# Patient Record
Sex: MALE | Race: WHITE | NOT HISPANIC OR LATINO | Employment: UNEMPLOYED | URBAN - METROPOLITAN AREA
[De-identification: names, ages, dates, MRNs, and addresses within clinical notes are randomized per-mention and may not be internally consistent; named-entity substitution may affect disease eponyms.]

---

## 2017-01-19 ENCOUNTER — TRANSFERRED RECORDS (OUTPATIENT)
Dept: HEALTH INFORMATION MANAGEMENT | Facility: CLINIC | Age: 32
End: 2017-01-19

## 2017-03-15 ENCOUNTER — TRANSFERRED RECORDS (OUTPATIENT)
Dept: HEALTH INFORMATION MANAGEMENT | Facility: CLINIC | Age: 32
End: 2017-03-15

## 2018-07-20 ENCOUNTER — TRANSFERRED RECORDS (OUTPATIENT)
Dept: HEALTH INFORMATION MANAGEMENT | Facility: CLINIC | Age: 33
End: 2018-07-20

## 2018-08-10 PROBLEM — R25.1 TREMOR: Status: ACTIVE | Noted: 2018-08-10

## 2018-08-13 NOTE — TELEPHONE ENCOUNTER
FUTURE VISIT INFORMATION      FUTURE VISIT INFORMATION:    Date: 08/17/2018    Time: 1:15 pm     [place] [facility] [place]FORMATION:    Referring provider:  Brianna Mckenzie,    Referring providers clinic:      Reason for visit/diagnosis        NOTES (FOR ALL VISITS) STATUS DETAILS   OFFICE NOTE from referring provider Internal 08/17/2018   OFFICE NOTE from other specialist N/A    DISCHARGE SUMMARY from hospital N/A    DISCHARGE REPORT from the ER Care Everywhere 07/14/2017   OPERATIVE REPORT N/A    MEDICATION LIST NA     IMAGING  (FOR ALL VISITS)     EMG N/A    EEG N/A    ECT N/A    MRI (HEAD, NECK, SPINE) Internal PACS  12/08/2009, 03/15/2017   CT (HEAD, NECK, SPINE) N/A    OTHER

## 2018-08-17 ENCOUNTER — PRE VISIT (OUTPATIENT)
Dept: NEUROLOGY | Facility: CLINIC | Age: 33
End: 2018-08-17

## 2018-08-17 PROBLEM — R56.9 SEIZURE (H): Status: ACTIVE | Noted: 2018-08-17

## 2018-09-04 ENCOUNTER — OFFICE VISIT (OUTPATIENT)
Dept: NEUROLOGY | Facility: CLINIC | Age: 33
End: 2018-09-04
Payer: COMMERCIAL

## 2018-09-04 ENCOUNTER — PRE VISIT (OUTPATIENT)
Dept: NEUROLOGY | Facility: CLINIC | Age: 33
End: 2018-09-04

## 2018-09-04 VITALS
RESPIRATION RATE: 16 BRPM | HEART RATE: 62 BPM | OXYGEN SATURATION: 98 % | TEMPERATURE: 97.3 F | WEIGHT: 177.1 LBS | BODY MASS INDEX: 23.47 KG/M2 | SYSTOLIC BLOOD PRESSURE: 134 MMHG | DIASTOLIC BLOOD PRESSURE: 82 MMHG | HEIGHT: 73 IN

## 2018-09-04 DIAGNOSIS — R25.1 TREMOR: Primary | ICD-10-CM

## 2018-09-04 DIAGNOSIS — R25.1 TREMOR: ICD-10-CM

## 2018-09-04 PROCEDURE — 82390 ASSAY OF CERULOPLASMIN: CPT | Performed by: PSYCHIATRY & NEUROLOGY

## 2018-09-04 ASSESSMENT — PAIN SCALES - GENERAL: PAINLEVEL: NO PAIN (0)

## 2018-09-04 NOTE — TELEPHONE ENCOUNTER
FUTURE VISIT INFORMATION:    Date: 09/04/2018 Rescheduled from  08/17/2018    Time: 1:40 pm     Location[place] [facility] [place]   REFERRAL INFORMATION:    Referring provider:  Brianna Mckenzie,    Referring providers clinic:      Reason for visit/diagnosis          NOTES (FOR ALL VISITS) STATUS DETAILS   OFFICE NOTE from referring provider Internal 08/17/2018   OFFICE NOTE from other specialist N/A     DISCHARGE SUMMARY from hospital N/A     DISCHARGE REPORT from the ER Care Everywhere 07/14/2017   OPERATIVE REPORT N/A     MEDICATION LIST NA      IMAGING  (FOR ALL VISITS)       EMG N/A     EEG N/A     ECT N/A     MRI (HEAD, NECK, SPINE) Internal PACS  12/08/2009, 03/15/2017   CT (HEAD, NECK, SPINE) N/A     OTHER

## 2018-09-04 NOTE — NURSING NOTE
Chief Complaint   Patient presents with     Tremors     P NEW MOVEMENT DISORDER, CONSULTATION     Skyler Joy, EMT

## 2018-09-04 NOTE — PROGRESS NOTES
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 Most notable on exam are rigidity and bradykinesia, confined largely to the left arm although there is some subtler possible involvement of the face with hypomimia and right arm with milder rigidity. There was no clear resting tremor observed today, more a jerky action tremor.     Etiology is not clear at this point. Will evaluate for secondary cause for parkinsonism, notably Renny's Disease or a mass lesion causing hemiparkinonism. Will also pursure GEETA scan. If this were to be Parkinson Disease, age of onset is quite young and a genetic etiology would have to be considered.    Recommendations:   -ceruloplasmin and 24 hour urine copper  -MRI brain  -GEETA scan  -genetics consult with kelsi Sorto    Will follow-up with me in one month to discuss above results    Jimbo Love MD  Movement Disorders Fellow    Patient seen and examined by me today September 4, 2018  Ramesh carson md.   September 4, 2018    Ramesh Carson MD      [facility] - New Patient    CC: tremor    HPI: Dipesh Nicole is a 32 year-old right-handed male with a history of a single reported seizure who presents due to left hand tremor. He reports that about a year ago while holding his left hand on the steering wheel he would start to notice difficulty tapping out a rhythm and tremor of the hand. He reports the tremor comes more when he is trying to use the hand and less when it is relaxed. The hand has also become more difficult to use is certain situations. For example putting his hand in his pocket, making piano playing movements are difficult. 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 He also more recently noted that his left toe would extend while driving. He feels like he has been trying suppress the tremor, and thus not swinging his  "left arm while walking.    He denies anosmia, dream enactment behavior, right body symptoms. No cognitive changes, mood changes, dysphagia, or orthostasis. Rolling over in bed is a little harder, no difficulty rising from a chair. No drooling. No facial expression or speech changes.    He had a convulsion in 2009. It occurred after eating breakfast while deer hunting. He had a felt like he was \"sleepwalking\" while cleaning the campsite, then lost consciousness. He was witnessed to fall over a convulse for about 10 minutes. He had an MRI performed but does not remember an EEG. No further events.      Past Medical History:   Diagnosis Date     H/O magnetic resonance imaging of brain and brain stem 8/17/2018 2009 December MR Brain without and with IV [id number]/8/2009 [facility] Result Impression  Normal MRI of the brain.  Result Narrative      Multiecho, multiplanar views of the brain were obtained prior to and  following the IV administration of 19 mL of contrast. There are no  previous studies available for comparison.     The brain parenchyma is normal in appearance on all pulse sequences,  with      H/O magnetic resonance imaging of cervical spine 8/17/2018 March MR Cervical Spine without IV Contrast3/15/2017 [facility] Result Addenda Addendum by Provider, PA Jones on 03/15/2017 12:30 PM RAD^^^MA MR Cervical Spine w/o contrast 3/15/2017 12:30:00 Result Impression  Minimal degenerative disc disease at C5-6 and C6-7  otherwise an unremarkable MR scan of the cervical spine.  Result Narrative   EXAM: MR Cervical Spine w/o contrast   INDICATION:      H/O shoulder surgery 8/17/2018 2017 July XR SHOULDER 2 VIEWS LEFT7/14/2017 [facility] Result Narrative XR SHOULDER 2 VIEWS LEFT 7/14/2017 9:13 PM  INDICATION: Shoulder Injury COMPARISON: None.  FINDINGS: Negative shoulder. No fracture or dislocation.  Status       Seizure (H) at age 22 yrs 8/17/2018     Shoulder " "dislocation     left     Tremor 8/10/2018     Past Surgical History:   Procedure Laterality Date     SHOULDER SURGERY  2007       No current outpatient prescriptions on file.     No current facility-administered medications for this visit.        Social History     Social History Narrative    single. lives in [facility]. mother cabrera ambriz            He has a history of a left dislocated shoulder with surgery performed in 2009. He currently lives in [place] but comes to the [place] area to visit his parents in [place]. 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           Family History   Problem Relation Age of Onset     Cancer Other      grandmother     Tremor No family hx of      Dementia No family hx of      Parkinsonism No family hx of      Seizure Disorder No family hx of        Review of Systems noted below, or as above in HPI    Exam:  /82  Pulse 62  Temp 97.3  F (36.3  C) (Oral)  Resp 16  Ht 1.854 m (6' 1\")  Wt 80.3 kg (177 lb 1.6 oz)  SpO2 98%  BMI 23.37 kg/m2    Neurological Examination (R/L):  Mental Status: Awake, alert. Fluent. Affect slightly anxious.  Cranial Nerves: Visual fields intact to confrontation. Versions full. Saccades intact. Question slightly decreased frequency of eye blinking. No hypophonia. Tongue protrudes midline without fasciculations. No visual extinction to DST. No apraxia of left arm.  Motor: Moderate/severe bradykinesia with left hand finger tapping, grasping, and pronation-supination. Slight bradykinesia on left heel stomping, but toe tapping normal bilaterally. No right arm bradykinesia. Tone moderate in neck, as well as left arm and milder in left leg. Increased tone with augmentation in right arm. No resting tremor. Irregular low amplitude jerky tremor with posture and kinetic in left arm.  Sensory: Light touch intact in all four extremities. No extinction to DST. Vibration intact at the toes bilaterally. Romberg negative.  Reflexes: " Biceps 2/2 Triceps 2/2 Brachioradialis 2/2 Patellar 2/2 Achilles 2/2. Toes were downgoing bilaterally.  Coordination: Finger to nose without dysmetria.  Gait: Can rise from chair with arms crossed. Gait narrow-based with good posture. Decreased left arm swing. Tandem and retropulsion normal.      _____________________________________________________________________  PATIENT: Dipesh Nicole  32 year old male   : 1985  LAUREL: 2018    Consult requested by Dr. Mckenzie        Outside records reviewed and revealed   Inserted.       History obtained from patient      History of Present Illness  33 yo with tremor      Norco  Hydrocodone=acetaminophen    MR Cervical Spine without IV Contrast3/15/2017  [facility]  Result Addenda   Addendum by Provider, PA Jones on 03/15/2017 12:30 PM  RAD^^^MA  MR Cervical Spine w/o contrast  3/15/2017 12:30:00   Result Impression    Minimal degenerative disc disease at C5-6 and C6-7   otherwise an unremarkable MR scan of the cervical spine.    Result Narrative       EXAM: MR Cervical Spine w/o contrast     INDICATION: left hand weakness     COMPARISON: None.      Procedure: Sagittal short and long TR and STIR images of the cervical   spine were followed by axial 2-D merge and long TR images through the   disc spaces. Oblique sagittal long TR images within obtained through   the neural foramina bilaterally.     FINDINGS: There is a normal cervical lordosis. The vertebral body   heights and intervertebral disc spaces are maintained. The marrow   signal is normal in all sequences. The C1-2 relationship is normal. No   prevertebral soft tissue swelling is seen. The C7-T1 relationship is   normal.     Axial images at C2-3, C3-4, C4-5 demonstrate no disc bulging or   herniation. The neural foramina are widely patent and the posterior   facets are intact.     Axial images at C5-6 and C6-7 demonstrate minimal focal annular   bulging in the midline without significant  encroachment on the thecal   sac. The neural foramina are widely patent and the posterior facets   are intact.     Axial images at C7-T1 demonstrate no disc bulging or herniation. The   neural foramina are        2009 December  MR Brain without and with IV Gkikswul64/8/2009  [facility]  Result Impression    Normal MRI of the brain.    Result Narrative           Multiecho, multiplanar views of the brain were obtained prior to and   following the IV administration of 19 mL of contrast. There are no   previous studies available for comparison.       The brain parenchyma is normal in appearance on all pulse sequences,   with no intracranial hemorrhage or evidence of an acute stroke. There   is no pathological enhancement. The ventricles are normal in size,   shape and position, with no shift in midline structures or other   evidence of significant mass effect. There is no sclerosis of the   mesial temporal lobe on either side to suggest a cause for this   patient's apparent seizure activity.       There are normal flow-voids within the internal carotid and   vertebrobasilar arterial systems bilaterally. The visualized aspects   of the orbits are normal.              Medications                                                                                                                                                                                                                  14 Review of systems  are negative except for   Patient Active Problem List   Diagnosis     Tremor     Seizure (H) at age 22 yrs     H/O magnetic resonance imaging of cervical spine     H/O shoulder surgery     H/O magnetic resonance imaging of brain and brain stem     Shoulder dislocation      No Known Allergies  Past Surgical History:   Procedure Laterality Date     SHOULDER SURGERY  2007     Past Medical History:   Diagnosis Date     H/O magnetic resonance imaging of brain and brain stem 8/17/2018 2009 December MR Brain without and  with IV Jojzqmih96/8/2009 [facility] Result Impression  Normal MRI of the brain.  Result Narrative      Multiecho, multiplanar views of the brain were obtained prior to and  following the IV administration of 19 mL of contrast. There are no  previous studies available for comparison.     The brain parenchyma is normal in appearance on all pulse sequences,  with      H/O magnetic resonance imaging of cervical spine 8/17/2018 March MR Cervical Spine without IV Contrast3/15/2017 [facility] Result Addenda Addendum by Provider, PA Jones on 03/15/2017 12:30 PM RAD^^^MA MR Cervical Spine w/o contrast 3/15/2017 12:30:00 Result Impression  Minimal degenerative disc disease at C5-6 and C6-7  otherwise an unremarkable MR scan of the cervical spine.  Result Narrative   EXAM: MR Cervical Spine w/o contrast   INDICATION:      H/O shoulder surgery 8/17/2018 2017 July XR SHOULDER 2 VIEWS LEFT7/14/2017 [facility] Result Narrative XR SHOULDER 2 VIEWS LEFT 7/14/2017 9:13 PM  INDICATION: Shoulder Injury COMPARISON: None.  FINDINGS: Negative shoulder. No fracture or dislocation.  Status       Seizure (H) at age 22 yrs 8/17/2018     Shoulder dislocation     left     Tremor 8/10/2018     Social History     Social History     Marital status: Single     Spouse name: N/A     Number of children: N/A     Years of education: N/A     Occupational History     Not on file.     Social History Main Topics     Smoking status: Never Smoker     Smokeless tobacco: Never Used     Alcohol use No     Drug use: No     Sexual activity: Not on file     Other Topics Concern     Not on file     Social History Narrative    single. lives in [facility]. mother cabrera ambriz            He has a history of a left dislocated shoulder with surgery performed in 2009. He currently lives in [place] but comes to the [place] area to visit his parents in [place]. He works as a full-time  throughout the  "year.         Family History   Problem Relation Age of Onset     Cancer Other      grandmother     Tremor No family hx of      Dementia No family hx of      Parkinsonism No family hx of      Seizure Disorder No family hx of      No current outpatient prescriptions on file.     Examination  B/P: Data Unavailable, T: Data Unavailable, P: Data Unavailable, R: Data Unavailable 177 lbs 1.6 oz  Blood pressure 134/82, pulse 62, temperature 97.3  F (36.3  C), temperature source Oral, resp. rate 16, height 1.854 m (6' 1\"), weight 80.3 kg (177 lb 1.6 oz), SpO2 98 %., Body mass index is 23.37 kg/(m^2).    Vitals signs were added and reviewed if not above. Please refer to the chart from this visit.    He has obvious asymmetric features with tremor, rigidity and bradykinesia on the left.     ----------------------------------------------------------------------------------------------------------------------------------------------        32 year old male   : 1985  LAUREL:      No show        2018     Consult requested by Dr. Mckenzie           Outside records reviewed and revealed  =inserted.         History obtained from patient        History of Present Illness  31 yo man with tremor           Medications                                                                                                                                                                                                                                                                                                                                                                          14 Review of systems  are negative except for       Patient Active Problem List   Diagnosis     Tremor       Allergies not on file   Past Surgical History    No past surgical history on file.      Past Medical History         Past Medical History:   Diagnosis Date     Tremor 8/10/2018          Social History    Social History            Social History     " Marital status: Single       Spouse name: N/A     Number of children: N/A     Years of education: N/A          Occupational History     Not on file.           Social History Main Topics     Smoking status: Not on file     Smokeless tobacco: Not on file     Alcohol use Not on file     Drug use: Not on file     Sexual activity: Not on file           Other Topics Concern     Not on file      Social History Narrative     single. lives in [facility]. mother cabrera ambriz          Family History    No family history on file.      Current Outpatient Prescriptions    No current outpatient prescriptions on file.               Patient Demographics  - 32 y.o. Male, born Oct. 27, 1985     Patient Address Communication Language Race / Ethnicity   [address] [place] [address]  [phone] (Home)  [phone]  English - Spoken (Preferred) White / Not  or    Allergies    No Known Allergies  Current Medications    Prescription Sig. Disp. Refills Start Date End Date Status   rx HYDROcodone-acetaminophen, 5-325 mg, (NORCO) tablet (ED DC MED)    Indications: Acute pain of left shoulder Take 1 tablet by mouth every 4 hours if needed 4 tablet   0 07/14/2017    Active   Active Problems    Not on file    Surgical History    Surgery Date Laterality Comments   SHOULDER SURGERY 2007 Left      Social History    Tobacco Use Types Packs/Day Years Used Date   Never Smoker               Smokeless Tobacco: Never Used               Alcohol Use Drinks/Week oz/Week Comments   Yes       occasionally      Sex Assigned at Birth Date Recorded   Not on file            Patient Demographics  - 32 y.o. Male, born Oct. 27, 1985     Patient Address Communication Language Race / Ethnicity   [address]  [place] [address]  [phone] (Home)  [email or web address]  English - Written (Preferred) White / Not  or    Source Comments  - [facility]    Please be aware that records only contain data for  patients seen at the [facility] and [facility] in [place], [place]. [facility]. For patient requestsfor these sites, Please contact [phone] M-F 8:00 AM - 5:00 PM [employer]. For emergent after hours requests, please call [phone].  Allergies    No Known Allergies  Current Medications    No known medications  Active Problems    No known active problems    Encounters  - from Last 3 Months    Date Type Specialty Care Team Description   07/20/2018 Comprehensive Visit Family Medicine Artie Chandler D.O.   Pain Neck (Primary Dx);   Dysfunction Somatic Head Region;   Dysfunction Somatic Cervical Region;   Dysfunction Somatic Upper Extremity   Immunizations  Reconcile with Patient's Chart    Name Dates Previously Given Next Due   HepA Adult 10/24/2011      Influenza, Unspecified 10/24/2011      Tdap 10/24/2011      Surgical History    Surgery Date Laterality Comments   SHOULDER SURGERY    Left      Medical History    Medical History Date Comments   Dislocation Recurrent Shoulder Left         Social History    Tobacco Use Types Packs/Day Years Used Date   Never Smoker               Smokeless Tobacco: Never Used               Alcohol Use Drinks/Week oz/Week Comments   Yes               Sex Assigned at Birth Date Recorded   Not on file           Progress Notes  - in this encounter    Artie Chandelr D.O. - 07/20/2018 9:15 AM CDT  Formatting of this note may be different from the original.  SUBJECTIVE     Dipesh Nicole is a 32 y.o. male who comes to the clinic for   Chief Complaint   Patient presents with     Torso Pain   Left sided for 1 year     Neck Pain   on right worsening x 2 months     Annalise comes to the clinic today to discuss right-sided neck pain. He also is in need of a annual physical but would like to concentrate on his pain today. He states this has presently been present since February but has been worsening over the last  2 months. He describes the pain as neck in 5/10 in quality. He will occasionally have clicking in his jaw when he yawns or swallows. When this happens he reports his left arm will also start shaking. He does not know of any aggravating or relieving factors. He does notice that he is stiff in the mornings.    He also describes pain on his left side and can feel clicking when he bends over and twists. This pain will also be in his arm PET when he brings his shoulders back.    He has a history of a left dislocated shoulder with surgery performed in 2009. He currently lives in [place] but comes to the [place] area to visit his parents in [place]. 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    I have reviewed the patient's medication list, allergies, and problem list.    REVIEW OF SYSTEMS  Positive review of systems as per HPI. Patient also describes essential tremor in his hands.    OBJECTIVE     VITAL SIGNS  Temperature: [36.3  C] 36.3  C  Resp Rate: [16] 16  Blood Pressure: (118)/(62) 118/62  SpO2: [98 %] 98 %  Height: [183 cm] 183 cm  Weight: [80.3 kg] 80.3 kg  BSA (Calculated - sq m): [2.02 sq meters] 2.02 sq meters  BMI (Calculated): [24 kg/m ] 24 kg/m   Pulse Rate: [73] 73    PHYSICAL EXAM  Constitutional: He is oriented to person, place, and time. He appears well-developed and well-nourished. No distress.     Neurological: Alert and oriented to person, place, and time. There is normal strength and normal reflexes. Negative Spurling's test bilaterally. No sensory deficit. Gait normal.   Musculoskeletal: Decreased passive range of motion in neck flexion and left side bending. No clicking noticed in TMJ. No deviation of the jaw with opening and closing of the mouth.  Skin: Skin is warm, dry and intact. Capillary refill takes less than 2 seconds. No rash noted. No pallor.     HENT:   Head:     Musculoskeletal:   Back:    Osteopathic exam:  OA FRlSr  C2 Left anterior TP  C2 FRrSr  C7 FRrSr  B/l  thight trapezoid musculature  R splenius cervicis hypertonicity    Nursing note and vitals reviewed.    LABS AND DIAGNOSTICS    None performed today.    ASSESSMENT / PLAN     #1 Pain Neck  #2 Dysfunction Somatic Head Region  #3 Dysfunction Somatic Cervical Region  #4 Dysfunction Somatic Upper Extremity  Patient describes chronic pain which is most likely due to his career as a . He does a lot of overhead throwing throughout the day. Also possible that the patient has some TMJ dysfunction on the right. He osteopathic evaluation was performed with somatic dysfunction as described above. The patient agreed osteopathic treatment today. Somatic dysfunction was treated using strain counterstrain, facilitated positional release, and muscle energy techniques. The patient described great improvement in pain. He was instructed to drink plenty of water today. He will follow up with me in 3 weeks for his neck pain and possible osteopathic treatment as needed.    Risks, benefits, and alternatives to the above assessment and plan were discussed with the patient who is in agreement with this moving forward. All of patient's questions were answered to the best of my ability at this time.    This patient was precepted with Dr. Seo.    Artie Chandler D.O. PGY2

## 2018-09-04 NOTE — Clinical Note
9/4/2018       RE: Dipesh Nicole  [address]  [place] [address]     Dear Colleague,    Thank you for referring your patient, Dipesh Nicole, to the [facility]UR[facility] at [facility]. Please see a copy of my visit note below.    No notes on file    Again, thank you for allowing me to participate in the care of your patient.      Sincerely,    Ramesh Carson MD

## 2018-09-04 NOTE — LETTER
9/4/2018       RE: Dipesh Nicole  [address]  [place] [address]     Dear Colleague,    Thank you for referring your patient, Dipesh Nicole, to the [facility] NEUROLOGY at [facility]. Please see a copy of my visit note below.          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 Most notable on exam are rigidity and bradykinesia, confined largely to the left arm although there is some subtler possible involvement of the face with hypomimia and right arm with milder rigidity. There was no clear resting tremor observed today, more a jerky action tremor.     Etiology is not clear at this point. Will evaluate for secondary cause for parkinsonism, notably Renny's Disease or a mass lesion causing hemiparkinonism. Will also pursure GEETA scan. If this were to be Parkinson Disease, age of onset is quite young and a genetic etiology would have to be considered.    Recommendations:   -ceruloplasmin and 24 hour urine copper  -MRI brain  -GEETA scan  -genetics consult with kelsi Sorto    Will follow-up with me in one month to discuss above results    Jimbo Love MD  Movement Disorders Fellow    Patient seen and examined by me today September 4, 2018  Ramesh carson md.   September 4, 2018    Ramesh Carson MD      [facility] - New Patient    CC: tremor    HPI: Dipesh Nicole is a 32 year-old right-handed male with a history of a single reported seizure who presents due to left hand tremor. He reports that about a year ago while holding his left hand on the steering wheel he would start to notice difficulty tapping out a rhythm and tremor of the hand. He reports the tremor comes more when he is trying to use the hand and less when it is relaxed. The hand has also become more difficult to use is certain situations. For example putting his  "hand in his pocket, making piano playing movements are difficult. 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 He also more recently noted that his left toe would extend while driving. He feels like he has been trying suppress the tremor, and thus not swinging his left arm while walking.    He denies anosmia, dream enactment behavior, right body symptoms. No cognitive changes, mood changes, dysphagia, or orthostasis. Rolling over in bed is a little harder, no difficulty rising from a chair. No drooling. No facial expression or speech changes.    He had a convulsion in 2009. It occurred after eating breakfast while deer hunting. He had a felt like he was \"sleepwalking\" while cleaning the campsite, then lost consciousness. He was witnessed to fall over a convulse for about 10 minutes. He had an MRI performed but does not remember an EEG. No further events.      Past Medical History:   Diagnosis Date     H/O magnetic resonance imaging of brain and brain stem 8/17/2018 2009 December MR Brain without and with IV Gyrfcbci13/8/2009 [facility] Result Impression  Normal MRI of the brain.  Result Narrative      Multiecho, multiplanar views of the brain were obtained prior to and  following the IV administration of 19 mL of contrast. There are no  previous studies available for comparison.     The brain parenchyma is normal in appearance on all pulse sequences,  with      H/O magnetic resonance imaging of cervical spine 8/17/2018 March MR Cervical Spine without IV Contrast3/15/2017 [facility] Result Addenda Addendum by Provider, PA Jones on 03/15/2017 12:30 PM RAD^^^MA MR Cervical Spine w/o contrast 3/15/2017 12:30:00 Result Impression  Minimal degenerative disc disease at C5-6 and C6-7  otherwise an unremarkable MR scan of the cervical spine.  Result Narrative   EXAM: MR Cervical Spine w/o contrast   INDICATION:      H/O shoulder surgery 8/17/2018 2017 July XR SHOULDER 2 VIEWS " "LEFT7/14/2017 [facility] Result Narrative XR SHOULDER 2 VIEWS LEFT 7/14/2017 9:13 PM  INDICATION: Shoulder Injury COMPARISON: None.  FINDINGS: Negative shoulder. No fracture or dislocation.  Status       Seizure (H) at age 22 yrs 8/17/2018     Shoulder dislocation     left     Tremor 8/10/2018     Past Surgical History:   Procedure Laterality Date     SHOULDER SURGERY  2007       No current outpatient prescriptions on file.     No current facility-administered medications for this visit.        Social History     Social History Narrative    single. lives in [facility] [place] [facility]. mother cabrera ambriz            He has a history of a left dislocated shoulder with surgery performed in 2009. He currently lives in [place] but comes to the [place] area to visit his parents in [place]. 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           Family History   Problem Relation Age of Onset     Cancer Other      grandmother     Tremor No family hx of      Dementia No family hx of      Parkinsonism No family hx of      Seizure Disorder No family hx of        Review of Systems noted below, or as above in HPI    Exam:  /82  Pulse 62  Temp 97.3  F (36.3  C) (Oral)  Resp 16  Ht 1.854 m (6' 1\")  Wt 80.3 kg (177 lb 1.6 oz)  SpO2 98%  BMI 23.37 kg/m2    Neurological Examination (R/L):  Mental Status: Awake, alert. Fluent. Affect slightly anxious.  Cranial Nerves: Visual fields intact to confrontation. Versions full. Saccades intact. Question slightly decreased frequency of eye blinking. No hypophonia. Tongue protrudes midline without fasciculations. No visual extinction to DST. No apraxia of left arm.  Motor: Moderate/severe bradykinesia with left hand finger tapping, grasping, and pronation-supination. Slight bradykinesia on left heel stomping, but toe tapping normal bilaterally. No right arm bradykinesia. Tone moderate in neck, as well as left arm and milder in left leg. " Increased tone with augmentation in right arm. No resting tremor. Irregular low amplitude jerky tremor with posture and kinetic in left arm.  Sensory: Light touch intact in all four extremities. No extinction to DST. Vibration intact at the toes bilaterally. Romberg negative.  Reflexes: Biceps 2/2 Triceps 2/2 Brachioradialis 2/2 Patellar 2/2 Achilles 2/2. Toes were downgoing bilaterally.  Coordination: Finger to nose without dysmetria.  Gait: Can rise from chair with arms crossed. Gait narrow-based with good posture. Decreased left arm swing. Tandem and retropulsion normal.      _____________________________________________________________________  PATIENT: Dipesh Nicole  32 year old male   : 1985  LAUREL: 2018    Consult requested by Dr. Mckenzie        Outside records reviewed and revealed   Inserted.       History obtained from patient      History of Present Illness  31 yo with tremor      Norco  Hydrocodone=acetaminophen    MR Cervical Spine without IV Contrast3/15/2017  [facility]  Result Addenda   Addendum by Provider, PA Jones on 03/15/2017 12:30 PM  RAD^^^MA  MR Cervical Spine w/o contrast  3/15/2017 12:30:00   Result Impression    Minimal degenerative disc disease at C5-6 and C6-7   otherwise an unremarkable MR scan of the cervical spine.    Result Narrative       EXAM: MR Cervical Spine w/o contrast     INDICATION: left hand weakness     COMPARISON: None.      Procedure: Sagittal short and long TR and STIR images of the cervical   spine were followed by axial 2-D merge and long TR images through the   disc spaces. Oblique sagittal long TR images within obtained through   the neural foramina bilaterally.     FINDINGS: There is a normal cervical lordosis. The vertebral body   heights and intervertebral disc spaces are maintained. The marrow   signal is normal in all sequences. The C1-2 relationship is normal. No   prevertebral soft tissue swelling is seen. The C7-T1 relationship is    normal.     Axial images at C2-3, C3-4, C4-5 demonstrate no disc bulging or   herniation. The neural foramina are widely patent and the posterior   facets are intact.     Axial images at C5-6 and C6-7 demonstrate minimal focal annular   bulging in the midline without significant encroachment on the thecal   sac. The neural foramina are widely patent and the posterior facets   are intact.     Axial images at C7-T1 demonstrate no disc bulging or herniation. The   neural foramina are        2009 December  MR Brain without and with IV Taurudue62/8/2009  [facility]  Result Impression    Normal MRI of the brain.    Result Narrative           Multiecho, multiplanar views of the brain were obtained prior to and   following the IV administration of 19 mL of contrast. There are no   previous studies available for comparison.       The brain parenchyma is normal in appearance on all pulse sequences,   with no intracranial hemorrhage or evidence of an acute stroke. There   is no pathological enhancement. The ventricles are normal in size,   shape and position, with no shift in midline structures or other   evidence of significant mass effect. There is no sclerosis of the   mesial temporal lobe on either side to suggest a cause for this   patient's apparent seizure activity.       There are normal flow-voids within the internal carotid and   vertebrobasilar arterial systems bilaterally. The visualized aspects   of the orbits are normal.              Medications                                                                                                                                                                                                                  14 Review of systems  are negative except for   Patient Active Problem List   Diagnosis     Tremor     Seizure (H) at age 22 yrs     H/O magnetic resonance imaging of cervical spine     H/O shoulder surgery     H/O magnetic resonance imaging of brain and brain  stem     Shoulder dislocation      No Known Allergies  Past Surgical History:   Procedure Laterality Date     SHOULDER SURGERY  2007     Past Medical History:   Diagnosis Date     H/O magnetic resonance imaging of brain and brain stem 8/17/2018 2009 December MR Brain without and with IV Ggyeihbt25/8/2009 [facility] Result Impression  Normal MRI of the brain.  Result Narrative      Multiecho, multiplanar views of the brain were obtained prior to and  following the IV administration of 19 mL of contrast. There are no  previous studies available for comparison.     The brain parenchyma is normal in appearance on all pulse sequences,  with      H/O magnetic resonance imaging of cervical spine 8/17/2018 March MR Cervical Spine without IV Contrast3/15/2017 [facility] Result Addenda Addendum by Provider, PA Jones on 03/15/2017 12:30 PM RAD^^^MA MR Cervical Spine w/o contrast 3/15/2017 12:30:00 Result Impression  Minimal degenerative disc disease at C5-6 and C6-7  otherwise an unremarkable MR scan of the cervical spine.  Result Narrative   EXAM: MR Cervical Spine w/o contrast   INDICATION:      H/O shoulder surgery 8/17/2018 2017 July XR SHOULDER 2 VIEWS LEFT7/14/2017 [facility] Result Narrative XR SHOULDER 2 VIEWS LEFT 7/14/2017 9:13 PM  INDICATION: Shoulder Injury COMPARISON: None.  FINDINGS: Negative shoulder. No fracture or dislocation.  Status       Seizure (H) at age 22 yrs 8/17/2018     Shoulder dislocation     left     Tremor 8/10/2018     Social History     Social History     Marital status: Single     Spouse name: N/A     Number of children: N/A     Years of education: N/A     Occupational History     Not on file.     Social History Main Topics     Smoking status: Never Smoker     Smokeless tobacco: Never Used     Alcohol use No     Drug use: No     Sexual activity: Not on file     Other Topics Concern     Not on file     Social History Narrative    single.  "lives in [facility]. mother cabrera ambriz            He has a history of a left dislocated shoulder with surgery performed in . He currently lives in [place] but comes to the [place] area to visit his parents in [place]. 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         Family History   Problem Relation Age of Onset     Cancer Other      grandmother     Tremor No family hx of      Dementia No family hx of      Parkinsonism No family hx of      Seizure Disorder No family hx of      No current outpatient prescriptions on file.     Examination  B/P: Data Unavailable, T: Data Unavailable, P: Data Unavailable, R: Data Unavailable 177 lbs 1.6 oz  Blood pressure 134/82, pulse 62, temperature 97.3  F (36.3  C), temperature source Oral, resp. rate 16, height 1.854 m (6' 1\"), weight 80.3 kg (177 lb 1.6 oz), SpO2 98 %., Body mass index is 23.37 kg/(m^2).    Vitals signs were added and reviewed if not above. Please refer to the chart from this visit.    He has obvious asymmetric features with tremor, rigidity and bradykinesia on the left.     ----------------------------------------------------------------------------------------------------------------------------------------------        32 year old male   : 1985  LAUREL:      No show        2018     Consult requested by Dr. Mckenzie           Outside records reviewed and revealed  =inserted.         History obtained from patient        History of Present Illness  33 yo man with tremor           Medications                                                                                                                                                                                                                                                                                                                                                                          14 Review of systems  are negative except for       Patient Active " Problem List   Diagnosis     Tremor       Allergies not on file   Past Surgical History    No past surgical history on file.      Past Medical History         Past Medical History:   Diagnosis Date     Tremor 8/10/2018          Social History    Social History            Social History     Marital status: Single       Spouse name: N/A     Number of children: N/A     Years of education: N/A          Occupational History     Not on file.           Social History Main Topics     Smoking status: Not on file     Smokeless tobacco: Not on file     Alcohol use Not on file     Drug use: Not on file     Sexual activity: Not on file           Other Topics Concern     Not on file          Social History Narrative     single. lives in [facility]. mother cabrera ambriz          Family History    No family history on file.      Current Outpatient Prescriptions    No current outpatient prescriptions on file.               Patient Demographics  - 32 y.o. Male, born Oct. 27, 1985     Patient Address Communication Language Race / Ethnicity   [address] [place] [address]  [phone] (Home)  [phone]  English - Spoken (Preferred) White / Not  or    Allergies    No Known Allergies  Current Medications    Prescription Sig. Disp. Refills Start Date End Date Status   rx HYDROcodone-acetaminophen, 5-325 mg, (NORCO) tablet (ED DC MED)    Indications: Acute pain of left shoulder Take 1 tablet by mouth every 4 hours if needed 4 tablet   0 07/14/2017    Active   Active Problems    Not on file    Surgical History    Surgery Date Laterality Comments   SHOULDER SURGERY 2007 Left      Social History    Tobacco Use Types Packs/Day Years Used Date   Never Smoker               Smokeless Tobacco: Never Used               Alcohol Use Drinks/Week oz/Week Comments   Yes       occasionally      Sex Assigned at Birth Date Recorded   Not on file            Patient Demographics  - 32 y.o. Male, born Oct. 27, 1985     Patient Address  Communication Language Race / Ethnicity   [facility]  [place] [address]  [phone] (Home)  [email or web address]  English - Written (Preferred) White / Not  or    Source Comments  - [facility]    Please be aware that records only contain data for patients seen at the [facility] and [facility] in [place], [place]. [facility] will transition to [facility] later this year. For patient requestsfor these sites, Please contact [phone] M-F 8:00 AM - 5:00 PM CentralTime. For emergent after hours requests, please call [phone].  Allergies    No Known Allergies  Current Medications    No known medications  Active Problems    No known active problems    Encounters  - from Last 3 Months    Date Type Specialty Care Team Description   07/20/2018 Comprehensive Visit Family Medicine Artie Chandler D.O.   Pain Neck (Primary Dx);   Dysfunction Somatic Head Region;   Dysfunction Somatic Cervical Region;   Dysfunction Somatic Upper Extremity   Immunizations  Reconcile with Patient's Chart    Name Dates Previously Given Next Due   HepA Adult 10/24/2011      Influenza, Unspecified 10/24/2011      Tdap 10/24/2011      Surgical History    Surgery Date Laterality Comments   SHOULDER SURGERY    Left      Medical History    Medical History Date Comments   Dislocation Recurrent Shoulder Left         Social History    Tobacco Use Types Packs/Day Years Used Date   Never Smoker               Smokeless Tobacco: Never Used               Alcohol Use Drinks/Week oz/Week Comments   Yes               Sex Assigned at Birth Date Recorded   Not on file           Progress Notes  - in this encounter    Artie Chandler D.O. - 07/20/2018 9:15 AM CDT  Formatting of this note may be different from the original.  SUBJECTIVE     Dipesh Nicole is a 32 y.o. male who comes to the clinic for   Chief Complaint   Patient presents with     Torso Pain   Left sided for 1 year      Neck Pain   on right worsening x 2 months     Annalise comes to the clinic today to discuss right-sided neck pain. He also is in need of a annual physical but would like to concentrate on his pain today. He states this has presently been present since February but has been worsening over the last 2 months. He describes the pain as neck in 5/10 in quality. He will occasionally have clicking in his jaw when he yawns or swallows. When this happens he reports his left arm will also start shaking. He does not know of any aggravating or relieving factors. He does notice that he is stiff in the mornings.    He also describes pain on his left side and can feel clicking when he bends over and twists. This pain will also be in his arm PET when he brings his shoulders back.    He has a history of a left dislocated shoulder with surgery performed in 2009. He currently lives in [place] but comes to the [place] area to visit his parents in [place]. 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    I have reviewed the patient's medication list, allergies, and problem list.    REVIEW OF SYSTEMS  Positive review of systems as per HPI. Patient also describes essential tremor in his hands.    OBJECTIVE     VITAL SIGNS  Temperature: [36.3  C] 36.3  C  Resp Rate: [16] 16  Blood Pressure: (118)/(62) 118/62  SpO2: [98 %] 98 %  Height: [183 cm] 183 cm  Weight: [80.3 kg] 80.3 kg  BSA (Calculated - sq m): [2.02 sq meters] 2.02 sq meters  BMI (Calculated): [24 kg/m ] 24 kg/m   Pulse Rate: [73] 73    PHYSICAL EXAM  Constitutional: He is oriented to person, place, and time. He appears well-developed and well-nourished. No distress.     Neurological: Alert and oriented to person, place, and time. There is normal strength and normal reflexes. Negative Spurling's test bilaterally. No sensory deficit. Gait normal.   Musculoskeletal: Decreased passive range of motion in neck flexion and left side bending. No clicking noticed in TMJ.  No deviation of the jaw with opening and closing of the mouth.  Skin: Skin is warm, dry and intact. Capillary refill takes less than 2 seconds. No rash noted. No pallor.     HENT:   Head:     Musculoskeletal:   Back:    Osteopathic exam:  OA FRlSr  C2 Left anterior TP  C2 FRrSr  C7 FRrSr  B/l thight trapezoid musculature  R splenius cervicis hypertonicity    Nursing note and vitals reviewed.    LABS AND DIAGNOSTICS    None performed today.    ASSESSMENT / PLAN     #1 Pain Neck  #2 Dysfunction Somatic Head Region  #3 Dysfunction Somatic Cervical Region  #4 Dysfunction Somatic Upper Extremity  Patient describes chronic pain which is most likely due to his career as a . He does a lot of overhead throwing throughout the day. Also possible that the patient has some TMJ dysfunction on the right. He osteopathic evaluation was performed with somatic dysfunction as described above. The patient agreed osteopathic treatment today. Somatic dysfunction was treated using strain counterstrain, facilitated positional release, and muscle energy techniques. The patient described great improvement in pain. He was instructed to drink plenty of water today. He will follow up with me in 3 weeks for his neck pain and possible osteopathic treatment as needed.    Risks, benefits, and alternatives to the above assessment and plan were discussed with the patient who is in agreement with this moving forward. All of patient's questions were answered to the best of my ability at this time.    This patient was precepted with Dr. Seo.    Artie Chandler D.O. PGY2             Again, thank you for allowing me to participate in the care of your patient.      Sincerely,    Ramesh Carson MD

## 2018-09-04 NOTE — PATIENT INSTRUCTIONS
Tests to be done:    Ceruloplasmin blood test  24 hour urine copper    MRI of brain    GEETA scan to look at dopamine transporter

## 2018-09-04 NOTE — MR AVS SNAPSHOT
After Visit Summary   9/4/2018    Dipesh Nicole    MRN: [id number]           Patient Information     Date Of Birth          1985        Visit Information        Provider Department      9/4/2018 1:40 PM Ramesh Carson MD [facility] Neurology        Today's Diagnoses     Tremor    -  1      Care Instructions    Tests to be done:    Ceruloplasmin blood test  24 hour urine copper    MRI of brain    GEETA scan to look at dopamine transporter          Follow-ups after your visit        Your next 10 appointments already scheduled     Sep 04, 2018  3:15 PM CDT   LAB with [facility] ([facility] [place])    [address] [id number] [phone]           Please do not eat 10-12 hours before your appointment if you are coming in fasting for labs on lipids, cholesterol, or glucose (sugar). This does not apply to pregnant women. Water, hot tea and black coffee (with nothing added) are okay. Do not drink other fluids, diet soda or chew gum.            Sep 10, 2018  4:00 PM CDT   (Arrive by 3:30 PM)   MR BRAIN W/O CONTRAST with UUMR2   [facility] [place], MRI ([facility] [place])    [address] [address] [phone]           Take your medicines as usual, unless your doctor tells you not to. Bring a list of your current medicines to your exam (including vitamins, minerals and over-the-counter drugs). Also bring the results of similar scans you may have had.  Please remove any body piercings and hair extensions before you arrive.  Follow your doctor s orders. If you do not, we may have to postpone your exam.  You may or may not receive IV contrast for this exam pending the discretion of the Radiologist.  You do not need to do anything special to prepare.  The MRI machine uses a strong magnet. Please wear clothes without metal (snaps, zippers). A sweatsuit works well, or we may  give you a hospital gown.   **IMPORTANT** THE INSTRUCTIONS BELOW ARE ONLY FOR THOSE PATIENTS WHO HAVE BEEN PRESCRIBED SEDATION OR GENERAL ANESTHESIA DURING THEIR MRI PROCEDURE:  IF YOUR DOCTOR PRESCRIBED ORAL SEDATION (take medicine to help you relax during your exam):   You must get the medicine from your doctor (oral medication) before you arrive. Bring the medicine to the exam. Do not take it at home. You ll be told when to take it upon arriving for your exam.   Arrive one hour early. Bring someone who can take you home after the test. Your medicine will make you sleepy. After the exam, you may not drive, take a bus or take a taxi by yourself.  IF YOUR DOCTOR PRESCRIBED IV SEDATION:   Arrive one hour early. Bring someone who can take you home after the test. Your medicine will make you sleepy. After the exam, you may not drive, take a bus or take a taxi by yourself.   No eating 6 hours before your exam. You may have clear liquids up until 4 hours before your exam. (Clear liquids include water, clear tea, black coffee and fruit juice without pulp.)  IF YOUR DOCTOR PRESCRIBED ANESTHESIA (be asleep for your exam):   Arrive 1 1/2 hours early. Bring someone who can take you home after the test. You may not drive, take a bus or take a taxi by yourself.   No eating 8 hours before your exam. You may have clear liquids up until 4 hours before your exam. (Clear liquids include water, clear tea, black coffee and fruit juice without pulp.)   You will spend four to five hours in the recovery room.  Please call the Imaging Department at your exam site with any questions.            Oct 03, 2018  4:30 PM CDT   (Arrive by 4:15 PM)   Return Movement Disorder with  MOVEMENT DISORDER FELLOW   [facility] Neurology ([facility] [place])    [address]  [address] [facility] [address] [phone]              Future tests that were ordered for you today     Open Future Orders        Priority Expected  "Expires Ordered    MRI Brain w/o contrast Routine  9/4/2019 9/4/2018    NM Brain Image Tomographic(Spect) Datscan Routine  9/4/2019 9/4/2018    Copper urine Routine  9/4/2019 9/4/2018    Ceruloplasmin Routine  9/4/2019 9/4/2018            Who to contact     Please call your clinic at [phone] to:    Ask questions about your health    Make or cancel appointments    Discuss your medicines    Learn about your test results    Speak to your doctor            Additional Information About Your Visit        Care EveryWhere ID     This is your Care EveryWhere ID. This could be used by other organizations to access your [place] medical records  [id number]        Your Vitals Were     Pulse Temperature Respirations Height Pulse Oximetry BMI (Body Mass Index)    62 97.3  F (36.3  C) (Oral) 16 1.854 m (6' 1\") 98% 23.37 kg/m2       Blood Pressure from Last 3 Encounters:   09/04/18 134/82    Weight from Last 3 Encounters:   09/04/18 80.3 kg (177 lb 1.6 oz)              We Performed the Following     Copper urine          Today's Medication Changes          These changes are accurate as of 9/4/18  3:12 PM.  If you have any questions, ask your nurse or doctor.               Stop taking these medicines if you haven't already. Please contact your care team if you have questions.     HYDROcodone-acetaminophen 5-325 MG per tablet   Commonly known as:  NORCO   Stopped by:  Ramesh Carson MD                    Primary Care Provider Office Phone # Fax #    Joey Morley [phone]       [place] [id number]        Equal Access to Services     [facility] AH: Hadii aad ku hadasho Soomaali, waaxda luqadaha, qaybta kaalmada adeegyada, kyle rosales haysalma medel . So [facility] [phone].    ATENCIÓN: Si habla español, tiene a gonzalez disposición servicios gratuitos de asistencia lingüística. Llame al [phone].    We comply with applicable federal civil rights laws and " [place] laws. We do not discriminate on the basis of race, color, national origin, age, disability, sex, sexual orientation, or gender identity.            Thank you!     Thank you for choosing [facility] NEUROLOGY  for your care. Our goal is always to provide you with excellent care. Hearing back from our patients is one way we can continue to improve our services. Please take a few minutes to complete the written survey that you may receive in the mail after your visit with us. Thank you!             Your Updated Medication List - Protect others around you: Learn how to safely use, store and throw away your medicines at www.disposemymeds.org.      Notice  As of 9/4/2018  3:12 PM    You have not been prescribed any medications.

## 2018-09-06 LAB — CERULOPLASMIN SERPL-MCNC: 31 MG/DL (ref 23–53)

## 2018-09-10 ENCOUNTER — HOSPITAL ENCOUNTER (OUTPATIENT)
Dept: MRI IMAGING | Facility: CLINIC | Age: 33
Discharge: HOME | End: 2018-09-10
Attending: PSYCHIATRY & NEUROLOGY | Admitting: PSYCHIATRY & NEUROLOGY
Payer: COMMERCIAL

## 2018-09-10 DIAGNOSIS — R25.1 TREMOR: ICD-10-CM

## 2018-09-10 LAB
COLLECT DURATION TIME UR: 24 H
SPECIMEN VOL UR: 2790 ML

## 2018-09-10 PROCEDURE — 70551 MRI BRAIN STEM W/O DYE: CPT

## 2018-09-13 LAB
COLLECT DURATION TIME UR: 24 HR
COPPER 24H UR-MRATE: ABNORMAL UG/D (ref 3–45)
COPPER ?TM UR-MCNC: <1 UG/DL (ref 0.3–3.2)
COPPER/CREAT 24H UR: ABNORMAL UG/G CRT (ref 10–45)
CREAT 24H UR-MRATE: 1702 MG/D (ref 1000–2500)
CREAT UR-MCNC: 61 MG/DL
SPECIMEN VOL ?TM UR: 2790 ML

## 2018-10-03 ENCOUNTER — OFFICE VISIT (OUTPATIENT)
Dept: NEUROLOGY | Facility: CLINIC | Age: 33
End: 2018-10-03
Payer: COMMERCIAL

## 2018-10-03 VITALS
TEMPERATURE: 98 F | WEIGHT: 179.6 LBS | HEART RATE: 51 BPM | BODY MASS INDEX: 23.8 KG/M2 | RESPIRATION RATE: 18 BRPM | OXYGEN SATURATION: 98 % | SYSTOLIC BLOOD PRESSURE: 142 MMHG | HEIGHT: 73 IN | DIASTOLIC BLOOD PRESSURE: 92 MMHG

## 2018-10-03 VITALS
HEART RATE: 51 BPM | OXYGEN SATURATION: 98 % | RESPIRATION RATE: 18 BRPM | HEIGHT: 73 IN | BODY MASS INDEX: 23.8 KG/M2 | TEMPERATURE: 98 F | SYSTOLIC BLOOD PRESSURE: 142 MMHG | DIASTOLIC BLOOD PRESSURE: 92 MMHG | WEIGHT: 179.6 LBS

## 2018-10-03 DIAGNOSIS — G20.A1 PARKINSON DISEASE (H): Primary | ICD-10-CM

## 2018-10-03 DIAGNOSIS — G20.C PARKINSONISM (H): Primary | ICD-10-CM

## 2018-10-03 ASSESSMENT — PAIN SCALES - GENERAL
PAINLEVEL: NO PAIN (0)
PAINLEVEL: NO PAIN (0)

## 2018-10-03 NOTE — LETTER
"10/3/2018       RE: Dipesh Nicole  [address]  [place] [address]     Dear Colleague,    Thank you for referring your patient, Dipesh Nicole, to the [facility] NEUROLOGY at [facility]. Please see a copy of my visit note below.    GENETIC COUNSELING-Neurology  I met with Dipesh \"Jin\" Corey and his parents, Ritu and Sarath, for 45 minutes in the neurology clinic at [facility].  I met with them in conjunction with our movement disorders fellow, Dr. Jimbo Love. Jin was referred to me by Dr. Ramesh Carson to review family history and discuss genetic testing options for early onset Parkinson disease.    MEDICAL HISTORY-  Please see notes from Travis Carson and Ivan.  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 He began noticing difficulty using one hand/arm.  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  He has had a workup for other causes of Parkinsonism, which has been unrevealing. At this point, we are considering Parkinson disease as the likely diagnosis, but Dr. Love discussed the difficulty of establishing a definitive diagnosis in the early part of the disease.    FAMILY HISTORY-see scanned pedigree  1. Jin has a younger brother, Aung, who has no reported neurologic findings.  2. Jin's parents are both alive and neurologically healthy.  3. Jin has 17 aunts and uncles and numerous cousins.  Only a single paternal uncle is reported with cognitive impairment in his 70's. Otherwise, no significant neurologic history is reported in these family members.  4. Jin's grandparents all lived to older ages and  from non-neurologic complications.  5. Ritu and Sarath reported no known neurologic disease in more distant family members (Jin's great grand parents, great aunts, great uncles).  6. Ethnic background is Citizen of the [place], " [place], [place]. No consanguinity is reported.    GENETIC COUNSELING-  We discussed the genetic and environmental basis of Parkinson disease. I explained that in most cases, it results from a complex and lifelong interaction of multiple genetic and environmental factors. In some rare cases, there may be a single gene cause.  In Jin's case, we still strongly suspect a genetic etiology in spite of the negative family history due to his extremely young age of onset.      We discussed some of the known genetic causes of Parkinsonism.  For today's discussion, we focused on bigger picture issues such as patterns of inheritance and did not discuss each individual gene associated with hereditary Parkinsonism.    1. We discussed fully penetrant autsomal dominant forms of Parkinson disease (e.g. SNCA mutations). I explained that the lack of family history- particularly given such a large family of long-lived individuals- does argue against this possibility.  I explained that for these types of Parkinsonism, a single mutation is sufficient to cause disease and there is a 50% risk of transmitting the risk to each child.    2. We discussed autosomal recessive forms of Parkinson disease (e.g. Wharton). I explained that this would be much more consistent with the lack of family history and Jin's very young age of onset.  I explained that with this pattern of inheritance, we typically would not expect a significant family history.  This type of Parkinsonism is typically confined to a single sibship. We did discuss that if this form of Parkinsonism is identified, there would be a 25% chance that Jin's younger brother could have inherited the genetic risk.  I also explained that with this pattern of inheritance, the risks to any future children for Jin would be small.    3. We discussed autosomal dominant risk factor genes (e.g. GBA mutations). I explained that these types of mutations confer a significant increase in risk for  Parkinsonism- but they do not always cause Parkinsonism.  While we typically would not expect these types of mutations to be the sole cause of Parkinsonism in such a young onset case, we have observed these types of mutations in some of our young onset patients.    We discussed the utility of genetic testing. I explained that identifying causative mutation(s) would provide us with diagnostic certainty.  This is of critical importance for Jin as he is such a young person who is facing a possible diagnosis of a progressive neurodegenerative disease.  I explained that the some of the hereditary forms of Parkinsonism may follow a more 'benign' and slowly progressive course.  This is also of critical importance to Jin as he will likely be facing many decisions about career and/or starting a family in the near future.  This testing can also impact medical management decisions as we know that genetic forms of Parkinsonism may respond more favorably to interventions such as deep brain stimulation surgery.  Finally, this information is critical for Jin's family.  He has a younger sibling who may face a risk as high as 25% if a recessive form is identified or 50% if a dominant form is identified.    We discussed other possible results of testing including a negative test result or variants of uncertain significance.  We discussed options of pursuing testing, or waiting to think about the implications of testing before proceeding.  Jin and his family indicated that he would like to have blood drawn for the testing and we will check on prior authorization.  This will give them time to think about this testing before making a final decision.  We strongly support the decision to have testing as it could have important bearing on Jin's diagnosis, medical management, and the risks to his family members.  Jin provided written informed consent for this testing.    Kev Sorto MS, [facility]  Licensed Genetic Counselor    We are  requesting prior authorization for:  Genes:HIA57N4,AT,BSC5BP6,CHCHD2,CHMP2B,DCTN1,DNAJC6,EIF4G1,FBXO7,GBA,GRN,LRRK2,MAPT,PARK2,PARK7,PINK1,PLA2G6,RAB39B,SNCA,SNCB,SYNJ1,TAF1,QCMV962,VPS13C,VPS35    CPT codes (sequencing): 81405x1, 81406x3, 81407x1, 81479x19, 81251x1  CPT codes (CNV): 81405x1, 81479x24
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
## 2018-10-03 NOTE — MR AVS SNAPSHOT
"              After Visit Summary   10/3/2018    Dipesh Nicole    MRN: [id number]           Patient Information     Date Of Birth          1985        Visit Information        Provider Department      10/3/2018 3:30 PM Jose Maria Sorto GC [facility] Neurology        Today's Diagnoses     Parkinsonism (H)    -  1       Follow-ups after your visit        Future tests that were ordered for you today     Open Future Orders        Priority Expected Expires Ordered    [employer] Hold For Preauthorization: CUSTOM NGS; Parkinsonism sequence and CNV Routine  10/4/2019 10/3/2018            Who to contact     Please call your clinic at [phone] to:    Ask questions about your health    Make or cancel appointments    Discuss your medicines    Learn about your test results    Speak to your doctor            Additional Information About Your Visit        Swag Of The Month Information     Swag Of The Month gives you secure access to your electronic health record. If you see a primary care provider, you can also send messages to your care team and make appointments. If you have questions, please call your primary care clinic.  If you do not have a primary care provider, please call [phone] and they will assist you.      Swag Of The Month is an electronic gateway that provides easy, online access to your medical records. With Swag Of The Month, you can request a clinic appointment, read your test results, renew a prescription or communicate with your care team.     To access your existing account, please contact your [facility] or call [phone] for assistance.        Care EveryWhere ID     This is your Care EveryWhere ID. This could be used by other organizations to access your [place] medical records  [id number]        Your Vitals Were     Pulse Temperature Respirations Height Pulse Oximetry BMI (Body Mass Index)    51 98  F (36.7  C) (Oral) 18 1.842 m (6' 0.5\") 98% 24.02 kg/m2       Blood Pressure from Last 3 " Encounters:   10/03/18 (!) 142/92   10/03/18 (!) 142/92   09/04/18 134/82    Weight from Last 3 Encounters:   10/03/18 81.5 kg (179 lb 9.6 oz)   10/03/18 81.5 kg (179 lb 9.6 oz)   09/04/18 80.3 kg (177 lb 1.6 oz)               Primary Care Provider Office Phone # Fax #    Joey Morley [phone]       [place] [address] [address] [address]        Equal Access to Services     [facility] : Hadii isra ku hadasho Sorush, waaxda luqadaha, qaybta kaalmada adegrisyada, kyle medel . So [facility] [phone].    ATENCIÓN: Si habla español, tiene a gonzalez disposición servicios gratuitos de asistencia lingüística. LlKing's Daughters Medical [facility] [phone].    We comply with applicable federal civil rights laws and [place] laws. We do not discriminate on the basis of race, color, national origin, age, disability, sex, sexual orientation, or gender identity.            Thank you!     Thank you for choosing [facility] NEUROLOGY  for your care. Our goal is always to provide you with excellent care. Hearing back from our patients is one way we can continue to improve our services. Please take a few minutes to complete the written survey that you may receive in the mail after your visit with us. Thank you!             Your Updated Medication List - Protect others around you: Learn how to safely use, store and throw away your medicines at www.disposemymeds.org.      Notice  As of 10/3/2018 11:59 PM    You have not been prescribed any medications.
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
## 2018-10-03 NOTE — MR AVS SNAPSHOT
"              After Visit Summary   10/3/2018    Dipesh Nicole    MRN: [id number]           Patient Information     Date Of Birth          1985        Visit Information        Provider Department      10/3/2018 4:30 PM  MOVEMENT DISORDER FELLOW [facility] Neurology        Today's Diagnoses     Parkinson disease (H)    -  1      Care Instructions    Start taking carbidopa/levodopa 25/100 (Sinemet), half tablet three times daily.  After one week, increase the dose to 1 tab three times daily.    Protein can interfere with the absorption of Sinemet, so it is advised you take the medication 1 hour before or 2 hours after meals.    If you develop nausea with the medication you can eat crackers.     Some side effects that you can feel are lightheadedness with standing and fidgeting movements called \"dyskinesias.\"          Follow-ups after your visit        Future tests that were ordered for you today     Open Future Orders        Priority Expected Expires Ordered    [employer] Hold For Preauthorization: CUSTOM NGS; Parkinsonism sequence and CNV Routine  10/4/2019 10/3/2018            Who to contact     Please call your clinic at [phone] to:    Ask questions about your health    Make or cancel appointments    Discuss your medicines    Learn about your test results    Speak to your doctor            Additional Information About Your Visit        [employer] Information     [employer] gives you secure access to your electronic health record. If you see a primary care provider, you can also send messages to your care team and make appointments. If you have questions, please call your primary care clinic.  If you do not have a primary care provider, please call [phone] and they will assist you.      [employer] is an electronic gateway that provides easy, online access to your medical records. With [employer], you can request a clinic appointment, read your test results, renew a prescription or communicate with your " "care team.     To access your existing account, please contact your [facility] or call [phone] for assistance.        Care EveryWhere ID     This is your Care EveryWhere ID. This could be used by other organizations to access your [place] medical records  [id number]        Your Vitals Were     Pulse Temperature Respirations Height Pulse Oximetry BMI (Body Mass Index)    51 98  F (36.7  C) (Oral) 18 1.842 m (6' 0.5\") 98% 24.02 kg/m2       Blood Pressure from Last 3 Encounters:   10/03/18 (!) 142/92   10/03/18 (!) 142/92   09/04/18 134/82    Weight from Last 3 Encounters:   10/03/18 81.5 kg (179 lb 9.6 oz)   10/03/18 81.5 kg (179 lb 9.6 oz)   09/04/18 80.3 kg (177 lb 1.6 oz)              Today, you had the following     No orders found for display       Primary Care Provider Office Phone # Fax #    Joey Lucasangelia [phone]       [address] [address]        Equal Access to Services     [facility] Campus[facility]HEIL[facility] : Hadii aad ku hadasho Soomaali, waaxda luqadaha, qaybta kaalmada adeegyada, waxay idiin hayaan adegris khgeorge la'mckenzien ah. So [facility] [phone].    ATENCIÓN: Si habla español, tiene a gonzalez disposición servicios gratuitos de asistencia lingüística. Kameron al [phone].    We comply with applicable federal civil rights laws and [place] laws. We do not discriminate on the basis of race, color, national origin, age, disability, sex, sexual orientation, or gender identity.            Thank you!     Thank you for choosing [facility] NEUROLOGY  for your care. Our goal is always to provide you with excellent care. Hearing back from our patients is one way we can continue to improve our services. Please take a few minutes to complete the written survey that you may receive in the mail after your visit with us. Thank you!             Your Updated Medication List - Protect others around you: Learn how to safely use, store and throw away your " medicines at www.disposemymeds.org.      Notice  As of 10/3/2018  5:19 PM    You have not been prescribed any medications.

## 2018-10-03 NOTE — PATIENT INSTRUCTIONS
"Start taking carbidopa/levodopa 25/100 (Sinemet), half tablet three times daily.  After one week, increase the dose to 1 tab three times daily.    Protein can interfere with the absorption of Sinemet, so it is advised you take the medication 1 hour before or 2 hours after meals.    If you develop nausea with the medication you can eat crackers.     Some side effects that you can feel are lightheadedness with standing and fidgeting movements called \"dyskinesias.\"  "

## 2018-10-03 NOTE — NURSING NOTE
Chief Complaint   Patient presents with     RECHECK     [facility] RETURN MOVEMENT DISORDER     Vitals carried over from earlier appt on same day 10/3/18.    Naya Aparicio, EMT

## 2018-10-04 RX ORDER — CARBIDOPA AND LEVODOPA 25; 100 MG/1; MG/1
TABLET ORAL
Qty: 90 TABLET | Refills: 3 | Status: SHIPPED | OUTPATIENT
Start: 2018-10-04 | End: 2018-10-22

## 2018-10-04 NOTE — PROGRESS NOTES
"GENETIC COUNSELING-Neurology  I met with Dipesh \"Jin\" Corey and his parents, Ritu and Sarath, for 45 minutes in the neurology clinic at [facility].  I met with them in conjunction with our movement disorders fellow, Dr. Jimbo Love. Jin was referred to me by Dr. Ramesh Carson to review family history and discuss genetic testing options for early onset Parkinson disease.    MEDICAL HISTORY-  Please see notes from Travis Carson and Ivna.  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 He began noticing difficulty using one hand/arm.  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  He has had a workup for other causes of Parkinsonism, which has been unrevealing. At this point, we are considering Parkinson disease as the likely diagnosis, but Dr. Love discussed the difficulty of establishing a definitive diagnosis in the early part of the disease.    FAMILY HISTORY-see scanned pedigree  1. Jin has a younger brother, Aung, who has no reported neurologic findings.  2. Jin's parents are both alive and neurologically healthy.  3. Jin has 17 aunts and uncles and numerous cousins.  Only a single paternal uncle is reported with cognitive impairment in his 70's. Otherwise, no significant neurologic history is reported in these family members.  4. Jin's grandparents all lived to older ages and  from non-neurologic complications.  5. Ritu and Sarath reported no known neurologic disease in more distant family members (Jin's great grand parents, great aunts, great uncles).  6. Ethnic background is Turkish, Croatian, Faroese. No consanguinity is reported.    GENETIC COUNSELING-  We discussed the genetic and environmental basis of Parkinson disease. I explained that in most cases, it results from a complex and lifelong interaction of multiple genetic and environmental factors. In some rare cases, " there may be a single gene cause.  In Jin's case, we still strongly suspect a genetic etiology in spite of the negative family history due to his extremely young age of onset.      We discussed some of the known genetic causes of Parkinsonism.  For today's discussion, we focused on bigger picture issues such as patterns of inheritance and did not discuss each individual gene associated with hereditary Parkinsonism.    1. We discussed fully penetrant autsomal dominant forms of Parkinson disease (e.g. SNCA mutations). I explained that the lack of family history- particularly given such a large family of long-lived individuals- does argue against this possibility.  I explained that for these types of Parkinsonism, a single mutation is sufficient to cause disease and there is a 50% risk of transmitting the risk to each child.    2. We discussed autosomal recessive forms of Parkinson disease (e.g. Fan). I explained that this would be much more consistent with the lack of family history and Jin's very young age of onset.  I explained that with this pattern of inheritance, we typically would not expect a significant family history.  This type of Parkinsonism is typically confined to a single sibship. We did discuss that if this form of Parkinsonism is identified, there would be a 25% chance that Jin's younger brother could have inherited the genetic risk.  I also explained that with this pattern of inheritance, the risks to any future children for Jin would be small.    3. We discussed autosomal dominant risk factor genes (e.g. GBA mutations). I explained that these types of mutations confer a significant increase in risk for Parkinsonism- but they do not always cause Parkinsonism.  While we typically would not expect these types of mutations to be the sole cause of Parkinsonism in such a young onset case, we have observed these types of mutations in some of our young onset patients.    We discussed the utility of  genetic testing. I explained that identifying causative mutation(s) would provide us with diagnostic certainty.  This is of critical importance for Jin as he is such a young person who is facing a possible diagnosis of a progressive neurodegenerative disease.  I explained that the some of the hereditary forms of Parkinsonism may follow a more 'benign' and slowly progressive course.  This is also of critical importance to Jin as he will likely be facing many decisions about career and/or starting a family in the near future.  This testing can also impact medical management decisions as we know that genetic forms of Parkinsonism may respond more favorably to interventions such as deep brain stimulation surgery.  Finally, this information is critical for Jin's family.  He has a younger sibling who may face a risk as high as 25% if a recessive form is identified or 50% if a dominant form is identified.    We discussed other possible results of testing including a negative test result or variants of uncertain significance.  We discussed options of pursuing testing, or waiting to think about the implications of testing before proceeding.  Jin and his family indicated that he would like to have blood drawn for the testing and we will check on prior authorization.  This will give them time to think about this testing before making a final decision.  We strongly support the decision to have testing as it could have important bearing on Jin's diagnosis, medical management, and the risks to his family members.  Jin provided written informed consent for this testing.    Kev Sorto MS, [facility]  Licensed Genetic Counselor    We are requesting prior authorization for:  Genes:ICU75R1,AT,MHJ1CC5,CHCHD2,CHMP2B,DCTN1,DNAJC6,EIF4G1,FBXO7,GBA,GRN,LRRK2,MAPT,PARK2,PARK7,PINK1,PLA2G6,RAB39B,SNCA,SNCB,SYNJ1,TAF1,VJBL388,VPS13C,VPS35    CPT codes (sequencing): 81405x1, 81406x3, 81407x1, 81479x19, 81251x1  CPT codes (CNV): 81405x1,  81479x24

## 2018-10-22 ENCOUNTER — OFFICE VISIT (OUTPATIENT)
Dept: NEUROLOGY | Facility: CLINIC | Age: 33
End: 2018-10-22
Payer: COMMERCIAL

## 2018-10-22 VITALS
HEART RATE: 57 BPM | OXYGEN SATURATION: 99 % | DIASTOLIC BLOOD PRESSURE: 82 MMHG | SYSTOLIC BLOOD PRESSURE: 145 MMHG | HEIGHT: 73 IN | BODY MASS INDEX: 23.72 KG/M2 | WEIGHT: 179 LBS

## 2018-10-22 DIAGNOSIS — G20.A1 PARKINSON DISEASE (H): Primary | ICD-10-CM

## 2018-10-22 RX ORDER — CARBIDOPA AND LEVODOPA 25; 100 MG/1; MG/1
TABLET ORAL
Qty: 180 TABLET | Refills: 3 | Status: SHIPPED | OUTPATIENT
Start: 2018-10-22 | End: 2019-05-06

## 2018-10-22 ASSESSMENT — UNIFIED PARKINSONS DISEASE RATING SCALE (UPDRS)
TOETAPPING_LEFT: SLIGHT: ANY OF THE FOLLOWING: A) THE REGULAR RHYTHM IS BROKEN WITH ONE WITH ONE OR TWO INTERRUPTIONS OR HESITATIONS OF THE MOVEMENT B) SLIGHT SLOWING C) THE AMPLITUDE DECREMENTS NEAR THE END OF THE 10 MOVEMENTS.
RIGIDITY_NECK: MILD: RIGIDITY DETECTED WITHOUT THE ACTIVATION MANEUVER.  FULL RANGE OF MOTION IS EASILY ACHIEVED.
FACIAL_EXPRESSION: SLIGHT: MINIMAL MASKED FACIES MANIFESTED ONLY BY DECREASED FREQUENCY OF BLINKING.
RIGIDITY_LUE: MILD: RIGIDITY DETECTED WITHOUT THE ACTIVATION MANEUVER.  FULL RANGE OF MOTION IS EASILY ACHIEVED.
POSTURAL_STABILITY: NORMAL:  RECOVERS WITH ONE OR TWO STEPS.
RIGIDITY_RUE: SLIGHT: RIGIDITY ONLY DETECTED WITH ACTIVATION MANEUVER.
SPONTANEITY_OF_MOVEMENT: 1: SLIGHT: SLIGHT GLOBAL SLOWNESS AND POVERTY OF SPONTANEOUS MOVEMENTS.
FREEZING_GAIT: NORMAL
AMPLITUDE_LIP_JAW: NORMAL: NO TREMOR.
TOTAL_SCORE: 1
FINGER_TAPPING_LEFT: SEVERE: CANNOT OR CAN ONLY BARELY PERFORM THE TASK BECAUSE OF SLOWING, INTERRUPTIONS, OR DECREMENTS.
RIGIDITY_LLE: SLIGHT: RIGIDITY ONLY DETECTED WITH ACTIVATION MANEUVER.
TOTAL_SCORE: 22
LEG_AGILITY_LEFT: SLIGHT: ANY OF THE FOLLOWING: A) THE REGULAR RHYTHM IS BROKEN WITH ONE WITH ONE OR TWO INTERRUPTIONS OR HESITATIONS OF THE MOVEMENT B) SLIGHT SLOWING C) THE AMPLITUDE DECREMENTS NEAR THE END OF THE 10 MOVEMENTS.
PRONATION_SUPINATION_RIGHT: NORMAL
AMPLITUDE_LUE: NORMAL: NO TREMOR.
LEG_AGILITY_RIGHT: NORMAL
HANDMOVEMENTS_RIGHT: NORMAL
CONSTANCY_TREMOR_ATREST: NORMAL: NO TREMOR.
TOETAPPING_RIGHT: NORMAL
RIGIDITY_RLE: NORMAL
POSTURE: 0 NORMAL, NO PROBLEMS
GAIT: NORMAL
FINGER_TAPPING_RIGHT: NORMAL
PRONATION_SUPINATION_LEFT: MODERATE: ANY OF THE FOLLOWING:  A) MORE THAN 5 INTERRUPTIONS  OR AT LEAST ONE LONGER ARREST (FREEZE) IN ONGOING MOVEMENT  B) MODERATE SLOWING C) THE AMPLITUDE DECREMENTS STARTING AFTER THE FIRST MOVEMENT.
PARKINSONS_MEDS: ON
HANDMOVEMENTS_LEFT: MODERATE: ANY OF THE FOLLOWING:  A) MORE THAN 5 INTERRUPTIONS  OR AT LEAST ONE LONGER ARREST (FREEZE) IN ONGOING MOVEMENT  B) MODERATE SLOWING C) THE AMPLITUDE DECREMENTS STARTING AFTER THE FIRST MOVEMENT.
ARISING_CHAIR: NORMAL: ABLE TO ARISE QUICKLY WITHOUT HESITATION.
TOTAL_SCORE_LEFT: 17
AMPLITUDE_LLE: NORMAL: NO TREMOR.
AXIAL_SCORE: 4
SPEECH: NORMAL
AMPLITUDE_RUE: NORMAL: NO TREMOR.
AMPLITUDE_RLE: NORMAL: NO TREMOR.

## 2018-10-22 ASSESSMENT — PAIN SCALES - GENERAL: PAINLEVEL: NO PAIN (0)

## 2018-10-22 NOTE — MR AVS SNAPSHOT
After Visit Summary   10/22/2018    Dipesh Nicole    MRN: [id number]           Patient Information     Date Of Birth          1985        Visit Information        Provider Department      10/22/2018 10:30 AM  MOVEMENT DISORDER FELLOW [facility] Neurology        Today's Diagnoses     Parkinson disease (H)    -  1      Care Instructions    If you are going to be looking up information you can check:  National Parkinson Foundation  [facility]    Increase your Sinemet to 1.5 tabs three times daily.  After two weeks increase to 2 tablets three times daily.    I will send a message to Theresa Garcia and she will get in touch with you about the medication class.    I have placed a referral for the Big therapy.          Follow-ups after your visit        Additional Services     PT Referral (Tanesha)       If you have not heard from the scheduling office within 2 business days, please call [phone] for all locations, with the exception of [place], please call [phone] and [facility] [place], please call [phone].    Please be aware that coverage of these services is subject to the terms and limitations of your health insurance plan.  Call member services at your health plan with any benefit or coverage questions.                  Your next 10 appointments already scheduled     Dec 19, 2018  4:30 PM CST   (Arrive by 4:15 PM)   Return Movement Disorder with  MOVEMENT DISORDER FELLOW   [facility] Neurology ([facility])    [address] [place] [id number] [phone]              Future tests that were ordered for you today     Open Future Orders        Priority Expected Expires Ordered    PT Referral (Tanesha) Routine  10/22/2019 10/22/2018            Who to contact     Please call your clinic at [phone] to:    Ask questions about your health    Make or cancel appointments    Discuss your medicines    Learn about your  "test results    Speak to your doctor            Additional Information About Your Visit        [employer] Information     [employer] gives you secure access to your electronic health record. If you see a primary care provider, you can also send messages to your care team and make appointments. If you have questions, please call your primary care clinic.  If you do not have a primary care provider, please call [phone] and they will assist you.      [employer] is an electronic gateway that provides easy, online access to your medical records. With [employer], you can request a clinic appointment, read your test results, renew a prescription or communicate with your care team.     To access your existing account, please contact your [facility] or call [phone] for assistance.        Care EveryWhere ID     This is your Care EveryWhere ID. This could be used by other organizations to access your [place] medical records  [id number]        Your Vitals Were     Pulse Height Pulse Oximetry BMI (Body Mass Index)          57 1.842 m (6' 0.5\") 99% 23.94 kg/m2         Blood Pressure from Last 3 Encounters:   10/22/18 145/82   10/03/18 (!) 142/92   10/03/18 (!) 142/92    Weight from Last 3 Encounters:   10/22/18 81.2 kg (179 lb)   10/03/18 81.5 kg (179 lb 9.6 oz)   10/03/18 81.5 kg (179 lb 9.6 oz)                 Today's Medication Changes          These changes are accurate as of 10/22/18 11:16 AM.  If you have any questions, ask your nurse or doctor.               These medicines have changed or have updated prescriptions.        Dose/Directions    carbidopa-levodopa  MG per tablet   Commonly known as:  SINEMET   This may have changed:  additional instructions   Used for:  Parkinson disease (H)        Take 1.5 tablets TID for two weeks. Increase to 2 tablets TID after two weeks.   Quantity:  180 tablet   Refills:  3            Where to get your medicines      These medications were sent to " [facility]/pharmacy #[id number] - [place], [place] - [address]  [address], [place] [address]     Phone:  [phone]     carbidopa-levodopa  MG per tablet                Primary Care Provider Office Phone # Fax #    Joey Morley [phone]       [place] [address] [address] [facility] [address]        Equal Access to Services     R[place] HANNA : Hadii aad ku hadasho Soomaali, waaxda luqadaha, qaybta kaalmada adeegyada, waxay idiin hayaan adeeg kharash la'aan . So [facility] [phone].    ATENCIÓN: Si habla español, tiene a gonzalez disposición servicios gratuitos de asistencia lingüística. [facility] [phone].    We comply with applicable federal civil rights laws and [place] laws. We do not discriminate on the basis of race, color, national origin, age, disability, sex, sexual orientation, or gender identity.            Thank you!     Thank you for choosing [facility] NEUROLOGY  for your care. Our goal is always to provide you with excellent care. Hearing back from our patients is one way we can continue to improve our services. Please take a few minutes to complete the written survey that you may receive in the mail after your visit with us. Thank you!             Your Updated Medication List - Protect others around you: Learn how to safely use, store and throw away your medicines at www.disposemymeds.org.          This list is accurate as of 10/22/18 11:16 AM.  Always use your most recent med list.                   Brand Name Dispense Instructions for use Diagnosis    carbidopa-levodopa  MG per tablet    SINEMET    180 tablet    Take 1.5 tablets TID for two weeks. Increase to 2 tablets TID after two weeks.    Parkinson disease (H)

## 2018-10-22 NOTE — PATIENT INSTRUCTIONS
If you are going to be looking up information you can check:  National Parkinson Foundation  [facility]    Increase your Sinemet to 1.5 tabs three times daily.  After two weeks increase to 2 tablets three times daily.    I will send a message to Theresa Garcia and she will get in touch with you about the medication class.    I have placed a referral for the Big therapy.

## 2018-10-22 NOTE — PROGRESS NOTES
"[facility] Disorders Clinic Follow-up    CC: parkinsonism follow-up    HPI: Dipesh Nicole is a 32 year-old male with a history of a single convulsion who presents in follow-up due to likely early onset Parkinson Disease. Symptom onset was in 2017 with difficulty using the left hand and tremor.    He was last seen 10/3/18. At that time he met with Kev Sorto to have genetic testing for a recessive PD. He was started on a Sinemet trial.    He reports he has been on the full dose for two weeks now. He feels like it makes him slightly more \"wired\" like he has had too much coffee. He reports movements in the left hand feel more smooth, and he feels like it is moving spontaneously more now that it used to. Tremor is better, but still present.    No significant fluctuations, dyskinesia, or hallucinations.    Medications:  Current Outpatient Prescriptions   Medication     carbidopa-levodopa (SINEMET)  MG per tablet     No current facility-administered medications for this visit.      Exam:  /82  Pulse 57  Ht 1.842 m (6' 0.5\")  Wt 81.2 kg (179 lb)  SpO2 99%  BMI 23.94 kg/m2    Neurological Examination (R/L):  Mental Status: Awake, alert. Fluent. Affect normal.  Cranial Nerves: Versions full. Question slight hypomimia. No hypophonia. No torticollis.  Motor: Moderate/severe bradykinesia in left hand/arm. Mild bradykinesia with left foot toe tapping. No right body bradykinesia. No resting tremor. Has a jerky left hand postural and action tremor. Does develop a tremor in left hand with gait. Moderate neck and left arm increased tone. Right arm with mildly increased tone.  Coordination: Finger to nose without dysmetria.  Gait: Gait narrow-based with decreased left arm swing and tremor.    UPDRS Values 10/22/2018   Time: 11:00 AM   Medication On   R Brain DBS: None   L Brain DBS: None   Speech 0   Facial Expression 1   Rigidity Neck 2   Rigidity RUE 1   Rigidity LUE 2   Rigidity RLE 0 "   Rigidity LLE 1   Finger Taps R 0   Finger Taps L 4   Hand Mvt R 0   Hand Mvt L 3   Pron-/Supinate R 0   Pron-/Supinate L 3   Toe Tap R 0   Toe Tap L 1   Leg Agility R 0   Leg Agility L 1   Arise From Chair 0   Gait 0   Gait Freezing 0   Postural Stability 0   Posture 0   Global Spont Mvt 1   Postural Tremor RUE 0   Postural Tremor LUE 1   Kinetic Tremor RUE 0   Kinetic Tremor LUE 1   Rest Tremor RUE 0   Rest Tremor LUE 0   Rest Tremor RLE 0   Rest Tremor LLE 0   Rest Tremor Lip/Jaw 0   Rest Tremor Constancy 0   Total Right 1   Total Left 17   Axial Total 4   Total 22       Assessment: Likely early onset PD in a 32 year-old male. Reports subjective improvement with Sinemet, however does still have significant bradykinesia in the left arm/hand on exam today. Will plan to increase the dose further. He had multiple questions about how to take the Sinemet, as well as general questions about PD which were answered.    Plan:   -increase carbidopa/levodopa 25/100 to 1.5 tabs TID then 2 tabs TID after one week  -medication class with Theresa Garcia  -Big therapy    Follow-up 2-3 months    40 minutes were spent in this visit, greater than 50% was spent on counseling and coordination of care.    Jimbo Love MD  Movement Disorders Fellow

## 2018-10-23 ENCOUNTER — DOCUMENTATION ONLY (OUTPATIENT)
Dept: NEUROLOGY | Facility: CLINIC | Age: 33
End: 2018-10-23

## 2018-10-23 NOTE — TELEPHONE ENCOUNTER
October 23, 2018      Re: Dipesh Nicole  MRN: [id number]  Member ID: [id number]  Reference: [id number]    To whom it may concern:    I am writing on behalf of both Dipesh and Dr. Ramesh Carson to appeal the decision to not cover genetic testing in this case.  Dipesh is a young man (32 years old) with clinical findings that strongly suggest a diagnosis of early onset Parkinson disease.  The denial letter outline 3 primary criteria that must be met for genetic testing to be covered.  I have responded to each below.    1. The result of the test will have a significant impact on the treatment being delivered for the disease.  While the test result may not fundamentally change our current treatment recommendations we still strongly believe that it is medically necessary in the situation of a 32-year old man facing a neurodegenerative process.  Identifying a genetic basis would allow Dipesh to realistically make plans for his future.  Some forms of hereditary Parkinsonism are characterized by a relatively benign course with minimal risk for significant cognitive complications [PMID: 39097831], while others may present with a much more rapidly progressive course.  In addition, some hereditary forms of Parkinsonism may be accompanied by additional neurologic findings for which screening may be warranted.  Equally important is the fact that Dipesh is of a reproductive age and if a dominant form of Parkinsonism is identified, the risks to his children could be as high as 50%.  Thus, genetic testing could significantly shape the guidance we provide Dipesh in reproductive decision making.      2. The testing method is considered scientifically valid for the identification of a specific genetically linked inheritable disease-   * This testing utilizes next generation sequencing technology which has been validated in our laboratory for the detection of both point mutations and copy number alterations [PMID:  18305968][PMID:64845410][PMID:24885458]    * This testing evaluates a panel of genes that is well established as causing hereditary young onset forms of Parkinson disease.  This list of genes was established through review of established Parkinson disease gene loci in the Online Mendelian inheritance in Man (OMIM) database and review of medical literature.    * There is a clear link between mutations in these genes and risk for early onset Parkinson disease [PMID: 79374278].  In particular, mutations in [place] are identified in ~10% of patients with onset between age 30-40 and ~24% of patients with onset before age 30.  Dipesh reports that his symptoms date back 1-2 years to around age 29-30.  Recent reviews have suggested that the proportion of young-adult patients with mutations in autosomal recessive genes may be underestimated [PMID: 02973317].  Autosomal dominant forms of PD are also well established to cause younger onset Parkinson disease [PMID: 50313473].    3. Appropriate genetic counseling occurs both before and after testing-    I met with Dipesh for a 60 minute genetic consultation on 10/3/2018.  We reviewed the genetic basis of early onset Parkinsonism.  We reviewed risks, benefits, limitations, and utility of genetic testing. We discussed potential results of testing and the implications for both Dipesh and family members. We also discussed potential reproductive implications of genetic testing.  I will personally communicate results of genetic testing when completed and will be available to provide clinical genetic counseling services as needed, depending upon the results.    In summary, Dipesh has been counseled about genetic testing for Parkinson disease.  We strongly believe that this testing is medically necessary in the situation of a 32 year old man with a neurodegenerative process.  While we concede that the testing may not alter our specific treatment decisions at this point, the testing has  profound implications for discussing Dipesh's future and for his family planning decisions.    Sincerely      Kev Sorto MS, [facility]  Licensed Genetic Counselor  [phone]

## 2018-11-02 ENCOUNTER — TELEPHONE (OUTPATIENT)
Dept: NEUROLOGY | Facility: CLINIC | Age: 33
End: 2018-11-02

## 2018-11-02 NOTE — TELEPHONE ENCOUNTER
Called patient to invite him and a support person to the next Intro to Parkinson's disease class on 11/12 or 12/10.     Left voice mail asking him to let me know by [employer]t or by calling.

## 2018-11-28 ENCOUNTER — TELEPHONE (OUTPATIENT)
Dept: NEUROLOGY | Facility: CLINIC | Age: 33
End: 2018-11-28

## 2018-11-28 NOTE — TELEPHONE ENCOUNTER
GENETIC COUNSELING-Neurology clinic  I left a message for Dipesh indicating that his insurance company had denied coverage for his genetic testing. I did write a letter to appeal the decision, and the company still upheld the denial. I indicated that for the time being, we will not do this gene testing unless he contacts me to indicate otherwise. I did indicate that we may have some options in the near future to make this testing more affordable and so he may want to check back in later in 2019.    Kev Sorto MS, [facility]  Licensed Genetic Counselor

## 2018-12-19 ENCOUNTER — OFFICE VISIT (OUTPATIENT)
Dept: NEUROLOGY | Facility: CLINIC | Age: 33
End: 2018-12-19
Payer: COMMERCIAL

## 2018-12-19 VITALS
RESPIRATION RATE: 16 BRPM | HEIGHT: 73 IN | DIASTOLIC BLOOD PRESSURE: 72 MMHG | HEART RATE: 68 BPM | WEIGHT: 185 LBS | TEMPERATURE: 97.5 F | SYSTOLIC BLOOD PRESSURE: 129 MMHG | BODY MASS INDEX: 24.52 KG/M2 | OXYGEN SATURATION: 96 %

## 2018-12-19 DIAGNOSIS — G20.A1 PARKINSON DISEASE (H): Primary | ICD-10-CM

## 2018-12-19 ASSESSMENT — MIFFLIN-ST. JEOR: SCORE: 1835.41

## 2018-12-19 ASSESSMENT — PAIN SCALES - GENERAL: PAINLEVEL: NO PAIN (0)

## 2018-12-19 NOTE — NURSING NOTE
Chief Complaint   Patient presents with     RECHECK     [facility] RETURN MOVEMENT DISORDER - 2 MONTH     Lillian Haque, CMA

## 2018-12-19 NOTE — PROGRESS NOTES
[facility] Follow-up    CC: PD f/u    HPI: Dipesh Nicole is a 33 year-old male with a history of early onset PD who presents in follow-up. At last visit increased Sinemet from 1 to 2 tabs TID.    Today reports feels much better. Looser, more coordinated. Will maybe feel a little stiffer around 8pm or 10pm at night, but otherwise no fluctuations.     No dyskinesias, orthostasis, or hallucinations        PD Medications                   830 noon 5   C/L 25/100                                 2 2 2     Medications:  Current Outpatient Medications   Medication     carbidopa-levodopa (SINEMET)  MG per tablet     No current facility-administered medications for this visit.        Past Medical History:   Diagnosis Date     H/O magnetic resonance imaging of brain and brain stem 8/17/2018 2009 December MR Brain without and with IV Zcxdouyh55/8/2009 [facility] Result Impression  Normal MRI of the brain.  Result Narrative      Multiecho, multiplanar views of the brain were obtained prior to and  following the IV administration of 19 mL of contrast. There are no  previous studies available for comparison.     The brain parenchyma is normal in appearance on all pulse sequences,  with      H/O magnetic resonance imaging of cervical spine 8/17/2018 March MR Cervical Spine without IV Contrast3/15/2017 [facility] Result Addenda Addendum by Provider, PA Jones on 03/15/2017 12:30 PM RAD^^^MA MR Cervical Spine w/o contrast 3/15/2017 12:30:00 Result Impression  Minimal degenerative disc disease at C5-6 and C6-7  otherwise an unremarkable MR scan of the cervical spine.  Result Narrative   EXAM: MR Cervical Spine w/o contrast   INDICATION:      H/O shoulder surgery 8/17/2018 2017 July XR SHOULDER 2 VIEWS LEFT7/14/2017 [facility] Result Narrative XR SHOULDER 2 VIEWS LEFT 7/14/2017 9:13 PM  INDICATION: Shoulder Injury COMPARISON: None.   "FINDINGS: Negative shoulder. No fracture or dislocation.  Status       Seizure (H) at age 22 yrs 8/17/2018     Shoulder dislocation     left     Tremor 8/10/2018     Social History     Social History Narrative    single. lives in [facility]. mother cabrera ambriz            He has a history of a left dislocated shoulder with surgery performed in 2009. He currently lives in [place] but comes to the [place] area to visit his parents in [place]. 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     Exam:  /72 (BP Location: Left arm, Patient Position: Sitting, Cuff Size: Adult Regular)   Pulse 68   Temp 97.5  F (36.4  C) (Oral)   Resp 16   Ht 1.842 m (6' 0.52\")   Wt 83.9 kg (185 lb)   SpO2 96%   BMI 24.73 kg/m    NAD  Breathing comfortably  No peripheral edema    Neurological Examination (R/L):  Mental Status: Awake, alert. Fluent. Affect normal. No apraxia in left hand.  Cranial Nerves: Versions full. No hypomimia. No hypophonia. No torticollis. Tongue protrudes midline  Motor: Slight/mild bradykinesia in left arm. Slight left postural and kinetic tremor. No resting tremor.   Coordination: Finger to nose without dysmetria.  Gait: Can rise from chair with arms crossed. Gait narrow-based, good arm swing on left.     Assessment: Early onset PD in a 33 year-old male. More reassuring as to idiopathic PD diagnosis today given much better response to 2 tabs of Sinemet. Has some mild left arm parkinsonism, but overall doing quite well and doesn't seem to be functionally limiting. Will not change dose.    Plan:   -continue 2 tabs TID    Follow-up 4-6 months    30 minute visit  50% spent on counseling and coordination of care    Jimbo Love MD  Movement Disorders Fellow  "

## 2019-04-03 ENCOUNTER — OFFICE VISIT (OUTPATIENT)
Dept: NEUROLOGY | Facility: CLINIC | Age: 34
End: 2019-04-03
Payer: COMMERCIAL

## 2019-04-03 VITALS
BODY MASS INDEX: 24.6 KG/M2 | WEIGHT: 184 LBS | OXYGEN SATURATION: 97 % | DIASTOLIC BLOOD PRESSURE: 82 MMHG | SYSTOLIC BLOOD PRESSURE: 140 MMHG | HEART RATE: 50 BPM

## 2019-04-03 DIAGNOSIS — G20.A1 PARKINSON DISEASE (H): Primary | ICD-10-CM

## 2019-04-03 ASSESSMENT — UNIFIED PARKINSONS DISEASE RATING SCALE (UPDRS)
CONSTANCY_TREMOR_ATREST: NORMAL: NO TREMOR.
POSTURE: 0 NORMAL, NO PROBLEMS
FREEZING_GAIT: NORMAL
RIGIDITY_LLE: NORMAL
LEG_AGILITY_RIGHT: NORMAL
AMPLITUDE_LUE: NORMAL: NO TREMOR.
POSTURAL_STABILITY: NORMAL:  RECOVERS WITH ONE OR TWO STEPS.
PARKINSONS_MEDS: ON
FINGER_TAPPING_RIGHT: NORMAL
AMPLITUDE_LLE: NORMAL: NO TREMOR.
RIGIDITY_RLE: NORMAL
ARISING_CHAIR: NORMAL: ABLE TO ARISE QUICKLY WITHOUT HESITATION.
RIGIDITY_LUE: SLIGHT: RIGIDITY ONLY DETECTED WITH ACTIVATION MANEUVER.
AMPLITUDE_RLE: NORMAL: NO TREMOR.
TOTAL_SCORE: 1
HANDMOVEMENTS_RIGHT: NORMAL
AMPLITUDE_LIP_JAW: NORMAL: NO TREMOR.
HANDMOVEMENTS_LEFT: NORMAL
TOETAPPING_RIGHT: NORMAL
PRONATION_SUPINATION_LEFT: NORMAL
FACIAL_EXPRESSION: NORMAL.
AMPLITUDE_RUE: NORMAL: NO TREMOR.
PRONATION_SUPINATION_RIGHT: NORMAL
TOTAL_SCORE_LEFT: 1
RIGIDITY_NECK: SLIGHT: RIGIDITY ONLY DETECTED WITH ACTIVATION MANEUVER.
TOETAPPING_LEFT: NORMAL
FINGER_TAPPING_LEFT: NORMAL
SPONTANEITY_OF_MOVEMENT: 0: NORMAL.  NO PROBLEMS.
GAIT: NORMAL
SPEECH: NORMAL
AXIAL_SCORE: 1
RIGIDITY_RUE: SLIGHT: RIGIDITY ONLY DETECTED WITH ACTIVATION MANEUVER.
TOTAL_SCORE: 3
LEG_AGILITY_LEFT: NORMAL

## 2019-04-03 ASSESSMENT — PAIN SCALES - GENERAL: PAINLEVEL: NO PAIN (0)

## 2019-04-03 NOTE — PROGRESS NOTES
[facility] Movement Disorders Clinic Follow-up    CC: PD f/u    HPI: Dipesh Nicole is a 33 year-old male with a history of early onset PD who presents in follow-up. At last visit Sinemet was continued at 2 tabs TID.    Today he reports he is doing well. Has almost normal function in the left hand. No difficulty walking. Has some shaking of the hand if he runs.    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    Can feel the Sinemet kick in in the morning. No fluctuations or dyskinesias or orthostasis.      PD Medications                   830 noon 5   C/L 25/100                                 2 2 2     Medications:  Current Outpatient Medications   Medication     carbidopa-levodopa (SINEMET)  MG per tablet     No current facility-administered medications for this visit.        Past Medical History:   Diagnosis Date     H/O magnetic resonance imaging of brain and brain stem 8/17/2018 2009 December MR Brain without and with IV Ruicdvbd53/8/2009 [facility] Result Impression  Normal MRI of the brain.  Result Narrative      Multiecho, multiplanar views of the brain were obtained prior to and  following the IV administration of 19 mL of contrast. There are no  previous studies available for comparison.     The brain parenchyma is normal in appearance on all pulse sequences,  with      H/O magnetic resonance imaging of cervical spine 8/17/2018 March MR Cervical Spine without IV Contrast3/15/2017 [facility] Result Addenda Addendum by ProviderKaren M.D. on 03/15/2017 12:30 PM RAD^^^MA MR Cervical Spine w/o contrast 3/15/2017 12:30:00 Result Impression  Minimal degenerative disc disease at C5-6 and C6-7  otherwise an unremarkable MR scan of the cervical spine.  Result Narrative   EXAM: MR Cervical Spine w/o contrast   INDICATION:      H/O shoulder surgery 8/17/2018 2017 July XR SHOULDER 2 VIEWS LEFT7/14/2017 [facility] Result Narrative XR SHOULDER 2 VIEWS LEFT 7/14/2017 9:13 PM  INDICATION: Shoulder Injury COMPARISON: None.  FINDINGS: Negative shoulder. No fracture or dislocation.  Status       Seizure (H) at age 22 yrs 8/17/2018     Shoulder dislocation     left     Tremor 8/10/2018     Social History     Social History Narrative    single. lives in [facility]. mother cabrera ambriz            He has a history of a left dislocated shoulder with surgery performed in 2009. He currently lives in [place] but comes to the [place] area to visit his parents in [place]. 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     Exam:  /82 (BP Location: Right arm, Patient Position: Chair, Cuff Size: Adult Regular)   Pulse 50   Wt 83.5 kg (184 lb)   SpO2 97%   BMI 24.60 kg/m    NAD  Breathing comfortably  No peripheral edema    Neurological Examination (R/L):  Mental Status: Awake, alert. Fluent. Affect normal.  Cranial Nerves: Versions full. No hypomimia. No hypophonia. No torticollis. Tongue protrudes midline  Motor: No bradykinesia. Tone mildly increased in neck and arms bilaterally. No resting tremor.   Coordination: Finger to nose without dysmetria.  Gait: Can rise from chair with arms crossed. Gait narrow-based with good arm swing.     UPDRS Values 4/3/2019   Time: 4:30 PM   Medication On   R Brain DBS: None   L Brain DBS: None   Speech 0   Facial Expression 0   Rigidity Neck 1   Rigidity RUE 1   Rigidity LUE 1   Rigidity RLE 0   Rigidity LLE 0   Finger Taps R 0   Finger Taps L 0   Hand Mvt R 0   Hand Mvt L 0   Pron-/Supinate R 0   Pron-/Supinate L 0   Toe Tap R 0   Toe Tap L 0   Leg Agility R 0   Leg Agility L 0   Arise From Chair 0   Gait 0   Gait Freezing 0   Postural Stability 0   Posture 0   Global Spont Mvt 0   Postural Tremor RUE 0   Postural Tremor LUE 0   Kinetic Tremor RUE 0   Kinetic Tremor LUE 0   Rest Tremor RUE 0   Rest Tremor LUE 0   Rest Tremor RLE 0   Rest Tremor  LLE 0   Rest Tremor Lip/Jaw 0   Rest Tremor Constancy 0   Total Right 1   Total Left 1   Axial Total 1   Total 3       Assessment: Early onset PD in a 33 year-old male. Exam is largely normal today. Interestingly, has improved since last visit without change in Sinemet dose. Will not change dose at this time.    Plan:   -continue Sinemet 2 tabs TID      Will schedule follow-up appointment in 6 months with Dr. Carson, who he has seen before as I will be completing fellowship.    30 minute visit  50% spent on counseling and coordination of care    Jimbo Love MD  Movement Disorders Fellow

## 2019-04-03 NOTE — NURSING NOTE
Chief Complaint   Patient presents with     RECHECK     [facility] RETURN MOVEMENT DISORDER 4-6 MO F/U       Naya Aparicio, EMT

## 2019-05-06 ENCOUNTER — MYC REFILL (OUTPATIENT)
Dept: NEUROLOGY | Facility: CLINIC | Age: 34
End: 2019-05-06

## 2019-05-06 DIAGNOSIS — G20.A1 PARKINSON DISEASE (H): ICD-10-CM

## 2019-05-06 RX ORDER — CARBIDOPA AND LEVODOPA 25; 100 MG/1; MG/1
2 TABLET ORAL 3 TIMES DAILY
Qty: 180 TABLET | Refills: 11 | Status: SHIPPED | OUTPATIENT
Start: 2019-05-06 | End: 2019-10-03

## 2019-05-06 RX ORDER — CARBIDOPA AND LEVODOPA 25; 100 MG/1; MG/1
TABLET ORAL
Qty: 180 TABLET | Refills: 3 | OUTPATIENT
Start: 2019-05-06

## 2019-05-06 NOTE — TELEPHONE ENCOUNTER
Nurse received refill request for:   carbidopa-levodopa (SINEMET)  MG per tablet 180 tablet 3 10/22/2018  No   Sig: Take 1.5 tablets TID for two weeks. Increase to 2 tablets TID after two weeks.     Pharm[facility]: [facility]    Last re-ordered: 10/22/2018    Last appointment: 4/3/2019    Action taken: Sent to be signed by the provider.

## 2019-09-09 PROBLEM — K40.90 RIGHT INGUINAL HERNIA: Status: ACTIVE | Noted: 2017-01-19

## 2019-09-09 RX ORDER — CYANOCOBALAMIN/FOLIC AC/VIT B6 1-2.5-25MG
TABLET ORAL
Refills: 3 | COMMUNITY
Start: 2019-01-22 | End: 2019-10-03

## 2019-09-11 NOTE — PROGRESS NOTES
Diagnosis/Summary/Recommendations:    PATIENT: Dipesh Nicole  33 year old male     : 1985    LAUREL: October 3, 2019    Parkinson  Seen by me and Dr. Love    He is [employer] and getting another degree at [employer] and working on sports management  Will see where things are after that.     He was with the [employer] and [employer] and working with [employer].     His father is with [employer] and travels a lot and is in the [place].  Mother is in [place] and in [place]    The past month he has some locking of the right leg and has some cramping of the leg - he feels like he has hyperextension of the leg - locks up and sits down and gets up and it is better briefly  Not clear as how it relates to the medication  The right arm may be dystonic and raised and locked up    If he foregoes the medication he is slower.     He may take the medication in the morning and goes back to bed and then wakes up and feels great    He had locking up of th e leg at 215pm    8am 1pm 5/6pm     He has had this problem over the past 1.5 months.     The problem with the leg is more common in the afternoon    He has tried less and still having     He took another pill while golfing and seems to have helped.     [facility] often recommends that patients take vitamin b12, b6 and folic acid if individuals are on sinemet (levodopa) to reduce the chances of alterations in homocysteine levels.  The doses that are in a typical Foltx/Folbic tablet that is taking once daily as recommended by [facility] is as follows:    Folacin (Folic Acid) 2.5 mg  Pyridoxine (B6)  25 mg  Cyanocobalamin (B12) 2 mg      Medications            Carbidopa/levodopa sinemet 25/100 2 2 2     Folbee 2.5-25-1mg 1       Folic acid-vit b6-vit b12 fa -cyanocobalamin-pyridoxine 2.5-1-25mg 1                                                                                                                   History obtained from patient    PLAN  PD GENEration study  with Dr. Coreas at [facility]  Appointment Scheduling: Brooke Dang  Telephone: [phone]  Email: [email or web address]     See if can obtain folic acid/ vitamin b6 and b12 over the counter    Try a change in sinemet from 2-2-2 to 2-2.5-2 or 2-3-2     Pharmacy consultation for other options if dystonic locking up problems persist, eg trial of amantadine, mao b  Inhibitor or low dose of a dopamine agonist    Will be in [place] January 2019    Return back in 3-6 months.       Coding statement:   Duration of  Services: patient care and care coordination was 25 minutes  Greater than 50% of this visit was spent in counseling and coordination of care.     Ramesh Carson MD     ______________________________________    Last visit date and details:      Assessment: 32 year-old male with single reported seizure who presents with about a year of left arm parkinsonism. Most notable on exam are rigidity and bradykinesia, confined largely to the left arm although there is some subtler possible involvement of the face with hypomimia and right arm with milder rigidity. There was no clear resting tremor observed today, more a jerky action tremor.      Etiology is not clear at this point. Will evaluate for secondary cause for parkinsonism, notably Renny's Disease or a mass lesion causing hemiparkinonism. Will also pursure GEETA scan. If this were to be Parkinson Disease, age of onset is quite young and a genetic etiology would have to be considered.     Recommendations:   -ceruloplasmin and 24 hour urine copper  -MRI brain  -GEETA scan  -genetics consult with kelsi Sorto     Will follow-up with me in one month to discuss above results     Jimbo Love MD  Movement Disorders Fellow     Patient seen and examined by me today September 4, 2018  Ramesh carson md.   September 4, 2018     Ramesh Carson MD        ______________________________________      Patient was asked about 14 Review of systems including changes in vision (dry eyes,  double vision), hearing, heart, lungs, musculoskeletal, depression, anxiety, snoring, RBD, insomnia, urinary frequency, urinary urgency, constipation, swallowing problems, hematological, ID, allergies, skin problems: seborrhea, endocrinological: thyroid, diabetes, cholesterol; balance, weight changes, and other neurological problems and these were not significant at this time except for   Patient Active Problem List   Diagnosis     Tremor     Seizure (H) at age 22 yrs     H/O magnetic resonance imaging of cervical spine     H/O shoulder surgery     H/O magnetic resonance imaging of brain and brain stem     Shoulder dislocation     Parkinson disease (H)     Right inguinal hernia          Allergies   Allergen Reactions     Metoclopramide      Avoid in patient with a history of Parkinson Disease  Other reaction(s): GI intolerance  Avoid in patient with a history of Parkinson Disease     Past Surgical History:   Procedure Laterality Date     SHOULDER SURGERY  2007     Past Medical History:   Diagnosis Date     H/O magnetic resonance imaging of brain and brain stem 8/17/2018 2009 December MR Brain without and with IV Orfszxsa12/8/2009 [facility] Result Impression  Normal MRI of the brain.  Result Narrative      Multiecho, multiplanar views of the brain were obtained prior to and  following the IV administration of 19 mL of contrast. There are no  previous studies available for comparison.     The brain parenchyma is normal in appearance on all pulse sequences,  with      H/O magnetic resonance imaging of cervical spine 8/17/2018 March MR Cervical Spine without IV Contrast3/15/2017 [facility] Result Addenda Addendum by ProviderKaren M.D. on 03/15/2017 12:30 PM RAD^^^MA MR Cervical Spine w/o contrast 3/15/2017 12:30:00 Result Impression  Minimal degenerative disc disease at C5-6 and C6-7  otherwise an unremarkable MR scan of the cervical spine.  Result Narrative   EXAM: MR Cervical Spine w/o contrast    INDICATION:      H/O shoulder surgery 8/17/2018 2017 July XR SHOULDER 2 VIEWS LEFT7/14/2017 [facility] Result Narrative XR SHOULDER 2 VIEWS LEFT 7/14/2017 9:13 PM  INDICATION: Shoulder Injury COMPARISON: None.  FINDINGS: Negative shoulder. No fracture or dislocation.  Status       Seizure (H) at age 22 yrs 8/17/2018     Shoulder dislocation     left     Tremor 8/10/2018     Social History     Socioeconomic History     Marital status: Single     Spouse name: Not on file     Number of children: Not on file     Years of education: Not on file     Highest education level: Not on file   Occupational History     Not on file   Social Needs     Financial resource strain: Not on file     Food insecurity:     Worry: Not on file     Inability: Not on file     Transportation needs:     Medical: Not on file     Non-medical: Not on file   Tobacco Use     Smoking status: Never Smoker     Smokeless tobacco: Never Used   Substance and Sexual Activity     Alcohol use: No     Drug use: No     Sexual activity: Not on file   Lifestyle     Physical activity:     Days per week: Not on file     Minutes per session: Not on file     Stress: Not on file   Relationships     Social connections:     Talks on phone: Not on file     Gets together: Not on file     Attends Denominational service: Not on file     Active member of club or organization: Not on file     Attends meetings of clubs or organizations: Not on file     Relationship status: Not on file     Intimate partner violence:     Fear of current or ex partner: Not on file     Emotionally abused: Not on file     Physically abused: Not on file     Forced sexual activity: Not on file   Other Topics Concern     Not on file   Social History Narrative    single. lives in [facility]. mother cabrera ambriz            He has a history of a left dislocated shoulder with surgery performed in 2009. He currently lives in [place] but comes to the [facility]  to visit his parents in [place]. 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       Drug and lactation database from the [place] National Library of Medicine:  http://toxnet.nlm.nih.gov/cgi-bin/sis/htmlgen?LACT      B/P: Data Unavailable, T: Data Unavailable, P: Data Unavailable, R: Data Unavailable 0 lbs 0 oz  There were no vitals taken for this visit., There is no height or weight on file to calculate BMI.  Medications and Vitals not listed above were documented in the cart and reviewed by me.     Current Outpatient Medications   Medication Sig Dispense Refill     Folic Acid-Vit B6-Vit B12 (FA-CYANOCOBALAMIN-PYRIDOXINE) 2.5-1-25 MG TABS Take 1 tablet by mouth       carbidopa-levodopa (SINEMET)  MG tablet Take 2 tablets by mouth 3 times daily 180 tablet 11     FOLBEE 2.5-25-1 MG TABS TK 1 T PO D  3         Ramesh Carson MD    [facility] Follow-up     CC: PD f/u     HPI: Dipesh Nicole is a 33 year-old male with a history of early onset PD who presents in follow-up. At last visit Sinemet was continued at 2 tabs TID.     Today he reports he is doing well. Has almost normal function in the left hand. No difficulty walking. Has some shaking of the hand if he runs.     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     Can feel the Sinemet kick in in the morning. No fluctuations or dyskinesias or orthostasis.        PD Medications                   830 noon 5   C/L 25/100                                 2 2 2      Medications:      Current Outpatient Medications   Medication     carbidopa-levodopa (SINEMET)  MG per tablet      No current facility-administered medications for this visit.          Past Medical History        Past Medical History:   Diagnosis Date     H/O magnetic resonance imaging of brain and brain stem 8/17/2018 2009 December MR Brain without and with IV Ssmpdwpk22/8/2009  [facility] Result Impression  Normal MRI of the brain.  Result Narrative      Multiecho, multiplanar views of the brain were obtained prior to and  following the IV administration of 19 mL of contrast. There are no  previous studies available for comparison.     The brain parenchyma is normal in appearance on all pulse sequences,  with      H/O magnetic resonance imaging of cervical spine 8/17/2018 March MR Cervical Spine without IV Contrast3/15/2017 [facility] Result Addenda Addendum by Provider, PA Jones on 03/15/2017 12:30 PM RAD^^^MA MR Cervical Spine w/o contrast 3/15/2017 12:30:00 Result Impression  Minimal degenerative disc disease at C5-6 and C6-7  otherwise an unremarkable MR scan of the cervical spine.  Result Narrative   EXAM: MR Cervical Spine w/o contrast   INDICATION:      H/O shoulder surgery 8/17/2018 2017 July XR SHOULD[facility] Narrative XR SHOULDER 2 VIEWS LEFT 7/14/2017 9:13 PM  INDICATION: Shoulder Injury COMPARISON: None.  FINDINGS: Negative shoulder. No fracture or dislocation.  Status       Seizure (H) at age 22 yrs 8/17/2018     Shoulder dislocation       left     Tremor 8/10/2018         Social History          Social History Narrative     single. lives in [facility]. mother cabrera ambriz                 He has a history of a left dislocated shoulder with surgery performed in 2009. He currently lives in [place] but comes to the [place] area to visit his parents in [place]. 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      Exam:  /82 (BP Location: Right arm, Patient Position: Chair, Cuff Size: Adult Regular)   Pulse 50   Wt 83.5 kg (184 lb)   SpO2 97%   BMI 24.60 kg/m    NAD  Breathing comfortably  No peripheral edema     Neurological Examination (R/L):  Mental Status: Awake, alert. Fluent. Affect normal.  Cranial Nerves: Versions full. No hypomimia. No hypophonia. No  torticollis. Tongue protrudes midline  Motor: No bradykinesia. Tone mildly increased in neck and arms bilaterally. No resting tremor.   Coordination: Finger to nose without dysmetria.  Gait: Can rise from chair with arms crossed. Gait narrow-based with good arm swing.     UPDRS Values 4/3/2019   Time: 4:30 PM   Medication On   R Brain DBS: None   L Brain DBS: None   Speech 0   Facial Expression 0   Rigidity Neck 1   Rigidity RUE 1   Rigidity LUE 1   Rigidity RLE 0   Rigidity LLE 0   Finger Taps R 0   Finger Taps L 0   Hand Mvt R 0   Hand Mvt L 0   Pron-/Supinate R 0   Pron-/Supinate L 0   Toe Tap R 0   Toe Tap L 0   Leg Agility R 0   Leg Agility L 0   Arise From Chair 0   Gait 0   Gait Freezing 0   Postural Stability 0   Posture 0   Global Spont Mvt 0   Postural Tremor RUE 0   Postural Tremor LUE 0   Kinetic Tremor RUE 0   Kinetic Tremor LUE 0   Rest Tremor RUE 0   Rest Tremor LUE 0   Rest Tremor RLE 0   Rest Tremor LLE 0   Rest Tremor Lip/Jaw 0   Rest Tremor Constancy 0   Total Right 1   Total Left 1   Axial Total 1   Total 3         Assessment: Early onset PD in a 33 year-old male. Exam is largely normal today. Interestingly, has improved since last visit without change in Sinemet dose. Will not change dose at this time.     Plan:   -continue Sinemet 2 tabs TID        Will schedule follow-up appointment in 6 months with Dr. Carson, who he has seen before as I will be completing fellowship.     30 minute visit  50% spent on counseling and coordination of care     Jimbo Love MD  Movement Disorders Fellow    [facility] Follow-up     CC: PD f/u     HPI: Dipesh Nicole is a 33 year-old male with a history of early onset PD who presents in follow-up. At last visit increased Sinemet from 1 to 2 tabs TID.     Today reports feels much better. Looser, more coordinated. Will maybe feel a little stiffer around 8pm or 10pm at night, but otherwise no fluctuations.      No dyskinesias,  orthostasis, or hallucinations           PD Medications                   830 noon 5   C/L 25/100                                 2 2 2      Medications:      Current Outpatient Medications   Medication     carbidopa-levodopa (SINEMET)  MG per tablet      No current facility-administered medications for this visit.          Past Medical History        Past Medical History:   Diagnosis Date     H/O magnetic resonance imaging of brain and brain stem 8/17/2018 2009 December MR Brain without and with IV Eotedvpy87/8/2009 [facility] Result Impression  Normal MRI of the brain.  Result Narrative      Multiecho, multiplanar views of the brain were obtained prior to and  following the IV administration of 19 mL of contrast. There are no  previous studies available for comparison.     The brain parenchyma is normal in appearance on all pulse sequences,  with      H/O magnetic resonance imaging of cervical spine 8/17/2018 March MR Cervical Spine without IV Contrast3/15/2017 [facility] Result Addenda Addendum by Provider, PA Jones on 03/15/2017 12:30 PM RAD^^^MA MR Cervical Spine w/o contrast 3/15/2017 12:30:00 Result Impression  Minimal degenerative disc disease at C5-6 and C6-7  otherwise an unremarkable MR scan of the cervical spine.  Result Narrative   EXAM: MR Cervical Spine w/o contrast   INDICATION:      H/O shoulder surgery 8/17/2018 2017 July XR SHOULDER 2 VIEWS [facility]7/14/[facility] Result Narrative XR SHOULDER 2 VIEWS LEFT 7/14/2017 9:13 PM  INDICATION: Shoulder Injury COMPARISON: None.  FINDINGS: Negative shoulder. No fracture or dislocation.  Status       Seizure (H) at age 22 yrs 8/17/2018     Shoulder dislocation       left     Tremor 8/10/2018         Social History          Social History Narrative     single. lives in [facility]. mother cabrera ambriz                 He has a history of a left dislocated shoulder with surgery performed in 2009.  "He currently lives in [place] but comes to the [place] area to visit his parents in [place]. 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      Exam:  /72 (BP Location: Left arm, Patient Position: Sitting, Cuff Size: Adult Regular)   Pulse 68   Temp 97.5  F (36.4  C) (Oral)   Resp 16   Ht 1.842 m (6' 0.52\")   Wt 83.9 kg (185 lb)   SpO2 96%   BMI 24.73 kg/m    NAD  Breathing comfortably  No peripheral edema     Neurological Examination (R/L):  Mental Status: Awake, alert. Fluent. Affect normal. No apraxia in left hand.  Cranial Nerves: Versions full. No hypomimia. No hypophonia. No torticollis. Tongue protrudes midline  Motor: Slight/mild bradykinesia in left arm. Slight left postural and kinetic tremor. No resting tremor.   Coordination: Finger to nose without dysmetria.  Gait: Can rise from chair with arms crossed. Gait narrow-based, good arm swing on left.     Assessment: Early onset PD in a 33 year-old male. More reassuring as to idiopathic PD diagnosis today given much better response to 2 tabs of Sinemet. Has some mild left arm parkinsonism, but overall doing quite well and doesn't seem to be functionally limiting. Will not change dose.     Plan:   -continue 2 tabs TID     Follow-up 4-6 months     30 minute visit  50% spent on counseling and coordination of care     Jimbo Love MD  Movement Disorders Fellow      "

## 2019-10-03 ENCOUNTER — OFFICE VISIT (OUTPATIENT)
Dept: NEUROLOGY | Facility: CLINIC | Age: 34
End: 2019-10-03
Payer: COMMERCIAL

## 2019-10-03 VITALS
HEART RATE: 61 BPM | OXYGEN SATURATION: 99 % | BODY MASS INDEX: 24.33 KG/M2 | WEIGHT: 182 LBS | DIASTOLIC BLOOD PRESSURE: 90 MMHG | SYSTOLIC BLOOD PRESSURE: 142 MMHG

## 2019-10-03 DIAGNOSIS — G20.A1 PARKINSON DISEASE (H): Primary | ICD-10-CM

## 2019-10-03 RX ORDER — CYANOCOBALAMIN/FOLIC AC/VIT B6 1-2.5-25MG
TABLET ORAL
Qty: 90 TABLET | Refills: 3 | COMMUNITY
Start: 2019-10-03 | End: 2019-10-03

## 2019-10-03 RX ORDER — CARBIDOPA AND LEVODOPA 25; 100 MG/1; MG/1
TABLET ORAL
Qty: 630 TABLET | Refills: 11 | Status: SHIPPED | OUTPATIENT
Start: 2019-10-03 | End: 2020-01-16

## 2019-10-03 RX ORDER — CARBIDOPA AND LEVODOPA 25; 100 MG/1; MG/1
2 TABLET ORAL 3 TIMES DAILY
Qty: 540 TABLET | Refills: 11 | COMMUNITY
Start: 2019-10-03 | End: 2019-10-03

## 2019-10-03 RX ORDER — CYANOCOBALAMIN/FOLIC AC/VIT B6 1-2.5-25MG
TABLET ORAL
Qty: 90 TABLET | Refills: 3 | Status: SHIPPED | OUTPATIENT
Start: 2019-10-03 | End: 2020-06-09

## 2019-10-03 ASSESSMENT — PAIN SCALES - GENERAL: PAINLEVEL: NO PAIN (0)

## 2019-10-03 NOTE — PATIENT INSTRUCTIONS
: 1985    LAUREL: October 3, 2019    Parkinson  Seen by me and Dr. Love    He is [employer] and getting another degree at [employer] and working on sports management  Will see where things are after that.     He was with the [employer] and [employer] and working with [employer].     His father is with [employer] and travels a lot and is in the [place].  Mother is in [place] and in [place]    The past month he has some locking of the right leg and has some cramping of the leg - he feels like he has hyperextension of the leg - locks up and sits down and gets up and it is better briefly  Not clear as how it relates to the medication  The right arm may be dystonic and raised and locked up    If he foregoes the medication he is slower.     He may take the medication in the morning and goes back to bed and then wakes up and feels great    He had locking up of th e leg at 215pm    8am 1pm 5/6pm     He has had this problem over the past 1.5 months.     The problem with the leg is more common in the afternoon    He has tried less and still having     He took another pill while golfing and seems to have helped.     [facility] often recommends that patients take vitamin b12, b6 and folic acid if individuals are on sinemet (levodopa) to reduce the chances of alterations in homocysteine levels.  The doses that are in a typical Foltx/Folbic tablet that is taking once daily as recommended by [facility] is as follows:    Folacin (Folic Acid) 2.5 mg  Pyridoxine (B6)  25 mg  Cyanocobalamin (B12) 2 mg      Medications            Carbidopa/levodopa sinemet 25/100 2 2 2     Folbee 2.5-25-1mg 1       Folic acid-vit b6-vit b12 fa -cyanocobalamin-pyridoxine 2.5-1-25mg 1                                                                                                                   History obtained from patient    PLAN  PD GENEration study with Dr. Coreas at [place]  Appointment Scheduling: Brooke Dang  Telephone:  [phone]  Email: [email or web address]     See if can obtain folic acid/ vitamin b6 and b12 over the counter    Try a change in sinemet from 2-2-2 to 2-2.5-2 or 2-3-2     Pharmacy consultation for other options if dystonic locking up problems persist, eg trial of amantadine, mao b  Inhibitor or low dose of a dopamine agonist    Will be in [place] January 2019    Return back in 3-6 months.

## 2019-10-03 NOTE — NURSING NOTE
Chief Complaint   Patient presents with     Parkinson     [facility] RETURN - 6 MONTH FOLLOW UP       Andrea Still, EMT

## 2019-10-03 NOTE — LETTER
10/3/2019       RE: Dipesh Nicole  [address]  [place] [address]     Dear Colleague,    Thank you for referring your patient, Dipesh Nicole, to the [facility] NEUROLOGY at [facility]. Please see a copy of my visit note below.      Diagnosis/Summary/Recommendations:    PATIENT: Dipesh Nicole  33 year old male     : 1985    LAUREL: October 3, 2019    Parkinson  Seen by me and Dr. Love    He is [place] and getting another degree at [employer] and working on sports management  Will see where things are after that.     He was with the [employer] and [employer] and working with [employer].     His father is with [employer] and travels a lot and is in the [place].  Mother is in [place] and in [place]    The past month he has some locking of the right leg and has some cramping of the leg - he feels like he has hyperextension of the leg - locks up and sits down and gets up and it is better briefly  Not clear as how it relates to the medication  The right arm may be dystonic and raised and locked up    If he foregoes the medication he is slower.     He may take the medication in the morning and goes back to bed and then wakes up and feels great    He had locking up of th e leg at 215pm    8am 1pm 5/6pm     He has had this problem over the past 1.5 months.     The problem with the leg is more common in the afternoon    He has tried less and still having     He took another pill while golfing and seems to have helped.     [facility] often recommends that patients take vitamin b12, b6 and folic acid if individuals are on sinemet (levodopa) to reduce the chances of alterations in homocysteine levels.  The doses that are in a typical Foltx/Folbic tablet that is taking once daily as recommended by [facility] is as follows:    Folacin (Folic Acid) 2.5 mg  Pyridoxine (B6)  25 mg  Cyanocobalamin (B12) 2 mg      Medications            Carbidopa/levodopa sinemet 25/100 2 2 2      Folbee 2.5-25-1mg 1       Folic acid-vit b6-vit b12 fa -cyanocobalamin-pyridoxine 2.5-1-25mg 1                                                                                                                   History obtained from patient    PLAN  PD GENEration study with Dr. Lyudmila guzman [place]  Appointment Scheduling: Brooke Dang  Telephone: [phone]  Email: [email or web address]     See if can obtain folic acid/ vitamin b6 and b12 over the counter    Try a change in sinemet from 2-2-2 to 2-2.5-2 or 2-3-2     Pharmacy consultation for other options if dystonic locking up problems persist, eg trial of amantadine, mao b  Inhibitor or low dose of a dopamine agonist    Will be in [place] January 2019    Return back in 3-6 months.       Coding statement:   Duration of  Services: patient care and care coordination was 25 minutes  Greater than 50% of this visit was spent in counseling and coordination of care.     Ramesh Carson MD     ______________________________________    Last visit date and details:      Assessment: 32 year-old male with single reported seizure who presents with about a year of left arm parkinsonism. Most notable on exam are rigidity and bradykinesia, confined largely to the left arm although there is some subtler possible involvement of the face with hypomimia and right arm with milder rigidity. There was no clear resting tremor observed today, more a jerky action tremor.      Etiology is not clear at this point. Will evaluate for secondary cause for parkinsonism, notably Renny's Disease or a mass lesion causing hemiparkinonism. Will also pursure GEETA scan. If this were to be Parkinson Disease, age of onset is quite young and a genetic etiology would have to be considered.     Recommendations:   -ceruloplasmin and 24 hour urine copper  -MRI brain  -GEETA scan  -genetics consult with kelsi Sorto     Will follow-up with me in one month to discuss above results     Jimbo Love MD  Movement  Disorders Fellow     Patient seen and examined by me today September 4, 2018  Ramesh carson md.   September 4, 2018     Ramesh Carson MD        ______________________________________      Patient was asked about 14 Review of systems including changes in vision (dry eyes, double vision), hearing, heart, lungs, musculoskeletal, depression, anxiety, snoring, RBD, insomnia, urinary frequency, urinary urgency, constipation, swallowing problems, hematological, ID, allergies, skin problems: seborrhea, endocrinological: thyroid, diabetes, cholesterol; balance, weight changes, and other neurological problems and these were not significant at this time except for   Patient Active Problem List   Diagnosis     Tremor     Seizure (H) at age 22 yrs     H/O magnetic resonance imaging of cervical spine     H/O shoulder surgery     H/O magnetic resonance imaging of brain and brain stem     Shoulder dislocation     Parkinson disease (H)     Right inguinal hernia          Allergies   Allergen Reactions     Metoclopramide      Avoid in patient with a history of Parkinson Disease  Other reaction(s): GI intolerance  Avoid in patient with a history of Parkinson Disease     Past Surgical History:   Procedure Laterality Date     SHOULDER SURGERY  2007     Past Medical History:   Diagnosis Date     H/O magnetic resonance imaging of brain and brain stem 8/17/2018 2009 December MR Brain without and with IV Whnikqvu77/8/2009 [facility] Result Impression  Normal MRI of the brain.  Result Narrative      Multiecho, multiplanar views of the brain were obtained prior to and  following the IV administration of 19 mL of contrast. There are no  previous studies available for comparison.     The brain parenchyma is normal in appearance on all pulse sequences,  with      H/O magnetic resonance imaging of cervical spine 8/17/2018 March MR Cervical Spine without IV Contrast3/15/2017 [facility] Result Addenda Addendum by ProviderKaren M.D. on  03/15/2017 12:30 PM RAD^^^MA MR Cervical Spine w/o contrast 3/15/2017 12:30:00 Result Impression  Minimal degenerative disc disease at C5-6 and C6-7  otherwise an unremarkable MR scan of the cervical spine.  Result Narrative   EXAM: MR Cervical Spine w/o contrast   INDICATION:      H/O shoulder surgery 8/17/2018 2017 July [facility] Result Narrative XR SHOULDER 2 VIEWS LEFT 7/14/2017 9:13 PM  INDICATION: Shoulder Injury COMPARISON: None.  FINDINGS: Negative shoulder. No fracture or dislocation.  Status       Seizure (H) at age 22 yrs 8/17/2018     Shoulder dislocation     left     Tremor 8/10/2018     Social History     Socioeconomic History     Marital status: Single     Spouse name: Not on file     Number of children: Not on file     Years of education: Not on file     Highest education level: Not on file   Occupational History     Not on file   Social Needs     Financial resource strain: Not on file     Food insecurity:     Worry: Not on file     Inability: Not on file     Transportation needs:     Medical: Not on file     Non-medical: Not on file   Tobacco Use     Smoking status: Never Smoker     Smokeless tobacco: Never Used   Substance and Sexual Activity     Alcohol use: No     Drug use: No     Sexual activity: Not on file   Lifestyle     Physical activity:     Days per week: Not on file     Minutes per session: Not on file     Stress: Not on file   Relationships     Social connections:     Talks on phone: Not on file     Gets together: Not on file     Attends Caodaism service: Not on file     Active member of club or organization: Not on file     Attends meetings of clubs or organizations: Not on file     Relationship status: Not on file     Intimate partner violence:     Fear of current or ex partner: Not on file     Emotionally abused: Not on file     Physically abused: Not on file     Forced sexual activity: Not on file   Other Topics  Concern     Not on file   Social History Narrative    single. lives in [facility] [place]. mother cabrera ambriz            He has a history of a left dislocated shoulder with surgery performed in 2009. He currently lives in [place] but comes to the [place] area to visit his parents in [place]. 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       Drug and lactation database from the [place] National Library of Medicine:  http://toxnet.nlm.nih.gov/cgi-bin/sis/htmlgen?LACT      B/P: Data Unavailable, T: Data Unavailable, P: Data Unavailable, R: Data Unavailable 0 lbs 0 oz  There were no vitals taken for this visit., There is no height or weight on file to calculate BMI.  Medications and Vitals not listed above were documented in the cart and reviewed by me.     Current Outpatient Medications   Medication Sig Dispense Refill     Folic Acid-Vit B6-Vit B12 (FA-CYANOCOBALAMIN-PYRIDOXINE) 2.5-1-25 MG TABS Take 1 tablet by mouth       carbidopa-levodopa (SINEMET)  MG tablet Take 2 tablets by mouth 3 times daily 180 tablet 11     FOLBEE 2.5-25-1 MG TABS TK 1 T PO D  3         Ramesh Carson MD    [facility] Follow-up     CC: PD f/u     HPI: Dipesh Ambriz is a 33 year-old male with a history of early onset PD who presents in follow-up. At last visit Sinemet was continued at 2 tabs TID.     Today he reports he is doing well. Has almost normal function in the left hand. No difficulty walking. Has some shaking of the hand if he runs.     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     Can feel the Sinemet kick in in the morning. No fluctuations or dyskinesias or orthostasis.        PD Medications                   830 noon 5   C/L 25/100                                 2 2 2      Medications:      Current Outpatient Medications   Medication     carbidopa-levodopa (SINEMET)  MG per tablet      No  current facility-administered medications for this visit.          Past Medical History        Past Medical History:   Diagnosis Date     H/O magnetic resonance imaging of brain and brain stem 8/17/2018 2009 December MR Brain without and with IV Tlldklox99/8/2009 [facility] Result Impression  Normal MRI of the brain.  Result Narrative      Multiecho, multiplanar views of the brain were obtained prior to and  following the IV administration of 19 mL of contrast. There are no  previous studies available for comparison.     The brain parenchyma is normal in appearance on all pulse sequences,  with      H/O magnetic resonance imaging of cervical spine 8/17/2018 March MR Cervical Spine without IV Contrast3/15/2017 [facility] Result Addenda Addendum by Provider, PA Jones on 03/15/2017 12:30 PM RAD^^^MA MR Cervical Spine w/o contrast 3/15/2017 12:30:00 Result Impression  Minimal degenerative disc disease at C5-6 and C6-7  otherwise an unremarkable MR scan of the cervical spine.  Result Narrative   EXAM: MR Cervical Spine w/o contrast   INDICATION:      H/O shoulder surgery 8/17/2018 2017 July [facility] SH[facility] Result Narrative XR SHOULDER 2 VIEWS LEFT 7/14/2017 9:13 PM  INDICATION: Shoulder Injury COMPARISON: None.  FINDINGS: Negative shoulder. No fracture or dislocation.  Status       Seizure (H) at age 22 yrs 8/17/2018     Shoulder dislocation       left     Tremor 8/10/2018         Social History          Social History Narrative     single. lives in [facility] [facility]. mother cabrera ambriz                 He has a history of a left dislocated shoulder with surgery performed in 2009. He currently lives in [place] but comes to the [place] area to visit his parents in [place]. 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      Exam:  /82 (BP Location: Right arm, Patient Position: Chair, Cuff Size: Adult Regular)    Pulse 50   Wt 83.5 kg (184 lb)   SpO2 97%   BMI 24.60 kg/m    NAD  Breathing comfortably  No peripheral edema     Neurological Examination (R/L):  Mental Status: Awake, alert. Fluent. Affect normal.  Cranial Nerves: Versions full. No hypomimia. No hypophonia. No torticollis. Tongue protrudes midline  Motor: No bradykinesia. Tone mildly increased in neck and arms bilaterally. No resting tremor.   Coordination: Finger to nose without dysmetria.  Gait: Can rise from chair with arms crossed. Gait narrow-based with good arm swing.     UPDRS Values 4/3/2019   Time: 4:30 PM   Medication On   R Brain DBS: None   L Brain DBS: None   Speech 0   Facial Expression 0   Rigidity Neck 1   Rigidity RUE 1   Rigidity LUE 1   Rigidity RLE 0   Rigidity LLE 0   Finger Taps R 0   Finger Taps L 0   Hand Mvt R 0   Hand Mvt L 0   Pron-/Supinate R 0   Pron-/Supinate L 0   Toe Tap R 0   Toe Tap L 0   Leg Agility R 0   Leg Agility L 0   Arise From Chair 0   Gait 0   Gait Freezing 0   Postural Stability 0   Posture 0   Global Spont Mvt 0   Postural Tremor RUE 0   Postural Tremor LUE 0   Kinetic Tremor RUE 0   Kinetic Tremor LUE 0   Rest Tremor RUE 0   Rest Tremor LUE 0   Rest Tremor RLE 0   Rest Tremor LLE 0   Rest Tremor Lip/Jaw 0   Rest Tremor Constancy 0   Total Right 1   Total Left 1   Axial Total 1   Total 3         Assessment: Early onset PD in a 33 year-old male. Exam is largely normal today. Interestingly, has improved since last visit without change in Sinemet dose. Will not change dose at this time.     Plan:   -continue Sinemet 2 tabs TID        Will schedule follow-up appointment in 6 months with Dr. Carson, who he has seen before as I will be completing fellowship.     30 minute visit  50% spent on counseling and coordination of care     Jimbo Love MD  Movement Disorders Fellow    [facility] Follow-up     CC: PD f/u     HPI: Dipesh Nicole is a 33 year-old male with a history of early  onset PD who presents in follow-up. At last visit increased Sinemet from 1 to 2 tabs TID.     Today reports feels much better. Looser, more coordinated. Will maybe feel a little stiffer around 8pm or 10pm at night, but otherwise no fluctuations.      No dyskinesias, orthostasis, or hallucinations           PD Medications                   830 noon 5   C/L 25/100                                 2 2 2      Medications:      Current Outpatient Medications   Medication     carbidopa-levodopa (SINEMET)  MG per tablet      No current facility-administered medications for this visit.          Past Medical History        Past Medical History:   Diagnosis Date     H/O magnetic resonance imaging of brain and brain stem 8/17/2018 2009 December MR Brain without and with IV Pvogelrz85/8/2009 [facility] Result Impression  Normal MRI of the brain.  Result Narrative      Multiecho, multiplanar views of the brain were obtained prior to and  following the IV administration of 19 mL of contrast. There are no  previous studies available for comparison.     The brain parenchyma is normal in appearance on all pulse sequences,  with      H/O magnetic resonance imaging of cervical spine 8/17/2018 March MR Cervical Spine without IV Contrast3/15/2017 [facility] Result Addenda Addendum by Provider, PA Jones on 03/15/2017 12:30 PM RAD^^^MA MR Cervical Spine w/o contrast 3/15/2017 12:30:00 Result Impression  Minimal degenerative disc disease at C5-6 and C6-7  otherwise an unremarkable MR scan of the cervical spine.  Result Narrative   EXAM: MR Cervical Spine w/o contrast   INDICATION:      H/O shoulder surgery 8/17/2018 2017 July XR SHOULDER 2 [facility] Result Narrative XR SHOULDER 2 VIEWS LEFT 7/14/2017 9:13 PM  INDICATION: Shoulder Injury COMPARISON: None.  FINDINGS: Negative shoulder. No fracture or dislocation.  Status       Seizure (H) at age 22 yrs  "8/17/2018     Shoulder dislocation       left     Tremor 8/10/2018         Social History          Social History Narrative     single. lives in [place]. mother cabrera ambriz                 He has a history of a left dislocated shoulder with surgery performed in 2009. He currently lives in [place] but comes to the [place] area to visit his parents in [place]. 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      Exam:  /72 (BP Location: Left arm, Patient Position: Sitting, Cuff Size: Adult Regular)   Pulse 68   Temp 97.5  F (36.4  C) (Oral)   Resp 16   Ht 1.842 m (6' 0.52\")   Wt 83.9 kg (185 lb)   SpO2 96%   BMI 24.73 kg/m    NAD  Breathing comfortably  No peripheral edema     Neurological Examination (R/L):  Mental Status: Awake, alert. Fluent. Affect normal. No apraxia in left hand.  Cranial Nerves: Versions full. No hypomimia. No hypophonia. No torticollis. Tongue protrudes midline  Motor: Slight/mild bradykinesia in left arm. Slight left postural and kinetic tremor. No resting tremor.   Coordination: Finger to nose without dysmetria.  Gait: Can rise from chair with arms crossed. Gait narrow-based, good arm swing on left.     Assessment: Early onset PD in a 33 year-old male. More reassuring as to idiopathic PD diagnosis today given much better response to 2 tabs of Sinemet. Has some mild left arm parkinsonism, but overall doing quite well and doesn't seem to be functionally limiting. Will not change dose.     Plan:   -continue 2 tabs TID     Follow-up 4-6 months     30 minute visit  50% spent on counseling and coordination of care     Jimbo Love MD  Movement Disorders Fellow        Again, thank you for allowing me to participate in the care of your patient.      Sincerely,    Ramesh Carson MD      "

## 2019-10-07 ENCOUNTER — TELEPHONE (OUTPATIENT)
Dept: NEUROLOGY | Facility: CLINIC | Age: 34
End: 2019-10-07

## 2019-10-07 NOTE — TELEPHONE ENCOUNTER
MTM referral from: [facility] visit (referral by provider)    MTM referral outreach attempt #2 on October 7, 2019 at 10:56 AM      Outcome: Patient not reachable after several attempts, will route to MTM Pharmacist/Provider as an FYI. Thank you for the referral.    See [facility] Pharm[facility] Coordinator

## 2019-10-15 ENCOUNTER — TELEPHONE (OUTPATIENT)
Dept: NEUROLOGY | Facility: CLINIC | Age: 34
End: 2019-10-15

## 2019-10-15 NOTE — TELEPHONE ENCOUNTER
GENETIC COUNSELING-Neurology  I left a message for Dipesh. We had previously tried to obtain authorization for genetic testing and it was denied. I offered the option of the [employer] 250 dollar patient pay program and asked him to contact me if interested.    Kev Sorto MS, [facility]  Licensed Genetic Counselor

## 2020-01-14 NOTE — PROGRESS NOTES
Diagnosis/Summary/Recommendations:    PATIENT: Dipesh Nicole  34 year old male     : 1985    LAUREL: 2020    Taking 4 classes this semester in sports management and 2 classes this summer   Been doing camps and coaching.   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   Business management.   Front office.     Delayed on    He has some locking up at mid day     He is trying to stay ahead of the medication curve.     He usually takes medication one hour before he gets up to go   eg 6am takes pill and goes back to bed till 7am and then has class at 8am     Wondering about foods.      Medications      6/7am 10/11am 3/4pm 7/8pm     Carbidopa/levodopa sinemet 25/100 2 2 2 2      Folbee 2.5-25-1mg 1           Folic acid-vit b6-vit b12 fa -cyanocobalamin-pyridoxine 2.5-1-25mg See above                                                                                  History obtained from patient    PLAN  Genetics study with Dr. Coreas -   Will wait to get results from patient after results are back    Telephone consultation with Veronica Funk     Telephone discussion with Theresa Garcia.    Medications reviewed and refilled.     Discussion about medication therapy today as he is have some wearing off and delayed on periods. And mild dyskinesias    He will have completed his degree by the end of the summer.     Father is with the [employer]     Return back       Coding statement:   Duration of  Services: patient care and care coordination was 25 minutes  Greater than 50% of this visit was spent in counseling and coordination of care.     Ramesh Carson MD     ______________________________________    Last visit date and details:       LAUREL: October 3, 2019     Parkinson  Seen by me and Dr. Love     He is [place] and getting another degree at [employer] and working on sports management  Will see where things are after that.      He was with the [employer] and working with [employer] program.      His father is with [employer]  and travels a lot and is in the [place].  Mother is in [place] and in [place]     The past month he has some locking of the right leg and has some cramping of the leg - he feels like he has hyperextension of the leg - locks up and sits down and gets up and it is better briefly  Not clear as how it relates to the medication  The right arm may be dystonic and raised and locked up     If he foregoes the medication he is slower.      He may take the medication in the morning and goes back to bed and then wakes up and feels great     He had locking up of th e leg at 215pm     8am 1pm 5/6pm      He has had this problem over the past 1.5 months.      The problem with the leg is more common in the afternoon     He has tried less and still having      He took another pill while golfing and seems to have helped.      [facility] often recommends that patients take vitamin b12, b6 and folic acid if individuals are on sinemet (levodopa) to reduce the chances of alterations in homocysteine levels.  The doses that are in a typical Foltx/Folbic tablet that is taking once daily as recommended by [facility] is as follows:     Folacin (Folic Acid)      2.5 mg  Pyridoxine       (B6)                 25 mg  Cyanocobalamin         (B12)   2 mg        Medications                 Carbidopa/levodopa sinemet 25/100 2 2 2       Folbee 2.5-25-1mg 1           Folic acid-vit b6-vit b12 fa -cyanocobalamin-pyridoxine 2.5-1-25mg 1                                                                                                                                                                                                       History obtained from patient     PLAN  PD GENEration study with Dr. Coreas at [place]  Appointment Scheduling: Brooke Dang  Telephone: [phone]  Email: [email or web address]      See if can obtain folic acid/ vitamin b6 and b12 over the counter     Try a change in sinemet from 2-2-2 to 2-2.5-2 or 2-3-2       Pharmacy consultation for other options if dystonic locking up problems persist, eg trial of amantadine, mao b  Inhibitor or low dose of a dopamine agonist     Will be in [place] January 2019     Return back in 3-6 months.           ______________________________________      Patient was asked about 14 Review of systems including changes in vision (dry eyes, double vision), hearing, heart, lungs, musculoskeletal, depression, anxiety, snoring, RBD, insomnia, urinary frequency, urinary urgency, constipation, swallowing problems, hematological, ID, allergies, skin problems: seborrhea, endocrinological: thyroid, diabetes, cholesterol; balance, weight changes, and other neurological problems and these were not significant at this time except for   Patient Active Problem List   Diagnosis     Tremor     Seizure (H) at age 22 yrs     H/O magnetic resonance imaging of cervical spine     H/O shoulder surgery     H/O magnetic resonance imaging of brain and brain stem     Shoulder dislocation     Parkinson disease (H)     Right inguinal hernia          Allergies   Allergen Reactions     Metoclopramide      Avoid in patient with a history of Parkinson Disease  Other reaction(s): GI intolerance  Avoid in patient with a history of Parkinson Disease     Past Surgical History:   Procedure Laterality Date     SHOULDER SURGERY  2007     Past Medical History:   Diagnosis Date     H/O magnetic resonance imaging of brain and brain stem 8/17/2018 2009 December MR Brain without and with IV Yjfovfqv02/8/2009 [facility] Result Impression  Normal MRI of the brain.  Result Narrative      Multiecho, multiplanar views of the brain were obtained prior to and  following the IV administration of 19 mL of contrast. There are no  previous studies available for comparison.     The brain parenchyma is normal in appearance on all pulse sequences,  with      H/O magnetic resonance imaging of cervical spine 8/17/2018 March MR Cervical Spine  without IV Contrast3/15/2017 [facility] Result Addenda Addendum by Provider, PA Jones on 03/15/2017 12:30 PM RAD^^^MA MR Cervical Spine w/o contrast 3/15/2017 12:30:00 Result Impression  Minimal degenerative disc disease at C5-6 and C6-7  otherwise an unremarkable MR scan of the cervical spine.  Result Narrative   EXAM: MR Cervical Spine w/o contrast   INDICATION:      H/O shoulder surgery 8/17/2018 2017 July XR SHOULDER 2 VIEWS LEFT7/14/2017 [facility] Result Narrative XR SHOULDER 2 VIEWS LEFT 7/14/2017 9:13 PM  INDICATION: Shoulder Injury COMPARISON: None.  FINDINGS: Negative shoulder. No fracture or dislocation.  Status       Seizure (H) at age 22 yrs 8/17/2018     Shoulder dislocation     left     Tremor 8/10/2018     Social History     Socioeconomic History     Marital status: Single     Spouse name: Not on file     Number of children: Not on file     Years of education: Not on file     Highest education level: Not on file   Occupational History     Not on file   Social Needs     Financial resource strain: Not on file     Food insecurity:     Worry: Not on file     Inability: Not on file     Transportation needs:     Medical: Not on file     Non-medical: Not on file   Tobacco Use     Smoking status: Never Smoker     Smokeless tobacco: Never Used   Substance and Sexual Activity     Alcohol use: Yes     Drug use: No     Sexual activity: Not on file   Lifestyle     Physical activity:     Days per week: Not on file     Minutes per session: Not on file     Stress: Not on file   Relationships     Social connections:     Talks on phone: Not on file     Gets together: Not on file     Attends Che[employer] service: Not on file     Active member of club or organization: Not on file     Attends meetings of clubs or organizations: Not on file     Relationship status: Not on file     Intimate partner violence:     Fear of current or ex partner: Not on file     Emotionally abused:  Not on file     Physically abused: Not on file     Forced sexual activity: Not on file   Other Topics Concern     Not on file   Social History Narrative    single. lives in [facility]. mother [place]rera amb[place]            He has a history of a left dislocated shoulder with surgery performed in 2009. He currently lives in [place] but comes to the [place] area to visit his parents in [place]. 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       Drug and lactation database from the [place] National Library of Medicine:  http://toxnet.nlm.nih.gov/cgi-bin/sis/htmlgen?LACT      B/P: Data Unavailable, T: Data Unavailable, P: Data Unavailable, R: Data Unavailable 0 lbs 0 oz  There were no vitals taken for this visit., There is no height or weight on file to calculate BMI.  Medications and Vitals not listed above were documented in the cart and reviewed by me.     Current Outpatient Medications   Medication Sig Dispense Refill     carbidopa-levodopa (SINEMET)  MG tablet Take 2 tabs @ 8am and 2-3 tabs @1pm and 2 tabs @ 5pm 630 tablet 11     FOLBEE 2.5-25-1 MG TABS 1 tablet by mouth daily 90 tablet 3         Ramesh Carson MD

## 2020-01-16 ENCOUNTER — TELEPHONE (OUTPATIENT)
Dept: NEUROLOGY | Facility: CLINIC | Age: 35
End: 2020-01-16

## 2020-01-16 DIAGNOSIS — G20.A1 PARKINSON DISEASE (H): ICD-10-CM

## 2020-01-16 RX ORDER — CARBIDOPA AND LEVODOPA 25; 100 MG/1; MG/1
TABLET ORAL
Qty: 210 TABLET | Refills: 0 | Status: SHIPPED | OUTPATIENT
Start: 2020-01-16 | End: 2020-01-28

## 2020-01-16 NOTE — TELEPHONE ENCOUNTER
Health Call Center    Phone Message    May a detailed message be left on voicemail: yes    Reason for Call: Medication Question or concern regarding medication   Prescription Clarification  Name of Medication: carbidopa-levodopa (SINEMET)  MG  Prescribing Provider: Dr. Carson, Ramesh Parks MD Pharmacy:  [id number] - [place], [address]  [phone]    What on the order needs clarification? Pharmacy stating too early to refill.  He went on vacation and left his medication, had to get a refill and is now out.    Last pill taken this morning.  Priority High/    Action Taken: Message routed to:  [facility] ([facility] Neurology

## 2020-01-28 ENCOUNTER — OFFICE VISIT (OUTPATIENT)
Dept: NEUROLOGY | Facility: CLINIC | Age: 35
End: 2020-01-28
Payer: COMMERCIAL

## 2020-01-28 VITALS
WEIGHT: 184.4 LBS | HEIGHT: 73 IN | BODY MASS INDEX: 24.44 KG/M2 | OXYGEN SATURATION: 97 % | DIASTOLIC BLOOD PRESSURE: 84 MMHG | SYSTOLIC BLOOD PRESSURE: 128 MMHG | HEART RATE: 70 BPM | RESPIRATION RATE: 16 BRPM

## 2020-01-28 DIAGNOSIS — G20.A1 PARKINSON DISEASE (H): Primary | ICD-10-CM

## 2020-01-28 RX ORDER — CARBIDOPA AND LEVODOPA 25; 100 MG/1; MG/1
TABLET ORAL
Qty: 720 TABLET | Refills: 3 | COMMUNITY
Start: 2020-01-28 | End: 2020-01-28

## 2020-01-28 RX ORDER — CARBIDOPA AND LEVODOPA 25; 100 MG/1; MG/1
TABLET ORAL
Qty: 720 TABLET | Refills: 3 | Status: SHIPPED | OUTPATIENT
Start: 2020-01-28 | End: 2020-06-09

## 2020-01-28 ASSESSMENT — PAIN SCALES - GENERAL: PAINLEVEL: NO PAIN (0)

## 2020-01-28 ASSESSMENT — MIFFLIN-ST. JEOR: SCORE: 1827.37

## 2020-01-28 NOTE — NURSING NOTE
Chief Complaint   Patient presents with     Parkinson     [facility] RETURN MOVEMENT DISORDER 3 MO F/U     Kelly Tenorio CMA

## 2020-01-28 NOTE — LETTER
2020    RE: Dipesh Nicole  [address] [place] [address]     Diagnosis/Summary/Recommendations:    PATIENT: Dipesh Nicole  34 year old male     : 1985    LAUREL: 2020    Taking 4 classes this semester in sports management and 2 classes this summer   Been doing camps and coaching.   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   Business management.   Front office.     Delayed on    He has some locking up at mid day     He is trying to stay ahead of the medication curve.     He usually takes medication one hour before he gets up to go   eg 6am takes pill and goes back to bed till 7am and then has class at 8am     Wondering about foods.      Medications      6/7am 10/11am 3/4pm 7/8pm     Carbidopa/levodopa sinemet 25/100 2 2 2 2      Folbee 2.5-25-1mg 1           Folic acid-vit b6-vit b12 fa -cyanocobalamin-pyridoxine 2.5-1-25mg See above                                                                                  History obtained from patient    PLAN  Genetics study with Dr. Coreas -   Will wait to get results from patient after results are back    Telephone consultation with Veronica Funk     Telephone discussion with Theresa Garcia.    Medications reviewed and refilled.     Discussion about medication therapy today as he is have some wearing off and delayed on periods. And mild dyskinesias    He will have completed his degree by the end of the summer.     Father is with the [employer] [facility] [employer] [facility]     Return back       Coding statement:   Duration of  Services: patient care and care coordination was 25 minutes  Greater than 50% of this visit was spent in counseling and coordination of care.     Ramesh Carson MD     ______________________________________    Last visit date and details:       LAUREL: October 3, 2019     Parkinson  Seen by me and Dr. Love     He is [place] and getting another degree at [employer] and working on sports management  Will see where things are after that.      He was with the [employer] and camps and  working with [employer].      His father is with [employer] and travels a lot and is in the [place].  Mother is in [place] and in [place]     The past month he has some locking of the right leg and has some cramping of the leg - he feels like he has hyperextension of the leg - locks up and sits down and gets up and it is better briefly  Not clear as how it relates to the medication  The right arm may be dystonic and raised and locked up     If he foregoes the medication he is slower.      He may take the medication in the morning and goes back to bed and then wakes up and feels great     He had locking up of th e leg at 215pm     8am 1pm 5/6pm      He has had this problem over the past 1.5 months.      The problem with the leg is more common in the afternoon     He has tried less and still having      He took another pill while golfing and seems to have helped.      [place] often recommends that patients take vitamin b12, b6 and folic acid if individuals are on sinemet (levodopa) to reduce the chances of alterations in homocysteine levels.  The doses that are in a typical Foltx/Folbic tablet that is taking once daily as recommended by [facility] is as follows:     Folacin (Folic Acid)      2.5 mg  Pyridoxine       (B6)                 25 mg  Cyanocobalamin         (B12)   2 mg        Medications                 Carbidopa/levodopa sinemet 25/100 2 2 2       Folbee 2.5-25-1mg 1           Folic acid-vit b6-vit b12 fa -cyanocobalamin-pyridoxine 2.5-1-25mg 1                                                                                       History obtained from patient     PLAN  PD GENEration study with Dr. Coreas at [place]  Appointment Scheduling: Brooke Dang  Telephone: [phone]  Email: [email or web address]      See if can obtain folic acid/ vitamin b6 and b12 over the counter     Try a change in sinemet from 2-2-2 to 2-2.5-2 or 2-3-2      Pharmacy consultation for other options if  dystonic locking up problems persist, eg trial of amantadine, mao b  Inhibitor or low dose of a dopamine agonist     Will be in [place] January 2019     Return back in 3-6 months.     ______________________________________      Patient was asked about 14 Review of systems including changes in vision (dry eyes, double vision), hearing, heart, lungs, musculoskeletal, depression, anxiety, snoring, RBD, insomnia, urinary frequency, urinary urgency, constipation, swallowing problems, hematological, ID, allergies, skin problems: seborrhea, endocrinological: thyroid, diabetes, cholesterol; balance, weight changes, and other neurological problems and these were not significant at this time except for   Patient Active Problem List   Diagnosis     Tremor     Seizure (H) at age 22 yrs     H/O magnetic resonance imaging of cervical spine     H/O shoulder surgery     H/O magnetic resonance imaging of brain and brain stem     Shoulder dislocation     Parkinson disease (H)     Right inguinal hernia          Allergies   Allergen Reactions     Metoclopramide      Avoid in patient with a history of Parkinson Disease  Other reaction(s): GI intolerance  Avoid in patient with a history of Parkinson Disease     Past Surgical History:   Procedure Laterality Date     SHOULDER SURGERY  2007     Past Medical History:   Diagnosis Date     H/O magnetic resonance imaging of brain and brain stem 8/17/2018 2009 December MR Brain without and with IV [id number]/8/2009 [facility] Result Impression  Normal MRI of the brain.  Result Narrative      Multiecho, multiplanar views of the brain were obtained prior to and  following the IV administration of 19 mL of contrast. There are no  previous studies available for comparison.     The brain parenchyma is normal in appearance on all pulse sequences,  with      H/O magnetic resonance imaging of cervical spine 8/17/2018 March MR Cervical Spine without IV Contrast3/15/2017 [facility] Result  Addenda Addendum by ProviderKaren M.D. on 03/15/2017 12:30 PM RAD^^^MA MR Cervical Spine w/o contrast 3/15/2017 12:30:00 Result Impression  Minimal degenerative disc disease at C5-6 and C6-7  otherwise an unremarkable MR scan of the cervical spine.  Result Narrative   EXAM: MR Cervical Spine w/o contrast   INDICATION:      H/O shoulder surgery 8/17/2018 2017 July XR SHOULDER 2 [facility]IE[facility]14[facility] Result Narrative XR SHOULDER 2 VIEWS LEFT 7/14/2017 9:13 PM  INDICATION: Shoulder Injury COMPARISON: None.  FINDINGS: Negative shoulder. No fracture or dislocation.  Status       Seizure (H) at age 22 yrs 8/17/2018     Shoulder dislocation     left     Tremor 8/10/2018     Social History     Socioeconomic History     Marital status: Single     Spouse name: Not on file     Number of children: Not on file     Years of education: Not on file     Highest education level: Not on file   Occupational History     Not on file   Social Needs     Financial resource strain: Not on file     Food insecurity:     Worry: Not on file     Inability: Not on file     Transportation needs:     Medical: Not on file     Non-medical: Not on file   Tobacco Use     Smoking status: Never Smoker     Smokeless tobacco: Never Used   Substance and Sexual Activity     Alcohol use: Yes     Drug use: No     Sexual activity: Not on file   Lifestyle     Physical activity:     Days per week: Not on file     Minutes per session: Not on file     Stress: Not on file   Relationships     Social connections:     Talks on phone: Not on file     Gets together: Not on file     Attends Evangelical service: Not on file     Active member of club or organization: Not on file     Attends meetings of clubs or organizations: Not on file     Relationship status: Not on file     Intimate partner violence:     Fear of current or ex partner: Not on file     Emotionally abused: Not on file     Physically abused: Not on file      Forced sexual activity: Not on file   Other Topics Concern     Not on file   Social History Narrative    single. lives in [facility]. mother [place]            He has a history of a left dislocated shoulder with surgery performed in 2009. He currently lives in [place] but comes to the [place] area to visit his parents in [place]. 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     Drug and lactation database from the [place] National Library of Medicine:  http://toxnet.nlm.nih.gov/cgi-bin/sis/htmlgen?LACT    B/P: Data Unavailable, T: Data Unavailable, P: Data Unavailable, R: Data Unavailable 0 lbs 0 oz  There were no vitals taken for this visit., There is no height or weight on file to calculate BMI.  Medications and Vitals not listed above were documented in the cart and reviewed by me.     Current Outpatient Medications   Medication Sig Dispense Refill     carbidopa-levodopa (SINEMET)  MG tablet Take 2 tabs @ 8am and 2-3 tabs @1pm and 2 tabs @ 5pm 630 tablet 11     FOLBEE 2.5-25-1 MG TABS 1 tablet by mouth daily 90 tablet 3       Ramesh Carson MD

## 2020-01-28 NOTE — LETTER
2020     RE: Dipesh Nicole  [address] [facility] [address]     Dear Colleague,    Thank you for referring your patient, Dipesh Nicole, to the [facility] NEUROLOGY at [facility]. Please see a copy of my visit note below.    Diagnosis/Summary/Recommendations:    PATIENT: Dipesh Nicole  34 year old male     : 1985    LAUREL: 2020    Taking 4 classes this semester in sports management and 2 classes this summer   Been doing camps and coaching.   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   Business management.   Front office.     Delayed on    He has some locking up at mid day     He is trying to stay ahead of the medication curve.     He usually takes medication one hour before he gets up to go   eg 6am takes pill and goes back to bed till 7am and then has class at 8am     Wondering about foods.      Medications      6/7am 10/11am 3/4pm 7/8pm     Carbidopa/levodopa sinemet 25/100 2 2 2 2      Folbee 2.5-25-1mg 1           Folic acid-vit b6-vit b12 fa -cyanocobalamin-pyridoxine 2.5-1-25mg See above                                                                                  History obtained from patient    PLAN  Genetics study with Dr. Coreas -   Will wait to get results from patient after results are back    Telephone consultation with Veronica Funk     Telephone discussion with Theresa Garcia.    Medications reviewed and refilled.     Discussion about medication therapy today as he is have some wearing off and delayed on periods. And mild dyskinesias    He will have completed his degree by the end of the summer.     Father is with the [employer]     Return back       Coding statement:   Duration of  Services: patient care and care coordination was 25 minutes  Greater than 50% of this visit was spent in counseling and coordination of care.     Ramesh Carson MD     ______________________________________    Last visit date and details:       LAUREL: October 3, 2019     Parkinson  Seen by me  and Dr. Love     He is [place] and getting another degree at [employer] and working on sports management  Will see where things are after that.      He was with the [employer] and camps and working with [employer].      His father is with [employer] and travels a lot and is in the [place].  Mother is in [place] and in [place]     The past month he has some locking of the right leg and has some cramping of the leg - he feels like he has hyperextension of the leg - locks up and sits down and gets up and it is better briefly  Not clear as how it relates to the medication  The right arm may be dystonic and raised and locked up     If he foregoes the medication he is slower.      He may take the medication in the morning and goes back to bed and then wakes up and feels great     He had locking up of th e leg at 215pm     8am 1pm 5/6pm      He has had this problem over the past 1.5 months.      The problem with the leg is more common in the afternoon     He has tried less and still having      He took another pill while golfing and seems to have helped.      [facility] often recommends that patients take vitamin b12, b6 and folic acid if individuals are on sinemet (levodopa) to reduce the chances of alterations in homocysteine levels.  The doses that are in a typical Foltx/Folbic tablet that is taking once daily as recommended by [facility] is as follows:     Folacin (Folic Acid)      2.5 mg  Pyridoxine       (B6)                 25 mg  Cyanocobalamin         (B12)   2 mg        Medications                 Carbidopa/levodopa sinemet 25/100 2 2 2       Folbee 2.5-25-1mg 1           Folic acid-vit b6-vit b12 fa -cyanocobalamin-pyridoxine 2.5-1-25mg 1                                                                                                                                                                                                       History obtained from patient     PLAN  PD GENEration study  with Dr. Coreas at [place]  Appointment Scheduling: Brooke Laurence  Telephone: [phone]  Email: [email or web address]      See if can obtain folic acid/ vitamin b6 and b12 over the counter     Try a change in sinemet from 2-2-2 to 2-2.5-2 or 2-3-2      Pharmacy consultation for other options if dystonic locking up problems persist, eg trial of amantadine, mao b  Inhibitor or low dose of a dopamine agonist     Will be in [place] January 2019     Return back in 3-6 months.           ______________________________________      Patient was asked about 14 Review of systems including changes in vision (dry eyes, double vision), hearing, heart, lungs, musculoskeletal, depression, anxiety, snoring, RBD, insomnia, urinary frequency, urinary urgency, constipation, swallowing problems, hematological, ID, allergies, skin problems: seborrhea, endocrinological: thyroid, diabetes, cholesterol; balance, weight changes, and other neurological problems and these were not significant at this time except for   Patient Active Problem List   Diagnosis     Tremor     Seizure (H) at age 22 yrs     H/O magnetic resonance imaging of cervical spine     H/O shoulder surgery     H/O magnetic resonance imaging of brain and brain stem     Shoulder dislocation     Parkinson disease (H)     Right inguinal hernia          Allergies   Allergen Reactions     Metoclopramide      Avoid in patient with a history of Parkinson Disease  Other reaction(s): GI intolerance  Avoid in patient with a history of Parkinson Disease     Past Surgical History:   Procedure Laterality Date     SHOULDER SURGERY  2007     Past Medical History:   Diagnosis Date     H/O magnetic resonance imaging of brain and brain stem 8/17/2018 2009 December MR Brain without and with IV Kyxgdkrp07/8/2009 [facility] Result Impression  Normal MRI of the brain.  Result Narrative      Multiecho, multiplanar views of the brain were obtained prior to and  following the IV  administration of 19 mL of contrast. There are no  previous studies available for comparison.     The brain parenchyma is normal in appearance on all pulse sequences,  with      H/O magnetic resonance imaging of cervical spine 8/17/2018 March MR Cervical Spine without IV Contrast3/15/2017 [facility] Result Addenda Addendum by Provider, PA Jones on 03/15/2017 12:30 PM RAD^^^MA MR Cervical Spine w/o contrast 3/15/2017 12:30:00 Result Impression  Minimal degenerative disc disease at C5-6 and C6-7  otherwise an unremarkable MR scan of the cervical spine.  Result Narrative   EXAM: MR Cervical Spine w/o contrast   INDICATION:      H/O shoulder surgery 8/17/2018 2017 July XR SHOULDER 2 VIEWS LEFT7/14/2017 [facility] Result Narrative XR SHOULDER 2 VIEWS LEFT 7/14/2017 9:13 PM  INDICATION: Shoulder Injury COMPARISON: None.  FINDINGS: Negative shoulder. No fracture or dislocation.  Status       Seizure (H) at age 22 yrs 8/17/2018     Shoulder dislocation     left     Tremor 8/10/2018     Social History     Socioeconomic History     Marital status: Single     Spouse name: Not on file     Number of children: Not on file     Years of education: Not on file     Highest education level: Not on file   Occupational History     Not on file   Social Needs     Financial resource strain: Not on file     Food insecurity:     Worry: Not on file     Inability: Not on file     Transportation needs:     Medical: Not on file     Non-medical: Not on file   Tobacco Use     Smoking status: Never Smoker     Smokeless tobacco: Never Used   Substance and Sexual Activity     Alcohol use: Yes     Drug use: No     Sexual activity: Not on file   Lifestyle     Physical activity:     Days per week: Not on file     Minutes per session: Not on file     Stress: Not on file   Relationships     Social connections:     Talks on phone: Not on file     Gets together: Not on file     Attends Pentecostal service: Not  on file     Active member of club or organization: Not on file     Attends meetings of clubs or organizations: Not on file     Relationship status: Not on file     Intimate partner violence:     Fear of current or ex partner: Not on file     Emotionally abused: Not on file     Physically abused: Not on file     Forced sexual activity: Not on file   Other Topics Concern     Not on file   Social History Narrative    single. lives in [facility]. mother cabrera ambriz            He has a history of a left dislocated shoulder with surgery performed in 2009. He currently lives in [place] but comes to the [place] area to visit his parents in [place]. 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       Drug and lactation database from the [place] National Library of Medicine:  http://toxnet.nlm.nih.gov/cgi-bin/sis/htmlgen?LACT      B/P: Data Unavailable, T: Data Unavailable, P: Data Unavailable, R: Data Unavailable 0 lbs 0 oz  There were no vitals taken for this visit., There is no height or weight on file to calculate BMI.  Medications and Vitals not listed above were documented in the cart and reviewed by me.     Current Outpatient Medications   Medication Sig Dispense Refill     carbidopa-levodopa (SINEMET)  MG tablet Take 2 tabs @ 8am and 2-3 tabs @1pm and 2 tabs @ 5pm 630 tablet 11     FOLBEE 2.5-25-1 MG TABS 1 tablet by mouth daily 90 tablet 3         Ramesh Carson MD    Again, thank you for allowing me to participate in the care of your patient.      Sincerely,    Ramesh Carson MD

## 2020-01-31 ENCOUNTER — ALLIED HEALTH/NURSE VISIT (OUTPATIENT)
Dept: PHARMACY | Facility: CLINIC | Age: 35
End: 2020-01-31
Payer: COMMERCIAL

## 2020-01-31 DIAGNOSIS — G20.A1 PARKINSON DISEASE (H): Primary | ICD-10-CM

## 2020-01-31 PROCEDURE — 99207 ZZC NO CHARGE LOS: CPT | Performed by: PHARMACIST

## 2020-01-31 NOTE — PROGRESS NOTES
"Therapy Management:                                                    Dipesh Nicole is a 34 year old male called for a therapy management visit.  He was referred to me from Dr. Carson.     Reason for Consult: education on carbidopa-levodopa    Discussion: Patient is currently taking carbidopa-levodopa  mg 2 tablets 4 times per day (6 am, 10-11 am, 3-4 pm, and 7-8 pm). Patient states that about 4 months ago he started experiencing more leg stiffness which was affecting his ability to walk. He describes this as \"leg cramping\" and \"locking up.\" He tried taking less medication and this seemed to make it worse. Since meeting with Dr. Carson earlier this week he increased the dose as directed and feels this has been better with less wearing off. The first dose typically takes about an hour to kick in so he goes back to sleep for an hour and gets up at about 7 am. He is experiencing some dyskinesia, especially in the afternoons when he is at school or at the gym and on those days he sometimes takes a dose early. I explained that dyskinesia can occur because of too much medication - the peak effect of medication or taking it too early and getting build up in the body. He will watch out for this and will try to be consistent about the timing. We briefly discussed other PD therapies but decided it would be best to stay with his current regimen for now and we will have further discussion at follow up in July 2020.    Plan:  1. Continue carbidopa-levodopa  mg 2 tablets 4 times/day.   2. Provided education on monitoring efficacy (ie: stiffness) and side effects (ie: dyskinesia) of carbidopa-levodopa.   3. At next visit (7/28/20) we can continue to discuss other possible medications (amantadine, MAOB inhibitor, etc).    Veronica Funk, Pharm.D.  Medication Therapy Management Pharmacist  Phone: [phone]  "

## 2020-01-31 NOTE — Clinical Note
Provided education by phone today but won't make any further changes. He has Medical assistance insurance so I can't do formal phone visits but can check in like this by phone if needed. I sched him for a visit with me the same day he is here to see you in July.

## 2020-02-07 NOTE — TELEPHONE ENCOUNTER
Faxed a signed clarification of a prescription to [facility] [phone].    2 tabs of carbidopa/levodopa  mg every 4 hours up to 8 tablets per day.

## 2020-03-11 ENCOUNTER — HEALTH MAINTENANCE LETTER (OUTPATIENT)
Age: 35
End: 2020-03-11

## 2020-06-01 ENCOUNTER — TELEPHONE (OUTPATIENT)
Dept: NEUROLOGY | Facility: CLINIC | Age: 35
End: 2020-06-01

## 2020-06-01 NOTE — TELEPHONE ENCOUNTER
[facility] Call Center    Phone Message    May a detailed message be left on voicemail: no     Reason for Call: Other: Lorena from [facility] calling about the purchasing of over the counter more prescriptions for carbidopa-levodopa beyond what has been prescribed.  1-2 more times over a month.   Pt, Annalise then says he has upcoming appt with Dr. Carson for Medication Changes when he asks for more medication.       Comments: Wants us to know that and the concern over Meth also uses the Carbidopa-Levodopa as possible red flag.  Sending over priority      Action Taken: Message routed to:  [facility] Neurology    Travel Screening: Not Applicable

## 2020-06-01 NOTE — TELEPHONE ENCOUNTER
"Spoke to Yaquelin in the pharmacy. She has noticed a pattern evolving with the patient and wanted to let us know this is happening. Patient has been asking to get extra tabs of the carbidopa/levodopa  mg. He is prescribed 8 per day but told Yaquelin that it is not enough to help his symptoms.     On 4/17 (1 month supply at 8 per day) 240 tabs filled, insurance would not pay until 5/10. He came on 5/7 asking for more, pharmacy gave him  32 tabs (4 days), again on 5/9  8 tabs (1 day). Insurance paid for the refill on 5/10 (240) tabs. On 5/27 he came in again asking for more, they gave him 40 tabs (5 days). He keeps telling the pharmacy that he has a follow up appointment. His follow up is not until 7/28 and there have been no appointments made or canceled since he was seen in clinic on 1/31/20.    He called again over the holiday weekend and said he was out. They gave him 20 more tablets, today 6/1,  he called and asked for 8 more. Dad came through drive through and pharmacist talked to him about her concern. Dad told her that he is very active and stays up pretty late at night.     TOTAL = 588 over 46 days = over 12 per day.    Call center person told her that it can be used to make meth. I will follow up with call center and patient after speaking with provider. I advised the pharmacist to tell the patient \"no\" to any extra tabs and to be seen in clinic.    "

## 2020-06-02 NOTE — TELEPHONE ENCOUNTER
Received a note from Dr. Carson to relay our concerns about his medication overuse regarding carbidopa/levodopa.    Patient will need to have a virtual visit before 20.    carbidopa-levodopa (SINEMET)  MG tablet  720 tablet  3  2020   No    Si TABS by mouth eVERY 4 HOURS = 8/day x90 = 720      Left voice mail that he needs to be seen for a return video visit before  and that we need to discuss his carbidopa/levodopa dosing. Gave him the scheduling number and stated I [facility]

## 2020-06-05 NOTE — PROGRESS NOTES
VIDEO VISIT    Date of Visit: June 9, 2020  Name: Dipesh Nicole  Date of Birth 1985  [place] [address]  [phone] (M)  [phone] (H)    [email or web address]  Has mychart  No proxy    Presently has been in [email or web address]    Ritu      He has been taking 2 @ 6/630a, 9am and 1-1.5 tabs every 3 hours 12noon, 3pm, 6pm, 9pm and 1/2 at 1030pm    Since he is taking 1 instead of 2.     8.5 - 9 tabs per day. Doing well with this regimen.    May take 0.5 tab before bed to avoid cramping before bed.  Not as jittery with taking 1 tab vs 2 tabs.  Cramps up after 20 minutes of being up, doesn't bother him when getting up in middle of night.  Living at parent's home  Was at school, now doing on line schooling for Sports management.   Discussed extensively about Dopamine Dysregulation Syndrome  Family and brother know about his PD but now friends.   Feels normal with meds.  Diagnosed end of 2018    Assessment:  (G20) Parkinson disease (H)  (primary encounter diagnosis)     6/630a 9a 12pm 3p 6p 9pm 1030p   carbidopa/levodopa sinemet 25/100 2 2 1 1 1  1 1/2   Folbee                               Plan:    Discussion about dopamine dysregulation and parkinson  Deep brain stimulation  Medications reviewed and refilled.     I have reviewed the note as documented above.  This accurately captures the substance of my conversation with the patient.  Patient contact time  40  minutes. Over 50% of this visit was spent in patient care and care coordination.     Time of visit 740am - 820am    PATIENT SEEN AND EXAMINED BY ME on June 9, 2020 I AGREE WITH THE FINDINGS DETAILED BY THE NEUROLOGY fellow and documented in their note on June 9, 2020   PLEASE REFER TO THEIR NOTE FOR ADDITIONAL DETAILS.       RAMESH CARSON MD  June 9, 2020      Ramesh Carson  "MD      ------------------------------------------------------------------------------------------------------------------------------------------------------------------------    Video-Visit Details    The patient has been notified of following:     \"After a review of the patient s situation, this visit was changed from an in-person visit to a video visit to reduce the risk of COVID-19 exposure.   The patient is being evaluated via a billable video visit.\"    \"This video visit will be conducted via a call between you and your physician/provider. We have found that certain health care needs can be provided without the need for an in-person physical exam.  This service lets us provide the care you need with a video conversation.  If a prescription is necessary we can send it directly to your pharmacy.  If lab work is needed we can place an order for that and you can then stop by our lab to have the test done at a later time.    If during the course of the call the physician/provider feels a video visit is not appropriate, you will not be charged for this service.\"     Patient has given verbal consent for Video visit? Yes    Patient would like the video invitation sent by:     Type of service:  Video Visit    Video Start Time:     Video End Time (time video stopped):     Duration:  minutes - see above    Originating Location (pt. Location):     Distant Location (provider location):  [facility] NEUROLOGY     Mode of Communication:  Video Conference via Qriket (and if not possible then doximity)      Ramesh Carson MD      --------------------------------------------------------------------------------------------------------------    Dipesh Nicole is a 34 year old male who is being evaluated via a billable video visit.      Charts reviewed  Consult from  Images reviewed        I have reviewed and updated the patient's Past Medical History, Social History, Family History and Medication " List.    ALLERGIES  Metoclopramide    Lasts visit details if there was a last visit:         Medications                                                                                                                                                                                                              14 Review of systems  are negative except for   Patient Active Problem List   Diagnosis     Tremor     Seizure (H) at age 22 yrs     H/O magnetic resonance imaging of cervical spine     H/O shoulder surgery     H/O magnetic resonance imaging of brain and brain stem     Shoulder dislocation     Parkinson disease (H)     Right inguinal hernia        Allergies   Allergen Reactions     Metoclopramide      Avoid in patient with a history of Parkinson Disease  Other reaction(s): GI intolerance  Avoid in patient with a history of Parkinson Disease     Past Surgical History:   Procedure Laterality Date     SHOULDER SURGERY  2007     Past Medical History:   Diagnosis Date     H/O magnetic resonance imaging of brain and brain stem 8/17/2018 2009 December MR Brain without and with IV [id number]/8/2009 [facility] Result Impression  Normal MRI of the brain.  Result Narrative      Multiecho, multiplanar views of the brain were obtained prior to and  following the IV administration of 19 mL of contrast. There are no  previous studies available for comparison.     The brain parenchyma is normal in appearance on all pulse sequences,  with      H/O magnetic resonance imaging of cervical spine 8/17/2018 March MR Cervical Spine without IV Contrast3/15/2017 [facility] Result Addenda Addendum by Provider, PA Jones on 03/15/2017 12:30 PM RAD^^^MA MR Cervical Spine w/o contrast 3/15/2017 12:30:00 Result Impression  Minimal degenerative disc disease at C5-6 and C6-7  otherwise an unremarkable MR scan of the cervical spine.  Result Narrative   EXAM: MR Cervical Spine w/o contrast   INDICATION:      H/O shoulder surgery  8/17/2018 2017 July XR SHOULDER 2 VIEWS LEFT7/14/2017 [facility] Result Narrative XR SHOULDER 2 VIEWS LEFT 7/14/2017 9:13 PM  INDICATION: Shoulder Injury COMPARISON: None.  FINDINGS: Negative shoulder. No fracture or dislocation.  Status       Seizure (H) at age 22 yrs 8/17/2018     Shoulder dislocation     left     Tremor 8/10/2018     Social History     Socioeconomic History     Marital status: Single     Spouse name: Not on file     Number of children: Not on file     Years of education: Not on file     Highest education level: Not on file   Occupational History     Not on file   Social Needs     Financial resource strain: Not on file     Food insecurity     Worry: Not on file     Inability: Not on file     Transportation needs     Medical: Not on file     Non-medical: Not on file   Tobacco Use     Smoking status: Never Smoker     Smokeless tobacco: Never Used   Substance and Sexual Activity     Alcohol use: Yes     Drug use: No     Sexual activity: Not on file   Lifestyle     Physical activity     Days per week: Not on file     Minutes per session: Not on file     Stress: Not on file   Relationships     Social connections     Talks on phone: Not on file     Gets together: Not on file     Attends Protestant service: Not on file     Active member of club or organization: Not on file     Attends meetings of clubs or organizations: Not on file     Relationship status: Not on file     Intimate partner violence     Fear of current or ex partner: Not on file     Emotionally abused: Not on file     Physically abused: Not on file     Forced sexual activity: Not on file   Other Topics Concern     Not on file   Social History Narrative    single. lives in [facility] [place] [facility]. mother cabrera ambriz            He has a history of a left dislocated shoulder with surgery performed in 2009. He currently lives in [place] but comes to the [facility] to visit his parents in [place]. He works as  a full-time  throughout the year.     Family History   Problem Relation Age of Onset     Cancer Other         grandmother     Tremor No family hx of      Dementia No family hx of      Parkinsonism No family hx of      Seizure Disorder No family hx of      Current Outpatient Medications   Medication Sig Dispense Refill     carbidopa-levodopa (SINEMET)  MG tablet 2 TABS by mouth eVERY 4 HOURS = 8/day x90 = 720 720 tablet 3     FOLBEE 2.5-25-1 MG TABS 1 tablet by mouth daily (Patient taking differently: 1 tablet by mouth daily    MAY NEED A NEW RX PER INSURANCE MAY NOT BE COVERED) 90 tablet 3

## 2020-06-09 ENCOUNTER — VIRTUAL VISIT (OUTPATIENT)
Dept: NEUROLOGY | Facility: CLINIC | Age: 35
End: 2020-06-09
Payer: COMMERCIAL

## 2020-06-09 DIAGNOSIS — G20.A1 PARKINSON DISEASE (H): Primary | ICD-10-CM

## 2020-06-09 RX ORDER — CYANOCOBALAMIN/FOLIC AC/VIT B6 1-2.5-25MG
TABLET ORAL
Qty: 90 TABLET | Refills: 3 | Status: SHIPPED | OUTPATIENT
Start: 2020-06-09 | End: 2020-07-28

## 2020-06-09 RX ORDER — CARBIDOPA AND LEVODOPA 25; 100 MG/1; MG/1
TABLET ORAL
Qty: 810 TABLET | Refills: 3 | Status: SHIPPED | OUTPATIENT
Start: 2020-06-09 | End: 2020-07-28

## 2020-06-09 NOTE — LETTER
6/9/2020       RE: Dipesh Nicole  [address]  [place] [address]     Dear Colleague,    Thank you for referring your patient, Dipesh Nicole, to the [facility] NEUROLOGY at [facility]. Please see a copy of my visit note below.      VIDEO VISIT    Date of Visit: June 9, 2020  Name: Dipesh Nicole  Date of Birth 1985  [place] [address]  [phone] (M)  [phone] (H)    [email or web address]  Has mychart  No proxy    Presently has been in [place]    [email or web address]    Ritu  [phone]    He has been taking 2 @ 6/630a, 9am and 1-1.5 tabs every 3 hours 12noon, 3pm, 6pm, 9pm and 1/2 at 1030pm    Since he is taking 1 instead of 2.     8.5 - 9 tabs per day. Doing well with this regimen.    May take 0.5 tab before bed to avoid cramping before bed.  Not as jittery with taking 1 tab vs 2 tabs.  Cramps up after 20 minutes of being up, doesn't bother him when getting up in middle of night.  Living at parent's home  Was at school, now doing on line schooling for Sports management.   Discussed extensively about Dopamine Dysregulation Syndrome  Family and brother know about his PD but now friends.   Feels normal with meds.  Diagnosed end of 2018    Assessment:  (G20) Parkinson disease (H)  (primary encounter diagnosis)     6/630a 9a 12pm 3p 6p 9pm 1030p   carbidopa/levodopa sinemet 25/100 2 2 1 1 1  1 1/2   Folbee                               Plan:    Discussion about dopamine dysregulation and parkinson  Deep brain stimulation  Medications reviewed and refilled.     I have reviewed the note as documented above.  This accurately captures the substance of my conversation with the patient.  Patient contact time  40  minutes. Over 50% of this visit was spent in patient care and care coordination.     Time of visit 740am - 820am    PATIENT SEEN AND EXAMINED BY ME on June 9, 2020 I AGREE WITH THE FINDINGS DETAILED BY THE NEUROLOGY fellow and documented in their note on June 9, 2020    "PLEASE REFER TO THEIR NOTE FOR ADDITIONAL DETAILS.       RAMESH CARSON MD  June 9, 2020      Ramesh Carson MD      ------------------------------------------------------------------------------------------------------------------------------------------------------------------------    Video-Visit Details    The patient has been notified of following:     \"After a review of the patient s situation, this visit was changed from an in-person visit to a video visit to reduce the risk of COVID-19 exposure.   The patient is being evaluated via a billable video visit.\"    \"This video visit will be conducted via a call between you and your physician/provider. We have found that certain health care needs can be provided without the need for an in-person physical exam.  This service lets us provide the care you need with a video conversation.  If a prescription is necessary we can send it directly to your pharmacy.  If lab work is needed we can place an order for that and you can then stop by our lab to have the test done at a later time.    If during the course of the call the physician/provider feels a video visit is not appropriate, you will not be charged for this service.\"     Patient has given verbal consent for Video visit? Yes    Patient would like the video invitation sent by:     Type of service:  Video Visit    Video Start Time:     Video End Time (time video stopped):     Duration:  minutes - see above    Originating Location (pt. Location):     Distant Location (provider location):  [facility] NEUROLOGY     Mode of Communication:  Video Conference via [employer]mi (and if not possible then doximity)      Ramesh Carson MD      --------------------------------------------------------------------------------------------------------------    Dipesh Nicole is a 34 year old male who is being evaluated via a billable video visit.      Charts reviewed  Consult from  Images reviewed        I have reviewed and updated the patient's " Past Medical History, Social History, Family History and Medication List.    ALLERGIES  Metoclopramide    Lasts visit details if there was a last visit:         Medications                                                                                                                                                                                                              14 Review of systems  are negative except for   Patient Active Problem List   Diagnosis     Tremor     Seizure (H) at age 22 yrs     H/O magnetic resonance imaging of cervical spine     H/O shoulder surgery     H/O magnetic resonance imaging of brain and brain stem     Shoulder dislocation     Parkinson disease (H)     Right inguinal hernia        Allergies   Allergen Reactions     Metoclopramide      Avoid in patient with a history of Parkinson Disease  Other reaction(s): GI intolerance  Avoid in patient with a history of Parkinson Disease     Past Surgical History:   Procedure Laterality Date     SHOULDER SURGERY  2007     Past Medical History:   Diagnosis Date     H/O magnetic resonance imaging of brain and brain stem 8/17/2018 2009 December MR Brain without and with IV Yqazbrhr67/8/2009 [facility] Result Impression  Normal MRI of the brain.  Result Narrative      Multiecho, multiplanar views of the brain were obtained prior to and  following the IV administration of 19 mL of contrast. There are no  previous studies available for comparison.     The brain parenchyma is normal in appearance on all pulse sequences,  with      H/O magnetic resonance imaging of cervical spine 8/17/2018 March MR Cervical Spine without IV Contrast3/15/2017 [facility] Result Addenda Addendum by Provider, PA Jones on 03/15/2017 12:30 PM RAD^^^MA MR Cervical Spine w/o contrast 3/15/2017 12:30:00 Result Impression  Minimal degenerative disc disease at C5-6 and C6-7  otherwise an unremarkable MR scan of the cervical spine.  Result Narrative   EXAM: MR  Cervical Spine w/o contrast   INDICATION:      H/O shoulder surgery 8/17/2018 2017 July XR SHOULDER 2 VIEWS LEFT7/14/2017 [facility] Result Narrative XR SHOULDER 2 VIEWS LEFT 7/14/2017 9:13 PM  INDICATION: Shoulder Injury COMPARISON: None.  FINDINGS: Negative shoulder. No fracture or dislocation.  Status       Seizure (H) at age 22 yrs 8/17/2018     Shoulder dislocation     left     Tremor 8/10/2018     Social History     Socioeconomic History     Marital status: Single     Spouse name: Not on file     Number of children: Not on file     Years of education: Not on file     Highest education level: Not on file   Occupational History     Not on file   Social Needs     Financial resource strain: Not on file     Food insecurity     Worry: Not on file     Inability: Not on file     Transportation needs     Medical: Not on file     Non-medical: Not on file   Tobacco Use     Smoking status: Never Smoker     Smokeless tobacco: Never Used   Substance and Sexual Activity     Alcohol use: Yes     Drug use: No     Sexual activity: Not on file   Lifestyle     Physical activity     Days per week: Not on file     Minutes per session: Not on file     Stress: Not on file   Relationships     Social connections     Talks on phone: Not on file     Gets together: Not on file     Attends Jain service: Not on file     Active member of club or organization: Not on file     Attends meetings of clubs or organizations: Not on file     Relationship status: Not on file     Intimate partner violence     Fear of current or ex partner: Not on file     Emotionally abused: Not on file     Physically abused: Not on file     Forced sexual activity: Not on file   Other Topics Concern     Not on file   Social History Narrative    single. lives in [facility]. mother cabrera ambriz            He has a history of a left dislocated shoulder with surgery performed in 2009. He currently lives in [place] but  comes to the [place] area to visit his parents in [place]. 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     Family History   Problem Relation Age of Onset     Cancer Other         grandmother     Tremor No family hx of      Dementia No family hx of      Parkinsonism No family hx of      Seizure Disorder No family hx of      Current Outpatient Medications   Medication Sig Dispense Refill     carbidopa-levodopa (SINEMET)  MG tablet 2 TABS by mouth eVERY 4 HOURS = 8/day x90 = 720 720 tablet 3     FOLBEE 2.5-25-1 MG TABS 1 tablet by mouth daily (Patient taking differently: 1 tablet by mouth daily    MAY NEED A NEW RX PER INSURANCE MAY NOT BE COVERED) 90 tablet 3     Again, thank you for allowing me to participate in the care of your patient.      Sincerely,    Ramesh Carson MD

## 2020-06-09 NOTE — PATIENT INSTRUCTIONS
Today you spoke with Travis Carson and Sunil. We discussed your symptoms and talked about different treatment options for Parkinson disease. We also discussed a condition called Dopamine Dysregulation Syndrome (DDS) and occurs in people that take dopaminergic medication. DDS is a dysfunction of the reward system and may begin to affect behavior. We are not saying you have DDS but this is a condition we keep an eye out for as it is preventable.     Plan:   - We discussed several treatment options for Parkinson disease (PD), including MAOB-I (rasagiline is not covered by your healthcare plan, but selegiline is), Rytary (not covered by your healthcare plan), and Deep Brain Stimulation.   - Ultimately, we decided to continue with your current PD medication regimen and observe how well your symptoms respond.   - Take carbidopa/levodopa (CD/LD) 25/100 mg IR 2 tabs around 6-6:30 am, 2 tabs at 9 am, then 1 tab every 3 hours afterward.   - May take an extra CD/LD 0.5 tab as needed. Consider crushing the med and taking with a carbonated beverage or orange juice to increase absorption into your system.    - Avoid taking CD/LD with protein. Take an hour before a protein rich meal or 1 hour afterward.  - We discussed the process of evaluation for deep brain stimulation at the [facility], which will consist of MRI of the brain, neuropsychometric evaluation, video on/off testing and a meeting with one of our DBS neurosurgeons (Dr. Pramod Delgado or Dr. Angel Morales).  - [employer] has a series of short videos to help Parkinson disease patients understand DBS Therapy. The videos combine engaging patient stories, animations, and education from clinicians.  The website address is: [email or web address]. This is not an endorsement for [employer] [place].   - Continue to stay active.  - Follow up with Dr. Veronica Funk, PharmD, as scheduled.    Follow up with Dr. Carson around 9/2020.

## 2020-07-13 ENCOUNTER — TELEPHONE (OUTPATIENT)
Dept: NEUROLOGY | Facility: CLINIC | Age: 35
End: 2020-07-13

## 2020-07-13 ENCOUNTER — MYC REFILL (OUTPATIENT)
Dept: NEUROLOGY | Facility: CLINIC | Age: 35
End: 2020-07-13

## 2020-07-13 DIAGNOSIS — G20.A1 PARKINSON DISEASE (H): ICD-10-CM

## 2020-07-13 RX ORDER — CARBIDOPA AND LEVODOPA 25; 100 MG/1; MG/1
TABLET ORAL
Qty: 810 TABLET | Refills: 3
Start: 2020-07-13

## 2020-07-13 NOTE — TELEPHONE ENCOUNTER
carbidopa-levodopa (SINEMET)  MG tablet  810 tablet  3  2020   No    Si@6/630a and 9/930am, and 1 -1.5 tabs @ 12noon, 3pm, 6pm and 9pm and 1/2 tab @ 1030pm = 8.5- 9 tabs per day    Sent to pharmacy as: Carbidopa-Levodopa  MG Oral Tablet (SINEMET)    Class: E-Prescribe    Order: [id number]    E-Prescribing Status: Receipt confirmed by pharmacy (2020  8:02 AM CDT)      Called pharmacy to find out why patient was instructed to call pharmacy before.     Patient called pharmacy today to pay cash again. He is trying to fill early. The prescription is fillable on Wednesday - he is asking for a 2 day supply to last until he can get it formally filled. I will check with Dr. Carson.    Date: 2020    Time of Call: 4:07 PM     Diagnosis:  pd     [ TORB ] Ordering provider: Dr. Ramesh Carson  Order: OK to allow patient to pay out of pocket for 2 days of carbidopa/levodopa  mg for a total of 18 tabs.     Order received by: Theresa Garcia, RN     Follow-up/additional notes: Called pharmacy to give them verbal OK.

## 2020-07-13 NOTE — TELEPHONE ENCOUNTER
[facility] Call Center    Phone Message    May a detailed message be left on voicemail: yes     Reason for Call: Medication Question or concern regarding medication   Prescription Clarification  Name of Medication: carbidopa-levodopa (SINEMET)  MG tablet  Prescribing Provider: Dr. Carson   Pharmacy: [id number] - [address]    What on the order needs clarification? Annalise only has 1 dose of medication left. He stated that his last prescription was 2 days short. The pharmacy is requesting a verbal 2 day prescription. Annalise is also requesting a call back for clarification.        Action Taken: Message routed to:  [facility] ([facility]  NEUROLOGY    Travel Screening: Not Applicable

## 2020-07-26 NOTE — PROGRESS NOTES
VIDEO VISIT    Date of Visit: July 28, 2020  Name: Dipesh Nicole  Date of Birth 1985  [place] [address]  [phone] (M)  [phone] (H)     [email or web address]  Has mychart  No proxy    [email or web address]     Ritu  [phone]     Assessment:  (G20) Parkinson disease (H)  (primary encounter diagnosis)    Present medication schedule is working better  He is taking vitamin B6 intermittently   He is doing okay with his schedule    2 weeks ago -     8.5 tabs per day.     He is taking  His medication every 3 hours and has had some stiffness on the right side - and sometimes has cramping in his abdomen and diaphragm - he has thrown upon occasion. This may represent axial and appendicular symptoms that are wearing off related.     He has some dyskinesias.    He has to take his first sinemet before he gets up in the morning or he has significant morning off problems    When he is working out - golfing o baseball - he may have some cramping. He may take an extra 1/2 tablet.         6/630a 830a 1130am 230p 530p 830p 1030p   carbidopa/levodopa sinemet 25/100 2 1.5 1 1 1  1 1/2-1   Folbee Not covered                                Plan:    No change in his medication regimen for now.     Discussion about future options - such as rytary (long acting levodopa), monoamine oxidase type inhibitors such as rasagiline, and amantadine.     With rytary this medication tends to be variable in effectiveness in younger patients due to differences in diet and activity levels varying.     Discussion about not taking too much sinemet due to risks of having more dyskinesias, impulse control options.     Return to see me in 3 months or Veronica Funk to review things     I have reviewed the note as documented above.  This accurately captures the substance of my conversation with the patient.    Patient contact time  20 minutes. Over 50% of this visit was spent in patient care and care coordination.     Time of visit 245pm - 308pm  "    Ramesh Carson MD      ------------------------------------------------------------------------------------------------------------------------------------------------------------------------    Video-Visit Details    The patient has been notified of following:     \"After a review of the patient s situation, this visit was changed from an in-person visit to a video visit to reduce the risk of COVID-19 exposure.   The patient is being evaluated via a billable video visit.\"    \"This video visit will be conducted via a call between you and your physician/provider. We have found that certain health care needs can be provided without the need for an in-person physical exam.  This service lets us provide the care you need with a video conversation.  If a prescription is necessary we can send it directly to your pharmacy.  If lab work is needed we can place an order for that and you can then stop by our lab to have the test done at a later time.    If during the course of the call the physician/provider feels a video visit is not appropriate, you will not be charged for this service.\"     Patient has given verbal consent for Video visit? Yes    Patient would like the video invitation sent by:     Type of service:  Video Visit    Video Start Time:     Video End Time (time video stopped):     Duration:  minutes - see above    Originating Location (pt. Location):     Distant Location (provider location):  [facility] NEUROLOGY     Mode of Communication:  Video Conference via [employer] (and if not possible then doximity)      Ramesh Carson MD      --------------------------------------------------------------------------------------------------------------    Dipesh Nicole is a 34 year old male who is being evaluated via a billable video visit.      Charts reviewed  Consult from  Images reviewed        I have reviewed and updated the patient's Past Medical History, Social History, Family History and Medication " List.    ALLERGIES  Metoclopramide    Lasts visit details if there was a last visit:         Medications                                                                                                                                                                                                              14 Review of systems  are negative except for   Patient Active Problem List   Diagnosis     Tremor     Seizure (H) at age 22 yrs     H/O magnetic resonance imaging of cervical spine     H/O shoulder surgery     H/O magnetic resonance imaging of brain and brain stem     Shoulder dislocation     Parkinson disease (H)     Right inguinal hernia        Allergies   Allergen Reactions     Metoclopramide      Avoid in patient with a history of Parkinson Disease  Other reaction(s): GI intolerance  Avoid in patient with a history of Parkinson Disease     Past Surgical History:   Procedure Laterality Date     SHOULDER SURGERY  2007     Past Medical History:   Diagnosis Date     H/O magnetic resonance imaging of brain and brain stem 8/17/2018 2009 December MR Brain without and with IV Wvtqetbh56/8/2009 [facility] Result Impression  Normal MRI of the brain.  Result Narrative      Multiecho, multiplanar views of the brain were obtained prior to and  following the IV administration of 19 mL of contrast. There are no  previous studies available for comparison.     The brain parenchyma is normal in appearance on all pulse sequences,  with      H/O magnetic resonance imaging of cervical spine 8/17/2018 March MR Cervical Spine without IV Contrast3/15/2017 [facility] Result Addenda Addendum by Provider, PA Jones on 03/15/2017 12:30 PM RAD^^^MA MR Cervical Spine w/o contrast 3/15/2017 12:30:00 Result Impression  Minimal degenerative disc disease at C5-6 and C6-7  otherwise an unremarkable MR scan of the cervical spine.  Result Narrative   EXAM: MR Cervical Spine w/o contrast   INDICATION:      H/O shoulder surgery  8/17/2018 2017 July XR SHOULDER 2 VIEWS LEFT7/14/2017 [facility] Result Narrative XR SHOULDER 2 VIEWS LEFT 7/14/2017 9:13 PM  INDICATION: Shoulder Injury COMPARISON: None.  FINDINGS: Negative shoulder. No fracture or dislocation.  Status       Seizure (H) at age 22 yrs 8/17/2018     Shoulder dislocation     left     Tremor 8/10/2018     Social History     Socioeconomic History     Marital status: Single     Spouse name: Not on file     Number of children: Not on file     Years of education: Not on file     Highest education level: Not on file   Occupational History     Not on file   Social Needs     Financial resource strain: Not on file     Food insecurity     Worry: Not on file     Inability: Not on file     Transportation needs     Medical: Not on file     Non-medical: Not on file   Tobacco Use     Smoking status: Never Smoker     Smokeless tobacco: Never Used   Substance and Sexual Activity     Alcohol use: Yes     Drug use: No     Sexual activity: Not on file   Lifestyle     Physical activity     Days per week: Not on file     Minutes per session: Not on file     Stress: Not on file   Relationships     Social connections     Talks on phone: Not on file     Gets together: Not on file     Attends Jewish service: Not on file     Active member of club or organization: Not on file     Attends meetings of clubs or organizations: Not on file     Relationship status: Not on file     Intimate partner violence     Fear of current or ex partner: Not on file     Emotionally abused: Not on file     Physically abused: Not on file     Forced sexual activity: Not on file   Other Topics Concern     Not on file   Social History Narrative    single. lives in [facility] [place] [facility]. mother cabrera ambriz            He has a history of a left dislocated shoulder with surgery performed in 2009. He currently lives in [place] but comes to the [facility] to visit his parents in [place]. He works as  a full-time  throughout the year.     Family History   Problem Relation Age of Onset     Cancer Other         grandmother     Tremor No family hx of      Dementia No family hx of      Parkinsonism No family hx of      Seizure Disorder No family hx of      Current Outpatient Medications   Medication Sig Dispense Refill     carbidopa-levodopa (SINEMET)  MG tablet 2@6/630a and 9/930am, and 1 -1.5 tabs @ 12noon, 3pm, 6pm and 9pm and 1/2 tab @ 1030pm = 8.5- 9 tabs per day 810 tablet 3     FOLBEE 2.5-25-1 MG TABS 1 tablet by mouth daily    MAY NEED A NEW RX PER INSURANCE MAY NOT BE COVERED 90 tablet 3

## 2020-07-28 ENCOUNTER — ALLIED HEALTH/NURSE VISIT (OUTPATIENT)
Dept: PHARMACY | Facility: CLINIC | Age: 35
End: 2020-07-28
Payer: COMMERCIAL

## 2020-07-28 ENCOUNTER — VIRTUAL VISIT (OUTPATIENT)
Dept: NEUROLOGY | Facility: CLINIC | Age: 35
End: 2020-07-28
Payer: COMMERCIAL

## 2020-07-28 DIAGNOSIS — G20.A1 PARKINSON DISEASE (H): Primary | ICD-10-CM

## 2020-07-28 PROCEDURE — 99606 MTMS BY PHARM EST 15 MIN: CPT | Performed by: PHARMACIST

## 2020-07-28 RX ORDER — CARBIDOPA AND LEVODOPA 25; 100 MG/1; MG/1
TABLET ORAL
Qty: 900 TABLET | Refills: 3 | Status: SHIPPED | OUTPATIENT
Start: 2020-07-28 | End: 2021-04-20

## 2020-07-28 RX ORDER — CYANOCOBALAMIN/FOLIC AC/VIT B6 1-2.5-25MG
TABLET ORAL
Qty: 90 TABLET | Refills: 3 | Status: SHIPPED | OUTPATIENT
Start: 2020-07-28 | End: 2021-08-24

## 2020-07-28 RX ORDER — CARBIDOPA AND LEVODOPA 25; 100 MG/1; MG/1
TABLET ORAL
Qty: 810 TABLET | Refills: 3 | COMMUNITY
Start: 2020-07-28 | End: 2020-07-28

## 2020-07-28 NOTE — PATIENT INSTRUCTIONS
Assessment:  (G20) Parkinson disease (H)  (primary encounter diagnosis)    Present medication schedule is working better  He is taking vitamin B6 intermittently   He is doing okay with his schedule    2 weeks ago -     8.5 tabs per day.     He is taking  His medication every 3 hours and has had some stiffness on the right side - and sometimes has cramping in his abdomen and diaphragm - he has thrown upon occasion. This may represent axial and appendicular symptoms that are wearing off related.     He has some dyskinesias.    He has to take his first sinemet before he gets up in the morning or he has significant morning off problems    When he is working out - golfing o baseball - he may have some cramping. He may take an extra 1/2 tablet.         6/630a 830a 1130am 230p 530p 830p 1030p   carbidopa/levodopa sinemet 25/100 2 1.5 1 1 1  1 1/2-1   Folbee Not covered                                Plan:    No change in his medication regimen for now.     Discussion about future options - such as rytary (long acting levodopa), monoamine oxidase type inhibitors such as rasagiline, and amantadine.     With rytary this medication tends to be variable in effectiveness in younger patients due to differences in diet and activity levels varying.     Discussion about not taking too much sinemet due to risks of having more dyskinesias, impulse control options.     Return to see me in 3 months or Veronica Funk to review things

## 2020-07-28 NOTE — LETTER
7/28/2020       RE: Dipesh Nciole  [address]  [place] [address]     Dear Colleague,    Thank you for referring your patient, Dipesh Nicole, to the [facility] NEUROLOGY at [facility]. Please see a copy of my visit note below.        VIDEO VISIT    Date of Visit: July 28, 2020  Name: Dipesh Nicole  Date of Birth 1985  [place] [address]  [phone] (M)  [phone] (H)     [email or web address]  Has mychart  No proxy    [email or web address]     Ritu  [phone]     Assessment:  (G20) Parkinson disease (H)  (primary encounter diagnosis)    Present medication schedule is working better  He is taking vitamin B6 intermittently   He is doing okay with his schedule    2 weeks ago -     8.5 tabs per day.     He is taking  His medication every 3 hours and has had some stiffness on the right side - and sometimes has cramping in his abdomen and diaphragm - he has thrown upon occasion. This may represent axial and appendicular symptoms that are wearing off related.     He has some dyskinesias.    He has to take his first sinemet before he gets up in the morning or he has significant morning off problems    When he is working out - golfing o baseball - he may have some cramping. He may take an extra 1/2 tablet.         6/630a 830a 1130am 230p 530p 830p 1030p   carbidopa/levodopa sinemet 25/100 2 1.5 1 1 1  1 1/2-1   Folbee Not covered                                Plan:    No change in his medication regimen for now.     Discussion about future options - such as rytary (long acting levodopa), monoamine oxidase type inhibitors such as rasagiline, and amantadine.     With rytary this medication tends to be variable in effectiveness in younger patients due to differences in diet and activity levels varying.     Discussion about not taking too much sinemet due to risks of having more dyskinesias, impulse control options.     Return to see me in 3 months or Veronica Funk to review  "things     I have reviewed the note as documented above.  This accurately captures the substance of my conversation with the patient.    Patient contact time  20 minutes. Over 50% of this visit was spent in patient care and care coordination.     Time of visit 245pm - 308pm     Ramesh Carson MD      ------------------------------------------------------------------------------------------------------------------------------------------------------------------------    Video-Visit Details    The patient has been notified of following:     \"After a review of the patient s situation, this visit was changed from an in-person visit to a video visit to reduce the risk of COVID-19 exposure.   The patient is being evaluated via a billable video visit.\"    \"This video visit will be conducted via a call between you and your physician/provider. We have found that certain health care needs can be provided without the need for an in-person physical exam.  This service lets us provide the care you need with a video conversation.  If a prescription is necessary we can send it directly to your pharmacy.  If lab work is needed we can place an order for that and you can then stop by our lab to have the test done at a later time.    If during the course of the call the physician/provider feels a video visit is not appropriate, you will not be charged for this service.\"     Patient has given verbal consent for Video visit? Yes    Patient would like the video invitation sent by:     Type of service:  Video Visit    Video Start Time:     Video End Time (time video stopped):     Duration:  minutes - see above    Originating Location (pt. Location):     Distant Location (provider location):  [facility] NEUROLOGY     Mode of Communication:  Video Conference via [employer] (and if not possible then [facility])      Ramesh Carson " MD      --------------------------------------------------------------------------------------------------------------    Dipesh Nicole is a 34 year old male who is being evaluated via a billable video visit.      Charts reviewed  Consult from  Images reviewed        I have reviewed and updated the patient's Past Medical History, Social History, Family History and Medication List.    ALLERGIES  Metoclopramide    Lasts visit details if there was a last visit:         Medications                                                                                                                                                                                                              14 Review of systems  are negative except for   Patient Active Problem List   Diagnosis     Tremor     Seizure (H) at age 22 yrs     H/O magnetic resonance imaging of cervical spine     H/O shoulder surgery     H/O magnetic resonance imaging of brain and brain stem     Shoulder dislocation     Parkinson disease (H)     Right inguinal hernia        Allergies   Allergen Reactions     Metoclopramide      Avoid in patient with a history of Parkinson Disease  Other reaction(s): GI intolerance  Avoid in patient with a history of Parkinson Disease     Past Surgical History:   Procedure Laterality Date     SHOULDER SURGERY  2007     Past Medical History:   Diagnosis Date     H/O magnetic resonance imaging of brain and brain stem 8/17/2018 2009 December MR Brain without and with IV Ozanmuvs80/8/2009 [facility] Result Impression  Normal MRI of the brain.  Result Narrative      Multiecho, multiplanar views of the brain were obtained prior to and  following the IV administration of 19 mL of contrast. There are no  previous studies available for comparison.     The brain parenchyma is normal in appearance on all pulse sequences,  with      H/O magnetic resonance imaging of cervical spine 8/17/2018 March MR Cervical Spine without IV  Contrast3/15/2017 [facility] Result Addenda Addendum by Provider, PA Jones on 03/15/2017 12:30 PM RAD^^^MA MR Cervical Spine w/o contrast 3/15/2017 12:30:00 Result Impression  Minimal degenerative disc disease at C5-6 and C6-7  otherwise an unremarkable MR scan of the cervical spine.  Result Narrative   EXAM: MR Cervical Spine w/o contrast   INDICATION:      H/O shoulder surgery 8/17/2018 2017 July XR SHOULD[facility] 2 VIE[facility] Result Narrative XR SHOULDER 2 VIEWS LEFT 7/14/2017 9:13 PM  INDICATION: Shoulder Injury COMPARISON: None.  FINDINGS: Negative shoulder. No fracture or dislocation.  Status       Seizure (H) at age 22 yrs 8/17/2018     Shoulder dislocation     left     Tremor 8/10/2018     Social History     Socioeconomic History     Marital status: Single     Spouse name: Not on file     Number of children: Not on file     Years of education: Not on file     Highest education level: Not on file   Occupational History     Not on file   Social Needs     Financial resource strain: Not on file     Food insecurity     Worry: Not on file     Inability: Not on file     Transportation needs     Medical: Not on file     Non-medical: Not on file   Tobacco Use     Smoking status: Never Smoker     Smokeless tobacco: Never Used   Substance and Sexual Activity     Alcohol use: Yes     Drug use: No     Sexual activity: Not on file   Lifestyle     Physical activity     Days per week: Not on file     Minutes per session: Not on file     Stress: Not on file   Relationships     Social connections     Talks on phone: Not on file     Gets together: Not on file     Attends Jehovah's witness service: Not on file     Active member of club or organization: Not on file     Attends meetings of clubs or organizations: Not on file     Relationship status: Not on file     Intimate partner violence     Fear of current or ex partner: Not on file     Emotionally abused: Not on file      Physically abused: Not on file     Forced sexual activity: Not on file   Other Topics Concern     Not on file   Social History Narrative    single. lives in [place]. mother cabrera ambriz            He has a history of a left dislocated shoulder with surgery performed in 2009. He currently lives in [place] but comes to the [place] area to visit his parents in [place]. 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     Family History   Problem Relation Age of Onset     Cancer Other         grandmother     Tremor No family hx of      Dementia No family hx of      Parkinsonism No family hx of      Seizure Disorder No family hx of      Current Outpatient Medications   Medication Sig Dispense Refill     carbidopa-levodopa (SINEMET)  MG tablet 2@6/630a and 9/930am, and 1 -1.5 tabs @ 12noon, 3pm, 6pm and 9pm and 1/2 tab @ 1030pm = 8.5- 9 tabs per day 810 tablet 3     FOLBEE 2.5-25-1 MG TABS 1 tablet by mouth daily    MAY NEED A NEW RX PER INSURANCE MAY NOT BE COVERED 90 tablet 3       Again, thank you for allowing me to participate in the care of your patient.      Sincerely,    Ramesh Carson MD

## 2020-07-28 NOTE — PROGRESS NOTES
"  MT ENCOUNTER  SUBJECTIVE/OBJECTIVE:                           Dipesh Nicole is a 34 year old male contacted via secure video for a follow-up visit. He was referred to me from Dr. Carson. Today's visit is a co-visit with Dr. Carson. Today's visit is a follow-up MT visit from 1/31/20     Patient consented to a telehealth visit: yes  Telemedicine Visit Details  Type of service:  Video visit  Start Time: 2:51 PM  End Time: 3:06 PM  Originating Location (pt. Location): Home  Distant Location (provider location):  [facility] NEUROLOGY CLINIC [facility]  Mode of Communication:  Video Conference via [employer]    Chief Complaint: Discuss PD medications    Allergies/ADRs: Reviewed in EHR  Tobacco:  reports that he has never smoked. He has never used smokeless tobacco.  Alcohol: yes - did not quantify  Caffeine: not discussed  Activity:   PMH: Reviewed in EHR    Medication Adherence/Access: no issues reported    Parkinson's Disease:  Current medication regimen is listed below (not regularly taking Folbee vitamin). Patient states that since changing his medication regimen to the below schedule he has had more \"on\" time with less jitteriness, less dyskinesia, overall less fluctuations \"peaks and valleys.\" He found that with >3 hours between doses he would experience more muscle cramping and stiffness. On occasion he has had stomach \"convulsions\" which have caused him to vomit his medication. He will occasionally take an extra half-tablet on days when he is especially active and he tries to stay ahead of the medication wearing off. Patient states he is staying active with playing baseball.       6-6:30 am 8:30 am 11:30  am 2:30 pm 5:30 pm 8:30 pm 10:30 pm   Carbidopa/  Levodopa  mg  2 1.5 1 1 1  1 0.5-1       Today's Vitals: There were no vitals taken for this visit.  (Telemed)    ASSESSMENT:                            Medication Adherence: excellent, no issues identified    Parkinson's Disease: Stable. Discussed " possible medication changes to minimize motor fluctuations but patient is okay with continuing on his current regimen as-is for now with follow up with Dr. Carson or me in 3 months. Possible medication changes to consider in the future for motor fluctuations could include: switch to Rytary or add rasagiline, amantadine, or entacapone. I would be hesitant to switch to Rytary because he may be more prone to unpredictability of the absorption as the patient is very active and has varying diet habits depending on activity level. I would lean towards adding one of the other 3 medications.      PLAN:                            No medication changes recommended today.    I spent 15 minutes with this patient today. All changes were made via verbal approval with Dr. Carson. A copy of the visit note was provided to the patient's referring provider.    Will follow up in 3-6 months or sooner if needed.    The patient declined a summary of these recommendations.     Veronica Funk, Pharm.D.  Medication Therapy Management Pharmacist  Phone: [phone]

## 2020-08-05 ENCOUNTER — NURSE TRIAGE (OUTPATIENT)
Dept: NURSING | Facility: CLINIC | Age: 35
End: 2020-08-05

## 2020-08-05 DIAGNOSIS — Z11.59 SCREENING FOR VIRAL DISEASE: Primary | ICD-10-CM

## 2020-08-05 NOTE — TELEPHONE ENCOUNTER
"Patient is calling requesting COVID serologic antibody testing.  NOTE: Serologic testing is a blood test for 'antibodies' which are made at 10-14 days after you have had symptoms of COVID or were exposed and had an asymptomatic infection.  This does NOT test you for 'active' infection or tell you if you are contagious.    Are you a healthcare worker? no  Do you currently have a cough, fever, body aches, shortness of breath, or difficulty breathing?  No  Did you previously have cough, fever, body aches, shortness of breath, or difficulty breathing that have now resolved? Has had previous covid symptoms.   Symptoms began 30 days ago.  Symptoms started > 14 days ago. Lab order placed per SARS-CoV-2 Serology test Standing Order using indication \"Previously symptomatic >14d since onset, currently asymptomatic\" and diagnosis code \"Screening for viral disease\" (Z11.59)      The patient was informed: \"Testing is limited each day and it may take time for testing to be available to everyone who has called. You will receive a call within 48-72 hours to schedule the serology testing. Please confirm the best number to reach you is [phone]. If you have any questions about scheduling, call [phone].\"         Amanda Hylton RN      "

## 2021-01-03 ENCOUNTER — HEALTH MAINTENANCE LETTER (OUTPATIENT)
Age: 36
End: 2021-01-03

## 2021-02-17 NOTE — PROGRESS NOTES
VIDEO VISIT    Date of Visit: February 19, 2021  Name: Dipesh Nicole  Date of Birth 1985  [place] [address]  [phone] (M)  [phone] (H)     [email or web address]  Has mychart  No proxy     [email or web address]     Ritu  [phone]      Assessment:  (G20) Parkinson disease (H)  (primary encounter diagnosis)  carbidopa/levodopa sinemet 25/100  He has wearing off at 2 to 2.5 hours.   1.5@615/630am, 1@830a, 1@1045a, 1@1230/1245, 1@3p, 1@5p, 1@7p and 1@9p  Goes to sleep at 1030/11pm  Gets up at 730am  2 hours 20 minutes dose     1/2 pill every other day of the folbee.     He has wearing off and has mild dyskinesias.    [employer] - this is last semester and has 2 classes.   Company - vision training aide.   Stance check    Gait/Med related complications/Falls - no falls  Working out - lifting    Exercise/Therapy - exercising    Cognitive/Driving  No    Mood  - some changes in motivation  Not depressed.   Denies typical ICD issues     Hallucinations/delusions no     Sleep sleep from 11ish till 615am    Bladder - denies    GI/Constipation/GERD   denies    ENDO - no     Cardio/heart - no     Vision no     Heme no     Other:         6/630a 830a 1130am 230p 530p 830p 1030p   carbidopa/levodopa sinemet 25/100 2 1 1 1 1  1 1/2-1   Folbee Not covered                                     Plan:    May try amantadine to see how it helps his wearing off and dyskinesias.     Interested in talking with Luis Manuel    Interested in reviewing rytary and other medication options with Veronica Funk, Pharm    Kynmobi (apomorphine) inbrijja (inhaled levodopa)    Counseling     Discussed disclosure     Discussed social behaviors     [facility]  [address] [place] [id number]    Diana Link, Ph.D., LP  [phone]    Amaris Peres,Ph.D.,LP  [phone] (inpatient)    Dirk Hussein,Ph.D.,LP  [phone]    Renay Gibbons, Ph.D.  [phone]    Lisset Anderson, Ph.D.,  "  [phone]    Damian Jameson, Ph.D., [facility],   [phone]    Ekta Aguilar,Ph.D.,   [phone]    Medical Decision Making:  #  Chronic progressive medical conditions addressed  no  Review and/or interpretation of unique test or documentation from a provider outside of neurology no   Independent historian provided additional details  no   Prescription drug management and review of potential side effects and/or monitoring for side effects  yes   Health impacted by social determinants of health  no    I have reviewed the note as documented above.  This accurately captures the substance of my conversation with the patient and total time spent preparing for visit, executing visit and completing visit on the day of the visit:  30 minutes.     Ramesh Carson MD      ------------------------------------------------------------------------------------------------------------------------------------------------------------------------    Video-Visit Details    The patient has been notified of following:     \"After a review of the patient s situation, this visit was changed from an in-person visit to a video visit to reduce the risk of COVID-19 exposure.   The patient is being evaluated via a billable video visit.\"    \"This video visit will be conducted via a call between you and your physician/provider. We have found that certain health care needs can be provided without the need for an in-person physical exam.  This service lets us provide the care you need with a video conversation.  If a prescription is necessary we can send it directly to your pharmacy.  If lab work is needed we can place an order for that and you can then stop by our lab to have the test done at a later time.    If during the course of the call the physician/provider feels a video visit is not appropriate, you will not be charged for this service.\"     Patient has given verbal consent for Video visit? Yes    Patient would like the video " invitation sent by:     Type of service:  Video Visit    Video Start Time:     Video End Time (time video stopped):     Duration:  minutes - see above    Originating Location (pt. Location):     Distant Location (provider location):  [facility] NEUROLOGY [facility]     Mode of Communication:  Video Conference via [employer] (and if not possible then [employer])      Ramesh Carson MD      --------------------------------------------------------------------------------------------------------------    Dipesh Nicole is a 35 year old male who is being evaluated via a billable video visit.      Charts reviewed  Consult from  Images reviewed        I have reviewed and updated the patient's Past Medical History, Social History, Family History and Medication List.    ALLERGIES  Metoclopramide    Lasts visit details if there was a last visit:       14 Review of systems  are negative except for   Patient Active Problem List   Diagnosis     Tremor     Seizure (H) at age 22 yrs     H/O magnetic resonance imaging of cervical spine     H/O shoulder surgery     H/O magnetic resonance imaging of brain and brain stem     Shoulder dislocation     Parkinson disease (H)     Right inguinal hernia        Allergies   Allergen Reactions     Metoclopramide      Avoid in patient with a history of Parkinson Disease  Other reaction(s): GI intolerance  Avoid in patient with a history of Parkinson Disease     Past Surgical History:   Procedure Laterality Date     SHOULDER SURGERY  2007     Past Medical History:   Diagnosis Date     H/O magnetic resonance imaging of brain and brain stem 8/17/2018 2009 December MR Brain without and with IV Ttaejkqp18/8/2009 [facility] Result Impression  Normal MRI of the brain.  Result Narrative      Multiecho, multiplanar views of the brain were obtained prior to and  following the IV administration of 19 mL of contrast. There are no  previous studies available for comparison.     The brain  parenchyma is normal in appearance on all pulse sequences,  with      H/O magnetic resonance imaging of cervical spine 8/17/2018 March MR Cervical Spine without IV Contrast3/15/2017 [facility] Result Addenda Addendum by Provider, PA Jones on 03/15/2017 12:30 PM RAD^^^MA MR Cervical Spine w/o contrast 3/15/2017 12:30:00 Result Impression  Minimal degenerative disc disease at C5-6 and C6-7  otherwise an unremarkable MR scan of the cervical spine.  Result Narrative   EXAM: MR Cervical Spine w/o contrast   INDICATION:      H/O shoulder surgery 8/17/2018 2017 July XR SHOULDER 2 VIEWS LEFT7/14/2017 [facility] Result Narrative XR SHOULDER 2 VIEWS LEFT 7/14/2017 9:13 PM  INDICATION: Shoulder Injury COMPARISON: None.  FINDINGS: Negative shoulder. No fracture or dislocation.  Status       Seizure (H) at age 22 yrs 8/17/2018     Shoulder dislocation     left     Tremor 8/10/2018     Social History     Socioeconomic History     Marital status: Single     Spouse name: Not on file     Number of children: Not on file     Years of education: Not on file     Highest education level: Not on file   Occupational History     Not on file   Social Needs     Financial resource strain: Not on file     Food insecurity     Worry: Not on file     Inability: Not on file     Transportation needs     Medical: Not on file     Non-medical: Not on file   Tobacco Use     Smoking status: Never Smoker     Smokeless tobacco: Never Used   Substance and Sexual Activity     Alcohol use: Yes     Drug use: No     Sexual activity: Not on file   Lifestyle     Physical activity     Days per week: Not on file     Minutes per session: Not on file     Stress: Not on file   Relationships     Social connections     Talks on phone: Not on file     Gets together: Not on file     Attends Baptism service: Not on file     Active member of club or organization: Not on file     Attends meetings of clubs or organizations:  Not on file     Relationship status: Not on file     Intimate partner violence     Fear of current or ex partner: Not on file     Emotionally abused: Not on file     Physically abused: Not on file     Forced sexual activity: Not on file   Other Topics Concern     Not on file   Social History Narrative    single. lives in [place]. mother cabrera ambriz            He has a history of a left dislocated shoulder with surgery performed in 2009. He currently lives in [place] but comes to the [place] area to visit his parents in [place]. 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     Family History   Problem Relation Age of Onset     Cancer Other         grandmother     Tremor No family hx of      Dementia No family hx of      Parkinsonism No family hx of      Seizure Disorder No family hx of      Current Outpatient Medications   Medication Sig Dispense Refill     carbidopa-levodopa (SINEMET)  MG tablet 2@6/630a, 1.5 @ 830am, 1@ 1130a, 1@ 230p, 1@530p, 1@830p, 1/2-1@1030p and 1 as needed = 8.5- 10 tabs per day 900 tablet 3     FOLBEE 2.5-25-1 MG TABS 1 tablet by mouth daily 90 tablet 3         Medications

## 2021-02-19 ENCOUNTER — VIRTUAL VISIT (OUTPATIENT)
Dept: NEUROLOGY | Facility: CLINIC | Age: 36
End: 2021-02-19
Payer: COMMERCIAL

## 2021-02-19 DIAGNOSIS — G20.A1 PARKINSON DISEASE (H): Primary | ICD-10-CM

## 2021-02-19 DIAGNOSIS — F43.20 ADJUSTMENT DISORDER, UNSPECIFIED TYPE: ICD-10-CM

## 2021-02-19 PROCEDURE — 99214 OFFICE O/P EST MOD 30 MIN: CPT | Mod: 95 | Performed by: PSYCHIATRY & NEUROLOGY

## 2021-02-19 RX ORDER — AMANTADINE HYDROCHLORIDE 100 MG/1
100 CAPSULE, GELATIN COATED ORAL 2 TIMES DAILY
Qty: 60 CAPSULE | Refills: 11 | Status: SHIPPED | OUTPATIENT
Start: 2021-02-19 | End: 2021-11-18

## 2021-02-19 NOTE — PATIENT INSTRUCTIONS
(G20) Parkinson disease (H)  (primary encounter diagnosis)  carbidopa/levodopa sinemet 25/100  He has wearing off at 2 to 2.5 hours.   1.5@615/630am, 1@830a, 1@1045a, 1@1230/1245, 1@3p, 1@5p, 1@7p and 1@9p  Goes to sleep at 1030/11pm  Gets up at 730am  2 hours 20 minutes dose     1/2 pill every other day of the folbee.     He has wearing off and has mild dyskinesias.    [place] [employer] - this is last semester and has 2 classes.   Company - vision training aide.   Stance check    Gait/Med related complications/Falls - no falls  Working out - lifting    Exercise/Therapy - exercising    Cognitive/Driving  No    Mood  - some changes in motivation  Not depressed.   Denies typical ICD issues     Hallucinations/delusions no     Sleep sleep from 11ish till 615am    Bladder - denies    GI/Constipation/GERD   denies    ENDO - no     Cardio/heart - no     Vision no     Heme no     Other:         6/630a 830a 1130am 230p 530p 830p 1030p   carbidopa/levodopa sinemet 25/100 2 1 1 1 1  1 1/2-1   Folbee Not covered                                     Plan:    May try amantadine to see how it helps his wearing off and dyskinesias.     Interested in talking with Luis Manuel    Interested in reviewing rytary and other medication options with Veronica Funk, Pharm    Kynmobi (apomorphine) inbrijja (inhaled levodopa)    Counseling     Discussed disclosure     Discussed social behaviors     [facility]  [address] [place] [address]    Diana Link, Ph.D., LP  [phone]    Amaris Peres,Ph.D.,LP  [phone] (inpatient)    Dirk Hussein,Ph.D.,LP  [phone]    Renay Gibbons, Ph.D.  [phone]    Lisset Anderson, Ph.D., LP  [phone]    Damian Jameson, Ph.D., [facility], [facility]  [phone]    Ekta Aguilar,Ph.D., LP  [phone]

## 2021-02-19 NOTE — PROGRESS NOTES
ALEA is a 35 year old who is being evaluated via a billable video visit.      How would you like to obtain your AVS? Mychart  If the video visit is dropped, the invitation should be resent by:[phone]      Video Start Time:   Video-Visit Details    Type of service:  Video Visit    Video End Time:    Originating Location (pt. Location): Home    Distant Location (provider location):  [facility] NEUROLOGY [facility]     Platform used for Video Visit:Franchisee Gladiator

## 2021-02-19 NOTE — LETTER
2/19/2021       RE: Dipesh Nicole  [address]  [place] [address]     Dear Colleague,    Thank you for referring your patient, Dipesh Nicole, to the [facility]. Please see a copy of my visit note below.        VIDEO VISIT    Date of Visit: February 19, 2021  Name: Dipesh Nicole  Date of Birth 1985  [place] [address]  [phone] (M)  [phone] (H)     [email or web address]  Has mychart  No proxy     [email or web address]     Ritu  [phone]      Assessment:  (G20) Parkinson disease (H)  (primary encounter diagnosis)  carbidopa/levodopa sinemet 25/100  He has wearing off at 2 to 2.5 hours.   1.5@615/630am, 1@830a, 1@1045a, 1@1230/1245, 1@3p, 1@5p, 1@7p and 1@9p  Goes to sleep at 1030/11pm  Gets up at 730am  2 hours 20 minutes dose     1/2 pill every other day of the folbee.     He has wearing off and has mild dyskinesias.    RICK Herron - this is last semester and has 2 classes.   Company - vision training aide.   Stance check    Gait/Med related complications/Falls - no falls  Working out - lifting    Exercise/Therapy - exercising    Cognitive/Driving  No    Mood  - some changes in motivation  Not depressed.   Denies typical ICD issues     Hallucinations/delusions no     Sleep sleep from 11ish till 615am    Bladder - denies    GI/Constipation/GERD   denies    ENDO - no     Cardio/heart - no     Vision no     Heme no     Other:         6/630a 830a 1130am 230p 530p 830p 1030p   carbidopa/levodopa sinemet 25/100 2 1 1 1 1  1 1/2-1   Folbee Not covered                                     Plan:    May try amantadine to see how it helps his wearing off and dyskinesias.     Interested in talking with Luis Manuel    Interested in reviewing rytary and other medication options with Veronica Funk, Pharm    Kynmobi (apomorphine) innathanael (inhaled levodopa)    Counseling     Discussed disclosure     Discussed social behaviors  "    [facility] [address] [address]    Diana Link, Ph.D., LP  [phone]    Amaris Peres,Ph.D.,LP  [phone] (inpatient)    Dirk Hussein,Ph.D.,LP  [phone]    Renay Gibbons, Ph.D.  [phone]    Lisset Anderson, Ph.D., LP  [phone]    Damian Jameson, Ph.D., [facility], LP  [phone]    Ekta Aguilar,Ph.D., LP  [phone]    Medical Decision Making:  #  Chronic progressive medical conditions addressed  no  Review and/or interpretation of unique test or documentation from a provider outside of neurology no   Independent historian provided additional details  no   Prescription drug management and review of potential side effects and/or monitoring for side effects  yes   Health impacted by social determinants of health  no    I have reviewed the note as documented above.  This accurately captures the substance of my conversation with the patient and total time spent preparing for visit, executing visit and completing visit on the day of the visit:  30 minutes.     Ramesh Carson MD      ------------------------------------------------------------------------------------------------------------------------------------------------------------------------    Video-Visit Details    The patient has been notified of following:     \"After a review of the patient s situation, this visit was changed from an in-person visit to a video visit to reduce the risk of COVID-19 exposure.   The patient is being evaluated via a billable video visit.\"    \"This video visit will be conducted via a call between you and your physician/provider. We have found that certain health care needs can be provided without the need for an in-person physical exam.  This service lets us provide the care you need with a video conversation.  If a prescription is necessary we can send it directly to your pharmacy.  If lab work is needed we can place an order for that and you can then " "stop by our lab to have the test done at a later time.    If during the course of the call the physician/provider feels a video visit is not appropriate, you will not be charged for this service.\"     Patient has given verbal consent for Video visit? Yes    Patient would like the video invitation sent by:     Type of service:  Video Visit    Video Start Time:     Video End Time (time video stopped):     Duration:  minutes - see above    Originating Location (pt. Location):     Distant Location (provider location):  [facility] NEUROLOGY CL[facility]IC [place]     Mode of Communication:  Video Conference via WatrHub (and if not possible then doximity)      Ramesh Carson MD      --------------------------------------------------------------------------------------------------------------    Dipesh Nicole is a 35 year old male who is being evaluated via a billable video visit.      Charts reviewed  Consult from  Images reviewed        I have reviewed and updated the patient's Past Medical History, Social History, Family History and Medication List.    ALLERGIES  Metoclopramide    Lasts visit details if there was a last visit:       14 Review of systems  are negative except for   Patient Active Problem List   Diagnosis     Tremor     Seizure (H) at age 22 yrs     H/O magnetic resonance imaging of cervical spine     H/O shoulder surgery     H/O magnetic resonance imaging of brain and brain stem     Shoulder dislocation     Parkinson disease (H)     Right inguinal hernia        Allergies   Allergen Reactions     Metoclopramide      Avoid in patient with a history of Parkinson Disease  Other reaction(s): GI intolerance  Avoid in patient with a history of Parkinson Disease     Past Surgical History:   Procedure Laterality Date     SHOULDER SURGERY  2007     Past Medical History:   Diagnosis Date     H/O magnetic resonance imaging of brain and brain stem 8/17/2018 2009 December MR Brain without and with IV " Zjdldwza26/8/2009 [facility] Result Impression  Normal MRI of the brain.  Result Narrative      Multiecho, multiplanar views of the brain were obtained prior to and  following the IV administration of 19 mL of contrast. There are no  previous studies available for comparison.     The brain parenchyma is normal in appearance on all pulse sequences,  with      H/O magnetic resonance imaging of cervical spine 8/17/2018 March MR Cervical Spine without IV Contrast3/15/2017 [facility] Result Addenda Addendum by Provider, PA Jones on 03/15/2017 12:30 PM RAD^^^MA MR Cervical Spine w/o contrast 3/15/2017 12:30:00 Result Impression  Minimal degenerative disc disease at C5-6 and C6-7  otherwise an unremarkable MR scan of the cervical spine.  Result Narrative   EXAM: MR Cervical Spine w/o contrast   INDICATION:      H/O shoulder surgery 8/17/2018 2017 July XR SHOULDER 2 VIEWS LEFT7/14/[facility] Result Narrative XR SHOULDER 2 VIEWS LEFT 7/14/2017 9:13 PM  INDICATION: Shoulder Injury COMPARISON: None.  FINDINGS: Negative shoulder. No fracture or dislocation.  Status       Seizure (H) at age 22 yrs 8/17/2018     Shoulder dislocation     left     Tremor 8/10/2018     Social History     Socioeconomic History     Marital status: Single     Spouse name: Not on file     Number of children: Not on file     Years of education: Not on file     Highest education level: Not on file   Occupational History     Not on file   Social Needs     Financial resource strain: Not on file     Food insecurity     Worry: Not on file     Inability: Not on file     Transportation needs     Medical: Not on file     Non-medical: Not on file   Tobacco Use     Smoking status: Never Smoker     Smokeless tobacco: Never Used   Substance and Sexual Activity     Alcohol use: Yes     Drug use: No     Sexual activity: Not on file   Lifestyle     Physical activity     Days per week: Not on file     Minutes per  session: Not on file     Stress: Not on file   Relationships     Social connections     Talks on phone: Not on file     Gets together: Not on file     Attends Christianity service: Not on file     Active member of club or organization: Not on file     Attends meetings of clubs or organizations: Not on file     Relationship status: Not on file     Intimate partner violence     Fear of current or ex partner: Not on file     Emotionally abused: Not on file     Physically abused: Not on file     Forced sexual activity: Not on file   Other Topics Concern     Not on file   Social History Narrative    single. lives in [place]. mother cabrera ambriz            He has a history of a left dislocated shoulder with surgery performed in 2009. He currently lives in [place] but comes to the [place] area to visit his parents in [place]. 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     Family History   Problem Relation Age of Onset     Cancer Other         grandmother     Tremor No family hx of      Dementia No family hx of      Parkinsonism No family hx of      Seizure Disorder No family hx of      Current Outpatient Medications   Medication Sig Dispense Refill     carbidopa-levodopa (SINEMET)  MG tablet 2@6/630a, 1.5 @ 830am, 1@ 1130a, 1@ 230p, 1@530p, 1@830p, 1/2-1@1030p and 1 as needed = 8.5- 10 tabs per day 900 tablet 3     FOLBEE 2.5-25-1 MG TABS 1 tablet by mouth daily 90 tablet 3         Medications                                                                                                                                                                                                                    ALEA is a 35 year old who is being evaluated via a billable video visit.      How would you like to obtain your AVS? Mychart  If the video visit is dropped, the invitation should be resent by:[phone]      Video Start Time:   Video-Visit Details    Type of service:  Video Visit    Video End  Time:    Originating Location (pt. Location): Home    Distant Location (provider location):  [facility] NEUROLOGY [facility]     Platform used for Video Visit:Alessia      Again, thank you for allowing me to participate in the care of your patient.      Sincerely,    Ramesh Carson MD

## 2021-02-23 ENCOUNTER — TELEPHONE (OUTPATIENT)
Dept: NEUROLOGY | Facility: CLINIC | Age: 36
End: 2021-02-23

## 2021-02-23 NOTE — TELEPHONE ENCOUNTER
MTM referral from: [facility] visit (referral by provider)    MTM referral outreach attempt #2 on February 23, 2021 at 2:40 PM      Outcome: Patient not reachable after several attempts, will route to MTM Pharmacist/Provider as an FYI. Thank you for the referral.    Phillip Berg, MTM coordinator

## 2021-03-08 ENCOUNTER — VIRTUAL VISIT (OUTPATIENT)
Dept: PHARMACY | Facility: CLINIC | Age: 36
End: 2021-03-08
Payer: COMMERCIAL

## 2021-03-08 DIAGNOSIS — G20.A1 PARKINSON DISEASE (H): Primary | ICD-10-CM

## 2021-03-08 PROCEDURE — 99605 MTMS BY PHARM NP 15 MIN: CPT | Performed by: PHARMACIST

## 2021-03-08 PROCEDURE — 99607 MTMS BY PHARM ADDL 15 MIN: CPT | Performed by: PHARMACIST

## 2021-03-08 NOTE — PROGRESS NOTES
"Medication Therapy Management (MTM) Encounter    ASSESSMENT:                            Medication Adherence/Access: No issues identified    Parkinson's Disease: Patient would benefit from starting amantadine per recent visit with Dr. Carson.  Amantadine may help reduce the wearing off of levodopa between doses and can also reduce dyskinesia.  We discussed that there are other options for adjunctive medications for Parkinson's that we can consider in the future but will start with trying amantadine for now.  I would hold off on the \"on demand\" Parkinson's medications (ie: Inbrija/Kynmobi) for now and focus on optimizing the patient's daily medication regimen which overall seems to be working pretty well for him.      PLAN:                            Start amantadine 100 mg by mouth once daily in the morning (ie: 6 am) for one week then increase to twice daily (ie: 6 am and 11 am) thereafter. We discussed that the goal of amantadine is to decrease the wearing off of carbidopa-levodopa and to minimize your dyskinesia.  Side effects of amantadine can include dry mouth, constipation, leg swelling, or a discoloration of your skin (snakeskin appearance).    Follow-up: 4/12/21 at 12:30 pm     SUBJECTIVE/OBJECTIVE:                          Dipesh Nicole is a 35 year old male contacted via secure video for a follow-up visit. He was referred to me from Dr. Carson.  Today's visit is a follow-up MTM visit from 7/28/20     Reason for visit: follow up on Parkinson's disease medications.    Allergies/ADRs: Reviewed in chart  Tobacco: He reports that he has never smoked. He has never used smokeless tobacco.  Alcohol: 1-3 beverages / week   Caffeine: not discussed  Activity: very active   Past Medical History: Reviewed in chart    Medication Adherence/Access: no issues reported    Parkinson's Disease:  Current medication regimen is listed below.  Patient states that he seems to be having more \"peaks and valleys\" throughout the day.  " Overall he states that levodopa has remained very effective for his Parkinson symptoms but he is noticing more effective protein on the efficacy of the medication.  The dyskinesia occurs occasionally but is not too bothersome.  He would be interested in trying amantadine per his last visit with Dr. Carson but wanted to know more about this medication.  He did some of his own research online and read about Kynmobi and Inbrija and is wondering if these medications are options for him to try.       6/630a 830a 1130am 230p 530p 830p 1030p   Carbidopa/levodopa 25/100 2 1 1 1 1  1 0.5-1     Today's Vitals: There were no vitals taken for this visit.  ----------------    I spent 21 minutes with this patient today. All changes were made via collaborative practice agreement with Dr. Carson. A copy of the visit note was provided to the patient's referring provider.    The patient was sent via Neohapsis a summary of these recommendations.     Veronica Funk, Pharm.D.  Medication Therapy Management Pharmacist  Phone: [phone]    Telemedicine Visit Details  Type of service:  Video Conference via Jugo  Start Time: 12:39 PM  End Time: 1:00 PM  Originating Location (patient location): Home  Distant Location (provider location):  [facility] [place]      Medication Therapy Recommendations  Parkinson disease (H)    Current Medication: amantadine (SYMMETREL) 100 MG capsule   Rationale: Does not understand instructions - Adherence - Adherence   Recommendation: Provide Education - amantadine HCl 100 MG Tabs - start amantadine as prescribed by Dr. Carson   Status: Patient Agreed - Adherence/Education

## 2021-03-08 NOTE — PATIENT INSTRUCTIONS
Recommendations from today's MTM visit:                                                      Start amantadine 100 mg by mouth once daily in the morning (ie: 6 am) for one week then increase to twice daily (ie: 6 am and 11 am) thereafter. We discussed that the goal of amantadine is to decrease the wearing off of carbidopa-levodopa and to minimize your dyskinesia.  Side effects of amantadine can include dry mouth, constipation, leg swelling, or a discoloration of your skin (snakeskin appearance).    It was great to speak with you today.  I value your experience and would be very thankful for your time with providing feedback on our clinic survey. You may receive a survey via email or text message in the next few days.     Next MTM visit: 4/12/21 at 12:30 pm - video visit    To schedule another MTM appointment, please call the clinic directly or you may call the MTM scheduling line at [phone] or toll-free at [phone].     My Clinical Pharmacist's contact information:                                                      It was a pleasure talking with you today!  Please feel free to contact me with any questions or concerns you have.      Veronica Funk, Pharm.D.  Medication Therapy Management Pharmacist  Phone: [phone]

## 2021-03-23 ENCOUNTER — TELEPHONE (OUTPATIENT)
Dept: NEUROLOGY | Facility: CLINIC | Age: 36
End: 2021-03-23

## 2021-03-23 NOTE — TELEPHONE ENCOUNTER
Dr. Carson asked me to get in touch with medical record number [id number] to see if he would consider sharing his Parkinson's disease story/journey with Annalise in the hopes that this would help support this patient. Annalise has not shared his Parkinson's disease diagnosis with anyone.    I called the other patient who readily agreed to have Annalise call him to talk. He told me that the best times to talk would be evenings or weekends.    I will give Annalise the first name of the other patient along with his phone number [phone]. Documentation of consent is in the ScribeStorm messages in the other patient's chart for today 3/23.     Left voice mail and sent ScribeStorm with the above information.

## 2021-04-12 ENCOUNTER — MYC MEDICAL ADVICE (OUTPATIENT)
Dept: NEUROLOGY | Facility: CLINIC | Age: 36
End: 2021-04-12

## 2021-04-12 ENCOUNTER — VIRTUAL VISIT (OUTPATIENT)
Dept: PHARMACY | Facility: CLINIC | Age: 36
End: 2021-04-12
Payer: COMMERCIAL

## 2021-04-12 DIAGNOSIS — G20.A1 PARKINSON DISEASE (H): Primary | ICD-10-CM

## 2021-04-12 PROCEDURE — 99606 MTMS BY PHARM EST 15 MIN: CPT | Performed by: PHARMACIST

## 2021-04-12 NOTE — Clinical Note
He is doing well with addition of amantadine. He is still having trouble dosing carbidopa-levodopa around protein - would you be willing to refer him to a dietician? He would like to talk with someone about low-protein or fast-absorbed protein options.

## 2021-04-12 NOTE — PROGRESS NOTES
Medication Therapy Management (MTM) Encounter    ASSESSMENT:                            Medication Adherence/Access: No issues identified    Parkinson's Disease: Improved with addition of amantadine. Because patient is having difficulty with getting protein in his diet while maintaining optimal effect of carbidopa-levodopa, I would recommend that he meet with a dietician to explore dietary changes that may help manage his Parkinson's disease symptoms and overall medication efficacy.     PLAN:                            1. No change to medications today.  2. Dietician referral recommended.  3. Follow up with Dr. Carson in late June/early July. Carbidopa-levodopa refill will be due end of July.    Follow-up: 1 year or sooner if needed    SUBJECTIVE/OBJECTIVE:                          Dipesh Nicole is a 35 year old male contacted via secure video for a follow-up visit. He was referred to me from Dr. Carson.  Today's visit is a follow-up MTM visit from 3/8/21     Reason for visit: discuss amantadine.    Allergies/ADRs: Reviewed in chart  Tobacco: He reports that he has never smoked. He has never used smokeless tobacco.  Alcohol: 1-3 beverages / week  Activity: very active  Past Medical History: Reviewed in chart  Social: currently taking college classes    Medication Adherence/Access: no issues reported    Parkinson's Disease:  Current medications are listed below. Since starting amantadine, patient has noticed that he has had an improvement in energy, drive, motivation - making him more productive in school. He has not had any issues with interference with his sleep scheduled and is able to relax at night and fall asleep easily. He denies any compulsive/impulsive behaviors. Dyskinesia has lessened. Carbidopa-levodopa I still working well every 2.5 hours or so but he does notice that protein significantly interferes with efficacy of the medication and he is wondering if there are dietary changes he could make. He is  reserving his protein shake for nighttime hours to not interfere with his function during the day. Carbonated water with carbidopa-levodopa doses does seem to help the medication kick in faster.       6/630a 830a 1130am 230p 530p 830p 1030p   Carbidopa/levodopa   25/100 2 1 1 1 1  1 0.5-1   Amantadine 100 mg 1  1         Today's Vitals: There were no vitals taken for this visit.  ----------------    I spent 10 minutes with this patient today. I offer these suggestions for consideration by Dr. Carson. A copy of the visit note was provided to the patient's referring provider.    The patient was sent via [employer] a summary of these recommendations.     Telemedicine Visit Details  Type of service:  Video Conference via Kira Talent  Start Time: 12:29 PM  End Time: 12:39 PM  Originating Location (patient location): [place]  Distant Location (provider location):  [facility]UROLOGY CLINIC [facility]

## 2021-04-12 NOTE — PATIENT INSTRUCTIONS
Recommendations from today's MTM visit:                                                      1. No change to medications today.  2. Dietician referral recommended.  3. Follow up with Dr. Carson in late June/early July. Carbidopa-levodopa refill will be due end of July.    Next MTM visit: 1 year or sooner if needed    It was great to speak with you today.  I value your experience and would be very thankful for your time with providing feedback on our clinic survey. You may receive a survey via email or text message in the next few days.     To schedule another MTM appointment, please call the clinic directly or you may call the MTM scheduling line at [phone] or toll-free at [phone].     My Clinical Pharmacist's contact information:                                                      Please feel free to contact me with any questions or concerns you have.      Veronica Funk, Pharm.D.  Medication Therapy Management Pharmacist  Phone: [phone]

## 2021-04-13 ENCOUNTER — TELEPHONE (OUTPATIENT)
Dept: NEUROLOGY | Facility: CLINIC | Age: 36
End: 2021-04-13

## 2021-04-13 NOTE — TELEPHONE ENCOUNTER
Diabetes Education Scheduling Outreach #1:    Call to patient to schedule. Left message with phone number to call to schedule.    Plan for 2nd outreach attempt within 1 week.    Elyse Smith  [facility]  Diabetes and Nutrition Scheduling

## 2021-04-19 RX ORDER — CARBIDOPA AND LEVODOPA 25; 100 MG/1; MG/1
1 TABLET, EXTENDED RELEASE ORAL
COMMUNITY
End: 2021-04-19

## 2021-04-19 RX ORDER — CARBIDOPA/LEVODOPA 25MG-250MG
2 TABLET ORAL
COMMUNITY
End: 2021-04-19

## 2021-04-19 RX ORDER — CARBIDOPA AND LEVODOPA 25; 100 MG/1; MG/1
TABLET, EXTENDED RELEASE ORAL
COMMUNITY
Start: 2021-04-19 | End: 2021-04-20

## 2021-04-19 RX ORDER — ONDANSETRON 4 MG/1
4 TABLET, ORALLY DISINTEGRATING ORAL EVERY 8 HOURS PRN
COMMUNITY
Start: 2021-04-16 | End: 2021-04-20

## 2021-04-19 RX ORDER — CARBIDOPA/LEVODOPA 25MG-250MG
TABLET ORAL
COMMUNITY
Start: 2021-04-19 | End: 2021-04-20

## 2021-04-20 ENCOUNTER — VIRTUAL VISIT (OUTPATIENT)
Dept: NEUROLOGY | Facility: CLINIC | Age: 36
End: 2021-04-20
Payer: COMMERCIAL

## 2021-04-20 DIAGNOSIS — G20.A1 PARKINSON DISEASE (H): Primary | ICD-10-CM

## 2021-04-20 DIAGNOSIS — K44.9 HIATAL HERNIA: ICD-10-CM

## 2021-04-20 DIAGNOSIS — K31.89 GASTRIC DYSMOTILITY: ICD-10-CM

## 2021-04-20 PROCEDURE — 99214 OFFICE O/P EST MOD 30 MIN: CPT | Mod: 95 | Performed by: PSYCHIATRY & NEUROLOGY

## 2021-04-20 RX ORDER — ONDANSETRON 4 MG/1
4 TABLET, ORALLY DISINTEGRATING ORAL EVERY 8 HOURS PRN
Qty: 90 TABLET | Refills: 11 | Status: SHIPPED | OUTPATIENT
Start: 2021-04-20 | End: 2021-11-18

## 2021-04-20 RX ORDER — CARBIDOPA AND LEVODOPA 25; 100 MG/1; MG/1
TABLET ORAL
Qty: 900 TABLET | Refills: 3 | COMMUNITY
Start: 2021-04-20 | End: 2021-05-20

## 2021-04-20 NOTE — PROGRESS NOTES
ALEA is a 35 year old who is being evaluated via a billable video visit.      How would you like to obtain your AVS? Mychart  If the video visit is dropped, the invitation should be resent by: [phone]      Video Start Time:   Video-Visit Details    Type of service:  Video Visit    Video End Time:    Originating Location (pt. Location): Home    Distant Location (provider location):  [facility] NEUROLOGY [facility]     Platform used for Video Visit: MakieLab

## 2021-04-20 NOTE — PATIENT INSTRUCTIONS
Assessment:  (G20) Parkinson disease (H)  (primary encounter diagnosis)  He has wearing off at 2 to 2.5 hours.     Amantadine symmetrel 100mg    carbidopa/levodopa sinemet 25/100  1.5@615/630am, 1@830a, 1@1130am, 1@230p, 1@530p, 1@830p, 1/2-1@1030p    odansetron zofran ODT 4mg as needed    Had some cramping in legs- 3 hours after eating at 930pm   He had dose at 630pm and ate at 730 and cramping at 930pm    Cramping - independent of what he is eating.     Gait/Med related complications/Falls     Exercise/Therapy     Cognitive/Driving     Mood     Hallucinations/delusions     Sleep     Bladder     GI/Constipation/GERD     ENDO     Cardio/heart     Vision     Heme     Other:    Meals are usually at 8am, noon and evening  2 hours after eating gets cramping  Has not tried 1/2 tab one hour after eating       6/630a 830a 1130am 230p 530p 830p 1030p   Amantadine symmetrel 100mg 1  1       Carbidopa/levodopa Sinemet CR 25/100 Not taking         Carbidopa/Levodopa 25/250 Not taking         carbidopa/levodopa sinemet 25/100 1.5 1 1 1 1  1 1/2-1   Folbee Has a few left              Ondansetron zofran-ODT 4mg  As needed                   Plan:    Genetic counseling with kelsi [email or web address]  He thinks he did the PDGeneration study but not clear if he got the results.     DBS video from peter ramirez    Neuropsych baseline with dr Gracia.     GI referral for endoscopy - has esophagitis and may need lower studies  May need upper and lower gastric motility studies    Will try him on 1/2 sinemet 1 to 2 hours after meals    Ondansetron as needed - continue for now.

## 2021-04-20 NOTE — LETTER
4/20/2021       RE: Dipesh Nicole  [address] [place] [address]     Dear Colleague,    Thank you for referring your patient, Dipesh Nicloe, to the [facility]UROLOG[facility] [place] at [facility]. Please see a copy of my visit note below.        VIDEO VISIT    Date of Visit: April 20, 2021  Name: Dipesh Nicole  Date of Birth 1985  [address] [address]   [email or web address]  [phone] (M)   [phone] (H)     Ritu  [phone]    Assessment:  (G20) Parkinson disease (H)  (primary encounter diagnosis)  He has wearing off at 2 to 2.5 hours.     Amantadine symmetrel 100mg    carbidopa/levodopa sinemet 25/100  1.5@615/630am, 1@830a, 1@1130am, 1@230p, 1@530p, 1@830p, 1/2-1@1030p    odansetron zofran ODT 4mg as needed    Had some cramping in legs- 3 hours after eating at 930pm   He had dose at 630pm and ate at 730 and cramping at 930pm    Cramping - independent of what he is eating.     Gait/Med related complications/Falls     Exercise/Therapy     Cognitive/Driving     Mood     Hallucinations/delusions     Sleep     Bladder     GI/Constipation/GERD     ENDO     Cardio/heart     Vision     Heme     Other:    Meals are usually at 8am, noon and evening  2 hours after eating gets cramping  Has not tried 1/2 tab one hour after eating       6/630a 830a 1130am 230p 530p 830p 1030p   Amantadine symmetrel 100mg 1  1       Carbidopa/levodopa Sinemet CR 25/100 Not taking         Carbidopa/Levodopa 25/250 Not taking         carbidopa/levodopa sinemet 25/100 1.5 1 1 1 1  1 1/2-1   Folbee Has a few left              Ondansetron zofran-ODT 4mg  As needed                   Plan:    Genetic counseling with kelsi Sorto - [email or web address]  He thinks he did the PDGeneration study but not clear if he got the results.     DBS video from peter ramirez    Neuropsych baseline with dr Gracia.     GI referral for endoscopy - has esophagitis and may need  "lower studies  May need upper and lower gastric motility studies    Will try him on 1/2 sinemet 1 to 2 hours after meals    Ondansetron as needed - continue for now.       Medical Decision Making:  #  Chronic progressive medical conditions addressed  Yes - gi and parkinson  Review and/or interpretation of unique test or documentation from a provider outside of neurology yes -    Independent historian provided additional details  no   Prescription drug management and review of potential side effects and/or monitoring for side effects  yes   Health impacted by social determinants of health  no    I have reviewed the note as documented above.  This accurately captures the substance of my conversation with the patient and total time spent preparing for visit, executing visit and completing visit on the day of the visit:  30 minutes.     Time of video start 740am  End of video 810am     Ramesh Carson MD      ------------------------------------------------------------------------------------------------------------------------------------------------------------------------    Video-Visit Details    The patient has been notified of following:     \"After a review of the patient s situation, this visit was changed from an in-person visit to a video visit to reduce the risk of COVID-19 exposure.   The patient is being evaluated via a billable video visit.\"    \"This video visit will be conducted via a call between you and your physician/provider. We have found that certain health care needs can be provided without the need for an in-person physical exam.  This service lets us provide the care you need with a video conversation.  If a prescription is necessary we can send it directly to your pharmacy.  If lab work is needed we can place an order for that and you can then stop by our lab to have the test done at a later time.    If during the course of the call the physician/provider feels a video visit is not appropriate, you " "will not be charged for this service.\"     Patient has given verbal consent for Video visit? Yes    Patient would like the video invitation sent by:     Type of service:  Video Visit    Video Start Time:     Video End Time (time video stopped):     Duration:  minutes - see above    Originating Location (pt. Location):     Distant Location (provider location):  [facility] NEUROLOGY CLINIC [place]     Mode of Communication:  Video Conference via [employer] (and if not possible then doximity)      Ramesh Carson MD      --------------------------------------------------------------------------------------------------------------    Dipesh Nicole is a 35 year old male who is being evaluated via a billable video visit.      Charts reviewed  Consult from  Images reviewed        I have reviewed and updated the patient's Past Medical History, Social History, Family History and Medication List.    ALLERGIES  Metoclopramide    Lasts visit details if there was a last visit:       14 Review of systems  are negative except for   Patient Active Problem List   Diagnosis     Tremor     Seizure (H) at age 22 yrs     H/O magnetic resonance imaging of cervical spine     H/O shoulder surgery     H/O magnetic resonance imaging of brain and brain stem     Shoulder dislocation     Parkinson disease (H)     Right inguinal hernia        Allergies   Allergen Reactions     Metoclopramide      Avoid in patient with a history of Parkinson Disease  Other reaction(s): GI intolerance  Avoid in patient with a history of Parkinson Disease     Past Surgical History:   Procedure Laterality Date     SHOULDER SURGERY  2007     Past Medical History:   Diagnosis Date     H/O magnetic resonance imaging of brain and brain stem 8/17/2018 2009 December MR Brain without and with IV Yueuojvy53/8/2009 [facility] Result Impression  Normal MRI of the brain.  Result Narrative      Multiecho, multiplanar views of the brain were obtained prior to and  " following the IV administration of 19 mL of contrast. There are no  previous studies available for comparison.     The brain parenchyma is normal in appearance on all pulse sequences,  with      H/O magnetic resonance imaging of cervical spine 8/17/2018 March MR Cervical Spine without IV Contrast3/15/2017 [facility] Result Addenda Addendum by ProviderKaren M.D. on 03/15/2017 12:30 PM RAD^^^MA MR Cervical Spine w/o contrast 3/15/2017 12:30:00 Result Impression  Minimal degenerative disc disease at C5-6 and C6-7  otherwise an unremarkable MR scan of the cervical spine.  Result Narrative   EXAM: MR Cervical Spine w/o contrast   INDICATION:      H/O shoulder surgery 8/17/2018 2017 July XR SHOULDER 2 VIEWS LEFT7/14/2017 [facility] Result Narrative XR SHOULDER 2 VIEWS LEFT 7/14/2017 9:13 PM  INDICATION: Shoulder Injury COMPARISON: None.  FINDINGS: Negative shoulder. No fracture or dislocation.  Status       Seizure (H) at age 22 yrs 8/17/2018     Shoulder dislocation     left     Tremor 8/10/2018     Social History     Socioeconomic History     Marital status: Single     Spouse name: Not on file     Number of children: Not on file     Years of education: Not on file     Highest education level: Not on file   Occupational History     Not on file   Social Needs     Financial resource strain: Not on file     Food insecurity     Worry: Not on file     Inability: Not on file     Transportation needs     Medical: Not on file     Non-medical: Not on file   Tobacco Use     Smoking status: Never Smoker     Smokeless tobacco: Never Used   Substance and Sexual Activity     Alcohol use: Yes     Drug use: No     Sexual activity: Not on file   Lifestyle     Physical activity     Days per week: Not on file     Minutes per session: Not on file     Stress: Not on file   Relationships     Social connections     Talks on phone: Not on file     Gets together: Not on file     Attends Rastafarian  service: Not on file     Active member of club or organization: Not on file     Attends meetings of clubs or organizations: Not on file     Relationship status: Not on file     Intimate partner violence     Fear of current or ex partner: Not on file     Emotionally abused: Not on file     Physically abused: Not on file     Forced sexual activity: Not on file   Other Topics Concern     Not on file   Social History Narrative    single. lives in [facility]. mother cabrera ambriz            He has a history of a left dislocated shoulder with surgery performed in 2009. He currently lives in [place] but comes to the [place] area to visit his parents in [place]. 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     Family History   Problem Relation Age of Onset     Cancer Other         grandmother     Tremor No family hx of      Dementia No family hx of      Parkinsonism No family hx of      Seizure Disorder No family hx of      Current Outpatient Medications   Medication Sig Dispense Refill     amantadine (SYMMETREL) 100 MG capsule Take 1 capsule (100 mg) by mouth 2 times daily for 180 doses 60 capsule 11     carbidopa-levodopa (SINEMET)  MG tablet 2@6/630a, 1.5 @ 830am, 1@ 1130a, 1@ 230p, 1@530p, 1@830p, 1/2-1@1030p and 1 as needed = 8.5- 10 tabs per day 900 tablet 3     FOLBEE 2.5-25-1 MG TABS 1 tablet by mouth daily 90 tablet 3         Medications                                                                                                                                                                                                                    ALEA is a 35 year old who is being evaluated via a billable video visit.      How would you like to obtain your AVS? Mychart  If the video visit is dropped, the invitation should be resent by: [phone]      Video Start Time:   Video-Visit Details    Type of service:  Video Visit    Video End Time:    Originating Location (pt. Location):  Home    Distant Location (provider location):  [facility] NEUROLOGY [facility]     Platform used for Video Visit: Alessia        Again, thank you for allowing me to participate in the care of your patient.      Sincerely,    Ramesh Carson MD

## 2021-04-20 NOTE — PROGRESS NOTES
VIDEO VISIT    Date of Visit: April 20, 2021  Name: Dipesh Nicole  Date of Birth 1985  [address] [place] [address]   [email or web address]  [phone] (M)   [phone] (H)     Ritu  [phone]    Assessment:  (G20) Parkinson disease (H)  (primary encounter diagnosis)  He has wearing off at 2 to 2.5 hours.     Amantadine symmetrel 100mg    carbidopa/levodopa sinemet 25/100  1.5@615/630am, 1@830a, 1@1130am, 1@230p, 1@530p, 1@830p, 1/2-1@1030p    odansetron zofran ODT 4mg as needed    Had some cramping in legs- 3 hours after eating at 930pm   He had dose at 630pm and ate at 730 and cramping at 930pm    Cramping - independent of what he is eating.     Gait/Med related complications/Falls     Exercise/Therapy     Cognitive/Driving     Mood     Hallucinations/delusions     Sleep     Bladder     GI/Constipation/GERD     ENDO     Cardio/heart     Vision     Heme     Other:    Meals are usually at 8am, noon and evening  2 hours after eating gets cramping  Has not tried 1/2 tab one hour after eating       6/630a 830a 1130am 230p 530p 830p 1030p   Amantadine symmetrel 100mg 1  1       Carbidopa/levodopa Sinemet CR 25/100 Not taking         Carbidopa/Levodopa 25/250 Not taking         carbidopa/levodopa sinemet 25/100 1.5 1 1 1 1  1 1/2-1   Folbee Has a few left              Ondansetron zofran-ODT 4mg  As needed                   Plan:    Genetic counseling with kelsi Sorto - [email or web address]  He thinks he did the PDGeneration study but not clear if he got the results.     DBS video from peter ramirez    Neuropsych baseline with dr Gracia.     GI referral for endoscopy - has esophagitis and may need lower studies  May need upper and lower gastric motility studies    Will try him on 1/2 sinemet 1 to 2 hours after meals    Ondansetron as needed - continue for now.       Medical Decision Making:  #  Chronic progressive medical conditions addressed  Yes - gi and parkinson  Review and/or interpretation of  "unique test or documentation from a provider outside of neurology yes -    Independent historian provided additional details  no   Prescription drug management and review of potential side effects and/or monitoring for side effects  yes   Health impacted by social determinants of health  no    I have reviewed the note as documented above.  This accurately captures the substance of my conversation with the patient and total time spent preparing for visit, executing visit and completing visit on the day of the visit:  30 minutes.     Time of video start 740am  End of video 810am     Ramesh Carson MD      ------------------------------------------------------------------------------------------------------------------------------------------------------------------------    Video-Visit Details    The patient has been notified of following:     \"After a review of the patient s situation, this visit was changed from an in-person visit to a video visit to reduce the risk of COVID-19 exposure.   The patient is being evaluated via a billable video visit.\"    \"This video visit will be conducted via a call between you and your physician/provider. We have found that certain health care needs can be provided without the need for an in-person physical exam.  This service lets us provide the care you need with a video conversation.  If a prescription is necessary we can send it directly to your pharmacy.  If lab work is needed we can place an order for that and you can then stop by our lab to have the test done at a later time.    If during the course of the call the physician/provider feels a video visit is not appropriate, you will not be charged for this service.\"     Patient has given verbal consent for Video visit? Yes    Patient would like the video invitation sent by:     Type of service:  Video Visit    Video Start Time:     Video End Time (time video stopped):     Duration:  minutes - see above    Originating Location (pt. " Location):     Distant Location (provider location):  [facility] NEUROLOGY CLINIC [place]     Mode of Communication:  Video Conference via [employer] (and if not possible then doximity)      Ramesh Carson MD      --------------------------------------------------------------------------------------------------------------    Dipesh Nicole is a 35 year old male who is being evaluated via a billable video visit.      Charts reviewed  Consult from  Images reviewed        I have reviewed and updated the patient's Past Medical History, Social History, Family History and Medication List.    ALLERGIES  Metoclopramide    Lasts visit details if there was a last visit:       14 Review of systems  are negative except for   Patient Active Problem List   Diagnosis     Tremor     Seizure (H) at age 22 yrs     H/O magnetic resonance imaging of cervical spine     H/O shoulder surgery     H/O magnetic resonance imaging of brain and brain stem     Shoulder dislocation     Parkinson disease (H)     Right inguinal hernia        Allergies   Allergen Reactions     Metoclopramide      Avoid in patient with a history of Parkinson Disease  Other reaction(s): GI intolerance  Avoid in patient with a history of Parkinson Disease     Past Surgical History:   Procedure Laterality Date     SHOULDER SURGERY  2007     Past Medical History:   Diagnosis Date     H/O magnetic resonance imaging of brain and brain stem 8/17/2018 2009 December MR Brain without and with IV Nqpsgovj45/8/2009 [facility] Result Impression  Normal MRI of the brain.  Result Narrative      Multiecho, multiplanar views of the brain were obtained prior to and  following the IV administration of 19 mL of contrast. There are no  previous studies available for comparison.     The brain parenchyma is normal in appearance on all pulse sequences,  with      H/O magnetic resonance imaging of cervical spine 8/17/2018 March MR Cervical Spine without IV Contrast3/15/2017  [facility] Result Addenda Addendum by ProviderKaren M.D. on 03/15/2017 12:30 PM RAD^^^MA MR Cervical Spine w/o contrast 3/15/2017 12:30:00 Result Impression  Minimal degenerative disc disease at C5-6 and C6-7  otherwise an unremarkable MR scan of the cervical spine.  Result Narrative   EXAM: MR Cervical Spine w/o contrast   INDICATION:      H/O shoulder surgery 8/17/2018 2017 July XR SHOULDER 2 VIEWS LEFT7/14/2017 [facility] Result Narrative XR SHOULDER 2 VIEWS LEFT 7/14/2017 9:13 PM  INDICATION: Shoulder Injury COMPARISON: None.  FINDINGS: Negative shoulder. No fracture or dislocation.  Status       Seizure (H) at age 22 yrs 8/17/2018     Shoulder dislocation     left     Tremor 8/10/2018     Social History     Socioeconomic History     Marital status: Single     Spouse name: Not on file     Number of children: Not on file     Years of education: Not on file     Highest education level: Not on file   Occupational History     Not on file   Social Needs     Financial resource strain: Not on file     Food insecurity     Worry: Not on file     Inability: Not on file     Transportation needs     Medical: Not on file     Non-medical: Not on file   Tobacco Use     Smoking status: Never Smoker     Smokeless tobacco: Never Used   Substance and Sexual Activity     Alcohol use: Yes     Drug use: No     Sexual activity: Not on file   Lifestyle     Physical activity     Days per week: Not on file     Minutes per session: Not on file     Stress: Not on file   Relationships     Social connections     Talks on phone: Not on file     Gets together: Not on file     Attends Faith service: Not on file     Active member of club or organization: Not on file     Attends meetings of clubs or organizations: Not on file     Relationship status: Not on file     Intimate partner violence     Fear of current or ex partner: Not on file     Emotionally abused: Not on file     Physically abused:  Not on file     Forced sexual activity: Not on file   Other Topics Concern     Not on file   Social History Narrative    single. lives in [facility]. mother cabrera ambriz            He has a history of a left dislocated shoulder with surgery performed in 2009. He currently lives in [place] but comes to the [place] area to visit his parents in [place]. 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     Family History   Problem Relation Age of Onset     Cancer Other         grandmother     Tremor No family hx of      Dementia No family hx of      Parkinsonism No family hx of      Seizure Disorder No family hx of      Current Outpatient Medications   Medication Sig Dispense Refill     amantadine (SYMMETREL) 100 MG capsule Take 1 capsule (100 mg) by mouth 2 times daily for 180 doses 60 capsule 11     carbidopa-levodopa (SINEMET)  MG tablet 2@6/630a, 1.5 @ 830am, 1@ 1130a, 1@ 230p, 1@530p, 1@830p, 1/2-1@1030p and 1 as needed = 8.5- 10 tabs per day 900 tablet 3     FOLBEE 2.5-25-1 MG TABS 1 tablet by mouth daily 90 tablet 3         Medications

## 2021-04-25 ENCOUNTER — HEALTH MAINTENANCE LETTER (OUTPATIENT)
Age: 36
End: 2021-04-25

## 2021-04-29 ENCOUNTER — MYC MEDICAL ADVICE (OUTPATIENT)
Dept: NEUROLOGY | Facility: CLINIC | Age: 36
End: 2021-04-29

## 2021-05-04 ENCOUNTER — TELEPHONE (OUTPATIENT)
Dept: NEUROLOGY | Facility: CLINIC | Age: 36
End: 2021-05-04

## 2021-05-04 ENCOUNTER — MYC MEDICAL ADVICE (OUTPATIENT)
Dept: NEUROLOGY | Facility: CLINIC | Age: 36
End: 2021-05-04

## 2021-05-04 NOTE — TELEPHONE ENCOUNTER
Pt is due for 3 month follow-up (around 7/20/21). Please schedule pt for an in-person or virtual follow-up with Dr Carson.

## 2021-05-05 NOTE — TELEPHONE ENCOUNTER
Need more information/documentation to submit to insurance to answer why patient needs to exceed the Qty Limit on the Ondansetron ODT tabs.   Limit is 2/day, patient is taking up to 3/day.   I am unable to find anything that clearly can support this.

## 2021-05-05 NOTE — TELEPHONE ENCOUNTER
Central Prior Authorization Team   Phone: [phone]      PA Initiation    Medication: ondansetron (ZOFRAN-ODT) 4 MG ODT tab  Insurance Company: [employer] - Phone [phone] Fax [phone]  Pharmacy Filling the Rx: [id number] - [place], [place] - [address]  Filling Pharmacy Phone: [phone]  Filling Pharmacy Fax:    Start Date: 5/5/2021

## 2021-05-06 NOTE — TELEPHONE ENCOUNTER
Quantity Limit Approval - for up to 3 tabs/day    Authorization Effective Date: 5/6/2021  Authorization Expiration Date: 5/6/2022  Medication: ondansetron (ZOFRAN-ODT) 4 MG ODT tab  Approved Dose/Quantity: 90 per 30 days  Reference #: [id number]   Insurance Company: [employer] - Phone [phone] Fax [phone]  Expected CoPay:       Which Pharmacy is filling the prescription (Not needed for infusion/clinic administered): [id number] - [place], [place] - [address]  Pharmacy Notified: Yes  Patient Notified: No

## 2021-05-06 NOTE — TELEPHONE ENCOUNTER
Submitted Letter (letters tab dated today) to insurance to satisfy their request for additional info.

## 2021-05-20 DIAGNOSIS — G20.A1 PARKINSON DISEASE (H): ICD-10-CM

## 2021-05-20 RX ORDER — CARBIDOPA AND LEVODOPA 25; 100 MG/1; MG/1
TABLET ORAL
Qty: 900 TABLET | Refills: 3 | Status: SHIPPED | OUTPATIENT
Start: 2021-05-20 | End: 2021-08-24

## 2021-05-20 NOTE — TELEPHONE ENCOUNTER
Rx Authorization:    Requested Medication/ Dose: Carbidopa/Ldopa  MG tabs    Date last refill ordered: 4/20/21    Quantity ordered: 900 tabs    # refills:     Date of last clinic visit with ordering provider: 4/20/21    Date of next clinic visit with ordering provider: F/U 3 months    All pertinent protocol data (lab date/result):     Include pertinent information from patients message:

## 2021-05-25 ENCOUNTER — HOSPITAL ENCOUNTER (OUTPATIENT)
Dept: NUTRITION | Facility: CLINIC | Age: 36
Discharge: HOME | End: 2021-05-25
Attending: PSYCHIATRY & NEUROLOGY | Admitting: PSYCHIATRY & NEUROLOGY
Payer: COMMERCIAL

## 2021-05-25 PROCEDURE — 97802 MEDICAL NUTRITION INDIV IN: CPT | Mod: 95 | Performed by: DIETITIAN, REGISTERED

## 2021-05-25 NOTE — PROGRESS NOTES
"[facility]  Medical Nutrition Therapy    Visit Type: Initial Assessment    Dipesh Nicole, 35 year old referred by Ramesh Carson MD for MNT related to Parkinson disease (H)       The patient has been notified of following:      \"This video visit will be conducted via a call between you and your physician/provider. We have found that certain health care needs can be provided without the need for an in-person physical exam.  This service lets us provide the care you need with a video conversation.     Video visits are billed at different rates depending on your insurance coverage.  Please reach out to your insurance provider with any questions.     If during the course of the call the physician/provider feels a video visit is not appropriate, you will not be charged for this service.\"     Patient has given verbal consent for Video visit? YES        Video-Visit Details     Type of service:  Video Visit    Originating Location (pt. Location): home     Distant Location (provider location):  [facility] NUTRITION SERVICES      Platform used for Video Visit: Markafoni     Nutrition Assessment:  Anthropometrics  Height:   Ht Readings from Last 1 Encounters:   01/28/20 1.842 m (6' 0.5\")         BMI:  24.7   Weight Status:  Normal BMI   Weight:   Wt Readings from Last 1 Encounters:   01/28/20 83.6 kg (184 lb 6.4 oz)             IBW:  178 lb (81 kg) IBW %: 103%        Weight History:   Wt Readings from Last 10 Encounters:   01/28/20 83.6 kg (184 lb 6.4 oz)   10/03/19 82.6 kg (182 lb)   04/03/19 83.5 kg (184 lb)   12/19/18 83.9 kg (185 lb)   10/22/18 81.2 kg (179 lb)   10/03/18 81.5 kg (179 lb 9.6 oz)   10/03/18 81.5 kg (179 lb 9.6 oz)   09/04/18 80.3 kg (177 lb 1.6 oz)   -Stable wt past 1 year    Goal Weight:   184 lb    Nutrition History    PMH:   Patient Active Problem List   Diagnosis     Tremor     Seizure (H) at age 22 yrs     H/O magnetic resonance imaging of cervical spine     H/O " "shoulder surgery     H/O magnetic resonance imaging of brain and brain stem     Shoulder dislocation     Parkinson disease (H)     Right inguinal hernia     Hiatal hernia     -Pt was dx with Parkinson's disease in about 2018. Hasn't told any family yet.     Labs: reviewed      Meds:   Current Outpatient Medications   Medication Instructions     carbidopa-levodopa (SINEMET)  MG tablet 1.5@6/630a, 1 @ 830am, 1@ 1130a, 1@ 230p, 1@530p, 1@830p, 1/2-1@1030p and 1/2 tab 3/day as needed after meals = 8.5- 10 tabs per day     FOLBEE 2.5-25-1 MG TABS 1 tablet by mouth daily     ondansetron (ZOFRAN-ODT) 4 mg, Oral, EVERY 8 HOURS PRN   -Taking a MTV+M morning w/breakfast    carbidopa/levodopa sinemet 25/100  1.5@615/630am, 1@830a, 1@1130am, 1@230p, 1@530p, 1@830p, 1/2-1@1030p    \"Had some cramping in legs- 3 hours after eating at 930pm\"...\"He had dose at 630pm and ate at 730 and cramping at 930pm\"...\"Cramping - independent of what he is eating\"...\"Will try him on 1/2 sinemet 1 to 2 hours after meals\"- per MD note 4/20    -Feels cramping in abs, stiffness in knees, tighten in arms when carbidopa not absorbed.     Supplements: reviewed      Social/Environmental:   -average sleep per night:   -level of stress:     Food Record  Breakfast: Toast and cereal (Special K), OJ, 2% 2 cups milk   Lunch: Model lunch meat or PB& J, veggies or frozen fruit   Dinner: Meat, potatoes, veggies  Snacks: Potato Chips and salsa OR carrots OR peanuts OR fruits  Beverages: Carbonated beverages- helps absorption. Sprite- 1 can per day.   -Whey protein shake (44 grams per night)- 3 weeks ago     Nutritional Details:   -Food allergies: none  -Food intolerances: none  -Food sensitivities: none  -GI concerns: none  -Appetite: good. Hungry a lot.   -Pace of eating: moderate  -Role of cooking: self  -Role of food shopping: self      Physical Activity:  Weight lifting, riding bike at the gym - 3-4 days per week for 1.5 hr per day   Golfing 3 days " per week for over an hour     ASSESSED MALNUTRITION STATUS  % Weight Loss:  None noted  % Intake:  No decreased intake noted  Subcutaneous Fat Loss:  None observed  Loss of Muscle Mass:  None observed  Fluid Retention:  None noted    Malnutrition Diagnosis:  Patient does not meet two of the above criteria necessary for diagnosing malnutrition        Nutrition Diagnosis:  Food and nutrition-related knowledge deficit related to limited exposure to nutrition information for Parkinson's Disease as evidenced by usual dietary intake inconsistent in protein, dx Parkinson's Disease, and report of not knowing protein needs.        Nutrition Intervention:  Nutrition Prescription Summary: Protein consistent diet, scheduled around Carbidopa-Levodopa medication intake    Carbidopa-Levodopa schedule: 1.5@615/630am, 1@830a, 1@1130am, 1@230p, 1@530p, 1@830p, 1/2-1@1030p    Meal Plan:  21-25 g PRO w/meals TID (Breakfast: 7:30 am, Lunch: 12:30 pm, Dinner: 6:30 pm)  5-7 g PRO w/snacks TID (Snacks: 10:30 am, 3:30 pm, 9:30 pm)  =Total PRO 78-96 g/day     Nutrition Education (Content):  -Discussed: General Healthy Nutrition with adequate calcium, vitamin D, and B12 intake and adequate fiber to prevent constipation. Discussed general healthy meal planning using MyPlate as well. Helped with meal planning ideas. Discussed CHO+Protein for snacks.     Provided the following handouts: General, Healthful Nutrition Therapy and Healthy Snacks- Complex Carb & Protein Pairing      Nutrition Prescription: Macronutrient and Micronutrient details   Dosing weight: 84 kg (current BW)  Energy: 6465-5864 kcals/day (25-30 Kcal/Kg)    Justification:  (maintenance) Protein:  g Pro/day (1-1.2 g pro/Kg)    Justification:  (maintenance, high physical activity)   Fluid: 1699-1006 mL/day   (1 mL/Kcal)     Justification:  (maintenance)   Fiber:   38 grams per day       Micronutrient:  Vitamin D: 600 international unit(s)/day  B-12: 2.4 micrograms/day               Patient Engagement:   Assessed learning needs and learning preference: Yes.  Teaching Method(s) used: Explanation    Nutrition Education (Application):  a)  Discussed current eating plans / recommended alternative food choices    b)  Patient verbalizes understanding of diet by coming up with snack ideas    Anticipate >75% compliance   Stage of Change:  action      Nutrition Goals:  1) 21-25 g Protein with meals  2) 5-7 g Protein with snacks  3) Space meals and snacks apart from Carbidopa-Levodopa meds as directed    Nutrition Follow Up / Monitoring:   Weight, PO intake, PA, muscular symptoms (stiffness, cramping)      Nutrition Recommendations:  Patient to follow-up with RD in 4 weeks.  Patient has RD contact information to call/email if needed.      Start time: 15:00  End time: 15:31    Total Time Duration: 31 min      Nayeli Reyna RD, SMILEY  Clinical Dietitian  [facility]: [phone]  [facility]: [phone]

## 2021-05-26 NOTE — TELEPHONE ENCOUNTER
REFERRAL INFORMATION:    Referring Provider:  Dr. Ramesh Carson    Referring Clinic:  [facility]     Reason for Visit/Diagnosis: Abdominal pain     FUTURE VISIT INFORMATION:    Appointment Date: 6/29/2021    Appointment Time: 12 PM     NOTES STATUS DETAILS   OFFICE NOTE from Referring Provider Internal 4/20/2021 Office visit with Dr. Carson      OFFICE NOTE from Other Specialist N/A    HOSPITAL DISCHARGE SUMMARY/  ED VISITS N/A    OPERATIVE REPORT N/A    MEDICATION LIST Internal         ENDOSCOPY  N/A    COLONOSCOPY N/A    ERCP N/A    EUS N/A    STOOL TESTING N/A    PERTINENT LABS Care Everywhere/ Internal     PATHOLOGY REPORTS (RELATED) N/A    IMAGING (CT, MRI, EGD, MRCP, Small Bowel Follow Through/SBT, MR/CT Enterography) Care Everywhere [facility]:  - CT Abdomen Pelvis: 4/15/2021, 2/21/19 6/21/2021 9:07am Fax request sent to [facility] ([phone]) for images. -Bhao     6/29/2021 11:36am Fax request sent to [facility] ([phone]) for images. -Miguelao

## 2021-06-29 ENCOUNTER — PRE VISIT (OUTPATIENT)
Dept: GASTROENTEROLOGY | Facility: CLINIC | Age: 36
End: 2021-06-29

## 2021-08-19 ENCOUNTER — MYC MEDICAL ADVICE (OUTPATIENT)
Dept: NEUROLOGY | Facility: CLINIC | Age: 36
End: 2021-08-19

## 2021-08-20 ENCOUNTER — TELEPHONE (OUTPATIENT)
Dept: NEUROLOGY | Facility: CLINIC | Age: 36
End: 2021-08-20

## 2021-08-20 NOTE — TELEPHONE ENCOUNTER
Prior Authorization Retail Medication Request    Medication/Dose: carbidopa 25 mg   ICD code (if different than what is on RX):  G20  Previously Tried and Failed:  Carbidopa-levodopa, amantadine  Rationale:  Patient is experiencing nausea from carbidopa-levodopa and would benefit from additional carbidopa to manage this side effect     Insurance Name:  [facility]  Insurance ID:  [id number]      Pharmacy Information (if different than what is on RX)  Name:  [id number] - [place], [place] - [address]  Phone:

## 2021-08-22 RX ORDER — AMANTADINE HYDROCHLORIDE 100 MG/1
100 CAPSULE, GELATIN COATED ORAL 2 TIMES DAILY
COMMUNITY
Start: 2021-02-19 | End: 2021-08-24

## 2021-08-22 NOTE — PROGRESS NOTES
VIDEO VISIT    Date of Visit: August 24, 2021  Name: Dipesh Nicole  Date of Birth 1985  [address] [place] [address]   [email or web address]  [phone] (M)   [phone] (H)      Ritu      [phone]    DBS video from peter ramirez     Neuropsych baseline with dr Gracia.      GI referral for endoscopy - has esophagitis and may need lower studies  May need upper and lower gastric motility studies     Will try him on 1/2 sinemet 1 to 2 hours after meals     Ondansetron as needed - continue for now.      Assessment:  (G20) Parkinson disease (H)  (primary encounter diagnosis)  He has wearing off at 2 to 2.5 hours.   Developed symptoms left arm at age 31 years or 30 years of age  Diagnosis was made 2018  Genetic testing not yet done but will be done September 5, 2021 at [facility]     Amantadine symmetrel 100mg twice daily     carbidopa/levodopa sinemet 25/100  1.5@630am, 1@9a, 1@1130am, 1@2p, 1@430p, 1@7p, 1/2-1@930p    Watched DBS video and watched something from the App[employer]eta Horne     odansetron zofran ODT 4mg as needed     Had some cramping in legs- 3 hours after eating at 930pm   He had dose at 630pm and ate at 730 and cramping at 930pm     Cramping - independent of what he is eating.     Having episodes once per month - wondering     Had a possible seizure on 11/23/2009 - had an mri and was discussing the use of medication  From chart review it may have been a syncopal episode vs seizure. He had been dehydrated and possibly sleep deprived.     EEG was done 12/9/2009  This is an abnormal EEG due to the presence of two episodes of frontally maximal sharp waves, epileptiform in nature. This places the patient at risk for partial or secondarily generalized seizures arising from the frontal lobes.     BRAIN MRI was normal on 12/8/2009    Seen by Dr. Je Sanon on 12/10/2009 and Annalise opted not to take an AED lamotrigine     Gait/Med related complications/Falls   No  falls     Exercise/Therapy      Cognitive/Driving   No problems    Working with his dad's company and may move to [place] and look for a job.     Mood   Denies      Hallucinations/delusions   None     Sleep   Crazy dreams  No problems falling asleep  Has not yet tried melatonin  He recalls having dreams when he wakes up  He can be awaken about a dream about cramping leg and then wakes up with a cramp and takes sinemet and goes back to bed.     Waking up with cramping at 2am or so and taking 1/4 to 1/2 tablet of medication.     Bladder   Denies urinary problems     GI/Constipation/GERD   No constipation or heartburn  Had vomiting episodes when he has taken a lot of sinemet in a day  Has not been on lodosyn before      ENDO   denies     Cardio/heart   Denies faints     Vision   No changes in vision     Heme   Not taking aspirin     Other:     Meals are usually at 8am, noon and evening  2 hours after eating gets cramping  Has not tried 1/2 tab one hour after eating       630a 9a 1130am 2p 430p 7p 930p 2-3am   Amantadine symmetrel 100mg 1   1            Carbidopa (lodosyn) 25mg 1  1   1     Carbidopa/levodopa Sinemet CR 25/100 Not taking                Carbidopa/Levodopa 25/250 Not taking                carbidopa/levodopa sinemet 25/100 1.5 1 1 1 1  1 1 1/4   Elmer Has a few left                Ondansetron zofran-ODT 4mg  As needed                    Plan:    We have decided to start a deep brain stimulation (DBS) workup to see if you are candidate for DBS.  The workup will consist of the following series of tests or evaluations.    1.  MRI brain with and without contrast.  This is a study to look at the structure of your brain.  Please alert us if you have an allergy to MRI dye (gadolinium), claustrophobia, or if you have a medical device such as a pacemaker.    2.  Memory and cognitive testing to make sure you will tolerate DBS    3.  Videotaping session.     This is an in-person session with our DBS specialists  which will video tape you and your symptoms.    It is very important for you to follow these instructions about your medication before this appointment.    Do not take the following medication for 3 days before your appointment:  Amantadine - Stop 3 days before motor testing. Stop night before lead placement  surgery. OK to stay on for neuropsych.    Do not take any levodopa formulations 12 hours before your appointment.  These medications include:  Sinemet (carbidopa/levodopa) both CR and immediate release  Stalevo (carbidopa/levodopa + comtan/entacapone)  Rytary      Take your anti-depressants and anti-anxiety medications as usual.    Please do not eat breakfast foods that have a significant amount of protein the morning of the  appointment. Please bring your Parkinsons medications with you to the appointment.    Call our nurses at [phone].  If you have questions about your medication.  Failure to follow these instructions will require us to cancel this appointment which will delay your workup.    4.  Neurosurgery consultation    You will meet with one of our expert neurosurgeons to discuss your potential DBS procedure.    After your 4 appointments are completed you will be discussed with our entire team at the DBS Consensus Conference.  You should hear from us about the results of that conference within 3 weeks after your last appointment.  If you have questions please call Theresa Garcia RN at [phone].    [facility]    May consider video EEG for 3 hours to followup on his 2009 studies from [facility]    He does not have a power of  at this time - as best I can determine    His brother Aung, mother and father are the only ones he has told he has parkinson    He will decide if he will vivian proxy access to his family or someone else.     Medical Decision Making:  #  Chronic progressive medical conditions addressed  Parkinson's  "disease  Review and/or interpretation of unique test or documentation from a provider outside of neurology  - no   Independent historian provided additional details  no   Prescription drug management and review of potential side effects and/or monitoring for side effects  ys   Health impacted by social determinants of health  no    I have reviewed the note as documented above.  This accurately captures the substance of my conversation with the patient and total time spent preparing for visit, executing visit and completing visit on the day of the visit:  40 minutes.     Start Time of visit: 1040am  End of video visit 1124am    Ramesh Carson MD      ------------------------------------------------------------------------------------------------------------------------------------------------------------------------    Video-Visit Details    The patient has been notified of following:     \"After a review of the patient s situation, this visit was changed from an in-person visit to a video visit to reduce the risk of COVID-19 exposure.   The patient is being evaluated via a billable video visit.\"    \"This video visit will be conducted via a call between you and your physician/provider. We have found that certain health care needs can be provided without the need for an in-person physical exam.  This service lets us provide the care you need with a video conversation.  If a prescription is necessary we can send it directly to your pharmacy.  If lab work is needed we can place an order for that and you can then stop by our lab to have the test done at a later time.    If during the course of the call the physician/provider feels a video visit is not appropriate, you will not be charged for this service.\"     Patient has given verbal consent for Video visit? Yes    Patient would like the video invitation sent by:     Type of service:  Video Visit    Video Start Time:     Video End Time (time video stopped):     Duration:  " minutes - see above    Originating Location (pt. Location):     Distant Location (provider location):  [facility] [place]     Mode of Communication:  Video Conference via Reclamador (and if not possible then doximity)      Ramesh Carson MD      --------------------------------------------------------------------------------------------------------------    Dipesh Nicole is a 35 year old male who is being evaluated via a billable video visit.      Charts reviewed  Consult from  Images reviewed        I have reviewed and updated the patient's Past Medical History, Social History, Family History and Medication List.    ALLERGIES  Diphenhydramine, Metoclopramide, and Promethazine    Lasts visit details if there was a last visit:       14 Review of systems  are negative except for   Patient Active Problem List   Diagnosis     Tremor     Seizure (H) at age 22 yrs     H/O magnetic resonance imaging of cervical spine     H/O shoulder surgery     H/O magnetic resonance imaging of brain and brain stem     Shoulder dislocation     Parkinson disease (H)     Right inguinal hernia     Hiatal hernia        Allergies   Allergen Reactions     Diphenhydramine      Other reaction(s): Other (see comments)  Told not to take due to parkinsons     Metoclopramide      Avoid in patient with a history of Parkinson Disease  Other reaction(s): GI intolerance  Avoid in patient with a history of Parkinson Disease     Promethazine      Other reaction(s): Other (see comments)  Told not to take due to parkisons     Past Surgical History:   Procedure Laterality Date     SHOULDER SURGERY  2007     Past Medical History:   Diagnosis Date     H/O magnetic resonance imaging of brain and brain stem 8/17/2018 2009 December MR Brain without and with IV [id number]/8/2009 [facility] Result Impression  Normal MRI of the brain.  Result Narrative      Multiecho, multiplanar views of the brain were obtained prior to and  following  the IV administration of 19 mL of contrast. There are no  previous studies available for comparison.     The brain parenchyma is normal in appearance on all pulse sequences,  with      H/O magnetic resonance imaging of cervical spine 8/17/2018 March MR Cervical Spine without IV Contrast3/15/2017 [facility] Result Addenda Addendum by ProviderKaren M.D. on 03/15/2017 12:30 PM RAD^^^MA MR Cervical Spine w/o contrast 3/15/2017 12:30:00 Result Impression  Minimal degenerative disc disease at C5-6 and C6-7  otherwise an unremarkable MR scan of the cervical spine.  Result Narrative   EXAM: MR Cervical Spine w/o contrast   INDICATION:      H/O shoulder surgery 8/17/2018 2017 July XR SH[facility] Result Narrative XR SHOULDER 2 VIEWS LEFT 7/14/2017 9:13 PM  INDICATION: Shoulder Injury COMPARISON: None.  FINDINGS: Negative shoulder. No fracture or dislocation.  Status       Hiatal hernia 4/20/2021    Based on a 2021 ct scan Small hiatal hernia with some wall thickening in the distal thoracic esophagus suggesting some esophagitis.     Seizure (H) at age 22 yrs 8/17/2018     Shoulder dislocation     left     Tremor 8/10/2018     Social History     Socioeconomic History     Marital status: Single     Spouse name: Not on file     Number of children: Not on file     Years of education: Not on file     Highest education level: Not on file   Occupational History     Not on file   Tobacco Use     Smoking status: Never Smoker     Smokeless tobacco: Never Used   Substance and Sexual Activity     Alcohol use: Yes     Drug use: No     Sexual activity: Not on file   Other Topics Concern     Not on file   Social History Narrative    single. lives in [facility]. mother cabrera ambriz            He has a history of a left dislocated shoulder with surgery performed in 2009. He currently lives in [place] but comes to the [place] area to visit his parents in  [place]. 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     Social Determinants of Health     Financial Resource Strain:      Difficulty of Paying Living Expenses:    Food Insecurity:      Worried About Running Out of Food in the Last Year:      Ran Out of Food in the Last Year:    Transportation Needs:      Lack of Transportation (Medical):      Lack of Transportation (Non-Medical):    Physical Activity:      Days of Exercise per Week:      Minutes of Exercise per Session:    Stress:      Feeling of Stress :    Social Connections:      Frequency of Communication with Friends and Family:      Frequency of Social Gatherings with Friends and Family:      Attends Cheondoism Services:      Active Member of Clubs or Organizations:      Attends Club or Organization Meetings:      Marital Status:    Intimate Partner Violence:      Fear of Current or Ex-Partner:      Emotionally Abused:      Physically Abused:      Sexually Abused:      Family History   Problem Relation Age of Onset     Cancer Other         grandmother     Tremor No family hx of      Dementia No family hx of      Parkinsonism No family hx of      Seizure Disorder No family hx of      Current Outpatient Medications   Medication Sig Dispense Refill     amantadine (SYMMETREL) 100 MG capsule Take 100 mg by mouth 2 times daily       carbidopa (LODOSYN) 25 MG TABS tablet Take 1 tablet (25 mg) by mouth 3 times daily 90 tablet 11     carbidopa-levodopa (SINEMET)  MG tablet 1.5@6/630a, 1 @ 830am, 1@ 1130a, 1@ 230p, 1@530p, 1@830p, 1/2-1@1030p and 1/2 tab 3/day as needed after meals = 8.5- 10 tabs per day 900 tablet 3     FOLBEE 2.5-25-1 MG TABS 1 tablet by mouth daily 90 tablet 3     ondansetron (ZOFRAN-ODT) 4 MG ODT tab Take 1 tablet (4 mg) by mouth every 8 hours as needed for nausea or vomiting 90 tablet 11         Medications

## 2021-08-23 NOTE — TELEPHONE ENCOUNTER
Prior Authorization Approval    Authorization Effective Date: 8/23/2021  Authorization Expiration Date: 8/23/2022  Medication: carbidopa 25 mg -APPROVED  Approved Dose/Quantity:   Reference #:     Insurance Company: [employer] - Phone [phone] Fax [phone]  Expected CoPay:       CoPay Card Available:      Foundation Assistance Needed:    Which Pharmacy is filling the prescription (Not needed for infusion/clinic administered): [id number] - [place], [address]  Pharmacy Notified: Yes-Pharmacy will notify patient when ready.  Patient Notified: No

## 2021-08-23 NOTE — TELEPHONE ENCOUNTER
Central Prior Authorization Team   Phone: [phone]      PA Initiation    Medication: carbidopa 25 mg   Insurance Company: [employer] - Phone [phone] Fax [phone]  Pharmacy Filling the Rx: [id number] - [place], [place] - [address]  Filling Pharmacy Phone: [phone]  Filling Pharmacy Fax:    Start Date: 8/23/2021

## 2021-08-24 ENCOUNTER — TELEPHONE (OUTPATIENT)
Dept: NEUROLOGY | Facility: CLINIC | Age: 36
End: 2021-08-24

## 2021-08-24 ENCOUNTER — VIRTUAL VISIT (OUTPATIENT)
Dept: NEUROLOGY | Facility: CLINIC | Age: 36
End: 2021-08-24
Payer: COMMERCIAL

## 2021-08-24 DIAGNOSIS — G20.C PARKINSONISM, UNSPECIFIED PARKINSONISM TYPE (H): ICD-10-CM

## 2021-08-24 DIAGNOSIS — G20.A1 PARKINSON DISEASE (H): ICD-10-CM

## 2021-08-24 DIAGNOSIS — R41.89 SPELL OF ALTERED COGNITION: Primary | ICD-10-CM

## 2021-08-24 PROBLEM — F12.90 MISUSE OF CANNABIS: Status: RESOLVED | Noted: 2021-07-30 | Resolved: 2021-08-24

## 2021-08-24 PROBLEM — F12.90 MISUSE OF CANNABIS: Status: ACTIVE | Noted: 2021-07-30

## 2021-08-24 PROBLEM — R11.15 CYCLICAL VOMITING SYNDROME UNRELATED TO MIGRAINE: Status: ACTIVE | Noted: 2021-07-30

## 2021-08-24 PROCEDURE — 99207 ZZHC EEG VIDEO < 12 HR: CPT | Mod: 52 | Performed by: PSYCHIATRY & NEUROLOGY

## 2021-08-24 PROCEDURE — 99215 OFFICE O/P EST HI 40 MIN: CPT | Mod: 95 | Performed by: PSYCHIATRY & NEUROLOGY

## 2021-08-24 RX ORDER — AMANTADINE HYDROCHLORIDE 100 MG/1
CAPSULE, GELATIN COATED ORAL
Qty: 180 CAPSULE | Refills: 3 | COMMUNITY
Start: 2021-08-24 | End: 2021-11-23

## 2021-08-24 RX ORDER — CARBIDOPA AND LEVODOPA 25; 100 MG/1; MG/1
TABLET ORAL
Qty: 900 TABLET | Refills: 3 | COMMUNITY
Start: 2021-08-24 | End: 2022-03-24

## 2021-08-24 RX ORDER — CYANOCOBALAMIN/FOLIC AC/VIT B6 1-2.5-25MG
TABLET ORAL
Qty: 90 TABLET | Refills: 3 | COMMUNITY
Start: 2021-08-24 | End: 2021-11-18

## 2021-08-24 RX ORDER — CARBIDOPA 25 MG/1
TABLET ORAL
Qty: 270 TABLET | Refills: 3 | COMMUNITY
Start: 2021-08-24 | End: 2021-10-05

## 2021-08-24 RX ORDER — AMANTADINE HYDROCHLORIDE 100 MG/1
CAPSULE, GELATIN COATED ORAL
Qty: 180 CAPSULE | Refills: 3 | COMMUNITY
Start: 2021-08-24 | End: 2021-08-24

## 2021-08-24 NOTE — PATIENT INSTRUCTIONS
Assessment:  (G20) Parkinson disease (H)  (primary encounter diagnosis)  He has wearing off at 2 to 2.5 hours.   Developed symptoms left arm at age 31 years or 30 years of age  Diagnosis was made 2018  Genetic testing not yet done but will be done September 5, 2021 at [place]     Amantadine symmetrel 100mg twice daily     carbidopa/levodopa sinemet 25/100  1.5@630am, 1@9a, 1@1130am, 1@2p, 1@430p, 1@7p, 1/2-1@930p    Watched DBS video and watched something from the [employer]     odansetron zofran ODT 4mg as needed     Had some cramping in legs- 3 hours after eating at 930pm   He had dose at 630pm and ate at 730 and cramping at 930pm     Cramping - independent of what he is eating.     Having episodes once per month - wondering     Had a possible seizure on 11/23/2009 - had an mri and was discussing the use of medication  From chart review it may have been a syncopal episode vs seizure. He had been dehydrated and possibly sleep deprived.     EEG was done 12/9/2009  This is an abnormal EEG due to the presence of two episodes of frontally maximal sharp waves, epileptiform in nature. This places the patient at risk for partial or secondarily generalized seizures arising from the frontal lobes.     BRAIN MRI was normal on 12/8/2009    Seen by Dr. Je Sanon on 12/10/2009 and Annalise opted not to take an AED lamotrigine     Gait/Med related complications/Falls   No falls     Exercise/Therapy      Cognitive/Driving   No problems    Working with his dad's company and may move to [place] and look for a job.     Mood   Denies      Hallucinations/delusions   None     Sleep   Crazy dreams  No problems falling asleep  Has not yet tried melatonin  He recalls having dreams when he wakes up  He can be awaken about a dream about cramping leg and then wakes up with a cramp and takes sinemet and goes back to bed.     Waking up with cramping at 2am or so and taking 1/4 to 1/2 tablet of medication.     Bladder   Denies urinary  problems     GI/Constipation/GERD   No constipation or heartburn  Had vomiting episodes when he has taken a lot of sinemet in a day  Has not been on lodosyn before      ENDO   denies     Cardio/heart   Denies faints     Vision   No changes in vision     Heme   Not taking aspirin     Other:     Meals are usually at 8am, noon and evening  2 hours after eating gets cramping  Has not tried 1/2 tab one hour after eating       630a 9a 1130am 2p 430p 7p 930p 2-3am   Amantadine symmetrel 100mg 1   1            Carbidopa (lodosyn) 25mg 1  1   1     Carbidopa/levodopa Sinemet CR 25/100 Not taking                Carbidopa/Levodopa 25/250 Not taking                carbidopa/levodopa sinemet 25/100 1.5 1 1 1 1  1 1 1/4   Elmer Has a few left                Ondansetron zofran-ODT 4mg  As needed                    Plan:    We have decided to start a deep brain stimulation (DBS) workup to see if you are candidate for DBS.  The workup will consist of the following series of tests or evaluations.    1.  MRI brain with and without contrast.  This is a study to look at the structure of your brain.  Please alert us if you have an allergy to MRI dye (gadolinium), claustrophobia, or if you have a medical device such as a pacemaker.    2.  Memory and cognitive testing to make sure you will tolerate DBS    3.  Videotaping session.     This is an in-person session with our DBS specialists which will video tape you and your symptoms.    It is very important for you to follow these instructions about your medication before this appointment.    Do not take the following medication for 3 days before your appointment:  Amantadine - Stop 3 days before motor testing. Stop night before lead placement  surgery. OK to stay on for neuropsych.    Do not take any levodopa formulations 12 hours before your appointment.  These medications include:  Sinemet (carbidopa/levodopa) both CR and immediate release  Stalevo (carbidopa/levodopa +  comtan/entacapone)  Rytary      Take your anti-depressants and anti-anxiety medications as usual.    Please do not eat breakfast foods that have a significant amount of protein the morning of the  appointment. Please bring your Parkinsons medications with you to the appointment.    Call our nurses at [phone].  If you have questions about your medication.  Failure to follow these instructions will require us to cancel this appointment which will delay your workup.    4.  Neurosurgery consultation    You will meet with one of our expert neurosurgeons to discuss your potential DBS procedure.    After your 4 appointments are completed you will be discussed with our entire team at the DBS Consensus Conference.  You should hear from us about the results of that conference within 3 weeks after your last appointment.  If you have questions please call Theresa Garcia RN at [phone].    Has pending genetic testing from [place] [facility] GENERATION study on 9/5/2021    May consider video EEG for 3 hours to followup on his 2009 studies from [facility]    He does not have a power of  at this time - as best I can determine    His brother Aung, mother and father are the only ones he has told he has parkinson    He will decide if he will vivian proxy access to his family or someone else.

## 2021-08-24 NOTE — TELEPHONE ENCOUNTER
The patient confirmed with the writer that he was able to view the Deep Brain Stimulation Class Video message the writer just sent to him earlier today.    The patient stated he wanted to hold off on scheduling and to look at the Deep Brain Stimulation class videos first. The writer informed the patient that she would contact him in 2 weeks time, to give him time to think it over, but also, for the patient to reach out at anytime if after reviewing the videos, he wants to start scheduling.

## 2021-08-24 NOTE — PROGRESS NOTES
ALEA is a 35 year old who is being evaluated via a billable video visit.      How would you like to obtain your AVS? Mychart  If the video visit is dropped, the invitation should be resent by:[phone]      Video Start Time:   Video-Visit Details    Type of service:  Video Visit    Video End Time:    Originating Location (pt. Location): Home    Distant Location (provider location):  [facility] NEUROLOGY [facility]     Platform used for Video Visit: [employer]

## 2021-08-24 NOTE — LETTER
8/24/2021       RE: Dipesh Nicole  [address] [place] [address]     Dear Colleague,    Thank you for referring your patient, Dipesh Nicole, to the [facility] NEUROLOGY CLINIC [place] at [facility]. Please see a copy of my visit note below.        VIDEO VISIT    Date of Visit: August 24, 2021  Name: Dipesh Nicole  Date of Birth 1985  [address] [address]   [email or web address]  [phone] (M)   [phone] (H)      Ritu  [phone]    DBS video from peter ramirez     Neuropsych baseline with dr Gracia.      GI referral for endoscopy - has esophagitis and may need lower studies  May need upper and lower gastric motility studies     Will try him on 1/2 sinemet 1 to 2 hours after meals     Ondansetron as needed - continue for now.      Assessment:  (G20) Parkinson disease (H)  (primary encounter diagnosis)  He has wearing off at 2 to 2.5 hours.   Developed symptoms left arm at age 31 years or 30 years of age  Diagnosis was made 2018  Genetic testing not yet done but will be done September 5, 2021 at [facility]     Amantadine symmetrel 100mg twice daily     carbidopa/levodopa sinemet 25/100  1.5@630am, 1@9a, 1@1130am, 1@2p, 1@430p, 1@7p, 1/2-1@930p    Watched DBS video and watched something from the [employer]     odansetron zofran ODT 4mg as needed     Had some cramping in legs- 3 hours after eating at 930pm   He had dose at 630pm and ate at 730 and cramping at 930pm     Cramping - independent of what he is eating.     Having episodes once per month - wondering     Had a possible seizure on 11/23/2009 - had an mri and was discussing the use of medication  From chart review it may have been a syncopal episode vs seizure. He had been dehydrated and possibly sleep deprived.     EEG was done 12/9/2009  This is an abnormal EEG due to the presence of two episodes of frontally maximal sharp waves, epileptiform in nature. This  places the patient at risk for partial or secondarily generalized seizures arising from the frontal lobes.     BRAIN MRI was normal on 12/8/2009    Seen by Dr. Je Sanon on 12/10/2009 and Annalise opted not to take an AED lamotrigine     Gait/Med related complications/Falls   No falls     Exercise/Therapy      Cognitive/Driving   No problems    Working with his dad's company and may move to [place] and look for a job.     Mood   Denies      Hallucinations/delusions   None     Sleep   Crazy dreams  No problems falling asleep  Has not yet tried melatonin  He recalls having dreams when he wakes up  He can be awaken about a dream about cramping leg and then wakes up with a cramp and takes sinemet and goes back to bed.     Waking up with cramping at 2am or so and taking 1/4 to 1/2 tablet of medication.     Bladder   Denies urinary problems     GI/Constipation/GERD   No constipation or heartburn  Had vomiting episodes when he has taken a lot of sinemet in a day  Has not been on lodosyn before      ENDO   denies     Cardio/heart   Denies faints     Vision   No changes in vision     Heme   Not taking aspirin     Other:     Meals are usually at 8am, noon and evening  2 hours after eating gets cramping  Has not tried 1/2 tab one hour after eating       630a 9a 1130am 2p 430p 7p 930p 2-3am   Amantadine symmetrel 100mg 1   1            Carbidopa (lodosyn) 25mg 1  1   1     Carbidopa/levodopa Sinemet CR 25/100 Not taking                Carbidopa/Levodopa 25/250 Not taking                carbidopa/levodopa sinemet 25/100 1.5 1 1 1 1  1 1 1/4   Derikbee Has a few left                Ondansetron zofran-ODT 4mg  As needed                    Plan:    We have decided to start a deep brain stimulation (DBS) workup to see if you are candidate for DBS.  The workup will consist of the following series of tests or evaluations.    1.  MRI brain with and without contrast.  This is a study to look at the structure of your brain.   Please alert us if you have an allergy to MRI dye (gadolinium), claustrophobia, or if you have a medical device such as a pacemaker.    2.  Memory and cognitive testing to make sure you will tolerate DBS    3.  Videotaping session.     This is an in-person session with our DBS specialists which will video tape you and your symptoms.    It is very important for you to follow these instructions about your medication before this appointment.    Do not take the following medication for 3 days before your appointment:  Amantadine - Stop 3 days before motor testing. Stop night before lead placement  surgery. OK to stay on for neuropsych.    Do not take any levodopa formulations 12 hours before your appointment.  These medications include:  Sinemet (carbidopa/levodopa) both CR and immediate release  Stalevo (carbidopa/levodopa + comtan/entacapone)  Rytary      Take your anti-depressants and anti-anxiety medications as usual.    Please do not eat breakfast foods that have a significant amount of protein the morning of the  appointment. Please bring your Parkinsons medications with you to the appointment.    Call our nurses at [phone].  If you have questions about your medication.  Failure to follow these instructions will require us to cancel this appointment which will delay your workup.    4.  Neurosurgery consultation    You will meet with one of our expert neurosurgeons to discuss your potential DBS procedure.    After your 4 appointments are completed you will be discussed with our entire team at the DBS Consensus Conference.  You should hear from us about the results of that conference within 3 weeks after your last appointment.  If you have questions please call Theresa Garcia RN at [phone].    Has pending genetic testing from [facility] GENERATION study on 9/5/2021    May consider video EEG for 3 hours to followup on his 2009 studies from [facility]    He does not have a power of  at this time - as  "best I can determine    His brother Aung, mother and father are the only ones he has told he has parkinson    He will decide if he will vivian proxy access to his family or someone else.     Medical Decision Making:  #  Chronic progressive medical conditions addressed  Parkinson's disease  Review and/or interpretation of unique test or documentation from a provider outside of neurology  - no   Independent historian provided additional details  no   Prescription drug management and review of potential side effects and/or monitoring for side effects  ys   Health impacted by social determinants of health  no    I have reviewed the note as documented above.  This accurately captures the substance of my conversation with the patient and total time spent preparing for visit, executing visit and completing visit on the day of the visit:  40 minutes.     Start Time of visit: 1040am  End of video visit 1124am    Ramesh Carson MD      ------------------------------------------------------------------------------------------------------------------------------------------------------------------------    Video-Visit Details    The patient has been notified of following:     \"After a review of the patient s situation, this visit was changed from an in-person visit to a video visit to reduce the risk of COVID-19 exposure.   The patient is being evaluated via a billable video visit.\"    \"This video visit will be conducted via a call between you and your physician/provider. We have found that certain health care needs can be provided without the need for an in-person physical exam.  This service lets us provide the care you need with a video conversation.  If a prescription is necessary we can send it directly to your pharmacy.  If lab work is needed we can place an order for that and you can then stop by our lab to have the test done at a later time.    If during the course of the call the physician/provider feels a video visit is " "not appropriate, you will not be charged for this service.\"     Patient has given verbal consent for Video visit? Yes    Patient would like the video invitation sent by:     Type of service:  Video Visit    Video Start Time:     Video End Time (time video stopped):     Duration:  minutes - see above    Originating Location (pt. Location):     Distant Location (provider location):  [facility] NEUROLOGY CLINIC [place]     Mode of Communication:  Video Conference via [employer] (and if not possible then doximity)      Ramesh Carson MD      --------------------------------------------------------------------------------------------------------------    Dipesh Nicole is a 35 year old male who is being evaluated via a billable video visit.      Charts reviewed  Consult from  Images reviewed        I have reviewed and updated the patient's Past Medical History, Social History, Family History and Medication List.    ALLERGIES  Diphenhydramine, Metoclopramide, and Promethazine    Lasts visit details if there was a last visit:       14 Review of systems  are negative except for   Patient Active Problem List   Diagnosis     Tremor     Seizure (H) at age 22 yrs     H/O magnetic resonance imaging of cervical spine     H/O shoulder surgery     H/O magnetic resonance imaging of brain and brain stem     Shoulder dislocation     Parkinson disease (H)     Right inguinal hernia     Hiatal hernia        Allergies   Allergen Reactions     Diphenhydramine      Other reaction(s): Other (see comments)  Told not to take due to parkinsons     Metoclopramide      Avoid in patient with a history of Parkinson Disease  Other reaction(s): GI intolerance  Avoid in patient with a history of Parkinson Disease     Promethazine      Other reaction(s): Other (see comments)  Told not to take due to parkisons     Past Surgical History:   Procedure Laterality Date     SHOULDER SURGERY  2007     Past Medical History:   Diagnosis Date     H/O " magnetic resonance imaging of brain and brain stem 8/17/2018 2009 December MR Brain without and with IV Mlfwcewn45/8/2009 [facility] Result Impression  Normal MRI of the brain.  Result Narrative      Multiecho, multiplanar views of the brain were obtained prior to and  following the IV administration of 19 mL of contrast. There are no  previous studies available for comparison.     The brain parenchyma is normal in appearance on all pulse sequences,  with      H/O magnetic resonance imaging of cervical spine 8/17/2018 March MR Cervical Spine without IV Contrast3/15/2017 [facility] Result Addenda Addendum by Provider, PA Jones on 03/15/2017 12:30 PM RAD^^^MA MR Cervical Spine w/o contrast 3/15/2017 12:30:00 Result Impression  Minimal degenerative disc disease at C5-6 and C6-7  otherwise an unremarkable MR scan of the cervical spine.  Result Narrative   EXAM: MR Cervical Spine w/o contrast   INDICATION:      H/O shoulder surgery 8/17/2018 2017 July XR SHOULDER 2 VIEWS LEFT7/14/2017 [facility] Result Narrative XR SHOULDER 2 VIEWS LEFT 7/14/2017 9:13 PM  INDICATION: Shoulder Injury COMPARISON: None.  FINDINGS: Negative shoulder. No fracture or dislocation.  Status       Hiatal hernia 4/20/2021    Based on a 2021 ct scan Small hiatal hernia with some wall thickening in the distal thoracic esophagus suggesting some esophagitis.     Seizure (H) at age 22 yrs 8/17/2018     Shoulder dislocation     left     Tremor 8/10/2018     Social History     Socioeconomic History     Marital status: Single     Spouse name: Not on file     Number of children: Not on file     Years of education: Not on file     Highest education level: Not on file   Occupational History     Not on file   Tobacco Use     Smoking status: Never Smoker     Smokeless tobacco: Never Used   Substance and Sexual Activity     Alcohol use: Yes     Drug use: No     Sexual activity: Not on file   Other Topics  Concern     Not on file   Social History Narrative    single. lives in [facility] [facility]. mother cabrera ambriz            He has a history of a left dislocated shoulder with surgery performed in 2009. He currently lives in [place] but comes to the [place] area to visit his parents in [place]. 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     Social Determinants of Health     Financial Resource Strain:      Difficulty of Paying Living Expenses:    Food Insecurity:      Worried About Running Out of Food in the Last Year:      Ran Out of Food in the Last Year:    Transportation Needs:      Lack of Transportation (Medical):      Lack of Transportation (Non-Medical):    Physical Activity:      Days of Exercise per Week:      Minutes of Exercise per Session:    Stress:      Feeling of Stress :    Social Connections:      Frequency of Communication with Friends and Family:      Frequency of Social Gatherings with Friends and Family:      Attends Muslim Services:      Active Member of Clubs or Organizations:      Attends Club or Organization Meetings:      Marital Status:    Intimate Partner Violence:      Fear of Current or Ex-Partner:      Emotionally Abused:      Physically Abused:      Sexually Abused:      Family History   Problem Relation Age of Onset     Cancer Other         grandmother     Tremor No family hx of      Dementia No family hx of      Parkinsonism No family hx of      Seizure Disorder No family hx of      Current Outpatient Medications   Medication Sig Dispense Refill     amantadine (SYMMETREL) 100 MG capsule Take 100 mg by mouth 2 times daily       carbidopa (LODOSYN) 25 MG TABS tablet Take 1 tablet (25 mg) by mouth 3 times daily 90 tablet 11     carbidopa-levodopa (SINEMET)  MG tablet 1.5@6/630a, 1 @ 830am, 1@ 1130a, 1@ 230p, 1@530p, 1@830p, 1/2-1@1030p and 1/2 tab 3/day as needed after meals = 8.5- 10 tabs per day 900 tablet 3     FOLBEE 2.5-25-1 MG TABS 1 tablet by mouth  daily 90 tablet 3     ondansetron (ZOFRAN-ODT) 4 MG ODT tab Take 1 tablet (4 mg) by mouth every 8 hours as needed for nausea or vomiting 90 tablet 11         Medications                                                                                                                                                                                                                    ALEA is a 35 year old who is being evaluated via a billable video visit.      How would you like to obtain your AVS? Mychart  If the video visit is dropped, the invitation should be resent by:[phone]      Video Start Time:   Video-Visit Details    Type of service:  Video Visit    Video End Time:    Originating Location (pt. Location): Home    Distant Location (provider location):  [facility] NEUROLOGY [facility]     Platform used for Video Visit: [employer]      Again, thank you for allowing me to participate in the care of your patient.      Sincerely,    Ramesh Carson MD

## 2021-08-27 NOTE — TELEPHONE ENCOUNTER
Left a message asking if the patient wanted to schedule the rest of his appointments since he scheduled his image test on 9/30. The writer indicated in the message to call the writer back on what his plan is.

## 2021-09-07 NOTE — TELEPHONE ENCOUNTER
Writer left a message for the patient to call back and that the writer will send him a [employer] message as well.    See the [employer] message sent today as well.

## 2021-09-07 NOTE — TELEPHONE ENCOUNTER
The patient called back and left the writer a message that he wanted me to call him back next week after his parents are back from vacation and have reviewed the videos with him.

## 2021-09-14 NOTE — TELEPHONE ENCOUNTER
"Called patient to schedule their motor testing for Parkinson's Disease.    Relayed the following message to the patient:    \"There is a research study that involves minimal risk and would simply be part of your motor testing appointment, plus an additional 10-15 minutes for you to fill out some forms. If you're interested, I can let the , Oral Varela know, and he will be in contact with you and provide more details. Would you be interested to learn more?\"    The patient stated he is interested in learning more about this research.    Christopher Rogers CMA        "

## 2021-09-16 ENCOUNTER — TELEPHONE (OUTPATIENT)
Dept: NEUROLOGY | Facility: CLINIC | Age: 36
End: 2021-09-16

## 2021-09-16 NOTE — TELEPHONE ENCOUNTER
Spoke to Margoth at [facility]. The writer asked if they received the order from Dr. Carson on scheduling the patient for an EEG. Margoth was given the patient's information and stated they do have the order. Margoth stated they will call the patient to get it set up and that they had availability in the next 2 weeks. Margoth was informed that the patient is undergoing Deep Brain Stimulation work up and that the EEG is part of the work up. Margoth was informed the patient has all of his work up scheduled but is missing this appointment. Margoth stated they will contact the patient to get him scheduled for his EEG.

## 2021-09-16 NOTE — TELEPHONE ENCOUNTER
FUTURE VISIT INFORMATION      FUTURE VISIT INFORMATION:    Date: 10/5/2021    Time: 2pm    [place] [facility]  REFERRAL INFORMATION:    Referring provider:  Dr. Carson    Referring providers clinic:  [facility]     Reason for visit/diagnosis  DBS     RECORDS REQUESTED FROM:       Clinic name Comments Records Status Imaging Status   Internal Dr. Carson-8/24/2021, 4/20/2021, 2/19/2021    MR Brain-9/10/2018, 9/30/2021 Epic PACS         [facility] ED Visit-7/7/2021 Care Everywhere N/A

## 2021-09-30 ENCOUNTER — ANCILLARY PROCEDURE (OUTPATIENT)
Dept: MRI IMAGING | Facility: CLINIC | Age: 36
End: 2021-09-30
Attending: PSYCHIATRY & NEUROLOGY
Payer: COMMERCIAL

## 2021-09-30 RX ORDER — GADOBUTROL 604.72 MG/ML
10 INJECTION INTRAVENOUS ONCE
Status: COMPLETED | OUTPATIENT
Start: 2021-09-30 | End: 2021-09-30

## 2021-09-30 RX ADMIN — GADOBUTROL 7.7 ML: 604.72 INJECTION INTRAVENOUS at 10:30

## 2021-10-01 ENCOUNTER — TRANSFERRED RECORDS (OUTPATIENT)
Dept: HEALTH INFORMATION MANAGEMENT | Facility: CLINIC | Age: 36
End: 2021-10-01
Payer: COMMERCIAL

## 2021-10-03 ASSESSMENT — ENCOUNTER SYMPTOMS
NAUSEA: 1
BLOOD IN STOOL: 0
BOWEL INCONTINENCE: 0
BLOATING: 0
CONSTIPATION: 0
RECTAL PAIN: 0
VOMITING: 1
HEARTBURN: 0
JAUNDICE: 0
DIARRHEA: 0
ABDOMINAL PAIN: 0

## 2021-10-04 ENCOUNTER — TELEPHONE (OUTPATIENT)
Dept: PHARMACY | Facility: CLINIC | Age: 36
End: 2021-10-04

## 2021-10-04 ENCOUNTER — MYC MEDICAL ADVICE (OUTPATIENT)
Dept: NEUROLOGY | Facility: CLINIC | Age: 36
End: 2021-10-04

## 2021-10-04 DIAGNOSIS — K31.89 GASTRIC DYSMOTILITY: Primary | ICD-10-CM

## 2021-10-04 RX ORDER — TRIMETHOBENZAMIDE HYDROCHLORIDE 300 MG/1
300 CAPSULE ORAL 3 TIMES DAILY PRN
Qty: 90 CAPSULE | Refills: 3 | Status: SHIPPED | OUTPATIENT
Start: 2021-10-04 | End: 2021-10-05

## 2021-10-04 RX ORDER — ONDANSETRON 4 MG/1
4 TABLET, FILM COATED ORAL EVERY 8 HOURS PRN
Status: ON HOLD | COMMUNITY
End: 2022-07-25

## 2021-10-04 NOTE — TELEPHONE ENCOUNTER
Received voicemail from mother, Ritu, stating that Annalise is not doing well -- he is still vomiting and in terrible abdominal cramping. He is not able to speak on the phone right now. They are concerned his medications are causing these severe GI problems and wondering what to do. Ritu is requesting a call back as soon as possible.     Veronica Swift (Roy), Pharm.D.  Medication Therapy Management Pharmacist  [facility]

## 2021-10-04 NOTE — PROGRESS NOTES
Department of Neurology  Movement Disorders Division   Motor Testing Visit    Patient: Dipesh Nicole   MRN: [id number]   : 1985   Date of Visit: 10/3/2021    CC: ON/OFF testing    HPI:  Mr. Nicole is a 34 yo man with PD who presents for ON/OFF videotaped exam for DBS evaluation.  OFF examination was completed after numerous delays due to severe dystonia and episodes of freezing.  He was extremely diaphoretic and had episodes every few minutes of right arm, right leg, and abdominal cramping.  He began nauseated by the end of the exam. Almost immediately after taking CD/LD + CD + Zofran, he began to have intractable vomiting and was transported to the ED.    I spoke with his parents who reported that the episodes of vomiting began 6 months ago.  Five or six times he has needed to be taken to the ED for hydration and control of the vomiting.  Many times these occurred overnight.  Recently he had coffee ground emesis.  Has not been evaluated by GI.      Sx onset:  Diagnosis:  Prior meds:  Side effects:  Goals:  1st body side:  IPG type:  FH:      Parkinson Disease Motor Symptom Review:  Motor fluctuations:   Dyskinesia: left arm when on  Wearing off:    Freezing of gait:   Dystonia: Right arm and leg when off  Tremor: left leg  Rigidity:   Bradykinesia:        UPDRS Part II  2.1 Speech (P) 0 (P) Normal: Not at all (no problems).   2.2 Saliva and drooling (P) 0 (P) Normal: Not at all (no problems).   2.3 Chewing and swallowing (P) 0 (P) Normal: No problems.   2.4 Eating tasks (P) 0 (P) Normal: Not at all (no problems).   2.5 Dressing (P) 0 (P) Normal: Not at all (no problems).   2.6 Hygiene (P) 0 (P) Normal: Not at all (no problems).   2.7 Handwriting (P) 0 (P) Normal: Not at all (no problems).   2.8 Doing hobbies and other activities (P) 0 (P) Normal:  Not at all (no problems).   2.9 Turning in bed (P) 1 (P) Slight: I have a bit of trouble turning, but I do not need any help.   2.10 Tremor (P) 1 (P) Slight:  Shaking or tremor occurs but does not cause problems with any activities.   2.11 Getting out of bed  (P) 0 (P) Normal: Not at all (no problems).   2.12 Walking and balance  (P) 0 (P) Normal: Not at all (no problems).   2.13 Freezing (P) 1 (P) Slightly: I briefly freeze, but I can easily start walking again. I do not need help from someone else or a walking aid (cane or walker) because of freezing.   Total (P) 3        PD Medications        CD/LD 25/100mg IR        Carbidopa 25mg        Amantadine 100mg        Last taken at 12:45am today 10/6/2021    ROS:  All others negative except as listed above.    Past Medical History:   Diagnosis Date     H/O magnetic resonance imaging of brain and brain stem 8/17/2018 2009 December MR Brain without and with IV Owdoxlec55/8/2009 [facility] Result Impression  Normal MRI of the brain.  Result Narrative      Multiecho, multiplanar views of the brain were obtained prior to and  following the IV administration of 19 mL of contrast. There are no  previous studies available for comparison.     The brain parenchyma is normal in appearance on all pulse sequences,  with      H/O magnetic resonance imaging of cervical spine 8/17/2018 March MR Cervical Spine without IV Contrast3/15/2017 [facility] Result Addenda Addendum by Provider, PA Jones on 03/15/2017 12:30 PM RAD^^^MA MR Cervical Spine w/o contrast 3/15/2017 12:30:00 Result Impression  Minimal degenerative disc disease at C5-6 and C6-7  otherwise an unremarkable MR scan of the cervical spine.  Result Narrative   EXAM: MR Cervical Spine w/o contrast   INDICATION:      H/O shoulder surgery 8/17/2018 2017 July XR SHOULD[facility] 2 V[facility] Result Narrative XR SHOULDER 2 VIEWS LEFT 7/14/2017 9:13 PM  INDICATION: Shoulder Injury COMPARISON: None.  FINDINGS: Negative shoulder. No fracture or dislocation.  Status       Hiatal hernia 4/20/2021    Based on a 2021 ct scan  Small hiatal hernia with some wall thickening in the distal thoracic esophagus suggesting some esophagitis.     Seizure (H) at age 22 yrs 8/17/2018     Shoulder dislocation     left     Tremor 8/10/2018        Past Surgical History:   Procedure Laterality Date     SHOULDER SURGERY  2007        Current Outpatient Medications   Medication Sig Dispense Refill     amantadine (SYMMETREL) 100 MG capsule 100mg by mouth twice daily at 630am and 87815pm 180 capsule 3     carbidopa-levodopa (PARCOPA)  MG ODT Take Parcopa when you are vomiting and can't keep the carbidopa-levodopa tablets down: 1.5@630a, 1 @ 9am, 1@ 1130a, 1@ 2p, 1@430p, 1@7p, 1@930p 240 tablet 3     carbidopa-levodopa (SINEMET)  MG tablet 1.5@630a, 1 @ 9am, 1@ 1130a, 1@ 2p, 1@430p, 1@7p, 1@930p and 1/4-1/2 tab 3/day as needed after meals = 8.5- 10 tabs per day 900 tablet 3     ondansetron (ZOFRAN) 4 MG tablet Take 4 mg by mouth every 8 hours as needed       FOLBEE 2.5-25-1 MG TABS 1/2-1 tablet by mouth daily (Patient not taking: Reported on 10/6/2021) 90 tablet 3     NONFORMULARY Domperidone 1-2 tabs of 10mg by mouth 3-4 times per day as needed up to 6/day (Patient not taking: Reported on 10/6/2021) 180 each 11     ondansetron (ZOFRAN-ODT) 4 MG ODT tab Take 1 tablet (4 mg) by mouth every 8 hours as needed for nausea or vomiting (Patient not taking: Reported on 10/6/2021) 90 tablet 11       Allergies   Allergen Reactions     Diphenhydramine      Other reaction(s): Other (see comments)  Told not to take due to parkinsons     Metoclopramide      Avoid in patient with a history of Parkinson Disease  Other reaction(s): GI intolerance  Avoid in patient with a history of Parkinson Disease     Promethazine      Other reaction(s): Other (see comments)  Told not to take due to parkisons        Family History   Problem Relation Age of Onset     Cancer Other         grandmother     Tremor No family hx of      Dementia No family hx of      Parkinsonism No  family hx of      Seizure Disorder No family hx of         Social History     Socioeconomic History     Marital status: Single     Spouse name: Not on file     Number of children: Not on file     Years of education: Not on file     Highest education level: Not on file   Occupational History     Not on file   Tobacco Use     Smoking status: Never Smoker     Smokeless tobacco: Never Used   Substance and Sexual Activity     Alcohol use: Yes     Drug use: No     Sexual activity: Not on file   Other Topics Concern     Not on file   Social History Narrative    single. lives in [facility]. mother cabrera ambriz            He has a history of a left dislocated shoulder with surgery performed in 2009. He currently lives in [place] but comes to the [facility] to visit his parents in [place]. 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     Social Determinants of Health     Financial Resource Strain:      Difficulty of Paying Living Expenses:    Food Insecurity:      Worried About Running Out of Food in the Last Year:      Ran Out of Food in the Last Year:    Transportation Needs:      Lack of Transportation (Medical):      Lack of Transportation (Non-Medical):    Physical Activity:      Days of Exercise per Week:      Minutes of Exercise per Session:    Stress:      Feeling of Stress :    Social Connections:      Frequency of Communication with Friends and Family:      Frequency of Social Gatherings with Friends and Family:      Attends Druze Services:      Active Member of Clubs or Organizations:      Attends Club or Organization Meetings:      Marital Status:    Intimate Partner Violence:      Fear of Current or Ex-Partner:      Emotionally Abused:      Physically Abused:      Sexually Abused:         PHYSICAL EXAM:  BP (!) 151/90   Pulse 60   SpO2 100%   Video consent obtained.  Meds taken at 2:08, CD/LD 25/100mg 1.5 pills, carbidopa 25mg, ondansetron 4mg  UPDRS Values 10/6/2021   Time: 1:44  PM   Medication Off   R Brain DBS: None   L Brain DBS: None   Dyskinesia (LID) No   Speech 1   Facial Expression 0   Rigidity Neck 4   Rigidity RUE 2   Rigidity LUE 1   Rigidity RLE 4   Rigidity LLE 2   Finger Taps R 1   Finger Taps L 2   Hand Mvt R 2   Hand Mvt L 3   Pron-/Supinate R 1   Pron-/Supinate L 2   Toe Tap R 1   Toe Tap L 2   Leg Agility R 0   Leg Agility L 1   Arise From Chair 3   Gait 4   Gait Freezing 2   Postural Stability 0   Posture 4   Global Spont Mvt 1   Postural Tremor RUE 0   Postural Tremor LUE 1   Kinetic Tremor RUE 0   Kinetic Tremor LUE 2   Rest Tremor RUE 0   Rest Tremor LUE 0   Rest Tremor RLE 1   Rest Tremor LLE 2   Rest Tremor Lip/Jaw 0   Rest Tremor Constancy 4   Total Right 12   Total Left 18   Axial Total 19   Total 53         ASSESSMENT/PLAN:  Mr. Nicole is a 34 yo man with PD who presented today for ON/OFF videotaped assessment.  After the OFF assessment he developed intractable vomiting almost immediately after taking CD/LD 25/100mg 1.5 pills, carbidopa 25mg, and ondansetron 4mg.  Patient was transferred to the ED.  I spoke with the charge nurse there and encouraged a GI consultation given his history of episodic intractable vomiting and coffee ground emesis.      - Completed 3T MRI, neurosurgery consult.  - Schedule 60 minute appointment for ON medication testing and interview  - Pending neuropsych eval 10/19      Krista Bettencourt MD  Movement Disorders Fellow      60 minutes spent on the date of the encounter doing chart review, history and exam, documentation and further activities as noted above.

## 2021-10-04 NOTE — TELEPHONE ENCOUNTER
"Ritu reports the nausea and vomiting started on Friday - he got fluids, Ativan and droperidol  In the ER which seemed to help. After this he was weak all weakend but otherwise his nausea/vomiting was gone. This morning it started coming on again after his first dose of carbidopa/levodopa. His abdomin starts to cramp first then the nausea and vomiting happen. It is unpredictable when it will occur. Ritu is willing to have him try anything out of the box to help. The only thing that helps is bending his legs up over his head but this does not solve the issue and is very uncomfortable for him. He sweats and will become very stiff and cannot easily walk to the bathroom which is 15 feet away. On Friday afternoon he was throwing up dark black fluid \"like thick coca cola.\" He threw up maybe a small amount of thick black fluid today. Currently Annalise is sleeping with his legs bent up over his head.    Ritu's questions:  1. Is there a different nausea medicine he can take daily?  2. Can he try parcopa or inhaled levodopa?  3. Are most patients on this medication for Parkinson's disease? Do other patients have this level of nausea and vomiting?  4. He cannot keep water down, what if he cant keep the lashell tea down?    Education:  1. Some anti-nausea medications arnt good to use because of their interaction with Parkinson's disease (I.e. confusion, memory issues, urinary retention etc.). Given Annalise is pretty young these are a little less likely to occur. Anti-cholinergic medications should not be used regularly in people with Parkinson's disease. This is what makes treating his nausea difficult as many have these effects.    2. Carbidopa/levodopa is the gold standard treatment for Parkinson's disease. The level of nausea and vomiting he has is not typical for people who are on this medication.    3. Inhaled levodopa is now available but this is not a good option for management of symptoms, just for flare ups in symptoms with " oral medications.    Plan/recommendation:  1. I will discuss with Veronica and Dr. Carson about the use of disintegrating carbidopa/levodopa for Annalise - I don't see that he has tried this before and on if there are options for nausea treatment. It looks like he has tried the typical ones we use with no benefit.    2. If he vomits blood or increasing amounts of black fluid bring him to the emergency room    3. If his nausea/vomiting does not subside 24 hours after starting he should go to the emergency room for fluid and electrolyte replacements. When the electrolytes are off this can cause serious health events.    4. When he wakes him have him try lashell tea. The hope is that this will settle his stomach better than water alone.    5. Per your request I will leave the appointment on to meet with Dr. Camara tomorrow. Ritu and her  made arrangements to go with Annalise to the appointment.    24 minute phone call

## 2021-10-05 ENCOUNTER — PRE VISIT (OUTPATIENT)
Dept: NEUROSURGERY | Facility: CLINIC | Age: 36
End: 2021-10-05

## 2021-10-05 ENCOUNTER — OFFICE VISIT (OUTPATIENT)
Dept: NEUROSURGERY | Facility: CLINIC | Age: 36
End: 2021-10-05
Payer: COMMERCIAL

## 2021-10-05 ENCOUNTER — TELEPHONE (OUTPATIENT)
Dept: NEUROLOGY | Facility: CLINIC | Age: 36
End: 2021-10-05

## 2021-10-05 VITALS
DIASTOLIC BLOOD PRESSURE: 90 MMHG | HEART RATE: 83 BPM | RESPIRATION RATE: 16 BRPM | BODY MASS INDEX: 22.07 KG/M2 | OXYGEN SATURATION: 98 % | WEIGHT: 165 LBS | SYSTOLIC BLOOD PRESSURE: 137 MMHG

## 2021-10-05 DIAGNOSIS — K31.89 GASTRIC DYSMOTILITY: ICD-10-CM

## 2021-10-05 DIAGNOSIS — G20.A1 PARKINSON DISEASE (H): Primary | ICD-10-CM

## 2021-10-05 DIAGNOSIS — K92.89 GASTROINTESTINAL DYSMOTILITY: Primary | ICD-10-CM

## 2021-10-05 DIAGNOSIS — R41.89 SPELL OF ALTERED COGNITION: Primary | ICD-10-CM

## 2021-10-05 PROCEDURE — 99204 OFFICE O/P NEW MOD 45 MIN: CPT | Performed by: NEUROLOGICAL SURGERY

## 2021-10-05 ASSESSMENT — MOVEMENT DISORDERS SOCIETY - UNIFIED PARKINSONS DISEASE RATING SCALE (MDS-UPDRS)
WALKING_AND_BALANCE: NORMAL: NOT AT ALL (NO PROBLEMS).
CHEWING_AND_SWALLOWING: NORMAL: NO PROBLEMS.
TURNING_IN_BED: SLIGHT: I HAVE A BIT OF TROUBLE TURNING, BUT I DO NOT NEED ANY HELP.
HYGIENE: NORMAL: NOT AT ALL (NO PROBLEMS).
HOBBIES_AND_OTHER_ACTIVITIES: NORMAL:  NOT AT ALL (NO PROBLEMS).
EATING_TASKS: NORMAL: NOT AT ALL (NO PROBLEMS).
FREEZING: SLIGHTLY: I BRIEFLY FREEZE, BUT I CAN EASILY START WALKING AGAIN. I DO NOT NEED HELP FROM SOMEONE ELSE OR A WALKING AID (CANE OR WALKER) BECAUSE OF FREEZING.
DRESSING: NORMAL: NOT AT ALL (NO PROBLEMS).
SALIVA_AND_DROOLING: NORMAL: NOT AT ALL (NO PROBLEMS).
TOTAL_SCORE: 3
SPEECH: NORMAL: NOT AT ALL (NO PROBLEMS).
HANDWRITING: NORMAL: NOT AT ALL (NO PROBLEMS).
GETTING_OUT_OF_BED_CAR_DEEP_CHAIR: NORMAL: NOT AT ALL (NO PROBLEMS).
TREMOR: SLIGHT: SHAKING OR TREMOR OCCURS BUT DOES NOT CAUSE PROBLEMS WITH ANY ACTIVITIES.

## 2021-10-05 ASSESSMENT — PAIN SCALES - GENERAL: PAINLEVEL: NO PAIN (0)

## 2021-10-05 NOTE — LETTER
10/5/2021       RE: Dipesh Nicole  [address] [id number]     Dear Colleague,    Thank you for referring your patient, Dipesh Nicole, to the [facility]UROSURG[facility]INIC [place] at [facility]. Please see a copy of my visit note below.    Neurosurgery Attending DBS Consult    Chief Complaint   Patient presents with     Consult     [facility] NEW       HISTORY OF PRESENT ILLNESS  Annalise Nicole is a 36 y/o gentleman with PD who presents for neurosurgical consultation regarding DBS. He was diagnosed in 2018 with onset of left arm bradykinesia, rigidity and subtle action (not resting) tremor for about a year or two prior to his presentation. He was sent for genetic testing soon after his diagnosis, but his insurance denied testing at that time. Since then, he was seen and enrolled into the PD GENEration study in Jan 2020, but then apparently did not follow up for the testing results, but recently re-enrolled in the study in September. He is considering DBS now as he is wearing off 2-2.5 hrs after taking his medication and feels he is developing worsening dyskinesias and leg dystonia. 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 He is otherwise healthy but has been having multiple bouts of vomiting leading to dehydration, most typically occurs shortly after taking his levodopa recently. Not on antiplatelets or anticoagulation.    ON/OFF testing: performed tomorrow    MRI: Normal besides absence of swallow tail sign    Neuropsych testing: upcoming on 10/19    DBS goals:  To improve his quality of life, reduce his dyskinesias and dystonia, lengthen time to wearing off. He is reasonably concerned about the aesthetics and practical implications of having implant surgery at such a young age and what effects that might have on being able to date, have a family, etc.       Past Medical History:   Diagnosis Date      H/O magnetic resonance imaging of brain and brain stem 8/17/2018 2009 December MR Brain without and with IV Clhkzxjx15/8/2009 [facility] Result Impression  Normal MRI of the brain.  Result Narrative      Multiecho, multiplanar views of the brain were obtained prior to and  following the IV administration of 19 mL of contrast. There are no  previous studies available for comparison.     The brain parenchyma is normal in appearance on all pulse sequences,  with      H/O magnetic resonance imaging of cervical spine 8/17/2018 March MR Cervical Spine without IV Contrast3/15/2017 [facility] Result Addenda Addendum by Provider, PA Jones on 03/15/2017 12:30 PM RAD^^^MA MR Cervical Spine w/o contrast 3/15/2017 12:30:00 Result Impression  Minimal degenerative disc disease at C5-6 and C6-7  otherwise an unremarkable MR scan of the cervical spine.  Result Narrative   EXAM: MR Cervical Spine w/o contrast   INDICATION:      H/O shoulder surgery 8/17/2018 2017 July XR SHOULDER 2 VIEWS [facility] Result Narrative XR SHOULDER 2 VIEWS LEFT 7/14/2017 9:13 PM  INDICATION: Shoulder Injury COMPARISON: None.  FINDINGS: Negative shoulder. No fracture or dislocation.  Status       Hiatal hernia 4/20/2021    Based on a 2021 ct scan Small hiatal hernia with some wall thickening in the distal thoracic esophagus suggesting some esophagitis.     Seizure (H) at age 22 yrs 8/17/2018     Shoulder dislocation     left     Tremor 8/10/2018       Past Surgical History:   Procedure Laterality Date     SHOULDER SURGERY  2007       Family History   Problem Relation Age of Onset     Cancer Other         grandmother     Tremor No family hx of      Dementia No family hx of      Parkinsonism No family hx of      Seizure Disorder No family hx of        Social History     Socioeconomic History     Marital status: Single     Spouse name: Not on file     Number of children: Not on file      Years of education: Not on file     Highest education level: Not on file   Occupational History     Not on file   Tobacco Use     Smoking status: Never Smoker     Smokeless tobacco: Never Used   Substance and Sexual Activity     Alcohol use: Yes     Drug use: No     Sexual activity: Not on file   Other Topics Concern     Not on file   Social History Narrative    single. lives in [facility]. mother cabrera ambriz            He has a history of a left dislocated shoulder with surgery performed in 2009. He currently lives in [place] but comes to the [facility] to visit his parents in [place]. 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     Social Determinants of Health     Financial Resource Strain:      Difficulty of Paying Living Expenses:    Food Insecurity:      Worried About Running Out of Food in the Last Year:      Ran Out of Food in the Last Year:    Transportation Needs:      Lack of Transportation (Medical):      Lack of Transportation (Non-Medical):    Physical Activity:      Days of Exercise per Week:      Minutes of Exercise per Session:    Stress:      Feeling of Stress :    Social Connections:      Frequency of Communication with Friends and Family:      Frequency of Social Gatherings with Friends and Family:      Attends Shinto Services:      Active Member of Clubs or Organizations:      Attends Club or Organization Meetings:      Marital Status:    Intimate Partner Violence:      Fear of Current or Ex-Partner:      Emotionally Abused:      Physically Abused:      Sexually Abused:           Allergies   Allergen Reactions     Diphenhydramine      Other reaction(s): Other (see comments)  Told not to take due to parkinsons     Metoclopramide      Avoid in patient with a history of Parkinson Disease  Other reaction(s): GI intolerance  Avoid in patient with a history of Parkinson Disease     Promethazine      Other reaction(s): Other (see comments)  Told not to take due to  fallon       Current Outpatient Medications   Medication     amantadine (SYMMETREL) 100 MG capsule     carbidopa-levodopa (PARCOPA)  MG ODT     carbidopa-levodopa (SINEMET)  MG tablet     FOLBEE 2.5-25-1 MG TABS     NONFORMULARY     ondansetron (ZOFRAN) 4 MG tablet     ondansetron (ZOFRAN-ODT) 4 MG ODT tab     No current facility-administered medications for this visit.         PHYSICAL EXAM  BP (!) 137/90   Pulse 83   Resp 16   Wt 74.8 kg (165 lb)   SpO2 98%   BMI 22.07 kg/m      Neurological  Awake, alert and oriented to date, time, place and person. Speech fluent.   Pupils equal, round, reactive to light.  Extraocular movement intact.  Hearing is grossly normal to finger rub.   Facial sensation intact.  Face symmetric.  Tongue midline.  Uvula elevates equally.    Motor: full strength throughout.  Sensation: intact to light touch and pinpoint.        ASSESSMENT   35 year old right handed gentleman with a history of Parkinson's Disease. If his vomiting is triggered solely by medication administration, then awake surgery would likely be fine since he will come in off medication. But if it is related to anxiety or is unpredictable, this may make awake, frame-based surgery a bit more of a risk in terms of the need to abort surgery in this event.     During today's visit, we discussed both phase I and II of DBS surgery. We discussed that Phase I would place the DBS electrode on the right side under MAC and microelectrode recording. He would then return one week later for phase II with placement of the DBS generator/battery over the right chest wall under general anesthesia. He  would then return three weeks later for phase I with placement of the DBS electrode on the left side under MAC and microelectrode recording.    Briefly, the following topics were discussed regarding device options:    [employer]  MRI compatible.  Non-rechargeable and rechargeable generator/battery available.  8 contact  segmented/directional electrode.  Communication method: have the  or patient controller on the skin directly over the generator/battery.    [employer]  MRI compatible.  Non-rechargeable generator/battery.  8 contact segmented/directional electrode.  Communication method: Wireless/bluetooth.    [employer]  MRI compatible.    Rechargeable generator/battery.  8 contact segmented/directional electrode.  Independent current control at each contacts.  Communication method: Wireless.    I did discuss that the implant technique for these devices are relatively the same which was discussed again.  They all have similar risks associated with the surgery.    The risks, benefits, alternative therapies were discussed with the patient, including but not limited to infection and bleeding. Surgical procedure was discussed in detail. All questions were answered, and he  expressed understanding. His  case will be discussed at the Movement Disorder Consensus Group Meeting to determine whether he  is a good candidate for DBS surgery.     PLAN:  1. We will discuss his  case at the Movement Disorder Consensus Group Meeting, and we will contact him  regarding his DBS candidacy.         Again, thank you for allowing me to participate in the care of your patient.      Sincerely,    Angel Morales MD

## 2021-10-05 NOTE — CONFIDENTIAL NOTE
Ref Range & Units 1 mo ago   Ventricular Rate ECG/Min  BPM 80    FL Interval  ms 156    QRSD Interval  ms 96    QT Interval  ms 370    QTC Interval  ms 426    P Axis  degrees 56    R Axis  degrees 90    T Wave Axis  degrees 56      Impression    Normal sinus rhythm with sinus arrhythmia   Rightward axis   When compared with ECG of 31-AUG-2021 19:33,   Significant changes have occurred   Reviewed by ANGÉLICA Pepper

## 2021-10-05 NOTE — TELEPHONE ENCOUNTER
Ritu took him to the ER at 8 PM last night due to ongoing symptoms. He was there until 11 PM. He got 2 liters of sodium chloride, ativan and droperidol. When he got home he took his bedtime carbidopa/levodopa dose. He is currently still sleeping. He has not ate anything in 24 hours and continues to lose weight. The hospital was going to have GI call him to schedule an appointment but this has not happened yet. They would like a referral for GI to go [facility] clinic in [place]. They would like to try the parcopa to use during the times he has these episodes to see if maybe these can stay down better.    We reviewed the instructions for his on-off testing tomorrow - he very much wants to try and make this appointment.  Do not take the following medications 24 hours before your appointment:   Symmetrel (amantadine)     Do not take any levodopa formulations 12 hours before your appointment.  Sinemet (carbidopa/levodopa) immediate release   Lodosyn (carbidopa)

## 2021-10-05 NOTE — TELEPHONE ENCOUNTER
[facility] Call Center    Phone Message    May a detailed message be left on voicemail: yes     Reason for Call: Other: Please have Nurse Valeria call Ritu. Thank you.     Action Taken: Message routed to:  [facility] ([facility]    Travel Screening: Not Applicable

## 2021-10-06 ENCOUNTER — HOSPITAL ENCOUNTER (EMERGENCY)
Facility: CLINIC | Age: 36
Discharge: HOME | End: 2021-10-06
Payer: COMMERCIAL

## 2021-10-06 ENCOUNTER — TELEPHONE (OUTPATIENT)
Dept: NEUROLOGY | Facility: CLINIC | Age: 36
End: 2021-10-06

## 2021-10-06 ENCOUNTER — MYC MEDICAL ADVICE (OUTPATIENT)
Dept: NEUROLOGY | Facility: CLINIC | Age: 36
End: 2021-10-06

## 2021-10-06 ENCOUNTER — OFFICE VISIT (OUTPATIENT)
Dept: NEUROLOGY | Facility: CLINIC | Age: 36
End: 2021-10-06
Payer: COMMERCIAL

## 2021-10-06 VITALS
OXYGEN SATURATION: 98 % | SYSTOLIC BLOOD PRESSURE: 134 MMHG | DIASTOLIC BLOOD PRESSURE: 97 MMHG | HEART RATE: 90 BPM | RESPIRATION RATE: 18 BRPM | TEMPERATURE: 97.9 F

## 2021-10-06 VITALS — DIASTOLIC BLOOD PRESSURE: 90 MMHG | SYSTOLIC BLOOD PRESSURE: 151 MMHG | OXYGEN SATURATION: 100 % | HEART RATE: 60 BPM

## 2021-10-06 DIAGNOSIS — R11.2 INTRACTABLE VOMITING WITH NAUSEA, UNSPECIFIED VOMITING TYPE: Primary | ICD-10-CM

## 2021-10-06 DIAGNOSIS — R11.2 INTRACTABLE VOMITING WITH NAUSEA, UNSPECIFIED VOMITING TYPE: ICD-10-CM

## 2021-10-06 DIAGNOSIS — G20.A1 PARKINSON DISEASE (H): Primary | ICD-10-CM

## 2021-10-06 DIAGNOSIS — K31.89 GASTRIC DYSMOTILITY: ICD-10-CM

## 2021-10-06 DIAGNOSIS — K92.89 GASTROINTESTINAL DYSMOTILITY: ICD-10-CM

## 2021-10-06 PROCEDURE — 99207 PR ESOPHAGEAL MOTILITY STUDY: CPT | Performed by: PSYCHIATRY & NEUROLOGY

## 2021-10-06 PROCEDURE — 99207 PR NON-BILLABLE SERV PER CHARTING: CPT | Performed by: STUDENT IN AN ORGANIZED HEALTH CARE EDUCATION/TRAINING PROGRAM

## 2021-10-06 PROCEDURE — 99215 OFFICE O/P EST HI 40 MIN: CPT | Performed by: STUDENT IN AN ORGANIZED HEALTH CARE EDUCATION/TRAINING PROGRAM

## 2021-10-06 ASSESSMENT — UNIFIED PARKINSONS DISEASE RATING SCALE (UPDRS)
ARISING_CHAIR: MODERATE: NEEDS TO PUSH OFF, BUT TENDS TO FALL BACK, OR MAY HAVE TO TRY MORE THAN ONE TIME USING ARMS OF CHAIR, BUT CAN GET UP WITHOUT HELP.
AXIAL_SCORE: 19
LEG_AGILITY_LEFT: SLIGHT: ANY OF THE FOLLOWING: A) THE REGULAR RHYTHM IS BROKEN WITH ONE WITH ONE OR TWO INTERRUPTIONS OR HESITATIONS OF THE MOVEMENT B) SLIGHT SLOWING C) THE AMPLITUDE DECREMENTS NEAR THE END OF THE 10 MOVEMENTS.
PRONATION_SUPINATION_LEFT: MILD: ANY OF THE FOLLOWING: A) 3 TO 5 INTERRUPTIONS DURING TAPPING B) MILD SLOWING C) THE AMPLITUDE DECREMENTS MIDWAY IN THE 10-MOVEMENT SEQUENCE
AMPLITUDE_RLE: SLIGHT: < 1 CM IN MAXIMAL AMPLITUDE.
RIGIDITY_RLE: SEVERE: RIGIDITY DETECTED WITHOUT THE ACTIVATION MANEUVER AND FULL RANGE OF MOTION NOT ACHIEVED.
FINGER_TAPPING_RIGHT: SLIGHT: ANY OF THE FOLLOWING: A) THE REGULAR RHYTHM IS BROKEN WITH ONE WITH ONE OR TWO INTERRUPTIONS OR HESITATIONS OF THE MOVEMENT B) SLIGHT SLOWING C) THE AMPLITUDE DECREMENTS NEAR THE END OF THE 10 MOVEMENTS.
RIGIDITY_LUE: SLIGHT: RIGIDITY ONLY DETECTED WITH ACTIVATION MANEUVER.
TOTAL_SCORE: 12
CONSTANCY_TREMOR_ATREST: SEVERE: TREMOR AT REST IS PRESENT > 75% OF THE ENTIRE EXAMINATION PERIOD.
TOETAPPING_RIGHT: SLIGHT: ANY OF THE FOLLOWING: A) THE REGULAR RHYTHM IS BROKEN WITH ONE WITH ONE OR TWO INTERRUPTIONS OR HESITATIONS OF THE MOVEMENT B) SLIGHT SLOWING C) THE AMPLITUDE DECREMENTS NEAR THE END OF THE 10 MOVEMENTS.
DYSKINESIAS_PRESENT: NO
RIGIDITY_RUE: MILD: RIGIDITY DETECTED WITHOUT THE ACTIVATION MANEUVER.  FULL RANGE OF MOTION IS EASILY ACHIEVED.
FREEZING_GAIT: MILD: FREEZES ON STARTING, TURNING, OR WALKING THROUGH DOORWAY WITH MORE THAN ONE HALT DURING ANY OF THESE ACTIVITIES, BUT CONTINUES SMOOTHLY WITHOUT FREEZING DURING STRAIGHT WALKING.
TOTAL_SCORE_LEFT: 18
TOTAL_SCORE: 53
FACIAL_EXPRESSION: NORMAL.
AMPLITUDE_LIP_JAW: NORMAL: NO TREMOR.
PRONATION_SUPINATION_RIGHT: SLIGHT: ANY OF THE FOLLOWING: A) THE REGULAR RHYTHM IS BROKEN WITH ONE WITH ONE OR TWO INTERRUPTIONS OR HESITATIONS OF THE MOVEMENT B) SLIGHT SLOWING C) THE AMPLITUDE DECREMENTS NEAR THE END OF THE 10 MOVEMENTS.
POSTURE: 4 SEVERE. FLEXION, SCOLIOSIS, OR LEANING WITH EXTREME ABNORMALITY OF POSTURE.
FINGER_TAPPING_LEFT: MILD: ANY OF THE FOLLOWING: A) 3 TO 5 INTERRUPTIONS DURING TAPPING B) MILD SLOWING C) THE AMPLITUDE DECREMENTS MIDWAY IN THE 10-MOVEMENT SEQUENCE
TOETAPPING_LEFT: MILD: ANY OF THE FOLLOWING: A) 3 TO 5 INTERRUPTIONS DURING TAPPING B) MILD SLOWING C) THE AMPLITUDE DECREMENTS MIDWAY IN THE 10-MOVEMENT SEQUENCE
POSTURAL_STABILITY: NORMAL:  RECOVERS WITH ONE OR TWO STEPS.
PARKINSONS_MEDS: ON
HANDMOVEMENTS_RIGHT: MILD: ANY OF THE FOLLOWING: A) 3 TO 5 INTERRUPTIONS DURING TAPPING B) MILD SLOWING C) THE AMPLITUDE DECREMENTS MIDWAY IN THE 10-MOVEMENT SEQUENCE
PARKINSONS_MEDS: OFF
GAIT: SEVERE: CANNOT WALK AT ALL OR ONLY WITH ANOTHER PERSON'S ASSISTANCE.
SPONTANEITY_OF_MOVEMENT: 1: SLIGHT: SLIGHT GLOBAL SLOWNESS AND POVERTY OF SPONTANEOUS MOVEMENTS.
AMPLITUDE_RUE: NORMAL: NO TREMOR.
RIGIDITY_NECK: SEVERE: RIGIDITY DETECTED WITHOUT THE ACTIVATION MANEUVER AND FULL RANGE OF MOTION NOT ACHIEVED.
AMPLITUDE_LUE: NORMAL: NO TREMOR.
AMPLITUDE_LLE: MILD > 1 CM BUT < 3 CM IN MAXIMAL AMPLITUDE.
LEG_AGILITY_RIGHT: NORMAL
SPEECH: SLIGHT: LOSS OF MODULATION, DICTION OR VOLUME, BUT STILL ALL WORDS EASY TO UNDERSTAND.
RIGIDITY_LLE: MILD: RIGIDITY DETECTED WITHOUT THE ACTIVATION MANEUVER.  FULL RANGE OF MOTION IS EASILY ACHIEVED.
HANDMOVEMENTS_LEFT: MODERATE: ANY OF THE FOLLOWING:  A) MORE THAN 5 INTERRUPTIONS  OR AT LEAST ONE LONGER ARREST (FREEZE) IN ONGOING MOVEMENT  B) MODERATE SLOWING C) THE AMPLITUDE DECREMENTS STARTING AFTER THE FIRST MOVEMENT.

## 2021-10-06 NOTE — TELEPHONE ENCOUNTER
Patient's mom, Ritu, left a message on the writer's voicemail that they have left the emergency room. Ritu stated the wait would've been 3 hours long. Ritu stated the patient took his ant-inausea pill and his anti-parkinson's medication and was feeling better. Ritu stated she wanted to let the writer know they went home.

## 2021-10-06 NOTE — ED TRIAGE NOTES
Arrives via EMS, from [facility] clinic. Per reports at clinic for testing for parkinson's. Developed Nausea and vomiting. Bringing up dark brown fluid.     20 R AC. 4 mg Zofran at clinic, and 2.5 mg of droperidol en route.     VSS en route .

## 2021-10-06 NOTE — TELEPHONE ENCOUNTER
Tried to reach the patient but it went to voicemail. Called Ritu, patient's mother, to ask how the patient is doing. Ritu stated the patient is much better and had no more vomiting as of yesterday. Ritu stated she is not sure how the patient is doing today. The writer asked if Ritu could get an update on how the patient is feeling as we do not want to strain the patient more with a 2-hour appointment if he is not feeling well and also that he will have to be off of his Parkinson's Disease medications. Ritu stated she will call the writer back once she hears from the patient.

## 2021-10-06 NOTE — PATIENT INSTRUCTIONS
We completed your OFF medication exam today.  Christopher will contact you to schedule a time for us to videotape an ON medication exam.    Otherwise your neuropsych evaluation will complete your DBS work-up.    Please keep us up to date with regards to your vomiting and GI evaluation.  Hope you feel better quickly.

## 2021-10-06 NOTE — TELEPHONE ENCOUNTER
Ritu, patient's mother, called back and left the writer a message stating she checked with the patient and is doing well. Ritu stated they plan to come to today's on/off testing.

## 2021-10-06 NOTE — ED NOTES
During triage patient reporting feeling significantly improved and did not want to stay to be seen by a physician. Encouraged patient to stay for a medically screening without success. Patient A&O X4. Ambulatory and had family in the waiting room as a ride.

## 2021-10-07 ENCOUNTER — PATIENT OUTREACH (OUTPATIENT)
Dept: CARE COORDINATION | Facility: CLINIC | Age: 36
End: 2021-10-07

## 2021-10-07 DIAGNOSIS — Z71.89 OTHER SPECIFIED COUNSELING: ICD-10-CM

## 2021-10-07 NOTE — PROGRESS NOTES
Background: Care Coordination referral placed from [facility] discharge report for reason of patient meeting criteria for a TCM outreach call by [facility] team.    Assessment: Upon chart review, [facility] Team member will cancel/close the referral for TCM outreach due to reason below:    Patient went to the ED and left before being seen.     Plan: Care Coordination referral for TCM outreach canceled eliot Wells MA  [facility] [place], [facility]

## 2021-10-08 ENCOUNTER — TELEPHONE (OUTPATIENT)
Dept: NEUROLOGY | Facility: CLINIC | Age: 36
End: 2021-10-08

## 2021-10-08 ENCOUNTER — PATIENT OUTREACH (OUTPATIENT)
Dept: GASTROENTEROLOGY | Facility: CLINIC | Age: 36
End: 2021-10-08

## 2021-10-08 DIAGNOSIS — K31.89 GASTRIC DYSMOTILITY: ICD-10-CM

## 2021-10-08 DIAGNOSIS — K92.89 GASTROINTESTINAL DYSMOTILITY: ICD-10-CM

## 2021-10-08 DIAGNOSIS — R11.2 INTRACTABLE VOMITING WITH NAUSEA, UNSPECIFIED VOMITING TYPE: Primary | ICD-10-CM

## 2021-10-08 NOTE — TELEPHONE ENCOUNTER
----- Message from Krista Bettencourt MD sent at 10/6/2021  4:05 PM CDT -----  Regarding: Motor testing reschedule  Hi Christopher,    We were able to do the off testing today but then he had to be sent to the ED with vomiting.  Can you schedule him with me for 1hr to complete just the ON medication portion and interview?    Thanks,  Krista

## 2021-10-08 NOTE — TELEPHONE ENCOUNTER
The patient was called and scheduled for his ON testing for 11/2/21 at 8:15 AM with Dr. Bettencourt.    Patient asked if the OFF exam would be affected since the patient was sick. Patient stated that when he starts to have the same illness episode that he had on 10/6/21, his symptoms always begin with vomiting. Patient stated that Dr. Carson is aware his GI symptoms have been ongoing. The patient is aware Dr. Bettencourt is not available next week and will answer his question upon her return.    The patient did ask the writer about his GI referral that Dr. Carson ordered. The patient was informed he was called yesterday by scheduling and that scheduling also sent him a Mychart letter regarding the referral. The patient stated he did not receive a message from yesterday and that he will check his Mychart himself.     The patient also asked about the timeline of when his case would be discussed and how soon he can get scheduled for surgery if he is approved. The patient was informed the Deep Brain Stimulation consensus group meets every 2 weeks and that once he is done with his last test, which is his motor testing on 11/2 we just scheduled, then the group would discuss his case at the next meeting after that. The patient was informed if he is approved for surgery then the scheduling of the surgery would depend upon the neurosurgeon's OR schedule and also accommodating any requests from the patient's schedule too. Patient verbalized understanding and had no further questions.

## 2021-10-08 NOTE — TELEPHONE ENCOUNTER
Reached out to pt per Dr Carson to assist in scheduling EGD, motility studies and clinic visit.  High priority message sent to endoscopy schedule EGD and manometry.  Pt scheduled in first available appt in esophageal clinic with Dr Agustin for 12/21

## 2021-10-10 ENCOUNTER — HEALTH MAINTENANCE LETTER (OUTPATIENT)
Age: 36
End: 2021-10-10

## 2021-10-11 ENCOUNTER — PATIENT OUTREACH (OUTPATIENT)
Dept: GASTROENTEROLOGY | Facility: CLINIC | Age: 36
End: 2021-10-11

## 2021-10-11 ENCOUNTER — TELEPHONE (OUTPATIENT)
Dept: NEUROLOGY | Facility: CLINIC | Age: 36
End: 2021-10-11

## 2021-10-11 NOTE — TELEPHONE ENCOUNTER
From: Alexandr Sweet MD   Sent: 10/11/2021   2:20 PM CDT   To: Ramesh Carson MD, Rubi Dos Santos RN, *   Subject: RE: urgent GI consultation                       If this patient is deemed ok for outpatients scopes I am happy to do him any time.     I have a [facility] opening tomorrow (Tuesday 10/12). Otherwise 10/19 would also work    -------  Left voice mail for patient to call me back urgently to see if he can make it tomorrow for the scope.  --------  Spoke to mother Ritu, Annalise was at her house.  Spoke to Annalise, he will be here for the scope.  --------  Writer sending info to Dr. Sweet to have his staff call and set up with patient with instructions.

## 2021-10-11 NOTE — TELEPHONE ENCOUNTER
REFERRAL INFORMATION:    Referring Provider:  Dr. Ramesh Carson     Referring Clinic:      Reason for Visit/Diagnosis: Intractable vomiting with nausea, Gastric dysmotility, GI dysmotility      FUTURE VISIT INFORMATION:    Appointment Date: 3/2/2022    Appointment Time: 2 PM      NOTES STATUS DETAILS   OFFICE NOTE from Referring Provider Internal 4/20/2021 Office visit with Dr. Carson      OFFICE NOTE from Other Specialist Internal/ Care Everywhere 10/6/2021 Office visit with Dr. Krista Bettencourt ([facility])     7/30/2021 Office visit with Dr. Joey Morley ([place])      HOSPITAL DISCHARGE SUMMARY/  ED VISITS Care Everywhere 10/4/2021, 10/1/2021, 9/24/2021 ([place] [facility])     8/31/2021, 7/7/2021 ([place])      OPERATIVE REPORT Internal  Esophageal Motility Study: 11/4/2021, 10/6/2021  [place] Gastric Motility Study: 4/20/2021  Gastric Motility Study: 4/20/2021   MEDICATION LIST Internal         ENDOSCOPY  Internal  EGD: 11/18/2021   COLONOSCOPY N/A    ERCP N/A    EUS N/A    STOOL TESTING N/A    PERTINENT LABS Care Everywhere/ Internal     PATHOLOGY REPORTS (RELATED) Internal  11/18/2021   IMAGING (CT, MRI, EGD, MRCP, Small Bowel Follow Through/SBT, MR/CT Enterography) Care Everywhere [place]:  - CT Abdomen Pelvis: 4/15/2021, 2/21/19  - US Head Neck: 9/19/19 2/4/2022 8:17am Fax request sent to [place] for images. -Joni

## 2021-10-11 NOTE — TELEPHONE ENCOUNTER
Patient to have upper endoscopy  Openings tomorrow however   Due to patient needing a covid test will need to schedule next available  Dr Sweet does have openings on the 19th  Patient aware that he will be called to schedule  In basket message to the endoscopy scheduling team  Also my chart message to pt.

## 2021-10-12 ENCOUNTER — PATIENT OUTREACH (OUTPATIENT)
Dept: GASTROENTEROLOGY | Facility: CLINIC | Age: 36
End: 2021-10-12

## 2021-10-12 ENCOUNTER — TELEPHONE (OUTPATIENT)
Dept: GASTROENTEROLOGY | Facility: CLINIC | Age: 36
End: 2021-10-12

## 2021-10-12 ENCOUNTER — HOSPITAL ENCOUNTER (OUTPATIENT)
Facility: AMBULATORY SURGERY CENTER | Age: 36
End: 2021-10-12
Attending: INTERNAL MEDICINE
Payer: COMMERCIAL

## 2021-10-12 DIAGNOSIS — Z11.59 ENCOUNTER FOR SCREENING FOR OTHER VIRAL DISEASES: ICD-10-CM

## 2021-10-12 NOTE — TELEPHONE ENCOUNTER
Screening Questions  1. Are you active on mychart? Yes    2. What insurance is in the chart? [place]    2.  Ordering/Referring Provider: Kee    3. BMI 23.1    4. Do you have any pulmonary issues? Yes: No    5. Have you had a heart, lung, or liver transplant? No    6. Are you currently on dialysis or have chronic kidney disease? No    7. Have you had a stroke or Transient ischemic atttack (TIA) within 6 months? No    8. In the past 6 months, have you had any heart related issues including cardiomyopathy or heart attack? No      If yes, did it require cardiac stenting or other implantable device?No      9. Do you have any implantable devices in your body (pacemaker, defib, LVAD)? No    10. Do you take nitroglycerin? If yes, how often? No    11. Are you currently taking any blood thinners?No    12. Are you a diabetic? No    13. (Females) Are you currently pregnant?   If yes, how many weeks?      15. Are you taking any prescription pain medications on a routine schedule? No If yes, MAC sedation.    16. Do you have any chemical dependencies such as alcohol, street drugs, or methadone? No If yes, MAC sedation.    17. Do you have any history of post-traumatic stress syndrome, severe anxiety or history of psychosis? No    18. Do you transfer independently? Yes    19.  Do you have any issues with constipation? No    20. Preferred Pharmacy for Pre Prescription [id number] - [place]    Scheduling Details    Which Colonoscopy Prep was Sent?:   Procedure Scheduled: EGD  Provider/Surgeon: Kee  Date of Procedure: 10/19  Location: [facility]  Caller (Please ask for phone number if not scheduled by patient): Annalise      Sedation Type: CS  Conscious Sedation- Needs  for 6 hours after the procedure  MAC/General-Needs  for 24 hours after procedure    Pre-op Required at [facility], [place], [place] and OR for MAC sedation:   (if yes advise patient they will need a pre-op prior to procedure)      Is  patient on blood thinners? -No (If yes- inform patient to follow up with PCP or provider for follow up instructions)     Informed patient they will need an adult  Yes  Cannot take any type of public or medical transportation alone    Pre-Procedure Covid test to be completed at [facility] or Externally: Yes @ [facility] 10-16    Confirmed Nurse will call to complete assessment Yes    Additional comments:

## 2021-10-12 NOTE — TELEPHONE ENCOUNTER
Spoke with pt to schedule esophageal manometry and soft transfer to endoscopy scheduling.  Pt scheduled: EGD on 10/19 with Dr Sweet  Manometry on 10/21  Manometry information and instructions sent to pt's email per request.  Pt will call back as needed before procedures.     Isaias Landon,    Here is the information and instructions for your manometry test on 10/21.  I left you a voicemail as well.  Please let me know if you have any questions or concerns prior to your test.    Thanks  Kym Betancourt RN Motility Nurse  Non Invasive GI Procedures  Phone: [phone]         Manometry Test  Your exam is on 10/21/21 Exam Time: 11 am Arrival Time: 10:45 am  Check in at the [facility] [address] [place] [address]   Check in on the 3rd floor.    What is a manometry test?  This test shows the strength and function of your swallowing muscles. It also tells us the exact location of the lower muscle in your esophagus (food pipe) and stomach.    Manometry can help find the cause of acid reflux, heartburn, trouble swallowing and some types of chest pain. Or, it is done before surgery or other tests.    The test takes 45 to 60 minutes. We will:    Moisten the inside of your nose with gel.    Put a small tube in your nose, down your esophagus and into your stomach.    Give you swallows of salty water. Tiny sensors on the tube will record the pressure from swallowing.    Remove the tube.    Getting ready    Do not eat or drink 4 hours before the test. (It is okay to take medicines with a small amount of water.)      Unless your provider says it is okay, do not take these medicines for 4 hours before your test(s):  - pain medicine  - sedatives or tranquilizers  - antispasmodics  - promotility medicines  - anti-depressants      It is okay to continue taking:  - Proton-pump inhibitors (PPIs): omeprazole, Nexium, Prevacid, Zantac, Zegerid, Aciphex or Protonix  - Antacids or medicines that reduce stomach acid:  Aciphex, Axid, cimetidine, esomeprazole, famotidine, Losec, metoclopramide, Nexium, nizatidine, omeprazole, pantoprazole, Pepcid, Prilosec, Propulsid, Protonix, rabeprazole, ranitidine, Reglan,  Tagamet, and Zantac      You may drive yourself to and from the test(s).    Test results  - You will receive your results in 7 to 10 business days by phone, letter or MyChart.    Schedule your Covid Test:   Please ensure your COVID test is scheduled within 96 hours or 4 days of your procedure. If you have not been contacted to schedule please call [phone].    For questions or appointments, call:  [facility]  [phone]  Monday through Friday, 8 a.m. to 4:30 p.m.  (If it is after hours, call [phone]. Ask for the GI fellow resident on call.)

## 2021-10-12 NOTE — TELEPHONE ENCOUNTER
Attempted to contact patient regarding upcoming EGD procedure on 10.19.2021 for pre assessment questions. No answer.     Left message to return call to [phone] #2    Covid test scheduled: 10.16.2021    Arrival time: 0945    Facility location: [facility]    Sedation type: CS    Sent updated EGD prep instructions with 24 hours clear liquid diet d/t indication for procedure is gastric dysmotility.    Isabel Rosa RN

## 2021-10-13 ENCOUNTER — ANCILLARY PROCEDURE (OUTPATIENT)
Dept: NEUROLOGY | Facility: CLINIC | Age: 36
End: 2021-10-13
Attending: PSYCHIATRY & NEUROLOGY
Payer: COMMERCIAL

## 2021-10-13 ENCOUNTER — PATIENT OUTREACH (OUTPATIENT)
Dept: GASTROENTEROLOGY | Facility: CLINIC | Age: 36
End: 2021-10-13

## 2021-10-13 NOTE — TELEPHONE ENCOUNTER
Left message for pt to call back with any questions about upcoming manometry testing. Sent [employer] message with instructions as well.

## 2021-10-14 NOTE — TELEPHONE ENCOUNTER
Second attempt for pre-assessment prior to upcoming EGD.    No answer.  Left message to return call [phone] #2    Isabel Rosa RN

## 2021-10-18 RX ORDER — LIDOCAINE 40 MG/G
CREAM TOPICAL
Status: CANCELLED | OUTPATIENT
Start: 2021-10-18

## 2021-10-18 RX ORDER — ONDANSETRON 2 MG/ML
4 INJECTION INTRAMUSCULAR; INTRAVENOUS
Status: CANCELLED | OUTPATIENT
Start: 2021-10-18

## 2021-10-18 NOTE — PROGRESS NOTES
Neurosurgery Attending DBS Consult    Chief Complaint   Patient presents with     Consult     [facility] NEW       HISTORY OF PRESENT ILLNESS  Annalise Nicole is a 34 y/o gentleman with PD who presents for neurosurgical consultation regarding DBS. He was diagnosed in 2018 with onset of left arm bradykinesia, rigidity and subtle action (not resting) tremor for about a year or two prior to his presentation. He was sent for genetic testing soon after his diagnosis, but his insurance denied testing at that time. Since then, he was seen and enrolled into the PD GENEration study in Jan 2020, but then apparently did not follow up for the testing results, but recently re-enrolled in the study in September. He is considering DBS now as he is wearing off 2-2.5 hrs after taking his medication and feels he is developing worsening dyskinesias and leg dystonia. 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 He is otherwise healthy but has been having multiple bouts of vomiting leading to dehydration, most typically occurs shortly after taking his levodopa recently. Not on antiplatelets or anticoagulation.    ON/OFF testing: performed tomorrow    MRI: Normal besides absence of swallow tail sign    Neuropsych testing: upcoming on 10/19    DBS goals:  To improve his quality of life, reduce his dyskinesias and dystonia, lengthen time to wearing off. He is reasonably concerned about the aesthetics and practical implications of having implant surgery at such a young age and what effects that might have on being able to date, have a family, etc.       Past Medical History:   Diagnosis Date     H/O magnetic resonance imaging of brain and brain stem 8/17/2018 2009 December MR Brain without and with IV Jyiekoix47/8/2009 [facility] Result Impression  Normal MRI of the brain.  Result Narrative      Multiecho, multiplanar views of the brain were obtained prior to and  following the IV administration  of 19 mL of contrast. There are no  previous studies available for comparison.     The brain parenchyma is normal in appearance on all pulse sequences,  with      H/O magnetic resonance imaging of cervical spine 8/17/2018 March MR Cervical Spine without IV Contrast3/15/2017 [facility] Result Addenda Addendum by Provider, PA Jones on 03/15/2017 12:30 PM RAD^^^MA MR Cervical Spine w/o contrast 3/15/2017 12:30:00 Result Impression  Minimal degenerative disc disease at C5-6 and C6-7  otherwise an unremarkable MR scan of the cervical spine.  Result Narrative   EXAM: MR Cervical Spine w/o contrast   INDICATION:      H/O shoulder surgery 8/17/2018 2017 July XR SHOULDER 2 VIEWS LEFT7/14/2017 [facility] Result Narrative XR SHOULDER 2 VIEWS LEFT 7/14/2017 9:13 PM  INDICATION: Shoulder Injury COMPARISON: None.  FINDINGS: Negative shoulder. No fracture or dislocation.  Status       Hiatal hernia 4/20/2021    Based on a 2021 ct scan Small hiatal hernia with some wall thickening in the distal thoracic esophagus suggesting some esophagitis.     Seizure (H) at age 22 yrs 8/17/2018     Shoulder dislocation     left     Tremor 8/10/2018       Past Surgical History:   Procedure Laterality Date     SHOULDER SURGERY  2007       Family History   Problem Relation Age of Onset     Cancer Other         grandmother     Tremor No family hx of      Dementia No family hx of      Parkinsonism No family hx of      Seizure Disorder No family hx of        Social History     Socioeconomic History     Marital status: Single     Spouse name: Not on file     Number of children: Not on file     Years of education: Not on file     Highest education level: Not on file   Occupational History     Not on file   Tobacco Use     Smoking status: Never Smoker     Smokeless tobacco: Never Used   Substance and Sexual Activity     Alcohol use: Yes     Drug use: No     Sexual activity: Not on file   Other Topics  Concern     Not on file   Social History Narrative    single. lives in [facility] [place] [facility]. mother cabrera ambriz            He has a history of a left dislocated shoulder with surgery performed in 2009. He currently lives in [place] but comes to the [place] area to visit his parents in [place]. 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     Social Determinants of Health     Financial Resource Strain:      Difficulty of Paying Living Expenses:    Food Insecurity:      Worried About Running Out of Food in the Last Year:      Ran Out of Food in the Last Year:    Transportation Needs:      Lack of Transportation (Medical):      Lack of Transportation (Non-Medical):    Physical Activity:      Days of Exercise per Week:      Minutes of Exercise per Session:    Stress:      Feeling of Stress :    Social Connections:      Frequency of Communication with Friends and Family:      Frequency of Social Gatherings with Friends and Family:      Attends [employer] Services:      Active Member of Clubs or Organizations:      Attends Club or Organization Meetings:      Marital Status:    Intimate Partner Violence:      Fear of Current or Ex-Partner:      Emotionally Abused:      Physically Abused:      Sexually Abused:           Allergies   Allergen Reactions     Diphenhydramine      Other reaction(s): Other (see comments)  Told not to take due to parkinsons     Metoclopramide      Avoid in patient with a history of Parkinson Disease  Other reaction(s): GI intolerance  Avoid in patient with a history of Parkinson Disease     Promethazine      Other reaction(s): Other (see comments)  Told not to take due to parkisons       Current Outpatient Medications   Medication     amantadine (SYMMETREL) 100 MG capsule     carbidopa-levodopa (PARCOPA)  MG ODT     carbidopa-levodopa (SINEMET)  MG tablet     FOLBEE 2.5-25-1 MG TABS     NONFORMULARY     ondansetron (ZOFRAN) 4 MG tablet     ondansetron (ZOFRAN-ODT) 4 MG  ODT tab     No current facility-administered medications for this visit.         PHYSICAL EXAM  BP (!) 137/90   Pulse 83   Resp 16   Wt 74.8 kg (165 lb)   SpO2 98%   BMI 22.07 kg/m      Neurological  Awake, alert and oriented to date, time, place and person. Speech fluent.   Pupils equal, round, reactive to light.  Extraocular movement intact.  Hearing is grossly normal to finger rub.   Facial sensation intact.  Face symmetric.  Tongue midline.  Uvula elevates equally.    Motor: full strength throughout.  Sensation: intact to light touch and pinpoint.        ASSESSMENT   35 year old right handed gentleman with a history of Parkinson's Disease. If his vomiting is triggered solely by medication administration, then awake surgery would likely be fine since he will come in off medication. But if it is related to anxiety or is unpredictable, this may make awake, frame-based surgery a bit more of a risk in terms of the need to abort surgery in this event.     During today's visit, we discussed both phase I and II of DBS surgery. We discussed that Phase I would place the DBS electrode on the right side under MAC and microelectrode recording. He would then return one week later for phase II with placement of the DBS generator/battery over the right chest wall under general anesthesia. He  would then return three weeks later for phase I with placement of the DBS electrode on the left side under MAC and microelectrode recording.    Briefly, the following topics were discussed regarding device options:    [employer]  MRI compatible.  Non-rechargeable and rechargeable generator/battery available.  8 contact segmented/directional electrode.  Communication method: have the  or patient controller on the skin directly over the generator/battery.    [employer]  MRI compatible.  Non-rechargeable generator/battery.  8 contact segmented/directional electrode.  Communication method: Wireless/bluetooth.    [employer]  MRI compatible.    Rechargeable generator/battery.  8 contact segmented/directional electrode.  Independent current control at each contacts.  Communication method: Wireless.    I did discuss that the implant technique for these devices are relatively the same which was discussed again.  They all have similar risks associated with the surgery.    The risks, benefits, alternative therapies were discussed with the patient, including but not limited to infection and bleeding. Surgical procedure was discussed in detail. All questions were answered, and he  expressed understanding. His  case will be discussed at the Movement Disorder Consensus Group Meeting to determine whether he  is a good candidate for DBS surgery.     PLAN:  1. We will discuss his  case at the Movement Disorder Consensus Group Meeting, and we will contact him  regarding his DBS candidacy.

## 2021-10-18 NOTE — TELEPHONE ENCOUNTER
Third attempt for pre-assessment prior to upcoming EGD.    No answer.  Left message to return call [phone] #2    COVID test?    MyChart Message sent.    Isabel Rosa RN

## 2021-10-19 ENCOUNTER — OFFICE VISIT (OUTPATIENT)
Dept: NEUROPSYCHOLOGY | Facility: CLINIC | Age: 36
End: 2021-10-19
Attending: PSYCHIATRY & NEUROLOGY
Payer: COMMERCIAL

## 2021-10-19 DIAGNOSIS — G20.A1 PARKINSON'S DISEASE (H): Primary | ICD-10-CM

## 2021-10-19 DIAGNOSIS — F09 MENTAL DISORDER DUE TO GENERAL MEDICAL CONDITION: ICD-10-CM

## 2021-10-19 PROCEDURE — 90791 PSYCH DIAGNOSTIC EVALUATION: CPT

## 2021-10-19 PROCEDURE — 96139 PSYCL/NRPSYC TST TECH EA: CPT

## 2021-10-19 PROCEDURE — 96132 NRPSYC TST EVAL PHYS/QHP 1ST: CPT

## 2021-10-19 PROCEDURE — 96133 NRPSYC TST EVAL PHYS/QHP EA: CPT

## 2021-10-19 PROCEDURE — 96138 PSYCL/NRPSYC TECH 1ST: CPT

## 2021-10-19 NOTE — TELEPHONE ENCOUNTER
A [employer] message was sent to the patient with Dr. Bettencourt's response below.

## 2021-10-19 NOTE — PROGRESS NOTES
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  Information was obtained via interview with the patient and review of his medical records.  Records indicate a history of Parkinson's disease diagnosed in 2018, with symptom onset the previous year including difficulty using his left hand tremor.  He is interested in deep brain stimulation (DBS) surgery for management of his symptoms.  This neuropsychological evaluation was undertaken at the request of Ramesh Carson MD, as part of the presurgical protocol, to assess cognitive functioning and mood, as they pertain to his ability to make well reasoned medical decisions and follow through treatment recommendations, and to establish a neurocognitive baseline.    CLINICAL INTERVIEW FINDINGS    Upon interview, Mr. Nicole stated that he was diagnosed with Parkinson's disease in 2018.  One year earlier, he had noticed a tremor in his left hand, and trouble opening and closing his hand.  Tremor in his left arm remains his most bothersome symptom, and he has cramping in his right leg when it seems like it is stuck to the ground.  Goals for surgery include stabilizing his up and down time, as he has very high and very low times.  Ideally, he would like to reduce his medications as well.  He prefers to start with his left body since the tremor is on that side, and he is experiencing greater stiffness on his right side.  He has a good understanding of the surgical procedure and feels confident about being awake during the surgery.  He listed risks as including fogginess and haziness afterwards, changes in speech, bleeding in his brain, and infection.  His family is supportive of him proceeding, because they would like him to have a better quality of life.  He lives by himself, and plans on moving in with his brother in about 2 months.  His brother will be able to  "assist with his cares, or his parents could help if necessary following the surgery.    Mr. Nicole has not noticed any changes in cognition, including memory, word finding, attention or concentration, or decision-making.  He manages his own finances, driving, and cooking, apparently without difficulty.  He handles his medications himself using an alarm on his phone.  If he is late with the Parkinson's medications, 30 minutes or an hour later he finds that his movements are slow.  He will occasionally take half an extra pill if he is going to golf, or go to social gatherings.  He handles his personal cares independently.    Mr. Nicole denied any history of psychiatric illness.  He has not worked with a psychologist.  He described his mood as good.  He does not feel depressed, sad, guilty, or anxious.  He has been having more vivid dreams, but does not act out his dreams.  He stated that his amantadine keeps him up.  He sleeps about 8 hours a night but he is up 3 or 4 times a night.  He naps about twice a week for an hour each time.  His energy level varies depending on his medications.  When he is \"on\" his energy level is good.  His interest level is good.  For exercise he golfs, bikes, lifts weights, does push-ups, and walk his dog.  He is active and enjoys spending time with friends and relatives.  His appetite has been good.  He had been feeling sick earlier this summer, and lost about 10 pounds over the summer.  Over the last few weeks his appetite is better.  He is eating healthy foods.  He denied visual or auditory hallucinations.  He denied suicidal ideation or any history of attempts to commit suicide.    Mr. Nicole consumes an average of 2 beers once a week.  On perhaps a monthly basis, he may have 6 or more drinks if he has had a birthday party or other gathering.  He has never been in any chemical dependency treatment programs.  He has used marijuana, most recently a few months ago.  It helped him sleep a " little bit.  He denied tobacco use.  He does not osullivan.    Mr. Nicole graduated from [employer] this summer, with a Bachelor's degree in sports management.  He has worked as a professional , as a major league , and he is a partial owner of the company now, working full-time.  Work is going well, and they have been busier in the last year.  He has never been  and has no children.    In 2009, Mr. Nicole experienced a seizure of unknown etiology.  He stated that he was dehydrated and had been drinking for 2 days in a row.  He never had any subsequent seizures.  He denied any history of stroke or head injury resulting in loss of consciousness.  His balance has been good.  He denied unilateral weakness or numbness.  He has not been bothered by headaches or other aches or pains.    PAST MEDICAL HISTORY: Medical records indicate a history of hiatal hernia, Parkinson's disease, seizure, cyclical vomiting syndrome unrelated to migraine.    CURRENT MEDICATIONS:  Include amantadine, carbidopa-levodopa (Parcopa, Sinemet), Folbee, ondansetron.    FAMILY MEDICAL HISTORY: Reportedly neuropsychologically noncontributory.    BEHAVIORAL OBSERVATIONS    During the evaluation, Mr. Nicole was pleasant, cooperative, and seemed to understand the instructions. He was alert and oriented to person, place, and time. Gait was rushed. Tremors were observed clinically bilaterally, in his arms, legs, and head. Mood was neutral. Speech was fluent, with rapid rate and normal articulation and volume. Spontaneous conversation was present and appropriate. Internal performance validity measures fell within normal limits. The results are believed to accurately reflect his current level of functioning.     MEASURES ADMINISTERED    The following measures were administered by a trained psychometrist, under the direct supervision of a licensed psychologist.    Subtests of the Wechsler Adult Intelligence  Scale-4; Reading subtest of the Wide Range Achievement Test-4; subtests of the Wechsler Memory Scale-Revised; [place] Verbal Learning Test-Revised, Form 4; Brief Visual Memory Test - Revised, Form 4; [place] Naming Test; D-KEFS Verbal Fluency, Standard Form; Trail Making Test; Stroop; [place] Card Sorting Test - 64; [place] Judgement of Line Orientation Form V; Clock Drawing; MoCA v. 7.1; Arredondo Depression Inventory-2 (BDI-2), HAM-D, HAM-A, Apathy Scale, RBDSQ; QUIP, PDQ-39, ESS.     RESULTS AND INTERPRETATION    Overall intellectual functioning was estimated to fall in the high average range, generally consistent with premorbid estimates of average based on single word reading abilities. Performance on the MoCA fell within normal limits (28/30), with errors in memory.    Confrontation naming was average for his age and level of education. Verbal abstract reasoning and ability to comprehend and articulate responses to complex social situations was high average. Letter fluency, generative naming to category, and switching fluency were high average to above average.    Attention span was average for his age. Divided attention was average. Performance on a measure of distractibility was average. Psychomotor processing speed was average.     Basic visual perception, including matching lines and angles, was average for his age. Construction of a clock fell within normal limits. Nonverbal deductive reasoning was low average.    Novel problem solving, including the ability to generate strategies and solutions, fell within normal limits for his age and level of education.    Immediate and 30 minute delayed recall of verbal narrative material was average for his age. On a multiple trial list learning task, immediate recall was average, with exceptionally low recall following a 25 minute delay. Recognition memory on this task was exceptionally low. Immediate and 25 minute delayed recall of visual material was average.    On the  BDI-2, he endorsed minimal depressive symptomatology. He did not report significant apathy, dream enactment behaviors, excessive daytime sleepiness, or compulsive behaviors on questionnaires.    IMPRESSIONS AND RECOMMENDATIONS    Annalise Nicole is a 35-year-old man with a history of Parkinson's disease diagnosed in 2018.  He is bothered by left-sided tremor and right-sided stiffness.  He is interested in undergoing DBS surgery for management of his symptoms.    Current results indicate performance that falls almost entirely within normal limits across cognitive domains, generally in the average to above average range. He has difficulty with memory on one list-learning task; learning and memory otherwise falls within normal limits. He does not report significant depressive symptomatology, anxiety, apathy, or compulsive behaviors.    This pattern of performance does not reflect dementia at this time, and is only subtly abnormal. There is no consistent suggestion of cognitive decline.  He appears to be capable of comprehending medical information and making well reasoned decisions for himself. He has a good understanding of the surgical procedure and the risks involved.  He has a good support system in his family.  He lives alone now, but will be moving in with his brother in the next couple of months, and his parents are also available to provide assistance as necessary. He appears to be a good candidate for surgery from a neurocognitive perspective.    In terms of daily functioning, Mr. Nicole's cognitive inefficiencies are not likely to interfere with his ability to actively participate in treatment, or to manage his instrumental activities of daily living independently. He may remember information best when it is presented in context, or when it is presented visually. He may benefit from the use of written reminders or checklists.    Results may serve as a baseline of his neurocognitive functioning.  Repeated  neuropsychological evaluation in one year may help to determine whether his cognitive difficulties progress, remit, or remain stable.    Mamie Gracia, Ph.D., ABPP  Licensed Psychologist, LP [id number]  Board Certified in Clinical Neuropsychology      Time spent: One unit of professional time, including interview (CPT 15763). 60 minutes (1 unit) neuropsychological testing evaluation by licensed and board-certified neuropsychologist, including integration of patient data, interpretation of standardized test results and clinical data, clinical decision-making, treatment planning, report, and interactive feedback to the patient, first hour (CPT 06456). 94 minutes (2 units) of neuropsychological testing evaluation by licensed and board-certified neuropsychologist, including integration of patient data, interpretation of standardized test results and clinical data, clinical decision-making, treatment planning, report, and interactive feedback to the patient, subsequent hours (CPT 61051). 30 minutes of neuropsychological test administration and scoring by technician, first 30 minutes (CPT 64475). 131 additional minutes (4 units) neuropsychological test administration and scoring by technician, subsequent 30 minutes (CPT 41538). ICD-10 Diagnoses: G20; F06.8.

## 2021-10-19 NOTE — LETTER
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  Information was obtained via interview with the patient and review of his medical records.  Records indicate a history of Parkinson's disease diagnosed in 2018, with symptom onset the previous year including difficulty using his left hand tremor.  He is interested in deep brain stimulation (DBS) surgery for management of his symptoms.  This neuropsychological evaluation was undertaken at the request of Ramesh Carson MD, as part of the presurgical protocol, to assess cognitive functioning and mood, as they pertain to his ability to make well reasoned medical decisions and follow through treatment recommendations, and to establish a neurocognitive baseline.    CLINICAL INTERVIEW FINDINGS    Upon interview, Mr. Nicole stated that he was diagnosed with Parkinson's disease in 2018.  One year earlier, he had noticed a tremor in his left hand, and trouble opening and closing his hand.  Tremor in his left arm remains his most bothersome symptom, and he has cramping in his right leg when it seems like it is stuck to the ground.  Goals for surgery include stabilizing his up and down time, as he has very high and very low times.  Ideally, he would like to reduce his medications as well.  He prefers to start with his left body since the tremor is on that side, and he is experiencing greater stiffness on his right side.  He has a good understanding of the surgical procedure and feels confident about being awake during the surgery.  He listed risks as including fogginess and haziness afterwards, changes in speech, bleeding in his brain, and infection.  His family is supportive of him proceeding, because they would like him to have a better quality of life.  He lives by himself, and plans on  "moving in with his brother in about 2 months.  His brother will be able to assist with his cares, or his parents could help if necessary following the surgery.    Mr. Nicole has not noticed any changes in cognition, including memory, word finding, attention or concentration, or decision-making.  He manages his own finances, driving, and cooking, apparently without difficulty.  He handles his medications himself using an alarm on his phone.  If he is late with the Parkinson's medications, 30 minutes or an hour later he finds that his movements are slow.  He will occasionally take half an extra pill if he is going to golf, or go to social gatherings.  He handles his personal cares independently.    Mr. Nicole denied any history of psychiatric illness.  He has not worked with a psychologist.  He described his mood as good.  He does not feel depressed, sad, guilty, or anxious.  He has been having more vivid dreams, but does not act out his dreams.  He stated that his amantadine keeps him up.  He sleeps about 8 hours a night but he is up 3 or 4 times a night.  He naps about twice a week for an hour each time.  His energy level varies depending on his medications.  When he is \"on\" his energy level is good.  His interest level is good.  For exercise he golfs, bikes, lifts weights, does push-ups, and walk his dog.  He is active and enjoys spending time with friends and relatives.  His appetite has been good.  He had been feeling sick earlier this summer, and lost about 10 pounds over the summer.  Over the last few weeks his appetite is better.  He is eating healthy foods.  He denied visual or auditory hallucinations.  He denied suicidal ideation or any history of attempts to commit suicide.    Mr. Nicole consumes an average of 2 beers once a week.  On perhaps a monthly basis, he may have 6 or more drinks if he has had a birthday party or other gathering.  He has never been in any chemical dependency treatment programs.  He has " used marijuana, most recently a few months ago.  It helped him sleep a little bit.  He denied tobacco use.  He does not osullivan.    Mr. Nicole graduated from [employer] this summer, with a Bachelor's degree in sports management.  He has worked as a professional , as a major league , and he is a partial owner of the company now, working full-time.  Work is going well, and they have been busier in the last year.  He has never been  and has no children.    In 2009, Mr. Nicole experienced a seizure of unknown etiology.  He stated that he was dehydrated and had been drinking for 2 days in a row.  He never had any subsequent seizures.  He denied any history of stroke or head injury resulting in loss of consciousness.  His balance has been good.  He denied unilateral weakness or numbness.  He has not been bothered by headaches or other aches or pains.    PAST MEDICAL HISTORY: Medical records indicate a history of hiatal hernia, Parkinson's disease, seizure, cyclical vomiting syndrome unrelated to migraine.    CURRENT MEDICATIONS:  Include amantadine, carbidopa-levodopa (Parcopa, Sinemet), Folbee, ondansetron.    FAMILY MEDICAL HISTORY: Reportedly neuropsychologically noncontributory.    BEHAVIORAL OBSERVATIONS    During the evaluation, Mr. Nicole was pleasant, cooperative, and seemed to understand the instructions. He was alert and oriented to person, place, and time. Gait was rushed. Tremors were observed clinically bilaterally, in his arms, legs, and head. Mood was neutral. Speech was fluent, with rapid rate and normal articulation and volume. Spontaneous conversation was present and appropriate. Internal performance validity measures fell within normal limits. The results are believed to accurately reflect his current level of functioning.     MEASURES ADMINISTERED    The following measures were administered by a trained psychometrist, under the direct supervision of a  licensed psychologist.    Subtests of the Wechsler Adult Intelligence Scale-4; Reading subtest of the Wide Range Achievement Test-4; subtests of the Wechsler Memory Scale-Revised; [place] Verbal Learning Test-Revised, Form 4; Brief Visual Memory Test - Revised, Form 4; [place] Naming Test; D-KEFS Verbal Fluency, Standard Form; Trail Making Test; Stroop; [place] Card Sorting Test - 64; [place] Judgement of Line Orientation Form V; Clock Drawing; MoCA v. 7.1; Arredondo Depression Inventory-2 (BDI-2), HAM-D, HAM-A, Apathy Scale, RBDSQ; QUIP, PDQ-39, ESS.     RESULTS AND INTERPRETATION    Overall intellectual functioning was estimated to fall in the high average range, generally consistent with premorbid estimates of average based on single word reading abilities. Performance on the MoCA fell within normal limits (28/30), with errors in memory.    Confrontation naming was average for his age and level of education. Verbal abstract reasoning and ability to comprehend and articulate responses to complex social situations was high average. Letter fluency, generative naming to category, and switching fluency were high average to above average.    Attention span was average for his age. Divided attention was average. Performance on a measure of distractibility was average. Psychomotor processing speed was average.     Basic visual perception, including matching lines and angles, was average for his age. Construction of a clock fell within normal limits. Nonverbal deductive reasoning was low average.    Novel problem solving, including the ability to generate strategies and solutions, fell within normal limits for his age and level of education.    Immediate and 30 minute delayed recall of verbal narrative material was average for his age. On a multiple trial list learning task, immediate recall was average, with exceptionally low recall following a 25 minute delay. Recognition memory on this task was exceptionally low. Immediate  and 25 minute delayed recall of visual material was average.    On the BDI-2, he endorsed minimal depressive symptomatology. He did not report significant apathy, dream enactment behaviors, excessive daytime sleepiness, or compulsive behaviors on questionnaires.    IMPRESSIONS AND RECOMMENDATIONS    Annalise Nicole is a 35-year-old man with a history of Parkinson's disease diagnosed in 2018.  He is bothered by left-sided tremor and right-sided stiffness.  He is interested in undergoing DBS surgery for management of his symptoms.    Current results indicate performance that falls almost entirely within normal limits across cognitive domains, generally in the average to above average range. He has difficulty with memory on one list-learning task; learning and memory otherwise falls within normal limits. He does not report significant depressive symptomatology, anxiety, apathy, or compulsive behaviors.    This pattern of performance does not reflect dementia at this time, and is only subtly abnormal. There is no consistent suggestion of cognitive decline.  He appears to be capable of comprehending medical information and making well reasoned decisions for himself. He has a good understanding of the surgical procedure and the risks involved.  He has a good support system in his family.  He lives alone now, but will be moving in with his brother in the next couple of months, and his parents are also available to provide assistance as necessary. He appears to be a good candidate for surgery from a neurocognitive perspective.    In terms of daily functioning, Mr. Nicole's cognitive inefficiencies are not likely to interfere with his ability to actively participate in treatment, or to manage his instrumental activities of daily living independently. He may remember information best when it is presented in context, or when it is presented visually. He may benefit from the use of written reminders or checklists.    Results may  serve as a baseline of his neurocognitive functioning.  Repeated neuropsychological evaluation in one year may help to determine whether his cognitive difficulties progress, remit, or remain stable.    Mamie Gracia, Ph.D., [facility]  Licensed Psychologist, LP [id number]  Board Certified in Clinical Neuropsychology      Time spent: One unit of professional time, including interview (CPT 56294). 60 minutes (1 unit) neuropsychological testing evaluation by licensed and board-certified neuropsychologist, including integration of patient data, interpretation of standardized test results and clinical data, clinical decision-making, treatment planning, report, and interactive feedback to the patient, first hour (CPT 69218). 94 minutes (2 units) of neuropsychological testing evaluation by licensed and board-certified neuropsychologist, including integration of patient data, interpretation of standardized test results and clinical data, clinical decision-making, treatment planning, report, and interactive feedback to the patient, subsequent hours (CPT 99525). 30 minutes of neuropsychological test administration and scoring by technician, first 30 minutes (CPT 10824). 131 additional minutes (4 units) neuropsychological test administration and scoring by technician, subsequent 30 minutes (CPT 84196). ICD-10 Diagnoses: G20; F06.8.              Mamie Gracia, PhD LP

## 2021-10-19 NOTE — NURSING NOTE
The patient was seen for neuropsychological evaluation at the request of Dr. Ramesh Carson, for the purposes of diagnostic clarification and treatment planning. 161 minutes of test administration and scoring were provided by this writer, Giovanny Shirley. Please see Dr. Mamie Gracia's report for a full interpretation of the findings.

## 2021-10-21 ENCOUNTER — TELEPHONE (OUTPATIENT)
Dept: GASTROENTEROLOGY | Facility: CLINIC | Age: 36
End: 2021-10-21

## 2021-10-21 DIAGNOSIS — Z11.59 ENCOUNTER FOR SCREENING FOR OTHER VIRAL DISEASES: ICD-10-CM

## 2021-10-21 NOTE — TELEPHONE ENCOUNTER
Caller: ALEA CALLED TO FIND OUT WHAT HE NEEDS TO DO BEFORE APPOINTMENT TODAY.    Procedure: MANOMETRY    Date, Location, and Surgeon of Procedure Cancelled: 10/21/21 1045 RN    Ordering Provider:JOSE    Reason for cancel (please be detailed, any staff messages or encounters to note?): VERIFIED WITH PATIENT AND HE DID NOT DO COVID TEST SO COULD NOT DO PROCEDURE TODAY.        Rescheduled: YES     If rescheduled:    Date: 11/04   Location: [facility] [place]   Note any change or update to original order/sedation:   COVID TEST SCHEDULED 11/01 OX 130PM        ALSO RESCHEDULED EGD THAT PATIENT GOT CONFUSED ON THE TWO PROCEDURE.    EGD, [facility] [facility] [place] 10:05AM  COVID TEST 11/01

## 2021-10-21 NOTE — ADDENDUM NOTE
Addended by: SHERLYN MENDENHALL on: 10/21/2021 11:23 AM     Modules accepted: Orders     Consent 2/Introductory Paragraph: Mohs surgery was explained to the patient and consent was obtained. The risks, benefits and alternatives to therapy were discussed in detail. Specifically, the risks of infection, scarring, bleeding, prolonged wound healing, incomplete removal, allergy to anesthesia, nerve injury and recurrence were addressed. Prior to the procedure, the treatment site was clearly identified and confirmed by the patient. All components of Universal Protocol/PAUSE Rule completed.

## 2021-10-24 NOTE — PROGRESS NOTES
Patient Dipesh Nicole  CIFUENTES 10/19/21    MRN [id number]   Provider      [birth year]   Tech KB    Sex Male   Occupation Business  Owner   Education 16   Language English    Preferred Hand Right   Station OP    Age 35                 WAIS-IV          Raw SS       Similarities 29 12       Comprehension 28 12       Letter Num Seq 19 9       Digit Span 31 11       Matrix Reasoning 14 7                WIDE RANGE ACHIEVEMENT TEST - 4         Raw SS %ile Grade Eq.     Reading 63 103 58 12.9              WECHSLER MEMORY SCALE - REVISED         Raw MAS/SS       Info/Orientation 14        LM Immediate 25 10       LM 30 Minute 24 11                BOSTON NAMING TEST         Score (Correct+Stim Cue)  56       # Correct Stimulus Cue  0 T 48     # Correct Phonemic Cue  2                D-KEFS VERBAL FLUENCY    TEST FORM Standard     Raw SS       Letter Fluency 55 15       Category Fluency 48 13       Switching Fluency             Total Correct 18 15       Switching Accuracy 16 14                CLOCK DRAWING         Command 3 /3 Rating   Normal    Copy 3 /3 Rating  Normal    TRAIL MAKING TEST          Seconds Errors  z     Trails A 27 1  0.31     Trails B 56 1  0.34              STROOP             Raw    +    Jocelin    = Jocelin Tot.         T      Word 113 0 113 53     Color  73 0 73 45     Color/Word 46 0 46 51              [place] CARD SORTING TEST - 1 deck        # Categories 5 %ile >16      # Persev Error 5 T 46      Concept. Lev Resp. 56 T 54      FMS 0                 RENTERIA JUDGMENT OF LINE ORIENTATION   TEST FORM V    Raw Score 25 Raw Correction 0       Renteria %ile  56              HVLT-R    TEST FORM 4     Raw T       Trial 1 7        Trial 2 9        Trial 3 10        Total 1-3 26 45       Learning 3        Delay 4 21       Percent Retained 0.4 <20       True Positives 10        Discrimination Index 8 27       False Positives 2                 BRIEF VISUAL MEMORY TEST - REVISED   TEST FORM 4     Raw T       Trial 1 7         Trial 2 11        Trial 3 10        Total 1-3 28 53       Learning 3 46       Delay 11 56       Percent Retained 1 >16 %ile      Recognition Hits 5        Discrimination Index 5 6-10% %ile      False Alarms 0                 CLOTILDE COGNITVE ASSESSMENT (MOCA)  TEST FORM 7.1    Score 28 /30                BDI         Score 2        Interpretation Minimal                 APATHY SCALE         Score 0                 PD QUESTIONNAIRES         ESS         RDBSQ         PDQ-39         QUIP

## 2021-10-25 ENCOUNTER — TELEPHONE (OUTPATIENT)
Dept: NEUROLOGY | Facility: CLINIC | Age: 36
End: 2021-10-25
Payer: COMMERCIAL

## 2021-10-25 ENCOUNTER — TELEPHONE (OUTPATIENT)
Dept: GASTROENTEROLOGY | Facility: CLINIC | Age: 36
End: 2021-10-25

## 2021-10-25 DIAGNOSIS — Z11.59 ENCOUNTER FOR SCREENING FOR OTHER VIRAL DISEASES: ICD-10-CM

## 2021-10-25 NOTE — TELEPHONE ENCOUNTER
Caller: Annalise Nicole    Procedure: EGD    Date, Location, and Surgeon of Procedure Cancelled: 11/5/2021, CSC, Josue Kay    Ordering Provider:Vamsi    Reason for cancel (please be detailed, any staff messages or encounters to note?): pt needed to reschedule his EGD        Rescheduled: yes     If rescheduled:    Date: 11/18/2021   Location: [facility]   Note any change or update to original order/sedation: no

## 2021-10-25 NOTE — TELEPHONE ENCOUNTER
Deep Brain Stimulation Surgery Surgical Candidacy Form    Referring Provider: Dr. Ramesh Carson   Patient Information: Lives in [place], [place]  Name: Dipesh Nicole  YOB: 1985  Age: 35 year old  Height: ?  Weight: 175 lbs  BMI: ?  Blood Pressure: 107/48  Diagnosis: Parkinson's disease  Age of Onset of Symptoms: 31. Year of Onset of Symptoms: 2016.  Age of Diagnosis: 33. Year of Diagnosis: 2018.  Handedness: Right.  Side of Onset: Left upper extremity.   Disease Features: Dyskinesias, Dystonia, Fluctuations, Rigidity and Tremor     Surgery Goals: Decrease ON/OFF fluctuations., Decrease tremor. and Medication reduction.     Medications: As of 2/28/22:  Current Outpatient Medications   Medication Sig Dispense Refill     amantadine (SYMMETREL) 100 MG capsule Take 1 capsule (100 mg) by mouth 2 times daily At 6:30 AM and 10:30-11  capsule 3     carbidopa-levodopa (PARCOPA)  MG ODT TAKE 1 AND 1/2 TAB BY MOUTHAT 6:30AM, 1 AT 9AM, 1 AT 11:30 AM, 1 AT 2PM, 1 AT 4:30PM,1 AT 7PM,1 AT 9:30PMTAKE PARCOMA WHEN YOU ARE VOMITING AND CANT KEEP DOWN 240 tablet 2     carbidopa-levodopa (SINEMET CR)  MG CR tablet 50/200 tab by mouth nightly at 10/11pm 90 tablet 11     carbidopa-levodopa (SINEMET)  MG tablet 1.5@630a, 1 @ 9am, 1@ 1130a, 1@ 2p, 1@430p, 1@7p, 1@930p and 1/4-1/2 tab 3/day as needed after meals = 8.5- 10 tabs per day 900 tablet 3     Multiple Vitamin (MULTIVITAMIN ADULT PO) Vitamin by mouth daily       omeprazole (PRILOSEC) 20 MG DR capsule Take 1 capsule (20 mg) by mouth 2 times daily 60 capsule 3     ondansetron (ZOFRAN) 4 MG tablet Take 4 mg by mouth every 8 hours as needed       rotigotine (NEUPRO) 3 MG/24HR 24 hr patch Place 1 patch onto the skin daily 30 patch 11      Motor Assessment:  ON: 11  OFF: 53  % Change: 79%     Neuropsychological Evaluation:    Cognitive Issues: Results indicate performance that falls almost entirely within  normal limits across cognitive domains, in the average to above average range. He had difficulty with memory on one list learning task, with learning and memory otherwise falling within normala limits.     Psychiatric Issues: He does not report any history of psychiatric illness. He does not endorse significant depressive symptomatology, anxiety, apathy, or compulsive behaviors. He drinks an average of two beers, once a week. On a monthly basis, he may have six or more alcoholic beverages at a birthday party or other gathering. He has never been in any chemical dependency treatment programs. He lives alone now, but will be moving in with his brother in the next couple of months, and his parents are also available to provide assistance as needed.    Depression: No depression.    Cognitive Function: No signs or symptoms of cognitive dysfunction.    Psychosis: No hallucinations.     MRI Date: 8/24/21    Impression:   On high-resolution SWI images, nigrosome-1 cannot be well-seen  (absence of swallow tail sign). This can be seen in Parkinson's  disease or Lewy Body dementia. No abnormal enhancement.     PMH: -  Past Medical History:   Diagnosis Date     H/O magnetic resonance imaging of brain and brain stem 8/17/2018 2009 December MR Brain without and with IV [id number]/8/2009 [facility] Result Impression  Normal MRI of the brain.  Result Narrative      Multiecho, multiplanar views of the brain were obtained prior to and  following the IV administration of 19 mL of contrast. There are no  previous studies available for comparison.     The brain parenchyma is normal in appearance on all pulse sequences,  with      H/O magnetic resonance imaging of cervical spine 8/17/2018 March MR Cervical Spine without IV Contrast3/15/2017 [facility] Result Addenda Addendum by Provider, PA Jones on 03/15/2017 12:30 PM RAD^^^MA MR Cervical Spine w/o contrast 3/15/2017 12:30:00 Result Impression  Minimal degenerative disc  disease at C5-6 and C6-7  otherwise an unremarkable MR scan of the cervical spine.  Result Narrative   EXAM: MR Cervical Spine w/o contrast   INDICATION:      H/O shoulder surgery 8/17/2018 2017 July XR SHOULDER 2 VIEWS LEFT7/14/2017 [facility] Result Narrative XR SHOULDER 2 VIEWS LEFT 7/14/2017 9:13 PM  INDICATION: Shoulder Injury COMPARISON: None.  FINDINGS: Negative shoulder. No fracture or dislocation.  Status       Hiatal hernia 4/20/2021    Based on a 2021 ct scan Small hiatal hernia with some wall thickening in the distal thoracic esophagus suggesting some esophagitis.     Seizure (H) at age 22 yrs 8/17/2018     Shoulder dislocation     left     Tremor 8/10/2018     PSH  Past Surgical History:   Procedure Laterality Date     SHOULDER SURGERY  2007     Soc Hx: lives alone, plans to move in with brother in 2 months. No tobacco use. Drinks 2 beers once per week on average, approximately monthly will have 6 or more for social functions. Marijuana occasionally, most recent a few months ago to help him sleep.    Family Hx of Movement Disorders: none    Consult Travis Carson/Sg/Carmen   Patient discussed at conference on: 11/18/21, 3/10/22     DBS Meeting Notes/Further Work-up Needed: -    Authorization to discuss Protected Health Information:  Yes, Ritu (mother) and Sarath (father)  Healthcare Directive:  None on file  Mychart use:  Uses reliably    From 11/18/21 Consensus:     1. Rediscuss after medication changes - need to trial meds to avoid vomiting during surgery. Appointment with Dr. Carson 11/23 to discuss medication options.     2. If medication plan can be developed to avoid vomiting during surgery, he is a Candidate for staged gpi start with RGpi     3. Shake Genus rechargeable - make sure he knows if he has another medical implanted device he may no longer be MRI conditional     4. Treating his tremor, bradykinesia, rigidity. He has motor fluctuations,  "predictable FOG and dystonia when off, choreiform dyskinesias when on. He has vomiting with medication. Other indication for surgery is to decrease medication.     5. Trial with Neupro before moving forward with surgery scheduling. Veronica to talk with Dr. Carson regarding medications. She placed PA for the Neupro during the meeting. Rytary also option.     6. Interested in research - YES  ---------------------------------  Writer made it very clear, the surgery depends on getting him, medically, to a point where he is not vomiting, as this cannot occur during surgery.  Briefly discussed dopamine agonists (Neupro) and our concern for ICDs in him and that Dr. Carson will look at all the options.     Writer went into detail about the MRI conditionality of [employer]. If he were to need another medically implanted device he may no longer be MRI conditional with the Bsci system. He stated, \"Yeah, I am young and may need an MRI of my knee or hip, shoulder.\" Explained that the other two systems are very good, it is just an individual decision. He was very intrigued hearing about the Abbott remote programming. Writer stated there is a risk with all Deep Brain Stimulation systems of a short or open circuit which would make him ineligible for an MRI.     Writer asked about the cocaine found in his system in July. Patient assured writer it was a \"One time thing, I was at a party with friends, I know it was wrong. I got sick and went to the hospital.\"      Writer discussed our concern about DDS dopamine dysregulation syndrome with him as he had a history of using cash to purchase the carbidopa/levodopa from his pharmacy before refills were available. Stated that this propensity, plus a dopamine agonist, plus any kind of addictive behavior does not natan well. Patient assured writer that he does not excessively shop, eat, osullivan or have hypersexuality.     Patient wants to treat the dystonia of his right leg first - LGpi " preferred. He has noticed the pattern of his right foot locking up first then goes to his abdomen and then he vomits.      He is interested in research and learning about the GWL for bilateral DEEP BRAIN STIMULATION.     [employer] message sent with the above information.  18 minute call.  ___________________________    From 3/10/22 Consensus:     Goals Decrease ON/OFF fluctuations., Decrease tremor. and Medication reduction.  1. Candidate for staged bilateral start with LGPi  2. Benzo on hand (Versed), continue with all PD medications, bring medications to hospital with him (days were q 2 hour parcopa)  3. Abbott Infinity 7 - make sure he knows it will have a big profile  4. Research and GET WELL LOOP     Motor symptoms treating: Dystonia, Rigidity and Tremor  Motor fluctuations? yes wearing off, dystonia, and emesis  Off predictable? yes - freezing, emesis, dystonia  On predictable? choreiform dysk  Med side effects?  Trending toward DDS  Other indication for surgery? Decrease medications    Patient is very excited.  Wants to hear about research and GET WELL LOOP.

## 2021-10-25 NOTE — TELEPHONE ENCOUNTER
Called patient to attempt to complete pre assessment for upcoming EGD scheduled on 11/5/21.    Per patient they are needing to reschedule as they are going out of town.     Transferred to endoscopy scheduling per request.     Emely Pascual RN

## 2021-10-27 ENCOUNTER — PATIENT OUTREACH (OUTPATIENT)
Dept: GASTROENTEROLOGY | Facility: CLINIC | Age: 36
End: 2021-10-27

## 2021-10-27 NOTE — TELEPHONE ENCOUNTER
Left message for pt to call back with any questions about upcoming manometry testing. Sent ADTZt message as well. Pt is scheduled for COVID test pre-procedure

## 2021-11-01 ENCOUNTER — LAB (OUTPATIENT)
Dept: URGENT CARE | Facility: URGENT CARE | Age: 36
End: 2021-11-01
Payer: COMMERCIAL

## 2021-11-01 DIAGNOSIS — Z11.59 ENCOUNTER FOR SCREENING FOR OTHER VIRAL DISEASES: ICD-10-CM

## 2021-11-01 PROCEDURE — U0003 INFECTIOUS AGENT DETECTION BY NUCLEIC ACID (DNA OR RNA); SEVERE ACUTE RESPIRATORY SYNDROME CORONAVIRUS 2 (SARS-COV-2) (CORONAVIRUS DISEASE [COVID-19]), AMPLIFIED PROBE TECHNIQUE, MAKING USE OF HIGH THROUGHPUT TECHNOLOGIES AS DESCRIBED BY CMS-2020-01-R: HCPCS

## 2021-11-01 PROCEDURE — U0005 INFEC AGEN DETEC AMPLI PROBE: HCPCS

## 2021-11-01 NOTE — PROGRESS NOTES
Department of Neurology  Movement Disorders Division   ON Motor Testing Note    Patient: Dipesh Nicole   MRN: [id number]   : 1985   Date of Visit: 2021    INTERVAL EVENTS:  Annalise is a 35 yo man with PD who returns for ON medication motor testing as part of his DBS evaluation.    Sx onset: , slowed left hand movement  Diagnosis: 2018  Prior meds: carbidopa  Side effects: insomnia - amantadine  Goals: less off time, more even/smooth throughout the day, decrease medication  1st body side: left body since that is where his tremors are  IPG type: nonrechargeable  FH: no family hx    He is asking about taking ER overnight to reduce cramping.  Right side is the dystonia side.  Left side is tremor and dyskinesias.    Parkinson Disease Motor Symptom Review:  Motor fluctuations: wearing off  Dyskinesia: yes, when on  Wearing off:  2.5 hours  Freezing of gait: yes, when off  Dystonia: yes, when off  Tremor: mostly on left arm  Rigidity: worse on right  Bradykinesia: not as noticeable     Parkinson's Disease Non-motor Symptom Review:   GI symptoms - denies constipation  Urinary symptoms - denies   Autonomic dysfunction - denies orthostasis  Hallucinations - denies  Swallowing - denies dysphagia  Salivation - denies  Anosmia - denies    PD Medications  630am 900am 1130am 200pm 430pm 700pm 930pm 11pm   CD/LD 25/100mg IR  1.5 1  1 1 1 1 1 1   Carbidopa 25mg - not taking                Amantadine 100mg   1            Last taken at 800am  0.5 pill prior to eating    ROS:  All others negative except as listed above.    Past Medical History:   Diagnosis Date     H/O magnetic resonance imaging of brain and brain stem 2018 MR Brain without and with IV Snvsimyu54/8/2009 [facility] Result Impression  Normal MRI of the brain.  Result Narrative      Multiecho, multiplanar views of the brain were obtained prior to and  following the IV administration of 19 mL of contrast. There are no  previous  studies available for comparison.     The brain parenchyma is normal in appearance on all pulse sequences,  with      H/O magnetic resonance imaging of cervical spine 8/17/2018 March MR Cervical Spine without IV Contrast3/15/2017 [facility] Result Addenda Addendum by Provider, PA Jones on 03/15/2017 12:30 PM RAD^^^MA MR Cervical Spine w/o contrast 3/15/2017 12:30:00 Result Impression  Minimal degenerative disc disease at C5-6 and C6-7  otherwise an unremarkable MR scan of the cervical spine.  Result Narrative   EXAM: MR Cervical Spine w/o contrast   INDICATION:      H/O shoulder surgery 8/17/2018 2017 July XR SHOULDER 2 VIEWS LEFT7/14/2017 [facility] Result Narrative XR SHOULDER 2 VIEWS LEFT 7/14/2017 9:13 PM  INDICATION: Shoulder Injury COMPARISON: None.  FINDINGS: Negative shoulder. No fracture or dislocation.  Status       Hiatal hernia 4/20/2021    Based on a 2021 ct scan Small hiatal hernia with some wall thickening in the distal thoracic esophagus suggesting some esophagitis.     Seizure (H) at age 22 yrs 8/17/2018     Shoulder dislocation     left     Tremor 8/10/2018        Past Surgical History:   Procedure Laterality Date     SHOULDER SURGERY  2007        Current Outpatient Medications   Medication Sig Dispense Refill     amantadine (SYMMETREL) 100 MG capsule 100mg by mouth twice daily at 630am and [id number] 180 capsule 3     carbidopa (LODOSYN) 25 MG TABS tablet TAKE 1 TABLET (25 MG) BY MOUTH 3 TIMES DAILY       carbidopa-levodopa (PARCOPA)  MG ODT Take Parcopa when you are vomiting and can't keep the carbidopa-levodopa tablets down: 1.5@630a, 1 @ 9am, 1@ 1130a, 1@ 2p, 1@430p, 1@7p, 1@930p 240 tablet 3     carbidopa-levodopa (SINEMET)  MG tablet 1.5@630a, 1 @ 9am, 1@ 1130a, 1@ 2p, 1@430p, 1@7p, 1@930p and 1/4-1/2 tab 3/day as needed after meals = 8.5- 10 tabs per day 900 tablet 3     ondansetron (ZOFRAN) 4 MG tablet Take 4 mg by mouth every 8  hours as needed       FOLBEE 2.5-25-1 MG TABS 1/2-1 tablet by mouth daily (Patient not taking: Reported on 10/6/2021) 90 tablet 3     NONFORMULARY Domperidone 1-2 tabs of 10mg by mouth 3-4 times per day as needed up to 6/day (Patient not taking: Reported on 10/6/2021) 180 each 11     ondansetron (ZOFRAN-ODT) 4 MG ODT tab Take 1 tablet (4 mg) by mouth every 8 hours as needed for nausea or vomiting (Patient not taking: Reported on 10/6/2021) 90 tablet 11       Allergies   Allergen Reactions     Diphenhydramine      Other reaction(s): Other (see comments)  Told not to take due to parkinsons     Metoclopramide      Avoid in patient with a history of Parkinson Disease  Other reaction(s): GI intolerance  Avoid in patient with a history of Parkinson Disease     Promethazine      Other reaction(s): Other (see comments)  Told not to take due to parkisons        Family History   Problem Relation Age of Onset     Cancer Other         grandmother     Tremor No family hx of      Dementia No family hx of      Parkinsonism No family hx of      Seizure Disorder No family hx of         Social History     Socioeconomic History     Marital status: Single     Spouse name: Not on file     Number of children: Not on file     Years of education: Not on file     Highest education level: Not on file   Occupational History     Not on file   Tobacco Use     Smoking status: Never Smoker     Smokeless tobacco: Never Used   Substance and Sexual Activity     Alcohol use: Yes     Drug use: No     Sexual activity: Not on file   Other Topics Concern     Not on file   Social History Narrative    single. lives in [facility]. mother cabrera ambriz            He has a history of a left dislocated shoulder with surgery performed in 2009. He currently lives in [place] but comes to the [place] area to visit his parents in [place]. 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     Social Determinants of Health     Financial Resource  Strain:      Difficulty of Paying Living Expenses:    Food Insecurity:      Worried About Running Out of Food in the Last Year:      Ran Out of Food in the Last Year:    Transportation Needs:      Lack of Transportation (Medical):      Lack of Transportation (Non-Medical):    Physical Activity:      Days of Exercise per Week:      Minutes of Exercise per Session:    Stress:      Feeling of Stress :    Social Connections:      Frequency of Communication with Friends and Family:      Frequency of Social Gatherings with Friends and Family:      Attends Rastafari Services:      Active Member of Clubs or Organizations:      Attends Club or Organization Meetings:      Marital Status:    Intimate Partner Violence:      Fear of Current or Ex-Partner:      Emotionally Abused:      Physically Abused:      Sexually Abused:         PHYSICAL EXAM:  BP (!) 148/74   Pulse 65   Resp 16   Wt 77.6 kg (171 lb)   SpO2 99%   BMI 22.87 kg/m     Exam was videotaped.  UPDRS Values 10/6/2021 11/2/2021   Time: 1:44 PM 8:27 AM   Medication Off On   R Brain DBS: None None   L Brain DBS: None None   Dyskinesia (LID) No Yes   Did LID interfere  No   Speech 1 0   Facial Expression 0 0   Rigidity Neck 4 2   Rigidity RUE 2 1   Rigidity LUE 1 0   Rigidity RLE 4 1   Rigidity LLE 2 0   Finger Taps R 1 0   Finger Taps L 2 0   Hand Mvt R 2 0   Hand Mvt L 3 1   Pron-/Supinate R 1 0   Pron-/Supinate L 2 0   Toe Tap R 1 0   Toe Tap L 2 0   Leg Agility R 0 0   Leg Agility L 1 0   Arise From Chair 3 0   Gait 4 1   Gait Freezing 2 0   Postural Stability 0 0   Posture 4 3   Global Spont Mvt 1 0   Postural Tremor RUE 0 0   Postural Tremor LUE 1 1   Kinetic Tremor RUE 0 0   Kinetic Tremor LUE 2 1   Rest Tremor RUE 0 0   Rest Tremor LUE 0 0   Rest Tremor RLE 1 0   Rest Tremor LLE 2 0   Rest Tremor Lip/Jaw 0 0   Rest Tremor Constancy 4 0   Total Right 12 2   Total Left 18 3   Axial Total 19 6   Total 53 11     % improvement: 53-11=42/53=  79%      ASSESSMENT/PLAN:  Annalise is a 37 yo man with PD who returns for ON medication motor testing as part of his DBS evaluation.  He has now completed his evaluation.      Krista Bettencourt MD  Movement Disorders Fellow      23 minutes spent on the date of the encounter doing chart review, history and exam, documentation and further activities as noted above.

## 2021-11-02 ENCOUNTER — OFFICE VISIT (OUTPATIENT)
Dept: NEUROLOGY | Facility: CLINIC | Age: 36
End: 2021-11-02
Payer: COMMERCIAL

## 2021-11-02 VITALS
BODY MASS INDEX: 22.87 KG/M2 | WEIGHT: 171 LBS | RESPIRATION RATE: 16 BRPM | SYSTOLIC BLOOD PRESSURE: 148 MMHG | OXYGEN SATURATION: 99 % | HEART RATE: 65 BPM | DIASTOLIC BLOOD PRESSURE: 74 MMHG

## 2021-11-02 DIAGNOSIS — G20.A1 PARKINSON DISEASE (H): Primary | ICD-10-CM

## 2021-11-02 LAB — SARS-COV-2 RNA RESP QL NAA+PROBE: NEGATIVE

## 2021-11-02 PROCEDURE — 99213 OFFICE O/P EST LOW 20 MIN: CPT

## 2021-11-02 RX ORDER — CARBIDOPA 25 MG/1
TABLET ORAL
COMMUNITY
Start: 2021-10-14 | End: 2021-11-23

## 2021-11-02 ASSESSMENT — UNIFIED PARKINSONS DISEASE RATING SCALE (UPDRS)
LEG_AGILITY_RIGHT: NORMAL
SPONTANEITY_OF_MOVEMENT: 0: NORMAL.  NO PROBLEMS.
PARKINSONS_MEDS: ON
FACIAL_EXPRESSION: NORMAL.
DYSKINESIAS_PRESENT: YES
TOETAPPING_LEFT: NORMAL
POSTURE: 3 MODERATE.  STOOPED POSTURE, SCOLIOSIS, OR LEANING TO ONE SIDE THAT CANNOT BE CORRECTED VOLITIONALLY TO A NORMAL POSTURE BY THE PATIENT.
FINGER_TAPPING_RIGHT: NORMAL
RIGIDITY_RLE: SLIGHT: RIGIDITY ONLY DETECTED WITH ACTIVATION MANEUVER.
AXIAL_SCORE: 6
MOVEMENTS_INTERFERE_WITH_RATINGS: NO
TOTAL_SCORE: 11
RIGIDITY_RUE: SLIGHT: RIGIDITY ONLY DETECTED WITH ACTIVATION MANEUVER.
RIGIDITY_LLE: NORMAL
POSTURAL_STABILITY: NORMAL:  RECOVERS WITH ONE OR TWO STEPS.
FREEZING_GAIT: NORMAL
CONSTANCY_TREMOR_ATREST: NORMAL: NO TREMOR.
FINGER_TAPPING_LEFT: NORMAL
PRONATION_SUPINATION_LEFT: NORMAL
HANDMOVEMENTS_RIGHT: NORMAL
HANDMOVEMENTS_LEFT: SLIGHT: ANY OF THE FOLLOWING: A) THE REGULAR RHYTHM IS BROKEN WITH ONE WITH ONE OR TWO INTERRUPTIONS OR HESITATIONS OF THE MOVEMENT B) SLIGHT SLOWING C) THE AMPLITUDE DECREMENTS NEAR THE END OF THE 10 MOVEMENTS.
AMPLITUDE_LUE: NORMAL: NO TREMOR.
AMPLITUDE_LLE: NORMAL: NO TREMOR.
RIGIDITY_LUE: NORMAL
AMPLITUDE_RUE: NORMAL: NO TREMOR.
TOETAPPING_RIGHT: NORMAL
LEG_AGILITY_LEFT: NORMAL
AMPLITUDE_RLE: NORMAL: NO TREMOR.
TOTAL_SCORE: 2
PRONATION_SUPINATION_RIGHT: NORMAL
SPEECH: NORMAL
RIGIDITY_NECK: MILD: RIGIDITY DETECTED WITHOUT THE ACTIVATION MANEUVER.  FULL RANGE OF MOTION IS EASILY ACHIEVED.
AMPLITUDE_LIP_JAW: NORMAL: NO TREMOR.
ARISING_CHAIR: NORMAL: ABLE TO ARISE QUICKLY WITHOUT HESITATION.
GAIT: SLIGHT: INDEPENDENT WALKING WITH MINOR GAIT IMPAIRMENT.
TOTAL_SCORE_LEFT: 3

## 2021-11-02 ASSESSMENT — PAIN SCALES - GENERAL: PAINLEVEL: NO PAIN (0)

## 2021-11-02 NOTE — NURSING NOTE
Chief Complaint   Patient presents with     RECHECK     [facility] RETURN MOVEMENT DISORDER      Heath Galvez

## 2021-11-03 ENCOUNTER — TELEPHONE (OUTPATIENT)
Dept: NEUROLOGY | Facility: CLINIC | Age: 36
End: 2021-11-03

## 2021-11-03 DIAGNOSIS — G20.A1 PARKINSON DISEASE (H): Primary | ICD-10-CM

## 2021-11-03 RX ORDER — CARBIDOPA AND LEVODOPA 50; 200 MG/1; MG/1
1 TABLET, EXTENDED RELEASE ORAL AT BEDTIME
Qty: 30 TABLET | Refills: 11 | Status: SHIPPED | OUTPATIENT
Start: 2021-11-03 | End: 2021-11-23

## 2021-11-03 NOTE — PROGRESS NOTES
VIDEO VISIT    Reschedule     Date of Visit: November 16, 2021  Name: Dipesh Nicole  Date of Birth 1985    [address] [place] [place] [address]   [email or web address]  [phone] (M)   [phone] (H)      Ritu       [phone]      Assessment:  (G20) Parkinson disease (H)  (primary encounter diagnosis)    Developed symptoms left arm at age 31 years or 30 years of age  Diagnosis was made 2018  Parkinson's disease, not due to a mutation in SNCA, GBA, LRRK2, kimberley, DJ1, PINK1, or VPS35.      Amantadine symmetrel 100mg twice daily     carbidopa/levodopa sinemet 25/100  1.5@630am, 1@9a, 1@1130am, 1@2p, 1@430p, 1@7p, 1/2-1@930p      Review of diagnosis    Young onset Parkinson    Avoidance of dopamine blockers   Not taking    Motor complication review   Motor fluctuations/dyskinesias    Review of Impulse control disorders     Review of surgical or medication options     Gait/Balance/Falls       Exercise/Therapy performed/offered     Had intractable vomiting with motor testing on 10/6/2021  OFF score of 53      Cognitive/Driving     Working with his dad's company and may move to [place] and look for a job      Brain MRI 9/30/2021    Impression:  On high-resolution SWI images, nigrosome-1 cannot be well-seen  (absence of swallow tail sign). This can be seen in Parkinson's  disease or Lewy Body dementia. No abnormal enhancement.      Neuropsych evaluation 10/19/2021    Annalise Nicole is a 35-year-old man with a history of Parkinson's disease diagnosed in 2018.  He is bothered by left-sided tremor and right-sided stiffness.  He is interested in undergoing DBS surgery for management of his symptoms.     Current results indicate performance that falls almost entirely within normal limits across cognitive domains, generally in the average to above average range. He has difficulty with memory on one list-learning task; learning and memory otherwise falls within normal limits. He does not report  significant depressive symptomatology, anxiety, apathy, or compulsive behaviors.     This pattern of performance does not reflect dementia at this time, and is only subtly abnormal. There is no consistent suggestion of cognitive decline.  He appears to be capable of comprehending medical information and making well reasoned decisions for himself. He has a good understanding of the surgical procedure and the risks involved.  He has a good support system in his family.  He lives alone now, but will be moving in with his brother in the next couple of months, and his parents are also available to provide assistance as necessary. He appears to be a good candidate for surgery from a neurocognitive perspective.    Mood   denies    Hallucinations/delusions   denies    Sleep     Crazy dreams  No problems falling asleep  Has not yet tried melatonin  He recalls having dreams when he wakes up  He can be awaken about a dream about cramping leg and then wakes up with a cramp and takes sinemet and goes back to bed.      Waking up with cramping at 2am or so and taking 1/4 to 1/2 tablet of medication.    Bladder   Denies    GI/Constipation/GERD    odansetron zofran ODT 4mg as needed     Had some cramping in legs- 3 hours after eating at 930pm   He had dose at 630pm and ate at 730 and cramping at 930pm     Cramping - independent of what he is eating.      Having episodes once per month - wondering       ENDO   denies     Cardio/heart   Denies faints     Vision   No changes in vision     Heme   Not taking aspirin    Other:    Had a possible seizure on 11/23/2009 - had an mri and was discussing the use of medication  From chart review it may have been a syncopal episode vs seizure. He had been dehydrated and possibly sleep deprived.      EEG was done 12/9/2009  This is an abnormal EEG due to the presence of two episodes of frontally maximal sharp waves, epileptiform in nature. This places the patient at risk for partial or secondarily  "generalized seizures arising from the frontal lobes.      BRAIN MRI was normal on 12/8/2009     Seen by Dr. Je Sanon on 12/10/2009 and Annalise opted not to take an AED lamotrigine        630a 9a 1130am 2p 430p 7p 930p 2-3am   Amantadine symmetrel 100mg 1   1             Carbidopa (lodosyn) 25mg 1   1     1       Carbidopa/levodopa Parcopa 25/100 ODT prn          Carbidopa/levodopa Sinemet CR 25/100 stopped                 Carbidopa/Levodopa 25/250 stopped                 carbidopa/levodopa sinemet 25/100 1.5 1 1 1 1  1 1 1/4   Folbee ?taking                 Domperidone motilium 10mg ?taking          Ondansetron zofran 4mg prn          Ondansetron zofran-ODT 4mg  prn                           Plan:      Medical Decision Making:  #  Chronic progressive medical conditions addressed    Review and/or interpretation of unique test or documentation from a provider outside of neurology    Independent historian provided additional details     Prescription drug management and review of potential side effects and/or monitoring for side effects     Health impacted by social determinants of health      I have reviewed the note as documented above.  This accurately captures the substance of my conversation with the patient and total time spent preparing for visit, executing visit and completing visit on the day of the visit:  0 minutes.     Ramesh Carson MD      ------------------------------------------------------------------------------------------------------------------------------------------------------------------------    Video-Visit Details    The patient has been notified of following:     \"After a review of the patient s situation, this visit was changed from an in-person visit to a video visit to reduce the risk of COVID-19 exposure.   The patient is being evaluated via a billable video visit.\"    \"This video visit will be conducted via a call between you and your physician/provider. We have found that certain " "health care needs can be provided without the need for an in-person physical exam.  This service lets us provide the care you need with a video conversation.  If a prescription is necessary we can send it directly to your pharmacy.  If lab work is needed we can place an order for that and you can then stop by our lab to have the test done at a later time.    If during the course of the call the physician/provider feels a video visit is not appropriate, you will not be charged for this service.\"     Patient has given verbal consent for Video visit? Yes    Patient would like the video invitation sent by:     Type of service:  Video Visit    Video Start Time:     Video End Time (time video stopped):     Duration:  minutes - see above    Originating Location (pt. Location):     Distant Location (provider location):  [facility] [place]     Mode of Communication:  Video Conference via [employer] (and if not possible then doximity)      Ramesh Carson MD      --------------------------------------------------------------------------------------------------------------    Dipesh Nicole is a 36 year old male who is being evaluated via a billable video visit.      Charts reviewed  Consult from  Images reviewed        I have reviewed and updated the patient's Past Medical History, Social History, Family History and Medication List.    ALLERGIES  Diphenhydramine, Metoclopramide, and Promethazine    Lasts visit details if there was a last visit:       14 Review of systems  are negative except for   Patient Active Problem List   Diagnosis     Tremor     Seizure (H) at age 22 yrs     H/O magnetic resonance imaging of cervical spine     H/O shoulder surgery     H/O magnetic resonance imaging of brain and brain stem     Shoulder dislocation     Parkinson disease (H)     Right inguinal hernia     Hiatal hernia     Cyclical vomiting syndrome unrelated to migraine        Allergies   Allergen Reactions     " Diphenhydramine      Other reaction(s): Other (see comments)  Told not to take due to parkinsons     Metoclopramide      Avoid in patient with a history of Parkinson Disease  Other reaction(s): GI intolerance  Avoid in patient with a history of Parkinson Disease     Promethazine      Other reaction(s): Other (see comments)  Told not to take due to parkisons     Past Surgical History:   Procedure Laterality Date     SHOULDER SURGERY  2007     Past Medical History:   Diagnosis Date     H/O magnetic resonance imaging of brain and brain stem 8/17/2018 2009 December MR Brain without and with IV Cluupeoq59/8/2009 [facility] Result Impression  Normal MRI of the brain.  Result Narrative      Multiecho, multiplanar views of the brain were obtained prior to and  following the IV administration of 19 mL of contrast. There are no  previous studies available for comparison.     The brain parenchyma is normal in appearance on all pulse sequences,  with      H/O magnetic resonance imaging of cervical spine 8/17/2018 March MR Cervical Spine without IV Contrast3/15/2017 [facility] Result Addenda Addendum by Provider, PA Jones on 03/15/2017 12:30 PM RAD^^^MA MR Cervical Spine w/o contrast 3/15/2017 12:30:00 Result Impression  Minimal degenerative disc disease at C5-6 and C6-7  otherwise an unremarkable MR scan of the cervical spine.  Result Narrative   EXAM: MR Cervical Spine w/o contrast   INDICATION:      H/O shoulder surgery 8/17/2018 2017 July XR SHOULDER 2 VIEWS LEFT7/14/2017 [facility] [employer] [facility] Result Narrative XR SHOULDER 2 VIEWS LEFT 7/14/2017 9:13 PM  INDICATION: Shoulder Injury COMPARISON: None.  FINDINGS: Negative shoulder. No fracture or dislocation.  Status       Hiatal hernia 4/20/2021    Based on a 2021 ct scan Small hiatal hernia with some wall thickening in the distal thoracic esophagus suggesting some esophagitis.     Seizure (H) at age 22 yrs 8/17/2018     Shoulder  dislocation     left     Tremor 8/10/2018     Social History     Socioeconomic History     Marital status: Single     Spouse name: Not on file     Number of children: Not on file     Years of education: Not on file     Highest education level: Not on file   Occupational History     Not on file   Tobacco Use     Smoking status: Never Smoker     Smokeless tobacco: Never Used   Substance and Sexual Activity     Alcohol use: Yes     Drug use: No     Sexual activity: Not on file   Other Topics Concern     Not on file   Social History Narrative    single. lives in [facility]. mother cabrera ambriz            He has a history of a left dislocated shoulder with surgery performed in 2009. He currently lives in [place] but comes to the [facility] to visit his parents in [place]. 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     Social Determinants of Health     Financial Resource Strain:      Difficulty of Paying Living Expenses:    Food Insecurity:      Worried About Running Out of Food in the Last Year:      Ran Out of Food in the Last Year:    Transportation Needs:      Lack of Transportation (Medical):      Lack of Transportation (Non-Medical):    Physical Activity:      Days of Exercise per Week:      Minutes of Exercise per Session:    Stress:      Feeling of Stress :    Social Connections:      Frequency of Communication with Friends and Family:      Frequency of Social Gatherings with Friends and Family:      Attends Restorationist Services:      Active Member of Clubs or Organizations:      Attends Club or Organization Meetings:      Marital Status:    Intimate Partner Violence:      Fear of Current or Ex-Partner:      Emotionally Abused:      Physically Abused:      Sexually Abused:      Family History   Problem Relation Age of Onset     Cancer Other         grandmother     Tremor No family hx of      Dementia No family hx of      Parkinsonism No family hx of      Seizure Disorder No family hx of       Current Outpatient Medications   Medication Sig Dispense Refill     amantadine (SYMMETREL) 100 MG capsule 100mg by mouth twice daily at 630am and [id number] 180 capsule 3     carbidopa (LODOSYN) 25 MG TABS tablet TAKE 1 TABLET (25 MG) BY MOUTH 3 TIMES DAILY       carbidopa-levodopa (PARCOPA)  MG ODT Take Parcopa when you are vomiting and can't keep the carbidopa-levodopa tablets down: 1.5@630a, 1 @ 9am, 1@ 1130a, 1@ 2p, 1@430p, 1@7p, 1@930p 240 tablet 3     carbidopa-levodopa (SINEMET)  MG tablet 1.5@630a, 1 @ 9am, 1@ 1130a, 1@ 2p, 1@430p, 1@7p, 1@930p and 1/4-1/2 tab 3/day as needed after meals = 8.5- 10 tabs per day 900 tablet 3     FOLBEE 2.5-25-1 MG TABS 1/2-1 tablet by mouth daily (Patient not taking: Reported on 10/6/2021) 90 tablet 3     NONFORMULARY Domperidone 1-2 tabs of 10mg by mouth 3-4 times per day as needed up to 6/day (Patient not taking: Reported on 10/6/2021) 180 each 11     ondansetron (ZOFRAN) 4 MG tablet Take 4 mg by mouth every 8 hours as needed       ondansetron (ZOFRAN-ODT) 4 MG ODT tab Take 1 tablet (4 mg) by mouth every 8 hours as needed for nausea or vomiting (Patient not taking: Reported on 10/6/2021) 90 tablet 11

## 2021-11-03 NOTE — TELEPHONE ENCOUNTER
----- Message from Veronica Swift [facility] sent at 11/3/2021  2:17 PM CDT -----  Regarding: RE: PD meds  Thanks everyone, that sounds like a good plan. Very helpful to hear your perspective Krista as the timing of his vomiting has been puzzling. Looks like he had very significant different between on/off during your testing.    Veronica    ----- Message -----  From: Ramesh Carson MD  Sent: 11/3/2021  12:52 PM CDT  To: Krista Bettencourt MD, Veronica Swift, [facility], #  Subject: RE: PD meds                                      Okay - will have to see how it all goes and probably worth trying the sinemet CR  I sent in a Rx  Will see if Valeria or Theresa can followup with him about trying it at night.       ----- Message -----  From: Krista Bettencourt MD  Sent: 11/3/2021  12:45 PM CDT  To: Ramesh Carson MD, Veronica Swift, [facility]  Subject: RE: PD meds                                      I feel strongly after speaking with him and his parents that his vomiting is related to being off medications.  ----- Message -----  From: Ramesh Carson MD  Sent: 11/3/2021  12:43 PM CDT  To: Krista Bettencourt MD, Veronica Swift, [facility]  Subject: RE: PD meds                                      Not unreasonable. Want to loop Veronica in again as he has had complicated gi issues = don't think we have tried before - we are also tracking his amount of meds - hence my push for surgery asap to keep his dose as low as possible as he is a dopamine dysreg risk in the long run.     ----- Message -----  From: Krista Bettencourt MD  Sent: 11/3/2021  12:34 PM CDT  To: Ramesh Carson MD  Subject: PD meds                                          Annalise was asking about the possibility of Sinemet CR for overnight symptoms which I think is a good idea so I told him I'd pass his question on to you.    Thanks,  Krista

## 2021-11-03 NOTE — TELEPHONE ENCOUNTER
Spoke to patient. He is very happy to try this medication and follow up with us next week on Mychart. He will let us know how it is working. He stated that he cramps up in the middle of the night and he hopes this will help with that. Writer told him to take his other medication as prescribed, that this medication is in addition to that.    Patient verbalized understanding and agreement with this plan.

## 2021-11-03 NOTE — H&P (VIEW-ONLY)
VIDEO VISIT    Reschedule     Date of Visit: November 16, 2021  Name: Dipesh Nicole  Date of Birth 1985    [address] [place] [address]   [email or web address]  [phone] (M)   [phone] (H)      Ritu       [phone]      Assessment:  (G20) Parkinson disease (H)  (primary encounter diagnosis)    Developed symptoms left arm at age 31 years or 30 years of age  Diagnosis was made 2018  Parkinson's disease, not due to a mutation in SNCA, GBA, LRRK2, kimberley, DJ1, PINK1, or VPS35.      Amantadine symmetrel 100mg twice daily     carbidopa/levodopa sinemet 25/100  1.5@630am, 1@9a, 1@1130am, 1@2p, 1@430p, 1@7p, 1/2-1@930p      Review of diagnosis    Young onset Parkinson    Avoidance of dopamine blockers   Not taking    Motor complication review   Motor fluctuations/dyskinesias    Review of Impulse control disorders     Review of surgical or medication options     Gait/Balance/Falls       Exercise/Therapy performed/offered     Had intractable vomiting with motor testing on 10/6/2021  OFF score of 53      Cognitive/Driving     Working with his dad's company and may move to [place] and look for a job      Brain MRI 9/30/2021    Impression:  On high-resolution SWI images, nigrosome-1 cannot be well-seen  (absence of swallow tail sign). This can be seen in Parkinson's  disease or Lewy Body dementia. No abnormal enhancement.      Neuropsych evaluation 10/19/2021    Annalise Nicole is a 35-year-old man with a history of Parkinson's disease diagnosed in 2018.  He is bothered by left-sided tremor and right-sided stiffness.  He is interested in undergoing DBS surgery for management of his symptoms.     Current results indicate performance that falls almost entirely within normal limits across cognitive domains, generally in the average to above average range. He has difficulty with memory on one list-learning task; learning and memory otherwise falls within normal limits. He does not report  significant depressive symptomatology, anxiety, apathy, or compulsive behaviors.     This pattern of performance does not reflect dementia at this time, and is only subtly abnormal. There is no consistent suggestion of cognitive decline.  He appears to be capable of comprehending medical information and making well reasoned decisions for himself. He has a good understanding of the surgical procedure and the risks involved.  He has a good support system in his family.  He lives alone now, but will be moving in with his brother in the next couple of months, and his parents are also available to provide assistance as necessary. He appears to be a good candidate for surgery from a neurocognitive perspective.    Mood   denies    Hallucinations/delusions   denies    Sleep     Crazy dreams  No problems falling asleep  Has not yet tried melatonin  He recalls having dreams when he wakes up  He can be awaken about a dream about cramping leg and then wakes up with a cramp and takes sinemet and goes back to bed.      Waking up with cramping at 2am or so and taking 1/4 to 1/2 tablet of medication.    Bladder   Denies    GI/Constipation/GERD    odansetron zofran ODT 4mg as needed     Had some cramping in legs- 3 hours after eating at 930pm   He had dose at 630pm and ate at 730 and cramping at 930pm     Cramping - independent of what he is eating.      Having episodes once per month - wondering       ENDO   denies     Cardio/heart   Denies faints     Vision   No changes in vision     Heme   Not taking aspirin    Other:    Had a possible seizure on 11/23/2009 - had an mri and was discussing the use of medication  From chart review it may have been a syncopal episode vs seizure. He had been dehydrated and possibly sleep deprived.      EEG was done 12/9/2009  This is an abnormal EEG due to the presence of two episodes of frontally maximal sharp waves, epileptiform in nature. This places the patient at risk for partial or secondarily  "generalized seizures arising from the frontal lobes.      BRAIN MRI was normal on 12/8/2009     Seen by Dr. Je Sanon on 12/10/2009 and Annalise opted not to take an AED lamotrigine        630a 9a 1130am 2p 430p 7p 930p 2-3am   Amantadine symmetrel 100mg 1   1             Carbidopa (lodosyn) 25mg 1   1     1       Carbidopa/levodopa Parcopa 25/100 ODT prn          Carbidopa/levodopa Sinemet CR 25/100 stopped                 Carbidopa/Levodopa 25/250 stopped                 carbidopa/levodopa sinemet 25/100 1.5 1 1 1 1  1 1 1/4   Folbee ?taking                 Domperidone motilium 10mg ?taking          Ondansetron zofran 4mg prn          Ondansetron zofran-ODT 4mg  prn                           Plan:      Medical Decision Making:  #  Chronic progressive medical conditions addressed    Review and/or interpretation of unique test or documentation from a provider outside of neurology    Independent historian provided additional details     Prescription drug management and review of potential side effects and/or monitoring for side effects     Health impacted by social determinants of health      I have reviewed the note as documented above.  This accurately captures the substance of my conversation with the patient and total time spent preparing for visit, executing visit and completing visit on the day of the visit:  0 minutes.     Ramesh Carson MD      ------------------------------------------------------------------------------------------------------------------------------------------------------------------------    Video-Visit Details    The patient has been notified of following:     \"After a review of the patient s situation, this visit was changed from an in-person visit to a video visit to reduce the risk of COVID-19 exposure.   The patient is being evaluated via a billable video visit.\"    \"This video visit will be conducted via a call between you and your physician/provider. We have found that certain " "health care needs can be provided without the need for an in-person physical exam.  This service lets us provide the care you need with a video conversation.  If a prescription is necessary we can send it directly to your pharmacy.  If lab work is needed we can place an order for that and you can then stop by our lab to have the test done at a later time.    If during the course of the call the physician/provider feels a video visit is not appropriate, you will not be charged for this service.\"     Patient has given verbal consent for Video visit? Yes    Patient would like the video invitation sent by:     Type of service:  Video Visit    Video Start Time:     Video End Time (time video stopped):     Duration:  minutes - see above    Originating Location (pt. Location):     Distant Location (provider location):  [facility] [place]     Mode of Communication:  Video Conference via [employer]late (and if not possible then doximity)      Ramesh Carson MD      --------------------------------------------------------------------------------------------------------------    Dipesh Nicole is a 36 year old male who is being evaluated via a billable video visit.      Charts reviewed  Consult from  Images reviewed        I have reviewed and updated the patient's Past Medical History, Social History, Family History and Medication List.    ALLERGIES  Diphenhydramine, Metoclopramide, and Promethazine    Lasts visit details if there was a last visit:       14 Review of systems  are negative except for   Patient Active Problem List   Diagnosis     Tremor     Seizure (H) at age 22 yrs     H/O magnetic resonance imaging of cervical spine     H/O shoulder surgery     H/O magnetic resonance imaging of brain and brain stem     Shoulder dislocation     Parkinson disease (H)     Right inguinal hernia     Hiatal hernia     Cyclical vomiting syndrome unrelated to migraine        Allergies   Allergen Reactions     " Diphenhydramine      Other reaction(s): Other (see comments)  Told not to take due to parkinsons     Metoclopramide      Avoid in patient with a history of Parkinson Disease  Other reaction(s): GI intolerance  Avoid in patient with a history of Parkinson Disease     Promethazine      Other reaction(s): Other (see comments)  Told not to take due to parkisons     Past Surgical History:   Procedure Laterality Date     SHOULDER SURGERY  2007     Past Medical History:   Diagnosis Date     H/O magnetic resonance imaging of brain and brain stem 8/17/2018 2009 December MR Brain without and with IV Vzqkxbzz93/8/2009 [facility] Result Impression  Normal MRI of the brain.  Result Narrative      Multiecho, multiplanar views of the brain were obtained prior to and  following the IV administration of 19 mL of contrast. There are no  previous studies available for comparison.     The brain parenchyma is normal in appearance on all pulse sequences,  with      H/O magnetic resonance imaging of cervical spine 8/17/2018 March MR Cervical Spine without IV Contrast3/15/2017 [facility] Result Addenda Addendum by Provider, PA Jones on 03/15/2017 12:30 PM RAD^^^MA MR Cervical Spine w/o contrast 3/15/2017 12:30:00 Result Impression  Minimal degenerative disc disease at C5-6 and C6-7  otherwise an unremarkable MR scan of the cervical spine.  Result Narrative   EXAM: MR Cervical Spine w/o contrast   INDICATION:      H/O shoulder surgery 8/17/2018 2017 July XR SHOULDER 2 VIEWS LEFT7/14/2017 [facility] Result Narrative XR SHOULDER 2 VIEWS LEFT 7/14/2017 9:13 PM  INDICATION: Shoulder Injury COMPARISON: None.  FINDINGS: Negative shoulder. No fracture or dislocation.  Status       Hiatal hernia 4/20/2021    Based on a 2021 ct scan Small hiatal hernia with some wall thickening in the distal thoracic esophagus suggesting some esophagitis.     Seizure (H) at age 22 yrs 8/17/2018     Shoulder  dislocation     left     Tremor 8/10/2018     Social History     Socioeconomic History     Marital status: Single     Spouse name: Not on file     Number of children: Not on file     Years of education: Not on file     Highest education level: Not on file   Occupational History     Not on file   Tobacco Use     Smoking status: Never Smoker     Smokeless tobacco: Never Used   Substance and Sexual Activity     Alcohol use: Yes     Drug use: No     Sexual activity: Not on file   Other Topics Concern     Not on file   Social History Narrative    single. lives in [facility]. mother cabrera ambriz            He has a history of a left dislocated shoulder with surgery performed in 2009. He currently lives in [place] but comes to the [facility] to visit his parents in [place]. 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     Social Determinants of Health     Financial Resource Strain:      Difficulty of Paying Living Expenses:    Food Insecurity:      Worried About Running Out of Food in the Last Year:      Ran Out of Food in the Last Year:    Transportation Needs:      Lack of Transportation (Medical):      Lack of Transportation (Non-Medical):    Physical Activity:      Days of Exercise per Week:      Minutes of Exercise per Session:    Stress:      Feeling of Stress :    Social Connections:      Frequency of Communication with Friends and Family:      Frequency of Social Gatherings with Friends and Family:      Attends Mandaen Services:      Active Member of Clubs or Organizations:      Attends Club or Organization Meetings:      Marital Status:    Intimate Partner Violence:      Fear of Current or Ex-Partner:      Emotionally Abused:      Physically Abused:      Sexually Abused:      Family History   Problem Relation Age of Onset     Cancer Other         grandmother     Tremor No family hx of      Dementia No family hx of      Parkinsonism No family hx of      Seizure Disorder No family hx of       Current Outpatient Medications   Medication Sig Dispense Refill     amantadine (SYMMETREL) 100 MG capsule 100mg by mouth twice daily at 630am and 36670xc 180 capsule 3     carbidopa (LODOSYN) 25 MG TABS tablet TAKE 1 TABLET (25 MG) BY MOUTH 3 TIMES DAILY       carbidopa-levodopa (PARCOPA)  MG ODT Take Parcopa when you are vomiting and can't keep the carbidopa-levodopa tablets down: 1.5@630a, 1 @ 9am, 1@ 1130a, 1@ 2p, 1@430p, 1@7p, 1@930p 240 tablet 3     carbidopa-levodopa (SINEMET)  MG tablet 1.5@630a, 1 @ 9am, 1@ 1130a, 1@ 2p, 1@430p, 1@7p, 1@930p and 1/4-1/2 tab 3/day as needed after meals = 8.5- 10 tabs per day 900 tablet 3     FOLBEE 2.5-25-1 MG TABS 1/2-1 tablet by mouth daily (Patient not taking: Reported on 10/6/2021) 90 tablet 3     NONFORMULARY Domperidone 1-2 tabs of 10mg by mouth 3-4 times per day as needed up to 6/day (Patient not taking: Reported on 10/6/2021) 180 each 11     ondansetron (ZOFRAN) 4 MG tablet Take 4 mg by mouth every 8 hours as needed       ondansetron (ZOFRAN-ODT) 4 MG ODT tab Take 1 tablet (4 mg) by mouth every 8 hours as needed for nausea or vomiting (Patient not taking: Reported on 10/6/2021) 90 tablet 11

## 2021-11-04 ENCOUNTER — OFFICE VISIT (OUTPATIENT)
Dept: GASTROENTEROLOGY | Facility: CLINIC | Age: 36
End: 2021-11-04
Payer: COMMERCIAL

## 2021-11-04 VITALS
HEART RATE: 63 BPM | WEIGHT: 175 LBS | DIASTOLIC BLOOD PRESSURE: 63 MMHG | HEIGHT: 73 IN | SYSTOLIC BLOOD PRESSURE: 105 MMHG | BODY MASS INDEX: 23.19 KG/M2 | RESPIRATION RATE: 18 BRPM | OXYGEN SATURATION: 97 %

## 2021-11-04 DIAGNOSIS — K92.89 GASTROINTESTINAL DYSMOTILITY: ICD-10-CM

## 2021-11-04 DIAGNOSIS — K31.89 GASTRIC DYSMOTILITY: ICD-10-CM

## 2021-11-04 DIAGNOSIS — R11.2 INTRACTABLE VOMITING WITH NAUSEA, UNSPECIFIED VOMITING TYPE: ICD-10-CM

## 2021-11-04 PROCEDURE — 91010 ESOPHAGUS MOTILITY STUDY: CPT

## 2021-11-04 PROCEDURE — 91037 ESOPH IMPED FUNCTION TEST: CPT

## 2021-11-04 ASSESSMENT — MIFFLIN-ST. JEOR: SCORE: 1782.67

## 2021-11-04 ASSESSMENT — PAIN SCALES - GENERAL: PAINLEVEL: NO PAIN (0)

## 2021-11-04 NOTE — PATIENT INSTRUCTIONS
Esophogeal Manometry Study  1. Resume regular diet.  2. You may have a bloody nose or sore throat after the procedure.  3. If you have questions call [phone] from 7:00am-5:00pm.  For afterhours questions call GI doctor on call at [phone].

## 2021-11-04 NOTE — PROGRESS NOTES
"Non-Invasive GI Procedure Visit    Dipesh Nicole is a 36 year old male with history of    Intractable vomiting with nausea, unspecified vomiting type  Gastric dysmotility  Gastrointestinal dysmotility.   Patient stated reason for procedure: Vomitting  COVID-19 Test Performed within 48-72hrs of the procedure:  Yes. COVID-19 result was NEGATIVE.    COVID-19 PCR Results    COVID-19 PCR Results 11/1/21   SARS CoV2 PCR Negative      Comments are available for some flowsheets but are not being displayed.         COVID-19 Antibody Results, Testing for Immunity    COVID-19 Antibody Results, Testing for Immunity   No data to display.             Pre-Procedure Assessment  Patient presents to clinic today for Esophageal Manometry Study    Referring Provider: Dr. Agustin  Patient has not undergone previous endoscopy.    Does patient report taking a PPI (omeprazole, pantoprazole, rabeprazole, lansoprazole, esomeprazole, dexlansoprazole)? No  Does patient report taking a H2 blocker (ranitidine, or famotidine)? No  Does patient report taking opioids? No  Patient reported that last food and/or drink was last consumed 12 hours ago.  Esophageal Questionnaire(s) Completed: Yes - Esophageal Questionnaire(s)    BEDQ Questionnaire  No flowsheet data found.  No flowsheet data found.    Eckardt Questionnaire  No flowsheet data found.    Promis 10 Questionnaire  No flowsheet data found.    .    Patient Hx  Patient's history, medications and allergies were reviewed.     Height: 6' 1\"   Weight: 175 lbs 0 oz    Patient Active Problem List    Diagnosis Date Noted     Cyclical vomiting syndrome unrelated to migraine 07/30/2021     Priority: Medium     Hiatal hernia 04/20/2021     Priority: Medium     Based on a 2021 ct scan  Small hiatal hernia with some wall thickening in the distal thoracic esophagus suggesting some esophagitis.       Parkinson disease (H) 01/09/2019     Priority: Medium     Shoulder dislocation      Priority: Medium     left   "     Seizure (H) at age 22 yrs 08/17/2018     Priority: Medium     H/O magnetic resonance imaging of cervical spine 08/17/2018     Priority: Medium     March  MR Cervical Spine without IV Contrast3/15/2017  [facility]  Result Addenda   Addendum by Provider, PA Jones on 03/15/2017 12:30 PM  RAD^^^MA  MR Cervical Spine w/o contrast  3/15/2017 12:30:00   Result Impression    Minimal degenerative disc disease at C5-6 and C6-7   otherwise an unremarkable MR scan of the cervical spine.    Result Narrative       EXAM: MR Cervical Spine w/o contrast     INDICATION: left hand weakness     COMPARISON: None.      Procedure: Sagittal short and long TR and STIR images of the cervical   spine were followed by axial 2-D merge and long TR images through the   disc spaces. Oblique sagittal long TR images within obtained through   the neural foramina bilaterally.     FINDINGS: There is a normal cervical lordosis. The vertebral body   heights and intervertebral disc spaces are maintained. The marrow   signal is normal in all sequences. The C1-2 relationship is normal. No   prevertebral soft tissue swelling is seen. The C7-T1 relationship is   normal.     Axial images at C2-3, C3-4, C4-5 demonstrate no disc bulging or   herniation. The neural foramina are widely patent and the posterior   facets are intact.     Axial images at C5-6 and C6-7 demonstrate minimal focal annular   bulging in the midline without significant encroachment on the thecal   sac. The neural foramina are widely patent and the posterior facets   are intact.     Axial images at C7-T1 demonstrate no disc bulging or herniation. The   neural foramina are widely patent and the posterior facets are intact.    Status            H/O shoulder surgery 08/17/2018     Priority: Medium     2017 July  [facility]  Result Narrative   XR SHOULDER 2 VIEWS LEFT  7/14/2017 9:13 PM    INDICATION: Shoulder  Injury  COMPARISON: None.    FINDINGS: Negative shoulder. No fracture or dislocation.    Status            H/O magnetic resonance imaging of brain and brain stem 08/17/2018     Priority: Medium     2009 December  MR Brain without and with IV Vujlfnux52/8/2009  [facility]  Result Impression    Normal MRI of the brain.    Result Narrative           Multiecho, multiplanar views of the brain were obtained prior to and   following the IV administration of 19 mL of contrast. There are no   previous studies available for comparison.       The brain parenchyma is normal in appearance on all pulse sequences,   with no intracranial hemorrhage or evidence of an acute stroke. There   is no pathological enhancement. The ventricles are normal in size,   shape and position, with no shift in midline structures or other   evidence of significant mass effect. There is no sclerosis of the   mesial temporal lobe on either side to suggest a cause for this   patient's apparent seizure activity.       There are normal flow-voids within the internal carotid and   vertebrobasilar arterial systems bilaterally. The visualized aspects   of the orbits are normal.                 Tremor 08/10/2018     Priority: Medium     Right inguinal hernia 01/19/2017     Priority: Medium      Prior to Admission medications    Medication Sig Start Date End Date Taking? Authorizing Provider   amantadine (SYMMETREL) 100 MG capsule 100mg by mouth twice daily at 630am and 16102wd 8/24/21   Ramesh Carson MD   carbidopa (LODOSYN) 25 MG TABS tablet TAKE 1 TABLET (25 MG) BY MOUTH 3 TIMES DAILY 10/14/21   Reported, Patient   carbidopa-levodopa (PARCOPA)  MG ODT Take Parcopa when you are vomiting and can't keep the carbidopa-levodopa tablets down: 1.5@630a, 1 @ 9am, 1@ 1130a, 1@ 2p, 1@430p, 1@7p, 1@930p 10/5/21   Ramesh Carson MD   carbidopa-levodopa (SINEMET CR)  MG CR tablet Take 1 tablet by mouth At Bedtime 11/3/21   Ramesh Carson MD    carbidopa-levodopa (SINEMET)  MG tablet 1.5@630a, 1 @ 9am, 1@ 1130a, 1@ 2p, 1@430p, 1@7p, 1@930p and 1/4-1/2 tab 3/day as needed after meals = 8.5- 10 tabs per day 8/24/21   Ramesh Carson MD   FOLBEE 2.5-25-1 MG TABS 1/2-1 tablet by mouth daily  Patient not taking: Reported on 10/6/2021 8/24/21   Ramesh Carson MD   NONFORMULARY Domperidone 1-2 tabs of 10mg by mouth 3-4 times per day as needed up to 6/day  Patient not taking: Reported on 10/6/2021 10/5/21   Ramesh Carson MD   ondansetron (ZOFRAN) 4 MG tablet Take 4 mg by mouth every 8 hours as needed    Reported, Patient   ondansetron (ZOFRAN-ODT) 4 MG ODT tab Take 1 tablet (4 mg) by mouth every 8 hours as needed for nausea or vomiting  Patient not taking: Reported on 10/6/2021 4/20/21   Ramesh Carson MD     Allergies   Allergen Reactions     Diphenhydramine      Other reaction(s): Other (see comments)  Told not to take due to parkinsons     Metoclopramide      Avoid in patient with a history of Parkinson Disease  Other reaction(s): GI intolerance  Avoid in patient with a history of Parkinson Disease     Promethazine      Other reaction(s): Other (see comments)  Told not to take due to parkisons     Past Medical History:   Diagnosis Date     H/O magnetic resonance imaging of brain and brain stem 8/17/2018 2009 December MR Brain without and with IV Lcdqvosw98/8/2009 [facility] Result Impression  Normal MRI of the brain.  Result Narrative      Multiecho, multiplanar views of the brain were obtained prior to and  following the IV administration of 19 mL of contrast. There are no  previous studies available for comparison.     The brain parenchyma is normal in appearance on all pulse sequences,  with      H/O magnetic resonance imaging of cervical spine 8/17/2018 March MR Cervical Spine without IV Contrast3/15/2017 [facility] Result Addenda Addendum by Provider, PA Jones on 03/15/2017 12:30 PM RAD^^^MA MR Cervical Spine w/o  contrast 3/15/2017 12:30:00 Result Impression  Minimal degenerative disc disease at C5-6 and C6-7  otherwise an unremarkable MR scan of the cervical spine.  Result Narrative   EXAM: MR Cervical Spine w/o contrast   INDICATION:      H/O shoulder surgery 8/17/2018 2017 July XR SHOULDER 2 VIEWS LEFT7/14/2017 [facility] Result Narrative XR SHOULDER 2 VIEWS LEFT 7/14/2017 9:13 PM  INDICATION: Shoulder Injury COMPARISON: None.  FINDINGS: Negative shoulder. No fracture or dislocation.  Status       Hiatal hernia 4/20/2021    Based on a 2021 ct scan Small hiatal hernia with some wall thickening in the distal thoracic esophagus suggesting some esophagitis.     Seizure (H) at age 22 yrs 8/17/2018     Shoulder dislocation     left     Tremor 8/10/2018     Past Surgical History:   Procedure Laterality Date     SHOULDER SURGERY  2007     Family History   Problem Relation Age of Onset     Cancer Other         grandmother     Tremor No family hx of      Dementia No family hx of      Parkinsonism No family hx of      Seizure Disorder No family hx of      Social History     Tobacco Use     Smoking status: Never Smoker     Smokeless tobacco: Never Used   Substance Use Topics     Alcohol use: Yes        Pre-Procedure Education & Consent  Procedure education was provided to: Patient  Teaching method: Explanation  Barriers to learning: No Barrier    Patient indicated understanding of pre-procedure instruction and appropriate consent was obtained and documented.    ____________________________________________________________________    Post-Procedure Documentation: Esophageal Manometry    Manometry catheter was placed via right nare to 57 cm and normal saline swallows given per protocol. Manometry catheter was removed at the end of test.    Discharge instructions given to patient.    Notification of pending test results sent to provider for interpretation. Please reference scanned document for final  interpretation of results. Patient will follow up with referring provider for test results.    Lita Ingram RN on 11/4/2021 at 11:07 AM

## 2021-11-10 ENCOUNTER — TELEPHONE (OUTPATIENT)
Dept: GASTROENTEROLOGY | Facility: CLINIC | Age: 36
End: 2021-11-10
Payer: COMMERCIAL

## 2021-11-10 NOTE — TELEPHONE ENCOUNTER
Attempted to contact patient regarding upcoming EGD procedure on 11.18.2021 for pre assessment questions. No answer.     Left message to return call to [phone] #2    Covid test scheduled: 11.15.2021    Arrival time: 1130    Facility location: [facility]    Sedation type: MAC    Indication for procedure: n/v, gastric dysmotility    Sent updated EGD prep instructions with 24 hours clear liquid diet d/t indication for procedure is gastric dysmotility.    Isabel Rosa RN

## 2021-11-15 ENCOUNTER — LAB (OUTPATIENT)
Dept: URGENT CARE | Facility: URGENT CARE | Age: 36
End: 2021-11-15
Attending: INTERNAL MEDICINE
Payer: COMMERCIAL

## 2021-11-15 DIAGNOSIS — Z11.59 ENCOUNTER FOR SCREENING FOR OTHER VIRAL DISEASES: ICD-10-CM

## 2021-11-15 PROCEDURE — U0005 INFEC AGEN DETEC AMPLI PROBE: HCPCS

## 2021-11-15 PROCEDURE — U0003 INFECTIOUS AGENT DETECTION BY NUCLEIC ACID (DNA OR RNA); SEVERE ACUTE RESPIRATORY SYNDROME CORONAVIRUS 2 (SARS-COV-2) (CORONAVIRUS DISEASE [COVID-19]), AMPLIFIED PROBE TECHNIQUE, MAKING USE OF HIGH THROUGHPUT TECHNOLOGIES AS DESCRIBED BY CMS-2020-01-R: HCPCS

## 2021-11-15 NOTE — TELEPHONE ENCOUNTER
Writer reviewed pre-assessment questions with patient prior to upcoming EGD on 11.18.2021.      Covid test scheduled: 11.15.2021    Arrival time: 1130    Facility location: [facility]    Sedation type: MAC    Electronic Implantable devices? No    Blood thinners/Antiplatelet medication? NO    Reviewed EGD prep instructions with 24 hours clear liquid diet prior with patient.      Designated  policy reviewed with patient.     Patient verbalized understanding.  No further questions or concerns.    Isabel Rosa RN

## 2021-11-16 ENCOUNTER — VIRTUAL VISIT (OUTPATIENT)
Dept: NEUROLOGY | Facility: CLINIC | Age: 36
End: 2021-11-16
Payer: COMMERCIAL

## 2021-11-16 DIAGNOSIS — G20.A1 PARKINSON DISEASE (H): Primary | ICD-10-CM

## 2021-11-16 LAB — SARS-COV-2 RNA RESP QL NAA+PROBE: NEGATIVE

## 2021-11-16 PROCEDURE — 99207 PR NO CHARGE LOS: CPT | Performed by: PSYCHIATRY & NEUROLOGY

## 2021-11-17 ENCOUNTER — ANESTHESIA EVENT (OUTPATIENT)
Dept: SURGERY | Facility: AMBULATORY SURGERY CENTER | Age: 36
End: 2021-11-17
Payer: COMMERCIAL

## 2021-11-18 ENCOUNTER — TELEPHONE (OUTPATIENT)
Dept: NEUROLOGY | Facility: CLINIC | Age: 36
End: 2021-11-18

## 2021-11-18 ENCOUNTER — ANESTHESIA (OUTPATIENT)
Dept: SURGERY | Facility: AMBULATORY SURGERY CENTER | Age: 36
End: 2021-11-18
Payer: COMMERCIAL

## 2021-11-18 ENCOUNTER — HOSPITAL ENCOUNTER (OUTPATIENT)
Facility: AMBULATORY SURGERY CENTER | Age: 36
End: 2021-11-18
Attending: INTERNAL MEDICINE
Payer: COMMERCIAL

## 2021-11-18 VITALS
SYSTOLIC BLOOD PRESSURE: 108 MMHG | DIASTOLIC BLOOD PRESSURE: 68 MMHG | TEMPERATURE: 97.4 F | OXYGEN SATURATION: 99 % | RESPIRATION RATE: 16 BRPM

## 2021-11-18 VITALS — HEART RATE: 62 BPM

## 2021-11-18 DIAGNOSIS — K22.11 ULCER OF ESOPHAGUS WITH BLEEDING: Primary | ICD-10-CM

## 2021-11-18 DIAGNOSIS — G20.A1 PARKINSON DISEASE (H): Primary | ICD-10-CM

## 2021-11-18 PROBLEM — Z92.89 H/O ELECTROENCEPHALOGRAM: Status: ACTIVE | Noted: 2021-11-18

## 2021-11-18 PROBLEM — K20.90 ESOPHAGITIS: Status: ACTIVE | Noted: 2021-11-18

## 2021-11-18 LAB — UPPER GI ENDOSCOPY: NORMAL

## 2021-11-18 PROCEDURE — 88342 IMHCHEM/IMCYTCHM 1ST ANTB: CPT | Mod: TC | Performed by: INTERNAL MEDICINE

## 2021-11-18 PROCEDURE — 43239 EGD BIOPSY SINGLE/MULTIPLE: CPT

## 2021-11-18 RX ORDER — NALOXONE HYDROCHLORIDE 0.4 MG/ML
0.2 INJECTION, SOLUTION INTRAMUSCULAR; INTRAVENOUS; SUBCUTANEOUS
Status: DISCONTINUED | OUTPATIENT
Start: 2021-11-18 | End: 2021-11-19 | Stop reason: HOSPADM

## 2021-11-18 RX ORDER — FLUMAZENIL 0.1 MG/ML
0.2 INJECTION, SOLUTION INTRAVENOUS
Status: DISCONTINUED | OUTPATIENT
Start: 2021-11-18 | End: 2021-11-19 | Stop reason: HOSPADM

## 2021-11-18 RX ORDER — PROPOFOL 10 MG/ML
INJECTION, EMULSION INTRAVENOUS PRN
Status: DISCONTINUED | OUTPATIENT
Start: 2021-11-18 | End: 2021-11-18

## 2021-11-18 RX ORDER — NALOXONE HYDROCHLORIDE 0.4 MG/ML
0.4 INJECTION, SOLUTION INTRAMUSCULAR; INTRAVENOUS; SUBCUTANEOUS
Status: DISCONTINUED | OUTPATIENT
Start: 2021-11-18 | End: 2021-11-19 | Stop reason: HOSPADM

## 2021-11-18 RX ORDER — ONDANSETRON 4 MG/1
4 TABLET, ORALLY DISINTEGRATING ORAL EVERY 6 HOURS PRN
Status: DISCONTINUED | OUTPATIENT
Start: 2021-11-18 | End: 2021-11-19 | Stop reason: HOSPADM

## 2021-11-18 RX ORDER — GLYCOPYRROLATE 0.2 MG/ML
INJECTION, SOLUTION INTRAMUSCULAR; INTRAVENOUS PRN
Status: DISCONTINUED | OUTPATIENT
Start: 2021-11-18 | End: 2021-11-18

## 2021-11-18 RX ORDER — LIDOCAINE 40 MG/G
CREAM TOPICAL
Status: DISCONTINUED | OUTPATIENT
Start: 2021-11-18 | End: 2021-11-18 | Stop reason: HOSPADM

## 2021-11-18 RX ORDER — LIDOCAINE HYDROCHLORIDE 20 MG/ML
INJECTION, SOLUTION INFILTRATION; PERINEURAL PRN
Status: DISCONTINUED | OUTPATIENT
Start: 2021-11-18 | End: 2021-11-18

## 2021-11-18 RX ORDER — PROPOFOL 10 MG/ML
INJECTION, EMULSION INTRAVENOUS CONTINUOUS PRN
Status: DISCONTINUED | OUTPATIENT
Start: 2021-11-18 | End: 2021-11-18

## 2021-11-18 RX ORDER — PROCHLORPERAZINE MALEATE 10 MG
10 TABLET ORAL EVERY 6 HOURS PRN
Status: DISCONTINUED | OUTPATIENT
Start: 2021-11-18 | End: 2021-11-19 | Stop reason: HOSPADM

## 2021-11-18 RX ORDER — ONDANSETRON 2 MG/ML
4 INJECTION INTRAMUSCULAR; INTRAVENOUS
Status: DISCONTINUED | OUTPATIENT
Start: 2021-11-18 | End: 2021-11-18 | Stop reason: HOSPADM

## 2021-11-18 RX ORDER — SODIUM CHLORIDE, SODIUM LACTATE, POTASSIUM CHLORIDE, CALCIUM CHLORIDE 600; 310; 30; 20 MG/100ML; MG/100ML; MG/100ML; MG/100ML
INJECTION, SOLUTION INTRAVENOUS CONTINUOUS PRN
Status: DISCONTINUED | OUTPATIENT
Start: 2021-11-18 | End: 2021-11-18

## 2021-11-18 RX ORDER — ONDANSETRON 2 MG/ML
4 INJECTION INTRAMUSCULAR; INTRAVENOUS EVERY 6 HOURS PRN
Status: DISCONTINUED | OUTPATIENT
Start: 2021-11-18 | End: 2021-11-19 | Stop reason: HOSPADM

## 2021-11-18 RX ADMIN — GLYCOPYRROLATE 0.2 MG: 0.2 INJECTION, SOLUTION INTRAMUSCULAR; INTRAVENOUS at 12:35

## 2021-11-18 RX ADMIN — PROPOFOL 40 MG: 10 INJECTION, EMULSION INTRAVENOUS at 12:39

## 2021-11-18 RX ADMIN — SODIUM CHLORIDE, SODIUM LACTATE, POTASSIUM CHLORIDE, CALCIUM CHLORIDE: 600; 310; 30; 20 INJECTION, SOLUTION INTRAVENOUS at 12:31

## 2021-11-18 RX ADMIN — LIDOCAINE HYDROCHLORIDE 100 MG: 20 INJECTION, SOLUTION INFILTRATION; PERINEURAL at 12:35

## 2021-11-18 RX ADMIN — PROPOFOL 150 MCG/KG/MIN: 10 INJECTION, EMULSION INTRAVENOUS at 12:37

## 2021-11-18 RX ADMIN — PROPOFOL 50 MG: 10 INJECTION, EMULSION INTRAVENOUS at 12:37

## 2021-11-18 RX ADMIN — PROPOFOL 50 MG: 10 INJECTION, EMULSION INTRAVENOUS at 12:38

## 2021-11-18 ASSESSMENT — ENCOUNTER SYMPTOMS: SEIZURES: 1

## 2021-11-18 NOTE — ANESTHESIA POSTPROCEDURE EVALUATION
Patient: Dipesh Nicole    Procedure: Procedure(s):  ESOPHAGOGASTRODUODENOSCOPY, WITH BIOPSY       Diagnosis:Intractable vomiting with nausea, unspecified vomiting type [R11.2]  Diagnosis Additional Information: No value filed.    Anesthesia Type:  MAC    Note:  Disposition: Outpatient   Postop Pain Control: Uneventful            Sign Out: Well controlled pain   PONV: No   Neuro/Psych: Uneventful            Sign Out: Acceptable/Baseline neuro status   Airway/Respiratory: Uneventful            Sign Out: Acceptable/Baseline resp. status   CV/Hemodynamics: Uneventful            Sign Out: Acceptable CV status; No obvious hypovolemia; No obvious fluid overload   Other NRE: NONE   DID A NON-ROUTINE EVENT OCCUR? No           Last vitals:  Vitals Value Taken Time   /68 11/18/21 1314   Temp 36.3  C (97.4  F) 11/18/21 1314   Pulse     Resp 16 11/18/21 1314   SpO2 99 % 11/18/21 1314       Electronically Signed By: Artie Cartagena MD  November 18, 2021  2:20 PM

## 2021-11-18 NOTE — H&P
Dipesh BROWN [place]  [id number]  male  36 year old      Reason for procedure/surgery: vomiting    Patient Active Problem List   Diagnosis     Tremor     Seizure (H) at age 22 yrs     H/O magnetic resonance imaging of cervical spine     H/O shoulder surgery     H/O magnetic resonance imaging of brain and brain stem     Shoulder dislocation     Parkinson disease (H)     Right inguinal hernia     Hiatal hernia     Cyclical vomiting syndrome unrelated to migraine       Past Surgical History:    Past Surgical History:   Procedure Laterality Date     SHOULDER SURGERY  2007       Past Medical History:   Past Medical History:   Diagnosis Date     H/O magnetic resonance imaging of brain and brain stem 8/17/2018 2009 December MR Brain without and with IV Hprbruoq87/8/2009 [facility] Result Impression  Normal MRI of the brain.  Result Narrative      Multiecho, multiplanar views of the brain were obtained prior to and  following the IV administration of 19 mL of contrast. There are no  previous studies available for comparison.     The brain parenchyma is normal in appearance on all pulse sequences,  with      H/O magnetic resonance imaging of cervical spine 8/17/2018 March MR Cervical Spine without IV Contrast3/15/2017 [facility] Result Addenda Addendum by Provider, PA Jones on 03/15/2017 12:30 PM RAD^^^MA MR Cervical Spine w/o contrast 3/15/2017 12:30:00 Result Impression  Minimal degenerative disc disease at C5-6 and C6-7  otherwise an unremarkable MR scan of the cervical spine.  Result Narrative   EXAM: MR Cervical Spine w/o contrast   INDICATION:      H/O shoulder surgery 8/17/2018 2017 July XR SHOULDER 2 VIEWS LEFT7/14/2017 [facility] Result Narrative XR SHOULDER 2 VIEWS LEFT 7/14/2017 9:13 PM  INDICATION: Shoulder Injury COMPARISON: None.  FINDINGS: Negative shoulder. No fracture or dislocation.  Status       Hiatal hernia 4/20/2021    Based on a 2021 ct scan Small hiatal  hernia with some wall thickening in the distal thoracic esophagus suggesting some esophagitis.     Seizure (H) at age 22 yrs 8/17/2018     Shoulder dislocation     left     Tremor 8/10/2018       Social History:   Social History     Tobacco Use     Smoking status: Never Smoker     Smokeless tobacco: Never Used   Substance Use Topics     Alcohol use: Yes       Family History:   Family History   Problem Relation Age of Onset     Cancer Other         grandmother     Tremor No family hx of      Dementia No family hx of      Parkinsonism No family hx of      Seizure Disorder No family hx of        Allergies:   Allergies   Allergen Reactions     Diphenhydramine      Other reaction(s): Other (see comments)  Told not to take due to parkinsons     Metoclopramide      Avoid in patient with a history of Parkinson Disease  Other reaction(s): GI intolerance  Avoid in patient with a history of Parkinson Disease     Promethazine      Other reaction(s): Other (see comments)  Told not to take due to parkisons       Active Medications:   Current Outpatient Medications   Medication Sig Dispense Refill     carbidopa-levodopa (PARCOPA)  MG ODT Take Parcopa when you are vomiting and can't keep the carbidopa-levodopa tablets down: 1.5@630a, 1 @ 9am, 1@ 1130a, 1@ 2p, 1@430p, 1@7p, 1@930p 240 tablet 3     carbidopa-levodopa (SINEMET CR)  MG CR tablet Take 1 tablet by mouth At Bedtime 30 tablet 11     carbidopa-levodopa (SINEMET)  MG tablet 1.5@630a, 1 @ 9am, 1@ 1130a, 1@ 2p, 1@430p, 1@7p, 1@930p and 1/4-1/2 tab 3/day as needed after meals = 8.5- 10 tabs per day 900 tablet 3     amantadine (SYMMETREL) 100 MG capsule 100mg by mouth twice daily at 630am and [id number] 180 capsule 3     carbidopa (LODOSYN) 25 MG TABS tablet TAKE 1 TABLET (25 MG) BY MOUTH 3 TIMES DAILY       FOLBEE 2.5-25-1 MG TABS 1/2-1 tablet by mouth daily (Patient not taking: Reported on 10/6/2021) 90 tablet 3     NONFORMULARY Domperidone 1-2 tabs of 10mg by  mouth 3-4 times per day as needed up to 6/day (Patient not taking: Reported on 10/6/2021) 180 each 11     ondansetron (ZOFRAN) 4 MG tablet Take 4 mg by mouth every 8 hours as needed       ondansetron (ZOFRAN-ODT) 4 MG ODT tab Take 1 tablet (4 mg) by mouth every 8 hours as needed for nausea or vomiting (Patient not taking: Reported on 10/6/2021) 90 tablet 11       Systemic Review:   CONSTITUTIONAL: NEGATIVE for fever, chills, change in weight  ENT/MOUTH: NEGATIVE for ear, mouth and throat problems  RESP: NEGATIVE for significant cough or SOB  CV: NEGATIVE for chest pain, palpitations or peripheral edema    Physical Examination:   Vital Signs: BP (!) 146/103   Temp 97.7  F (36.5  C) (Temporal)   Resp 18   SpO2 100%   GENERAL:  alert and no distress  RESP: clear  CV: rrr  ABDOMEN: soft      Plan: Appropriate to proceed as scheduled.      Veronica Kay MD  11/18/2021    PCP:  Joey Morley

## 2021-11-18 NOTE — ANESTHESIA CARE TRANSFER NOTE
Patient: Dipesh Nicole    Procedure: Procedure(s):  ESOPHAGOGASTRODUODENOSCOPY, WITH BIOPSY       Diagnosis: Intractable vomiting with nausea, unspecified vomiting type [R11.2]  Diagnosis Additional Information: No value filed.    Anesthesia Type:   MAC     Note:    Oropharynx: oropharynx clear of all foreign objects and spontaneously breathing  Level of Consciousness: drowsy  Oxygen Supplementation: room air    Independent Airway: airway patency satisfactory and stable  Dentition: dentition unchanged  Vital Signs Stable: post-procedure vital signs reviewed and stable  Report to RN Given: handoff report given  Patient transferred to: Phase II    Handoff Report: Identifed the Patient, Identified the Reponsible Provider, Reviewed the pertinent medical history, Discussed the surgical course, Reviewed Intra-OP anesthesia mangement and issues during anesthesia, Set expectations for post-procedure period and Allowed opportunity for questions and acknowledgement of understanding      Vitals:  Vitals Value Taken Time   /57    Temp 97.2    Pulse 63    Resp 16    SpO2 97        Electronically Signed By: LUCERO Angeles CRNA  November 18, 2021  12:55 PM

## 2021-11-18 NOTE — TELEPHONE ENCOUNTER
Prior Authorization Retail Medication Request    Medication/Dose: Neupro 2 mg  ICD code (if different than what is on RX):  G20  Previously Tried and Failed:  Carbidopa-levodopa  mg IR, carbidopa-levodopa  mg CR, carbidopa-levodopa  mg ODT, amantadine, carbidopa (Lodosyn)  Rationale:  Patient experiences severe nausea/vomiting and would benefit from a non-oral formulation of Parkinson's disease medications    Insurance Name:  [facility]  Insurance ID:  [id number]    Pharmacy Information (if different than what is on RX)  Name:  [facility]  Phone:  [phone]

## 2021-11-18 NOTE — ANESTHESIA PREPROCEDURE EVALUATION
Anesthesia Pre-Procedure Evaluation    Patient: Dipesh Nicole   MRN: [id number] : 1985        Preoperative Diagnosis: Intractable vomiting with nausea, unspecified vomiting type [R11.2]    Procedure : Procedure(s):  ESOPHAGOGASTRODUODENOSCOPY (EGD)          Past Medical History:   Diagnosis Date     H/O magnetic resonance imaging of brain and brain stem 2018 MR Brain without and with IV Vkmleedk12/8/2009 [facility] Result Impression  Normal MRI of the brain.  Result Narrative      Multiecho, multiplanar views of the brain were obtained prior to and  following the IV administration of 19 mL of contrast. There are no  previous studies available for comparison.     The brain parenchyma is normal in appearance on all pulse sequences,  with      H/O magnetic resonance imaging of cervical spine 2018 MR Cervical Spine without IV Contrast3/15/2017 [facility] Result Addenda Addendum by ProviderKaren M.D. on 03/15/2017 12:30 PM RAD^^^MA MR Cervical Spine w/o contrast 3/15/2017 12:30:00 Result Impression  Minimal degenerative disc disease at C5-6 and C6-7  otherwise an unremarkable MR scan of the cervical spine.  Result Narrative   EXAM: MR Cervical Spine w/o contrast   INDICATION:      H/O shoulder surgery 2018 [facility] Result Narrative XR SHOULDER 2 VIEWS LEFT 2017 9:13 PM  INDICATION: Shoulder Injury COMPARISON: None.  FINDINGS: Negative shoulder. No fracture or dislocation.  Status       Hiatal hernia 2021    Based on a  ct scan Small hiatal hernia with some wall thickening in the distal thoracic esophagus suggesting some esophagitis.     Seizure (H) at age 22 yrs 2018     Shoulder dislocation     left     Tremor 8/10/2018      Past Surgical History:   Procedure Laterality Date     SHOULDER SURGERY        Allergies   Allergen Reactions     Diphenhydramine       Other reaction(s): Other (see comments)  Told not to take due to parkinsons     Metoclopramide      Avoid in patient with a history of Parkinson Disease  Other reaction(s): GI intolerance  Avoid in patient with a history of Parkinson Disease     Promethazine      Other reaction(s): Other (see comments)  Told not to take due to parkisons      Social History     Tobacco Use     Smoking status: Never Smoker     Smokeless tobacco: Never Used   Substance Use Topics     Alcohol use: Yes      Wt Readings from Last 1 Encounters:   11/04/21 79.4 kg (175 lb)        Anesthesia Evaluation   Pt has had prior anesthetic. Type: General.        ROS/MED HX  ENT/Pulmonary:  - neg pulmonary ROS     Neurologic:  - neg neurologic ROS   (+) seizures, Parkinson's disease,     Cardiovascular:  - neg cardiovascular ROS  (-) murmur   METS/Exercise Tolerance: >4 METS    Hematologic:  - neg hematologic  ROS     Musculoskeletal:  - neg musculoskeletal ROS     GI/Hepatic:  - neg GI/hepatic ROS   (+) GERD, Symptomatic, hiatal hernia,     Renal/Genitourinary:  - neg Renal ROS     Endo:  - neg endo ROS     Psychiatric/Substance Use:  - neg psychiatric ROS     Infectious Disease:  - neg infectious disease ROS     Malignancy:  - neg malignancy ROS     Other:  - neg other ROS          Physical Exam    Airway        Mallampati: I   TM distance: > 3 FB   Neck ROM: full   Mouth opening: > 3 cm    Respiratory Devices and Support         Dental  no notable dental history         Cardiovascular          Rhythm and rate: regular and normal (-) no murmur    Pulmonary   pulmonary exam normal        breath sounds clear to auscultation           OUTSIDE LABS:  CBC: No results found for: WBC, HGB, HCT, PLT  BMP: No results found for: NA, POTASSIUM, CHLORIDE, CO2, BUN, CR, GLC  COAGS: No results found for: PTT, INR, FIBR  POC: No results found for: BGM, HCG, HCGS  HEPATIC: No results found for: ALBUMIN, PROTTOTAL, ALT, AST, GGT, ALKPHOS, BILITOTAL, BILIDIRECT,  JACQUELYN  OTHER: No results found for: PH, LACT, A1C, DAWSON, PHOS, MAG, LIPASE, AMYLASE, TSH, T4, T3, CRP, SED    Anesthesia Plan    ASA Status:  3      Anesthesia Type: MAC.     - Reason for MAC: chronic cardiopulmonary disease, immobility needed   Induction: Intravenous, Propofol.   Maintenance: TIVA.        Consents    Anesthesia Plan(s) and associated risks, benefits, and realistic alternatives discussed. Questions answered and patient/representative(s) expressed understanding.     - Discussed: Risks, Benefits and Alternatives for BOTH SEDATION and the PROCEDURE were discussed     - Discussed with:  Patient      - Specific Concerns: Conversion to general with associated airway management.     - Extended Intubation/Ventilatory Support Discussed: No.      - Patient is DNR/DNI Status: No    Use of blood products discussed: No .     Postoperative Care    Pain management: IV analgesics.   PONV prophylaxis: Background Propofol Infusion     Comments:    Other Comments: Patient appropriately NPO, has episodes of vomiting 2-3 times a week preempted by foot spasm and abdominal pain, of which he is not experiencing either currently. Will plan on starting with Native airway unless substantial gastric contents seen or other changes occur.             Artie Cartagena MD

## 2021-11-19 PROCEDURE — 88305 TISSUE EXAM BY PATHOLOGIST: CPT | Mod: 26 | Performed by: PATHOLOGY

## 2021-11-19 PROCEDURE — 88342 IMHCHEM/IMCYTCHM 1ST ANTB: CPT | Mod: 26 | Performed by: PATHOLOGY

## 2021-11-19 PROCEDURE — 88341 IMHCHEM/IMCYTCHM EA ADD ANTB: CPT | Mod: 26 | Performed by: PATHOLOGY

## 2021-11-19 NOTE — PROGRESS NOTES
VIDEO VISIT    Date of Visit: November 23, 2021  Name: Dipesh Nicole  Date of Birth 1985    [address] [place] [address]   [email or web address]  [phone] (M)   [phone] (H)      Ritu       [phone]    See information from Theresa at [facility]    [phone]    Assessment:  (G20) Parkinson disease (H)  (primary encounter diagnosis)    Developed symptoms left arm at age 31 years or 30 years of age  Diagnosis was made 2018  Parkinson's disease, not due to a mutation in SNCA, GBA, LRRK2, kimberley, DJ1, PINK1, or VPS35.       Amantadine symmetrel 100mg twice daily     carbidopa/levodopa sinemet 25/100  1.5@630am, 1@9a, 1@1130am, 1@2p, 1@430p, 1@7p, 1/2-1@930p     Review of diagnosis    Young onset Parkinson     Avoidance of dopamine blockers   Not taking     Motor complication review   Motor fluctuations/dyskinesias     Review of Impulse control disorders      Review of surgical or medication options      Gait/Balance/Falls      Exercise/Therapy performed/offered     Had intractable vomiting with motor testing on 10/6/2021  OFF score of 53      Cognitive/Driving      Working with his dad's company and may move to [place] and look for a job      Brain MRI 9/30/2021     Impression:  On high-resolution SWI images, nigrosome-1 cannot be well-seen  (absence of swallow tail sign). This can be seen in Parkinson's  disease or Lewy Body dementia. No abnormal enhancement.        Neuropsych evaluation 10/19/2021     Annalise Nicole is a 35-year-old man with a history of Parkinson's disease diagnosed in 2018.  He is bothered by left-sided tremor and right-sided stiffness.  He is interested in undergoing DBS surgery for management of his symptoms.     Current results indicate performance that falls almost entirely within normal limits across cognitive domains, generally in the average to above average range. He has difficulty with memory on one list-learning task; learning and memory otherwise falls  within normal limits. He does not report significant depressive symptomatology, anxiety, apathy, or compulsive behaviors.     This pattern of performance does not reflect dementia at this time, and is only subtly abnormal. There is no consistent suggestion of cognitive decline.  He appears to be capable of comprehending medical information and making well reasoned decisions for himself. He has a good understanding of the surgical procedure and the risks involved.  He has a good support system in his family.  He lives alone now, but will be moving in with his brother in the next couple of months, and his parents are also available to provide assistance as necessary. He appears to be a good candidate for surgery from a neurocognitive perspective.    Mood   denies     Hallucinations/delusions   denies     Sleep      Crazy dreams  No problems falling asleep  Has not yet tried melatonin  He recalls having dreams when he wakes up  He can be awaken about a dream about cramping leg and then wakes up with a cramp and takes sinemet and goes back to bed.      Waking up with cramping at 2am or so and taking 1/4 to 1/2 tablet of medication.     Bladder   Denies     GI/Constipation/GERD     odansetron zofran ODT 4mg as needed     Had some cramping in legs- 3 hours after eating at 930pm   He had dose at 630pm and ate at 730 and cramping at 930pm     Cramping - independent of what he is eating.      Having episodes once per month - wondering     Endoscopy 11/2021    - Normal first portion of the duodenum, second portion                          of the duodenum and third portion of the duodenum.                          Biopsied.                          - Nodular mucosa in the duodenal bulb. Biopsied.                          - Normal stomach. Biopsied.                          - Medium-sized hiatal hernia.                          - LA Grade C esophagitis (with numerous ulcers at the                          GE junction and in the  esophagus) with bleeding.                          Biopsied. Friability may indicate EoE as well -                          further biopses avoided given friability.   Recommendation:        - Patient has a contact number available for                          emergencies. The signs and symptoms of potential                          delayed complications were discussed with the patient.                          Return to normal activities tomorrow. Written                          discharge instructions were provided to the patient.                          - Resume previous diet.                          - Continue present medications.                          - Await pathology results.                          - Repeat upper endoscopy at appointment to be                          scheduled to check healing.                          - Start PPI BID if there is no interaction with                          current medications.   ENDO   denies     Cardio/heart   Denies faints     Vision   No changes in vision     Heme   Not taking aspirin     Other:     Had a possible seizure on 11/23/2009 - had an mri and was discussing the use of medication  From chart review it may have been a syncopal episode vs seizure. He had been dehydrated and possibly sleep deprived.      EEG was done 12/9/2009  This is an abnormal EEG due to the presence of two episodes of frontally maximal sharp waves, epileptiform in nature. This places the patient at risk for partial or secondarily generalized seizures arising from the frontal lobes.      BRAIN MRI was normal on 12/8/2009     Seen by Dr. Je Sanon on 12/10/2009 and Annalise opted not to take an AED lamotrigine          630a 9a 1130a 2p 430p 7p 930p 10/11p 2-3am   Amantadine symmetrel 100mg 1   1              Carbidopa (lodosyn) 25mg 1   1     1        Carbidopa/levodopa Parcopa 25/100 ODT prn                  Carbidopa/levodopa Sinemet CR 50/200              1      Carbidopa/Levodopa 25/250 stopped                  carbidopa/levodopa sinemet 25/100 1.5 1 1 1 1  1 1  prn   Carbidopa/levodopa Rytary 195mg Not started           Folbee ?taking                  Domperidone motilium 10mg stopped                  Omeprazole prilosec 20mg 1     1      Ondansetron zofran 4mg prn                  Ondansetron zofran-ODT 4mg  prn                  Rotigotine neupro 2mg/24 hr patch Not started                           Plan:    [facility] Consultation 12/21   Will need to be on omeprazole/prilosec     Wondering about ativan for surgery as it helped with vomiting - discussed reluctance about using this but may be needed perioperatively.     Will have to review the possibility of using lorazepam short term with Maryanne swift.     He has not taken the rotigotine or rytary yet as it has not been approved.     We are still needing to get control of his wearing off and nausea.   Encouraged to take the carbidopa - he is not sure it is helpful.   He is also going to start the omeprazole twice daily      He has benefit from the CR at night and does not need night time sinemet at 2 or 3am    Discussed the drug screen results -     Once he gets his pills/patch will need to have him chat with maryanne Swift      Medical Decision Making:  #  Chronic progressive medical conditions addressed  - parkinson  Review and/or interpretation of unique test or documentation from a provider outside of neurology reviewed   Independent historian provided additional details  no   Prescription drug management and review of potential side effects and/or monitoring for side effects  yes   Health impacted by social determinants of health  no    I have reviewed the note as documented above.  This accurately captures the substance of my conversation with the patient and total time spent preparing for visit, executing visit and completing visit on the day of the visit:  30 minutes.     Video face to face   "[phone]    Ramesh Carson MD      ------------------------------------------------------------------------------------------------------------------------------------------------------------------------    Video-Visit Details    The patient has been notified of following:     \"After a review of the patient s situation, this visit was changed from an in-person visit to a video visit to reduce the risk of COVID-19 exposure.   The patient is being evaluated via a billable video visit.\"    \"This video visit will be conducted via a call between you and your physician/provider. We have found that certain health care needs can be provided without the need for an in-person physical exam.  This service lets us provide the care you need with a video conversation.  If a prescription is necessary we can send it directly to your pharmacy.  If lab work is needed we can place an order for that and you can then stop by our lab to have the test done at a later time.    If during the course of the call the physician/provider feels a video visit is not appropriate, you will not be charged for this service.\"     Patient has given verbal consent for Video visit? Yes    Patient would like the video invitation sent by:     Type of service:  Video Visit    Video Start Time:     Video End Time (time video stopped):     Duration:  minutes - see above    Originating Location (pt. Location):     Distant Location (provider location):  [facility] NEUROLOGY [facility]     Mode of Communication:  Video Conference via [employer] (and if not possible then doximity)      Ramesh Carson MD      --------------------------------------------------------------------------------------------------------------    Dipesh Nicole is a 36 year old male who is being evaluated via a billable video visit.      Charts reviewed  Consult from  Images reviewed        I have reviewed and updated the patient's Past Medical History, Social History, Family " History and Medication List.    ALLERGIES  Diphenhydramine, Metoclopramide, and Promethazine    Lasts visit details if there was a last visit:       14 Review of systems  are negative except for   Patient Active Problem List   Diagnosis     Tremor     Seizure (H) at age 22 yrs     H/O magnetic resonance imaging of cervical spine     H/O shoulder surgery     H/O magnetic resonance imaging of brain and brain stem     Shoulder dislocation     Parkinson disease (H)     Right inguinal hernia     Hiatal hernia     Cyclical vomiting syndrome unrelated to migraine     Esophagitis endoscopy done 11/2021     H/O electroencephalogram 2021        Allergies   Allergen Reactions     Diphenhydramine      Other reaction(s): Other (see comments)  Told not to take due to parkinsons     Metoclopramide      Avoid in patient with a history of Parkinson Disease  Other reaction(s): GI intolerance  Avoid in patient with a history of Parkinson Disease     Promethazine      Other reaction(s): Other (see comments)  Told not to take due to parkisons     Past Surgical History:   Procedure Laterality Date     SHOULDER SURGERY  2007     Past Medical History:   Diagnosis Date     Esophagitis endoscopy done 11/2021 11/18/2021    Impression:            - Normal first portion of the duodenum, second portion                         of the duodenum and third portion of the duodenum.                         Biopsied.                         - Nodular mucosa in the duodenal bulb. Biopsied.                         - Normal stomach. Biopsied.                         - Medium-sized hiatal hernia.                         - LA Grade     H/O electroencephalogram 2021 11/18/2021     Impression:  This is a normal waking and sleep EEG, with generalized beta activities throughout the recording. Generalized beta activities are usually associated certain medication use, such as benzodiazepines or barbiturates. The cause of these activities in this case is unclear at  this time.         H/O magnetic resonance imaging of brain and brain stem 8/17/2018 2009 December MR Brain without and with IV [id number]/8/2009 [facility] Result Impression  Normal MRI of the brain.  Result Narrative      Multiecho, multiplanar views of the brain were obtained prior to and  following the IV administration of 19 mL of contrast. There are no  previous studies available for comparison.     The brain parenchyma is normal in appearance on all pulse sequences,  with      H/O magnetic resonance imaging of cervical spine 8/17/2018 March MR Cervical Spine without IV Contrast3/15/2017 [facility] Result Addenda Addendum by Provider, PA Jones on 03/15/2017 12:30 PM RAD^^^MA MR Cervical Spine w/o contrast 3/15/2017 12:30:00 Result Impression  Minimal degenerative disc disease at C5-6 and C6-7  otherwise an unremarkable MR scan of the cervical spine.  Result Narrative   EXAM: MR Cervical Spine w/o contrast   INDICATION:      H/O shoulder surgery 8/17/2018 2017 July XR SHOULDER 2 VIEWS [facility] Result Narrative XR SHOULDER 2 VIEWS LEFT 7/14/2017 9:13 PM  INDICATION: Shoulder Injury COMPARISON: None.  FINDINGS: Negative shoulder. No fracture or dislocation.  Status       Hiatal hernia 4/20/2021    Based on a 2021 ct scan Small hiatal hernia with some wall thickening in the distal thoracic esophagus suggesting some esophagitis.     Seizure (H) at age 22 yrs 8/17/2018     Shoulder dislocation     left     Tremor 8/10/2018     Social History     Socioeconomic History     Marital status: Single     Spouse name: Not on file     Number of children: Not on file     Years of education: Not on file     Highest education level: Not on file   Occupational History     Not on file   Tobacco Use     Smoking status: Never Smoker     Smokeless tobacco: Never Used   Substance and Sexual Activity     Alcohol use: Yes     Drug use: No     Sexual activity: Not on  file   Other Topics Concern     Not on file   Social History Narrative    single. lives in [place]. mother cabrera ambriz            He has a history of a left dislocated shoulder with surgery performed in 2009. He currently lives in [place] but comes to the [place] area to visit his parents in [place]. 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     Social Determinants of Health     Financial Resource Strain: Not on file   Food Insecurity: Not on file   Transportation Needs: Not on file   Physical Activity: Not on file   Stress: Not on file   Social Connections: Not on file   Intimate Partner Violence: Not At Risk     Fear of Current or Ex-Partner: No     Emotionally Abused: No     Physically Abused: No     Sexually Abused: No   Housing Stability: Not on file     Family History   Problem Relation Age of Onset     Cancer Other         grandmother     Tremor No family hx of      Dementia No family hx of      Parkinsonism No family hx of      Seizure Disorder No family hx of      Current Outpatient Medications   Medication Sig Dispense Refill     amantadine (SYMMETREL) 100 MG capsule 100mg by mouth twice daily at 630am and 67140kq 180 capsule 3     carbidopa (LODOSYN) 25 MG TABS tablet TAKE 1 TABLET (25 MG) BY MOUTH 3 TIMES DAILY       carbidopa-levodopa (PARCOPA)  MG ODT Take Parcopa when you are vomiting and can't keep the carbidopa-levodopa tablets down: 1.5@630a, 1 @ 9am, 1@ 1130a, 1@ 2p, 1@430p, 1@7p, 1@930p 240 tablet 3     carbidopa-levodopa (RYTARY) 48. MG CPCR per CR capsule Take 3 capsules by mouth 3 times daily 270 capsule 11     carbidopa-levodopa (SINEMET CR)  MG CR tablet Take 1 tablet by mouth At Bedtime 30 tablet 11     carbidopa-levodopa (SINEMET)  MG tablet 1.5@630a, 1 @ 9am, 1@ 1130a, 1@ 2p, 1@430p, 1@7p, 1@930p and 1/4-1/2 tab 3/day as needed after meals = 8.5- 10 tabs per day 900 tablet 3     FOLBEE 2.5-25-1 MG TABS 1/2-1 tablet by mouth daily  (Patient not taking: Reported on 10/6/2021) 90 tablet 3     NONFORMULARY Domperidone 1-2 tabs of 10mg by mouth 3-4 times per day as needed up to 6/day (Patient not taking: Reported on 10/6/2021) 180 each 11     omeprazole (PRILOSEC) 20 MG DR capsule Take 1 capsule (20 mg) by mouth 2 times daily 60 capsule 3     ondansetron (ZOFRAN) 4 MG tablet Take 4 mg by mouth every 8 hours as needed       ondansetron (ZOFRAN-ODT) 4 MG ODT tab Take 1 tablet (4 mg) by mouth every 8 hours as needed for nausea or vomiting (Patient not taking: Reported on 10/6/2021) 90 tablet 11     rotigotine (NEUPRO) 2 MG/24HR 24 hr patch Place 1 patch onto the skin daily 30 patch 11           Thank you for your time today in discussing Deep Brain Stimulation. Below is what we talked about:     1. We will rediscuss your case after medication changes - need to trial meds to avoid vomiting during surgery. Appointment with Dr. Carson 11/23 to discuss medication options.     2. If medication plan can be developed to avoid vomiting during surgery, you are a Candidate for staged gpi start with RGpi  (You would like to start with LGPi to treat your right foot dystonia).     3. [email or web address] Genus rechargeable - keep in mind if you get another medical implanted device you may no longer be MRI conditional.  You will need to let us know what device  you prefer.     4. We are treating your tremor, bradykinesia, rigidity. You have motor fluctuations, predictable freezing of gait and dystonia when off, choreiform dyskinesias when on. You have vomiting with medication and off time. Another indication for surgery is to decrease medication.     5. Trial with Neupro before moving forward with surgery scheduling. Veronica to talk with Dr. Carson regarding medications. Veronica placed PA for the Neupro during the meeting. Rytary also option.     6. Interested in research - YES     I made it very clear, the surgery depends on getting you, medically, to a  "point where you are not vomiting, as this cannot occur during surgery.  Briefly discussed dopamine agonists (Neupro) and our concern for ICDs and that Dr. Carson will look at all the options.     Writer went into detail about the MRI conditionality of [employer]. If you were to need another medically implanted device you may no longer be MRI conditional with the Bsci system. You stated, \"Yeah, I am young and may need an MRI of my knee or hip, shoulder.\" Explained that the other two systems are very good, it is just an individual decision. You were intrigued hearing about the [employer] remote programming. I told you there is a risk with all Deep Brain Stimulation systems of a short or open circuit which would make you ineligible for an MRI.     Writer discussed our concern about DDS dopamine dysregulation syndrome You assured me that you do not excessively shop, eat, osullivan or have hypersexuality.     You want to treat the dystonia of his right leg first - LGpi preferred. You have noticed the pattern of your right foot locking up first then goes to your abdomen and then you vomit.      You are interested in research and learning about the [employer] Loop for bilateral DEEP BRAIN STIMULATION.     Warmest regards,  Theresa Garcia, DAMONN, RN, PHN, BA, CNRN, HNB-BC  DBS   Clinical Nurse Care Coordinator Movement Disorders   Phone: [phone]  Fax: [phone]    Last read by Dipesh Nicole at 3:35 PM on 11/22/2021.    "

## 2021-11-22 ENCOUNTER — TELEPHONE (OUTPATIENT)
Dept: NEUROLOGY | Facility: CLINIC | Age: 36
End: 2021-11-22
Payer: COMMERCIAL

## 2021-11-22 LAB
PATH REPORT.ADDENDUM SPEC: NORMAL
PATH REPORT.COMMENTS IMP SPEC: NORMAL
PATH REPORT.FINAL DX SPEC: NORMAL
PATH REPORT.GROSS SPEC: NORMAL
PATH REPORT.MICROSCOPIC SPEC OTHER STN: NORMAL
PATH REPORT.RELEVANT HX SPEC: NORMAL
PHOTO IMAGE: NORMAL

## 2021-11-22 NOTE — TELEPHONE ENCOUNTER
"From 11/18/21 Consensus:    1. Rediscuss after medication changes - need to trial meds to avoid vomiting during surgery. Appointment with Dr. Carson 11/23 to discuss medication options.    2. If medication plan can be developed to avoid vomiting during surgery, he is a Candidate for staged gpi start with RGpi    3. [employer] Genus rechargeable - make sure he knows if he has another medical implanted device he may no longer be MRI conditional    4. Treating his tremor, bradykinesia, rigidity. He has motor fluctuations, predictable FOG and dystonia when off, choreiform dyskinesias when on. He has vomiting with medication. Other indication for surgery is to decrease medication.    5. Trial with Neupro before moving forward with surgery scheduling. Veronica to talk with Dr. Carson regarding medications. She placed PA for the Neupro during the meeting. Rytary also option.    6. Interested in research - YES    Authorization to discuss Protected Health Information: Yes, Ritu (mother) and Sarath (father)  Healthcare Directive: None on file  [employer] use: Uses reliably  ---------------------------------  Writer made it very clear, the surgery depends on getting him, medically, to a point where he is not vomiting, as this cannot occur during surgery.  Briefly discussed dopamine agonists (Neupro) and our concern for ICDs in him and that Dr. Carson will look at all the options.    Writer went into detail about the MRI conditionality of [employer]. If he were to need another medically implanted device he may no longer be MRI conditional with the Bsci system. He stated, \"Yeah, I am young and may need an MRI of my knee or hip, shoulder.\" Explained that the other two systems are very good, it is just an individual decision. He was very intrigued hearing about the [employer] remote programming. Writer stated there is a risk with all Deep Brain Stimulation systems of a short or open circuit which would make him ineligible for an " "MRI.    Writer asked about the cocaine found in his system in July. Patient assured writer it was a \"One time thing, I was at a party with friends, I know it was wrong. I got sick and went to the hospital.\"     Writer discussed our concern about DDS dopamine dysregulation syndrome with him as he had a history of using cash to purchase the carbidopa/levodopa from his pharmacy before refills were available. Stated that this propensity, plus a dopamine agonist, plus any kind of addictive behavior does not natan well. Patient assured writer that he does not excessively shop, eat, osullivan or have hypersexuality.    Patient wants to treat the dystonia of his right leg first - LGpi preferred. He has noticed the pattern of his right foot locking up first then goes to his abdomen and then he vomits.     He is interested in research and learning about the GWL for bilateral DEEP BRAIN STIMULATION.    [employer] message sent with the above information.  18 minute call.    "

## 2021-11-22 NOTE — TELEPHONE ENCOUNTER
Central Prior Authorization Team   Phone: [phone]    PA Initiation    Medication: Neupro 2 mg  Insurance Company: [employer] - Phone [phone] Fax [phone]  Pharmacy Filling the Rx: [id number] - [place], [place] - [address]  Filling Pharmacy Phone: [phone]  Filling Pharmacy Fax: [phone]  Start Date: 11/22/2021

## 2021-11-23 ENCOUNTER — VIRTUAL VISIT (OUTPATIENT)
Dept: NEUROLOGY | Facility: CLINIC | Age: 36
End: 2021-11-23
Payer: COMMERCIAL

## 2021-11-23 DIAGNOSIS — G20.A1 PARKINSON DISEASE (H): Primary | ICD-10-CM

## 2021-11-23 PROCEDURE — 99214 OFFICE O/P EST MOD 30 MIN: CPT | Mod: 95 | Performed by: PSYCHIATRY & NEUROLOGY

## 2021-11-23 RX ORDER — CARBIDOPA AND LEVODOPA 50; 200 MG/1; MG/1
TABLET, EXTENDED RELEASE ORAL
Qty: 90 TABLET | Refills: 11 | COMMUNITY
Start: 2021-11-23 | End: 2022-04-06

## 2021-11-23 RX ORDER — AMANTADINE HYDROCHLORIDE 100 MG/1
100 CAPSULE, GELATIN COATED ORAL EVERY MORNING
Qty: 180 CAPSULE | Refills: 3 | COMMUNITY
Start: 2021-11-23 | End: 2022-01-18

## 2021-11-23 RX ORDER — CARBIDOPA 25 MG/1
25 TABLET ORAL EVERY MORNING
Qty: 270 TABLET | Refills: 3 | COMMUNITY
Start: 2021-11-23 | End: 2022-02-15

## 2021-11-23 NOTE — TELEPHONE ENCOUNTER
Prior Authorization Approval    Authorization Effective Date: 11/23/2021  Authorization Expiration Date: 11/23/2022  Medication: Neupro 2 mg-PA APPROVED   Approved Dose/Quantity:   Reference #:     Insurance Company: [employer] - Phone [phone] Fax [phone]  Expected CoPay:       CoPay Card Available:      Foundation Assistance Needed:    Which Pharmacy is filling the prescription (Not needed for infusion/clinic administered): [id number] - [place], [address]  Pharmacy Notified: Yes- **Instructed pharmacy to notify patient when script is ready to /ship.**  Patient Notified: Yes

## 2021-11-23 NOTE — PROGRESS NOTES
ALEA is a 36 year old who is being evaluated via a billable video visit.      How would you like to obtain your AVS? Mychart  If the video visit is dropped, the invitation should be resent by: [phone]      Video Start Time:   Video-Visit Details    Type of service:  Video Visit    Video End Time:    Originating Location (pt. Location): Home    Distant Location (provider location):  [facility] NEUROLOGY [facility]     Platform used for Video Visit: [employer]

## 2021-11-23 NOTE — LETTER
11/23/2021       RE: Dipesh Nicole  [address] [address]     Dear Colleague,    Thank you for referring your patient, Dipesh Nicole, to the [facility] NEUROLOGY CLINIC [place] at [facility]. Please see a copy of my visit note below.        VIDEO VISIT    Date of Visit: November 23, 2021  Name: Dipesh Nicole  Date of Birth 1985    [address] [address]   [email or web address]  [phone] (M)   [phone] (H)      Ritu       [phone]    See information from Theresa at [facility]    [phone]    Assessment:  (G20) Parkinson disease (H)  (primary encounter diagnosis)    Developed symptoms left arm at age 31 years or 30 years of age  Diagnosis was made 2018  Parkinson's disease, not due to a mutation in SNCA, GBA, LRRK2, kimberley, DJ1, PINK1, or VPS35.       Amantadine symmetrel 100mg twice daily     carbidopa/levodopa sinemet 25/100  1.5@630am, 1@9a, 1@1130am, 1@2p, 1@430p, 1@7p, 1/2-1@930p     Review of diagnosis    Young onset Parkinson     Avoidance of dopamine blockers   Not taking     Motor complication review   Motor fluctuations/dyskinesias     Review of Impulse control disorders      Review of surgical or medication options      Gait/Balance/Falls      Exercise/Therapy performed/offered     Had intractable vomiting with motor testing on 10/6/2021  OFF score of 53      Cognitive/Driving      Working with his dad's company and may move to [place] and look for a job      Brain MRI 9/30/2021     Impression:  On high-resolution SWI images, nigrosome-1 cannot be well-seen  (absence of swallow tail sign). This can be seen in Parkinson's  disease or Lewy Body dementia. No abnormal enhancement.        Neuropsych evaluation 10/19/2021     Annalise Nicole is a 35-year-old man with a history of Parkinson's disease diagnosed in 2018.  He is bothered by left-sided tremor and right-sided stiffness.  He is interested in  undergoing DBS surgery for management of his symptoms.     Current results indicate performance that falls almost entirely within normal limits across cognitive domains, generally in the average to above average range. He has difficulty with memory on one list-learning task; learning and memory otherwise falls within normal limits. He does not report significant depressive symptomatology, anxiety, apathy, or compulsive behaviors.     This pattern of performance does not reflect dementia at this time, and is only subtly abnormal. There is no consistent suggestion of cognitive decline.  He appears to be capable of comprehending medical information and making well reasoned decisions for himself. He has a good understanding of the surgical procedure and the risks involved.  He has a good support system in his family.  He lives alone now, but will be moving in with his brother in the next couple of months, and his parents are also available to provide assistance as necessary. He appears to be a good candidate for surgery from a neurocognitive perspective.    Mood   denies     Hallucinations/delusions   denies     Sleep      Crazy dreams  No problems falling asleep  Has not yet tried melatonin  He recalls having dreams when he wakes up  He can be awaken about a dream about cramping leg and then wakes up with a cramp and takes sinemet and goes back to bed.      Waking up with cramping at 2am or so and taking 1/4 to 1/2 tablet of medication.     Bladder   Denies     GI/Constipation/GERD     odansetron zofran ODT 4mg as needed     Had some cramping in legs- 3 hours after eating at 930pm   He had dose at 630pm and ate at 730 and cramping at 930pm     Cramping - independent of what he is eating.      Having episodes once per month - wondering     Endoscopy 11/2021    - Normal first portion of the duodenum, second portion                          of the duodenum and third portion of the duodenum.                           Biopsied.                          - Nodular mucosa in the duodenal bulb. Biopsied.                          - Normal stomach. Biopsied.                          - Medium-sized hiatal hernia.                          - LA Grade C esophagitis (with numerous ulcers at the                          GE junction and in the esophagus) with bleeding.                          Biopsied. Friability may indicate EoE as well -                          further biopses avoided given friability.   Recommendation:        - Patient has a contact number available for                          emergencies. The signs and symptoms of potential                          delayed complications were discussed with the patient.                          Return to normal activities tomorrow. Written                          discharge instructions were provided to the patient.                          - Resume previous diet.                          - Continue present medications.                          - Await pathology results.                          - Repeat upper endoscopy at appointment to be                          scheduled to check healing.                          - Start PPI BID if there is no interaction with                          current medications.   ENDO   denies     Cardio/heart   Denies faints     Vision   No changes in vision     Heme   Not taking aspirin     Other:     Had a possible seizure on 11/23/2009 - had an mri and was discussing the use of medication  From chart review it may have been a syncopal episode vs seizure. He had been dehydrated and possibly sleep deprived.      EEG was done 12/9/2009  This is an abnormal EEG due to the presence of two episodes of frontally maximal sharp waves, epileptiform in nature. This places the patient at risk for partial or secondarily generalized seizures arising from the frontal lobes.      BRAIN MRI was normal on 12/8/2009     Seen by Dr. Je Sanon on 12/10/2009  and Annalise opted not to take an AED lamotrigine          630a 9a 1130a 2p 430p 7p 930p 10/11p 2-3am   Amantadine symmetrel 100mg 1   1              Carbidopa (lodosyn) 25mg 1   1     1        Carbidopa/levodopa Parcopa 25/100 ODT prn                  Carbidopa/levodopa Sinemet CR 50/200              1     Carbidopa/Levodopa 25/250 stopped                  carbidopa/levodopa sinemet 25/100 1.5 1 1 1 1  1 1  prn   Carbidopa/levodopa Rytary 195mg Not started           Folbee ?taking                  Domperidone motilium 10mg stopped                  Omeprazole prilosec 20mg 1     1      Ondansetron zofran 4mg prn                  Ondansetron zofran-ODT 4mg  prn                  Rotigotine neupro 2mg/24 hr patch Not started                           Plan:    [facility] Consultation 12/21   Will need to be on omeprazole/prilosec     Wondering about ativan for surgery as it helped with vomiting - discussed reluctance about using this but may be needed perioperatively.     Will have to review the possibility of using lorazepam short term with Maryanne swift.     He has not taken the rotigotine or rytary yet as it has not been approved.     We are still needing to get control of his wearing off and nausea.   Encouraged to take the carbidopa - he is not sure it is helpful.   He is also going to start the omeprazole twice daily      He has benefit from the CR at night and does not need night time sinemet at 2 or 3am    Discussed the drug screen results -     Once he gets his pills/patch will need to have him chat with maryanne Swift      Medical Decision Making:  #  Chronic progressive medical conditions addressed  - parkinson  Review and/or interpretation of unique test or documentation from a provider outside of neurology reviewed   Independent historian provided additional details  no   Prescription drug management and review of potential side effects and/or monitoring for side effects  yes   Health impacted by  "social determinants of health  no    I have reviewed the note as documented above.  This accurately captures the substance of my conversation with the patient and total time spent preparing for visit, executing visit and completing visit on the day of the visit:  30 minutes.     Video face to face  310-339pm    Ramesh Carson MD      ------------------------------------------------------------------------------------------------------------------------------------------------------------------------    Video-Visit Details    The patient has been notified of following:     \"After a review of the patient s situation, this visit was changed from an in-person visit to a video visit to reduce the risk of COVID-19 exposure.   The patient is being evaluated via a billable video visit.\"    \"This video visit will be conducted via a call between you and your physician/provider. We have found that certain health care needs can be provided without the need for an in-person physical exam.  This service lets us provide the care you need with a video conversation.  If a prescription is necessary we can send it directly to your pharmacy.  If lab work is needed we can place an order for that and you can then stop by our lab to have the test done at a later time.    If during the course of the call the physician/provider feels a video visit is not appropriate, you will not be charged for this service.\"     Patient has given verbal consent for Video visit? Yes    Patient would like the video invitation sent by:     Type of service:  Video Visit    Video Start Time:     Video End Time (time video stopped):     Duration:  minutes - see above    Originating Location (pt. Location):     Distant Location (provider location):  [facility] NEUROLOGY [facility]     Mode of Communication:  Video Conference via [employer] (and if not possible then doximity)      Ramesh Carson" MD      --------------------------------------------------------------------------------------------------------------    Dipesh Nicole is a 36 year old male who is being evaluated via a billable video visit.      Charts reviewed  Consult from  Images reviewed        I have reviewed and updated the patient's Past Medical History, Social History, Family History and Medication List.    ALLERGIES  Diphenhydramine, Metoclopramide, and Promethazine    Lasts visit details if there was a last visit:       14 Review of systems  are negative except for   Patient Active Problem List   Diagnosis     Tremor     Seizure (H) at age 22 yrs     H/O magnetic resonance imaging of cervical spine     H/O shoulder surgery     H/O magnetic resonance imaging of brain and brain stem     Shoulder dislocation     Parkinson disease (H)     Right inguinal hernia     Hiatal hernia     Cyclical vomiting syndrome unrelated to migraine     Esophagitis endoscopy done 11/2021     H/O electroencephalogram 2021        Allergies   Allergen Reactions     Diphenhydramine      Other reaction(s): Other (see comments)  Told not to take due to parkinsons     Metoclopramide      Avoid in patient with a history of Parkinson Disease  Other reaction(s): GI intolerance  Avoid in patient with a history of Parkinson Disease     Promethazine      Other reaction(s): Other (see comments)  Told not to take due to parkisons     Past Surgical History:   Procedure Laterality Date     SHOULDER SURGERY  2007     Past Medical History:   Diagnosis Date     Esophagitis endoscopy done 11/2021 11/18/2021    Impression:            - Normal first portion of the duodenum, second portion                         of the duodenum and third portion of the duodenum.                         Biopsied.                         - Nodular mucosa in the duodenal bulb. Biopsied.                         - Normal stomach. Biopsied.                         - Medium-sized hiatal hernia.                          - LA Grade     H/O electroencephalogram 2021 11/18/2021     Impression:  This is a normal waking and sleep EEG, with generalized beta activities throughout the recording. Generalized beta activities are usually associated certain medication use, such as benzodiazepines or barbiturates. The cause of these activities in this case is unclear at this time.         H/O magnetic resonance imaging of brain and brain stem 8/17/2018 2009 December MR Brain without and with IV Yavftsmj23/8/2009 [facility] Result Impression  Normal MRI of the brain.  Result Narrative      Multiecho, multiplanar views of the brain were obtained prior to and  following the IV administration of 19 mL of contrast. There are no  previous studies available for comparison.     The brain parenchyma is normal in appearance on all pulse sequences,  with      H/O magnetic resonance imaging of cervical spine 8/17/2018 March MR Cervical Spine without IV Contrast3/15/2017 [facility] Result Addenda Addendum by ProviderKaren M.D. on 03/15/2017 12:30 PM RAD^^^MA MR Cervical Spine w/o contrast 3/15/2017 12:30:00 Result Impression  Minimal degenerative disc disease at C5-6 and C6-7  otherwise an unremarkable MR scan of the cervical spine.  Result Narrative   EXAM: MR Cervical Spine w/o contrast   INDICATION:      H/O shoulder surgery 8/17/2018 2017 July [facility] SH[facility] Narrative XR SHOULDER 2 VIEWS LEFT 7/14/2017 9:13 PM  INDICATION: Shoulder Injury COMPARISON: None.  FINDINGS: Negative shoulder. No fracture or dislocation.  Status       Hiatal hernia 4/20/2021    Based on a 2021 ct scan Small hiatal hernia with some wall thickening in the distal thoracic esophagus suggesting some esophagitis.     Seizure (H) at age 22 yrs 8/17/2018     Shoulder dislocation     left     Tremor 8/10/2018     Social History     Socioeconomic History     Marital status: Single      Spouse name: Not on file     Number of children: Not on file     Years of education: Not on file     Highest education level: Not on file   Occupational History     Not on file   Tobacco Use     Smoking status: Never Smoker     Smokeless tobacco: Never Used   Substance and Sexual Activity     Alcohol use: Yes     Drug use: No     Sexual activity: Not on file   Other Topics Concern     Not on file   Social History Narrative    single. lives in [facility]. mother cabrera ambriz            He has a history of a left dislocated shoulder with surgery performed in 2009. He currently lives in [place] but comes to the [facility] to visit his parents in [place]. 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     Social Determinants of Health     Financial Resource Strain: Not on file   Food Insecurity: Not on file   Transportation Needs: Not on file   Physical Activity: Not on file   Stress: Not on file   Social Connections: Not on file   Intimate Partner Violence: Not At Risk     Fear of Current or Ex-Partner: No     Emotionally Abused: No     Physically Abused: No     Sexually Abused: No   Housing Stability: Not on file     Family History   Problem Relation Age of Onset     Cancer Other         grandmother     Tremor No family hx of      Dementia No family hx of      Parkinsonism No family hx of      Seizure Disorder No family hx of      Current Outpatient Medications   Medication Sig Dispense Refill     amantadine (SYMMETREL) 100 MG capsule 100mg by mouth twice daily at 630am and 98101xx 180 capsule 3     carbidopa (LODOSYN) 25 MG TABS tablet TAKE 1 TABLET (25 MG) BY MOUTH 3 TIMES DAILY       carbidopa-levodopa (PARCOPA)  MG ODT Take Parcopa when you are vomiting and can't keep the carbidopa-levodopa tablets down: 1.5@630a, 1 @ 9am, 1@ 1130a, 1@ 2p, 1@430p, 1@7p, 1@930p 240 tablet 3     carbidopa-levodopa (RYTARY) 48. MG CPCR per CR capsule Take 3 capsules by mouth 3 times daily 270  capsule 11     carbidopa-levodopa (SINEMET CR)  MG CR tablet Take 1 tablet by mouth At Bedtime 30 tablet 11     carbidopa-levodopa (SINEMET)  MG tablet 1.5@630a, 1 @ 9am, 1@ 1130a, 1@ 2p, 1@430p, 1@7p, 1@930p and 1/4-1/2 tab 3/day as needed after meals = 8.5- 10 tabs per day 900 tablet 3     FOLBEE 2.5-25-1 MG TABS 1/2-1 tablet by mouth daily (Patient not taking: Reported on 10/6/2021) 90 tablet 3     NONFORMULARY Domperidone 1-2 tabs of 10mg by mouth 3-4 times per day as needed up to 6/day (Patient not taking: Reported on 10/6/2021) 180 each 11     omeprazole (PRILOSEC) 20 MG DR capsule Take 1 capsule (20 mg) by mouth 2 times daily 60 capsule 3     ondansetron (ZOFRAN) 4 MG tablet Take 4 mg by mouth every 8 hours as needed       ondansetron (ZOFRAN-ODT) 4 MG ODT tab Take 1 tablet (4 mg) by mouth every 8 hours as needed for nausea or vomiting (Patient not taking: Reported on 10/6/2021) 90 tablet 11     rotigotine (NEUPRO) 2 MG/24HR 24 hr patch Place 1 patch onto the skin daily 30 patch 11           Thank you for your time today in discussing Deep Brain Stimulation. Below is what we talked about:     1. We will rediscuss your case after medication changes - need to trial meds to avoid vomiting during surgery. Appointment with Dr. Carson 11/23 to discuss medication options.     2. If medication plan can be developed to avoid vomiting during surgery, you are a Candidate for staged gpi start with RGpi  (You would like to start with LGPi to treat your right foot dystonia).     3. [employer] Genus rechargeable - keep in mind if you get another medical implanted device you may no longer be MRI conditional.  You will need to let us know what device  you prefer.     4. We are treating your tremor, bradykinesia, rigidity. You have motor fluctuations, predictable freezing of gait and dystonia when off, choreiform dyskinesias when on. You have vomiting with medication and off time. Another  "indication for surgery is to decrease medication.     5. Trial with Neupro before moving forward with surgery scheduling. Veronica to talk with Dr. Carson regarding medications. Veronica placed PA for the Neupro during the meeting. Rytary also option.     6. Interested in research - YES     I made it very clear, the surgery depends on getting you, medically, to a point where you are not vomiting, as this cannot occur during surgery.  Briefly discussed dopamine agonists (Neupro) and our concern for ICDs and that Dr. Carson will look at all the options.     Writer went into detail about the MRI conditionality of iDubba. If you were to need another medically implanted device you may no longer be MRI conditional with the Bsci system. You stated, \"Yeah, I am young and may need an MRI of my knee or hip, shoulder.\" Explained that the other two systems are very good, it is just an individual decision. You were intrigued hearing about the [employer] remote programming. I told you there is a risk with all Deep Brain Stimulation systems of a short or open circuit which would make you ineligible for an MRI.     Writer discussed our concern about DDS dopamine dysregulation syndrome You assured me that you do not excessively shop, eat, osullivan or have hypersexuality.     You want to treat the dystonia of his right leg first - LGpi preferred. You have noticed the pattern of your right foot locking up first then goes to your abdomen and then you vomit.      You are interested in research and learning about the Get Well Loop for bilateral DEEP BRAIN STIMULATION.     Warmest regards,  DAMON GrossN, RN, PHN, BA, CNRN, HNB-BC  DBS   Clinical Nurse Care Coordinator Movement Disorders   Phone: [phone]  Fax: [phone]    Last read by Dipesh Nicole at 3:35 PM on 11/22/2021.      ALEA is a 36 year old who is being evaluated via a billable video visit.      How would you like to obtain your AVS? Mychart  If the " video visit is dropped, the invitation should be resent by: [phone]      Video Start Time:   Video-Visit Details    Type of service:  Video Visit    Video End Time:    Originating Location (pt. Location): Home    Distant Location (provider location):  [facility] NEUROLOGY [facility]     Platform used for Video Visit: LeslyeWell          Again, thank you for allowing me to participate in the care of your patient.      Sincerely,    Ramesh Carson MD

## 2021-11-23 NOTE — PATIENT INSTRUCTIONS
G20) Parkinson disease (H)  (primary encounter diagnosis)    Developed symptoms left arm at age 31 years or 30 years of age  Diagnosis was made 2018  Parkinson's disease, not due to a mutation in SNCA, GBA, LRRK2, kimberley, DJ1, PINK1, or VPS35.       Amantadine symmetrel 100mg twice daily     carbidopa/levodopa sinemet 25/100  1.5@630am, 1@9a, 1@1130am, 1@2p, 1@430p, 1@7p, 1/2-1@930p     Review of diagnosis    Young onset Parkinson     Avoidance of dopamine blockers   Not taking     Motor complication review   Motor fluctuations/dyskinesias     Review of Impulse control disorders      Review of surgical or medication options      Gait/Balance/Falls      Exercise/Therapy performed/offered     Had intractable vomiting with motor testing on 10/6/2021  OFF score of 53      Cognitive/Driving      Working with his dad's company and may move to [place] and look for a job      Brain MRI 9/30/2021     Impression:  On high-resolution SWI images, nigrosome-1 cannot be well-seen  (absence of swallow tail sign). This can be seen in Parkinson's  disease or Lewy Body dementia. No abnormal enhancement.        Neuropsych evaluation 10/19/2021     Annalise Nicole is a 35-year-old man with a history of Parkinson's disease diagnosed in 2018.  He is bothered by left-sided tremor and right-sided stiffness.  He is interested in undergoing DBS surgery for management of his symptoms.     Current results indicate performance that falls almost entirely within normal limits across cognitive domains, generally in the average to above average range. He has difficulty with memory on one list-learning task; learning and memory otherwise falls within normal limits. He does not report significant depressive symptomatology, anxiety, apathy, or compulsive behaviors.     This pattern of performance does not reflect dementia at this time, and is only subtly abnormal. There is no consistent suggestion of cognitive decline.  He appears to be capable of  comprehending medical information and making well reasoned decisions for himself. He has a good understanding of the surgical procedure and the risks involved.  He has a good support system in his family.  He lives alone now, but will be moving in with his brother in the next couple of months, and his parents are also available to provide assistance as necessary. He appears to be a good candidate for surgery from a neurocognitive perspective.    Mood   denies     Hallucinations/delusions   denies     Sleep      Crazy dreams  No problems falling asleep  Has not yet tried melatonin  He recalls having dreams when he wakes up  He can be awaken about a dream about cramping leg and then wakes up with a cramp and takes sinemet and goes back to bed.      Waking up with cramping at 2am or so and taking 1/4 to 1/2 tablet of medication.     Bladder   Denies     GI/Constipation/GERD     odansetron zofran ODT 4mg as needed     Had some cramping in legs- 3 hours after eating at 930pm   He had dose at 630pm and ate at 730 and cramping at 930pm     Cramping - independent of what he is eating.      Having episodes once per month - wondering     Endoscopy 11/2021    - Normal first portion of the duodenum, second portion                          of the duodenum and third portion of the duodenum.                          Biopsied.                          - Nodular mucosa in the duodenal bulb. Biopsied.                          - Normal stomach. Biopsied.                          - Medium-sized hiatal hernia.                          - LA Grade C esophagitis (with numerous ulcers at the                          GE junction and in the esophagus) with bleeding.                          Biopsied. Friability may indicate EoE as well -                          further biopses avoided given friability.   Recommendation:        - Patient has a contact number available for                          emergencies. The signs and symptoms of  potential                          delayed complications were discussed with the patient.                          Return to normal activities tomorrow. Written                          discharge instructions were provided to the patient.                          - Resume previous diet.                          - Continue present medications.                          - Await pathology results.                          - Repeat upper endoscopy at appointment to be                          scheduled to check healing.                          - Start PPI BID if there is no interaction with                          current medications.   ENDO   denies     Cardio/heart   Denies faints     Vision   No changes in vision     Heme   Not taking aspirin     Other:     Had a possible seizure on 11/23/2009 - had an mri and was discussing the use of medication  From chart review it may have been a syncopal episode vs seizure. He had been dehydrated and possibly sleep deprived.      EEG was done 12/9/2009  This is an abnormal EEG due to the presence of two episodes of frontally maximal sharp waves, epileptiform in nature. This places the patient at risk for partial or secondarily generalized seizures arising from the frontal lobes.      BRAIN MRI was normal on 12/8/2009     Seen by Dr. Je Sanon on 12/10/2009 and Annalise opted not to take an AED lamotrigine          630a 9a 1130a 2p 430p 7p 930p 10/11p 2-3am   Amantadine symmetrel 100mg 1   1              Carbidopa (lodosyn) 25mg 1   1     1        Carbidopa/levodopa Parcopa 25/100 ODT prn                  Carbidopa/levodopa Sinemet CR 50/200              1     Carbidopa/Levodopa 25/250 stopped                  carbidopa/levodopa sinemet 25/100 1.5 1 1 1 1  1 1  prn   Carbidopa/levodopa Rytary 195mg Not started           [place]bee ?taking                  Domperidone motilium 10mg stopped                  Omeprazole prilosec 20mg 1     1      Ondansetron zofran 4mg prn                   Ondansetron zofran-ODT 4mg  prn                  Rotigotine neupro 2mg/24 hr patch Not started                           Plan:    [facility] Consultation 12/21   Will need to be on omeprazole/prilosec     Wondering about ativan for surgery as it helped with vomiting - discussed reluctance about using this but may be needed perioperatively.     Will have to review the possibility of using lorazepam short term with Maryanne swift.     He has not taken the rotigotine or rytary yet as it has not been approved.     We are still needing to get control of his wearing off and nausea.   Encouraged to take the carbidopa - he is not sure it is helpful.   He is also going to start the omeprazole twice daily      He has benefit from the CR at night and does not need night time sinemet at 2 or 3am    Discussed the drug screen results -     Once he gets his pills/patch will need to have him chat with maryanne Swift

## 2021-11-29 DIAGNOSIS — K22.11 ULCER OF ESOPHAGUS WITH BLEEDING: Primary | ICD-10-CM

## 2021-11-29 NOTE — RESULT ENCOUNTER NOTE
Overall unremarkable biopsies taken from the duodenum and stomach.  Biopsies from the esophagus confirm severe ulceration and do not suggest infection with fungal/CMV/HSV.

## 2021-11-30 ENCOUNTER — PATIENT OUTREACH (OUTPATIENT)
Dept: GASTROENTEROLOGY | Facility: CLINIC | Age: 36
End: 2021-11-30
Payer: COMMERCIAL

## 2021-11-30 NOTE — PROGRESS NOTES
Received a message from Dr. Kay regarding follow up after the EGD.    Call placed to [facility] and he would like to  follow up with Dr. Kay. Appointment scheduled on 1- at 11am   My chart message to be sent to [facility] with the appointment info.

## 2021-12-02 ENCOUNTER — VIRTUAL VISIT (OUTPATIENT)
Dept: PHARMACY | Facility: CLINIC | Age: 36
End: 2021-12-02
Payer: COMMERCIAL

## 2021-12-02 DIAGNOSIS — G20.A1 PARKINSON DISEASE (H): Primary | ICD-10-CM

## 2021-12-02 DIAGNOSIS — K25.9 GASTRIC ULCER, UNSPECIFIED CHRONICITY, UNSPECIFIED WHETHER GASTRIC ULCER HEMORRHAGE OR PERFORATION PRESENT: ICD-10-CM

## 2021-12-02 PROCEDURE — 99606 MTMS BY PHARM EST 15 MIN: CPT | Performed by: PHARMACIST

## 2021-12-02 PROCEDURE — 99607 MTMS BY PHARM ADDL 15 MIN: CPT | Performed by: PHARMACIST

## 2021-12-02 NOTE — PROGRESS NOTES
Medication Therapy Management (MTM) Encounter    ASSESSMENT:                            Medication Adherence/Access: No issues identified    Parkinson's disease: improving - Patient tolerates Neupro patches well. His symptoms are improving after adding Neupro and Sinemet CR. He has less breakthrough symptoms and nausea/vomiting. Since patient was experiencing sleep interference with second dose of amantadine, it is appropriate for him to only take this once daily in the morning. I will discuss with Dr. Carson regarding whether we should consider a trial of Rytary now or continue current regimen. Since he is currently on a proton pump inhibitor, it could interfere with absorption of Rytary.    Gastric Ulcers: improving    PLAN:                            1. Continue the current regimen for Parkinson's disease and stomach ulcers.    2. We have discussed that Neupro patches and Rytary (long acting carbidopa/levodopa) could help prevent vomiting during DBS surgery. Since your symptoms are now well controlled, I will check with Dr. Carson to see if Rytary is still needed and keep you updated.       6:30 am 8:30 am 11 am 1:30 pm 4 pm 6:30 pm 9:30 pm Bedtime    Amantadine 100mg 1                Carbidopa 25mg  1                Carbidopa/levodopa (Parcopa) 25/100 mg ODT - as needed                  Carbidopa/levodopa 50/200 mg CR               1   Carbidopa/levodopa  25/100 mg 1.5 1 1 1 1  1 1     Neupro 2mg/24 hr patch   1                 Follow-up: 3 months or sooner if needed    SUBJECTIVE/OBJECTIVE:                          Dipesh Nicole is a 36 year old male contacted via secure video for a follow-up visit. He was referred to me from Dr. Carson.  Today's visit is a follow-up MTM visit from 4/12/2021.     Reason for visit: follow up MTM visit for Parkinson's disease    Allergies/ADRs: Reviewed in chart  Past Medical History: Reviewed in chart  Tobacco: He reports that he has never smoked. He has never used smokeless  "tobacco.    Medication Adherence/Access: No issues identified    Parkinson's Disease:  Patient is currently taking medications as listed below. Overall, is feeling much better since starting Neupro- improvement in \"off\" periods and, hence, resolution of vomiting.     Amantadine: He only takes it once daily instead of twice daily as prescribed. He reports that taking twice daily interfered with sleep too much.    Carbidopa: He only takes 1 tablet in the morning as his nausea has been much improved.     Sinemet: He typically takes 1.5 tablets after waking up then 1 tablet every 2-2.5 hours. He sometimes takes an additional 0.5 tablet if he has a big meal. After starting Sinemet CR and Neupro patches, he has not needed the overnight doses of Sinemet anymore.    Sinemet CR: Sleep is better with this dose at night. Not \"locking up\" as often.     Neupro: Was started on 11/24/21. He denies any side effects. He feels \"much better\" without wearing off or breakthrough symptoms. He has not had any episodes of vomiting since starting Neupro. He shows understanding for importance of application site rotation. He was also wondering how Neupro patches and Rytary can help with DBS surgery.    Parcopa ODT: He has this on hand and asked about how to use this and was instructed to replace Sinemet with Parcopa when he vomits (1:1 ratio)    Zofran: he hasn't needed it lately as vomiting is much improved. Previously, vomiting would come on very quickly and Zofran was not working fast enough.     Rytary: Not taking yet - has been instructed to not fill this prescription        6:30 am 8:30 am 11 am 1:30 pm 4 pm 6:30 pm 9:30 pm Bedtime    Amantadine 100mg 1                Carbidopa 25mg  1                Carbidopa/levodopa (Parcopa) 25/100 mg ODT - as needed                  Carbidopa/levodopa 50/200 mg CR               1   Carbidopa/levodopa  25/100 mg 1.5 1 1 1 1  1 1     Neupro 2mg/24 hr patch   1                 Gastric ulcers: Patient " is currently taking omeprazole 20 mg twice daily. He reports that his gastroenterologist instructed him to continue taking omeprazole for 2 more moths then repeat EGD. He has a follow-up appointment in January 2022.    Today's Vitals: There were no vitals taken for this visit.  ----------------    I spent 16 minutes with this patient today. All changes were made via collaborative practice agreement with Dr. Carson. A copy of the visit note was provided to the patient's referring provider.    The patient was sent via [employer] a summary of these recommendations.     Araceli Cuellar, pharmacy student    Veronica Swift, Pharm.D.  Medication Therapy Management Pharmacist  [facility]    Telemedicine Visit Details  Type of service:  Video Conference via [employer]  Start Time: 9:33 AM  End Time: 9:49 AM  Originating Location (patient location): [place]  Distant Location (provider location):  [facility]     Medication Therapy Recommendations  Parkinson disease (H)    Current Medication: amantadine (SYMMETREL) 100 MG capsule   Rationale: Dose too high - Dosage too high - Safety   Recommendation: Decrease Frequency   Status: Accepted per CPA   Note: patient experienced bedtime side effects of the medication

## 2021-12-02 NOTE — PATIENT INSTRUCTIONS
Recommendations from today's MTM visit:                                                      1. Continue the current regimen for Parkinson's disease and stomach ulcers.    2. We have discussed that Neupro patches and Rytary (long acting carbidopa/levodopa) could help prevent vomiting during DBS surgery. Since your symptoms are now well controlled, I will check with Dr. Carson to see if Rytary is still needed and keep you updated.       6:30 am 8:30 am 11 am 1:30 pm 4 pm 6:30 pm 9:30 pm Bedtime    Amantadine 100mg 1                Carbidopa 25mg  1                Carbidopa/levodopa (Parcopa) 25/100 mg ODT - as needed                  Carbidopa/levodopa 50/200 mg CR               1   Carbidopa/levodopa  25/100 mg 1.5 1 1 1 1  1 1     Neupro 2mg/24 hr patch   1                 Follow-up: 3 months or sooner if needed    It was great to speak with you today.  I value your experience and would be very thankful for your time with providing feedback on our clinic survey. You may receive a survey via email or text message in the next few days.     To schedule another MTM appointment, please call the clinic directly or you may call the MTM scheduling line at [phone] or toll-free at [phone].     My Clinical Pharmacist's contact information:                                                      Please feel free to contact me with any questions or concerns you have.      Veronica Swift, Pharm.D.  Medication Therapy Management Pharmacist  [facility]

## 2021-12-02 NOTE — Clinical Note
"Dr. Carson/Theresa:  Neupro patch seems to be working well. He is having less wearing off and no recent episodes of vomiting. Would you like to continue the regimen as-is or try switching to Rytary? One concern I have with Rytary is that the Prilosec may interfere with the absorption of Rytary. What are next steps for \"testing\" whether he will tolerate Neupro alone during surgery? "

## 2021-12-14 ENCOUNTER — TELEPHONE (OUTPATIENT)
Dept: GASTROENTEROLOGY | Facility: CLINIC | Age: 36
End: 2021-12-14
Payer: COMMERCIAL

## 2021-12-15 ENCOUNTER — TELEPHONE (OUTPATIENT)
Dept: GASTROENTEROLOGY | Facility: CLINIC | Age: 36
End: 2021-12-15
Payer: COMMERCIAL

## 2021-12-15 NOTE — TELEPHONE ENCOUNTER
Called pt to inform that Dr. Kay is on medical leave and appt needs to be rescheduled/ canceled for the meantime.   Left Call center number to call back. Pt can be seen by perri evans for next avail return.

## 2021-12-21 ENCOUNTER — PRE VISIT (OUTPATIENT)
Dept: GASTROENTEROLOGY | Facility: CLINIC | Age: 36
End: 2021-12-21
Payer: COMMERCIAL

## 2022-01-07 ENCOUNTER — TELEPHONE (OUTPATIENT)
Dept: NEUROLOGY | Facility: CLINIC | Age: 37
End: 2022-01-07
Payer: COMMERCIAL

## 2022-01-17 ENCOUNTER — TELEPHONE (OUTPATIENT)
Dept: GASTROENTEROLOGY | Facility: CLINIC | Age: 37
End: 2022-01-17
Payer: COMMERCIAL

## 2022-01-17 NOTE — TELEPHONE ENCOUNTER
[facility] Call Center    Phone Message    May a detailed message be left on voicemail: yes     Reason for Call: Other:     Annalise is calling in regards to his appointment.  He would like to discuss an earlier date.  The original appt was cancelled and needs to be seen so he can schedule brain surgery.   Please call back ASAP to discuss.    Action Taken: Message routed to:  [facility] gastro    Travel Screening: Not Applicable

## 2022-01-18 ENCOUNTER — MYC MEDICAL ADVICE (OUTPATIENT)
Dept: PHARMACY | Facility: CLINIC | Age: 37
End: 2022-01-18
Payer: COMMERCIAL

## 2022-01-18 DIAGNOSIS — G20.A1 PARKINSON DISEASE (H): Primary | ICD-10-CM

## 2022-01-18 RX ORDER — AMANTADINE HYDROCHLORIDE 100 MG/1
100 CAPSULE, GELATIN COATED ORAL DAILY
Qty: 90 CAPSULE | Refills: 3 | Status: SHIPPED | OUTPATIENT
Start: 2022-01-18 | End: 2022-02-15

## 2022-01-18 NOTE — TELEPHONE ENCOUNTER
Rx Authorization:  Requested Medication/ Dose AMANTADINE 100MG CAPSULE  Date last refill ordered:11/23/21  Quantity ordered: 180 caps  # refills: 3  Date of last clinic visit with ordering provider: 11/23/21  Date of next clinic visit with ordering provider:   All pertinent protocol data (lab date/result):   Include pertinent information from patients message:

## 2022-01-19 NOTE — TELEPHONE ENCOUNTER
Per discussion with Dr. Carson, will increase Neupro patch to 3 mg daily    Veronica Swift, Pharm.D.  Medication Therapy Management Pharmacist  [facility]

## 2022-01-21 ENCOUNTER — TELEPHONE (OUTPATIENT)
Dept: NEUROLOGY | Facility: CLINIC | Age: 37
End: 2022-01-21
Payer: COMMERCIAL

## 2022-01-21 NOTE — TELEPHONE ENCOUNTER
Prior Authorization Retail Medication Request    Medication/Dose: Neupro 3MG/24HR   ICD code: G20  Previously Tried and Failed:    Rationale:      Insurance Name: [facility] [place] [facility]   Insurance ID: [id number]       Pharmacy Information   [id number] - [place] [place] - [address]   Phone: [phone]

## 2022-01-22 NOTE — TELEPHONE ENCOUNTER
Central Prior Authorization Team   Phone: [phone]    PA Initiation    Medication: Neupro 3MG/24HR   Insurance Company: Comment:  [facility] [place]   Pharmacy Filling the Rx: [id number] - [place], [place] - [address]  Filling Pharmacy Phone: [phone]  Filling Pharmacy Fax: [phone]  Start Date: 1/22/2022

## 2022-01-25 NOTE — TELEPHONE ENCOUNTER
Prior Authorization Approval    Authorization Effective Date: 1/22/2022  Authorization Expiration Date: 1/22/2023  Medication: Neupro 3MG/24HR -PA APPROVED   Approved Dose/Quantity:   Reference #:     Insurance Company: Comment:  [facility] [place]   Expected CoPay:       CoPay Card Available:      Foundation Assistance Needed:    Which Pharmacy is filling the prescription (Not needed for infusion/clinic administered): [id number] - [place], [place] - [address] [place] [address]  Pharmacy Notified: Yes- **Instructed pharmacy to notify patient when script is ready to /ship.**  Patient Notified: Yes

## 2022-02-15 ENCOUNTER — VIRTUAL VISIT (OUTPATIENT)
Dept: PHARMACY | Facility: CLINIC | Age: 37
End: 2022-02-15
Payer: COMMERCIAL

## 2022-02-15 DIAGNOSIS — K25.9 GASTRIC ULCER, UNSPECIFIED CHRONICITY, UNSPECIFIED WHETHER GASTRIC ULCER HEMORRHAGE OR PERFORATION PRESENT: ICD-10-CM

## 2022-02-15 DIAGNOSIS — G20.A1 PARKINSON DISEASE (H): Primary | ICD-10-CM

## 2022-02-15 PROCEDURE — 99606 MTMS BY PHARM EST 15 MIN: CPT | Performed by: PHARMACIST

## 2022-02-15 PROCEDURE — 99607 MTMS BY PHARM ADDL 15 MIN: CPT | Performed by: PHARMACIST

## 2022-02-15 RX ORDER — AMANTADINE HYDROCHLORIDE 100 MG/1
100 CAPSULE, GELATIN COATED ORAL 2 TIMES DAILY
Qty: 180 CAPSULE | Refills: 3 | Status: SHIPPED | OUTPATIENT
Start: 2022-02-15 | End: 2022-05-24

## 2022-02-15 NOTE — Clinical Note
1. Neupro 3 mg is helping but he still gets this abdominal cramping when the levodopa wears off. He is taking more Parcopa to help with this and it works.  2. Will try increasing amantadine to twice daily and stopping the AM Lodosyn which isn't really needed anymore.   3. GI appt is in a couple weeks

## 2022-02-15 NOTE — PATIENT INSTRUCTIONS
Recommendations from today's MTM visit:                                                      1. You can stop the carbidopa (Lodosyn) 25 mg tablet in the morning. If you notice more nausea in the mornings, please let us know.     2. We will add a second dose of amantadine in the mid-late morning, about 4 hours after your first dose of amantadine, in hopes of improving your dystonia and dyskinesia. If you have more trouble sleeping with this, let us know.     3. You're seeing the GI specialist on 3/2/22 so we will await their report regarding whether you will be able to go off the omeprazole.       3-4 am 6:30 am 8:30 am 11 am 1:30 pm 4 pm 6:30 pm 9:30 pm Bedtime    Amantadine 100mg  1    1             Carbidopa/levodopa (Parcopa) 25/100 mg ODT - as needed                    Carbidopa/levodopa 50/200 mg CR                1   Carbidopa/levodopa  25/100 mg 0.5 1.5 1 1 1 1  1 1     Neupro 3 mg/24 hr patch     1                   Follow-up: 1-2 months, pending results of GI appointment     It was great to speak with you today.  I value your experience and would be very thankful for your time with providing feedback on our clinic survey. You may receive a survey via email or text message in the next few days.     To schedule another MTM appointment, please call the clinic directly or you may call the MTM scheduling line at [phone] or toll-free at [phone].     My Clinical Pharmacist's contact information:                                                      Please feel free to contact me with any questions or concerns you have.      Veronica Swift, Pharm.D.  Medication Therapy Management Pharmacist  [facility]

## 2022-02-15 NOTE — PROGRESS NOTES
Medication Therapy Management (MTM) Encounter    ASSESSMENT:                            Medication Adherence/Access: No issues identified    Parkinson's Disease:  Patient has had improvement in wearing off of the carbidopa-levodopa with the increase in dose of Neupro patch. It seems to cause less 'off' periods which result in the right leg cramping followed by abdominal cramping and sometimes vomiting. Patient is taking more carbidopa-levodopa ODT tablets to manage this which seems to work well. Rather than increase the dose of carbidopa-levodopa, I suggest we try increasing amantadine again as this may help with the dystonia and dyskinesia. We will stop the Lodosyn tablet in the morning as we have ruled out that the carbidopa-levodopa causes nausea; the GI symptoms occur when the carbidopa-levodopa has worn off. We will hold off on switching to Rytary for now as he is still taking the proton pump inhibitor which may interfere with absorption of Rytary.     GI ulcers: Defer to GI for follow up in March    PLAN:                            1. You can stop the carbidopa (Lodosyn) 25 mg tablet in the morning. If you notice more nausea in the mornings, please let us know.     2. We will add a second dose of amantadine in the mid-late morning, about 4 hours after your first dose of amantadine, in hopes of improving your dystonia and dyskinesia. If you have more trouble sleeping with this, let us know.     3. You're seeing the GI specialist on 3/2/22 so we will await their report regarding whether you will be able to go off the omeprazole.       3-4 am 6:30 am 8:30 am 11 am 1:30 pm 4 pm 6:30 pm 9:30 pm Bedtime    Amantadine 100mg  1    1             Carbidopa/levodopa (Parcopa) 25/100 mg ODT - as needed                    Carbidopa/levodopa 50/200 mg CR                1   Carbidopa/levodopa  25/100 mg 0.5 1.5 1 1 1 1  1 1     Neupro 3 mg/24 hr patch     1                   Follow-up: 1-2 months, pending results of GI  appointment     SUBJECTIVE/OBJECTIVE:                          Dipesh Nicole is a 36 year old male contacted via secure video for a follow-up visit.  Today's visit is a follow-up MTM visit from 12/2/21     Reason for visit: follow up on medications.    Allergies/ADRs: Reviewed in chart  Past Medical History: Reviewed in chart  Tobacco: He reports that he has never smoked. He has never used smokeless tobacco.  Alcohol: 1-3 beverages / week    Medication Adherence/Access: no issues reported    Parkinson's Disease:  Patient is currently taking medications as listed below. He reports that the increased dosage of Neupro patch seems to be helpful in preventing wearing off of carbidopa-levodopa which results in dystonia of his right leg, which often precedes the stomach cramping (?abodminal dystonia). He has been journaling about his symptoms and notes that the abdominal dystonia seems to come on about 1.5-2 hours after eating so he has been taking an extra carbidopa-levodopa ODT half or full tablet close to meals which has been helpful. The ODT tablet kicks in within 10-15 min which is faster than the carbidopa-levodopa IR tablet. He has been taking the ODT tablets about 3 times per day and may take either a half-tablet or full tablet. He can tell when his right leg starts cramping up then it will move to his stomach. The carbidopa-levodopa CR tablet that he takes before bed lasts 6-7 hours and then he will start getting the leg dystonia and will take half-tablet of carbidopa-levodopa IR. Overall he is feeling better- gaining weight back and exercising more. It does appear to have dyskinesia today but he isn't bothered by it. He has not had any hallucinations or impulsivity. The amantadine used to be at twice a day but was reduced as it may have been affecting his sleep. He is wondering if he should take the carbidopa in the morning still.       6:30 am 8:30 am 11 am 1:30 pm 4 pm 6:30 pm 9:30 pm Bedtime    Amantadine 100mg  1                 Carbidopa 25mg  1                  Carbidopa/levodopa (Parcopa) 25/100 mg ODT - as needed                   Carbidopa/levodopa 50/200 mg CR               1   Carbidopa/levodopa  25/100 mg 1.5 1 1 1 1  1 1     Neupro 3 mg/24 hr patch    1                 GI ulcers: Current medications include: Prilosec (omeprazole) 20 mg twice daily. Patient reports GI symptoms have been better. He is scheduled to see GI for follow up on 3/2/22.     Today's Vitals: There were no vitals taken for this visit.  ----------------    I spent 18 minutes with this patient today. All changes were made via collaborative practice agreement with Dr. Carson. A copy of the visit note was provided to the patient's provider(s).    The patient was sent via Spinback a summary of these recommendations.     Veronica Swift, Pharm.D.  Medication Therapy Management Pharmacist  [facility]    Telemedicine Visit Details  Type of service:  Video Conference via Second Dec[employer]  Start Time: 12:33 PM  End Time: 12:51 PM  Originating Location (patient location): Home  Distant Location (provider location):  [facility]     Medication Therapy Recommendations  Parkinson disease (H)    Current Medication: amantadine (SYMMETREL) 100 MG capsule   Rationale: Dose too low - Dosage too low - Effectiveness   Recommendation: Increase Frequency   Status: Accepted per [facility]          Current Medication: carbidopa (LODOSYN) 25 MG TABS tablet (Discontinued)   Rationale: No medical indication at this time - Unnecessary medication therapy - Indication   Recommendation: Discontinue Medication   Status: Accepted per [facility]

## 2022-02-17 ENCOUNTER — PATIENT OUTREACH (OUTPATIENT)
Dept: GASTROENTEROLOGY | Facility: CLINIC | Age: 37
End: 2022-02-17
Payer: COMMERCIAL

## 2022-02-17 PROBLEM — Z92.89 H/O MAGNETIC RESONANCE IMAGING OF CERVICAL SPINE: Status: ACTIVE | Noted: 2018-08-17

## 2022-02-17 PROBLEM — Z92.89 H/O MAGNETIC RESONANCE IMAGING OF BRAIN AND BRAIN STEM: Status: ACTIVE | Noted: 2018-08-17

## 2022-02-17 PROBLEM — Z98.890 H/O SHOULDER SURGERY: Status: ACTIVE | Noted: 2018-08-17

## 2022-02-17 NOTE — PROGRESS NOTES
Left a voicemail  for Annalise on 2-15 and  2-16 regarding the appointment change.       Spoke with Annalise and the appointment change from 3-2 to 3-4 works for him.

## 2022-02-18 ENCOUNTER — TELEPHONE (OUTPATIENT)
Dept: GASTROENTEROLOGY | Facility: CLINIC | Age: 37
End: 2022-02-18
Payer: COMMERCIAL

## 2022-02-18 ENCOUNTER — PATIENT OUTREACH (OUTPATIENT)
Dept: GASTROENTEROLOGY | Facility: CLINIC | Age: 37
End: 2022-02-18
Payer: COMMERCIAL

## 2022-02-18 DIAGNOSIS — K22.11 ULCER OF ESOPHAGUS WITH BLEEDING: Primary | ICD-10-CM

## 2022-02-18 DIAGNOSIS — Z11.59 ENCOUNTER FOR SCREENING FOR OTHER VIRAL DISEASES: Primary | ICD-10-CM

## 2022-02-18 NOTE — TELEPHONE ENCOUNTER
REFERRAL INFORMATION:    Referring Provider:  N/A    Referring Clinic:  N/A    Reason for Visit/Diagnosis: Follow up after EGD      FUTURE VISIT INFORMATION:    Appointment Date: 2/23/2022    Appointment Time: 1:40 PM      NOTES STATUS DETAILS   OFFICE NOTE from Referring Provider N/A 4/20/2021 Office visit with Dr. Ramesh Carson      OFFICE NOTE from Other Specialist Internal/ Care Everywhere 10/6/2021 Office visit with Dr. Krista Bettencourt ([facility])      7/30/2021 Office visit with Dr. Joey Morley ([place])    HOSPITAL DISCHARGE SUMMARY/  ED VISITS Care Everywhere 10/4/2021, 10/1/2021, 9/24/2021 ([facility])      8/31/2021, 7/7/2021 ([facility])      OPERATIVE REPORT Internal Esophageal Motility Study: 11/4/2021, 10/6/2021  NV Gastric Motility Study: 4/20/2021  Gastric Motility Study: 4/20/2021   MEDICATION LIST Internal         ENDOSCOPY  Internal EGD: 11/18/2021   COLONOSCOPY N/A    ERCP N/A    EUS N/A    STOOL TESTING N/A    PERTINENT LABS Internal/ Care Everywhere    PATHOLOGY REPORTS (RELATED) Internal 11/18/2021   IMAGING (CT, MRI, EGD, MRCP, Small Bowel Follow Through/SBT, MR/CT Enterography) Care Everywhere [facility]:  - CT Abdomen Pelvis: 4/15/2021, 2/21/19  - US Head Neck: 9/19/19

## 2022-02-18 NOTE — PROGRESS NOTES
Spoke with Annalise to move his clinic appointment to a sooner time.    Clinic visit rescheduled to 2-23.    Order placed for EGD  EGD scheduled on 3-7 at the [facility].     Annalise requested a My Chart message to be sent with the appointment details.

## 2022-02-18 NOTE — TELEPHONE ENCOUNTER
Screening Questions  1. Are you active on mychart? Yes     2. What insurance is in the chart? [place]     2.  Ordering/Referring Provider: Kee     3. BMI 23.1     4. Do you have any pulmonary issues? Yes: No     5. Have you had a heart, lung, or liver transplant? No     6. Are you currently on dialysis or have chronic kidney disease? No     7. Have you had a stroke or Transient ischemic atttack (TIA) within 6 months? No     8. In the past 6 months, have you had any heart related issues including cardiomyopathy or heart attack? No      If yes, did it require cardiac stenting or other implantable device?No        9. Do you have any implantable devices in your body (pacemaker, defib, LVAD)? No     10. Do you take nitroglycerin? If yes, how often? No     11. Are you currently taking any blood thinners?No     12. Are you a diabetic? No     13. (Females) Are you currently pregnant?   If yes, how many weeks?        15. Are you taking any prescription pain medications on a routine schedule? No If yes, MAC sedation.     16. Do you have any chemical dependencies such as alcohol, street drugs, or methadone? No If yes, MAC sedation.     17. Do you have any history of post-traumatic stress syndrome, severe anxiety or history of psychosis? No     18. Do you transfer independently? Yes     19.  Do you have any issues with constipation? No     20. Preferred Pharmacy for Pre Prescription [id number] - [place]     Scheduling Details     Which Colonoscopy Prep was Sent?:   Procedure Scheduled: EGD  Provider/Surgeon: Kee  Date of Procedure: 3/7/22  Location: [facility] [place]  Caller (Please ask for phone number if not scheduled by patient): Annalise        Sedation Type: MAC  Conscious Sedation- Needs  for 6 hours after the procedure  MAC/General-Needs  for 24 hours after procedure     Pre-op Required at [facility] [place], [place] and OR for MAC sedation:   (if yes advise patient they will need a pre-op  prior to procedure)      Is patient on blood thinners? -No (If yes- inform patient to follow up with PCP or provider for follow up instructions)      Informed patient they will need an adult  Yes  Cannot take any type of public or medical transportation alone     [facility] extern[facility]     Confirmed Nurse will call to complete assessment Y

## 2022-02-19 DIAGNOSIS — G20.A1 PARKINSON DISEASE (H): ICD-10-CM

## 2022-02-21 RX ORDER — CARBIDOPA AND LEVODOPA 25; 100 MG/1; MG/1
TABLET, ORALLY DISINTEGRATING ORAL
Qty: 240 TABLET | Refills: 2 | Status: SHIPPED | OUTPATIENT
Start: 2022-02-21 | End: 2022-04-06

## 2022-02-21 NOTE — TELEPHONE ENCOUNTER
Rx Authorization:  Requested Medication/ Dose: Carbidopa/Levodopa 25-100Mg ODT  Date last refill ordered: 10/5/21  Quantity ordered: 240 tabs  # refills: 3  Date of last clinic visit with ordering provider: 11/23/21  Date of next clinic visit with ordering provider: F/U 1 year  All pertinent protocol data (lab date/result):   Include pertinent information from patients message:

## 2022-02-23 ENCOUNTER — PRE VISIT (OUTPATIENT)
Dept: GASTROENTEROLOGY | Facility: CLINIC | Age: 37
End: 2022-02-23
Payer: COMMERCIAL

## 2022-02-23 ENCOUNTER — VIRTUAL VISIT (OUTPATIENT)
Dept: GASTROENTEROLOGY | Facility: CLINIC | Age: 37
End: 2022-02-23
Payer: COMMERCIAL

## 2022-02-23 DIAGNOSIS — R11.2 NAUSEA AND VOMITING, INTRACTABILITY OF VOMITING NOT SPECIFIED, UNSPECIFIED VOMITING TYPE: ICD-10-CM

## 2022-02-23 DIAGNOSIS — K21.00 GASTROESOPHAGEAL REFLUX DISEASE WITH ESOPHAGITIS WITHOUT HEMORRHAGE: Primary | ICD-10-CM

## 2022-02-23 PROCEDURE — 99213 OFFICE O/P EST LOW 20 MIN: CPT | Mod: 95 | Performed by: INTERNAL MEDICINE

## 2022-02-23 ASSESSMENT — ENCOUNTER SYMPTOMS
ABDOMINAL PAIN: 0
PARALYSIS: 0
DISTURBANCES IN COORDINATION: 0
MEMORY LOSS: 0
NAUSEA: 1
HEADACHES: 0
BOWEL INCONTINENCE: 0
NUMBNESS: 0
TREMORS: 1
RECTAL PAIN: 0
BLOOD IN STOOL: 0
SPEECH CHANGE: 0
HEARTBURN: 0
DIZZINESS: 0
TINGLING: 0
CONSTIPATION: 0
JAUNDICE: 0
VOMITING: 1
WEAKNESS: 0
LOSS OF CONSCIOUSNESS: 0
BLOATING: 0
SEIZURES: 0

## 2022-02-23 NOTE — PROGRESS NOTES
Dr Roman Daughters and this RN in for pelvic exam. Tolerated well.      Edgar Skinner, ADAM  01/28/22 1008 GI CLINIC VISIT    CC/REFERRING MD:  No ref. provider found  REASON FOR CONSULTATION:   No ref. provider found for   Chief Complaint   Patient presents with     Video Visit   Follow-up EGD for esophagitis and nausea vomiting    ASSESSMENT/PLAN:  36-year-old gentleman with history of Parkinson's disease on Sinemet here today to follow-up on his upper endoscopy that showed grade C esophagitis.    1.  Erosive esophagitis-he is doing well on omeprazole 20 mg p.o. twice daily.  He has a repeat EGD scheduled for 3/7/2022 to evaluate for healing of esophagitis and ensure no Hernandez's esophagus.  If the esophagitis is healed we will discuss decreasing the dose of his omeprazole down to 20 mg.  Given his erosive esophagitis he will likely need ongoing PPI use.  A trial of H2 blocker could be considered but this is often not adequate to prevent erosive esophagitis.    2.  Nausea and vomiting-this seems to be related to his Parkinson's disease.  Hold his medications get lower he has spasming and rigidity of his abdominal muscles which she believes leads to his nausea and vomiting.  Now that his medications have been adjusted and he has a sublingual form this has helped improve his symptoms.  GERD could have been contributing but he is now on treatment for that.  If his symptoms do not continue to improve we could consider a gastric emptying study but will hold off on this for now.    At this time I think he can follow-up as needed.      RTC PRN    Thank you for this consultation.  It was a pleasure to participate in the care of this patient; please contact us with any further questions.  I spent a total of 20 minutes during the day of encounter performed chart review, meeting with patient, patient counseling, care coordination, and documentation.    This note was created with voice recognition software, and while reviewed for accuracy, typos may remain.     Alexandr Sweet MD  Inflammatory Bowel Disease Program  Division of  Gastroenterology, Hepatology and Nutrition  [facility]  Pager: [phone]      HPI:    Annalise is a very pleasant 36-year-old gentleman with history of Parkinson's disease on Sinemet who is here today to be seen in GI clinic after he was found to have erosive esophagitis on endoscopy in November 2021.    He had an upper endoscopy as part of an evaluation for nausea and vomiting.  The upper endoscopy showed grade C esophagitis.  Biopsies of the esophagus showed only ulcer and necrotic debris.  He was put on omeprazole 20 mg twice daily.  He notes that he had never had symptoms of heartburn.  He denies any odynophagia dysphagia.    Overall, he is doing quite well.  His nausea and vomiting has been better.  He thinks that it may be related to his Parkinson's medications getting low.  This will cause tensing and spasming of his abdominal muscles which he believes contributes to the nausea and vomiting.  He now has a sublingual Parkinson's medication which helps abort the symptoms quite well.  He has had no symptoms in 2 weeks since starting this medication.    He also had esophageal manometry testing done which was normal.  MRI    ROS:    No fevers or chills  No weight loss  No blurry vision, double vision or change in vision  No sore throat  No lymphadenopathy  No headache, paraesthesias, or weakness in a limb  No shortness of breath or wheezing  No chest pain or pressure  No arthralgias or myalgias  No rashes or skin changes  No odynophagia or dysphagia  No BRBPR, hematochezia, melena  No dysuria, frequency or urgency  No hot/cold intolerance or polyria  No anxiety or depression    PREVIOUS ENDOSCOPY:  EGD as outlined above  Esophageal manometry normal    PERTINENT RELEVANT IMAGING OR LABS:  CT scan abdomen pelvis unremarkable    ALLERGIES:     Allergies   Allergen Reactions     Diphenhydramine      Other reaction(s): Other (see comments)  Told not to take due to parkinsons     Metoclopramide      Avoid in patient  with a history of Parkinson Disease  Other reaction(s): GI intolerance  Avoid in patient with a history of Parkinson Disease     Promethazine      Other reaction(s): Other (see comments)  Told not to take due to parkisons       PERTINENT MEDICATIONS:    Current Outpatient Medications:      amantadine (SYMMETREL) 100 MG capsule, Take 1 capsule (100 mg) by mouth 2 times daily At 6:30 AM and 10:30-11 AM, Disp: 180 capsule, Rfl: 3     carbidopa-levodopa (PARCOPA)  MG ODT, TAKE 1 AND 1/2 TAB BY MOUTHAT 6:30AM, 1 AT 9AM, 1 AT 11:30 AM, 1 AT 2PM, 1 AT 4:30PM,1 AT 7PM,1 AT 9:30PMTAKE PARCOMA WHEN YOU ARE VOMITING AND CANT KEEP DOWN, Disp: 240 tablet, Rfl: 2     carbidopa-levodopa (SINEMET CR)  MG CR tablet, 50/200 tab by mouth nightly at 10/11pm, Disp: 90 tablet, Rfl: 11     carbidopa-levodopa (SINEMET)  MG tablet, 1.5@630a, 1 @ 9am, 1@ 1130a, 1@ 2p, 1@430p, 1@7p, 1@930p and 1/4-1/2 tab 3/day as needed after meals = 8.5- 10 tabs per day, Disp: 900 tablet, Rfl: 3     Multiple Vitamin (MULTIVITAMIN ADULT PO), Vitamin by mouth daily, Disp: , Rfl:      omeprazole (PRILOSEC) 20 MG DR capsule, Take 1 capsule (20 mg) by mouth 2 times daily, Disp: 60 capsule, Rfl: 3     ondansetron (ZOFRAN) 4 MG tablet, Take 4 mg by mouth every 8 hours as needed, Disp: , Rfl:      rotigotine (NEUPRO) 3 MG/24HR 24 hr patch, Place 1 patch onto the skin daily, Disp: 30 patch, Rfl: 11    PROBLEM LIST  Patient Active Problem List    Diagnosis Date Noted     Esophagitis endoscopy done 11/2021 11/18/2021     Priority: Medium     Impression:            - Normal first portion of the duodenum, second portion                          of the duodenum and third portion of the duodenum.                          Biopsied.                          - Nodular mucosa in the duodenal bulb. Biopsied.                          - Normal stomach. Biopsied.                          - Medium-sized hiatal hernia.                          - LA Grade C  esophagitis (with numerous ulcers at the                          GE junction and in the esophagus) with bleeding.                          Biopsied. Friability may indicate EoE as well -                          further biopses avoided given friability.   Recommendation:        - Patient has a contact number available for                          emergencies. The signs and symptoms of potential                          delayed complications were discussed with the patient.                          Return to normal activities tomorrow. Written                          discharge instructions were provided to the patient.                          - Resume previous diet.                          - Continue present medications.                          - Await pathology results.                          - Repeat upper endoscopy at appointment to be                          scheduled to check healing.                          - Start PPI BID if there is no interaction with                          current medications.        H/O electroencephalogram 2021 11/18/2021     Priority: Medium       Impression:  This is a normal waking and sleep EEG, with generalized beta activities throughout the recording. Generalized beta activities are usually associated certain medication use, such as benzodiazepines or barbiturates. The cause of these activities in this case is unclear at this time.             Cyclical vomiting syndrome unrelated to migraine 07/30/2021     Priority: Medium     Hiatal hernia 04/20/2021     Priority: Medium     Based on a 2021 ct scan  Small hiatal hernia with some wall thickening in the distal thoracic esophagus suggesting some esophagitis.       Parkinson disease (H) 01/09/2019     Priority: Medium     Shoulder dislocation      Priority: Medium     left       Seizure (H) at age 22 yrs 08/17/2018     Priority: Medium     H/O magnetic resonance imaging of cervical spine 08/17/2018     Priority: Medium      March  MR Cervical Spine without IV Contrast3/15/2017  [facility]  Result Addenda   Addendum by Provider, PA Jones on 03/15/2017 12:30 PM  RAD^^^MA  MR Cervical Spine w/o contrast  3/15/2017 12:30:00   Result Impression    Minimal degenerative disc disease at C5-6 and C6-7   otherwise an unremarkable MR scan of the cervical spine.    Result Narrative       EXAM: MR Cervical Spine w/o contrast     INDICATION: left hand weakness     COMPARISON: None.      Procedure: Sagittal short and long TR and STIR images of the cervical   spine were followed by axial 2-D merge and long TR images through the   disc spaces. Oblique sagittal long TR images within obtained through   the neural foramina bilaterally.     FINDINGS: There is a normal cervical lordosis. The vertebral body   heights and intervertebral disc spaces are maintained. The marrow   signal is normal in all sequences. The C1-2 relationship is normal. No   prevertebral soft tissue swelling is seen. The C7-T1 relationship is   normal.     Axial images at C2-3, C3-4, C4-5 demonstrate no disc bulging or   herniation. The neural foramina are widely patent and the posterior   facets are intact.     Axial images at C5-6 and C6-7 demonstrate minimal focal annular   bulging in the midline without significant encroachment on the thecal   sac. The neural foramina are widely patent and the posterior facets   are intact.     Axial images at C7-T1 demonstrate no disc bulging or herniation. The   neural foramina are widely patent and the posterior facets are intact.    Status            H/O shoulder surgery 08/17/2018     Priority: Medium     2017 July  XR SHOULDER 2 VIEWS [facility]14/2017  [facility]  Result Narrative   XR SHOULDER 2 VIEWS LEFT  7/14/2017 9:13 PM    INDICATION: Shoulder Injury  COMPARISON: None.    FINDINGS: Negative shoulder. No fracture or dislocation.    Status            H/O magnetic resonance imaging of brain and  brain stem 08/17/2018     Priority: Medium     2009 December  MR Brain without and with IV [id number]/8/2009  [facility]  Result Impression    Normal MRI of the brain.    Result Narrative           Multiecho, multiplanar views of the brain were obtained prior to and   following the IV administration of 19 mL of contrast. There are no   previous studies available for comparison.       The brain parenchyma is normal in appearance on all pulse sequences,   with no intracranial hemorrhage or evidence of an acute stroke. There   is no pathological enhancement. The ventricles are normal in size,   shape and position, with no shift in midline structures or other   evidence of significant mass effect. There is no sclerosis of the   mesial temporal lobe on either side to suggest a cause for this   patient's apparent seizure activity.       There are normal flow-voids within the internal carotid and   vertebrobasilar arterial systems bilaterally. The visualized aspects   of the orbits are normal.                 Tremor 08/10/2018     Priority: Medium     Right inguinal hernia 01/19/2017     Priority: Medium       PERTINENT PAST MEDICAL HISTORY:  Past Medical History:   Diagnosis Date     Esophagitis endoscopy done 11/2021 11/18/2021    Impression:            - Normal first portion of the duodenum, second portion                         of the duodenum and third portion of the duodenum.                         Biopsied.                         - Nodular mucosa in the duodenal bulb. Biopsied.                         - Normal stomach. Biopsied.                         - Medium-sized hiatal hernia.                         - LA Grade     H/O electroencephalogram 2021 11/18/2021     Impression:  This is a normal waking and sleep EEG, with generalized beta activities throughout the recording. Generalized beta activities are usually associated certain medication use, such as benzodiazepines or barbiturates. The cause of these  activities in this case is unclear at this time.         H/O magnetic resonance imaging of brain and brain stem 8/17/2018 2009 December MR Brain without and with IV [id number]/8/2009 [facility] Result Impression  Normal MRI of the brain.  Result Narrative      Multiecho, multiplanar views of the brain were obtained prior to and  following the IV administration of 19 mL of contrast. There are no  previous studies available for comparison.     The brain parenchyma is normal in appearance on all pulse sequences,  with      H/O magnetic resonance imaging of cervical spine 8/17/2018 March MR Cervical Spine without IV Contrast3/15/2017 [facility] Result Addenda Addendum by Provider, PA Jones on 03/15/2017 12:30 PM RAD^^^MA MR Cervical Spine w/o contrast 3/15/2017 12:30:00 Result Impression  Minimal degenerative disc disease at C5-6 and C6-7  otherwise an unremarkable MR scan of the cervical spine.  Result Narrative   EXAM: MR Cervical Spine w/o contrast   INDICATION:      H/O shoulder surgery 8/17/2018 2017 July XR SHOULDER 2 VIEWS LEFT7/14/2017 [facility] Result Narrative XR SHOULDER 2 VIEWS LEFT 7/14/2017 9:13 PM  INDICATION: Shoulder Injury COMPARISON: None.  FINDINGS: Negative shoulder. No fracture or dislocation.  Status       Hiatal hernia 4/20/2021    Based on a 2021 ct scan Small hiatal hernia with some wall thickening in the distal thoracic esophagus suggesting some esophagitis.     Seizure (H) at age 22 yrs 8/17/2018     Shoulder dislocation     left     Tremor 8/10/2018       PREVIOUS SURGERIES:  Past Surgical History:   Procedure Laterality Date     ESOPHAGOSCOPY, GASTROSCOPY, DUODENOSCOPY (EGD), COMBINED N/A 11/18/2021    Procedure: ESOPHAGOGASTRODUODENOSCOPY, WITH BIOPSY;  Surgeon: Veronica Kay MD;  Location: [facility] OR     SHOULDER SURGERY  2007       SOCIAL HISTORY:  Social History     Socioeconomic History     Marital status: Single     Spouse name:  Not on file     Number of children: Not on file     Years of education: Not on file     Highest education level: Not on file   Occupational History     Not on file   Tobacco Use     Smoking status: Never Smoker     Smokeless tobacco: Never Used   Substance and Sexual Activity     Alcohol use: Yes     Drug use: No     Sexual activity: Not on file   Other Topics Concern     Not on file   Social History Narrative    single. lives in [facility]. mother cabrera ambriz            He has a history of a left dislocated shoulder with surgery performed in 2009. He currently lives in [place] but comes to the [facility] to visit his parents in [place]. 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     Social Determinants of Health     Financial Resource Strain: Not on file   Food Insecurity: Not on file   Transportation Needs: Not on file   Physical Activity: Not on file   Stress: Not on file   Social Connections: Not on file   Intimate Partner Violence: Not At Risk     Fear of Current or Ex-Partner: No     Emotionally Abused: No     Physically Abused: No     Sexually Abused: No   Housing Stability: Not on file       FAMILY HISTORY:  Family History   Problem Relation Age of Onset     Cancer Other         grandmother     Tremor No family hx of      Dementia No family hx of      Parkinsonism No family hx of      Seizure Disorder No family hx of        Past/family/social history reviewed and no changes    PHYSICAL EXAMINATION:  Constitutional: aaox3, cooperative, pleasant, not dyspneic/diaphoretic, no acute distress  Vitals reviewed: There were no vitals taken for this visit.  Wt:   Wt Readings from Last 2 Encounters:   11/04/21 79.4 kg (175 lb)   11/02/21 77.6 kg (171 lb)      Eyes: Sclera anicteric/injected  Respiratory: Unlabored breathing  Skin: no jaundice  Psych: Normal affect            Answers for HPI/ROS submitted by the patient on 2/23/2022  General Symptoms: No  Skin Symptoms: No  HENT Symptoms:  No  EYE SYMPTOMS: No  HEART SYMPTOMS: No  LUNG SYMPTOMS: No  INTESTINAL SYMPTOMS: Yes  URINARY SYMPTOMS: No  REPRODUCTIVE SYMPTOMS: No  SKELETAL SYMPTOMS: No  BLOOD SYMPTOMS: No  NERVOUS SYSTEM SYMPTOMS: Yes  MENTAL HEALTH SYMPTOMS: No  Heart burn or indigestion: No  Nausea: Yes  Vomiting: Yes  Abdominal pain: No  Bloating: No  Constipation: No  Blood in stool: No  Black stools: No  Rectal or Anal pain: No  Fecal incontinence: No  Yellowing of skin or eyes: No  Vomit with blood: No  Change in stools: No  Trouble with coordination: No  Dizziness or trouble with balance: No  Fainting or black-out spells: No  Memory loss: No  Headache: No  Seizures: No  Speech problems: No  Tingling: No  Tremor: Yes  Weakness: No  Difficulty walking: Yes  Paralysis: No  Numbness: No

## 2022-02-23 NOTE — PROGRESS NOTES
ALEA is a 36 year old who is being evaluated via a billable video visit.      How would you like to obtain your AVS? MyChart  If the video visit is dropped, the invitation should be resent by: Send to e-mail at: [email or web address]  Will anyone else be joining your video visit? No      Video Start Time: 1:43 PM  Video-Visit Details    Type of service:  Video Visit    Video End Time:1:50 PM    Originating Location (pt. Location): [place]    Distant Location (provider location):  [facility] GASTROENTEROLOGY CLINIC [place]     Platform used for Video Visit: BlueKite

## 2022-02-23 NOTE — LETTER
2/23/2022         RE: Dipesh Nicole  [address] [place]  [facility] [address]        Dear Colleague,    Thank you for referring your patient, Dipesh Nicole, to the [facility] [place]. Please see a copy of my visit note below.    ALEA is a 36 year old who is being evaluated via a billable video visit.      How would you like to obtain your AVS? MyChart  If the video visit is dropped, the invitation should be resent by: Send to e-mail at: [email or web address]  Will anyone else be joining your video visit? No      Video Start Time: 1:43 PM  Video-Visit Details    Type of service:  Video Visit    Video End Time:1:50 PM    Originating Location (pt. Location): Home    Distant Location (provider location):  [facility] [place]     Platform used for Video Visit: Flext[employer]    GI CLINIC VISIT    CC/REFERRING MD:  No ref. provider found  REASON FOR CONSULTATION:   No ref. provider found for   Chief Complaint   Patient presents with     Video Visit   Follow-up EGD for esophagitis and nausea vomiting    ASSESSMENT/PLAN:  36-year-old gentleman with history of Parkinson's disease on Sinemet here today to follow-up on his upper endoscopy that showed grade C esophagitis.    1.  Erosive esophagitis-he is doing well on omeprazole 20 mg p.o. twice daily.  He has a repeat EGD scheduled for 3/7/2022 to evaluate for healing of esophagitis and ensure no Hernandez's esophagus.  If the esophagitis is healed we will discuss decreasing the dose of his omeprazole down to 20 mg.  Given his erosive esophagitis he will likely need ongoing PPI use.  A trial of H2 blocker could be considered but this is often not adequate to prevent erosive esophagitis.    2.  Nausea and vomiting-this seems to be related to his Parkinson's disease.  Hold his medications get lower he has spasming and rigidity of his abdominal muscles which she believes leads to his nausea and vomiting.  Now that his  medications have been adjusted and he has a sublingual form this has helped improve his symptoms.  GERD could have been contributing but he is now on treatment for that.  If his symptoms do not continue to improve we could consider a gastric emptying study but will hold off on this for now.    At this time I think he can follow-up as needed.      RTC PRN    Thank you for this consultation.  It was a pleasure to participate in the care of this patient; please contact us with any further questions.  I spent a total of 20 minutes during the day of encounter performed chart review, meeting with patient, patient counseling, care coordination, and documentation.    This note was created with voice recognition software, and while reviewed for accuracy, typos may remain.     Alexandr Sweet MD  Inflammatory Bowel Disease Program  Division of Gastroenterology, Hepatology and Nutrition  [facility]  Pager: [phone]      HPI:    Annalise is a very pleasant 36-year-old gentleman with history of Parkinson's disease on Sinemet who is here today to be seen in GI clinic after he was found to have erosive esophagitis on endoscopy in November 2021.    He had an upper endoscopy as part of an evaluation for nausea and vomiting.  The upper endoscopy showed grade C esophagitis.  Biopsies of the esophagus showed only ulcer and necrotic debris.  He was put on omeprazole 20 mg twice daily.  He notes that he had never had symptoms of heartburn.  He denies any odynophagia dysphagia.    Overall, he is doing quite well.  His nausea and vomiting has been better.  He thinks that it may be related to his Parkinson's medications getting low.  This will cause tensing and spasming of his abdominal muscles which he believes contributes to the nausea and vomiting.  He now has a sublingual Parkinson's medication which helps abort the symptoms quite well.  He has had no symptoms in 2 weeks since starting this medication.    He also had esophageal  manometry testing done which was normal.  MRI    ROS:    No fevers or chills  No weight loss  No blurry vision, double vision or change in vision  No sore throat  No lymphadenopathy  No headache, paraesthesias, or weakness in a limb  No shortness of breath or wheezing  No chest pain or pressure  No arthralgias or myalgias  No rashes or skin changes  No odynophagia or dysphagia  No BRBPR, hematochezia, melena  No dysuria, frequency or urgency  No hot/cold intolerance or polyria  No anxiety or depression    PREVIOUS ENDOSCOPY:  EGD as outlined above  Esophageal manometry normal    PERTINENT RELEVANT IMAGING OR LABS:  CT scan abdomen pelvis unremarkable    ALLERGIES:     Allergies   Allergen Reactions     Diphenhydramine      Other reaction(s): Other (see comments)  Told not to take due to parkinsons     Metoclopramide      Avoid in patient with a history of Parkinson Disease  Other reaction(s): GI intolerance  Avoid in patient with a history of Parkinson Disease     Promethazine      Other reaction(s): Other (see comments)  Told not to take due to parkisons       PERTINENT MEDICATIONS:    Current Outpatient Medications:      amantadine (SYMMETREL) 100 MG capsule, Take 1 capsule (100 mg) by mouth 2 times daily At 6:30 AM and 10:30-11 AM, Disp: 180 capsule, Rfl: 3     carbidopa-levodopa (PARCOPA)  MG ODT, TAKE 1 AND 1/2 TAB BY MOUTHAT 6:30AM, 1 AT 9AM, 1 AT 11:30 AM, 1 AT 2PM, 1 AT 4:30PM,1 AT 7PM,1 AT 9:30PMTAKE PARCOMA WHEN YOU ARE VOMITING AND CANT KEEP DOWN, Disp: 240 tablet, Rfl: 2     carbidopa-levodopa (SINEMET CR)  MG CR tablet, 50/200 tab by mouth nightly at 10/11pm, Disp: 90 tablet, Rfl: 11     carbidopa-levodopa (SINEMET)  MG tablet, 1.5@630a, 1 @ 9am, 1@ 1130a, 1@ 2p, 1@430p, 1@7p, 1@930p and 1/4-1/2 tab 3/day as needed after meals = 8.5- 10 tabs per day, Disp: 900 tablet, Rfl: 3     Multiple Vitamin (MULTIVITAMIN ADULT PO), Vitamin by mouth daily, Disp: , Rfl:      omeprazole (PRILOSEC) 20  MG DR capsule, Take 1 capsule (20 mg) by mouth 2 times daily, Disp: 60 capsule, Rfl: 3     ondansetron (ZOFRAN) 4 MG tablet, Take 4 mg by mouth every 8 hours as needed, Disp: , Rfl:      rotigotine (NEUPRO) 3 MG/24HR 24 hr patch, Place 1 patch onto the skin daily, Disp: 30 patch, Rfl: 11    PROBLEM LIST  Patient Active Problem List    Diagnosis Date Noted     Esophagitis endoscopy done 11/2021 11/18/2021     Priority: Medium     Impression:            - Normal first portion of the duodenum, second portion                          of the duodenum and third portion of the duodenum.                          Biopsied.                          - Nodular mucosa in the duodenal bulb. Biopsied.                          - Normal stomach. Biopsied.                          - Medium-sized hiatal hernia.                          - LA Grade C esophagitis (with numerous ulcers at the                          GE junction and in the esophagus) with bleeding.                          Biopsied. Friability may indicate EoE as well -                          further biopses avoided given friability.   Recommendation:        - Patient has a contact number available for                          emergencies. The signs and symptoms of potential                          delayed complications were discussed with the patient.                          Return to normal activities tomorrow. Written                          discharge instructions were provided to the patient.                          - Resume previous diet.                          - Continue present medications.                          - Await pathology results.                          - Repeat upper endoscopy at appointment to be                          scheduled to check healing.                          - Start PPI BID if there is no interaction with                          current medications.        H/O electroencephalogram 2021 11/18/2021     Priority: Medium        Impression:  This is a normal waking and sleep EEG, with generalized beta activities throughout the recording. Generalized beta activities are usually associated certain medication use, such as benzodiazepines or barbiturates. The cause of these activities in this case is unclear at this time.             Cyclical vomiting syndrome unrelated to migraine 07/30/2021     Priority: Medium     Hiatal hernia 04/20/2021     Priority: Medium     Based on a 2021 ct scan  Small hiatal hernia with some wall thickening in the distal thoracic esophagus suggesting some esophagitis.       Parkinson disease (H) 01/09/2019     Priority: Medium     Shoulder dislocation      Priority: Medium     left       Seizure (H) at age 22 yrs 08/17/2018     Priority: Medium     H/O magnetic resonance imaging of cervical spine 08/17/2018     Priority: Medium     March  MR Cervical Spine without IV Contrast3/15/2017  [facility]  Result Addenda   Addendum by ProviderKaren M.D. on 03/15/2017 12:30 PM  RAD^^^MA  MR Cervical Spine w/o contrast  3/15/2017 12:30:00   Result Impression    Minimal degenerative disc disease at C5-6 and C6-7   otherwise an unremarkable MR scan of the cervical spine.    Result Narrative       EXAM: MR Cervical Spine w/o contrast     INDICATION: left hand weakness     COMPARISON: None.      Procedure: Sagittal short and long TR and STIR images of the cervical   spine were followed by axial 2-D merge and long TR images through the   disc spaces. Oblique sagittal long TR images within obtained through   the neural foramina bilaterally.     FINDINGS: There is a normal cervical lordosis. The vertebral body   heights and intervertebral disc spaces are maintained. The marrow   signal is normal in all sequences. The C1-2 relationship is normal. No   prevertebral soft tissue swelling is seen. The C7-T1 relationship is   normal.     Axial images at C2-3, C3-4, C4-5 demonstrate no disc bulging or   herniation. The neural  foramina are widely patent and the posterior   facets are intact.     Axial images at C5-6 and C6-7 demonstrate minimal focal annular   bulging in the midline without significant encroachment on the thecal   sac. The neural foramina are widely patent and the posterior facets   are intact.     Axial images at C7-T1 demonstrate no disc bulging or herniation. The   neural foramina are widely patent and the posterior facets are intact.    Status            H/O shoulder surgery 08/17/2018     Priority: Medium     2017 July  [facility]  Result Narrative   XR SHOULDER 2 VIEWS LEFT  7/14/2017 9:13 PM    INDICATION: Shoulder Injury  COMPARISON: None.    FINDINGS: Negative shoulder. No fracture or dislocation.    Status            H/O magnetic resonance imaging of brain and brain stem 08/17/2018     Priority: Medium     2009 December  MR Brain without and with IV Yeypljsb22/8/2009  [facility]  Result Impression    Normal MRI of the brain.    Result Narrative           Multiecho, multiplanar views of the brain were obtained prior to and   following the IV administration of 19 mL of contrast. There are no   previous studies available for comparison.       The brain parenchyma is normal in appearance on all pulse sequences,   with no intracranial hemorrhage or evidence of an acute stroke. There   is no pathological enhancement. The ventricles are normal in size,   shape and position, with no shift in midline structures or other   evidence of significant mass effect. There is no sclerosis of the   mesial temporal lobe on either side to suggest a cause for this   patient's apparent seizure activity.       There are normal flow-voids within the internal carotid and   vertebrobasilar arterial systems bilaterally. The visualized aspects   of the orbits are normal.                 Tremor 08/10/2018     Priority: Medium     Right inguinal hernia 01/19/2017      Priority: Medium       PERTINENT PAST MEDICAL HISTORY:  Past Medical History:   Diagnosis Date     Esophagitis endoscopy done 11/2021 11/18/2021    Impression:            - Normal first portion of the duodenum, second portion                         of the duodenum and third portion of the duodenum.                         Biopsied.                         - Nodular mucosa in the duodenal bulb. Biopsied.                         - Normal stomach. Biopsied.                         - Medium-sized hiatal hernia.                         - LA Grade     H/O electroencephalogram 2021 11/18/2021     Impression:  This is a normal waking and sleep EEG, with generalized beta activities throughout the recording. Generalized beta activities are usually associated certain medication use, such as benzodiazepines or barbiturates. The cause of these activities in this case is unclear at this time.         H/O magnetic resonance imaging of brain and brain stem 8/17/2018 2009 December MR Brain without and with IV Pllaqoqy05/8/2009 [facility] Result Impression  Normal MRI of the brain.  Result Narrative      Multiecho, multiplanar views of the brain were obtained prior to and  following the IV administration of 19 mL of contrast. There are no  previous studies available for comparison.     The brain parenchyma is normal in appearance on all pulse sequences,  with      H/O magnetic resonance imaging of cervical spine 8/17/2018 March MR Cervical Spine without IV Contrast3/15/2017 [facility] Result Addenda Addendum by Provider, PA Jones on 03/15/2017 12:30 PM RAD^^^MA MR Cervical Spine w/o contrast 3/15/2017 12:30:00 Result Impression  Minimal degenerative disc disease at C5-6 and C6-7  otherwise an unremarkable MR scan of the cervical spine.  Result Narrative   EXAM: MR Cervical Spine w/o contrast   INDICATION:      H/O shoulder surgery 8/17/2018 2017 July XR SHOULDER 2 VIEWS LEFT7/14/2017 [facility] Result Narrative XR SHOULDER 2 VIEWS LEFT 7/14/2017 9:13 PM  INDICATION: Shoulder Injury COMPARISON: None.  FINDINGS: Negative shoulder. No fracture or dislocation.  Status       Hiatal hernia 4/20/2021    Based on a 2021 ct scan Small hiatal hernia with some wall thickening in the distal thoracic esophagus suggesting some esophagitis.     Seizure (H) at age 22 yrs 8/17/2018     Shoulder dislocation     left     Tremor 8/10/2018       PREVIOUS SURGERIES:  Past Surgical History:   Procedure Laterality Date     ESOPHAGOSCOPY, GASTROSCOPY, DUODENOSCOPY (EGD), COMBINED N/A 11/18/2021    Procedure: ESOPHAGOGASTRODUODENOSCOPY, WITH BIOPSY;  Surgeon: Veronica Kay MD;  Location: [facility] OR     SHOULDER SURGERY  2007       SOCIAL HISTORY:  Social History     Socioeconomic History     Marital status: Single     Spouse name: Not on file     Number of children: Not on file     Years of education: Not on file     Highest education level: Not on file   Occupational History     Not on file   Tobacco Use     Smoking status: Never Smoker     Smokeless tobacco: Never Used   Substance and Sexual Activity     Alcohol use: Yes     Drug use: No     Sexual activity: Not on file   Other Topics Concern     Not on file   Social History Narrative    single. lives in [place]. mother cabrera ambriz            He has a history of a left dislocated shoulder with surgery performed in 2009. He currently lives in [place] but comes to the [place] area to visit his parents in [place]. 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     Social Determinants of Health     Financial Resource Strain: Not on file   Food Insecurity: Not on file   Transportation Needs: Not on file   Physical Activity: Not on file   Stress: Not on file   Social Connections: Not on file   Intimate Partner Violence: Not At Risk     Fear of Current or Ex-Partner: No     Emotionally Abused: No     Physically Abused: No     Sexually  Abused: No   Housing Stability: Not on file       FAMILY HISTORY:  Family History   Problem Relation Age of Onset     Cancer Other         grandmother     Tremor No family hx of      Dementia No family hx of      Parkinsonism No family hx of      Seizure Disorder No family hx of      Past/family/social history reviewed and no changes    PHYSICAL EXAMINATION:  Constitutional: aaox3, cooperative, pleasant, not dyspneic/diaphoretic, no acute distress  Vitals reviewed: There were no vitals taken for this visit.  Wt:   Wt Readings from Last 2 Encounters:   11/04/21 79.4 kg (175 lb)   11/02/21 77.6 kg (171 lb)      Eyes: Sclera anicteric/injected  Respiratory: Unlabored breathing  Skin: no jaundice  Psych: Normal affect    Answers for HPI/ROS submitted by the patient on 2/23/2022  General Symptoms: No  Skin Symptoms: No  HENT Symptoms: No  EYE SYMPTOMS: No  HEART SYMPTOMS: No  LUNG SYMPTOMS: No  INTESTINAL SYMPTOMS: Yes  URINARY SYMPTOMS: No  REPRODUCTIVE SYMPTOMS: No  SKELETAL SYMPTOMS: No  BLOOD SYMPTOMS: No  NERVOUS SYSTEM SYMPTOMS: Yes  MENTAL HEALTH SYMPTOMS: No  Heart burn or indigestion: No  Nausea: Yes  Vomiting: Yes  Abdominal pain: No  Bloating: No  Constipation: No  Blood in stool: No  Black stools: No  Rectal or Anal pain: No  Fecal incontinence: No  Yellowing of skin or eyes: No  Vomit with blood: No  Change in stools: No  Trouble with coordination: No  Dizziness or trouble with balance: No  Fainting or black-out spells: No  Memory loss: No  Headache: No  Seizures: No  Speech problems: No  Tingling: No  Tremor: Yes  Weakness: No  Difficulty walking: Yes  Paralysis: No  Numbness: No

## 2022-02-28 ENCOUNTER — TELEPHONE (OUTPATIENT)
Dept: GASTROENTEROLOGY | Facility: CLINIC | Age: 37
End: 2022-02-28

## 2022-03-03 ENCOUNTER — LAB (OUTPATIENT)
Dept: LAB | Facility: CLINIC | Age: 37
End: 2022-03-03
Attending: INTERNAL MEDICINE
Payer: COMMERCIAL

## 2022-03-03 DIAGNOSIS — Z11.59 ENCOUNTER FOR SCREENING FOR OTHER VIRAL DISEASES: ICD-10-CM

## 2022-03-03 LAB — SARS-COV-2 RNA RESP QL NAA+PROBE: NEGATIVE

## 2022-03-03 PROCEDURE — U0005 INFEC AGEN DETEC AMPLI PROBE: HCPCS

## 2022-03-03 PROCEDURE — U0003 INFECTIOUS AGENT DETECTION BY NUCLEIC ACID (DNA OR RNA); SEVERE ACUTE RESPIRATORY SYNDROME CORONAVIRUS 2 (SARS-COV-2) (CORONAVIRUS DISEASE [COVID-19]), AMPLIFIED PROBE TECHNIQUE, MAKING USE OF HIGH THROUGHPUT TECHNOLOGIES AS DESCRIBED BY CMS-2020-01-R: HCPCS

## 2022-03-04 NOTE — TELEPHONE ENCOUNTER
Second attempt for pre-assessment prior to upcoming #GD.    No answer.  Left message to return call [phone] #2    COVID test 3.3.2022-negative    24 hour clear liquid diet prior to EGD d/t gastric dysmotility noted in pt's chart.  Accelereacht message sent.    Isabel Rosa RN

## 2022-03-07 ENCOUNTER — ANESTHESIA EVENT (OUTPATIENT)
Dept: SURGERY | Facility: AMBULATORY SURGERY CENTER | Age: 37
End: 2022-03-07
Payer: COMMERCIAL

## 2022-03-07 ENCOUNTER — ANESTHESIA (OUTPATIENT)
Dept: SURGERY | Facility: AMBULATORY SURGERY CENTER | Age: 37
End: 2022-03-07
Payer: COMMERCIAL

## 2022-03-07 ENCOUNTER — HOSPITAL ENCOUNTER (OUTPATIENT)
Facility: AMBULATORY SURGERY CENTER | Age: 37
End: 2022-03-07
Attending: INTERNAL MEDICINE
Payer: COMMERCIAL

## 2022-03-07 VITALS
WEIGHT: 175 LBS | OXYGEN SATURATION: 100 % | DIASTOLIC BLOOD PRESSURE: 87 MMHG | SYSTOLIC BLOOD PRESSURE: 145 MMHG | HEIGHT: 73 IN | HEART RATE: 58 BPM | TEMPERATURE: 97.3 F | BODY MASS INDEX: 23.19 KG/M2 | RESPIRATION RATE: 16 BRPM

## 2022-03-07 VITALS — HEART RATE: 75 BPM

## 2022-03-07 DIAGNOSIS — K29.00 OTHER ACUTE GASTRITIS WITHOUT HEMORRHAGE: Primary | ICD-10-CM

## 2022-03-07 LAB — UPPER GI ENDOSCOPY: NORMAL

## 2022-03-07 PROCEDURE — 43239 EGD BIOPSY SINGLE/MULTIPLE: CPT

## 2022-03-07 PROCEDURE — 88305 TISSUE EXAM BY PATHOLOGIST: CPT | Mod: TC | Performed by: INTERNAL MEDICINE

## 2022-03-07 RX ORDER — LIDOCAINE 40 MG/G
CREAM TOPICAL
Status: DISCONTINUED | OUTPATIENT
Start: 2022-03-07 | End: 2022-03-07 | Stop reason: HOSPADM

## 2022-03-07 RX ORDER — LIDOCAINE HYDROCHLORIDE 20 MG/ML
INJECTION, SOLUTION INFILTRATION; PERINEURAL PRN
Status: DISCONTINUED | OUTPATIENT
Start: 2022-03-07 | End: 2022-03-07

## 2022-03-07 RX ORDER — FLUMAZENIL 0.1 MG/ML
0.2 INJECTION, SOLUTION INTRAVENOUS
Status: DISCONTINUED | OUTPATIENT
Start: 2022-03-07 | End: 2022-03-08 | Stop reason: HOSPADM

## 2022-03-07 RX ORDER — NALOXONE HYDROCHLORIDE 0.4 MG/ML
0.4 INJECTION, SOLUTION INTRAMUSCULAR; INTRAVENOUS; SUBCUTANEOUS
Status: DISCONTINUED | OUTPATIENT
Start: 2022-03-07 | End: 2022-03-08 | Stop reason: HOSPADM

## 2022-03-07 RX ORDER — PROPOFOL 10 MG/ML
INJECTION, EMULSION INTRAVENOUS PRN
Status: DISCONTINUED | OUTPATIENT
Start: 2022-03-07 | End: 2022-03-07

## 2022-03-07 RX ORDER — SODIUM CHLORIDE, SODIUM LACTATE, POTASSIUM CHLORIDE, CALCIUM CHLORIDE 600; 310; 30; 20 MG/100ML; MG/100ML; MG/100ML; MG/100ML
INJECTION, SOLUTION INTRAVENOUS CONTINUOUS PRN
Status: DISCONTINUED | OUTPATIENT
Start: 2022-03-07 | End: 2022-03-07

## 2022-03-07 RX ORDER — FAMOTIDINE 20 MG/1
20 TABLET, FILM COATED ORAL 2 TIMES DAILY
Qty: 60 TABLET | Refills: 11 | Status: SHIPPED | OUTPATIENT
Start: 2022-03-07 | End: 2022-05-24

## 2022-03-07 RX ORDER — NALOXONE HYDROCHLORIDE 0.4 MG/ML
0.2 INJECTION, SOLUTION INTRAMUSCULAR; INTRAVENOUS; SUBCUTANEOUS
Status: DISCONTINUED | OUTPATIENT
Start: 2022-03-07 | End: 2022-03-08 | Stop reason: HOSPADM

## 2022-03-07 RX ORDER — ONDANSETRON 2 MG/ML
4 INJECTION INTRAMUSCULAR; INTRAVENOUS EVERY 6 HOURS PRN
Status: DISCONTINUED | OUTPATIENT
Start: 2022-03-07 | End: 2022-03-08 | Stop reason: HOSPADM

## 2022-03-07 RX ORDER — ONDANSETRON 4 MG/1
4 TABLET, ORALLY DISINTEGRATING ORAL EVERY 6 HOURS PRN
Status: DISCONTINUED | OUTPATIENT
Start: 2022-03-07 | End: 2022-03-08 | Stop reason: HOSPADM

## 2022-03-07 RX ORDER — PROPOFOL 10 MG/ML
INJECTION, EMULSION INTRAVENOUS CONTINUOUS PRN
Status: DISCONTINUED | OUTPATIENT
Start: 2022-03-07 | End: 2022-03-07

## 2022-03-07 RX ORDER — ONDANSETRON 2 MG/ML
4 INJECTION INTRAMUSCULAR; INTRAVENOUS
Status: DISCONTINUED | OUTPATIENT
Start: 2022-03-07 | End: 2022-03-07 | Stop reason: HOSPADM

## 2022-03-07 RX ADMIN — PROPOFOL 100 MG: 10 INJECTION, EMULSION INTRAVENOUS at 16:02

## 2022-03-07 RX ADMIN — PROPOFOL 200 MCG/KG/MIN: 10 INJECTION, EMULSION INTRAVENOUS at 15:59

## 2022-03-07 RX ADMIN — SODIUM CHLORIDE, SODIUM LACTATE, POTASSIUM CHLORIDE, CALCIUM CHLORIDE: 600; 310; 30; 20 INJECTION, SOLUTION INTRAVENOUS at 15:52

## 2022-03-07 RX ADMIN — LIDOCAINE HYDROCHLORIDE 100 MG: 20 INJECTION, SOLUTION INFILTRATION; PERINEURAL at 15:59

## 2022-03-07 NOTE — ANESTHESIA CARE TRANSFER NOTE
Patient: Dipesh Nicole    Procedure: Procedure(s):  ESOPHAGOGASTRODUODENOSCOPY, WITH BIOPSY       Diagnosis: Ulcer of esophagus with bleeding [K22.11]  Diagnosis Additional Information: No value filed.    Anesthesia Type:   MAC     Note:    Oropharynx: oropharynx clear of all foreign objects  Level of Consciousness: awake  Oxygen Supplementation: room air    Independent Airway: airway patency satisfactory and stable  Dentition: dentition unchanged  Vital Signs Stable: post-procedure vital signs reviewed and stable  Report to RN Given: handoff report given  Patient transferred to: Phase II  Comments: VSS and WNL, comfortable, no PONV, report to Yasmin MARTÍNEZ  Handoff Report: Identifed the Patient, Identified the Reponsible Provider, Reviewed the pertinent medical history, Discussed the surgical course, Reviewed Intra-OP anesthesia mangement and issues during anesthesia, Set expectations for post-procedure period and Allowed opportunity for questions and acknowledgement of understanding      Vitals:  Vitals Value Taken Time   /70 03/07/22 1615   Temp 36.2  C (97.1  F) 03/07/22 1615   Pulse 70 03/07/22 1615   Resp 14 03/07/22 1615   SpO2 97 % 03/07/22 1615       Electronically Signed By: LUCERO Arcos CRNA  March 7, 2022  4:25 PM

## 2022-03-07 NOTE — ANESTHESIA POSTPROCEDURE EVALUATION
Patient: Dipesh Nicole    Procedure: Procedure(s):  ESOPHAGOGASTRODUODENOSCOPY, WITH BIOPSY       Anesthesia Type:  MAC    Note:  Disposition: Outpatient   Postop Pain Control: Uneventful            Sign Out: Well controlled pain   PONV: No   Neuro/Psych: Uneventful            Sign Out: Acceptable/Baseline neuro status   Airway/Respiratory: Uneventful            Sign Out: Acceptable/Baseline resp. status   CV/Hemodynamics: Uneventful            Sign Out: Acceptable CV status   Other NRE: NONE   DID A NON-ROUTINE EVENT OCCUR? No           Last vitals:  Vitals Value Taken Time   /87 03/07/22 1645   Temp 36.3  C (97.3  F) 03/07/22 1645   Pulse 58 03/07/22 1645   Resp 16 03/07/22 1645   SpO2 100 % 03/07/22 1645       Electronically Signed By: Luis Manuel Braxton MD  March 7, 2022  4:51 PM

## 2022-03-07 NOTE — PROGRESS NOTES
"B/P: 151/90, T: 98.2, P: 90, R: 20, sa02 100% RA    Patient having what he calls \"cramping episode\" Patient reports has them daily.  Patient very diaphoretic, +abd cramping and with nausea. Patient sitting with legs elevated, knees to chest. Patient took home sinemet dose which he reports helps. Vital signs as above.  Discussed with Dr. Braxton. Will continue to monitor.   "

## 2022-03-07 NOTE — H&P
Dipesh BROWN Preble  [id number]  male  36 year old      Reason for procedure/surgery: follow up esophagitis    Patient Active Problem List   Diagnosis     Tremor     Seizure (H) at age 22 yrs     H/O magnetic resonance imaging of cervical spine     H/O shoulder surgery     H/O magnetic resonance imaging of brain and brain stem     Shoulder dislocation     Parkinson disease (H)     Right inguinal hernia     Hiatal hernia     Cyclical vomiting syndrome unrelated to migraine     Esophagitis endoscopy done 11/2021     H/O electroencephalogram 2021       Past Surgical History:    Past Surgical History:   Procedure Laterality Date     ESOPHAGOSCOPY, GASTROSCOPY, DUODENOSCOPY (EGD), COMBINED N/A 11/18/2021    Procedure: ESOPHAGOGASTRODUODENOSCOPY, WITH BIOPSY;  Surgeon: Veronica Kay MD;  Location: [facility] OR     SHOULDER SURGERY  2007       Past Medical History:   Past Medical History:   Diagnosis Date     Esophagitis endoscopy done 11/2021 11/18/2021    Impression:            - Normal first portion of the duodenum, second portion                         of the duodenum and third portion of the duodenum.                         Biopsied.                         - Nodular mucosa in the duodenal bulb. Biopsied.                         - Normal stomach. Biopsied.                         - Medium-sized hiatal hernia.                         - LA Grade     H/O electroencephalogram 2021 11/18/2021     Impression:  This is a normal waking and sleep EEG, with generalized beta activities throughout the recording. Generalized beta activities are usually associated certain medication use, such as benzodiazepines or barbiturates. The cause of these activities in this case is unclear at this time.         H/O magnetic resonance imaging of brain and brain stem 8/17/2018 2009 December MR Brain without and with IV Yxqjrgdt18/8/2009 [facility] Result Impression  Normal MRI of the brain.  Result Narrative      Multiecho, multiplanar views  of the brain were obtained prior to and  following the IV administration of 19 mL of contrast. There are no  previous studies available for comparison.     The brain parenchyma is normal in appearance on all pulse sequences,  with      H/O magnetic resonance imaging of cervical spine 8/17/2018 March MR Cervical Spine without IV Contrast3/15/2017 [facility] Result Addenda Addendum by Provider, PA Jones on 03/15/2017 12:30 PM RAD^^^MA MR Cervical Spine w/o contrast 3/15/2017 12:30:00 Result Impression  Minimal degenerative disc disease at C5-6 and C6-7  otherwise an unremarkable MR scan of the cervical spine.  Result Narrative   EXAM: MR Cervical Spine w/o contrast   INDICATION:      H/O shoulder surgery 8/17/2018 2017 July XR SHOULDER 2 VIEWS LEFT7/14/2017 [facility] Result Narrative XR SHOULDER 2 VIEWS LEFT 7/14/2017 9:13 PM  INDICATION: Shoulder Injury COMPARISON: None.  FINDINGS: Negative shoulder. No fracture or dislocation.  Status       Hiatal hernia 4/20/2021    Based on a 2021 ct scan Small hiatal hernia with some wall thickening in the distal thoracic esophagus suggesting some esophagitis.     Seizure (H) at age 22 yrs 8/17/2018     Shoulder dislocation     left     Tremor 8/10/2018       Social History:   Social History     Tobacco Use     Smoking status: Never Smoker     Smokeless tobacco: Never Used   Substance Use Topics     Alcohol use: Yes       Family History:   Family History   Problem Relation Age of Onset     Cancer Other         grandmother     Tremor No family hx of      Dementia No family hx of      Parkinsonism No family hx of      Seizure Disorder No family hx of        Allergies:   Allergies   Allergen Reactions     Diphenhydramine      Other reaction(s): Other (see comments)  Told not to take due to parkinsons     Metoclopramide      Avoid in patient with a history of Parkinson Disease  Other reaction(s): GI intolerance  Avoid in patient with  "a history of Parkinson Disease     Promethazine      Other reaction(s): Other (see comments)  Told not to take due to parkisons       Active Medications:   Current Outpatient Medications   Medication Sig Dispense Refill     amantadine (SYMMETREL) 100 MG capsule Take 1 capsule (100 mg) by mouth 2 times daily At 6:30 AM and 10:30-11  capsule 3     carbidopa-levodopa (PARCOPA)  MG ODT TAKE 1 AND 1/2 TAB BY MOUTHAT 6:30AM, 1 AT 9AM, 1 AT 11:30 AM, 1 AT 2PM, 1 AT 4:30PM,1 AT 7PM,1 AT 9:30PMTAKE PARCOMA WHEN YOU ARE VOMITING AND CANT KEEP DOWN 240 tablet 2     carbidopa-levodopa (SINEMET)  MG tablet 1.5@630a, 1 @ 9am, 1@ 1130a, 1@ 2p, 1@430p, 1@7p, 1@930p and 1/4-1/2 tab 3/day as needed after meals = 8.5- 10 tabs per day 900 tablet 3     Multiple Vitamin (MULTIVITAMIN ADULT PO) Vitamin by mouth daily       omeprazole (PRILOSEC) 20 MG DR capsule Take 1 capsule (20 mg) by mouth 2 times daily 60 capsule 3     ondansetron (ZOFRAN) 4 MG tablet Take 4 mg by mouth every 8 hours as needed       rotigotine (NEUPRO) 3 MG/24HR 24 hr patch Place 1 patch onto the skin daily 30 patch 11     carbidopa-levodopa (SINEMET CR)  MG CR tablet 50/200 tab by mouth nightly at 10/11pm 90 tablet 11       Systemic Review:   CONSTITUTIONAL: NEGATIVE for fever, chills, change in weight  ENT/MOUTH: NEGATIVE for ear, mouth and throat problems  RESP: NEGATIVE for significant cough or SOB  CV: NEGATIVE for chest pain, palpitations or peripheral edema    Physical Examination:   Vital Signs: BP (!) 151/90   Temp 98.2  F (36.8  C) (Temporal)   Resp 20   Ht 1.854 m (6' 1\")   Wt 79.4 kg (175 lb)   SpO2 95%   BMI 23.09 kg/m    GENERAL: healthy, alert and no distress  NECK: no adenopathy, no asymmetry, masses, or scars  RESP: lungs clear to auscultation - no rales, rhonchi or wheezes  CV: regular rate and rhythm, normal S1 S2, no S3 or S4, no murmur, click or rub, no peripheral edema and peripheral pulses strong  ABDOMEN: soft, " nontender, no hepatosplenomegaly, no masses and bowel sounds normal  MS: no gross musculoskeletal defects noted, no edema    Plan: Appropriate to proceed as scheduled.      Alexandr Sweet MD  3/7/2022    PCP:  Joey Morley

## 2022-03-07 NOTE — ANESTHESIA PREPROCEDURE EVALUATION
Anesthesia Pre-Procedure Evaluation    Patient: Dipesh Nicole   MRN: [id number] : 1985        Procedure : Procedure(s):  ESOPHAGOGASTRODUODENOSCOPY (EGD)          Past Medical History:   Diagnosis Date     Esophagitis endoscopy done 2021    Impression:            - Normal first portion of the duodenum, second portion                         of the duodenum and third portion of the duodenum.                         Biopsied.                         - Nodular mucosa in the duodenal bulb. Biopsied.                         - Normal stomach. Biopsied.                         - Medium-sized hiatal hernia.                         - LA Grade     H/O electroencephalogram 2021     Impression:  This is a normal waking and sleep EEG, with generalized beta activities throughout the recording. Generalized beta activities are usually associated certain medication use, such as benzodiazepines or barbiturates. The cause of these activities in this case is unclear at this time.         H/O magnetic resonance imaging of brain and brain stem 2018 MR Brain without and with IV [id number]/8/2009 [facility] Result Impression  Normal MRI of the brain.  Result Narrative      Multiecho, multiplanar views of the brain were obtained prior to and  following the IV administration of 19 mL of contrast. There are no  previous studies available for comparison.     The brain parenchyma is normal in appearance on all pulse sequences,  with      H/O magnetic resonance imaging of cervical spine 2018 MR Cervical Spine without IV Contrast3/15/2017 [facility] Result Addenda Addendum by ProviderKaren M.D. on 03/15/2017 12:30 PM RAD^^^MA MR Cervical Spine w/o contrast 3/15/2017 12:30:00 Result Impression  Minimal degenerative disc disease at C5-6 and C6-7  otherwise an unremarkable MR scan of the cervical spine.  Result Narrative   EXAM: MR Cervical Spine w/o contrast    INDICATION:      H/O shoulder surgery 8/17/2018 2017 July XR SHOULDER 2 VIEWS LEFT7/14/[facility] Result Narrative XR SHOULDER 2 VIEWS LEFT 7/14/2017 9:13 PM  INDICATION: Shoulder Injury COMPARISON: None.  FINDINGS: Negative shoulder. No fracture or dislocation.  Status       Hiatal hernia 4/20/2021    Based on a 2021 ct scan Small hiatal hernia with some wall thickening in the distal thoracic esophagus suggesting some esophagitis.     Seizure (H) at age 22 yrs 8/17/2018     Shoulder dislocation     left     Tremor 8/10/2018      Past Surgical History:   Procedure Laterality Date     ESOPHAGOSCOPY, GASTROSCOPY, DUODENOSCOPY (EGD), COMBINED N/A 11/18/2021    Procedure: ESOPHAGOGASTRODUODENOSCOPY, WITH BIOPSY;  Surgeon: Veronica Kay MD;  Location: [facility] OR     SHOULDER SURGERY  2007      Allergies   Allergen Reactions     Diphenhydramine      Other reaction(s): Other (see comments)  Told not to take due to parkinsons     Metoclopramide      Avoid in patient with a history of Parkinson Disease  Other reaction(s): GI intolerance  Avoid in patient with a history of Parkinson Disease     Promethazine      Other reaction(s): Other (see comments)  Told not to take due to parkisons      Social History     Tobacco Use     Smoking status: Never Smoker     Smokeless tobacco: Never Used   Substance Use Topics     Alcohol use: Yes      Wt Readings from Last 1 Encounters:   03/07/22 79.4 kg (175 lb)        Anesthesia Evaluation            ROS/MED HX  ENT/Pulmonary:       Neurologic: Comment: Parkinson      Cardiovascular:       METS/Exercise Tolerance:     Hematologic:       Musculoskeletal:       GI/Hepatic: Comment: Cyclic vomiting    (+) hiatal hernia,     Renal/Genitourinary:       Endo:       Psychiatric/Substance Use:       Infectious Disease:       Malignancy:       Other:            Physical Exam    Airway  airway exam normal      Mallampati: II   TM distance: > 3 FB   Neck ROM:  full   Mouth opening: > 3 cm    Respiratory Devices and Support         Dental  no notable dental history         Cardiovascular          Rhythm and rate: regular and normal     Pulmonary   pulmonary exam normal        breath sounds clear to auscultation           OUTSIDE LABS:  CBC: No results found for: WBC, HGB, HCT, PLT  BMP: No results found for: NA, POTASSIUM, CHLORIDE, CO2, BUN, CR, GLC  COAGS: No results found for: PTT, INR, FIBR  POC: No results found for: BGM, HCG, HCGS  HEPATIC: No results found for: ALBUMIN, PROTTOTAL, ALT, AST, GGT, ALKPHOS, BILITOTAL, BILIDIRECT, JACQUELYN  OTHER: No results found for: PH, LACT, A1C, DAWSON, PHOS, MAG, LIPASE, AMYLASE, TSH, T4, T3, CRP, SED    Anesthesia Plan    ASA Status:  3   NPO Status:  NPO Appropriate    Anesthesia Type: MAC.     - Reason for MAC: immobility needed, straight local not clinically adequate   Induction: Intravenous.   Maintenance: TIVA.        Consents    Anesthesia Plan(s) and associated risks, benefits, and realistic alternatives discussed. Questions answered and patient/representative(s) expressed understanding.    - Discussed:     - Discussed with:  Patient      - Extended Intubation/Ventilatory Support Discussed: No.      - Patient is DNR/DNI Status: No    Use of blood products discussed: No .     Postoperative Care    Pain management: IV analgesics, Oral pain medications, Multi-modal analgesia.   PONV prophylaxis: Ondansetron (or other 5HT-3), Background Propofol Infusion     Comments:                Luis Manuel Braxton MD

## 2022-03-09 LAB
PATH REPORT.COMMENTS IMP SPEC: NORMAL
PATH REPORT.FINAL DX SPEC: NORMAL
PATH REPORT.GROSS SPEC: NORMAL
PATH REPORT.MICROSCOPIC SPEC OTHER STN: NORMAL
PATH REPORT.RELEVANT HX SPEC: NORMAL
PHOTO IMAGE: NORMAL

## 2022-03-09 PROCEDURE — 88305 TISSUE EXAM BY PATHOLOGIST: CPT | Mod: 26 | Performed by: PATHOLOGY

## 2022-03-14 ENCOUNTER — TELEPHONE (OUTPATIENT)
Dept: NEUROLOGY | Facility: CLINIC | Age: 37
End: 2022-03-14
Payer: COMMERCIAL

## 2022-03-14 DIAGNOSIS — G20.A1 PARKINSON DISEASE (H): Primary | ICD-10-CM

## 2022-03-14 NOTE — TELEPHONE ENCOUNTER
From 3/10/22 Consensus:    Goals Decrease ON/OFF fluctuations., Decrease tremor. and Medication reduction.  1. Candidate for staged bilateral start with LGPi  2. Benzo on hand (Versed), continue with all PD medications, bring medications to hospital with him (days were q 2 hour parcopa)  3. Abbott Infinity 7 - make sure he knows it will have a big profile  4. Research and GET WELL LOOP    Motor symptoms treating: Dystonia, Rigidity and Tremor  Motor fluctuations? yes wearing off, dystonia, and emesis  Off predictable? yes - freezing, emesis, dystonia  On predictable? choreiform dysk  Med side effects?  Trending toward DDS  Other indication for surgery? Decrease medications    Authorization to discuss Protected Health Information: Yes, Ritu (mother) and Sarath (father)  Healthcare Directive: None on file  OfferIQ use: Uses reliably  -----------------  Patient is very excited.  Wants to hear about research and GET WELL LOOP.

## 2022-03-18 ENCOUNTER — TELEPHONE (OUTPATIENT)
Dept: NEUROSURGERY | Facility: CLINIC | Age: 37
End: 2022-03-18
Payer: COMMERCIAL

## 2022-03-18 DIAGNOSIS — G20.A1 PARKINSON DISEASE (H): Primary | ICD-10-CM

## 2022-03-18 NOTE — TELEPHONE ENCOUNTER
I called the patient in regards to scheduling surgery with Dr. Morales. [facility]  and pre op has been taken care of with PAC in person. Patient is aware that the nursing team will be reaching out within the next few days. I did tell patient that a nurse will reach out within 2-3 days prior to surgery with arrival time and instructions.    Surgeon: Dr. Morales  Date of Surgery: 7/18/2022, 7/25/2022, 8/15/2022  Location of surgery: [facility]  Pre-Op H&P: 7/15/2022  Post-Op Appt Date: TBD   Imaging needed:  Yes  Discussed COVID-19 testing:  Yes  Pre-cert/Authorization completed:  Yes  Patient aware that pre-op RN will call 2-3 days prior to surgery with arrival time and instructions Yes  Packet sent out: No 03/18/22  Patient was instructed to review packet and call back with any questions or concerns.         *Patient requested that covid test orders to be sent to [facility] for 2nd and 3rd test*      Nicole Pardo on 3/18/2022 at 9:05 AM

## 2022-03-21 ENCOUNTER — TELEPHONE (OUTPATIENT)
Dept: NEUROLOGY | Facility: CLINIC | Age: 37
End: 2022-03-21
Payer: COMMERCIAL

## 2022-03-21 DIAGNOSIS — G20.A1 PARKINSON DISEASE (H): Primary | ICD-10-CM

## 2022-03-21 NOTE — TELEPHONE ENCOUNTER
FUTURE VISIT INFORMATION      SURGERY INFORMATION:    Date: 7/18/22    [facility]    Surgeon:  Angel Morales MD    Anesthesia Type:  Combined MAC with Local    Procedure: Left side deep brain stimulator placement, phase I, placement of left side deep brain stimulator electrode, target left globus pallidus internus, with microelectrode recording    RECORDS REQUESTED FROM:       Primary Care Provider: Romario    Most recent EKG+ Tracing: 10/5/21

## 2022-03-21 NOTE — TELEPHONE ENCOUNTER
Patient requested writer call him (with mom and dad on other line) to discuss Deep Brain Stimulation as he had additional questions. He wanted his mom and dad on the line so they understood everything first hand.    7/18/22 Phase 1 LGpi  7/25/22 Phase 2 Abbott Infinity 7   8/15/22 Phase 1 and 2 combined RGpi    Questions regarding post surgery and remote programming.  30 minute call. No further questions or concerns. Mychart sent with deep breathing and mindfulness resources. Health psychology referral placed.

## 2022-03-22 DIAGNOSIS — G20.A1 PARKINSON DISEASE (H): Primary | ICD-10-CM

## 2022-03-22 DIAGNOSIS — F43.20 ADJUSTMENT DISORDER, UNSPECIFIED TYPE: ICD-10-CM

## 2022-03-23 DIAGNOSIS — G20.A1 PARKINSON DISEASE (H): ICD-10-CM

## 2022-03-24 RX ORDER — CARBIDOPA AND LEVODOPA 25; 100 MG/1; MG/1
TABLET ORAL
Qty: 900 TABLET | Refills: 3 | Status: SHIPPED | OUTPATIENT
Start: 2022-03-24 | End: 2022-04-06

## 2022-04-06 ENCOUNTER — TELEPHONE (OUTPATIENT)
Dept: NEUROLOGY | Facility: CLINIC | Age: 37
End: 2022-04-06
Payer: COMMERCIAL

## 2022-04-06 NOTE — TELEPHONE ENCOUNTER
Prior Authorization Retail Medication Request    Medication/Dose: Rytary 48.75-195MG ER  ICD code: G20   Previously Tried and Failed:    Rationale:     Insurance Name: [facility]  Insurance ID: [id number]       Pharmacy Information   [id number] - [place], [place] - [address]  Phone: [phone]

## 2022-04-07 DIAGNOSIS — K22.11 ULCER OF ESOPHAGUS WITH BLEEDING: ICD-10-CM

## 2022-04-07 NOTE — TELEPHONE ENCOUNTER
Rx Authorization:    Requested Medication/ Dose: Omeprazole 20MG caps    Date last refill ordered: 11/18/21    Quantity ordered: 60 caps    # refills: 3    Date of last clinic visit with ordering provider: 11/23/21    Date of next clinic visit with ordering provider: F/U 1 year    All pertinent protocol data (lab date/result):     Include pertinent information from patients message:

## 2022-04-14 NOTE — TELEPHONE ENCOUNTER
Prior Authorization Approval    Authorization Effective Date: 4/14/2022  Authorization Expiration Date: 4/14/2023  Medication: carbidopa-levodopa (RYTARY) 48. MG CPCR per CR capsule--APPROVED  Approved Dose/Quantity:   Reference #:     Insurance Company: [employer] - Phone [phone] Fax [phone]  Expected CoPay:       CoPay Card Available:      Foundation Assistance Needed:    Which Pharmacy is filling the prescription (Not needed for infusion/clinic administered): [id number] - [place], [place] - [address] [place] [address]  Pharmacy Notified: Yes  Patient Notified: Yes **Instructed pharmacy to notify patient when script is ready to /ship.**

## 2022-04-14 NOTE — TELEPHONE ENCOUNTER
PA Initiation    Medication: carbidopa-levodopa (RYTARY) 48. MG CPCR per CR capsule   Insurance Company: [employer] - Phone [phone] Fax [phone]  Pharmacy Filling the Rx: [id number] [place] [facility] [address] [place] [address]  Filling Pharmacy Phone: [phone]  Filling Pharmacy Fax: [phone]  Start Date: 4/13/2022

## 2022-05-06 DIAGNOSIS — K22.11 ULCER OF ESOPHAGUS WITH BLEEDING: ICD-10-CM

## 2022-05-09 NOTE — TELEPHONE ENCOUNTER
Last Clinic Visit: 2/23/2022  [facility] [place]

## 2022-05-10 ENCOUNTER — CARE COORDINATION (OUTPATIENT)
Dept: NEUROLOGY | Facility: CLINIC | Age: 37
End: 2022-05-10
Payer: COMMERCIAL

## 2022-05-10 DIAGNOSIS — G20.A1 PARKINSON DISEASE (H): ICD-10-CM

## 2022-05-10 RX ORDER — CARBIDOPA AND LEVODOPA 25; 100 MG/1; MG/1
TABLET, ORALLY DISINTEGRATING ORAL
Qty: 90 TABLET | Refills: 0 | OUTPATIENT
Start: 2022-05-10

## 2022-05-10 NOTE — TELEPHONE ENCOUNTER
Rx Authorization:  Requested Medication/ Dose :carbidopa-levodopa (RYTARY) 48. MG CPCR per CR capsule  Date last refill ordered: 4/6/22  Quantity ordered: 300  # refills: 11  Date of last clinic visit with ordering provider: 11/23/21  Date of next clinic visit with ordering provider:   All pertinent protocol data (lab date/result):   Include pertinent information from patients message:

## 2022-05-10 NOTE — PROGRESS NOTES
"Situation:    Emergency department visit over the weekend due to intractable vomiting, a problem he had not had since being on Rytary. He was having a \"bad day\" today with stress and needed to talk to writer.    Background:    Scheduled forphase 1 Deep Brain Stimulation surgery 7/18/22 with Dr. Morales.     Switched to Rytary about 30 days ago - \"it's been great.\" He has been able to exercise and gained 13 pounds of muscle - but lost all of that over the weekend.  He was hospitalized because he couldn't keep anything down for 30 hours. He was wondering if this is an anxiety issue in addition to a medication issue.    Assessment:    Patient has an sarah that he uses for mindfulness and meditation. He reports that it has helped him a lot.     He wonders if anxiety can cause dystonia like symptoms. Writer stated that it may be two separate processes going on simultaneously. One process is simply wearing off and dystonia symptoms returning. The other process is \"fearing\" the medication may wear off which puts the stress response in motion. Writer asked if he was exercising to excess. Patient denies this and believes he has been listening closely to his body.    Patient had questions about when and how medications will be taken after surgery. Writer explained that he will go back to his regular regimen because we don't turn on the Deep Brain Stimulation system for 4 weeks after the lead is placed.    Recommendation:    1. Video visit with Dr. Carson 5/24 at 3:00 to discuss his hospitalization over the weekend and medication questions.    2. Continue to use mindfulness and meditation as these are life long tools that can arrest the stress response.    3. Patient stated he felt better after speaking with me, \"this is just what I needed.\"          "

## 2022-05-12 NOTE — PROGRESS NOTES
VIDEO VISIT    Date of Visit: May 24, 2022  Name: Dipesh Nicole  Date of Birth 1985  [address] [facility] [address]   [email or web address]  [phone] (M)   [phone] (H)      Ritu       [phone]     [facility] Consultation 12/21   Will need to be on omeprazole/prilosec      Wondering about ativan for surgery as it helped with vomiting - discussed reluctance about using this but may be needed perioperatively.      Will have to review the possibility of using lorazepam short term with Maryanne swift.      He has not taken the rotigotine or rytary yet as it has not been approved.      We are still needing to get control of his wearing off and nausea.   Encouraged to take the carbidopa - he is not sure it is helpful.   He is also going to start the omeprazole twice daily       He has benefit from the CR at night and does not need night time sinemet at 2 or 3am     Discussed the drug screen results -      Once he gets his pills/patch will need to have him chat with maryanne Swift      Assessment:  (G20) Parkinson disease (H)  (primary encounter diagnosis)    Developed symptoms left arm at age 31 years or 30 years of age  Diagnosis was made 2018  Parkinson's disease, not due to a mutation in SNCA, GBA, LRRK2, kimberley, DJ1, PINK1, or VPS35.       Amantadine symmetrel 100mg twice daily     carbidopa/levodopa sinemet 25/100  1.5@630am, 1@9a, 1@1130am, 1@2p, 1@430p, 1@7p, 1/2-1@930p    Had intractable vomiting with motor testing on 10/6/2021  OFF score of 53      Cognitive/Driving      Working with his dad's company and may move to [place] and look for a job      Brain MRI 9/30/2021  On high-resolution SWI images, nigrosome-1 cannot be well-seen  (absence of swallow tail sign). This can be seen in Parkinson's  disease or Lewy Body dementia. No abnormal enhancement.    Neuropsych evaluation 10/19/2021     Annalise Nicole is a 35-year-old man with a history of  Parkinson's disease diagnosed in 2018.  He is bothered by left-sided tremor and right-sided stiffness.  He is interested in undergoing DBS surgery for management of his symptoms.     Current results indicate performance that falls almost entirely within normal limits across cognitive domains, generally in the average to above average range. He has difficulty with memory on one list-learning task; learning and memory otherwise falls within normal limits. He does not report significant depressive symptomatology, anxiety, apathy, or compulsive behaviors.     This pattern of performance does not reflect dementia at this time, and is only subtly abnormal. There is no consistent suggestion of cognitive decline.  He appears to be capable of comprehending medical information and making well reasoned decisions for himself. He has a good understanding of the surgical procedure and the risks involved.  He has a good support system in his family.  He lives alone now, but will be moving in with his brother in the next couple of months, and his parents are also available to provide assistance as necessary. He appears to be a good candidate for surgery from a neurocognitive perspective.    Review of diagnosis    Young onset parkinson    Avoidance of dopamine blockers   Not taking    Motor complication review   Motor fluctuations/dyskinesias       Review of Impulse control disorders       Review of surgical or medication options       Gait/Balance/Falls       Exercise/Therapy performed/offered       Cognitive/Driving       Mood   Anxiety and ?vomiting  Meeting with a counsellor  Discussion about antidepressant medication    Hallucinations/delusions       Sleep   Crazy dreams  No problems falling asleep  Has not yet tried melatonin  He recalls having dreams when he wakes up  He can be awaken about a dream about cramping leg and then wakes up with a cramp and takes sinemet and goes back to bed.      Waking up with cramping at 2am or  so and taking 1/4 to 1/2 tablet of medication.    Bladder/Renal/Prostate/Gyn/Other   denies    GI/Constipation/GERD     odansetron zofran ODT 4mg as needed     Had some cramping in legs- 3 hours after eating at 930pm   He had dose at 630pm and ate at 730 and cramping at 930pm     Cramping - independent of what he is eating.      Having episodes once per month - wondering      Endoscopy 11/2021    - Normal first portion of the duodenum, second portion                          of the duodenum and third portion of the duodenum.                          Biopsied.                          - Nodular mucosa in the duodenal bulb. Biopsied.                          - Normal stomach. Biopsied.                          - Medium-sized hiatal hernia.                          - LA Grade C esophagitis (with numerous ulcers at the                          GE junction and in the esophagus) with bleeding.                          Biopsied. Friability may indicate EoE as well -                          further biopses avoided given friability.   Recommendation:        - Patient has a contact number available for                          emergencies. The signs and symptoms of potential                          delayed complications were discussed with the patient.                          Return to normal activities tomorrow. Written                          discharge instructions were provided to the patient.                          - Resume previous diet.                          - Continue present medications.                          - Await pathology results.                          - Repeat upper endoscopy at appointment to be                          scheduled to check healing.                          - Start PPI BID if there is no interaction with                          current medications.     ENDO/Lipid/DM/Bone density/Thyroid      Cardio/heart/Hyper or Hypotensive   Denies faints    Vision/Dry  Eyes/Cataracts/Glaucoma/Macular   No changes in vision    Heme/Anticoagulation/Antiplatelet/Anemia/Other  Not taking aspirin    ENT/Resp    Skin/Cancer/Seborrhea/other      Musculoskeletal/Pain/Headache      Other:    Had a possible seizure on 11/23/2009 - had an mri and was discussing the use of medication  From chart review it may have been a syncopal episode vs seizure. He had been dehydrated and possibly sleep deprived.      EEG was done 12/9/2009  This is an abnormal EEG due to the presence of two episodes of frontally maximal sharp waves, epileptiform in nature. This places the patient at risk for partial or secondarily generalized seizures arising from the frontal lobes.      BRAIN MRI was normal on 12/8/2009     Seen by Dr. Je Sanon on 12/10/2009 and Annalise opted not to take an AED lamotrigine        5a 7a 9a 11 1p 5 7p 930p 10/11p 2-3a   Amantadine symmetrel 100mg  1   1               Carbidopa/levodopa Parcopa 25/100 ODT prn                    Carbidopa/levodopa Rytary 195mg 2  2    2 2    2        Famotidine pepcid 20mg  1   1        Lorazepam ativan 0.5mg prn            MVI  1           Ondansetron zofran 4mg prn                    Rotigotine neupro 3mg/24 hr patch  @8a                                   Plan:    Discussion about anxiety and vomiting and parkinson - breaking the cycle  He has not had as severe off abdominal dystonia with the rytary which may be connected with the nausea/vomiting  Meeting with Ada Mon therapist    GI Dr. Kelley hiatal hernia consultation 6/1/2022 - he may consider putting this on hold till he has had dbs surgery   He had an esophagram on 5/17/2022  1. Small hiatal hernia.  2. Otherwise unremarkable double contrast esophagram.     Scheduling preop     Living with family and they are supportive    Luis Manuel Campbell  [email or web address]    [email or web address]  The girish Camara is doing surgery  He will have his patch on  "the day of surgery  Discussion of rytary - may want to take his rytary at 5am  Needs to bring  parcopa into hospital    Not sure if he has done a 7T    Medical Decision Making:  #  Chronic progressive medical conditions addressed    Review and/or interpretation of unique test or documentation from a provider outside of neurology    Independent historian provided additional details     Prescription drug management and review of potential side effects and/or monitoring for side effects     Health impacted by social determinants of health      I have reviewed the note as documented above.  This accurately captures the substance of my conversation with the patient and total time spent preparing for visit, executing visit and completing visit on the day of the visit:  30 minutes.   Face to face time 250pm - 320pm    Ramesh Carson MD      ------------------------------------------------------------------------------------------------------------------------------------------------------------------------    Video-Visit Details    The patient has been notified of following:     \"After a review of the patient s situation, this visit was changed from an in-person visit to a video visit to reduce the risk of COVID-19 exposure.   The patient is being evaluated via a billable video visit.\"    \"This video visit will be conducted via a call between you and your physician/provider. We have found that certain health care needs can be provided without the need for an in-person physical exam.  This service lets us provide the care you need with a video conversation.  If a prescription is necessary we can send it directly to your pharmacy.  If lab work is needed we can place an order for that and you can then stop by our lab to have the test done at a later time.    If during the course of the call the physician/provider feels a video visit is not appropriate, you will not be charged for this service.\"     Patient has given verbal " consent for Video visit? Yes    Patient would like the video invitation sent by:     Type of service:  Video Visit    Video Start Time:     Video End Time (time video stopped):     Duration:  minutes - see above    Originating Location (pt. Location):     Distant Location (provider location):  [facility] [place]     Mode of Communication:  Video Conference via [employer] (and if not possible then doximity)      Ramesh Carson MD      --------------------------------------------------------------------------------------------------------------    Dipesh Nicole is a 36 year old male who is being evaluated via a billable video visit.      Charts reviewed  Consult from  Images reviewed        I have reviewed and updated the patient's Past Medical History, Social History, Family History and Medication List.    ALLERGIES  Diphenhydramine, Metoclopramide, and Promethazine    Lasts visit details if there was a last visit:       14 Review of systems  are negative except for   Patient Active Problem List   Diagnosis     Tremor     Seizure (H) at age 22 yrs     H/O magnetic resonance imaging of cervical spine     H/O shoulder surgery     H/O magnetic resonance imaging of brain and brain stem     Shoulder dislocation     Parkinson disease (H)     Right inguinal hernia     Hiatal hernia     Cyclical vomiting syndrome unrelated to migraine     Esophagitis endoscopy done 11/2021     H/O electroencephalogram 2021        Allergies   Allergen Reactions     Diphenhydramine      Other reaction(s): Other (see comments)  Told not to take due to parkinsons     Metoclopramide      Avoid in patient with a history of Parkinson Disease  Other reaction(s): GI intolerance  Avoid in patient with a history of Parkinson Disease     Promethazine      Other reaction(s): Other (see comments)  Told not to take due to parkisons     Past Surgical History:   Procedure Laterality Date     ESOPHAGOSCOPY, GASTROSCOPY, DUODENOSCOPY  (EGD), COMBINED N/A 11/18/2021    Procedure: ESOPHAGOGASTRODUODENOSCOPY, WITH BIOPSY;  Surgeon: Veronica Kay MD;  [place] [facility] [place]     ESOPHAGOSCOPY, GASTROSCOPY, DUODENOSCOPY (EGD), COMBINED N/A 3/7/2022    Procedure: ESOPHAGOGASTRODUODENOSCOPY, WITH BIOPSY;  Surgeon: Alexandr Sweet MD;  [place] [facility] [place]     SHOULDER SURGERY  2007     Past Medical History:   Diagnosis Date     Esophagitis endoscopy done 11/2021 11/18/2021    Impression:            - Normal first portion of the duodenum, second portion                         of the duodenum and third portion of the duodenum.                         Biopsied.                         - Nodular mucosa in the duodenal bulb. Biopsied.                         - Normal stomach. Biopsied.                         - Medium-sized hiatal hernia.                         - LA Grade     H/O electroencephalogram 2021 11/18/2021     Impression:  This is a normal waking and sleep EEG, with generalized beta activities throughout the recording. Generalized beta activities are usually associated certain medication use, such as benzodiazepines or barbiturates. The cause of these activities in this case is unclear at this time.         H/O magnetic resonance imaging of brain and brain stem 8/17/2018 2009 December MR Brain without and with IV Ksukehyg86/8/2009 [facility] Result Impression  Normal MRI of the brain.  Result Narrative      Multiecho, multiplanar views of the brain were obtained prior to and  following the IV administration of 19 mL of contrast. There are no  previous studies available for comparison.     The brain parenchyma is normal in appearance on all pulse sequences,  with      H/O magnetic resonance imaging of cervical spine 8/17/2018 March MR Cervical Spine without IV Contrast3/15/2017 [facility] Result Addenda Addendum by ProviderKaren M.D. on 03/15/2017 12:30 PM RAD^^^MA MR Cervical Spine w/o contrast 3/15/2017 12:30:00 Result  Impression  Minimal degenerative disc disease at C5-6 and C6-7  otherwise an unremarkable MR scan of the cervical spine.  Result Narrative   EXAM: MR Cervical Spine w/o contrast   INDICATION:      H/O shoulder surgery 8/17/2018 2017 July XR SHOULDER 2 VIEWS LEFT7/14/2017 [facility] Result Narrative XR SHOULDER 2 VIEWS LEFT 7/14/2017 9:13 PM  INDICATION: Shoulder Injury COMPARISON: None.  FINDINGS: Negative shoulder. No fracture or dislocation.  Status       Hiatal hernia 4/20/2021    Based on a 2021 ct scan Small hiatal hernia with some wall thickening in the distal thoracic esophagus suggesting some esophagitis.     Seizure (H) at age 22 yrs 8/17/2018     Shoulder dislocation     left     Tremor 8/10/2018     Social History     Socioeconomic History     Marital status: Single     Spouse name: Not on file     Number of children: Not on file     Years of education: Not on file     Highest education level: Not on file   Occupational History     Not on file   Tobacco Use     Smoking status: Never Smoker     Smokeless tobacco: Never Used   Substance and Sexual Activity     Alcohol use: Yes     Drug use: No     Sexual activity: Not on file   Other Topics Concern     Not on file   Social History Narrative    single. lives in [place]. mother cabrera ambriz            He has a history of a left dislocated shoulder with surgery performed in 2009. He currently lives in [place] but comes to the [place] area to visit his parents in [place]. 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     Social Determinants of Health     Financial Resource Strain: Not on file   Food Insecurity: Not on file   Transportation Needs: Not on file   Physical Activity: Not on file   Stress: Not on file   Social Connections: Not on file   Intimate Partner Violence: Not At Risk     Fear of Current or Ex-Partner: No     Emotionally Abused: No     Physically Abused: No     Sexually Abused: No    Housing Stability: Not on file     Family History   Problem Relation Age of Onset     Cancer Other         grandmother     Tremor No family hx of      Dementia No family hx of      Parkinsonism No family hx of      Seizure Disorder No family hx of      Current Outpatient Medications   Medication Sig Dispense Refill     amantadine (SYMMETREL) 100 MG capsule Take 1 capsule (100 mg) by mouth 2 times daily At 6:30 AM and 10:30-11  capsule 3     carbidopa-levodopa (PARCOPA)  MG ODT Take one-half to 1 tablet by mouth as needed for breakthrough Parkinson symptoms, up to 2 times/day 60 tablet 11     carbidopa-levodopa (RYTARY) 48. MG CPCR per CR capsule Take 2 capsules by mouth 5 times daily (about 5 am, 9 am, 1 pm, 5 pm, and bedtime) 300 capsule 11     famotidine (PEPCID) 20 MG tablet Take 1 tablet (20 mg) by mouth 2 times daily 60 tablet 11     Multiple Vitamin (MULTIVITAMIN ADULT PO) Vitamin by mouth daily       omeprazole (PRILOSEC) 20 MG DR capsule Take 1 capsule (20 mg) by mouth 2 times daily 180 capsule 2     ondansetron (ZOFRAN) 4 MG tablet Take 4 mg by mouth every 8 hours as needed       rotigotine (NEUPRO) 3 MG/24HR 24 hr patch Place 1 patch onto the skin daily 30 patch 11

## 2022-05-13 ENCOUNTER — PATIENT OUTREACH (OUTPATIENT)
Dept: GASTROENTEROLOGY | Facility: CLINIC | Age: 37
End: 2022-05-13
Payer: COMMERCIAL

## 2022-05-13 ENCOUNTER — CARE COORDINATION (OUTPATIENT)
Dept: GASTROENTEROLOGY | Facility: CLINIC | Age: 37
End: 2022-05-13
Payer: COMMERCIAL

## 2022-05-13 DIAGNOSIS — K44.9 HIATAL HERNIA: Primary | ICD-10-CM

## 2022-05-13 NOTE — TELEPHONE ENCOUNTER
Please help arrange esophagram and referral to thoracic surgery to discuss hiatal hernia repair. He already has had manometry. Orders have been placed. Thanks!      Left a message with the number to call to schedule esophogram and also that referral for thoracic surgery has been placed   Also my chart message

## 2022-05-16 ENCOUNTER — PATIENT OUTREACH (OUTPATIENT)
Dept: GASTROENTEROLOGY | Facility: CLINIC | Age: 37
End: 2022-05-16
Payer: COMMERCIAL

## 2022-05-16 NOTE — TELEPHONE ENCOUNTER
Contacted patient to discuss his questions  Patient has esophogram scheduled for May 17  Patient given the number to call to schedule surgical referral

## 2022-05-17 ENCOUNTER — HOSPITAL ENCOUNTER (OUTPATIENT)
Dept: GENERAL RADIOLOGY | Facility: CLINIC | Age: 37
Discharge: HOME | End: 2022-05-17
Attending: INTERNAL MEDICINE | Admitting: INTERNAL MEDICINE
Payer: COMMERCIAL

## 2022-05-17 DIAGNOSIS — K44.9 HIATAL HERNIA: ICD-10-CM

## 2022-05-17 DIAGNOSIS — K44.9 HIATAL HERNIA: Primary | ICD-10-CM

## 2022-05-17 PROCEDURE — 74220 X-RAY XM ESOPHAGUS 1CNTRST: CPT

## 2022-05-17 PROCEDURE — 255N000001 HC RX 255: Performed by: INTERNAL MEDICINE

## 2022-05-17 RX ADMIN — ANTACID/ANTIFLATULENT 4 G: 380; 550; 10; 10 GRANULE, EFFERVESCENT ORAL at 08:40

## 2022-05-19 ENCOUNTER — VIRTUAL VISIT (OUTPATIENT)
Dept: PSYCHOLOGY | Facility: CLINIC | Age: 37
End: 2022-05-19
Attending: PSYCHIATRY & NEUROLOGY
Payer: COMMERCIAL

## 2022-05-19 DIAGNOSIS — G20.A1 PARKINSON DISEASE (H): ICD-10-CM

## 2022-05-19 DIAGNOSIS — F41.9 ANXIETY DISORDER, UNSPECIFIED TYPE: Primary | ICD-10-CM

## 2022-05-19 PROCEDURE — 90834 PSYTX W PT 45 MINUTES: CPT | Mod: 95 | Performed by: PSYCHOLOGIST

## 2022-05-19 NOTE — Clinical Note
Follow-up video visit with me on 6/6/22 at 1:00pm please. Thanks!  Ada Mon, Ph.D., , [facility] Clinical [facility] Psychologist Phone: [phone]  Pager: [phone] 5/19/2022 1:46 PM

## 2022-05-19 NOTE — PROGRESS NOTES
Health Psychology          Diana Link, Ph.D.,  [phone]  Amaris Peres, Ph.D.,  [phone]  Renay Gibbons, Ph.D.,  [phone]  Lisset Anderson, Ph.D., , [facility] [phone]  Damian Jameson, Ph.D., , [facility] [phone]  Ekta Aguilar, Ph.D.,  [phone]  Ada Mon, Ph.D., , [facility] [phone]    [facility], 3rd Floor  [address] [place] [address]  [id number]       Health Psychology Progress Note    Patient Name: Dipesh Nicole    Service Type: Individual  Length of Visit: 42 minutes  Start time: 1:05 PM  Stop time: 1:47 PM   Attendees: patient attended alone  Session #: 1    Telemedicine Information:     Patient's location: patient's home, [place], [place]    Provider's location: [place], [place]    Type of telemedicine visit: Telemedicine psychotherapy video    Technology used: Epic [employer]    Patient consent to telemedicine services? Yes    It has been determined that telemedicine service is appropriate and effective for this patient in light of the necessity for social distancing to mitigate the COVID-19 epidemic    Mode of transmission: Secure real time interactive audio and visual telecommunication system    The patient has been notified that:  Video visits will be conducted via a call with their psychologist to provide the care they need with a video conversation. Video visits may be billed at different rates depending on insurance coverage.  Patients are advised to contact their insurance provider with any questions about their health insurance coverage. If during the course of a call the psychologist feels a video visit is not appropriate, patients will not be charged for this service.    Identifying Information and Presenting Problem:  The patient is a 36 year old adult who is being seen for problematic symptoms of anxiety in the context of Parkinson's Disease.    Topics Discussed/Interventions  Provided:    Orientation to Service:    Is patient the family member of an employee who works at this clinic or connected to a patient health psychology provider is already treating? No    Discussed dual role conflicts? Yes     Discussed privacy and confidentiality, including limits? Yes     Is patient already established with a mental health provider? No      Health Psychology Service Introduction: Discussed the health psychology service. Provided education about role as health psychology provider and model of care. Patient was given the opportunity to ask questions and state his goals/expectations for initiating services with the health psychology provider. Patient is interested in establishing services with the health psychology provider.      Session Content:       Background/Context: Patient with a PMH significant for Parkinson's Disease and anxiety. He was referred to health psychology by Ramesh Carson MD in Neurology for assistance with anxiety. Patient states that his neurology team recommended a referral for health psychology for assistance with anxiety because they believed there was a connection between his anxiety and PD symptom severity. Patient states that learning that he had anxiety was a surprise for him, as he was not aware that he had anxiety or how anxiety and PD symptom severity are connected. Patient notes that he has never met with a mental health provider before, so this is a new experience for him. He states that his primary concern is anxiety and how anxiety impacts his PD symptoms. He notes that one of his major PD symptoms is dystonia and he believes there is a connection between his anxiety symptoms and the severity of his dystonia. Patient states that the anxiety has caused increased worry, contributed to worsened PD symptoms, has led him to avoid or be anxious in different situations, and has impacted his interpersonal functioning.       Treatment planning: Patient notes that he is  having surgery for a deep brain stimulator in July. Will need to coordinate follow-up sessions around surgery and recovery. Goals for treatment include increasing awareness and insight into anxiety and learning effective anxiety management skills with the hope of better managing anxiety and PD symptoms.       Skills/intervention: Provided psychoeducation about anxiety, symptoms of anxiety, and the cycle of anxiety. Discussed how avoidance exacerbates anxiety and the treatment approach for addressing anxiety.       Treatment Objective(s) Addressed in This Session:  Anxiety: will experience a reduction in anxiety, will develop more effective coping skills to manage anxiety symptoms, will develop healthy cognitive patterns and beliefs and will increase ability to function adaptively  Psychological distress related to Chronic Disease Management    Progress on / Status of Treatment Objective(s) / Homework:  New Objective established this session     Assessment: The patient appeared to be active and engaged in today's session and was receptive to feedback.     Mental Status Exam:   Appearance: Appropriate   Eye Contact: Good    Orientation: Yes, x4  Behavior/relationship to provider/demeanor: Cooperative, Engaged and Pleasant  Motor Activity: restless and significant for akathisia  Mood (subjective report): Anxious  Affect (objective appearance): Appropriate  Speech Rate: Normal  Speech Volume: Normal  Speech Articulation: Normal  Speech Coherence: Normal  Speech Spontaneity: Normal  Thought Content: no suicidal/homicidal ideation, plans, or intent  Thought Process (associations): Logical, Linear, Goal directed and Perseverative  Thought Process (rate): Normal  Abnormal Perception: None  Attention/Concentration: Normal  Memory: Appears grossly intact  Fund of Knowledge: Appears within normal limits   Abstraction:  Normal  Insight: Adequate  Judgment:  good    Does the patient appear to be at imminent risk of harm to  self/others at this time? No    The session was necessary to address problematic symptoms of anxiety in the context of PD that have been affecting the patient's quality of life and possibly contributing to worsening symptoms.  Ongoing psychotherapy is necessary to provide counseling, improve functioning with daily activities, provide psychoeducation, provide support and teach cognitive and behavioral skills.    Diagnoses:    1. Anxiety disorder, unspecified type    2. Parkinson disease (H)        Plan:    1. Follow-up video visit with me on 6/6/22 at 1:00pm  2. Patient to use [facility] or other crisis resources in the event of a psychiatric emergency  3. Primary care needs and medications are managed by Joey Morley MD  4. Neurology needs are managed by Ramesh Carson MD  5. Next visit goals/agenda:  a. Complete diagnostic assessment  b. Read handouts on anxiety and cycle of anxiety    NOTE: Treatment plan due in future visit    Ada Mon, Ph.D., , [facility]  Clinical Health Psychologist  Phone: [phone]   Pager: [phone]  5/19/2022 1:03 PM

## 2022-05-21 ENCOUNTER — HEALTH MAINTENANCE LETTER (OUTPATIENT)
Age: 37
End: 2022-05-21

## 2022-05-24 ENCOUNTER — VIRTUAL VISIT (OUTPATIENT)
Dept: PHARMACY | Facility: CLINIC | Age: 37
End: 2022-05-24
Payer: COMMERCIAL

## 2022-05-24 ENCOUNTER — VIRTUAL VISIT (OUTPATIENT)
Dept: NEUROLOGY | Facility: CLINIC | Age: 37
End: 2022-05-24
Payer: COMMERCIAL

## 2022-05-24 DIAGNOSIS — G20.A1 PARKINSON DISEASE (H): Primary | ICD-10-CM

## 2022-05-24 DIAGNOSIS — K21.9 GASTROESOPHAGEAL REFLUX DISEASE, UNSPECIFIED WHETHER ESOPHAGITIS PRESENT: ICD-10-CM

## 2022-05-24 DIAGNOSIS — K29.00 OTHER ACUTE GASTRITIS WITHOUT HEMORRHAGE: ICD-10-CM

## 2022-05-24 DIAGNOSIS — R11.10 VOMITING, INTRACTABILITY OF VOMITING NOT SPECIFIED, PRESENCE OF NAUSEA NOT SPECIFIED, UNSPECIFIED VOMITING TYPE: ICD-10-CM

## 2022-05-24 PROCEDURE — 99214 OFFICE O/P EST MOD 30 MIN: CPT | Mod: 95 | Performed by: PSYCHIATRY & NEUROLOGY

## 2022-05-24 PROCEDURE — 99605 MTMS BY PHARM NP 15 MIN: CPT | Performed by: PHARMACIST

## 2022-05-24 RX ORDER — FAMOTIDINE 20 MG/1
TABLET, FILM COATED ORAL
Qty: 180 TABLET | Refills: 11 | Status: SHIPPED | OUTPATIENT
Start: 2022-05-24 | End: 2023-06-02

## 2022-05-24 RX ORDER — LORAZEPAM 0.5 MG/1
0.5 TABLET ORAL 2 TIMES DAILY PRN
COMMUNITY
Start: 2022-05-10 | End: 2022-08-17

## 2022-05-24 RX ORDER — AMANTADINE HYDROCHLORIDE 100 MG/1
CAPSULE, GELATIN COATED ORAL
Qty: 180 CAPSULE | Refills: 3 | Status: SHIPPED | OUTPATIENT
Start: 2022-05-24 | End: 2023-06-13

## 2022-05-24 NOTE — LETTER
5/24/2022       RE: Dipesh Nicole  [address] [place] [address]     Dear Colleague,    Thank you for referring your patient, Dipesh Nicole, to the [facility] NEUROLOGY CLINIC [facility]. Please see a copy of my visit note below.      VIDEO VISIT    Date of Visit: May 24, 2022  Name: Dipesh Nicole  Date of Birth 1985  [address] [place] [address]   [email or web address]  [phone] (M)   [phone] (H)      Ritu       [phone]     [facility] Consultation 12/21   Will need to be on omeprazole/prilosec      Wondering about ativan for surgery as it helped with vomiting - discussed reluctance about using this but may be needed perioperatively.      Will have to review the possibility of using lorazepam short term with Maryanne swift.      He has not taken the rotigotine or rytary yet as it has not been approved.      We are still needing to get control of his wearing off and nausea.   Encouraged to take the carbidopa - he is not sure it is helpful.   He is also going to start the omeprazole twice daily       He has benefit from the CR at night and does not need night time sinemet at 2 or 3am     Discussed the drug screen results -      Once he gets his pills/patch will need to have him chat with maryanne Swift      Assessment:  (G20) Parkinson disease (H)  (primary encounter diagnosis)    Developed symptoms left arm at age 31 years or 30 years of age  Diagnosis was made 2018  Parkinson's disease, not due to a mutation in SNCA, GBA, LRRK2, kimberley, DJ1, PINK1, or VPS35.       Amantadine symmetrel 100mg twice daily     carbidopa/levodopa sinemet 25/100  1.5@630am, 1@9a, 1@1130am, 1@2p, 1@430p, 1@7p, 1/2-1@930p    Had intractable vomiting with motor testing on 10/6/2021  OFF score of 53      Cognitive/Driving      Working with his dad's company and may move to [place] and look for a  job      Brain MRI 9/30/2021  On high-resolution SWI images, nigrosome-1 cannot be well-seen  (absence of swallow tail sign). This can be seen in Parkinson's  disease or Lewy Body dementia. No abnormal enhancement.    Neuropsych evaluation 10/19/2021     Annalise Nicole is a 35-year-old man with a history of Parkinson's disease diagnosed in 2018.  He is bothered by left-sided tremor and right-sided stiffness.  He is interested in undergoing DBS surgery for management of his symptoms.     Current results indicate performance that falls almost entirely within normal limits across cognitive domains, generally in the average to above average range. He has difficulty with memory on one list-learning task; learning and memory otherwise falls within normal limits. He does not report significant depressive symptomatology, anxiety, apathy, or compulsive behaviors.     This pattern of performance does not reflect dementia at this time, and is only subtly abnormal. There is no consistent suggestion of cognitive decline.  He appears to be capable of comprehending medical information and making well reasoned decisions for himself. He has a good understanding of the surgical procedure and the risks involved.  He has a good support system in his family.  He lives alone now, but will be moving in with his brother in the next couple of months, and his parents are also available to provide assistance as necessary. He appears to be a good candidate for surgery from a neurocognitive perspective.    Review of diagnosis    Young onset parkinson    Avoidance of dopamine blockers   Not taking    Motor complication review   Motor fluctuations/dyskinesias       Review of Impulse control disorders       Review of surgical or medication options       Gait/Balance/Falls       Exercise/Therapy performed/offered       Cognitive/Driving       Mood   Anxiety and ?vomiting  Meeting with a counsellor  Discussion about antidepressant  medication    Hallucinations/delusions       Sleep   Crazy dreams  No problems falling asleep  Has not yet tried melatonin  He recalls having dreams when he wakes up  He can be awaken about a dream about cramping leg and then wakes up with a cramp and takes sinemet and goes back to bed.      Waking up with cramping at 2am or so and taking 1/4 to 1/2 tablet of medication.    Bladder/Renal/Prostate/Gyn/Other   denies    GI/Constipation/GERD     odansetron zofran ODT 4mg as needed     Had some cramping in legs- 3 hours after eating at 930pm   He had dose at 630pm and ate at 730 and cramping at 930pm     Cramping - independent of what he is eating.      Having episodes once per month - wondering      Endoscopy 11/2021    - Normal first portion of the duodenum, second portion                          of the duodenum and third portion of the duodenum.                          Biopsied.                          - Nodular mucosa in the duodenal bulb. Biopsied.                          - Normal stomach. Biopsied.                          - Medium-sized hiatal hernia.                          - LA Grade C esophagitis (with numerous ulcers at the                          GE junction and in the esophagus) with bleeding.                          Biopsied. Friability may indicate EoE as well -                          further biopses avoided given friability.   Recommendation:        - Patient has a contact number available for                          emergencies. The signs and symptoms of potential                          delayed complications were discussed with the patient.                          Return to normal activities tomorrow. Written                          discharge instructions were provided to the patient.                          - Resume previous diet.                          - Continue present medications.                          - Await pathology results.                          - Repeat upper endoscopy  at appointment to be                          scheduled to check healing.                          - Start PPI BID if there is no interaction with                          current medications.     ENDO/Lipid/DM/Bone density/Thyroid      Cardio/heart/Hyper or Hypotensive   Denies faints    Vision/Dry Eyes/Cataracts/Glaucoma/Macular   No changes in vision    Heme/Anticoagulation/Antiplatelet/Anemia/Other  Not taking aspirin    ENT/Resp    Skin/Cancer/Seborrhea/other      Musculoskeletal/Pain/Headache      Other:    Had a possible seizure on 11/23/2009 - had an mri and was discussing the use of medication  From chart review it may have been a syncopal episode vs seizure. He had been dehydrated and possibly sleep deprived.      EEG was done 12/9/2009  This is an abnormal EEG due to the presence of two episodes of frontally maximal sharp waves, epileptiform in nature. This places the patient at risk for partial or secondarily generalized seizures arising from the frontal lobes.      BRAIN MRI was normal on 12/8/2009     Seen by Dr. Je Sanon on 12/10/2009 and Annalise opted not to take an AED lamotrigine        5a 7a 9a 11 1p 5 7p 930p 10/11p 2-3a   Amantadine symmetrel 100mg  1   1               Carbidopa/levodopa Parcopa 25/100 ODT prn                    Carbidopa/levodopa Rytary 195mg 2  2    2 2    2        Famotidine pepcid 20mg  1   1        Lorazepam ativan 0.5mg prn            MVI  1           Ondansetron zofran 4mg prn                    Rotigotine neupro 3mg/24 hr patch  @8a                                   Plan:    Discussion about anxiety and vomiting and parkinson - breaking the cycle  He has not had as severe off abdominal dystonia with the rytary which may be connected with the nausea/vomiting  Meeting with Ada Mon therapist    GI Dr. Kelley hiatal hernia consultation 6/1/2022 - he may consider putting this on hold till he has had dbs surgery   He had an esophagram on 5/17/2022  1. Small hiatal  "hernia.  2. Otherwise unremarkable double contrast esophagram.     Scheduling preop     Living with family and they are supportive    Luis Manuel Campbell  [email or web address]    [email or web address]  The long haul    Dr. Camara is doing surgery  He will have his patch on the day of surgery  Discussion of rytary - may want to take his rytary at 5am  Needs to bring  parcopa into hospital    Not sure if he has done a 7T    Medical Decision Making:  #  Chronic progressive medical conditions addressed    Review and/or interpretation of unique test or documentation from a provider outside of neurology    Independent historian provided additional details     Prescription drug management and review of potential side effects and/or monitoring for side effects     Health impacted by social determinants of health      I have reviewed the note as documented above.  This accurately captures the substance of my conversation with the patient and total time spent preparing for visit, executing visit and completing visit on the day of the visit:  30 minutes.   Face to face time 250pm - 320pm    Ramesh Carson MD      ------------------------------------------------------------------------------------------------------------------------------------------------------------------------    Video-Visit Details    The patient has been notified of following:     \"After a review of the patient s situation, this visit was changed from an in-person visit to a video visit to reduce the risk of COVID-19 exposure.   The patient is being evaluated via a billable video visit.\"    \"This video visit will be conducted via a call between you and your physician/provider. We have found that certain health care needs can be provided without the need for an in-person physical exam.  This service lets us provide the care you need with a video conversation.  If a prescription is necessary we can send it directly to your pharmacy.  If " "lab work is needed we can place an order for that and you can then stop by our lab to have the test done at a later time.    If during the course of the call the physician/provider feels a video visit is not appropriate, you will not be charged for this service.\"     Patient has given verbal consent for Video visit? Yes    Patient would like the video invitation sent by:     Type of service:  Video Visit    Video Start Time:     Video End Time (time video stopped):     Duration:  minutes - see above    Originating Location (pt. Location):     Distant Location (provider location):  [facility] NEUROLOGY CLINIC [place]     Mode of Communication:  Video Conference via [facility] (and if not possible then doximity)      Ramesh Carson MD      --------------------------------------------------------------------------------------------------------------    Dipesh Nicole is a 36 year old male who is being evaluated via a billable video visit.      Charts reviewed  Consult from  Images reviewed        I have reviewed and updated the patient's Past Medical History, Social History, Family History and Medication List.    ALLERGIES  Diphenhydramine, Metoclopramide, and Promethazine    Lasts visit details if there was a last visit:       14 Review of systems  are negative except for   Patient Active Problem List   Diagnosis     Tremor     Seizure (H) at age 22 yrs     H/O magnetic resonance imaging of cervical spine     H/O shoulder surgery     H/O magnetic resonance imaging of brain and brain stem     Shoulder dislocation     Parkinson disease (H)     Right inguinal hernia     Hiatal hernia     Cyclical vomiting syndrome unrelated to migraine     Esophagitis endoscopy done 11/2021     H/O electroencephalogram 2021        Allergies   Allergen Reactions     Diphenhydramine      Other reaction(s): Other (see comments)  Told not to take due to parkinsons     Metoclopramide      Avoid in patient with a history of Parkinson " Disease  Other reaction(s): GI intolerance  Avoid in patient with a history of Parkinson Disease     Promethazine      Other reaction(s): Other (see comments)  Told not to take due to parkisons     Past Surgical History:   Procedure Laterality Date     ESOPHAGOSCOPY, GASTROSCOPY, DUODENOSCOPY (EGD), COMBINED N/A 11/18/2021    Procedure: ESOPHAGOGASTRODUODENOSCOPY, WITH BIOPSY;  Surgeon: Veronica Kay MD;  [place] [facility] [place]     ESOPHAGOSCOPY, GASTROSCOPY, DUODENOSCOPY (EGD), COMBINED N/A 3/7/2022    Procedure: ESOPHAGOGASTRODUODENOSCOPY, WITH BIOPSY;  Surgeon: Alexandr Sweet MD;  [place] [facility] [place]     SHOULDER SURGERY  2007     Past Medical History:   Diagnosis Date     Esophagitis endoscopy done 11/2021 11/18/2021    Impression:            - Normal first portion of the duodenum, second portion                         of the duodenum and third portion of the duodenum.                         Biopsied.                         - Nodular mucosa in the duodenal bulb. Biopsied.                         - Normal stomach. Biopsied.                         - Medium-sized hiatal hernia.                         - LA Grade     H/O electroencephalogram 2021 11/18/2021     Impression:  This is a normal waking and sleep EEG, with generalized beta activities throughout the recording. Generalized beta activities are usually associated certain medication use, such as benzodiazepines or barbiturates. The cause of these activities in this case is unclear at this time.         H/O magnetic resonance imaging of brain and brain stem 8/17/2018 2009 December MR Brain without and with IV Cfhninne85/8/2009 [facility] Result Impression  Normal MRI of the brain.  Result Narrative      Multiecho, multiplanar views of the brain were obtained prior to and  following the IV administration of 19 mL of contrast. There are no  previous studies available for comparison.     The brain parenchyma is normal in appearance on all pulse  sequences,  with      H/O magnetic resonance imaging of cervical spine 8/17/2018 March MR Cervical Spine without IV Contrast3/15/2017 [facility] Result Addenda Addendum by Provider, PA Jones on 03/15/2017 12:30 PM RAD^^^MA MR Cervical Spine w/o contrast 3/15/2017 12:30:00 Result Impression  Minimal degenerative disc disease at C5-6 and C6-7  otherwise an unremarkable MR scan of the cervical spine.  Result Narrative   EXAM: MR Cervical Spine w/o contrast   INDICATION:      H/O shoulder surgery 8/17/2018 2017 July XR SHOULDER 2 VIEWS LEFT7/14/2017 [facility] [address] Result Narrative XR SHOULDER 2 VIEWS LEFT 7/14/2017 9:13 PM  INDICATION: Shoulder Injury COMPARISON: None.  FINDINGS: Negative shoulder. No fracture or dislocation.  Status       Hiatal hernia 4/20/2021    Based on a 2021 ct scan Small hiatal hernia with some wall thickening in the distal thoracic esophagus suggesting some esophagitis.     Seizure (H) at age 22 yrs 8/17/2018     Shoulder dislocation     left     Tremor 8/10/2018     Social History     Socioeconomic History     Marital status: Single     Spouse name: Not on file     Number of children: Not on file     Years of education: Not on file     Highest education level: Not on file   Occupational History     Not on file   Tobacco Use     Smoking status: Never Smoker     Smokeless tobacco: Never Used   Substance and Sexual Activity     Alcohol use: Yes     Drug use: No     Sexual activity: Not on file   Other Topics Concern     Not on file   Social History Narrative    single. lives in [place]. mother cabrera ambriz            He has a history of a left dislocated shoulder with surgery performed in 2009. He currently lives in [place] but comes to the [place] area to visit his parents in [place]. 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     Social Determinants of Health     Financial Resource Strain: Not on file   Food Insecurity:  Not on file   Transportation Needs: Not on file   Physical Activity: Not on file   Stress: Not on file   Social Connections: Not on file   Intimate Partner Violence: Not At Risk     Fear of Current or Ex-Partner: No     Emotionally Abused: No     Physically Abused: No     Sexually Abused: No   Housing Stability: Not on file     Family History   Problem Relation Age of Onset     Cancer Other         grandmother     Tremor No family hx of      Dementia No family hx of      Parkinsonism No family hx of      Seizure Disorder No family hx of      Current Outpatient Medications   Medication Sig Dispense Refill     amantadine (SYMMETREL) 100 MG capsule Take 1 capsule (100 mg) by mouth 2 times daily At 6:30 AM and 10:30-11  capsule 3     carbidopa-levodopa (PARCOPA)  MG ODT Take one-half to 1 tablet by mouth as needed for breakthrough Parkinson symptoms, up to 2 times/day 60 tablet 11     carbidopa-levodopa (RYTARY) 48. MG CPCR per CR capsule Take 2 capsules by mouth 5 times daily (about 5 am, 9 am, 1 pm, 5 pm, and bedtime) 300 capsule 11     famotidine (PEPCID) 20 MG tablet Take 1 tablet (20 mg) by mouth 2 times daily 60 tablet 11     Multiple Vitamin (MULTIVITAMIN ADULT PO) Vitamin by mouth daily       omeprazole (PRILOSEC) 20 MG DR capsule Take 1 capsule (20 mg) by mouth 2 times daily 180 capsule 2     ondansetron (ZOFRAN) 4 MG tablet Take 4 mg by mouth every 8 hours as needed       rotigotine (NEUPRO) 3 MG/24HR 24 hr patch Place 1 patch onto the skin daily 30 patch 11     Again, thank you for allowing me to participate in the care of your patient.      Sincerely,    Ramesh Carson MD

## 2022-05-24 NOTE — PATIENT INSTRUCTIONS
"Recommendations from today's MTM visit:                                                      1. Continue current medications for now.   2. Bring all medications to hospital for the deep brain stimulation surgery.   3. We will follow up with the surgery team to discuss which medications you should take the day of surgery.   4. Continue working with Dr. Ada Mon on your anxiety and find ways to manage the anxiety to prevent the vomiting episodes.     Follow-up: before deep brain stimulation if needed, otherwise sometime after surgery     It was great speaking with you today.  I value your experience and would be very thankful for your time in providing feedback in our clinic survey. In the next few days, you may receive an email or text message from [employer] with a link to a survey related to your  clinical pharmacist.\"     To schedule another MTM appointment, please call the clinic directly or you may call the MTM scheduling line at [phone] or toll-free at [phone].     My Clinical Pharmacist's contact information:                                                      Please feel free to contact me with any questions or concerns you have.      Veronica Swift, Pharm.D.  Medication Therapy Management Pharmacist  [facility]     "

## 2022-05-24 NOTE — PROGRESS NOTES
Medication Therapy Management (MTM) Encounter    ASSESSMENT:                            Medication Adherence/Access: No issues identified    Parkinson's Disease: improved with Rytary. Would not recommend changing his medication regimen at this time with his upcoming deep brain stimulation. We have discussed whether he will continue his Parkinson's disease medications day- of surgery and this will be reviewed with his surgery team again. We discussed that the vomiting may be related to dystonia but also connected to anxiety given his previous experience with intractable vomiting. He is working on controlling his anxiety with his psychotherapist.     GERD/vomiting: improved. Will continue the famotidine monotherapy for now, but if symptoms worsen again, could consider re-starting omeprazole per Dr. Sweet in GI. Dr. Sweet suggested that he take the PPI in the morning and H2 blocker in the evening if a stronger regimen is needed. Patient may consider holding on hiatal hernia surgery for now given upcoming brain surgery. He believes the increased GERD symptoms recently was related to intractable vomiting.     PLAN:                            1. Continue current medications for now.   2. Bring all medications to hospital for the deep brain stimulation surgery.   3. We will follow up with the surgery team to discuss which medications you should take the day of surgery.   4. Continue working with Dr. Ada Mon on your anxiety and find ways to manage the anxiety to prevent the vomiting episodes.     Follow-up: before deep brain stimulation if needed, otherwise sometime after surgery     SUBJECTIVE/OBJECTIVE:                          Dipesh Nicole is a 36 year old male contacted via secure video for a follow-up visit. Today's visit is a co-visit with Dr. Carson. Today's visit is a follow-up MTM visit from 2/15/22     Reason for visit: follow up on medications.    Allergies/ADRs: Reviewed in chart  Past Medical History:  Reviewed in chart  Tobacco: He reports that he has never smoked. He has never used smokeless tobacco.  Alcohol: 1-3 beverages / week    Medication Adherence/Access: no issues reported    Parkinson's Disease:  Current medications include: Rytary 48. mg, 2 capsules by mouth 5 times daily (about 5 am, 9 am, 1 pm, 5 pm, and bedtime), amantadine 100 mg twice daily (7 am and 11 am) and Neupro patch 3 mg daily (after shower, about 8 am). Patient states that Rytary has been working well for his Parkinson's symptoms- he has a lot less wearing off and cramping/vomiting. He uses carbidopa-levodopa ODT about once every 3-4 days for breakthrough symptoms and this is working well when he needs it. Rytary usually lasts about 4 hours per dose. He is scheduled for deep brain stimulation surgery later this summer and looking forward to the surgery but has some anxiety that he is working on with Ada Mon in health psychology. He has lorazepam on hand but has only used it twice so far and he doesn't think he needs to take a medication for anxiety at this time.     GERD/vomiting: Taking famotidine 20 mg twice daily at 7 am and 11 am. He has remained off omeprazole. Patient states he had a flare of GERD symptoms after he was vomiting the week of 5/8. He went to the ER twice that week (5/8 and 5/10) given intractable vomiting. The GERD resolved abut 5 days ago. Vomiting is also much less. He has only had those 2 episodes of vomiting since starting Rytary. He is working on controlling anxiety in hopes of also improving the anxiety. He does also have Zofran on hand but has only used it a few times. Of note, patient had an esophagram on 5/17/22 which revealed mild hiatal hernia and he was told he could have surgery on this, if desired, but patient would prefer to focus on upcoming deep brain stimulation procedure for now.     Today's Vitals: There were no vitals taken for this visit.  ----------------    I spent 25 minutes with  this patient today. All changes were made via collaborative practice agreement with Dr. Carson. A copy of the visit note was provided to the patient's provider(s).    The patient was sent via [employer] a summary of these recommendations.     Veronica Swift, Pharm.D.  Medication Therapy Management Pharmacist  [facility] Neurology    Telemedicine Visit Details  Type of service:  Video Conference via [employer]  Start Time: 2:55 PM  End Time: 3:20 PM  Originating Location (patient location): Home  Distant Location (provider location):  [facility] NEUROLOGY CLINIC     Medication Therapy Recommendations  No medication therapy recommendations to display

## 2022-05-24 NOTE — PATIENT INSTRUCTIONS
Assessment:  (G20) Parkinson disease (H)  (primary encounter diagnosis)    Developed symptoms left arm at age 31 years or 30 years of age  Diagnosis was made 2018  Parkinson's disease, not due to a mutation in SNCA, GBA, LRRK2, kimberley, DJ1, PINK1, or VPS35.       Amantadine symmetrel 100mg twice daily     carbidopa/levodopa sinemet 25/100  1.5@630am, 1@9a, 1@1130am, 1@2p, 1@430p, 1@7p, 1/2-1@930p    Had intractable vomiting with motor testing on 10/6/2021  OFF score of 53      Cognitive/Driving      Working with his dad's company and may move to [place] and look for a job      Brain MRI 9/30/2021  On high-resolution SWI images, nigrosome-1 cannot be well-seen  (absence of swallow tail sign). This can be seen in Parkinson's  disease or Lewy Body dementia. No abnormal enhancement.    Neuropsych evaluation 10/19/2021     Annalise Nicole is a 35-year-old man with a history of Parkinson's disease diagnosed in 2018.  He is bothered by left-sided tremor and right-sided stiffness.  He is interested in undergoing DBS surgery for management of his symptoms.     Current results indicate performance that falls almost entirely within normal limits across cognitive domains, generally in the average to above average range. He has difficulty with memory on one list-learning task; learning and memory otherwise falls within normal limits. He does not report significant depressive symptomatology, anxiety, apathy, or compulsive behaviors.     This pattern of performance does not reflect dementia at this time, and is only subtly abnormal. There is no consistent suggestion of cognitive decline.  He appears to be capable of comprehending medical information and making well reasoned decisions for himself. He has a good understanding of the surgical procedure and the risks involved.  He has a good support system in his family.  He lives alone now, but will be moving in with his brother in the next couple of months, and his parents are also  available to provide assistance as necessary. He appears to be a good candidate for surgery from a neurocognitive perspective.    Review of diagnosis    Young onset parkinson    Avoidance of dopamine blockers   Not taking    Motor complication review   Motor fluctuations/dyskinesias       Review of Impulse control disorders       Review of surgical or medication options       Gait/Balance/Falls       Exercise/Therapy performed/offered       Cognitive/Driving       Mood   Anxiety and ?vomiting  Meeting with a counsellor  Discussion about antidepressant medication    Hallucinations/delusions       Sleep   Crazy dreams  No problems falling asleep  Has not yet tried melatonin  He recalls having dreams when he wakes up  He can be awaken about a dream about cramping leg and then wakes up with a cramp and takes sinemet and goes back to bed.      Waking up with cramping at 2am or so and taking 1/4 to 1/2 tablet of medication.    Bladder/Renal/Prostate/Gyn/Other   denies    GI/Constipation/GERD     odansetron zofran ODT 4mg as needed     Had some cramping in legs- 3 hours after eating at 930pm   He had dose at 630pm and ate at 730 and cramping at 930pm     Cramping - independent of what he is eating.      Having episodes once per month - wondering      Endoscopy 11/2021    - Normal first portion of the duodenum, second portion                          of the duodenum and third portion of the duodenum.                          Biopsied.                          - Nodular mucosa in the duodenal bulb. Biopsied.                          - Normal stomach. Biopsied.                          - Medium-sized hiatal hernia.                          - LA Grade C esophagitis (with numerous ulcers at the                          GE junction and in the esophagus) with bleeding.                          Biopsied. Friability may indicate EoE as well -                          further biopses avoided given friability.   Recommendation:         - Patient has a contact number available for                          emergencies. The signs and symptoms of potential                          delayed complications were discussed with the patient.                          Return to normal activities tomorrow. Written                          discharge instructions were provided to the patient.                          - Resume previous diet.                          - Continue present medications.                          - Await pathology results.                          - Repeat upper endoscopy at appointment to be                          scheduled to check healing.                          - Start PPI BID if there is no interaction with                          current medications.     ENDO/Lipid/DM/Bone density/Thyroid      Cardio/heart/Hyper or Hypotensive   Denies faints    Vision/Dry Eyes/Cataracts/Glaucoma/Macular   No changes in vision    Heme/Anticoagulation/Antiplatelet/Anemia/Other  Not taking aspirin    ENT/Resp    Skin/Cancer/Seborrhea/other      Musculoskeletal/Pain/Headache      Other:    Had a possible seizure on 11/23/2009 - had an mri and was discussing the use of medication  From chart review it may have been a syncopal episode vs seizure. He had been dehydrated and possibly sleep deprived.      EEG was done 12/9/2009  This is an abnormal EEG due to the presence of two episodes of frontally maximal sharp waves, epileptiform in nature. This places the patient at risk for partial or secondarily generalized seizures arising from the frontal lobes.      BRAIN MRI was normal on 12/8/2009     Seen by Dr. Je Sanon on 12/10/2009 and Annalise opted not to take an AED lamotrigine        5a 7a 9a 11 1p 5 7p 930p 10/11p 2-3a   Amantadine symmetrel 100mg  1   1               Carbidopa/levodopa Parcopa 25/100 ODT prn                    Carbidopa/levodopa Rytary 195mg 2  2    2 2    2        Famotidine pepcid 20mg  1   1        Lorazepam ativan  0.5mg prn            MVI  1           Ondansetron zofran 4mg prn                    Rotigotine neupro 3mg/24 hr patch  @8a                                   Plan:    Discussion about anxiety and vomiting and parkinson - breaking the cycle  He has not had as severe off abdominal dystonia with the rytary which may be connected with the nausea/vomiting  Meeting with Ada Mon therapist    GI Dr. Kelley hiatal hernia consultation 6/1/2022 - he may consider putting this on hold till he has had dbs surgery   He had an esophagram on 5/17/2022  1. Small hiatal hernia.  2. Otherwise unremarkable double contrast esophagram.     Scheduling preop     Living with family and they are supportive    Luis Manuel Campbell  [email or web address]    [email or web address]  The girish Camara is doing surgery  He will have his patch on the day of surgery  Discussion of rytary - may want to take his rytary at 5am  Needs to bring  parcopa into hospital    Not sure if he has done a 7T

## 2022-06-06 ENCOUNTER — VIRTUAL VISIT (OUTPATIENT)
Dept: PSYCHOLOGY | Facility: CLINIC | Age: 37
End: 2022-06-06
Payer: COMMERCIAL

## 2022-06-06 DIAGNOSIS — Z91.199 NO-SHOW FOR APPOINTMENT: Primary | ICD-10-CM

## 2022-06-06 NOTE — PROGRESS NOTES
Patient not yet connected to video visit as of 1:07pm. Called patient to check-in. No answer. Left message reminding patient of appointment and inviting him to reschedule if needed. Remained connected to video visit until 1:15pm then disconnected. Will also send [facility] message to follow-up.     Ada Mon, Ph.D., , [facility]  Clinical Health Psychologist  Phone: [phone]   Pager: [phone]  6/6/2022 1:16 PM        This patient was a no show for this scheduled appointment.

## 2022-06-22 ENCOUNTER — VIRTUAL VISIT (OUTPATIENT)
Dept: PSYCHOLOGY | Facility: CLINIC | Age: 37
End: 2022-06-22
Payer: COMMERCIAL

## 2022-06-22 ENCOUNTER — TELEPHONE (OUTPATIENT)
Dept: NEUROLOGY | Facility: CLINIC | Age: 37
End: 2022-06-22

## 2022-06-22 DIAGNOSIS — F41.1 GENERALIZED ANXIETY DISORDER: Primary | ICD-10-CM

## 2022-06-22 DIAGNOSIS — G20.A1 PARKINSON DISEASE (H): Primary | ICD-10-CM

## 2022-06-22 DIAGNOSIS — G20.A1 PARKINSON DISEASE (H): ICD-10-CM

## 2022-06-22 DIAGNOSIS — F33.40 MAJOR DEPRESSIVE DISORDER, RECURRENT, IN REMISSION (H): ICD-10-CM

## 2022-06-22 PROCEDURE — 90791 PSYCH DIAGNOSTIC EVALUATION: CPT | Mod: 95 | Performed by: PSYCHOLOGIST

## 2022-06-22 ASSESSMENT — ANXIETY QUESTIONNAIRES
2. NOT BEING ABLE TO STOP OR CONTROL WORRYING: SEVERAL DAYS
7. FEELING AFRAID AS IF SOMETHING AWFUL MIGHT HAPPEN: NOT AT ALL
5. BEING SO RESTLESS THAT IT IS HARD TO SIT STILL: SEVERAL DAYS
6. BECOMING EASILY ANNOYED OR IRRITABLE: SEVERAL DAYS
3. WORRYING TOO MUCH ABOUT DIFFERENT THINGS: SEVERAL DAYS
8. IF YOU CHECKED OFF ANY PROBLEMS, HOW DIFFICULT HAVE THESE MADE IT FOR YOU TO DO YOUR WORK, TAKE CARE OF THINGS AT HOME, OR GET ALONG WITH OTHER PEOPLE?: SOMEWHAT DIFFICULT
GAD7 TOTAL SCORE: 6
GAD7 TOTAL SCORE: 6
1. FEELING NERVOUS, ANXIOUS, OR ON EDGE: SEVERAL DAYS
4. TROUBLE RELAXING: SEVERAL DAYS
GAD7 TOTAL SCORE: 6
7. FEELING AFRAID AS IF SOMETHING AWFUL MIGHT HAPPEN: NOT AT ALL

## 2022-06-22 NOTE — Clinical Note
Follow-up video visit with me on 7/5/22 at 9:00am please. Thanks!  Ada Mon, Ph.D., , [facility] Clinical [facility] Psychologist Phone: [phone]  Pager: [phone] 6/22/2022 1:53 PM

## 2022-06-22 NOTE — PROGRESS NOTES
Health Psychology          Diana Link, Ph.D.,  [phone]  Amaris Peres, Ph.D.,  [phone]  Renay Gibbons, Ph.D.,  [phone]  Lisset Anderson, Ph.D., , ABP [phone]  Damian Jameson, Ph.D., , ABPP [phone]  Ekta Aguilar, Ph.D.,  [phone]  Ada Mon, Ph.D., , ABPP [phone]    [facility], 3rd Floor  [address] [place] [address]  [id number]     STANDARD DIAGNOSTIC ASSESSMENT      Information obtained from patient's self-report during face-to-face interaction, completion of assessment measures, and review of available medical records.     Services Provided: No    Length of Session: 53 minutes  Start time: 1:01 PM  End time: 1:54 PM     Type of assessment: Standard    Referred by: Ramesh Carson MD, Neurology    Telemedicine Information:     Patient's location: patient's home, [place], [place]    Provider's location: [facility], [place], [place]    Type of telemedicine visit: Telemedicine diagnostic assessment, video    Technology used: Epic, Amwell    Patient consent to telemedicine services? Yes    It has been determined that telemedicine service is appropriate and effective for this patient in light of the necessity for social distancing to mitigate the COVID-19 epidemic    Mode of transmission: Secure real time interactive audio and visual telecommunication system    The patient has been notified that:  Video visits will be conducted via a call with their psychologist to provide the care they need with a video conversation. Video visits may be billed at different rates depending on insurance coverage.  Patients are advised to contact their insurance provider with any questions about their health insurance coverage. If during the course of a call the psychologist feels a video visit is not appropriate, patients will not be charged for this service.    IDENTIFYING INFORMATION:  Dipesh Nicole is a 36 year  old male adult who was referred to health psychology by neurologist Dr. Carson.  Patient presented with concerns of anxiety in the context of Parkinson's Disease.    CURRENT LIFE SITUATION:    Patient currently resides in a house with his parents.     Patient is currently single.     Patient has 0 children.       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      Educational history includes Bachelor's degree in management.      Primary source(s) of social support is parents, brother, and friends and patient rates the quality of social support as good to excellent.     Patient identifies the following strengths and resources: caring, empathetic, motivated, open to learning, supportive, wants to learn, willing to ask questions and loyalty, stable housing and reliable transportation.     Patient identified the following current stressors/contextual factors that contribute to his presenting concern: upcoming surgery, job/vocational stress, life transition stress, health issues, somewhat limited social support.     Belief system is Spiritism    Patient rates general physical health as good    Relevant cultural influences on treatment and patient's relationship to cultural heritage: Patient identifies as male, heterosexual, White, and does feel connected to his cultural heritage/community.     Current medications:   Current Outpatient Medications   Medication Instructions     amantadine (SYMMETREL) 100 MG capsule 100mg by mouth twice daily at 7am and 11am     carbidopa-levodopa (PARCOPA)  MG ODT Take one-half to 1 tablet by mouth as needed for breakthrough Parkinson symptoms, up to 2 times/day     carbidopa-levodopa (RYTARY) 48. MG CPCR per CR capsule 2 capsules, Oral, 5 TIMES DAILY, (about 5 am, 9 am, 1 pm, 5 pm, and bedtime)     famotidine (PEPCID) 20 MG tablet 20mg tab by mouth twice daily at 7am and 11am     LORazepam (ATIVAN) 0.5 mg, Oral, 2 TIMES DAILY PRN     Multiple Vitamin  (MULTIVITAMIN ADULT PO) Vitamin by mouth daily      ondansetron (ZOFRAN) 4 mg, Oral, EVERY 8 HOURS PRN     rotigotine (NEUPRO) 3 MG/24HR 24 hr patch 1 patch, Transdermal, DAILY           REASON FOR ASSESSMENT:    Patient was seen previously for a consultation visit. History obtained at previous visit includes the following:   Patient with a PMH significant for Parkinson's Disease and anxiety. He was referred to health psychology by Ramesh Carson MD in Neurology for assistance with anxiety. Patient states that his neurology team recommended a referral for health psychology for assistance with anxiety because they believed there was a connection between his anxiety and PD symptom severity. Patient states that learning that he had anxiety was a surprise for him, as he was not aware that he had anxiety or how anxiety and PD symptom severity are connected. Patient notes that he has never met with a mental health provider before, so this is a new experience for him. He states that his primary concern is anxiety and how anxiety impacts his PD symptoms. He notes that one of his major PD symptoms is dystonia and he believes there is a connection between his anxiety symptoms and the severity of his dystonia. Patient states that the anxiety has caused increased worry, contributed to worsened PD symptoms, has led him to avoid or be anxious in different situations, and has impacted his interpersonal functioning.    Patient returning today to complete diagnostic assessment. Additional history obtained today includes the following:   Patient notes some stress related to upcoming DBS surgery and expressed that he will feel relief when it is completed. Regarding mental health symptoms, patient's concerns appear most consistent with recurrent major depressive disorder (currently in remission) and generalized anxiety disorder. He also endorses daily cannabis use for anxiety management, but ongoing assessment is warranted to determine  "whether his use rises to the level of chemical dependency. Thus, a diagnosis of cannabis use disorder is being deferred at this time. See below for additional details about symptom endorsements.      Patient's perception of his concern: \"Guidance, clear-headedness, and what I can do to clear my mind. Getting a better outlook on moving forward.\"      PSYCHIATRIC TREATMENT HISTORY:      Ever had mental health treatment in the past? No    Previous medication trials? No    Currently taking meds? Yes: has PRN Rx for lorazepam, rarely takes    Willing to consider psychiatric medication consultation? Yes    Ever seen by someone in psychiatry at [facility]? No    Currently being seen by a mental health provider? No    Release of information for records? No    DEVELOPMENTAL INCIDENTS: Patient reports normative development and denies experiencing any major developmental incidents.     MALTREATMENT OR ABUSE: Patient denies history of maltreatment, abuse, neglect or exploitation.     HISTORY OF CHEMICAL USE AND DEPENDENCY:    CAGE-AID Flowsheet 6/22/2022   1. Have you felt you ought to cut down on your drinking or drug use? No   2. Have people annoyed you by criticizing your drinking or drug use? No   3. Have you felt bad or guilty about your drinking or drug use? No   4. Have you ever had a drink or used drugs first thing in the morning to steady your nerves or to get rid of a hangover (eye opener)? Yes   CAGE-AID SCORE 1 (A total score of 2 or greater is considered clinically significant)         CAGE-AID reprinted with permission from the [place] Medical Journal, ADRIÁN Marc. and LAUREL Motta, \"Conjoint screening questionnaires for alcohol and drug abuse\" [place] Medical Journal 94: 135-140, 1995.     Patient currently reports consuming 0 alcoholic drinks per week and the following addiction symptoms: none. Patient notes that he has not had a drink in almost 3 months, reports that he would have 2-3 per week.  Patient currently " reports using illicit drugs including marijuana daily and the following addiction symptoms: does not report addiction symptoms related to cannabis use.  Patient currently denies problematic use of prescription medications and the following addiction symptoms: none.  Patient currently denies using nicotine.      Patient denies a history of problematic alcohol use/dependence. Patient denies a history of withdrawal symptoms. Reports that he did have a seizure at age 22 after a day of drinking and using energy drinks and being dehydrated. Has not occurred since that time.  Patient denies a history of problematic drug use/dependence.  Does currently use marijuana daily. Further assessment is warranted to rule out cannabis dependence. Patient denies a history of chemical dependency treatments.    HEALTH HISTORY AND FAMILY HEALTH HISTORY/MEDICAL CONCERNS: None. Patient's current medical concerns are indicated below.     Patient Active Problem List   Diagnosis     Tremor     Seizure (H) at age 22 yrs     H/O magnetic resonance imaging of cervical spine     H/O shoulder surgery     H/O magnetic resonance imaging of brain and brain stem     Shoulder dislocation     Parkinson disease (H)     Right inguinal hernia     Hiatal hernia     Cyclical vomiting syndrome unrelated to migraine     Esophagitis endoscopy done 11/2021     H/O electroencephalogram 2021        CULTURAL INFLUENCES AND IMPACT: Patient identified the following cultural influences and impact on his concerns: none identified     MENTAL STATUS EXAM:  Appearance: Appropriate   Eye Contact: Good    Orientation: Yes, x4  Behavior/relationship to provider/demeanor: Cooperative, Engaged and Pleasant  Motor Activity: restless and significant for akathisia  Mood (subjective report): Anxious  Affect (objective appearance): Appropriate  Speech Rate: Normal  Speech Volume: Normal  Speech Articulation: Normal  Speech Coherence: Normal  Speech Spontaneity: Normal  Thought  Content: no suicidal/homicidal ideation, plans, or intent  Thought Process (associations): Logical, Linear, Goal directed and Perseverative  Thought Process (rate): Normal  Abnormal Perception: None  Attention/Concentration: Normal  Memory: Appears grossly intact  Fund of Knowledge: Appears within normal limits   Abstraction:  Normal  Insight: Adequate  Judgment:  good    ASSESSMENT OF NEEDS: Emotional or mental health: psychotherapy to address anxiety    CURRENT SYMPTOMS AND HISTORY:    PTSD SCREEN  Patient denies history of trauma.    BIPOLAR SCREEN  Patient endorses the following symptoms of bipolar disorder: None, patient denies history of manic/hypomanic episodes    DEPRESSION SCREEN    PATIENT HEALTH QUESTIONNAIRE-9 (PHQ - 9)  PHQ-9 ([employer]) 6/22/2022   1.  Little interest or pleasure in doing things 0   2.  Feeling down, depressed, or hopeless 0   3.  Trouble falling or staying asleep, or sleeping too much 1   4.  Feeling tired or having little energy 1   5.  Poor appetite or overeating 0   6.  Feeling bad about yourself 0   7.  Trouble concentrating 0   8.  Moving slowly or restless 0   9.  Suicidal or self-harm thoughts 0   PHQ-9 Total Score 2       Onset of depressive symptoms occurred 4-5 years ago and was associated with onset of Parkinson's symptoms. Patient endorses a history of multiple major depressive episodes. Meets criteria for recurrent major depressive disorder, currently in remission.     ANXIETY DISORDER SCREEN    Generalized Anxiety Disorder 7-item (ANAYA-7)  ANAYA-7   [employer], 2002; Used with Permission) 6/22/2022   1. Feeling nervous, anxious, or on edge 1   2. Not being able to stop or control worrying 1   3. Worrying too much about different things 1   4. Trouble relaxing 1   5. Being so restless that it is hard to sit still 1   6. Becoming easily annoyed or irritable 1   7. Feeling afraid, as if something awful might happen 0   ANAYA-7 Total Score 6         Patient currently endorses the  following anxiety symptoms: anxiety/worry about several different things, excessive worry/preoccupation, restlessness, physical tension, irritability, difficulty concentrating, and sleep disturbance. Denies symptoms consistent with panic disorder, obsessive compulsive disorder, social anxiety disorder, and specific phobias. Patient meets criteria for generalized anxiety disorder.     OTHER MENTAL HEALTH CONCERNS SCREEN      PSYCHOSIS SCREEN: Patient currently endorses the following psychotic symptoms: None    PERSONALITY TRAITS: Patient endorsed and/or exhibits the following personality traits: Deferred.       SAFETY SCREEN    Suicide & Self-Harm Assessment:    1. In the past month, have you wished you were dead or wished you could go to sleep and not wake up? No  2. In the past month, have you actually had any thoughts of killing yourself? No  3. Have you ever done anything, started to do anything, or prepared to do anything to end your life? No      History of psychiatric hospitalizations No    Any family/friends/loved ones die by suicide: No    Access to guns/weapons? Yes: hunting rifles, stored in a gun safe, unloaded, ammunition kept separate    Safety Plan? No    Violence/Homicide Risk Assessment:      History of problems with anger management: No    History of violence: No    History of significant damage to property: No    History of threats made to harm or kill someone: No    History of legal involvement due to violence/aggression: No    Safety Plan? No      Patient denies current or recent homicidal ideation or behaviors.      Patient denies thoughts to engage in self-injurious behavior without suicidal intent.     Patient denies current or recent homicidal ideation or behaviors.      GENERAL FUNCTIONING:     WHODAS 2.0 6/22/2022   Standing 1   Household Responsibilities 2   Task Learning 1   Community Activities 1   Emotional Effect of Health 2   Concentration 1   Walking 2   Washing 1   Dressing 1   New  People 1   Friendships 1   Work 2   Total Score 16       PROMIS-10    PROMIS 10 6/22/2022   In general, would you say your health is: Good   In general, would you say your quality of life is: Good   In general, how would you rate your physical health? Very good   In general, how would you rate your mental health, including your mood and your ability to think? Good   In general, how would you rate your satisfaction with your social activities and relationships? Good   In general, please rate how well you carry out your usual social activities and roles Good   To what extent are you able to carry out your everyday physical activities such as walking, climbing stairs, carrying groceries, or moving a chair? Mostly   How often have you been bothered by emotional problems such as feeling anxious, depressed or irritable? Sometimes   How would you rate your fatigue on average? Moderate   How would you rate your pain on average?   0 = No Pain  to  10 = Worst Imaginable Pain 2   In general, would you say your health is: 3   In general, would you say your quality of life is: 3   In general, how would you rate your physical health? 4   In general, how would you rate your mental health, including your mood and your ability to think? 3   In general, how would you rate your satisfaction with your social activities and relationships? 3   In general, please rate how well you carry out your usual social activities and roles. (This includes activities at home, at work and in your community, and responsibilities as a parent, child, spouse, employee, friend, etc.) 3   To what extent are you able to carry out your everyday physical activities such as walking, climbing stairs, carrying groceries, or moving a chair? 4   In the past 7 days, how often have you been bothered by emotional problems such as feeling anxious, depressed, or irritable? 3   In the past 7 days, how would you rate your fatigue on average? 3   In the past 7 days, how  "would you rate your pain on average, where 0 means no pain, and 10 means worst imaginable pain? 2   Global Mental Health Score 12   Global Physical Health Score 15   PROMIS TOTAL - SUBSCORES 27          [id number] PROMIS HEALTH ORGANIZATION AND PROMIS COOPERATIVE GROUP VERSION 1.1     HEALTH RELATED CONCERNS TO BE ADDRESSED: Parkinson's Disease    LABS: Have basic labs been completed within the last year? Yes, generally WNL. Have labs been completed to check for Vitamin-D and TSH levels? No. Is patient taking supplement? Unknown      CLINICAL SUMMARY, CONCLUSIONS, AND RECOMMENDATIONS:   Dipesh \"Annalise\" STEPHANIE Nicole is a 36 year old male adult who was referred to health psychology by neurologist, Dr. Carson for assistance with anxiety in the context of Parkinson's Disease. The patient completed the assessment independently. Based on the patient's report, his symptoms are likely explained by a diagnosis of generalized anxiety disorder. He also endorses symptoms consistent with recurrent major depressive disorder, which appears to be in remission currently. The patient's mental health concerns have been affecting his overall quality of life. The patient reports current daily cannabis use. Further assessment is warranted to rule out cannabis use disorder, and this diagnosis is being deferred at this time. Patient's use will be evaluated on an ongoing basis to monitor for the presence of chemical dependency. He currently does not endorse symptoms consistent with addiction or chemical dependency. The patient is experiencing moderate psychosocial stress. Additional stressors include: upcoming surgery, job/vocational stress, life transition stress, health issues, somewhat limited social support. He denies safety concerns. A 12-item WHODAS 2.0 assessment was completed by the patient today and recorded in EPIC. Patient obtained a total score of 16. Without additional treatment, the patient's symptoms are likely to persist and continue " to affect his quality of life. Based on the patient's reported symptoms and impact on functioning, the recommendation for the patient is to complete a course of individual psychotherapy to treat anxiety in the context of Parkinson's Disease. Patient expressed a preference to continue individual psychotherapy with me.    PROVISIONAL CLINICAL HYPOTHESIS (Diagnostic Impressions):    1. Generalized anxiety disorder    2. Major depressive disorder, recurrent, in remission (H)    3. Parkinson disease (H)           TREATMENT PLAN: will generate with patient at next visit     PLAN:     1. Start a trial of individual psychotherapy.  Patient to return for a follow-up visit on 7/5/22 at 8:00am.  2. Primary care provider is oJey Morley and referring provider is Ramesh Carson MD.   3. Patient to contact [facility], [facility] or other community resources if there was a psychiatric emergency.  4. Next visit agenda: complete treatment plan, review educational materials about diagnoses.     Ada Mon, Ph.D., , [facility]  Clinical Health Psychologist  Phone: [phone]   Pager: [phone]  6/22/2022 1:01 PM

## 2022-06-22 NOTE — TELEPHONE ENCOUNTER
CT head ordered and scheduled x  Neuropsych order placed for 1 year follow up x  Letter composed x  Datebase updated x  Appointment held x  Get Well Loop ordered x    Tried to reach the patient to inform of his initial programming 8/26/22 and 9/23/22. Left the patient a message to review the UCAN message that was sent to the patient regarding his appointments with Neurology.     A UCAN message was sent to the patient regarding these appointments as well as reminder to bring their patient .

## 2022-07-05 ENCOUNTER — VIRTUAL VISIT (OUTPATIENT)
Dept: PSYCHOLOGY | Facility: CLINIC | Age: 37
End: 2022-07-05
Payer: COMMERCIAL

## 2022-07-05 DIAGNOSIS — G20.A1 PARKINSON DISEASE (H): ICD-10-CM

## 2022-07-05 DIAGNOSIS — F33.40 MAJOR DEPRESSIVE DISORDER, RECURRENT, IN REMISSION (H): ICD-10-CM

## 2022-07-05 DIAGNOSIS — F41.1 GENERALIZED ANXIETY DISORDER: Primary | ICD-10-CM

## 2022-07-05 PROCEDURE — 90834 PSYTX W PT 45 MINUTES: CPT | Mod: 95 | Performed by: PSYCHOLOGIST

## 2022-07-15 ENCOUNTER — LAB (OUTPATIENT)
Dept: LAB | Facility: CLINIC | Age: 37
End: 2022-07-15
Payer: COMMERCIAL

## 2022-07-15 ENCOUNTER — PRE VISIT (OUTPATIENT)
Dept: SURGERY | Facility: CLINIC | Age: 37
End: 2022-07-15
Payer: COMMERCIAL

## 2022-07-15 ENCOUNTER — OFFICE VISIT (OUTPATIENT)
Dept: SURGERY | Facility: CLINIC | Age: 37
End: 2022-07-15
Payer: COMMERCIAL

## 2022-07-15 ENCOUNTER — LAB (OUTPATIENT)
Dept: LAB | Facility: CLINIC | Age: 37
End: 2022-07-15

## 2022-07-15 ENCOUNTER — ANESTHESIA EVENT (OUTPATIENT)
Dept: SURGERY | Facility: CLINIC | Age: 37
End: 2022-07-15
Payer: COMMERCIAL

## 2022-07-15 VITALS
RESPIRATION RATE: 16 BRPM | TEMPERATURE: 98.4 F | DIASTOLIC BLOOD PRESSURE: 89 MMHG | OXYGEN SATURATION: 97 % | BODY MASS INDEX: 22.76 KG/M2 | WEIGHT: 171.7 LBS | HEART RATE: 66 BPM | HEIGHT: 73 IN | SYSTOLIC BLOOD PRESSURE: 138 MMHG

## 2022-07-15 DIAGNOSIS — Z11.59 ENCOUNTER FOR SCREENING FOR OTHER VIRAL DISEASES: ICD-10-CM

## 2022-07-15 DIAGNOSIS — G20.A1 PARKINSON DISEASE (H): Primary | ICD-10-CM

## 2022-07-15 DIAGNOSIS — G20.A1 PARKINSON DISEASE (H): ICD-10-CM

## 2022-07-15 LAB
ABO/RH(D): NORMAL
ANION GAP SERPL CALCULATED.3IONS-SCNC: 1 MMOL/L (ref 3–14)
ANTIBODY SCREEN: NEGATIVE
BUN SERPL-MCNC: 11 MG/DL (ref 7–30)
CALCIUM SERPL-MCNC: 9.3 MG/DL (ref 8.5–10.1)
CHLORIDE BLD-SCNC: 106 MMOL/L (ref 94–109)
CO2 SERPL-SCNC: 28 MMOL/L (ref 20–32)
CREAT SERPL-MCNC: 0.88 MG/DL (ref 0.66–1.25)
ERYTHROCYTE [DISTWIDTH] IN BLOOD BY AUTOMATED COUNT: 12.5 % (ref 10–15)
GFR SERPL CREATININE-BSD FRML MDRD: >90 ML/MIN/1.73M2
GLUCOSE BLD-MCNC: 102 MG/DL (ref 70–99)
HCT VFR BLD AUTO: 41 % (ref 40–53)
HGB BLD-MCNC: 13.6 G/DL (ref 13.3–17.7)
MCH RBC QN AUTO: 30.2 PG (ref 26.5–33)
MCHC RBC AUTO-ENTMCNC: 33.2 G/DL (ref 31.5–36.5)
MCV RBC AUTO: 91 FL (ref 78–100)
PLATELET # BLD AUTO: 216 10E3/UL (ref 150–450)
POTASSIUM BLD-SCNC: 4 MMOL/L (ref 3.4–5.3)
RBC # BLD AUTO: 4.5 10E6/UL (ref 4.4–5.9)
SARS-COV-2 RNA RESP QL NAA+PROBE: NEGATIVE
SODIUM SERPL-SCNC: 135 MMOL/L (ref 133–144)
SPECIMEN EXPIRATION DATE: NORMAL
WBC # BLD AUTO: 5.2 10E3/UL (ref 4–11)

## 2022-07-15 PROCEDURE — 85027 COMPLETE CBC AUTOMATED: CPT | Performed by: PATHOLOGY

## 2022-07-15 PROCEDURE — 99203 OFFICE O/P NEW LOW 30 MIN: CPT | Performed by: NURSE PRACTITIONER

## 2022-07-15 PROCEDURE — 80048 BASIC METABOLIC PNL TOTAL CA: CPT | Performed by: PATHOLOGY

## 2022-07-15 PROCEDURE — 36415 COLL VENOUS BLD VENIPUNCTURE: CPT | Performed by: PATHOLOGY

## 2022-07-15 PROCEDURE — U0003 INFECTIOUS AGENT DETECTION BY NUCLEIC ACID (DNA OR RNA); SEVERE ACUTE RESPIRATORY SYNDROME CORONAVIRUS 2 (SARS-COV-2) (CORONAVIRUS DISEASE [COVID-19]), AMPLIFIED PROBE TECHNIQUE, MAKING USE OF HIGH THROUGHPUT TECHNOLOGIES AS DESCRIBED BY CMS-2020-01-R: HCPCS | Performed by: INTERNAL MEDICINE

## 2022-07-15 PROCEDURE — 86901 BLOOD TYPING SEROLOGIC RH(D): CPT | Performed by: NURSE PRACTITIONER

## 2022-07-15 ASSESSMENT — ENCOUNTER SYMPTOMS
SEIZURES: 1
SEIZURES: 1

## 2022-07-15 ASSESSMENT — PAIN SCALES - GENERAL: PAINLEVEL: NO PAIN (0)

## 2022-07-15 NOTE — H&P
Pre-Operative H & P     CC:  Preoperative exam to assess for increased cardiopulmonary risk while undergoing surgery and anesthesia.    Date of Encounter: 7/15/2022  Primary Care Physician:  Joey Morley     Reason for visit:   Encounter Diagnosis   Name Primary?     Parkinson disease (H) Yes       HPI  Dipesh Nicole is a 36 year old male who presents for pre-operative H & P in preparation for  Procedure Information     Case: [id number] Date/Time: 07/18/22 0730    Procedure: Left side deep brain stimulator placement, phase I, placement of left side deep brain stimulator electrode, target left globus pallidus internus, with microelectrode recording (Left Head)    Anesthesia type: MAC with Local    Diagnosis: Parkinson disease (H) [G20]    Pre-op diagnosis: Parkinson disease (H) [G20]    Location:  OR  /  OR    Providers: Angel Morales MD        Dipesh Javier has a history of Parkinson's disease and is followed by Dr. Nunez.  He was seen by Dr. Morales in neurosurgical consultation for evaluation of possible DBS placement. Mr. Nicole was diagnosed with Parkinson s in 2018.  He reports symptoms of left arm bradykinesia, rigidity and subtle action (not resting) tremor for about a year or two prior to his diagnosis. His DBS goal is to improve his quality of life, reduce his dyskinesias and dystonia, and lengthen the time to wearing off. Mr. Nicole s case was discussed at the Movement Disorder Consensus Group Meeting, and he was deemed an acceptable candidate for the above procedure.  Mr. Nicole presents to the PAC in preparation for the above surgical procedure.     Last fall, Mr. Nicole was struggling with nausea and vomiting leading to dehydration, which typically occurred shortly after taking his levodopa.  Per Dr. Farias s 10/5/2021 note,       If his vomiting is triggered solely by medication administration, then awake surgery would likely be fine since he will come in off medication. But if it is  related to anxiety or is unpredictable, this may make awake, frame-based surgery a bit more of a risk in terms of the need to abort surgery in this event .     Mr. Nicole was referred to GI and was seen by Dr. Sweet for further evaluation of nausea and vomiting. Mr. Nicole had an EGD November 2021 that showed esophagitis with bleeding (numerous ulcers at the GE junction and in the esophagus). He was started on a PPI twice daily.  Follow-up EGD in March 2022 was without evidence of esophagitis.  He does have a hiatal hernia.  Mr. Nicole tells me today that he is doing well.  He had his medications adjusted with a sublingual form that seems to have helped improve his symptoms.  He now is only needing Pepcid.  Occasionally he will become symptomatic from his hiatal hernia if he becomes anxious when carbidopa-levadopa starts to wear off.  He will devlop spasms and rigidity of his abdominal muscles which he believes can lead to his nausea and vomiting.     History is obtained from the patient and chart review    Hx of abnormal bleeding or anti-platelet use: denies      Past Medical History  Past Medical History:   Diagnosis Date     Esophagitis endoscopy done 11/2021 11/18/2021    Impression:            - Normal first portion of the duodenum, second portion                         of the duodenum and third portion of the duodenum.                         Biopsied.                         - Nodular mucosa in the duodenal bulb. Biopsied.                         - Normal stomach. Biopsied.                         - Medium-sized hiatal hernia.                         - LA Grade     H/O electroencephalogram 2021 11/18/2021     Impression:  This is a normal waking and sleep EEG, with generalized beta activities throughout the recording. Generalized beta activities are usually associated certain medication use, such as benzodiazepines or barbiturates. The cause of these activities in this case is unclear at this time.         H/O  magnetic resonance imaging of brain and brain stem 8/17/2018 2009 December MR Brain without and with IV Eetksdmh37/8/2009 [facility] Result Impression  Normal MRI of the brain.  Result Narrative      Multiecho, multiplanar views of the brain were obtained prior to and  following the IV administration of 19 mL of contrast. There are no  previous studies available for comparison.     The brain parenchyma is normal in appearance on all pulse sequences,  with      H/O magnetic resonance imaging of cervical spine 8/17/2018 March MR Cervical Spine without IV Contrast3/15/2017 [facility] Result Addenda Addendum by Provider, PA Jones on 03/15/2017 12:30 PM RAD^^^MA MR Cervical Spine w/o contrast 3/15/2017 12:30:00 Result Impression  Minimal degenerative disc disease at C5-6 and C6-7  otherwise an unremarkable MR scan of the cervical spine.  Result Narrative   EXAM: MR Cervical Spine w/o contrast   INDICATION:      H/O shoulder surgery 8/17/2018 2017 July XR SHOULDER 2 VIEWS LEFT7/14/2017 [facility] [facility] Result Narrative XR SHOULDER 2 VIEWS LEFT 7/14/2017 9:13 PM  INDICATION: Shoulder Injury COMPARISON: None.  FINDINGS: Negative shoulder. No fracture or dislocation.  Status       Hiatal hernia 4/20/2021    Based on a 2021 ct scan Small hiatal hernia with some wall thickening in the distal thoracic esophagus suggesting some esophagitis.     Seizure (H) at age 22 yrs 8/17/2018     Shoulder dislocation     left     Tremor 8/10/2018       Past Surgical History  Past Surgical History:   Procedure Laterality Date     ESOPHAGOSCOPY, GASTROSCOPY, DUODENOSCOPY (EGD), COMBINED N/A 11/18/2021    Procedure: ESOPHAGOGASTRODUODENOSCOPY, WITH BIOPSY;  Surgeon: Veronica Kay MD;  Location: [facility] OR     ESOPHAGOSCOPY, GASTROSCOPY, DUODENOSCOPY (EGD), COMBINED N/A 3/7/2022    Procedure: ESOPHAGOGASTRODUODENOSCOPY, WITH BIOPSY;  Surgeon: Alexandr Sweet MD;  Location: [facility] OR      SHOULDER SURGERY  2007       Prior to Admission Medications  Current Outpatient Medications   Medication Sig Dispense Refill     amantadine (SYMMETREL) 100 MG capsule 100mg by mouth twice daily at 7am and 11am (Patient taking differently: Take 100 mg by mouth 2 times daily 100mg by mouth twice daily at 7am and 11am) 180 capsule 3     carbidopa-levodopa (PARCOPA)  MG ODT Take one-half to 1 tablet by mouth as needed for breakthrough Parkinson symptoms, up to 2 times/day 60 tablet 11     carbidopa-levodopa (RYTARY) 48. MG CPCR per CR capsule Take 2 capsules by mouth at 5 am, 9 am, 5 pm, and bedtime and take 3 capsules at 1 pm = 11 capsules/day 330 capsule 11     famotidine (PEPCID) 20 MG tablet 20mg tab by mouth twice daily at 7am and 11am 180 tablet 11     Multiple Vitamin (MULTIVITAMIN ADULT PO) every morning Vitamin by mouth daily       rotigotine (NEUPRO) 3 MG/24HR 24 hr patch Place 1 patch onto the skin daily 30 patch 11     LORazepam (ATIVAN) 0.5 MG tablet Take 0.5 mg by mouth 2 times daily as needed (Patient not taking: Reported on 7/15/2022)       ondansetron (ZOFRAN) 4 MG tablet Take 4 mg by mouth every 8 hours as needed (Patient not taking: Reported on 7/15/2022)         Allergies  Allergies   Allergen Reactions     Diphenhydramine      Other reaction(s): Other (see comments)  Told not to take due to parkinsons     Metoclopramide      Avoid in patient with a history of Parkinson Disease  Other reaction(s): GI intolerance  Avoid in patient with a history of Parkinson Disease     Promethazine      Other reaction(s): Other (see comments)  Told not to take due to parkisons       Social History  Social History     Socioeconomic History     Marital status: Single     Spouse name: Not on file     Number of children: Not on file     Years of education: Not on file     Highest education level: Not on file   Occupational History     Not on file   Tobacco Use     Smoking status: Never Smoker     Smokeless  tobacco: Never Used   Substance and Sexual Activity     Alcohol use: Yes     Drug use: No     Sexual activity: Not on file   Other Topics Concern     Not on file   Social History Narrative    single. lives in [place]. mother cabrera ambriz            He has a history of a left dislocated shoulder with surgery performed in 2009. He currently lives in [place] but comes to the [place] area to visit his parents in [place]. 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     Social Determinants of Health     Financial Resource Strain: Not on file   Food Insecurity: Not on file   Transportation Needs: Not on file   Physical Activity: Not on file   Stress: Not on file   Social Connections: Not on file   Intimate Partner Violence: Not At Risk     Fear of Current or Ex-Partner: No     Emotionally Abused: No     Physically Abused: No     Sexually Abused: No   Housing Stability: Not on file       Family History  Family History   Problem Relation Age of Onset     Cancer Other         grandmother     Tremor No family hx of      Dementia No family hx of      Parkinsonism No family hx of      Seizure Disorder No family hx of        Review of Systems  The complete review of systems is negative other than noted in the HPI or here.   Anesthesia Evaluation   Pt has had prior anesthetic. Type: General.    No history of anesthetic complications       ROS/MED HX  ENT/Pulmonary:  - neg pulmonary ROS     Neurologic:     (+) seizures, last seizure: 2009, features: isolated episode, Parkinson's disease, features:  dyskinesias and dystonia,     Cardiovascular:  - neg cardiovascular ROS     METS/Exercise Tolerance:  Comment: Very active playing basketball and golfing.    Hematologic:  - neg hematologic  ROS     Musculoskeletal: Comment: S/p left labrum tear surgery.       GI/Hepatic:     (+) GERD, Asymptomatic on medication, hiatal hernia,     Renal/Genitourinary:  - neg Renal ROS     Endo:  - neg endo ROS    "  Psychiatric/Substance Use:  - neg psychiatric ROS     Infectious Disease: Comment: Completed COVID vaccine and booster.      Malignancy:  - neg malignancy ROS     Other:  - neg other ROS          /89 (BP Location: Right arm, Patient Position: Sitting, Cuff Size: Adult Regular)   Pulse 66   Temp 98.4  F (36.9  C) (Oral)   Resp 16   Ht 1.854 m (6' 1\")   Wt 77.9 kg (171 lb 11.2 oz)   SpO2 97%   BMI 22.65 kg/m      Physical Exam   Constitutional: Awake, alert, cooperative, no apparent distress, and appears stated age.  Eyes: Pupils equal, round and reactive to light, extra ocular muscles intact, sclera clear, conjunctiva normal.  HENT: Normocephalic, oral pharynx with moist mucus membranes, good dentition. No goiter appreciated.   Respiratory: Clear to auscultation bilaterally, no crackles or wheezing.  Cardiovascular: Regular rate and rhythm, normal S1 and S2, and no murmur noted.  Carotids +2, no bruits. No edema. Palpable pulses to radial  DP and PT arteries.   GI: Normal bowel sounds, soft, non-distended, non-tender, no masses palpated, no hepatosplenomegaly.    Lymph/Hematologic: No cervical lymphadenopathy and no supraclavicular lymphadenopathy.  Genitourinary:  deferred  Skin: Warm and dry.  No rashes at anticipated surgical site.   Musculoskeletal: Full ROM of neck. There is no redness, warmth, or swelling of the joints. Gross motor strength is normal.    Neurologic: Awake, alert, oriented to name, place and time. Cranial nerves II-XII are grossly intact. Altered gait.  Evidence of  dyskinesias and dystonia of extremities. .   Neuropsychiatric: Calm, cooperative. Normal affect.     Prior Labs/Diagnostic Studies   All labs and imaging personally reviewed   PROCEDURES     Endoscopy  3/7/2022     Impression:            - Z-line irregular, 40 cm from the incisors. Biopsied                             to ensure no Hernandez's esophagus.                             - Esophagogastric landmarks identified.  "                            - Gastroesophageal flap valve classified as Hill Grade                             IV (no fold, wide open lumen, hiatal hernia present).                             - 4 cm hiatal hernia.                             - Bilious gastric fluid.                             - Normal examined duodenum.      Recommendation:        - Discharge patient to home (with escort).                             - Resume previous diet.                             - Continue present medications. Given severe erosive                             esophagitis in setting of hiatal hernia he likely will                             require daily PPI indefinitely. PCP can trial going                             down to once daily in future. If needed dose can be                             increased in BID in the future.                             - Await pathology results.                             - Follow an antireflux regimen.                             - Return to primary care physician.     Endoscopy 11/2021       - Normal first portion of the duodenum, second portion                             of the duodenum and third portion of the duodenum.                             Biopsied.                             - Nodular mucosa in the duodenal bulb. Biopsied.                             - Normal stomach. Biopsied.                             - Medium-sized hiatal hernia.                             - LA Grade C esophagitis (with numerous ulcers at the                             GE junction and in the esophagus) with bleeding.                             Biopsied. Friability may indicate EoE as well -                             further biopses avoided given friability.     The patient's records and results personally reviewed by this provider.     Outside records reviewed from: Care Everywhere    LAB/DIAGNOSTIC STUDIES TODAY:  CBC, BMP and T&S  Lab Results   Component Value Date    WBC 5.2 07/15/2022     Lab  Results   Component Value Date    RBC 4.50 07/15/2022     Lab Results   Component Value Date    HGB 13.6 07/15/2022     Lab Results   Component Value Date    HCT 41.0 07/15/2022     Lab Results   Component Value Date    MCV 91 07/15/2022     Lab Results   Component Value Date    MCH 30.2 07/15/2022     Lab Results   Component Value Date    MCHC 33.2 07/15/2022     Lab Results   Component Value Date    RDW 12.5 07/15/2022     Lab Results   Component Value Date     07/15/2022       Last Comprehensive Metabolic Panel:  Sodium   Date Value Ref Range Status   07/15/2022 135 133 - 144 mmol/L Final     Potassium   Date Value Ref Range Status   07/15/2022 4.0 3.4 - 5.3 mmol/L Final     Chloride   Date Value Ref Range Status   07/15/2022 106 94 - 109 mmol/L Final     Carbon Dioxide (CO2)   Date Value Ref Range Status   07/15/2022 28 20 - 32 mmol/L Final     Anion Gap   Date Value Ref Range Status   07/15/2022 1 (L) 3 - 14 mmol/L Final     Glucose   Date Value Ref Range Status   07/15/2022 102 (H) 70 - 99 mg/dL Final     Urea Nitrogen   Date Value Ref Range Status   07/15/2022 11 7 - 30 mg/dL Final     Creatinine   Date Value Ref Range Status   07/15/2022 0.88 0.66 - 1.25 mg/dL Final     GFR Estimate   Date Value Ref Range Status   07/15/2022 >90 >60 mL/min/1.73m2 Final     Comment:     Effective December 21, 2021 eGFRcr in adults is calculated using the 2021 CKD-EPI creatinine equation which includes age and gender (Kate et al., NEJM, DOI: 10.1056/WBFMxt7412847)     Calcium   Date Value Ref Range Status   07/15/2022 9.3 8.5 - 10.1 mg/dL Final         Assessment  Dipesh Nicole is a 36 year old male seen as a PAC referral for risk assessment and optimization for anesthesia.    Plan/Recommendations  Pt will be optimized for the proposed procedure.  See below for details on the assessment, risk, and preoperative recommendations    NEUROLOGY  - Parkinson's disease with  dyskinesias and dystonia. Followed by .   "Treated with carbidopa-levodopa (RYTARY) 48. MG and amantadine.  Phase I of DBS scheduled as above with Dr. Morales.  Preoperative labs ordered. Parkinson's medications per Dr. Morales's office.   - No history of TIA, CVA or seizure  -Post Op delirium risk factors:  No risk identified    ENT  - No current airway concerns.  Will need to be reassessed day of surgery.  Mallampati: I  TM: > 3    CARDIAC  - Denies known coronary artery disease.  Denies cardiac symptoms.  - METS (Metabolic Equivalents) >4  - RCRI-Very low risk: Class 1 0.4% complication rate            Total Score: 0        PULMONARY  MAYURI Low Risk            Total Score: 1    MAYURI: Male      - Denies asthma or inhaler use  - Tobacco History   History   Smoking Status     Never Smoker   Smokeless Tobacco     Never Used       GI  - Hiatal hernia, take pepcid as prescribed   PONV Medium Risk  Total Score: 2           1 AN PONV: Patient is not a current smoker    1 AN PONV: Intended Post Op Opioids          ENDOCRINE    - BMI: Estimated body mass index is 22.65 kg/m  as calculated from the following:    Height as of this encounter: 1.854 m (6' 1\").    Weight as of this encounter: 77.9 kg (171 lb 11.2 oz).  Healthy Weight (BMI 18.5-24.9)  - No history of Diabetes Mellitus    HEME  VTE Low Risk 0.5%            Total Score: 2    VTE: Male      - No history of abnormal bleeding or antiplatelet use.  - Denies h/o blood transfusion       The patient is optimized for their procedure. AVS with information on surgery time/arrival time, meds and NPO status given by nursing staff. No further diagnostic testing indicated.      On the day of service:     Prep time: 10 minutes  Visit time: 14 minutes  Documentation time: 20 minutes  ------------------------------------------  Total time: 44 minutes      LUCERO Khanna CNP  Preoperative Assessment Center  [facility]  Clinic and Surgery Center  Phone: [phone]  Fax: [phone]  "

## 2022-07-15 NOTE — PATIENT INSTRUCTIONS
Preparing for Your Surgery      Name:  Dipesh Nicole   MRN:  [id number]   :  1985   Today's Date:  7/15/2022       Arriving for surgery:  Surgery date:  22  Arrival time:  5 am  (CT 7 am)    Restrictions due to COVID 19       Effective 22 [facility] is implementing the following visitor policy:     1 person may accompany the patient through the Pre-Op process.      That same person may wait in the Surgery Waiting room, provided there is enough room to social distance           Visitors must wear a mask.      Visitors must not be ill.        Inpatients are allowed 2 visitors per day for the duration of their stay.      Visiting hours are 8 am to 8 pm.    [employer] parking is available for anyone with mobility limitations or disabilities.  ([facility]  24 hours/ 7 days a week; [facility]  7 am- 3:30 pm, Mon- Fri)    Please come to:     [facility] [address]  [address] [place] [address]    -  Parking is available at the Patient Visitor Ramp on [facility].    -  When entering the hospital, you will be asked some Covid screening questions and directed to Patient Registration. Patient Registration will then direct you to the 3rd floor Surgery Waiting Room.  [phone]?     - ?If you are in need of directions, wheelchair or escort please stop at the Information Desk in the lobby.  Inform the information person that you are here for surgery; a wheelchair and escort to [address] will be provided.?     What can I eat or drink?  -  You may eat and drink normally for up to 8 hours before your surgery. (Until 11:30 pm 22)  -  You may have clear liquids until 2 hours before surgery. (Until 5 am- Stop on arrival to hospital.)    Examples of clear liquids:  Water  Clear broth  Juices (apple, white grape, white cranberry  and cider) without pulp  Noncarbonated, powder based beverages  (lemonade and Angelito-Aid)  Sodas  (Alida, 7-Up, ginger ale and teena)  Coffee or tea (without milk or cream)  Gatorade    -  No Alcohol for at least 24 hours before surgery     Which medicines can I take?  Hold Aspirin for 7 days before surgery.   Hold Multivitamins for 7 days before surgery. Hold Multivitamin starting now if you have not already, and hold until surgery.  Hold Supplements for 7 days before surgery.  Hold Ibuprofen (Advil, Motrin) for 1 day before surgery--unless otherwise directed by surgeon.  Hold Naproxen (Aleve) for 4 days before surgery.     *Continue taking your Parkinson's medications and apply your patch as usual. Your providers do not want you to stop these medications before any of your surgeries.      *Bring your Parkinson's medications with you to the hospital on each surgery day.     -  PLEASE TAKE these medications the day of surgery:  Famotidine (Pepcid), Amantadine (Symmetrel), Carbidopa-Levodopa (Rytary), Rotigotine (Neupro) patch  Acetaminophen (Tylenol) if needed    How do I prepare myself?  - Please take 2 showers before surgery using Scrubcare or Hibiclens soap.    Use this soap only from the neck to your toes.     Leave the soap on your skin for one minute--then rinse thoroughly.      You may use your own shampoo and conditioner; no other hair products.   - Please remove all jewelry and body piercings.  - No lotions, deodorants or fragrance.   - Bring your ID and insurance card.    -If you have a Deep Brain Stimulator, Spinal Cord Stimulator or any neuro stimulator device---you must bring the remote control to the hospital       ALL PATIENTS GOING HOME THE SAME DAY OF SURGERY ARE REQUIRED TO HAVE A RESPONSIBLE ADULT TO DRIVE AND BE IN ATTENDANCE WITH THEM FOR 24 HOURS FOLLOWING SURGERY.      Questions or Concerns:    - For any questions regarding the day of surgery or your hospital stay, please contact the Pre Admission Nursing Office at [phone].       - If you have health changes between today and your  PROBLEMS;    s/p staph bactermia/sputum pseudomonas/staph  Ac on chronic hypercapnic & hypoxamic respiratory failure-s/p trach & PEG  sputum-Few Pseudomonas aeruginosa (Carbapenem Resistant)/Moderate Methicillin resistant Staphylococcus aureus  afib with RVR  OHS/VIJAY  AC flare of COPD/chronic vs acute on chronic bronchitis  Mild interstitial lung disease-NSIP h/o smoking  COVID negativex2  Pulmonary hypertension  Nonobstructing stone in the left ureterovesicular junction/ nonobstructing stone in the lower pole right kidney.  Subacute hemorrhage in the right rectus abdominis along the anterior sheath, measures 1.6 cm in thickness and extends from the umbilicus to the inferior myotendinous junction  Cirrhosis  Mild splenomegaly-portal venous hypertension.  Chronic afib w Watchman device  CHF  BPH  GERD  ETOH abuse  seizures disorder    PLAN;    pulmonary slow recovery-on trach collar as tolerated-waiting placement for rehab  Tube feeding as tolerated  supportive care  covid repeat testing-neg  Eliquis/asa 81  d/w staff surgery please call your surgeon.       For questions after surgery please call your surgeons office.

## 2022-07-15 NOTE — ANESTHESIA PREPROCEDURE EVALUATION
Anesthesia Pre-Procedure Evaluation    Patient: Dipesh Nicole   MRN: [id number] : 1985        Procedure : Procedure(s):  Left side deep brain stimulator placement, phase I, placement of left side deep brain stimulator electrode, target left globus pallidus internus, with microelectrode recording          Past Medical History:   Diagnosis Date     Esophagitis endoscopy done 2021    Impression:            - Normal first portion of the duodenum, second portion                         of the duodenum and third portion of the duodenum.                         Biopsied.                         - Nodular mucosa in the duodenal bulb. Biopsied.                         - Normal stomach. Biopsied.                         - Medium-sized hiatal hernia.                         - LA Grade     H/O electroencephalogram 2021     Impression:  This is a normal waking and sleep EEG, with generalized beta activities throughout the recording. Generalized beta activities are usually associated certain medication use, such as benzodiazepines or barbiturates. The cause of these activities in this case is unclear at this time.         H/O magnetic resonance imaging of brain and brain stem 2018 MR Brain without and with IV Peqxqpyz56/8/2009 [facility] Result Impression  Normal MRI of the brain.  Result Narrative      Multiecho, multiplanar views of the brain were obtained prior to and  following the IV administration of 19 mL of contrast. There are no  previous studies available for comparison.     The brain parenchyma is normal in appearance on all pulse sequences,  with      H/O magnetic resonance imaging of cervical spine 2018 MR Cervical Spine without IV Contrast3/15/2017 [facility] Result Addenda Addendum by Provider, PA Jones on 03/15/2017 12:30 PM RAD^^^MA MR Cervical Spine w/o contrast 3/15/2017 12:30:00 Result Impression  Minimal degenerative disc disease  at C5-6 and C6-7  otherwise an unremarkable MR scan of the cervical spine.  Result Narrative   EXAM: MR Cervical Spine w/o contrast   INDICATION:      H/O shoulder surgery 8/17/2018 2017 July XR SHOULDER 2 VIEWS LEFT7/14/2017 [facility] Result Narrative XR SHOULDER 2 VIEWS LEFT 7/14/2017 9:13 PM  INDICATION: Shoulder Injury COMPARISON: None.  FINDINGS: Negative shoulder. No fracture or dislocation.  Status       Hiatal hernia 4/20/2021    Based on a 2021 ct scan Small hiatal hernia with some wall thickening in the distal thoracic esophagus suggesting some esophagitis.     Seizure (H) at age 22 yrs 8/17/2018     Shoulder dislocation     left     Tremor 8/10/2018      Past Surgical History:   Procedure Laterality Date     ESOPHAGOSCOPY, GASTROSCOPY, DUODENOSCOPY (EGD), COMBINED N/A 11/18/2021    Procedure: ESOPHAGOGASTRODUODENOSCOPY, WITH BIOPSY;  Surgeon: Veronica Kay MD;  [place] [facility]     ESOPHAGOSCOPY, GASTROSCOPY, DUODENOSCOPY (EGD), COMBINED N/A 3/7/2022    Procedure: ESOPHAGOGASTRODUODENOSCOPY, WITH BIOPSY;  Surgeon: Alexandr Sweet MD;  Location: [facility] OR     SHOULDER SURGERY  2007      Allergies   Allergen Reactions     Diphenhydramine      Other reaction(s): Other (see comments)  Told not to take due to parkinsons     Metoclopramide      Avoid in patient with a history of Parkinson Disease  Other reaction(s): GI intolerance  Avoid in patient with a history of Parkinson Disease     Promethazine      Other reaction(s): Other (see comments)  Told not to take due to parkisons      Social History     Tobacco Use     Smoking status: Never Smoker     Smokeless tobacco: Never Used   Substance Use Topics     Alcohol use: Yes      Wt Readings from Last 1 Encounters:   07/15/22 77.9 kg (171 lb 11.2 oz)        Anesthesia Evaluation   Pt has had prior anesthetic. Type: General.    No history of anesthetic complications       ROS/MED HX  ENT/Pulmonary:  - neg pulmonary  ROS     Neurologic:  - neg neurologic ROS   (+) seizures, Parkinson's disease,     Cardiovascular:  - neg cardiovascular ROS     METS/Exercise Tolerance: >4 METS    Hematologic:  - neg hematologic  ROS     Musculoskeletal:  - neg musculoskeletal ROS     GI/Hepatic:  - neg GI/hepatic ROS   (+) GERD, Symptomatic, hiatal hernia,     Renal/Genitourinary:  - neg Renal ROS     Endo:  - neg endo ROS     Psychiatric/Substance Use:  - neg psychiatric ROS     Infectious Disease:  - neg infectious disease ROS     Malignancy:  - neg malignancy ROS     Other:  - neg other ROS          Physical Exam    Airway  airway exam normal      Mallampati: I   TM distance: > 3 FB   Neck ROM: full   Mouth opening: > 3 cm    Respiratory Devices and Support         Dental  no notable dental history         Cardiovascular   cardiovascular exam normal       Rhythm and rate: regular and normal     Pulmonary   pulmonary exam normal        breath sounds clear to auscultation           OUTSIDE LABS:  CBC:   Lab Results   Component Value Date    WBC 5.2 07/15/2022    HGB 13.6 07/15/2022    HCT 41.0 07/15/2022     07/15/2022     BMP:   Lab Results   Component Value Date     07/15/2022    POTASSIUM 4.0 07/15/2022    CHLORIDE 106 07/15/2022    CO2 28 07/15/2022    BUN 11 07/15/2022    CR 0.88 07/15/2022     (H) 07/15/2022     COAGS: No results found for: PTT, INR, FIBR  POC: No results found for: BGM, HCG, HCGS  HEPATIC: No results found for: ALBUMIN, PROTTOTAL, ALT, AST, GGT, ALKPHOS, BILITOTAL, BILIDIRECT, JACQUELYN  OTHER:   Lab Results   Component Value Date    DAWSON 9.3 07/15/2022       Anesthesia Plan    ASA Status:  3   NPO Status:  ELEVATED Aspiration Risk/Unknown    Anesthesia Type: MAC.     - Reason for MAC: immobility needed, straight local not clinically adequate   Induction: Intravenous.   Maintenance: TIVA.   Techniques and Equipment:       - Drips/Meds: Dexmed. infusion     Consents    Anesthesia Plan(s) and associated  risks, benefits, and realistic alternatives discussed. Questions answered and patient/representative(s) expressed understanding.     - Discussed: Risks, Benefits and Alternatives for BOTH SEDATION and the PROCEDURE were discussed     - Discussed with:  Patient      - Extended Intubation/Ventilatory Support Discussed: Yes.      - Patient is DNR/DNI Status: No    Use of blood products discussed: Yes.     - Discussed with: Patient.     - Consented: consented to blood products            Reason for refusal: other.     Postoperative Care    Pain management: Multi-modal analgesia.   PONV prophylaxis: Background Propofol Infusion     Comments:                Isabel Graham MD

## 2022-07-17 RX ORDER — CEFAZOLIN SODIUM/WATER 2 G/20 ML
2 SYRINGE (ML) INTRAVENOUS
Status: COMPLETED | OUTPATIENT
Start: 2022-07-17 | End: 2022-07-18

## 2022-07-17 RX ORDER — CEFAZOLIN SODIUM/WATER 2 G/20 ML
2 SYRINGE (ML) INTRAVENOUS SEE ADMIN INSTRUCTIONS
Status: DISCONTINUED | OUTPATIENT
Start: 2022-07-17 | End: 2022-07-18 | Stop reason: HOSPADM

## 2022-07-18 ENCOUNTER — ANESTHESIA (OUTPATIENT)
Dept: SURGERY | Facility: CLINIC | Age: 37
End: 2022-07-18
Payer: COMMERCIAL

## 2022-07-18 ENCOUNTER — APPOINTMENT (OUTPATIENT)
Dept: GENERAL RADIOLOGY | Facility: CLINIC | Age: 37
End: 2022-07-18
Attending: STUDENT IN AN ORGANIZED HEALTH CARE EDUCATION/TRAINING PROGRAM
Payer: COMMERCIAL

## 2022-07-18 ENCOUNTER — APPOINTMENT (OUTPATIENT)
Dept: CT IMAGING | Facility: CLINIC | Age: 37
End: 2022-07-18
Attending: STUDENT IN AN ORGANIZED HEALTH CARE EDUCATION/TRAINING PROGRAM
Payer: COMMERCIAL

## 2022-07-18 ENCOUNTER — HOSPITAL ENCOUNTER (INPATIENT)
Facility: CLINIC | Age: 37
LOS: 1 days | Discharge: HOME | End: 2022-07-19
Attending: NEUROLOGICAL SURGERY | Admitting: NEUROLOGICAL SURGERY
Payer: COMMERCIAL

## 2022-07-18 ENCOUNTER — HOSPITAL ENCOUNTER (OUTPATIENT)
Dept: CT IMAGING | Facility: CLINIC | Age: 37
Discharge: HOME | End: 2022-07-18
Attending: NEUROLOGICAL SURGERY | Admitting: NEUROLOGICAL SURGERY
Payer: COMMERCIAL

## 2022-07-18 ENCOUNTER — APPOINTMENT (OUTPATIENT)
Dept: GENERAL RADIOLOGY | Facility: CLINIC | Age: 37
End: 2022-07-18
Attending: NEUROLOGICAL SURGERY
Payer: COMMERCIAL

## 2022-07-18 DIAGNOSIS — Z96.89 S/P DEEP BRAIN STIMULATOR PLACEMENT: Primary | ICD-10-CM

## 2022-07-18 DIAGNOSIS — G20.A1 PARKINSON DISEASE (H): ICD-10-CM

## 2022-07-18 LAB — GLUCOSE BLDC GLUCOMTR-MCNC: 113 MG/DL (ref 70–99)

## 2022-07-18 PROCEDURE — 360N000086 HC SURGERY LEVEL 6 W/ FLUORO, PER MIN: Performed by: NEUROLOGICAL SURGERY

## 2022-07-18 PROCEDURE — 250N000011 HC RX IP 250 OP 636: Performed by: STUDENT IN AN ORGANIZED HEALTH CARE EDUCATION/TRAINING PROGRAM

## 2022-07-18 PROCEDURE — 250N000013 HC RX MED GY IP 250 OP 250 PS 637: Performed by: STUDENT IN AN ORGANIZED HEALTH CARE EDUCATION/TRAINING PROGRAM

## 2022-07-18 PROCEDURE — 70450 CT HEAD/BRAIN W/O DYE: CPT | Mod: 26 | Performed by: RADIOLOGY

## 2022-07-18 PROCEDURE — 999N000141 HC STATISTIC PRE-PROCEDURE NURSING ASSESSMENT: Performed by: NEUROLOGICAL SURGERY

## 2022-07-18 PROCEDURE — 272N000004 HC RX 272: Performed by: NEUROLOGICAL SURGERY

## 2022-07-18 PROCEDURE — 70250 X-RAY EXAM OF SKULL: CPT

## 2022-07-18 PROCEDURE — 61867 IMPLANT NEUROELECTRODE: CPT | Mod: LT | Performed by: NEUROLOGICAL SURGERY

## 2022-07-18 PROCEDURE — 710N000010 HC RECOVERY PHASE 1, LEVEL 2, PER MIN: Performed by: NEUROLOGICAL SURGERY

## 2022-07-18 PROCEDURE — 8E09XBF COMPUTER ASSISTED PROCEDURE OF HEAD AND NECK REGION, WITH FLUOROSCOPY: ICD-10-PCS | Performed by: NEUROLOGICAL SURGERY

## 2022-07-18 PROCEDURE — 00H03MZ INSERTION OF NEUROSTIMULATOR LEAD INTO BRAIN, PERCUTANEOUS APPROACH: ICD-10-PCS | Performed by: NEUROLOGICAL SURGERY

## 2022-07-18 PROCEDURE — 370N000017 HC ANESTHESIA TECHNICAL FEE, PER MIN: Performed by: NEUROLOGICAL SURGERY

## 2022-07-18 PROCEDURE — 70450 CT HEAD/BRAIN W/O DYE: CPT

## 2022-07-18 PROCEDURE — 999N000179 XR SURGERY CARM FLUORO LESS THAN 5 MIN W STILLS: Mod: TC

## 2022-07-18 PROCEDURE — 70250 X-RAY EXAM OF SKULL: CPT | Mod: 26 | Performed by: RADIOLOGY

## 2022-07-18 PROCEDURE — 258N000003 HC RX IP 258 OP 636: Performed by: STUDENT IN AN ORGANIZED HEALTH CARE EDUCATION/TRAINING PROGRAM

## 2022-07-18 PROCEDURE — 272N000001 HC OR GENERAL SUPPLY STERILE: Performed by: NEUROLOGICAL SURGERY

## 2022-07-18 PROCEDURE — 278N000051 HC OR IMPLANT GENERAL: Performed by: NEUROLOGICAL SURGERY

## 2022-07-18 PROCEDURE — 250N000009 HC RX 250: Performed by: NEUROLOGICAL SURGERY

## 2022-07-18 PROCEDURE — 120N000002 HC R&B MED SURG/OB UMMC

## 2022-07-18 PROCEDURE — 70450 CT HEAD/BRAIN W/O DYE: CPT | Mod: 77

## 2022-07-18 PROCEDURE — C1778 LEAD, NEUROSTIMULATOR: HCPCS | Performed by: NEUROLOGICAL SURGERY

## 2022-07-18 PROCEDURE — 250N000009 HC RX 250: Performed by: STUDENT IN AN ORGANIZED HEALTH CARE EDUCATION/TRAINING PROGRAM

## 2022-07-18 PROCEDURE — 250N000011 HC RX IP 250 OP 636: Performed by: NEUROLOGICAL SURGERY

## 2022-07-18 DEVICE — IMP BUR HOLE COVER GUARDIAN 6010ANS: Type: IMPLANTABLE DEVICE | Site: CRANIAL | Status: FUNCTIONAL

## 2022-07-18 RX ORDER — SODIUM CHLORIDE 9 MG/ML
INJECTION, SOLUTION INTRAVENOUS CONTINUOUS
Status: ACTIVE | OUTPATIENT
Start: 2022-07-18 | End: 2022-07-19

## 2022-07-18 RX ORDER — HYDROMORPHONE HYDROCHLORIDE 1 MG/ML
0.2 INJECTION, SOLUTION INTRAMUSCULAR; INTRAVENOUS; SUBCUTANEOUS EVERY 5 MIN PRN
Status: DISCONTINUED | OUTPATIENT
Start: 2022-07-18 | End: 2022-07-18 | Stop reason: HOSPADM

## 2022-07-18 RX ORDER — CARBIDOPA AND LEVODOPA 25; 100 MG/1; MG/1
1 TABLET, ORALLY DISINTEGRATING ORAL 2 TIMES DAILY PRN
Status: DISCONTINUED | OUTPATIENT
Start: 2022-07-18 | End: 2022-07-19 | Stop reason: HOSPADM

## 2022-07-18 RX ORDER — LIDOCAINE HYDROCHLORIDE 20 MG/ML
INJECTION, SOLUTION INFILTRATION; PERINEURAL PRN
Status: DISCONTINUED | OUTPATIENT
Start: 2022-07-18 | End: 2022-07-18

## 2022-07-18 RX ORDER — HYDRALAZINE HYDROCHLORIDE 20 MG/ML
2.5-5 INJECTION INTRAMUSCULAR; INTRAVENOUS EVERY 10 MIN PRN
Status: DISCONTINUED | OUTPATIENT
Start: 2022-07-18 | End: 2022-07-18 | Stop reason: HOSPADM

## 2022-07-18 RX ORDER — HYDRALAZINE HYDROCHLORIDE 20 MG/ML
10-20 INJECTION INTRAMUSCULAR; INTRAVENOUS EVERY 30 MIN PRN
Status: DISCONTINUED | OUTPATIENT
Start: 2022-07-18 | End: 2022-07-19 | Stop reason: HOSPADM

## 2022-07-18 RX ORDER — LABETALOL HYDROCHLORIDE 5 MG/ML
10-40 INJECTION, SOLUTION INTRAVENOUS EVERY 10 MIN PRN
Status: DISCONTINUED | OUTPATIENT
Start: 2022-07-18 | End: 2022-07-19 | Stop reason: HOSPADM

## 2022-07-18 RX ORDER — LORAZEPAM 0.5 MG/1
0.5 TABLET ORAL 2 TIMES DAILY PRN
Status: DISCONTINUED | OUTPATIENT
Start: 2022-07-18 | End: 2022-07-19 | Stop reason: HOSPADM

## 2022-07-18 RX ORDER — FENTANYL CITRATE 50 UG/ML
25 INJECTION, SOLUTION INTRAMUSCULAR; INTRAVENOUS EVERY 5 MIN PRN
Status: DISCONTINUED | OUTPATIENT
Start: 2022-07-18 | End: 2022-07-18 | Stop reason: HOSPADM

## 2022-07-18 RX ORDER — METOPROLOL TARTRATE 1 MG/ML
1-2 INJECTION, SOLUTION INTRAVENOUS EVERY 5 MIN PRN
Status: DISCONTINUED | OUTPATIENT
Start: 2022-07-18 | End: 2022-07-18 | Stop reason: HOSPADM

## 2022-07-18 RX ORDER — SODIUM CHLORIDE, SODIUM GLUCONATE, SODIUM ACETATE, POTASSIUM CHLORIDE AND MAGNESIUM CHLORIDE 526; 502; 368; 37; 30 MG/100ML; MG/100ML; MG/100ML; MG/100ML; MG/100ML
INJECTION, SOLUTION INTRAVENOUS CONTINUOUS PRN
Status: DISCONTINUED | OUTPATIENT
Start: 2022-07-18 | End: 2022-07-18

## 2022-07-18 RX ORDER — NALOXONE HYDROCHLORIDE 0.4 MG/ML
0.4 INJECTION, SOLUTION INTRAMUSCULAR; INTRAVENOUS; SUBCUTANEOUS
Status: DISCONTINUED | OUTPATIENT
Start: 2022-07-18 | End: 2022-07-19 | Stop reason: HOSPADM

## 2022-07-18 RX ORDER — ONDANSETRON 4 MG/1
4 TABLET, FILM COATED ORAL EVERY 8 HOURS PRN
Status: DISCONTINUED | OUTPATIENT
Start: 2022-07-18 | End: 2022-07-18

## 2022-07-18 RX ORDER — ACETAMINOPHEN 325 MG/1
650 TABLET ORAL EVERY 4 HOURS PRN
Status: DISCONTINUED | OUTPATIENT
Start: 2022-07-18 | End: 2022-07-19 | Stop reason: HOSPADM

## 2022-07-18 RX ORDER — LIDOCAINE HYDROCHLORIDE AND EPINEPHRINE 10; 10 MG/ML; UG/ML
INJECTION, SOLUTION INFILTRATION; PERINEURAL PRN
Status: DISCONTINUED | OUTPATIENT
Start: 2022-07-18 | End: 2022-07-18 | Stop reason: HOSPADM

## 2022-07-18 RX ORDER — OXYCODONE HYDROCHLORIDE 5 MG/1
5 TABLET ORAL
Status: DISCONTINUED | OUTPATIENT
Start: 2022-07-18 | End: 2022-07-19 | Stop reason: HOSPADM

## 2022-07-18 RX ORDER — CEFAZOLIN SODIUM 2 G/100ML
2 INJECTION, SOLUTION INTRAVENOUS EVERY 8 HOURS
Status: COMPLETED | OUTPATIENT
Start: 2022-07-18 | End: 2022-07-19

## 2022-07-18 RX ORDER — SODIUM CHLORIDE, SODIUM LACTATE, POTASSIUM CHLORIDE, CALCIUM CHLORIDE 600; 310; 30; 20 MG/100ML; MG/100ML; MG/100ML; MG/100ML
INJECTION, SOLUTION INTRAVENOUS CONTINUOUS
Status: DISCONTINUED | OUTPATIENT
Start: 2022-07-18 | End: 2022-07-18 | Stop reason: HOSPADM

## 2022-07-18 RX ORDER — PROPOFOL 10 MG/ML
INJECTION, EMULSION INTRAVENOUS PRN
Status: DISCONTINUED | OUTPATIENT
Start: 2022-07-18 | End: 2022-07-18

## 2022-07-18 RX ORDER — ACETAMINOPHEN 325 MG/1
975 TABLET ORAL ONCE
Status: COMPLETED | OUTPATIENT
Start: 2022-07-18 | End: 2022-07-18

## 2022-07-18 RX ORDER — OXYCODONE HYDROCHLORIDE 5 MG/1
5 TABLET ORAL EVERY 4 HOURS PRN
Status: DISCONTINUED | OUTPATIENT
Start: 2022-07-18 | End: 2022-07-18 | Stop reason: HOSPADM

## 2022-07-18 RX ORDER — AMANTADINE HYDROCHLORIDE 100 MG/1
100 CAPSULE, GELATIN COATED ORAL 2 TIMES DAILY
Status: DISCONTINUED | OUTPATIENT
Start: 2022-07-19 | End: 2022-07-19 | Stop reason: HOSPADM

## 2022-07-18 RX ORDER — ONDANSETRON 4 MG/1
4 TABLET, ORALLY DISINTEGRATING ORAL EVERY 6 HOURS PRN
Status: DISCONTINUED | OUTPATIENT
Start: 2022-07-18 | End: 2022-07-19 | Stop reason: HOSPADM

## 2022-07-18 RX ORDER — PROPOFOL 10 MG/ML
INJECTION, EMULSION INTRAVENOUS CONTINUOUS PRN
Status: DISCONTINUED | OUTPATIENT
Start: 2022-07-18 | End: 2022-07-18

## 2022-07-18 RX ORDER — NALOXONE HYDROCHLORIDE 0.4 MG/ML
0.2 INJECTION, SOLUTION INTRAMUSCULAR; INTRAVENOUS; SUBCUTANEOUS
Status: DISCONTINUED | OUTPATIENT
Start: 2022-07-18 | End: 2022-07-19 | Stop reason: HOSPADM

## 2022-07-18 RX ORDER — ONDANSETRON 4 MG/1
4 TABLET, ORALLY DISINTEGRATING ORAL EVERY 30 MIN PRN
Status: DISCONTINUED | OUTPATIENT
Start: 2022-07-18 | End: 2022-07-18 | Stop reason: HOSPADM

## 2022-07-18 RX ORDER — LIDOCAINE 40 MG/G
CREAM TOPICAL
Status: DISCONTINUED | OUTPATIENT
Start: 2022-07-18 | End: 2022-07-19 | Stop reason: HOSPADM

## 2022-07-18 RX ORDER — ONDANSETRON 2 MG/ML
4 INJECTION INTRAMUSCULAR; INTRAVENOUS EVERY 6 HOURS PRN
Status: DISCONTINUED | OUTPATIENT
Start: 2022-07-18 | End: 2022-07-19 | Stop reason: HOSPADM

## 2022-07-18 RX ORDER — LIDOCAINE HYDROCHLORIDE 20 MG/ML
JELLY TOPICAL PRN
Status: DISCONTINUED | OUTPATIENT
Start: 2022-07-18 | End: 2022-07-18 | Stop reason: HOSPADM

## 2022-07-18 RX ORDER — ALBUTEROL SULFATE 0.83 MG/ML
2.5 SOLUTION RESPIRATORY (INHALATION) EVERY 4 HOURS PRN
Status: DISCONTINUED | OUTPATIENT
Start: 2022-07-18 | End: 2022-07-18 | Stop reason: HOSPADM

## 2022-07-18 RX ORDER — ONDANSETRON 2 MG/ML
4 INJECTION INTRAMUSCULAR; INTRAVENOUS EVERY 30 MIN PRN
Status: DISCONTINUED | OUTPATIENT
Start: 2022-07-18 | End: 2022-07-18 | Stop reason: HOSPADM

## 2022-07-18 RX ORDER — FAMOTIDINE 20 MG/1
20 TABLET, FILM COATED ORAL 2 TIMES DAILY
Status: DISCONTINUED | OUTPATIENT
Start: 2022-07-19 | End: 2022-07-19 | Stop reason: HOSPADM

## 2022-07-18 RX ADMIN — FENTANYL CITRATE 25 MCG: 50 INJECTION, SOLUTION INTRAMUSCULAR; INTRAVENOUS at 15:30

## 2022-07-18 RX ADMIN — PROPOFOL 50 MG: 10 INJECTION, EMULSION INTRAVENOUS at 13:37

## 2022-07-18 RX ADMIN — PROPOFOL 30 MG: 10 INJECTION, EMULSION INTRAVENOUS at 13:34

## 2022-07-18 RX ADMIN — FENTANYL CITRATE 25 MCG: 50 INJECTION, SOLUTION INTRAMUSCULAR; INTRAVENOUS at 15:25

## 2022-07-18 RX ADMIN — CEFAZOLIN SODIUM 2 G: 2 INJECTION, SOLUTION INTRAVENOUS at 19:12

## 2022-07-18 RX ADMIN — PROPOFOL 30 MG: 10 INJECTION, EMULSION INTRAVENOUS at 14:21

## 2022-07-18 RX ADMIN — LEVODOPA AND CARBIDOPA 2 CAPSULE: 195; 48.75 CAPSULE, EXTENDED RELEASE ORAL at 22:03

## 2022-07-18 RX ADMIN — PROPOFOL 50 MG: 10 INJECTION, EMULSION INTRAVENOUS at 08:05

## 2022-07-18 RX ADMIN — PROPOFOL 30 MG: 10 INJECTION, EMULSION INTRAVENOUS at 14:04

## 2022-07-18 RX ADMIN — PROPOFOL 50 MG: 10 INJECTION, EMULSION INTRAVENOUS at 10:11

## 2022-07-18 RX ADMIN — FENTANYL CITRATE 25 MCG: 50 INJECTION, SOLUTION INTRAMUSCULAR; INTRAVENOUS at 15:35

## 2022-07-18 RX ADMIN — ACETAMINOPHEN 650 MG: 325 TABLET, FILM COATED ORAL at 23:53

## 2022-07-18 RX ADMIN — Medication 20 MCG: at 13:03

## 2022-07-18 RX ADMIN — REMIFENTANIL HYDROCHLORIDE 0.06 MCG/KG/MIN: 1 INJECTION, POWDER, LYOPHILIZED, FOR SOLUTION INTRAVENOUS at 08:52

## 2022-07-18 RX ADMIN — PROPOFOL 50 MG: 10 INJECTION, EMULSION INTRAVENOUS at 08:30

## 2022-07-18 RX ADMIN — Medication 20 MCG: at 14:06

## 2022-07-18 RX ADMIN — ACETAMINOPHEN 650 MG: 325 TABLET, FILM COATED ORAL at 17:54

## 2022-07-18 RX ADMIN — PROPOFOL 80 MG: 10 INJECTION, EMULSION INTRAVENOUS at 08:27

## 2022-07-18 RX ADMIN — PHENYLEPHRINE HYDROCHLORIDE 100 MCG: 10 INJECTION INTRAVENOUS at 09:16

## 2022-07-18 RX ADMIN — PROPOFOL 30 MG: 10 INJECTION, EMULSION INTRAVENOUS at 14:16

## 2022-07-18 RX ADMIN — PROPOFOL 40 MCG/KG/MIN: 10 INJECTION, EMULSION INTRAVENOUS at 08:19

## 2022-07-18 RX ADMIN — OXYCODONE HYDROCHLORIDE 5 MG: 5 TABLET ORAL at 17:54

## 2022-07-18 RX ADMIN — PROPOFOL 60 MG: 10 INJECTION, EMULSION INTRAVENOUS at 14:00

## 2022-07-18 RX ADMIN — LORAZEPAM 0.5 MG: 0.5 TABLET ORAL at 23:17

## 2022-07-18 RX ADMIN — PROPOFOL 50 MG: 10 INJECTION, EMULSION INTRAVENOUS at 08:04

## 2022-07-18 RX ADMIN — FENTANYL CITRATE 25 MCG: 50 INJECTION, SOLUTION INTRAMUSCULAR; INTRAVENOUS at 15:20

## 2022-07-18 RX ADMIN — SODIUM CHLORIDE: 9 INJECTION, SOLUTION INTRAVENOUS at 15:27

## 2022-07-18 RX ADMIN — LIDOCAINE HYDROCHLORIDE 100 MG: 20 INJECTION, SOLUTION INFILTRATION; PERINEURAL at 08:03

## 2022-07-18 RX ADMIN — PROPOFOL 70 MG: 10 INJECTION, EMULSION INTRAVENOUS at 13:15

## 2022-07-18 RX ADMIN — DEXMEDETOMIDINE HYDROCHLORIDE 0.7 MCG/KG/HR: 100 INJECTION, SOLUTION INTRAVENOUS at 08:00

## 2022-07-18 RX ADMIN — LEVODOPA AND CARBIDOPA 2 CAPSULE: 195; 48.75 CAPSULE, EXTENDED RELEASE ORAL at 19:12

## 2022-07-18 RX ADMIN — OXYCODONE HYDROCHLORIDE 5 MG: 5 TABLET ORAL at 23:53

## 2022-07-18 RX ADMIN — Medication 2 G: at 08:00

## 2022-07-18 RX ADMIN — PROPOFOL 50 MG: 10 INJECTION, EMULSION INTRAVENOUS at 08:07

## 2022-07-18 RX ADMIN — PROPOFOL 30 MG: 10 INJECTION, EMULSION INTRAVENOUS at 14:24

## 2022-07-18 RX ADMIN — OXYCODONE HYDROCHLORIDE 5 MG: 5 TABLET ORAL at 21:07

## 2022-07-18 RX ADMIN — SODIUM CHLORIDE, SODIUM GLUCONATE, SODIUM ACETATE, POTASSIUM CHLORIDE AND MAGNESIUM CHLORIDE: 526; 502; 368; 37; 30 INJECTION, SOLUTION INTRAVENOUS at 08:00

## 2022-07-18 RX ADMIN — PHENYLEPHRINE HYDROCHLORIDE 100 MCG: 10 INJECTION INTRAVENOUS at 09:08

## 2022-07-18 RX ADMIN — ACETAMINOPHEN 975 MG: 325 TABLET, FILM COATED ORAL at 16:06

## 2022-07-18 RX ADMIN — OXYCODONE HYDROCHLORIDE 5 MG: 5 TABLET ORAL at 16:07

## 2022-07-18 RX ADMIN — Medication 20 MCG: at 08:00

## 2022-07-18 RX ADMIN — Medication 2 G: at 11:49

## 2022-07-18 ASSESSMENT — ACTIVITIES OF DAILY LIVING (ADL)
ADLS_ACUITY_SCORE: 19
ADLS_ACUITY_SCORE: 18
ADLS_ACUITY_SCORE: 19
ADLS_ACUITY_SCORE: 18

## 2022-07-18 ASSESSMENT — VISUAL ACUITY
OU: NORMAL ACUITY
OU: NORMAL ACUITY

## 2022-07-18 NOTE — OR NURSING
Patient has his medications with him. He said he was told by neurosurgery to bring them with him for the procedure

## 2022-07-18 NOTE — BRIEF OP NOTE
[facility]    Brief Operative Note    Pre-operative diagnosis: Parkinson disease (H) [G20]  Post-operative diagnosis Same as pre-operative diagnosis    Procedure: Procedure(s):  Left side deep brain stimulator placement, phase I, placement of left side deep brain stimulator electrode, target left globus pallidus internus, with microelectrode recording  Surgeon: Surgeon(s) and Role:     * Angel Morales MD - Primary     * Ketan Velez MD - Resident - Assisting  Anesthesia: MAC with Local   Estimated Blood Loss: 5 ml    Drains: None  Specimens: * No specimens in log *  Findings:   All impedances normal on interrogation.  Complications: None.  Implants:   Implant Name Type Inv. Item Serial No.  Lot No. LRB No. Used Action   IMP BUR HOLE COVER GUARDIAN 6010ANS - [id number] Metallic Hardware/[employer] IMP BUR HOLE COVER GUARDIAN 6010ANS  [employer] [id number] Left 1 Implanted   KIT DBS LEAD DBS 8CH 40CM 1-3,3-1 SPACE 0.5 BLACK 6172ANS - [id number] Leads KIT DBS LEAD DBS 8CH 40CM 1-3,3-1 SPACE 0.5 BLACK [id number] [employer]  Left 1 Wasted   KIT DBS LEAD DBS 8CH 40CM 1-3,3-1 SPACE 0.5 BLACK 6172ANS - [id number] Leads KIT DBS LEAD DBS 8CH 40CM 1-3,3-1 SPACE 0.5 BLACK [id number] [employer]  Left 1 Implanted     Skin closed with monocryl 4-0

## 2022-07-18 NOTE — OR NURSING
Dr. Velez at the bedside with the pt. Aware that the HR is in low 40s. ANTON MAY also at bedside and aware. No new orders at this time.

## 2022-07-18 NOTE — ANESTHESIA POSTPROCEDURE EVALUATION
Patient: Dipesh Nicole    Procedure: Procedure(s):  Left side deep brain stimulator placement, phase I, placement of left side deep brain stimulator electrode, target left globus pallidus internus, with microelectrode recording       Anesthesia Type:  MAC    Note:  Disposition: Inpatient   Postop Pain Control: Uneventful            Sign Out: Well controlled pain   PONV: No   Neuro/Psych: Uneventful            Sign Out: Acceptable/Baseline neuro status   Airway/Respiratory: Uneventful            Sign Out: Acceptable/Baseline resp. status   CV/Hemodynamics: Uneventful            Sign Out: Acceptable CV status; No obvious hypovolemia; No obvious fluid overload   Other NRE: NONE   DID A NON-ROUTINE EVENT OCCUR?            Last vitals:  Vitals Value Taken Time   BP 57/39 07/18/22 1545   Temp 36.9  C (98.4  F) 07/18/22 1440   Pulse 45 07/18/22 1552   Resp 6 07/18/22 1552   SpO2 97 % 07/18/22 1552   Vitals shown include unvalidated device data.    Electronically Signed By: Lane Perry MD  July 18, 2022  3:53 PM

## 2022-07-18 NOTE — ANESTHESIA CARE TRANSFER NOTE
Patient: Dipesh Nicole    Procedure: Procedure(s):  Left side deep brain stimulator placement, phase I, placement of left side deep brain stimulator electrode, target left globus pallidus internus, with microelectrode recording       Diagnosis: Parkinson disease (H) [G20]  Diagnosis Additional Information: No value filed.    Anesthesia Type:   MAC     Note:    Oropharynx: oropharynx clear of all foreign objects and spontaneously breathing  Level of Consciousness: drowsy  Oxygen Supplementation: room air    Independent Airway: airway patency satisfactory and stable  Dentition: dentition unchanged  Vital Signs Stable: post-procedure vital signs reviewed and stable  Report to RN Given: handoff report given  Patient transferred to: PACU    Handoff Report: Identifed the Patient, Identified the Reponsible Provider, Reviewed the pertinent medical history, Discussed the surgical course, Reviewed Intra-OP anesthesia mangement and issues during anesthesia, Set expectations for post-procedure period and Allowed opportunity for questions and acknowledgement of understanding      Vitals:  Vitals Value Taken Time   BP     Temp     Pulse     Resp     SpO2         Electronically Signed By: Isabel Graham MD  July 18, 2022  2:33 PM

## 2022-07-18 NOTE — OP NOTE
"PATIENT NAME:       Dipesh MILNER \"María Nicole  YOB: 1985  MRN:                         [id number]     DATE OF PROCEDURE: 07/18/2022     PREOPERATIVE DIAGNOSIS:    Parkinson disease (H) [G20]     POSTOPERATIVE DIAGNOSIS:    Parkinson disease (H) [G20]     PROCEDURES PERFORMED:   1.  Placement of stereotactic frame and neuronavigation planning.  2.  Stealth-assisted Left side deep brain stimulator placement.  3.  Placement of Left side deep brain stimulator electrode, target Left Globus Pallidus Internus with microelectrode recording.  4.  Intraoperative fluoroscopy.  5.  Electrical interrogation and analysis of deep brain stimulator system.     ATTENDING SURGEON: Angel Morales MD     RESIDENT SURGEON: Ketan Velez MD     ANESTHESIA:  Monitored anesthesia care and local anesthetic.      ESTIMATED BLOOD LOSS:  5cc     COMPLICATIONS:  None     FINDINGS:  All impedances normal at the end of the procedure. Anterior Cecil Gun position with -0.2 mm below the previously set target; microelectrode rotated 30 degrees counterclockwise for final placement of electrode      IMPLANTS:  Implant Name Type Inv. Item Serial No.  Lot No. LRB No. Used Action   Lev Hole device kit         [id number] Left 1 Implanted   SenSight Directional Lead kit     [id number]     Left 1 Implanted   Sensight lead cap kit         [id number] Left 1 Implanted      INDICATIONS FOR PROCEDURE:  Annalise Nicole is a 34 y/o gentleman with PD who presents for neurosurgical consultation regarding DBS. He was diagnosed in 2018 with onset of left arm bradykinesia, rigidity and subtle action (not resting) tremor for about a year or two prior to his presentation. He was sent for genetic testing soon after his diagnosis, but his insurance denied testing at that time. Since then, he was seen and enrolled into the PD GENEration study in Jan 2020, but then apparently did not follow up for the testing results, but recently re-enrolled in the " study in September. He considered DBS as he was wearing off 2-2.5 hrs after taking his medication and feels he is developing worsening dyskinesias and leg dystonia. 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 He is otherwise healthy but has had multiple bouts of vomiting leading to dehydration, most typically occurs shortly after taking his levodopa recently. Not on antiplatelets or anticoagulation. Patient was offered DBS stimulation electrode implantation procedure for his symptoms. All the risks and benefits were discussed with the patient in detail. He consented to undergo the above-mentioned procedure and today, presents for the same.       DESCRIPTION OF PROCEDURE:       CRW head frame placement and surgical planning for stereotactic neuronavigation.     Informed consent was obtained.  In the pre-operative area, he was identified and a brief timeout was performed for the placement of the CRW head frame. The intended pin sites, two in the front and two in the back of the head, were cleaned and were injected with local anesthetic, 1% lidocaine with epinephrine.  A total of 20cc were used during this portion of the surgical case.  The CRW stereotactic head frame was placed onto the head with 4 pins for fixation.  Care was taken so that the fiducial box would fit over the head and the frame.  Once the head frame was on, the fiducial box was easily placed without interference from his forehead. After the CRW stereotactic head frame was placed, the patient was taken directly to the CT scanner, at which time CT scan of the head stereotactic protocol was obtained.  Subsequently, the patient was taken back up to the operating room where he was placed supine on the operative bed with the CRW head frame affixed to the bed as well. Patient was in a slight sitting up position with the neck in a neutral position and he was made comfortable.  The bed was positioned so  that it would be a reclining chair position. Of note, before the patient's head frame was affixed to the table, he was sedated and a vernon catheter was placed.      All pressure points were well padded.  Care was taken to make sure that the neck was in neutral position and that the Barth head shen device had room for manipulation in case more flexion or extension was needed throughout the case.       At this time, attention was turned to the neuronavigation imaging that was obtained.  The Stealth neuronavigation device was loaded with the CT head that was obtained immediately prior to the operation. The device also had an MRI of the head, stereotactic protocol, that was obtained prior to the surgery.  We used the Stealth MRI to assist with the localization and targeting of the left globus pallidus internus.  The CT head was merged with the previously obtained MRI of the brain.  The merge demonstrated good coherence/registration.  Then, using this merged image, neuronavigation was used to stereotactically target the left globus pallidus internus.  The technique used was AC-PC line localization technique to target left globus pallidus internus using the stereotactic coordinates, as well as direct identification of globus pallidus internus borders using T2 and T1 weighted and SWI, and the XYZ as well as a ring and arc angles for the CRW head frame were obtained for the left side.  We also planned so that the anterior Cecil Gun would be the target trajectory, and lateral and posterior Cecil Gun position would be used for additional mapping data.  Final plan was formulated.  The entry into the skull, as well as the trajectory of the electrode were checked with the probe's eye view to avoid any sulci, ventricle or vascular structures.     The stereotactic coordinates for the left side was then transferred to the [facility] stereotactic targeting apparatus as well as the phantom base.  The coordinates were confirmed and double  checked for accuracy.  Accuracy was also confirmed using the CRW's phantom base.     At this time, attention was turned back to the patient.  Using a hair clipper, hair over the left scalp area was clipped.  A semicircular C-shaped incision was planned on the left side and this was marked.  Then, the surgical area was prepped widely and draped in routine surgical fashion.     Timeout was then performed confirming the patient's identity, procedure to be performed, side and site of surgery identified, imaging corresponding with the patient and administration of preoperative prophylactic antibiotic.      Left-sided electrode placement with microelectrode recording: Target globus pallidus internus      The CRW targeting apparatus was attached to the head frame on top of the sterile drapes.  The entry point was marked on the scalp on the left side.  A C-shaped incision was made with a skin knife, after the area was injected with local anesthetic, 1% Lidocaine with epinephrine and 1/4% Marcaine plain, 50:50 mix.  The area posterior to the incision was also injected as a pocket would be created.  Area posterior lateral, towards the left parietal area was also injected as the electrode connector will be tunneled here later. Total of 15 mL of the above mentioned local anesthetic was used.  The incision was then further carried down to the pericranium.  Hemostasis was obtained using monopolar and bipolar cautery.  Thin layer of pericranial tissue was left behind on the scalp and the skin flap was reflected posteriorly.  Then, using a blunt dissection technique, a pocket was created posteriorly as well as a track that was made towards the left parietal area for later use.     At this time, the targeting apparatus again positioned and the entry point to the skull was marked.  Area of the intended jacqueline hole was cleaned and pericranial tissue removed.  Then using a 5-cutter drill, jacqueline hole was created over this entry point to  expose the dura.  The area was vigorously irrigated and bone wax used to control the bone bleeding.      Of note, at this point our anesthesia colleagues informed us that the patient's saturations were dropping. We stopped the procedure at our end until the patient was stabilized. Nasopharyngeal airway was established by our anesthesia colleagues, and the saturations improved. We ensured the jacqueline hole was thoroughly waxed and that the patient's end tidal CO2 had not dropped. The field was flooded with saline and we prepared to close immediately if necessary. He remained hemodynamically stable throughout and based on all available data, it appeared that his oxygen saturation had dropped due to sedation and airway obstruction. After the nasopharyngeal airway was established, oxygen saturation improved. After the patient was deemed stable by our anesthesia colleagues, we continued with the procedure as below.    Dura was coagulated with bipolar cautery for hemostasis, and again no active bleeding was noted. At this time, the electrode fixation cover was fixed to the skull using two screws.  Care was taken to overlap the pericranium over the edge of the cover to provide a smooth tissue transition.  Then, the electrode drive was attached to the targeting apparatus.  The entry into the dura was again checked.  It appeared that all positions of the Cecil Gun electrode shen were accessible without any interference from the bone edge.  Then using a #11 blade, opening was made into the dura, as well as a small corticectomy.  No active bleeding was noted.     Team from the Neurology Department participated in the recording and neurological testing.  During the microelectrode recording and testing, the neurology team took detailed notes of the electrophysiologic data and neurologic exam as well as any stimulation effects.     At this time, the electrode guide tubes were inserted in the anterior, lateral and posterior Cecil Gun  positions and they were advanced slowly until they were fully inserted at which point the tips would be well above the target.  No abnormal resistance was noted.  Duraseal was used to seal the entry site to provide a seal and to minimize cerebrospinal fluid leak and air entry.  Then, recording microelectrodes were brought in and placed within the guide tubes and they were connected for intraoperative recording.  The tips of the electrode were now 15 mm above target.  The patient was awakened.  Then, using a micro drive, the electrodes were slowly advanced towards the target, collecting microelectrode recording data for mapping the track.     Based on the mapping data, it was determined that the anterior Cecil Gun position with -0.2 mm below the previously set target with 30 degrees counterclockwise rotation of the microelectrode may be the best placement for the permanent electrode.  The electrode drive was then positioned to the desired position with the micro drive (after placing the central cannula and removing the anterior, lateral and posterior cannulas) and the electrodes pulled out of the guide tubes.  The permanent DBS electrode was brought into the field and placed in the anterior guide tube with the tip being placed at the depth of -0.2 mm below target.  The electrode demonstrated good impedance values.  The electrode was tested and stimulation showed high threshold for adverse side effects.  No further testing was done.  Based on the results, it was thought that the current location may be the best location of the permanent electrode.     Final frame coordinates:  X: -8.9mm  Y: +1.7mm  Z: -16.1mm     Depth Adjustment: -0.2mm below  Cecil-Gun track used: Anterior     Final angles:  Rin.8 degrees Ant  Arc: 12.9 degrees Left  AP: 58.7 degrees  MSP: 8.1 degrees     With the satisfactory testing of the electrode position and the stimulation parameters, the electrode was secured at this location.  The patient  was again made comfortable.  The guide cannulae were gently removed from the brain.  Duraseal was again used to seal any opening.  The area was generously irrigated.  Hemostasis was also obtained.  Fluoroscopy was brought into the field to test that the electrode did not move with each manipulation.  The electrode tip was seen to be near the target, as expected.  The electrode clamp (rubber shotted bayonet) was applied to hold the electrode. Then, the electrode stylet was removed. The electrode was then removed from the electrode shen. The cap cover was finally placed and secured in place.  Fluoroscopy confirmed that the tip of the electrode did not change in position with each manipulation.  The directional marker, on fluoroscopy, also demonstrated that the contact was facing anteriorly.     The connecting portion of the electrode was covered with a protective covering/dead-end connector, and this apparatus was inserted into the subgaleal space/pocket towards the left parietal area that was created at the beginning of the case.  The excess wire was also buried posteriorly in the previously created pocket.  Fluoroscopy confirmed that the tip did not move.  The wound and subgaleal pocket were then generously irrigated.     The galeal layer was reapproximated using 3-0 Vicryl sutures and the skin was reapproximated using 4-0 Monocryl suture in a running fashion.  The wound was cleaned and dried and prepped with ChoraPrep, and covered with primapore dressing.     Removal of the head frame and end of procedure     First, the stereotactic targeting apparatus was removed.  Then, the sterile drapes were removed.  Subsequently, the patient was taken out of the CRW head frame.  The four pin sites were clean and dry and no active bleeding was noted.  Antibiotic ointment was applied to the pin sites.     Patient was further awakened and taken to the recovery room in a stable condition.  At the end of the case, all counts  including needle, sponge and instrument counts were correct x 2.  There were no complications.     Dr. Morales, Neurosurgery attending, was present and scrubbed for the entire case and performed the critical portions of the case.     --  Ketan Velez M.D.  Neurosurgery Resident, PGY-2    Physician Attestation   I was present for the entire procedure between opening and closing.    Angel Morales MD  Date of Service (when I saw the patient): 7/18/22

## 2022-07-19 VITALS
OXYGEN SATURATION: 98 % | RESPIRATION RATE: 16 BRPM | TEMPERATURE: 98.7 F | DIASTOLIC BLOOD PRESSURE: 70 MMHG | WEIGHT: 162.7 LBS | HEART RATE: 65 BPM | BODY MASS INDEX: 21.56 KG/M2 | SYSTOLIC BLOOD PRESSURE: 133 MMHG | HEIGHT: 73 IN

## 2022-07-19 PROBLEM — Z96.89 S/P DEEP BRAIN STIMULATOR PLACEMENT: Status: ACTIVE | Noted: 2021-11-18

## 2022-07-19 LAB
ANION GAP SERPL CALCULATED.3IONS-SCNC: 11 MMOL/L (ref 7–15)
BUN SERPL-MCNC: 7.6 MG/DL (ref 6–20)
CALCIUM SERPL-MCNC: 9.3 MG/DL (ref 8.6–10)
CHLORIDE SERPL-SCNC: 102 MMOL/L (ref 98–107)
CREAT SERPL-MCNC: 0.82 MG/DL (ref 0.67–1.17)
DEPRECATED HCO3 PLAS-SCNC: 23 MMOL/L (ref 22–29)
ERYTHROCYTE [DISTWIDTH] IN BLOOD BY AUTOMATED COUNT: 12.7 % (ref 10–15)
GFR SERPL CREATININE-BSD FRML MDRD: >90 ML/MIN/1.73M2
GLUCOSE BLDC GLUCOMTR-MCNC: 130 MG/DL (ref 70–99)
GLUCOSE SERPL-MCNC: 121 MG/DL (ref 70–99)
HCT VFR BLD AUTO: 42 % (ref 40–53)
HGB BLD-MCNC: 14.2 G/DL (ref 13.3–17.7)
MAGNESIUM SERPL-MCNC: 2 MG/DL (ref 1.7–2.3)
MCH RBC QN AUTO: 30.3 PG (ref 26.5–33)
MCHC RBC AUTO-ENTMCNC: 33.8 G/DL (ref 31.5–36.5)
MCV RBC AUTO: 90 FL (ref 78–100)
PHOSPHATE SERPL-MCNC: 3.1 MG/DL (ref 2.5–4.5)
PLATELET # BLD AUTO: 214 10E3/UL (ref 150–450)
POTASSIUM SERPL-SCNC: 3.8 MMOL/L (ref 3.4–5.3)
RBC # BLD AUTO: 4.68 10E6/UL (ref 4.4–5.9)
SODIUM SERPL-SCNC: 136 MMOL/L (ref 136–145)
WBC # BLD AUTO: 11.4 10E3/UL (ref 4–11)

## 2022-07-19 PROCEDURE — 85027 COMPLETE CBC AUTOMATED: CPT | Performed by: STUDENT IN AN ORGANIZED HEALTH CARE EDUCATION/TRAINING PROGRAM

## 2022-07-19 PROCEDURE — 36415 COLL VENOUS BLD VENIPUNCTURE: CPT | Performed by: STUDENT IN AN ORGANIZED HEALTH CARE EDUCATION/TRAINING PROGRAM

## 2022-07-19 PROCEDURE — 80048 BASIC METABOLIC PNL TOTAL CA: CPT | Performed by: STUDENT IN AN ORGANIZED HEALTH CARE EDUCATION/TRAINING PROGRAM

## 2022-07-19 PROCEDURE — 83735 ASSAY OF MAGNESIUM: CPT | Performed by: STUDENT IN AN ORGANIZED HEALTH CARE EDUCATION/TRAINING PROGRAM

## 2022-07-19 PROCEDURE — 250N000013 HC RX MED GY IP 250 OP 250 PS 637: Performed by: STUDENT IN AN ORGANIZED HEALTH CARE EDUCATION/TRAINING PROGRAM

## 2022-07-19 PROCEDURE — 250N000011 HC RX IP 250 OP 636: Performed by: STUDENT IN AN ORGANIZED HEALTH CARE EDUCATION/TRAINING PROGRAM

## 2022-07-19 PROCEDURE — 84100 ASSAY OF PHOSPHORUS: CPT | Performed by: STUDENT IN AN ORGANIZED HEALTH CARE EDUCATION/TRAINING PROGRAM

## 2022-07-19 RX ORDER — SENNA AND DOCUSATE SODIUM 50; 8.6 MG/1; MG/1
1 TABLET, FILM COATED ORAL AT BEDTIME
Qty: 14 TABLET | Refills: 0 | Status: SHIPPED | OUTPATIENT
Start: 2022-07-19 | End: 2022-08-02

## 2022-07-19 RX ORDER — ONDANSETRON 4 MG/1
4 TABLET, ORALLY DISINTEGRATING ORAL EVERY 6 HOURS PRN
Qty: 10 TABLET | Refills: 0 | Status: SHIPPED | OUTPATIENT
Start: 2022-07-19 | End: 2022-07-25

## 2022-07-19 RX ORDER — OXYCODONE HYDROCHLORIDE 5 MG/1
5 TABLET ORAL
Qty: 20 TABLET | Refills: 0 | Status: ON HOLD | OUTPATIENT
Start: 2022-07-19 | End: 2022-07-25

## 2022-07-19 RX ADMIN — ACETAMINOPHEN 650 MG: 325 TABLET, FILM COATED ORAL at 08:54

## 2022-07-19 RX ADMIN — HYDRALAZINE HYDROCHLORIDE 10 MG: 20 INJECTION, SOLUTION INTRAMUSCULAR; INTRAVENOUS at 04:01

## 2022-07-19 RX ADMIN — ROTIGOTINE 1 PATCH: 3 PATCH, EXTENDED RELEASE TRANSDERMAL at 08:51

## 2022-07-19 RX ADMIN — FAMOTIDINE 20 MG: 20 TABLET ORAL at 08:54

## 2022-07-19 RX ADMIN — AMANTADINE 100 MG: 100 CAPSULE ORAL at 08:50

## 2022-07-19 RX ADMIN — CEFAZOLIN SODIUM 2 G: 2 INJECTION, SOLUTION INTRAVENOUS at 03:04

## 2022-07-19 RX ADMIN — LEVODOPA AND CARBIDOPA 2 CAPSULE: 195; 48.75 CAPSULE, EXTENDED RELEASE ORAL at 09:08

## 2022-07-19 RX ADMIN — LEVODOPA AND CARBIDOPA 2 CAPSULE: 195; 48.75 CAPSULE, EXTENDED RELEASE ORAL at 04:47

## 2022-07-19 RX ADMIN — CEFAZOLIN SODIUM 2 G: 2 INJECTION, SOLUTION INTRAVENOUS at 09:32

## 2022-07-19 RX ADMIN — ACETAMINOPHEN 650 MG: 325 TABLET, FILM COATED ORAL at 04:49

## 2022-07-19 RX ADMIN — ONDANSETRON 4 MG: 2 INJECTION INTRAMUSCULAR; INTRAVENOUS at 09:02

## 2022-07-19 RX ADMIN — ONDANSETRON 4 MG: 2 INJECTION INTRAMUSCULAR; INTRAVENOUS at 00:31

## 2022-07-19 ASSESSMENT — ACTIVITIES OF DAILY LIVING (ADL)
ADLS_ACUITY_SCORE: 19

## 2022-07-19 NOTE — PROVIDER NOTIFICATION
Paged @ [phone]:    [id number] EDELMIRA Nicole- pt just vomited. giving ativan now.  natali Ventura RN [id number]

## 2022-07-19 NOTE — PLAN OF CARE
Status: POD #0 L DBS placement. Hx of Parkinson's.  Vitals: VSS on RA  Neuros: A&O x4. 5/5 throughout. Baseline muscle jerking  IV: PIV SL  Labs/Electrolytes: WNL  Resp/trach: WNL  Diet: Regular diet. Tolerating PO  Bowel status: LBM 7/18  : Voiding spontaneously  Skin: Head incision and pin sites w/ primapore CDI  Pain: Headache managed with PRN Tylenol & Oxycodone  Activity: SBA  Social: Parents Ritu & Gene @ bedside.  Plan: Likely discharge tomorrow AM. 3 rounds IV abx before discharge    Arrived from: PACU @ 1700  Belongings/meds: Clothes, backpack, cell phone, shoes,   2 RN Skin Assessment Completed by: Writer and Hannah MARTÍNEZ  Non-intact findings documented (yes/no/NA): Yes

## 2022-07-19 NOTE — DISCHARGE SUMMARY
[facility] Discharge Summary and Instructions    Dipesh Nicole MRN# [id number]   Age: 36 year old YOB: 1985     Date of Admission:  7/18/2022  Date of Discharge::  7/19/2022  Admitting Physician:  Angel Morales MD  Discharge Physician:  Angel Morales MD          Admission Diagnoses:   Parkinson disease (H) [G20]          Discharge Diagnosis:     Parkinson disease (H) [G20]     Clinically Significant Risk Factors Present on Admission                       Procedures:   7/18/2022  1.  Placement of stereotactic frame and neuronavigation planning.  2.  Stealth-assisted Left side deep brain stimulator placement.  3.  Placement of Left side deep brain stimulator electrode, target Left Globus Pallidus Internus with microelectrode recording.  4.  Intraoperative fluoroscopy.  5.  Electrical interrogation and analysis of deep brain stimulator system.           Brief History of Illness:   Annalise Nicole is a 34 y/o gentleman with PD who presents for neurosurgical consultation regarding DBS. He was diagnosed in 2018 with onset of left arm bradykinesia, rigidity and subtle action (not resting) tremor for about a year or two prior to his presentation. He was sent for genetic testing soon after his diagnosis, but his insurance denied testing at that time. Since then, he was seen and enrolled into the PD GENEration study in Jan 2020, but then apparently did not follow up for the testing results, but recently re-enrolled in the study in September. He considered DBS as he was wearing off 2-2.5 hrs after taking his medication and feels he is developing worsening dyskinesias and leg dystonia. 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 He is otherwise healthy but has had multiple bouts of vomiting leading to dehydration, most typically occurs shortly after taking his levodopa recently. Not on antiplatelets or anticoagulation. Patient was offered DBS  stimulation electrode implantation procedure for his symptoms. All the risks and benefits were discussed with the patient in detail. He consented to undergo the above-mentioned procedure and today, presents for the same.            Hospital Course:   Following surgery the patient was monitored overnight where he remained medically stable.  Postoperative head CT and skull x-rays revealed proper positioning of intracranial leads without evidence of hemorrhage.  Following surgery the patient complained of mild incisional pain however was tolerating diet, voiding without a [place], and ambulating safely.   Home medications were restarted prior to discharge.  Patient was given 3 doses of IV antibiotic for wound prophylaxis and completed doses prior to discharge.  Patient was able to discharge home with family on postoperative day #1.  Patient will follow-up in 2 weeks for wound evaluation.  Discharge instructions were discussed with the patient who stated understanding.            Discharge Medications:     Current Discharge Medication List      START taking these medications    Details   oxyCODONE (ROXICODONE) 5 MG tablet Take 1 tablet (5 mg) by mouth every 3 hours as needed for moderate to severe pain  Qty: 20 tablet, Refills: 0    Associated Diagnoses: S/P deep brain stimulator placement      SENNA-docusate sodium (SENNA S) 8.6-50 MG tablet Take 1 tablet by mouth At Bedtime for 14 days  Qty: 14 tablet, Refills: 0    Associated Diagnoses: S/P deep brain stimulator placement         CONTINUE these medications which have NOT CHANGED    Details   amantadine (SYMMETREL) 100 MG capsule 100mg by mouth twice daily at 7am and 11am  Qty: 180 capsule, Refills: 3    Associated Diagnoses: Parkinson disease (H)      carbidopa-levodopa (PARCOPA)  MG ODT Take one-half to 1 tablet by mouth as needed for breakthrough Parkinson symptoms, up to 2 times/day  Qty: 60 tablet, Refills: 11    Associated Diagnoses: Parkinson disease (H)     "  carbidopa-levodopa (RYTARY) 48. MG CPCR per CR capsule Take 2 capsules by mouth at 5 am, 9 am, 5 pm, and bedtime and take 3 capsules at 1 pm = 11 capsules/day  Qty: 330 capsule, Refills: 11    Comments: Dose increase  Associated Diagnoses: Parkinson disease (H)      famotidine (PEPCID) 20 MG tablet 20mg tab by mouth twice daily at 7am and 11am  Qty: 180 tablet, Refills: 11    Associated Diagnoses: Other acute gastritis without hemorrhage      LORazepam (ATIVAN) 0.5 MG tablet Take 0.5 mg by mouth 2 times daily as needed      Multiple Vitamin (MULTIVITAMIN ADULT PO) every morning Vitamin by mouth daily      ondansetron (ZOFRAN) 4 MG tablet Take 4 mg by mouth every 8 hours as needed      rotigotine (NEUPRO) 3 MG/24HR 24 hr patch Place 1 patch onto the skin daily  Qty: 30 patch, Refills: 11    Comments: Dose change  Associated Diagnoses: Parkinson disease (H)            Exam:   Physical Exam  /70 (BP Location: Left arm)   Pulse 65   Temp 98.7  F (37.1  C) (Oral)   Resp 16   Ht 1.854 m (6' 1\")   Wt 73.8 kg (162 lb 11.2 oz)   SpO2 98%   BMI 21.47 kg/m    General: Appears comfortable, NAD  Wound: Incision, clean, dry, intact without strikethrough  Neurologic Exam:  - AOx3.  - Follows commands.  - Speech fluent, spontaneous. No aphasia or dysarthria.  - No gaze preference. No apparent hemineglect.  - PERRL, EOMI.  - Face symmetric with sensation intact to light touch.  - Palate elevates symmetrically, uvula midline, tongue protrudes midline.  - Trapezii and sternocleidomastoid muscles 5/5 bilaterally.  - No pronator drift.  Motor: Normal bulk/tone; no tremor, rigidity, or bradykinesia.   Right:  Deltoid 5/5, tricep 5/5, bicep 5/5, wrist flexor 5/5, wrist extensor 5/5, finger intrinsic 5/5  Left:  Deltoid 5/5, tricep 5/5, bicep 5/5, wrist flexor 5/5, wrist extensor 5/5, finger intrinsic 5/5  Right: Iliopsoas 5/5, quadricep 5/5, hamstring 5/5, tibialis anterior 5/5, gastrocnemius 5/5, EHL 5/5  Left:  " Iliopsoas 5/5, quadricep 5/5, hamstring 5/5, tibialis anterior 5/5, gastrocnemius 5/5, EHL 5/5    Sensation intact in bilateral L4-S1 dermatones     Upper Extremity:   Right:  bicep 2+/Tricep 2+/Brachioradialis 2+  Left:  bicep 2+/Tricep 2+/Brachioradialis 2+  Lower Extremity:   Right:   Patellar 2+/Achilles 2+  Left:   Patellar 2+/Achilles 2+               Discharge Instructions and Follow-Up:     Discharge diet: Regular   Discharge activity: You may advance activity as tolerated. No strenuous exercise or heay lifting greater than 10 lbs for 4 weeks or until seen and cleared in clinic.   Discharge follow-up:     Follow-up Dr. Angel Morales MD on 7/25/22, all additional follow-up visits to be determined by Dr. Angel Morales MD       Wound care: Ok to shower,however no scrubbing of the wound and no soaking of the wound, meaning no bathtubs or swimming pools. Pat dry only. Leave wound open to air.  Patient to have wound checked 2 weeks following surgery.    Wound location: cranial  Closure technique: monocryl  Dressing needs: none  Post-op care at follow-up: Keep dry and clean     Please call if you have:  1. increased pain, redness, drainage, swelling at your incision  2. fevers > 101.5 F degrees  3. with any questions or concerns.  You may reach the Neurosurgery clinic at [phone] during regular work hours. ER at [phone].    and ask for the Neurosurgery Resident on call at [phone], during off hours or weekends.         Discharge Disposition:     Discharged to home        LUCERO Samuels, CNP  Department of Neurosurgery  Pager: [phone]

## 2022-07-19 NOTE — PROVIDER NOTIFICATION
"\"NonUrgent - E.G. rm 215-1 6A NSG    Pt had 2nd episode of emesis and nausea. Gave IV Zofran, no new deficits noted other than pt reporting that he is not having R leg dystonia with these episodes. natali Hernandez [phone]\"        Paged neurosurgery on call resident at [phone]    "

## 2022-07-19 NOTE — PLAN OF CARE
Status: POD#0 for L side DBS placement phase I. PMH includes Parkinson's.   Vitals: VSS except mildly elevated BP, treated x1 with hydralazine  Neuros: A/Ox4, strengths 5/5 throughout. Baseline muscle twitching/jerking  IV: PIV SL x2  Resp/trach: RA  Diet: Regular, 2x episodes of emesis and nausea, mild control with currently ordered medications  Bowel status: LBM 7/18  : Voiding spontaneously  Skin: Head incisions covered and C/D/I  Pain: reporting headache, first tried oxycodone and tylenol, now just using tylenol (attempting to find cause for nausea/vomiting)  Activity: Independent in room   Plan: Potential discharge this AM    Updates this shift:   -No nausea this AM with Rytary dose - gave crackers and apple sauce, did not take oxycodone for pain. Pt going to continue trying this out at home to avoid emesis.

## 2022-07-20 ENCOUNTER — PATIENT OUTREACH (OUTPATIENT)
Dept: NEUROSURGERY | Facility: CLINIC | Age: 37
End: 2022-07-20

## 2022-07-20 ENCOUNTER — PATIENT OUTREACH (OUTPATIENT)
Dept: CARE COORDINATION | Facility: CLINIC | Age: 37
End: 2022-07-20

## 2022-07-20 DIAGNOSIS — Z71.89 OTHER SPECIFIED COUNSELING: ICD-10-CM

## 2022-07-20 NOTE — PROGRESS NOTES
[facility] Care [facility] [place]    Background: Care Coordination referral placed from [facility] discharge report for reason of patient meeting criteria for a TCM outreach call by [facility] team.    Assessment: Upon chart review, CCR[facility] Team member will cancel/close the referral for TCM outreach due to reason below:    Pt has a follow up phone call with RN - Neurological Surgery today for hospital discharge     Plan: Care Coordination referral for TCM outreach canceled.        GILDARDO Delgado  [facility]    *Connected Care Resource Team does NOT follow patient ongoing. Referrals are identified based on internal discharge reports and the outreach is to ensure patient has an understanding of their discharge instructions.

## 2022-07-20 NOTE — PROGRESS NOTES
Pt s/p placement of Left side deep brain stimulator electrode, target Left Globus Pallidus Internus with microelectrode recording on 7/18/22 by Dr. Morales. Left VM checking on pt's postoperative status and requested pt call back at his earliest convenience. Will follow up.

## 2022-07-21 ENCOUNTER — PATIENT OUTREACH (OUTPATIENT)
Dept: NEUROSURGERY | Facility: CLINIC | Age: 37
End: 2022-07-21

## 2022-07-21 NOTE — PROGRESS NOTES
[facility]: Post-Discharge Note  SITUATION                                                      Admission:    Admission Date: 07/18/22   Reason for Admission: Placement of Left side deep brain stimulator electrode, target Left Globus Pallidus Internus with microelectrode recording.  Discharge:   Discharge Date: 07/19/22  Discharge Diagnosis: Parkinson's disease    BACKGROUND                                                      Per hospital discharge summary and inpatient provider notes:  Neurosurgery Discharge Coordination Note     Attending physician: Dr. Morales  Discharge to: Home     Current status: Patient states he has been doing well since surgery. Incision site is not painful, but pt does have some tenderness where lead is coiled under the scalp. Taking Tylenol with adequate relief. Denies redness, swelling at incision site, increased tenderness, drainage, incision opening or elevated temp. Reports Incision CDI without signs of infection.  Denies new bowel or bladder issues.    Pt said he has some non-painful swelling of his forehead. We discussed that this can be from the head frame and will resolve over time. Pt can use ice pack as needed.     Pt reviewed his lab results while inpatient and had a question about why his blood glucose was elevated (121 on 7/19 vs 102 on 7/15). Elevated BG may be a result of stress from surgery. He also had a question about slightly elevated WBC on 7/19 (11.4) The reference range is 4.0-11.0, so this is a normal surgical response. Pt expressed understanding of the above.     Discharge instructions and medications reviewed with patient.  Follow up appointments/imaging/tests needed: DBS battery placement on 7/25 at 7:30 a.m. Arrive on [address] at 5:30 a.m.    RN triage/on call number given: [phone]/ [phone]        ASSESSMENT           Discharge Assessment  How are your symptoms? (Red Flag symptoms escalate to triage hotline per guidelines): Improved  Do you  feel your condition is stable enough to be safe at home until your provider visit?: Yes  Does the patient have their discharge instructions? : Yes  Were you started on any new medications or were there changes to any of your previous medications? : Yes  Does the patient have all of their medications?: Yes  Do you have questions regarding any of your medications? : No  Discharge follow-up appointment scheduled within 14 calendar days? : Yes  Discharge Follow Up Appointment Date:  (DBS generator placement 7/25/22)         Post-op (Clinicians Only)  Did the patient have surgery or a procedure: Yes  Incision: closed;healing  Drainage: No  Bleeding: none  Fever: No  Chills: No  Redness: No  Warmth: No  Swelling: No  Incision site pain: Yes  Closure: suture;dissolving  Eating & Drinking: eating and drinking without complaints/concerns  PO Intake: regular diet  Bowel Function: normal  Urinary Status: voiding without complaint/concerns        PLAN                                                      Outpatient Plan:  Routine postop follow-up      Future Appointments   Date Time Provider Department Center   7/29/2022 11:00 AM Ada Mon, PhD [facility]   8/15/2022  7:00 AM [address]   8/26/2022  7:00 AM Jun Murrell MD [facility]   9/2/2022 11:00 AM Monae Wong APRN [facility]   9/23/2022  9:20 AM [facility]   9/23/2022 10:00 AM Jun Murrell MD [facility]         For any urgent concerns, please contact our 24 hour nurse triage line: [phone] ([phone])         BUCK HEARN RN

## 2022-07-22 RX ORDER — CEFAZOLIN SODIUM/WATER 2 G/20 ML
2 SYRINGE (ML) INTRAVENOUS
Status: CANCELLED | OUTPATIENT
Start: 2022-07-22

## 2022-07-22 RX ORDER — CEFAZOLIN SODIUM/WATER 2 G/20 ML
2 SYRINGE (ML) INTRAVENOUS SEE ADMIN INSTRUCTIONS
Status: CANCELLED | OUTPATIENT
Start: 2022-07-22

## 2022-07-24 ENCOUNTER — ANESTHESIA EVENT (OUTPATIENT)
Dept: SURGERY | Facility: CLINIC | Age: 37
End: 2022-07-24
Payer: COMMERCIAL

## 2022-07-24 ASSESSMENT — ENCOUNTER SYMPTOMS: SEIZURES: 1

## 2022-07-25 ENCOUNTER — ANESTHESIA (OUTPATIENT)
Dept: SURGERY | Facility: CLINIC | Age: 37
End: 2022-07-25
Payer: COMMERCIAL

## 2022-07-25 ENCOUNTER — HOSPITAL ENCOUNTER (OUTPATIENT)
Facility: CLINIC | Age: 37
Discharge: HOME | End: 2022-07-25
Attending: NEUROLOGICAL SURGERY | Admitting: NEUROLOGICAL SURGERY
Payer: COMMERCIAL

## 2022-07-25 VITALS
HEIGHT: 73 IN | HEART RATE: 68 BPM | BODY MASS INDEX: 21.59 KG/M2 | TEMPERATURE: 97.8 F | WEIGHT: 162.92 LBS | SYSTOLIC BLOOD PRESSURE: 152 MMHG | OXYGEN SATURATION: 100 % | RESPIRATION RATE: 20 BRPM | DIASTOLIC BLOOD PRESSURE: 88 MMHG

## 2022-07-25 DIAGNOSIS — Z96.89 S/P DEEP BRAIN STIMULATOR PLACEMENT: Primary | ICD-10-CM

## 2022-07-25 DIAGNOSIS — Z96.89 S/P DEEP BRAIN STIMULATOR PLACEMENT: ICD-10-CM

## 2022-07-25 DIAGNOSIS — R11.0 NAUSEA: Primary | ICD-10-CM

## 2022-07-25 LAB — GLUCOSE BLDC GLUCOMTR-MCNC: 109 MG/DL (ref 70–99)

## 2022-07-25 PROCEDURE — 272N000001 HC OR GENERAL SUPPLY STERILE: Performed by: NEUROLOGICAL SURGERY

## 2022-07-25 PROCEDURE — 250N000013 HC RX MED GY IP 250 OP 250 PS 637: Performed by: ANESTHESIOLOGY

## 2022-07-25 PROCEDURE — C1883 ADAPT/EXT, PACING/NEURO LEAD: HCPCS | Performed by: NEUROLOGICAL SURGERY

## 2022-07-25 PROCEDURE — 250N000025 HC SEVOFLURANE, PER MIN: Performed by: NEUROLOGICAL SURGERY

## 2022-07-25 PROCEDURE — 250N000009 HC RX 250: Performed by: STUDENT IN AN ORGANIZED HEALTH CARE EDUCATION/TRAINING PROGRAM

## 2022-07-25 PROCEDURE — 61886 IMPLANT NEUROSTIM ARRAYS: CPT | Mod: 58 | Performed by: NEUROLOGICAL SURGERY

## 2022-07-25 PROCEDURE — 360N000076 HC SURGERY LEVEL 3, PER MIN: Performed by: NEUROLOGICAL SURGERY

## 2022-07-25 PROCEDURE — 250N000011 HC RX IP 250 OP 636: Performed by: STUDENT IN AN ORGANIZED HEALTH CARE EDUCATION/TRAINING PROGRAM

## 2022-07-25 PROCEDURE — 82962 GLUCOSE BLOOD TEST: CPT

## 2022-07-25 PROCEDURE — 370N000017 HC ANESTHESIA TECHNICAL FEE, PER MIN: Performed by: NEUROLOGICAL SURGERY

## 2022-07-25 PROCEDURE — 999N000141 HC STATISTIC PRE-PROCEDURE NURSING ASSESSMENT: Performed by: NEUROLOGICAL SURGERY

## 2022-07-25 PROCEDURE — C1713 ANCHOR/SCREW BN/BN,TIS/BN: HCPCS | Performed by: NEUROLOGICAL SURGERY

## 2022-07-25 PROCEDURE — 710N000012 HC RECOVERY PHASE 2, PER MINUTE: Performed by: NEUROLOGICAL SURGERY

## 2022-07-25 PROCEDURE — C1767 GENERATOR, NEURO NON-RECHARG: HCPCS | Performed by: NEUROLOGICAL SURGERY

## 2022-07-25 PROCEDURE — 710N000010 HC RECOVERY PHASE 1, LEVEL 2, PER MIN: Performed by: NEUROLOGICAL SURGERY

## 2022-07-25 PROCEDURE — 272N000002 HC OR SUPPLY OTHER OPNP: Performed by: NEUROLOGICAL SURGERY

## 2022-07-25 DEVICE — IMP PLATE SYN STR DOG BONE LOW PROFILE 4H TI 421.504: Type: IMPLANTABLE DEVICE | Site: CRANIAL | Status: FUNCTIONAL

## 2022-07-25 DEVICE — LEAD EXTENSION W/EXTEND TECHNOLOGY 50CM 6371ANS: Type: IMPLANTABLE DEVICE | Site: CHEST | Status: FUNCTIONAL

## 2022-07-25 DEVICE — GENERATOR DBS SYSTEM INFINITY 7 IPG 6662ANS: Type: IMPLANTABLE DEVICE | Site: CHEST | Status: FUNCTIONAL

## 2022-07-25 DEVICE — IMP SCR SYN MATRIX LOW PRO 1.5X04MM SELF DRILL 04.503.104.01: Type: IMPLANTABLE DEVICE | Site: CRANIAL | Status: FUNCTIONAL

## 2022-07-25 RX ORDER — POLYETHYLENE GLYCOL 3350 17 G/17G
1 POWDER, FOR SOLUTION ORAL DAILY PRN
Qty: 25 PACKET | Refills: 0 | Status: ON HOLD | OUTPATIENT
Start: 2022-07-25 | End: 2022-08-15

## 2022-07-25 RX ORDER — DEXAMETHASONE SODIUM PHOSPHATE 4 MG/ML
INJECTION, SOLUTION INTRA-ARTICULAR; INTRALESIONAL; INTRAMUSCULAR; INTRAVENOUS; SOFT TISSUE PRN
Status: DISCONTINUED | OUTPATIENT
Start: 2022-07-25 | End: 2022-07-25

## 2022-07-25 RX ORDER — OXYCODONE HYDROCHLORIDE 5 MG/1
5 TABLET ORAL
Status: DISCONTINUED | OUTPATIENT
Start: 2022-07-25 | End: 2022-07-25 | Stop reason: HOSPADM

## 2022-07-25 RX ORDER — CEPHALEXIN 500 MG/1
500 CAPSULE ORAL 4 TIMES DAILY
Qty: 28 CAPSULE | Refills: 0 | Status: SHIPPED | OUTPATIENT
Start: 2022-07-25 | End: 2022-08-01

## 2022-07-25 RX ORDER — LIDOCAINE HYDROCHLORIDE 20 MG/ML
INJECTION, SOLUTION INFILTRATION; PERINEURAL PRN
Status: DISCONTINUED | OUTPATIENT
Start: 2022-07-25 | End: 2022-07-25

## 2022-07-25 RX ORDER — FENTANYL CITRATE 50 UG/ML
INJECTION, SOLUTION INTRAMUSCULAR; INTRAVENOUS PRN
Status: DISCONTINUED | OUTPATIENT
Start: 2022-07-25 | End: 2022-07-25

## 2022-07-25 RX ORDER — ONDANSETRON 2 MG/ML
4 INJECTION INTRAMUSCULAR; INTRAVENOUS EVERY 30 MIN PRN
Status: DISCONTINUED | OUTPATIENT
Start: 2022-07-25 | End: 2022-07-25 | Stop reason: HOSPADM

## 2022-07-25 RX ORDER — LIDOCAINE 40 MG/G
CREAM TOPICAL
Status: DISCONTINUED | OUTPATIENT
Start: 2022-07-25 | End: 2022-07-25 | Stop reason: HOSPADM

## 2022-07-25 RX ORDER — NALOXONE HYDROCHLORIDE 0.4 MG/ML
0.2 INJECTION, SOLUTION INTRAMUSCULAR; INTRAVENOUS; SUBCUTANEOUS
Status: DISCONTINUED | OUTPATIENT
Start: 2022-07-25 | End: 2022-07-25 | Stop reason: HOSPADM

## 2022-07-25 RX ORDER — SODIUM CHLORIDE, SODIUM LACTATE, POTASSIUM CHLORIDE, CALCIUM CHLORIDE 600; 310; 30; 20 MG/100ML; MG/100ML; MG/100ML; MG/100ML
INJECTION, SOLUTION INTRAVENOUS CONTINUOUS
Status: DISCONTINUED | OUTPATIENT
Start: 2022-07-25 | End: 2022-07-25 | Stop reason: HOSPADM

## 2022-07-25 RX ORDER — SODIUM CHLORIDE, SODIUM GLUCONATE, SODIUM ACETATE, POTASSIUM CHLORIDE AND MAGNESIUM CHLORIDE 526; 502; 368; 37; 30 MG/100ML; MG/100ML; MG/100ML; MG/100ML; MG/100ML
INJECTION, SOLUTION INTRAVENOUS CONTINUOUS PRN
Status: DISCONTINUED | OUTPATIENT
Start: 2022-07-25 | End: 2022-07-25

## 2022-07-25 RX ORDER — HYDRALAZINE HYDROCHLORIDE 20 MG/ML
2.5-5 INJECTION INTRAMUSCULAR; INTRAVENOUS EVERY 10 MIN PRN
Status: DISCONTINUED | OUTPATIENT
Start: 2022-07-25 | End: 2022-07-25 | Stop reason: HOSPADM

## 2022-07-25 RX ORDER — ONDANSETRON 4 MG/1
4 TABLET, ORALLY DISINTEGRATING ORAL EVERY 6 HOURS PRN
Qty: 10 TABLET | Refills: 0 | Status: SHIPPED | OUTPATIENT
Start: 2022-07-25 | End: 2023-09-08

## 2022-07-25 RX ORDER — ONDANSETRON 2 MG/ML
INJECTION INTRAMUSCULAR; INTRAVENOUS PRN
Status: DISCONTINUED | OUTPATIENT
Start: 2022-07-25 | End: 2022-07-25

## 2022-07-25 RX ORDER — ACETAMINOPHEN 325 MG/1
650 TABLET ORAL EVERY 6 HOURS PRN
Qty: 50 TABLET | Refills: 0 | Status: SHIPPED | OUTPATIENT
Start: 2022-07-25 | End: 2022-12-07

## 2022-07-25 RX ORDER — NALOXONE HYDROCHLORIDE 0.4 MG/ML
0.4 INJECTION, SOLUTION INTRAMUSCULAR; INTRAVENOUS; SUBCUTANEOUS
Status: DISCONTINUED | OUTPATIENT
Start: 2022-07-25 | End: 2022-07-25 | Stop reason: HOSPADM

## 2022-07-25 RX ORDER — ONDANSETRON 4 MG/1
4 TABLET, ORALLY DISINTEGRATING ORAL EVERY 30 MIN PRN
Status: DISCONTINUED | OUTPATIENT
Start: 2022-07-25 | End: 2022-07-25 | Stop reason: HOSPADM

## 2022-07-25 RX ORDER — MAGNESIUM SULFATE HEPTAHYDRATE 40 MG/ML
2 INJECTION, SOLUTION INTRAVENOUS ONCE
Status: DISCONTINUED | OUTPATIENT
Start: 2022-07-25 | End: 2022-07-25 | Stop reason: HOSPADM

## 2022-07-25 RX ORDER — OXYCODONE HYDROCHLORIDE 5 MG/1
5 TABLET ORAL EVERY 6 HOURS PRN
Qty: 12 TABLET | Refills: 0 | Status: SHIPPED | OUTPATIENT
Start: 2022-07-25 | End: 2022-07-28

## 2022-07-25 RX ORDER — CEFAZOLIN SODIUM/WATER 2 G/20 ML
SYRINGE (ML) INTRAVENOUS PRN
Status: DISCONTINUED | OUTPATIENT
Start: 2022-07-25 | End: 2022-07-25

## 2022-07-25 RX ORDER — HYDROMORPHONE HYDROCHLORIDE 1 MG/ML
0.2 INJECTION, SOLUTION INTRAMUSCULAR; INTRAVENOUS; SUBCUTANEOUS EVERY 5 MIN PRN
Status: DISCONTINUED | OUTPATIENT
Start: 2022-07-25 | End: 2022-07-25 | Stop reason: HOSPADM

## 2022-07-25 RX ORDER — ACETAMINOPHEN 325 MG/1
975 TABLET ORAL ONCE
Status: COMPLETED | OUTPATIENT
Start: 2022-07-25 | End: 2022-07-25

## 2022-07-25 RX ORDER — METOPROLOL TARTRATE 1 MG/ML
1-2 INJECTION, SOLUTION INTRAVENOUS EVERY 5 MIN PRN
Status: DISCONTINUED | OUTPATIENT
Start: 2022-07-25 | End: 2022-07-25 | Stop reason: HOSPADM

## 2022-07-25 RX ORDER — PROPOFOL 10 MG/ML
INJECTION, EMULSION INTRAVENOUS PRN
Status: DISCONTINUED | OUTPATIENT
Start: 2022-07-25 | End: 2022-07-25

## 2022-07-25 RX ORDER — SENNA AND DOCUSATE SODIUM 50; 8.6 MG/1; MG/1
1 TABLET, FILM COATED ORAL 2 TIMES DAILY
Qty: 50 TABLET | Refills: 0 | Status: ON HOLD | OUTPATIENT
Start: 2022-07-25 | End: 2022-08-15

## 2022-07-25 RX ADMIN — Medication 2 G: at 08:02

## 2022-07-25 RX ADMIN — Medication 8 MCG: at 10:06

## 2022-07-25 RX ADMIN — FENTANYL CITRATE 50 MCG: 50 INJECTION, SOLUTION INTRAMUSCULAR; INTRAVENOUS at 10:09

## 2022-07-25 RX ADMIN — Medication 8 MCG: at 09:28

## 2022-07-25 RX ADMIN — LIDOCAINE HYDROCHLORIDE 60 MG: 20 INJECTION, SOLUTION INFILTRATION; PERINEURAL at 07:44

## 2022-07-25 RX ADMIN — FENTANYL CITRATE 50 MCG: 50 INJECTION, SOLUTION INTRAMUSCULAR; INTRAVENOUS at 08:46

## 2022-07-25 RX ADMIN — DEXAMETHASONE SODIUM PHOSPHATE 4 MG: 4 INJECTION, SOLUTION INTRA-ARTICULAR; INTRALESIONAL; INTRAMUSCULAR; INTRAVENOUS; SOFT TISSUE at 07:50

## 2022-07-25 RX ADMIN — SUGAMMADEX 200 MG: 100 INJECTION, SOLUTION INTRAVENOUS at 10:13

## 2022-07-25 RX ADMIN — ROCURONIUM BROMIDE 20 MG: 50 INJECTION, SOLUTION INTRAVENOUS at 08:52

## 2022-07-25 RX ADMIN — MIDAZOLAM 2 MG: 1 INJECTION INTRAMUSCULAR; INTRAVENOUS at 07:38

## 2022-07-25 RX ADMIN — Medication 8 MCG: at 09:36

## 2022-07-25 RX ADMIN — ONDANSETRON 4 MG: 2 INJECTION INTRAMUSCULAR; INTRAVENOUS at 09:30

## 2022-07-25 RX ADMIN — ROCURONIUM BROMIDE 20 MG: 50 INJECTION, SOLUTION INTRAVENOUS at 07:50

## 2022-07-25 RX ADMIN — FENTANYL CITRATE 100 MCG: 50 INJECTION, SOLUTION INTRAMUSCULAR; INTRAVENOUS at 07:44

## 2022-07-25 RX ADMIN — FENTANYL CITRATE 50 MCG: 50 INJECTION, SOLUTION INTRAMUSCULAR; INTRAVENOUS at 08:29

## 2022-07-25 RX ADMIN — PROPOFOL 50 MG: 10 INJECTION, EMULSION INTRAVENOUS at 07:49

## 2022-07-25 RX ADMIN — ROCURONIUM BROMIDE 50 MG: 50 INJECTION, SOLUTION INTRAVENOUS at 07:47

## 2022-07-25 RX ADMIN — SODIUM CHLORIDE, SODIUM GLUCONATE, SODIUM ACETATE, POTASSIUM CHLORIDE AND MAGNESIUM CHLORIDE: 526; 502; 368; 37; 30 INJECTION, SOLUTION INTRAVENOUS at 07:39

## 2022-07-25 RX ADMIN — ACETAMINOPHEN 975 MG: 325 TABLET, FILM COATED ORAL at 06:56

## 2022-07-25 RX ADMIN — PROPOFOL 200 MG: 10 INJECTION, EMULSION INTRAVENOUS at 07:45

## 2022-07-25 ASSESSMENT — LIFESTYLE VARIABLES: TOBACCO_USE: 0

## 2022-07-25 NOTE — ANESTHESIA POSTPROCEDURE EVALUATION
Patient: Dipesh Nicole    Procedure: Procedure(s):  Deep brain stimulator placement, phase II, placement of deep brain stimulator generator/battery over the left chest wall       Anesthesia Type:  General    Note:  Disposition: Outpatient   Postop Pain Control: Uneventful            Sign Out: Well controlled pain   PONV: No   Neuro/Psych: Uneventful            Sign Out: Acceptable/Baseline neuro status   Airway/Respiratory: Uneventful            Sign Out: Acceptable/Baseline resp. status   CV/Hemodynamics: Uneventful            Sign Out: Acceptable CV status; No obvious hypovolemia; No obvious fluid overload   Other NRE: NONE   DID A NON-ROUTINE EVENT OCCUR? No           Last vitals:  Vitals Value Taken Time   /80 07/25/22 1100   Temp 37.2  C (98.9  F) 07/25/22 1033   Pulse 77 07/25/22 1103   Resp 16 07/25/22 1100   SpO2 98 % 07/25/22 1103   Vitals shown include unvalidated device data.    Electronically Signed By: Rhys Tejeda MD  July 25, 2022  11:05 AM

## 2022-07-25 NOTE — ANESTHESIA PREPROCEDURE EVALUATION
Anesthesia Pre-Procedure Evaluation    Patient: Dipesh Nicole   MRN: [id number] : 1985        Procedure : Procedure(s):  Deep brain stimulator placement, phase II, placement of deep brain stimulator generator/battery over the left chest wall          Past Medical History:   Diagnosis Date     Esophagitis endoscopy done 2021    Impression:            - Normal first portion of the duodenum, second portion                         of the duodenum and third portion of the duodenum.                         Biopsied.                         - Nodular mucosa in the duodenal bulb. Biopsied.                         - Normal stomach. Biopsied.                         - Medium-sized hiatal hernia.                         - LA Grade     H/O electroencephalogram 2021     Impression:  This is a normal waking and sleep EEG, with generalized beta activities throughout the recording. Generalized beta activities are usually associated certain medication use, such as benzodiazepines or barbiturates. The cause of these activities in this case is unclear at this time.         H/O magnetic resonance imaging of brain and brain stem 2018 MR Brain without and with IV Cxlggwxl22/8/2009 [facility] Result Impression  Normal MRI of the brain.  Result Narrative      Multiecho, multiplanar views of the brain were obtained prior to and  following the IV administration of 19 mL of contrast. There are no  previous studies available for comparison.     The brain parenchyma is normal in appearance on all pulse sequences,  with      H/O magnetic resonance imaging of cervical spine 2018 MR Cervical Spine without IV Contrast3/15/2017 [facility] Result Addenda Addendum by ProviderKaren M.D. on 03/15/2017 12:30 PM RAD^^^MA MR Cervical Spine w/o contrast 3/15/2017 12:30:00 Result Impression  Minimal degenerative disc disease at C5-6 and C6-7  otherwise an unremarkable MR scan of  the cervical spine.  Result Narrative   EXAM: MR Cervical Spine w/o contrast   INDICATION:      H/O shoulder surgery 8/17/2018 2017 July XR SHOULDER 2 VIEWS LEFT7/14/2017 [facility] Result Narrative XR SHOULDER 2 VIEWS LEFT 7/14/2017 9:13 PM  INDICATION: Shoulder Injury COMPARISON: None.  FINDINGS: Negative shoulder. No fracture or dislocation.  Status       Hiatal hernia 4/20/2021    Based on a 2021 ct scan Small hiatal hernia with some wall thickening in the distal thoracic esophagus suggesting some esophagitis.     Seizure (H) at age 22 yrs 8/17/2018     Shoulder dislocation     left     Tremor 8/10/2018      Past Surgical History:   Procedure Laterality Date     ESOPHAGOSCOPY, GASTROSCOPY, DUODENOSCOPY (EGD), COMBINED N/A 11/18/2021    Procedure: ESOPHAGOGASTRODUODENOSCOPY, WITH BIOPSY;  Surgeon: Veronica Kay MD;  Location: [facility] [place]     ESOPHAGOSCOPY, GASTROSCOPY, DUODENOSCOPY (EGD), COMBINED N/A 3/7/2022    Procedure: ESOPHAGOGASTRODUODENOSCOPY, WITH BIOPSY;  Surgeon: Alexandr Sweet MD;  Location: [facility] [place]     OPTICAL TRACKING SYSTEM INSERTION DEEP BRAIN STIMULATION Left 7/18/2022    Procedure: Left side deep brain stimulator placement, phase I, placement of left side deep brain stimulator electrode, target left globus pallidus internus, with microelectrode recording;  Surgeon: Angel Morales MD;  Location:  OR     SHOULDER SURGERY  2007      Allergies   Allergen Reactions     Diphenhydramine      Other reaction(s): Other (see comments)  Told not to take due to parkinsons     Metoclopramide      Avoid in patient with a history of Parkinson Disease  Other reaction(s): GI intolerance  Avoid in patient with a history of Parkinson Disease     Promethazine      Other reaction(s): Other (see comments)  Told not to take due to parkisons      Social History     Tobacco Use     Smoking status: Never Smoker     Smokeless tobacco: Never Used   Substance Use Topics      Alcohol use: Yes      Wt Readings from Last 1 Encounters:   07/18/22 73.8 kg (162 lb 11.2 oz)        Anesthesia Evaluation   Pt has had prior anesthetic.     No history of anesthetic complications       ROS/MED HX  ENT/Pulmonary:    (-) tobacco use and asthma   Neurologic:     (+) seizures, last seizure: 2009, Parkinson's disease, features: s/p phase 1 DBS,     Cardiovascular:  - neg cardiovascular ROS     METS/Exercise Tolerance:     Hematologic:    (-) anemia   Musculoskeletal:  - neg musculoskeletal ROS     GI/Hepatic:     (+) GERD, Asymptomatic on medication, hiatal hernia,     Renal/Genitourinary:  - neg Renal ROS     Endo:  - neg endo ROS     Psychiatric/Substance Use:       Infectious Disease:  - neg infectious disease ROS     Malignancy:  - neg malignancy ROS     Other:            Physical Exam    Airway        Mallampati: III   TM distance: > 3 FB   Neck ROM: full   Mouth opening: > 3 cm    Respiratory Devices and Support         Dental  no notable dental history         Cardiovascular          Rhythm and rate: regular and normal     Pulmonary           breath sounds clear to auscultation           OUTSIDE LABS:  CBC:   Lab Results   Component Value Date    WBC 11.4 (H) 07/19/2022    WBC 5.2 07/15/2022    HGB 14.2 07/19/2022    HGB 13.6 07/15/2022    HCT 42.0 07/19/2022    HCT 41.0 07/15/2022     07/19/2022     07/15/2022     BMP:   Lab Results   Component Value Date     07/19/2022     07/15/2022    POTASSIUM 3.8 07/19/2022    POTASSIUM 4.0 07/15/2022    CHLORIDE 102 07/19/2022    CHLORIDE 106 07/15/2022    CO2 23 07/19/2022    CO2 28 07/15/2022    BUN 7.6 07/19/2022    BUN 11 07/15/2022    CR 0.82 07/19/2022    CR 0.88 07/15/2022     (H) 07/19/2022     (H) 07/19/2022     COAGS: No results found for: PTT, INR, FIBR  POC: No results found for: BGM, HCG, HCGS  HEPATIC: No results found for: ALBUMIN, PROTTOTAL, ALT, AST, GGT, ALKPHOS, BILITOTAL, BILIDIRECT,  JACQUELYN  OTHER:   Lab Results   Component Value Date    DAWOSN 9.3 07/19/2022    PHOS 3.1 07/19/2022    MAG 2.0 07/19/2022       Anesthesia Plan    ASA Status:  3   NPO Status:  NPO Appropriate    Anesthesia Type: General.     - Airway: ETT   Induction: Intravenous.   Maintenance: Balanced.        Consents    Anesthesia Plan(s) and associated risks, benefits, and realistic alternatives discussed. Questions answered and patient/representative(s) expressed understanding.    - Discussed:     - Discussed with:  Patient      - Extended Intubation/Ventilatory Support Discussed: No.      - Patient is DNR/DNI Status: No    Use of blood products discussed: No .     Postoperative Care    Pain management: IV analgesics, Oral pain medications, Multi-modal analgesia.   PONV prophylaxis: Ondansetron (or other 5HT-3), Dexamethasone or Solumedrol     Comments:                TERRA SMART MD

## 2022-07-25 NOTE — BRIEF OP NOTE
[facility]    Brief Operative Note    Pre-operative diagnosis: Parkinson disease (H) [G20]  Post-operative diagnosis Same as pre-operative diagnosis    Procedure: Procedure(s):  Deep brain stimulator placement, phase II, placement of deep brain stimulator generator/battery over the left chest wall  Surgeon: Surgeon(s) and Role:     * Angel Morales MD - Primary     * Ketan Velez MD - Resident - Assisting  Anesthesia: General   Estimated Blood Loss: 5 ml    Drains: None  Specimens: * No specimens in log *  Findings:   All impedances for both right and left extension wires within normal limits.  Complications: None.  Implants:   Implant Name Type Inv. Item Serial No.  Lot No. LRB No. Used Action   LEAD EXTENSION W/EXTEND TECHNOLOGY 50CM 6371ANS - [id number] Leads LEAD EXTENSION W/EXTEND TECHNOLOGY 50CM 6371ANS [id number] FIRE1 N/A Left 1 Implanted   LEAD EXTENSION W/EXTEND TECHNOLOGY 50CM 6371ANS - [id number] Leads LEAD EXTENSION W/EXTEND TECHNOLOGY 50CM 6371ANS [id number] FIRE1 N/A Left 1 Implanted   GENERATOR DBS SYSTEM INFINITY 7 IPG 6662ANS - [id number] Neurology device GENERATOR DBS SYSTEM INFINITY 7 IPG 6662ANS [id number] ABBOTT [facility] N/A Left 1 Implanted   IMP SCR SYN MATRIX LOW PRO 1.5X04MM SELF DRILL [id number] - [id number] [employer] IMP SCR SYN MATRIX LOW PRO 1.5X04MM SELF DRILL [id number]  [employer] N/A Left 4 Implanted   IMP PLATE SYN STR DOG BONE LOW PROFILE 4H .504 - [id number] [employer]/[place] IMP PLATE SYN STR DOG BONE LOW PROFILE 4H .504  [employer]STRATEC N/A Left 2 Implanted       Both scalp and chest wall incision closed with monocryl.

## 2022-07-25 NOTE — ANESTHESIA PROCEDURE NOTES
Airway       Patient location during procedure: OR       Procedure Start/Stop Times: 7/25/2022 7:53 AM  Staff -        CRNA: Toby Manriquez APRN CRNA       Performed By: CRNA  Consent for Airway        Urgency: elective  Indications and Patient Condition       Indications for airway management: riley-procedural       Induction type:intravenous       Mask difficulty assessment: 1 - vent by mask    Final Airway Details       Final airway type: endotracheal airway       Successful airway: ETT - single  Endotracheal Airway Details        ETT size (mm): 8.0       Successful intubation technique: direct laryngoscopy       DL Blade Type: MAC 4       Grade View of Cords: 1       Adjucts: stylet       Position: Right       Measured from: lips       Secured at (cm): 23    Post intubation assessment        Placement verified by: capnometry, equal breath sounds and chest rise        Number of attempts at approach: 1       Secured with: pink tape       Ease of procedure: easy       Dentition: Intact and Unchanged    Medication(s) Administered   Medication Administration Time: 7/25/2022 7:53 AM

## 2022-07-25 NOTE — ANESTHESIA CARE TRANSFER NOTE
Patient: Dipesh Nicole    Procedure: Procedure(s):  Deep brain stimulator placement, phase II, placement of deep brain stimulator generator/battery over the left chest wall       Diagnosis: Parkinson disease (H) [G20]  Diagnosis Additional Information: No value filed.    Anesthesia Type:   General     Note:    Oropharynx: oropharynx clear of all foreign objects and spontaneously breathing  Level of Consciousness: awake  Oxygen Supplementation: nasal cannula    Independent Airway: airway patency satisfactory and stable  Dentition: dentition unchanged  Vital Signs Stable: post-procedure vital signs reviewed and stable  Report to RN Given: handoff report given  Patient transferred to: PACU  Comments: Patient transferred to PACU in stable condition, breathing spontaneously on NC, VSS.  Report given to RN,  Handoff Report: Identifed the Patient, Identified the Reponsible Provider, Reviewed the pertinent medical history, Discussed the surgical course, Reviewed Intra-OP anesthesia mangement and issues during anesthesia, Set expectations for post-procedure period and Allowed opportunity for questions and acknowledgement of understanding      Vitals:  Vitals Value Taken Time   /72 07/25/22 1035   Temp     Pulse 83 07/25/22 1036   Resp     SpO2 99 % 07/25/22 1036   Vitals shown include unvalidated device data.    Electronically Signed By: LUCERO Berkowitz CRNA  July 25, 2022  10:37 AM

## 2022-07-26 ENCOUNTER — PATIENT OUTREACH (OUTPATIENT)
Dept: NEUROSURGERY | Facility: CLINIC | Age: 37
End: 2022-07-26

## 2022-07-26 NOTE — PROGRESS NOTES
[facility]: Post-Discharge Note  SITUATION                                                      Admission:    Admission Date: 07/25/22   Reason for Admission: Deep brain stimulator placement, phase II, placement of deep brain stimulator generator/battery over the left chest wall  Discharge:   Discharge Date: 07/25/22  Discharge Diagnosis: Parkinson's disease    BACKGROUND                                                      Per hospital discharge summary and inpatient provider notes:  Neurosurgery Discharge Coordination Note     Attending physician: Dr. Correia  Discharge to: Home     Current status: Patient states he is doing well since surgery. He has chest incision tenderness and tenderness at connector site; taking Tylenol with adequate relief. He did take oxycodone twice yesterday but got nauseated despite Zofran. Doing well with Tylenol. Incisions are covered with a dressing, which pt will remove tomorrow. He understands to call if he notices redness, swelling, increased tenderness, drainage, incision opening or elevated temp. Pt understands to leave steri strips in place. Reports Incision CDI without signs of infection.  Denies current bowel or bladder issues.    Discharge instructions and medications reviewed with patient.  Follow up appointments/imaging/tests needed: 2 week post op with 8/5/2022 at 1:30 p.m..     RN triage/on call number given: [phone]/ [phone]        ASSESSMENT           Discharge Assessment  How are your symptoms? (Red Flag symptoms escalate to triage hotline per guidelines): Improved  Do you feel your condition is stable enough to be safe at home until your provider visit?: Yes  Does the patient have their discharge instructions? : Yes  Does the patient have questions regarding their discharge instructions? : No  Were you started on any new medications or were there changes to any of your previous medications? : Yes  Does the patient have all of their medications?:  Yes  Do you have questions regarding any of your medications? : No  Discharge follow-up appointment scheduled within 14 calendar days? : Yes  Discharge Follow Up Appointment Date: 08/05/22  Discharge Follow Up Appointment Scheduled with?: Specialty Care Provider         Post-op (Clinicians Only)  Did the patient have surgery or a procedure: Yes  Incision: closed;healing  Drainage: No  Bleeding: none  Fever: No  Chills: No  Redness:  (dressing)  Warmth:  (dressing)  Swelling:  (dressing)  Incision site pain: Yes  Closure: suture;dissolving  Eating & Drinking: eating and drinking without complaints/concerns  PO Intake: regular diet  Bowel Function: normal  Urinary Status: voiding without complaint/concerns        PLAN                                                      Outpatient Plan:  Routine postop follow-up      Future Appointments   Date Time Provider Department Center   7/29/2022 11:00 AM Ada Mon, PhD [facility]   8/5/2022  1:30 PM Monae Wong APRN [facility]   8[facility]15/2022  [facility]    8/26/2022  7:00 AM Jun Murrell MD [facility]   9/2/2022 11:00 AM Monae Wong APRN [facility]   9/23/2022  9:20 AM [address] [place] [address]   9/23/2022 10:00 AM Jun Murrell MD [facility]         For any urgent concerns, please contact our 24 hour nurse triage line: [phone] ([phone])         BUCK HEARN RN

## 2022-07-27 NOTE — PROGRESS NOTES
L GP    X:-19.72,Y:+0.5,Z:-2.98;AP:58.7,MSP:8.1    P1T1 anterior hole  Striatum to +11.74 and Alida from +11.30  Lamina +5.77, Border cell at +5.67, and GPi at +5.32,  LE proprioceptive fields at +5.20, +5.00, +2.88, +2.36, and a unit at +1.10 which modulated with active movement of the elbow. Bottop at -1.43  Optic tract, by VEP, was identified at -1.97  Microstim at -1.97 mm gave hand muscle contraction at 50 uA  semimacrostimulation at a dept of -1.97 (effectively +0.5) gave perioral muscle contraction at 4.0 mA    P1T2 posterior hole  Striatum to +10.66, possible border cells at +10.00, +9.85, then Alida from +9.51.  GPi from possibly +5.20 (hip field) more clearly +3.55 (shoulder field).  Bottom at -0.50.  No OT    P1T3 lateral hole  End of straitum unclar, but Alida began at aout +10.63.  Possible Border cell at +4.89, and no clear bottom to GPi as deep as -2.5 (and no OT)    The BenGun was rotated 30 degrees counterclockwise (anterior hole laterally)  Abbott Infinity 0.5 placed at a depth of -0.20, anterior hole.  9tzt1ltoOko 130 Hz, 60 usec gave dyskinesia suppression at 1.0 mA, and cranial muscle contraction at 5.0, reproduced with a 0 to 3.0 mA transition.  5rpyTld5mcq 130 Hz, 60 usec  gave no IC effect with 0 to 4.0 mA transition, but the cranial muscle contraction was reproduced with 5.0 mA    Mapping time 3h

## 2022-07-27 NOTE — OP NOTE
"PATIENT NAME:       Dipesh MILNER \"Annalise\" Corey  YOB: 1985  MRN:                         [id number]     DATE OF SERVICE:  07/25/2022     PREOPERATIVE DIAGNOSIS:    Parkinson disease (H) [G20]     POSTOPERATIVE DIAGNOSIS:    Parkinson disease (H) [G20]     PROCEDURES PERFORMED  1.  Deep brain stimulator placement, phase II, placement of new deep brain stimulator generator/battery over the left chest wall.  2.  Placement of two extension wires and connection of the left side deep brain stimulator electrode, one electrode array, to the new generator/battery.  3.  Electrical analysis of the deep brain stimulator system.     ATTENDING SURGEON: Angel Morales MD     RESIDENT SURGEON:  Ketan Velez MD     ANESTHESIA:  General anesthesia.     ESTIMATED BLOOD LOSS:  5 mL.     COMPLICATIONS:  None.     IMPLANT:    Implant Name Type Inv. Item Serial No.  Lot No. LRB No. Used Action   LEAD EXTENSION W/EXTEND TECHNOLOGY 50CM 6371ANS - [id number] Leads LEAD EXTENSION W/EXTEND TECHNOLOGY 50CM 6371ANS [id number] [employer] N/A Left 1 Implanted   LEAD EXTENSION W/EXTEND TECHNOLOGY 50CM 6371ANS - [id number] Leads LEAD EXTENSION W/EXTEND TECHNOLOGY 50CM 6371ANS [id number] [employer] N/A Left 1 Implanted   GENERATOR DBS SYSTEM INFINITY 7 IPG 6662ANS - [id number] Neurology device GENERATOR DBS SYSTEM INFINITY 7 IPG 6662ANS [id number] [employer] N/A Left 1 Implanted   IMP SCR SYN MATRIX LOW PRO 1.5X04MM SELF DRILL [id number] - [id number] [employer] IMP SCR SYN MATRIX LOW PRO 1.5X04MM SELF DRILL [id number]   [employer] N/A Left 4 Implanted   IMP PLATE SYN STR DOG BONE LOW PROFILE 4H .504 - [id number] [employer] IMP PLATE SYN STR DOG BONE LOW PROFILE 4H .504   [employer] N/A Left 2 Implanted      INDICATIONS FOR PROCEDURE:  Annalise Nicole is a 36 y/o gentleman with PD who presents for neurosurgical consultation regarding DBS. He was " diagnosed in 2018 with onset of left arm bradykinesia, rigidity and subtle action (not resting) tremor for about a year or two prior to his presentation. He was sent for genetic testing soon after his diagnosis, but his insurance denied testing at that time. Since then, he was seen and enrolled into the PD GENEration study in Jan 2020, but then apparently did not follow up for the testing results, but recently re-enrolled in the study in September. He considered DBS as he was wearing off 2-2.5 hrs after taking his medication and feels he is developing worsening dyskinesias and leg dystonia. 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 He is otherwise healthy but has had multiple bouts of vomiting leading to dehydration, most typically occurs shortly after taking his levodopa recently. Not on antiplatelets or anticoagulation. Patient was offered DBS stimulation electrode implantation procedure for his symptoms. Patient is now s/p Phase I DBS implantation- target left Globus Pallidus internus (performed on 07/18/2022). Patient now presents for Phase II of the procedure- for implantation of battery in left chest wall and implantation of extension wires with connection of the left electrode to the battery. All the risks and benefits were discussed with the patient in detail. He consented to undergo the above-mentioned procedure and today, presents for the same.        DESCRIPTION OF PROCEDURE:  The patient was taken to the operating theater and positioned supine on the operating room table.  All pressure points were appropriately padded.  With placement of the endotracheal tube, general endotracheal anesthesia was induced. His head was turned to the right side, thus exposing the left parietal area of the head.  The previously implanted connector portion of the stimulating electrode from the left side could be palpated on the left side of the head. A small area of  hair was removed over this palpable connector using a surgical hair clipper. Then the left side of the head, neck, and chest area was prepped and draped in normal sterile fashion.  Local anesthetic was injected in the areas of intended incision at the left scalp and left chest wall as well as along the path of the intended tunneling. 1% lidocaine with epinephrine mixed with 0.25% Marcaine plain in a 50:50 combination was used.  A timeout was performed confirming the patient's identity, procedure to be performed, site and side of surgery and administration of preoperative prophylactic antibiotics.     Attention was turned to the head.  A #10 blade scalpel was used to make a 4 cm skin incision at the distal end of the connector that was palpated on the scalp.  Hemostasis was achieved with bipolar cautery.  The incision was carried down to the pericranium.  Monopolar cautery was used to make a linear opening in the pericranium to expose the bone. A 5mm cutting jacqueline was used to make a small trough in the bone to accept the extension wire connector piece.  The shodded mosquito was used to hold the protective cover, and we gently pulled out the connector.  This was found to be fully intact.  At that point, a pocket was made using a Sabiha clamp down toward behind the ear into the nuchal line.  This was in preparation for tunneling of the extension wire.       At that point, attention was turned to the left chest wall area.  A #10 blade scalpel was used to make a 6 cm incision on the left chest wall.  A #10 blade scalpel was used to finish dissection down to the proper plane.  We found a nice dissecting plane superficial to the pectoralis muscle.  A combination of blunt dissection as well as the monopolar cautery was used to establish a subcutaneous pocket about 3 fingerbreadths wide and deep enough to encompass the battery implant.  Hemostasis was achieved with bipolar cautery.  The pocket was copiously irrigated with  saline and sponge was placed in the pocket.       At this point, a tunneler was brought into the field.  We tunneled a passage from the head incision to the chest wall incision.  Subsequently, care was taken to stay superficial and the path of the tunneler was confirmed to be over the clavicle.   Two extension wires were passed from the chest to the head incision.       We then proceeded to make the connection between the left intracranial electrode and both extension wires to ensure that both extension wires were functioning. The protective covering was removed from the connector portion of the stimulating electrode.  The contacts were inspected to be clean and without damage.  Then, the stimulating electrode was inserted into the extension wire connection intended for the eventual right side electrode.  The connection was secured with screwdriver and impedances were checked which confirmed a functioning extension wire. This extension wire was then removed, plugged with a plastic plug and buried towards the right parietal region for future implantation of DBS electrode for the right side. Then, the process above was repeated for the opposite extension wire. Again, impedances were checked and the left side lead and extension wire were confirmed to be functioning normally. The extension wires were gently pulled from the chest incision and the excess wire length towards the head was also buried superiorly until the connectors were within the incision.  Then, the connector was buried further superiorly in the incision until it was no longer directly under the incision.     At this time, generator was brought onto the field.  The extension wires were then cleaned at their contacts and inserted into the generator/battery portal.  These were secured with a screwdriver.  Therefore, two electrode arrays were connected to the new generator/battery.  The previously placed sponge within the pocket was removed and hemostasis  was confirmed.  Then, the new generator/battery with the excess wires being placed posterior to the generator/battery was placed within the left chest wall pocket that was created previously.  The entire apparatus fit into the pocket comfortably.      The system was tested electrically.  All the impedance values of the left stimulating electrode were within normal limits.  No problems were found with the system.     With the satisfactory placement of the new system, we began closing the wound.  The left chest incision was closed in the following manner.  The deep pocket was reapproximated using 3-0 Vicryl sutures in an inverted interrupted fashion.  The subcutaneous fat layer was reapproximated using 3-0 Vicryl sutures in an inverted interrupted fashion.  The dermal layer was reapproximated using 3-0 Vicryl sutures in an inverted interrupted fashion.  The skin was reapproximated using 4-0 Monocryl suture in a subcuticular fashion.  The left parietal head incision was irrigated with antibiotic solution and dried.  2 dog-bones were used to affix the connector within the trough bed in the bone. Meticulous hemostasis was obtained.  It was then closed in the following manner.  The galea was reapproximated over the implanted hardware using 3-0 Vicryl sutures in an inverted interrupted fashion.  This provided a protective layer.  The skin was then reapproximated using 3-0 Monocryl suture in a running fashion.  Both wounds were cleaned and dried.  ChoraPrep was used to clean the incisions again.  Steristrips were applied over the incision of the chest wall covered with telfa, 4x4 and tegaderm.      At the end of the case, all counts including needle, sponge and instrument counts were correct x 2.  There were no complications.  Patient was extubated and taken to the recovery room in a stable condition.       Dr. Morales, Neurosurgery Attending, was present and scrubbed for the entire case and performed the key and critical  portions of the case.     Ketan [facility]  Neurosurgery Resident PGY-2    Physician Attestation   I was present for the entire procedure between opening and closing.    Angel Morales MD  Date of Service (when I saw the patient): 7/25/22

## 2022-07-29 ENCOUNTER — VIRTUAL VISIT (OUTPATIENT)
Dept: PSYCHOLOGY | Facility: CLINIC | Age: 37
End: 2022-07-29
Payer: COMMERCIAL

## 2022-07-29 DIAGNOSIS — G20.A1 PARKINSON DISEASE (H): ICD-10-CM

## 2022-07-29 DIAGNOSIS — F41.1 GENERALIZED ANXIETY DISORDER: Primary | ICD-10-CM

## 2022-07-29 DIAGNOSIS — F33.40 MAJOR DEPRESSIVE DISORDER, RECURRENT, IN REMISSION (H): ICD-10-CM

## 2022-07-29 PROCEDURE — 90834 PSYTX W PT 45 MINUTES: CPT | Mod: 95 | Performed by: PSYCHOLOGIST

## 2022-07-29 ASSESSMENT — ANXIETY QUESTIONNAIRES
GAD7 TOTAL SCORE: 2
3. WORRYING TOO MUCH ABOUT DIFFERENT THINGS: SEVERAL DAYS
2. NOT BEING ABLE TO STOP OR CONTROL WORRYING: NOT AT ALL
7. FEELING AFRAID AS IF SOMETHING AWFUL MIGHT HAPPEN: NOT AT ALL
GAD7 TOTAL SCORE: 2
8. IF YOU CHECKED OFF ANY PROBLEMS, HOW DIFFICULT HAVE THESE MADE IT FOR YOU TO DO YOUR WORK, TAKE CARE OF THINGS AT HOME, OR GET ALONG WITH OTHER PEOPLE?: SOMEWHAT DIFFICULT
6. BECOMING EASILY ANNOYED OR IRRITABLE: NOT AT ALL
4. TROUBLE RELAXING: SEVERAL DAYS
5. BEING SO RESTLESS THAT IT IS HARD TO SIT STILL: NOT AT ALL
IF YOU CHECKED OFF ANY PROBLEMS ON THIS QUESTIONNAIRE, HOW DIFFICULT HAVE THESE PROBLEMS MADE IT FOR YOU TO DO YOUR WORK, TAKE CARE OF THINGS AT HOME, OR GET ALONG WITH OTHER PEOPLE: SOMEWHAT DIFFICULT
1. FEELING NERVOUS, ANXIOUS, OR ON EDGE: NOT AT ALL
7. FEELING AFRAID AS IF SOMETHING AWFUL MIGHT HAPPEN: NOT AT ALL

## 2022-07-29 NOTE — Clinical Note
Follow-up video visit with me on 8/25/22 at 2:00pm please. Thanks!  dAa Mon, Ph.D., , [facility] Psychologist Phone: [phone]  Pager: [phone] 7/29/2022 11:54 AM

## 2022-07-29 NOTE — PROGRESS NOTES
Health Psychology          Diana Link, Ph.D.,  [phone]  Amaris Peres, Ph.D.,  [phone]  Renay Gibbons, Ph.D.,  [phone]  Lisset Anderson, Ph.D., , [facility] [phone]  Damian Jameson, Ph.D., , [facility] [phone]  Ekta Aguilar, Ph.D.,  [phone]  Ada Mon, Ph.D., , [facility] [phone]    [facility], 3rd Floor  [address] [place] [address]       Health Psychology Progress Note    Patient Name: Dipesh Nicole    Service Type: Individual  Length of Visit: 40 minutes  Start time: 11:18 AM - started late due to connection issues   Stop time: 11:58 AM    Attendees: patient attended alone  Session #: 4      Telemedicine Visit: The patient's condition can be safely assessed and treated via synchronous audio and visual telemedicine encounter.      Reason for Telemedicine Visit: Patient has requested telehealth visit and Patient convenience (e.g. access to timely appointments / distance to available provider)    Originating Site (Patient Location): Patient's home    Distant Site (Provider Location): Provider Remote Setting- Home Office    Consent:  The patient/guardian has verbally consented to: the potential risks and benefits of telemedicine (video visit) versus in person care; bill my insurance or make self-payment for services provided; and responsibility for payment of non-covered services.     Mode of Communication:  Video Conference via [employer] and [employer]. Connection was poor, so session shifted to audio only via [employer] toward the middle of the visit.     As the provider I attest to compliance with applicable laws and regulations related to telemedicine.    Identifying Information and Presenting Problem:  The patient is a 36 year old adult who is being seen for problematic symptoms of anxiety in the context of Parkinson's Disease.    Topics Discussed/Interventions Provided:    Session Content:       Update:  Patient reports doing generally well. DBS surgery has gone well and he is making progress. States that he has not been in as much pain as he anticipated.       Homework Review: Patient reports that he practiced the diaphragmatic breathing exercise and increased awareness by observing his cognitive distortions. Discussed insights. He notes that the breathing exercise has helped decrease his worries about the future and helped decrease his physiological arousal. Patient also noted that most of his cognitive distortions involve fear and uncertainty about the future.        Skills/intervention: Patient states that he disclosed his diagnosis to some of his close friends and has found this to be helpful in reducing his anxiety. He notes that his fears about his friends rejecting him or treating him differently were not how his friends responded. He states that they were supportive and encouraging and have spent time with him after his surgery. Discussed the use of behavioral experiments to challenge thinking patterns. Introduced CBT strategy of cognitive restructuring and reviewed 4 different cognitive restructuring exercises.        Treatment Objective(s) Addressed in This Session:  Anxiety: will experience a reduction in anxiety, will develop more effective coping skills to manage anxiety symptoms, will develop healthy cognitive patterns and beliefs and will increase ability to function adaptively  Psychological distress related to Chronic Disease Management    Progress on / Status of Treatment Objective(s) / Homework:  Stable   In progress    Assessment: The patient appeared to be active and engaged in today's session and was receptive to feedback.     Mental Status Exam:   Appearance: Appropriate   Eye Contact: Good    Orientation: Yes, x4  Behavior/relationship to provider/demeanor: Cooperative, Engaged and Pleasant  Motor Activity: restless and significant for akathisia  Mood (subjective report): Anxious  Affect  (objective appearance): Appropriate  Speech Rate: Normal  Speech Volume: Normal  Speech Articulation: Normal  Speech Coherence: Normal  Speech Spontaneity: Normal  Thought Content: no suicidal/homicidal ideation, plans, or intent  Thought Process (associations): Logical, Linear, Goal directed and Perseverative  Thought Process (rate): Normal  Abnormal Perception: None  Attention/Concentration: Normal  Memory: Appears grossly intact  Fund of Knowledge: Appears within normal limits   Abstraction:  Normal  Insight: Adequate  Judgment:  good    Does the patient appear to be at imminent risk of harm to self/others at this time? No    The session was necessary to address problematic symptoms of anxiety in the context of PD that have been affecting the patient's quality of life and possibly contributing to worsening symptoms.  Ongoing psychotherapy is necessary to provide counseling, improve functioning with daily activities, provide psychoeducation, provide support and teach cognitive and behavioral skills.    Diagnoses:    1. Generalized anxiety disorder    2. Major depressive disorder, recurrent, in remission (H)    3. Parkinson disease (H)        Plan:    1. Patient to return for a follow-up visit on 8/25/22 at 2:00pm.  2. Primary care provider is Joey Morley and referring provider is Ramesh Carson MD.   3. Patient to contact [facility], [facility] or other community resources if there was a psychiatric emergency.  4. Next visit agenda:   a. Continue to practice diaphragmatic breathing daily.  b. Try each of the cognitive restructuring tools at least once.   c. Next session introduce values clarification     Skills taught/interventions used thus far:     Psychoeducation    Awareness training    Awareness of cognitive distortions    Physiological arousal reduction    Diaphragmatic breathing    Unhooking skills    Cognitive restructuring      NOTE: Treatment plan update due 7/5/23; 90 day review date 10/3/22                          "                         Individual Treatment Plan    Patient's Name: Dipesh Nicole  YOB: 1985    Date of Creation: 07/05/22   Date Treatment Plan Last Reviewed/Revised: 07/05/22     DSM5 Diagnoses:     1. Generalized anxiety disorder    2. Major depressive disorder, recurrent, in remission (H)        Psychosocial / Contextual Factors: upcoming surgery, job/vocational stress, life transition stress, health issues, somewhat limited social support    PROMIS (reviewed every 90 days):     PROMIS 10 6/22/2022   PROMIS TOTAL - SUBSCORES 27       Referral / Collaboration:  Referral to another professional/service is not indicated at this time.    Anticipated number of session for this episode of care: 10-15  Anticipation frequency of session: Every other week  Anticipated Duration of each session: 38-52 minutes  Treatment plan will be reviewed in 90 days or when goals have been changed.     Measurable Treatment Goal(s) related to diagnosis / functional impairment(s)  Goal 1: Patient will improve his ability to manage his anxiety skillfully    \"I will know I've met my goal when I am not as scared to go places because of getting physically locked up and I will be more social. It's the fear of the physical aspects of anxiety.\"     Objective #A (Patient Action)    Patient will improve his ability to identify at least 3 early warning signs or symptoms of anxiety.  Status: New - Date: 07/05/22      Intervention(s)  Therapist will teach awareness training skills.    Objective #B (Patient Action)    Patient will learn use relaxation strategies at least once per day and during times of high anxiety to reduce the physical symptoms of anxiety.  Status: New - Date: 07/05/22       Intervention(s)  Therapist will teach relaxation and physiological arousal reduction strategies and skills.    Objective #C (Patient Action)    Patient will use cognitive strategies identified in therapy to challenge and unhook from anxious " thoughts.  Status: New - Date: 07/05/22      Intervention(s)  Therapist will teach cognitive skills.      Patient has reviewed and agreed to the above plan.      Ada Mon, PhD  July 5, 2022      Ada Mon, Ph.D., , [facility]  Clinical Health Psychologist  Phone: [phone]   Pager: [phone]

## 2022-08-05 ENCOUNTER — OFFICE VISIT (OUTPATIENT)
Dept: NEUROSURGERY | Facility: CLINIC | Age: 37
End: 2022-08-05
Payer: COMMERCIAL

## 2022-08-05 VITALS
HEIGHT: 73 IN | OXYGEN SATURATION: 97 % | HEART RATE: 65 BPM | WEIGHT: 172.5 LBS | DIASTOLIC BLOOD PRESSURE: 78 MMHG | SYSTOLIC BLOOD PRESSURE: 125 MMHG | RESPIRATION RATE: 16 BRPM | BODY MASS INDEX: 22.86 KG/M2

## 2022-08-05 DIAGNOSIS — G20.A1 PARKINSON DISEASE (H): Primary | ICD-10-CM

## 2022-08-05 PROCEDURE — 99024 POSTOP FOLLOW-UP VISIT: CPT | Performed by: NURSE PRACTITIONER

## 2022-08-05 ASSESSMENT — PAIN SCALES - GENERAL: PAINLEVEL: NO PAIN (0)

## 2022-08-05 NOTE — PROGRESS NOTES
"[facility]  Department of Neurosurgery      Name: Dipesh Nicole  MRN: [id number]  Age: 36 year old  : 1985  Referring provider: Joey Morley  2022      Chief Complaint:   PD  S/p DBS placement on 2022  Post op follow up    History of Present Illness:   Dipesh Nicole is a 36 year old male with a history of PD who is seen today for post op evaluation after DBS placement. This was done on 2022 by Dr. Morales. Overall, patient has been doing well since the procedure. He denies any fever or chills since hospital discharge. He is not using any pain medications at this time.       Review of Systems:   Pertinent items are noted in HPI or as in patient entered ROS below, remainder of complete ROS is negative.   No flowsheet data found.     Physical Exam:   /78   Pulse 65   Resp 16   Ht 1.854 m (6' 1\")   Wt 78.2 kg (172 lb 8 oz)   SpO2 97%   BMI 22.76 kg/m     General: No acute distress.    Neuro: The patient is fully oriented. Speech is normal. Extraocular movements are intact without nystagmus. Gait is normal.  Psych: Normal mood and affect. Behavior is normal.    Incision: Overall incisions x 3 CDI. No drainage or swelling. Healing well.                 Assessment:  PD  S/p DBS placement on 2022  Post op follow up    Plan:  Overall recovering well since surgery. No concerns today. He is scheduled for the next phase on 8/15/2022.        I spent 20 minutes on patient care activities related to this encounter on the date of service, including time spent reviewing the chart, obtaining history and examination and in counseling the patient, and in documentation in the electronic medical record.      Monae BARKER CNP  Department of Neurosurgery    "

## 2022-08-05 NOTE — LETTER
"2022       RE: Dipesh Nicole  [address] [place] [address]     Dear Colleague,    Thank you for referring your patient, Dipesh Nicole, to the [facility] [place] [facility]. Please see a copy of my visit note below.    [facility]  Department of Neurosurgery      Name: Dipesh Nicole  MRN: [id number]  Age: 36 year old  : 1985  Referring provider: Joey Morley  2022      Chief Complaint:   PD  S/p DBS placement on 2022  Post op follow up    History of Present Illness:   Dipesh Nicole is a 36 year old male with a history of PD who is seen today for post op evaluation after DBS placement. This was done on 2022 by Dr. Morales. Overall, patient has been doing well since the procedure. He denies any fever or chills since hospital discharge. He is not using any pain medications at this time.       Review of Systems:   Pertinent items are noted in HPI or as in patient entered ROS below, remainder of complete ROS is negative.   No flowsheet data found.     Physical Exam:   /78   Pulse 65   Resp 16   Ht 1.854 m (6' 1\")   Wt 78.2 kg (172 lb 8 oz)   SpO2 97%   BMI 22.76 kg/m     General: No acute distress.    Neuro: The patient is fully oriented. Speech is normal. Extraocular movements are intact without nystagmus. Gait is normal.  Psych: Normal mood and affect. Behavior is normal.    Incision: Overall incisions x 3 CDI. No drainage or swelling. Healing well.                 Assessment:  PD  S/p DBS placement on 2022  Post op follow up    Plan:  Overall recovering well since surgery. No concerns today. He is scheduled for the next phase on 8/15/2022.        I spent 20 minutes on patient care activities related to this encounter on the date of service, including time spent reviewing the chart, obtaining history and examination and in counseling the patient, and in documentation in the " electronic medical record.      Monae BARKER, CNP  Department of Neurosurgery

## 2022-08-09 DIAGNOSIS — Z11.59 ENCOUNTER FOR SCREENING FOR OTHER VIRAL DISEASES: Primary | ICD-10-CM

## 2022-08-12 ENCOUNTER — LAB (OUTPATIENT)
Dept: LAB | Facility: CLINIC | Age: 37
End: 2022-08-12
Attending: NEUROLOGICAL SURGERY
Payer: COMMERCIAL

## 2022-08-12 DIAGNOSIS — Z11.59 ENCOUNTER FOR SCREENING FOR OTHER VIRAL DISEASES: ICD-10-CM

## 2022-08-12 PROCEDURE — U0005 INFEC AGEN DETEC AMPLI PROBE: HCPCS

## 2022-08-12 PROCEDURE — U0003 INFECTIOUS AGENT DETECTION BY NUCLEIC ACID (DNA OR RNA); SEVERE ACUTE RESPIRATORY SYNDROME CORONAVIRUS 2 (SARS-COV-2) (CORONAVIRUS DISEASE [COVID-19]), AMPLIFIED PROBE TECHNIQUE, MAKING USE OF HIGH THROUGHPUT TECHNOLOGIES AS DESCRIBED BY CMS-2020-01-R: HCPCS

## 2022-08-13 LAB — SARS-COV-2 RNA RESP QL NAA+PROBE: NEGATIVE

## 2022-08-14 ENCOUNTER — ANESTHESIA EVENT (OUTPATIENT)
Dept: SURGERY | Facility: CLINIC | Age: 37
End: 2022-08-14
Payer: COMMERCIAL

## 2022-08-15 ENCOUNTER — HOSPITAL (OUTPATIENT)
Dept: NEUROLOGY | Facility: CLINIC | Age: 37
End: 2022-08-15

## 2022-08-15 ENCOUNTER — APPOINTMENT (OUTPATIENT)
Dept: GENERAL RADIOLOGY | Facility: CLINIC | Age: 37
End: 2022-08-15
Attending: NEUROLOGICAL SURGERY
Payer: COMMERCIAL

## 2022-08-15 ENCOUNTER — APPOINTMENT (OUTPATIENT)
Dept: CT IMAGING | Facility: CLINIC | Age: 37
End: 2022-08-15
Attending: NEUROLOGICAL SURGERY
Payer: COMMERCIAL

## 2022-08-15 ENCOUNTER — HOSPITAL ENCOUNTER (OUTPATIENT)
Dept: CT IMAGING | Facility: CLINIC | Age: 37
Discharge: HOME | End: 2022-08-15
Attending: NEUROLOGICAL SURGERY | Admitting: NEUROLOGICAL SURGERY
Payer: COMMERCIAL

## 2022-08-15 ENCOUNTER — ANESTHESIA (OUTPATIENT)
Dept: SURGERY | Facility: CLINIC | Age: 37
End: 2022-08-15
Payer: COMMERCIAL

## 2022-08-15 ENCOUNTER — HOSPITAL ENCOUNTER (INPATIENT)
Facility: CLINIC | Age: 37
LOS: 1 days | Discharge: HOME | End: 2022-08-16
Attending: NEUROLOGICAL SURGERY | Admitting: NEUROLOGICAL SURGERY
Payer: COMMERCIAL

## 2022-08-15 DIAGNOSIS — Z96.89 S/P DEEP BRAIN STIMULATOR PLACEMENT: Primary | ICD-10-CM

## 2022-08-15 DIAGNOSIS — G20.A1 PARKINSON DISEASE (H): ICD-10-CM

## 2022-08-15 LAB
ANION GAP SERPL CALCULATED.3IONS-SCNC: 14 MMOL/L (ref 7–15)
APTT PPP: 28 SECONDS (ref 22–38)
BUN SERPL-MCNC: 14.7 MG/DL (ref 6–20)
CALCIUM SERPL-MCNC: 10.5 MG/DL (ref 8.6–10)
CHLORIDE SERPL-SCNC: 101 MMOL/L (ref 98–107)
CREAT SERPL-MCNC: 0.8 MG/DL (ref 0.67–1.17)
DEPRECATED HCO3 PLAS-SCNC: 23 MMOL/L (ref 22–29)
ERYTHROCYTE [DISTWIDTH] IN BLOOD BY AUTOMATED COUNT: 12.6 % (ref 10–15)
GFR SERPL CREATININE-BSD FRML MDRD: >90 ML/MIN/1.73M2
GLUCOSE BLDC GLUCOMTR-MCNC: 120 MG/DL (ref 70–99)
GLUCOSE SERPL-MCNC: 110 MG/DL (ref 70–99)
HCT VFR BLD AUTO: 41.8 % (ref 40–53)
HGB BLD-MCNC: 14.3 G/DL (ref 13.3–17.7)
INR PPP: 0.97 (ref 0.85–1.15)
MCH RBC QN AUTO: 30.2 PG (ref 26.5–33)
MCHC RBC AUTO-ENTMCNC: 34.2 G/DL (ref 31.5–36.5)
MCV RBC AUTO: 88 FL (ref 78–100)
PLATELET # BLD AUTO: 253 10E3/UL (ref 150–450)
POTASSIUM SERPL-SCNC: 4.1 MMOL/L (ref 3.4–5.3)
RBC # BLD AUTO: 4.73 10E6/UL (ref 4.4–5.9)
SODIUM SERPL-SCNC: 138 MMOL/L (ref 136–145)
WBC # BLD AUTO: 6.2 10E3/UL (ref 4–11)

## 2022-08-15 PROCEDURE — 999N000141 HC STATISTIC PRE-PROCEDURE NURSING ASSESSMENT: Performed by: NEUROLOGICAL SURGERY

## 2022-08-15 PROCEDURE — 250N000011 HC RX IP 250 OP 636

## 2022-08-15 PROCEDURE — 250N000011 HC RX IP 250 OP 636: Performed by: ANESTHESIOLOGY

## 2022-08-15 PROCEDURE — 999N000065 XR SKULL G/E 4 VIEWS

## 2022-08-15 PROCEDURE — 250N000011 HC RX IP 250 OP 636: Performed by: NEUROLOGICAL SURGERY

## 2022-08-15 PROCEDURE — 258N000003 HC RX IP 258 OP 636: Performed by: ANESTHESIOLOGY

## 2022-08-15 PROCEDURE — 272N000004 HC RX 272: Performed by: NEUROLOGICAL SURGERY

## 2022-08-15 PROCEDURE — 85610 PROTHROMBIN TIME: CPT

## 2022-08-15 PROCEDURE — 80048 BASIC METABOLIC PNL TOTAL CA: CPT

## 2022-08-15 PROCEDURE — 999N000179 XR SURGERY CARM FLUORO LESS THAN 5 MIN W STILLS: Mod: TC

## 2022-08-15 PROCEDURE — 36415 COLL VENOUS BLD VENIPUNCTURE: CPT

## 2022-08-15 PROCEDURE — 250N000011 HC RX IP 250 OP 636: Performed by: STUDENT IN AN ORGANIZED HEALTH CARE EDUCATION/TRAINING PROGRAM

## 2022-08-15 PROCEDURE — C1778 LEAD, NEUROSTIMULATOR: HCPCS | Performed by: NEUROLOGICAL SURGERY

## 2022-08-15 PROCEDURE — 00K83ZZ MAP BASAL GANGLIA, PERCUTANEOUS APPROACH: ICD-10-PCS | Performed by: NEUROLOGICAL SURGERY

## 2022-08-15 PROCEDURE — 70450 CT HEAD/BRAIN W/O DYE: CPT | Mod: 26 | Performed by: RADIOLOGY

## 2022-08-15 PROCEDURE — 258N000003 HC RX IP 258 OP 636

## 2022-08-15 PROCEDURE — 70450 CT HEAD/BRAIN W/O DYE: CPT

## 2022-08-15 PROCEDURE — 85027 COMPLETE CBC AUTOMATED: CPT

## 2022-08-15 PROCEDURE — 8E09XBF COMPUTER ASSISTED PROCEDURE OF HEAD AND NECK REGION, WITH FLUOROSCOPY: ICD-10-PCS | Performed by: NEUROLOGICAL SURGERY

## 2022-08-15 PROCEDURE — 85730 THROMBOPLASTIN TIME PARTIAL: CPT

## 2022-08-15 PROCEDURE — 70450 CT HEAD/BRAIN W/O DYE: CPT | Mod: 77

## 2022-08-15 PROCEDURE — 250N000009 HC RX 250: Performed by: ANESTHESIOLOGY

## 2022-08-15 PROCEDURE — 2W30XYZ IMMOBILIZATION OF HEAD USING OTHER DEVICE: ICD-10-PCS | Performed by: NEUROLOGICAL SURGERY

## 2022-08-15 PROCEDURE — 120N000002 HC R&B MED SURG/OB UMMC

## 2022-08-15 PROCEDURE — 360N000079 HC SURGERY LEVEL 6, PER MIN: Performed by: NEUROLOGICAL SURGERY

## 2022-08-15 PROCEDURE — 272N000001 HC OR GENERAL SUPPLY STERILE: Performed by: NEUROLOGICAL SURGERY

## 2022-08-15 PROCEDURE — 00W03MZ REVISION OF NEUROSTIMULATOR LEAD IN BRAIN, PERCUTANEOUS APPROACH: ICD-10-PCS | Performed by: NEUROLOGICAL SURGERY

## 2022-08-15 PROCEDURE — 00H03MZ INSERTION OF NEUROSTIMULATOR LEAD INTO BRAIN, PERCUTANEOUS APPROACH: ICD-10-PCS | Performed by: NEUROLOGICAL SURGERY

## 2022-08-15 PROCEDURE — 250N000009 HC RX 250: Performed by: NEUROLOGICAL SURGERY

## 2022-08-15 PROCEDURE — 370N000017 HC ANESTHESIA TECHNICAL FEE, PER MIN: Performed by: NEUROLOGICAL SURGERY

## 2022-08-15 PROCEDURE — 278N000051 HC OR IMPLANT GENERAL: Performed by: NEUROLOGICAL SURGERY

## 2022-08-15 PROCEDURE — C1713 ANCHOR/SCREW BN/BN,TIS/BN: HCPCS | Performed by: NEUROLOGICAL SURGERY

## 2022-08-15 PROCEDURE — 710N000010 HC RECOVERY PHASE 1, LEVEL 2, PER MIN: Performed by: NEUROLOGICAL SURGERY

## 2022-08-15 PROCEDURE — 70260 X-RAY EXAM OF SKULL: CPT | Mod: 26 | Performed by: RADIOLOGY

## 2022-08-15 PROCEDURE — 61867 IMPLANT NEUROELECTRODE: CPT | Mod: 58 | Performed by: NEUROLOGICAL SURGERY

## 2022-08-15 PROCEDURE — 250N000013 HC RX MED GY IP 250 OP 250 PS 637

## 2022-08-15 DEVICE — IMP BUR HOLE COVER GUARDIAN 6010ANS: Type: IMPLANTABLE DEVICE | Site: CRANIAL | Status: FUNCTIONAL

## 2022-08-15 DEVICE — IMP PLATE SYN STR DOG BONE LOW PROFILE 4H TI 421.504: Type: IMPLANTABLE DEVICE | Site: CRANIAL | Status: FUNCTIONAL

## 2022-08-15 DEVICE — IMP SCR SYN MATRIX LOW PRO 1.5X04MM SELF DRILL 04.503.104.01: Type: IMPLANTABLE DEVICE | Site: CRANIAL | Status: FUNCTIONAL

## 2022-08-15 DEVICE — KIT DBS LEAD DBS 8CH 40CM 1-3,3-1 SPACE 0.5 BLACK 6172ANS: Type: IMPLANTABLE DEVICE | Site: CRANIAL | Status: FUNCTIONAL

## 2022-08-15 RX ORDER — OXYCODONE HYDROCHLORIDE 5 MG/1
5 TABLET ORAL EVERY 4 HOURS PRN
Status: DISCONTINUED | OUTPATIENT
Start: 2022-08-15 | End: 2022-08-16 | Stop reason: HOSPADM

## 2022-08-15 RX ORDER — ONDANSETRON 2 MG/ML
4 INJECTION INTRAMUSCULAR; INTRAVENOUS EVERY 6 HOURS PRN
Status: DISCONTINUED | OUTPATIENT
Start: 2022-08-15 | End: 2022-08-15 | Stop reason: HOSPADM

## 2022-08-15 RX ORDER — DEXAMETHASONE SODIUM PHOSPHATE 4 MG/ML
6 INJECTION, SOLUTION INTRA-ARTICULAR; INTRALESIONAL; INTRAMUSCULAR; INTRAVENOUS; SOFT TISSUE ONCE
Status: COMPLETED | OUTPATIENT
Start: 2022-08-15 | End: 2022-08-15

## 2022-08-15 RX ORDER — SODIUM CHLORIDE 9 MG/ML
INJECTION, SOLUTION INTRAVENOUS CONTINUOUS
Status: ACTIVE | OUTPATIENT
Start: 2022-08-15 | End: 2022-08-16

## 2022-08-15 RX ORDER — CEFAZOLIN SODIUM 2 G/100ML
2 INJECTION, SOLUTION INTRAVENOUS EVERY 8 HOURS
Status: COMPLETED | OUTPATIENT
Start: 2022-08-15 | End: 2022-08-16

## 2022-08-15 RX ORDER — ACETAMINOPHEN 325 MG/1
650 TABLET ORAL EVERY 4 HOURS PRN
Qty: 60 TABLET | Refills: 0 | Status: SHIPPED | OUTPATIENT
Start: 2022-08-18 | End: 2023-09-08

## 2022-08-15 RX ORDER — FAMOTIDINE 20 MG/1
20 TABLET, FILM COATED ORAL 2 TIMES DAILY
Status: DISCONTINUED | OUTPATIENT
Start: 2022-08-16 | End: 2022-08-16 | Stop reason: HOSPADM

## 2022-08-15 RX ORDER — AMANTADINE HYDROCHLORIDE 100 MG/1
100 CAPSULE, GELATIN COATED ORAL 2 TIMES DAILY
Status: DISCONTINUED | OUTPATIENT
Start: 2022-08-16 | End: 2022-08-16 | Stop reason: HOSPADM

## 2022-08-15 RX ORDER — NALOXONE HYDROCHLORIDE 0.4 MG/ML
0.4 INJECTION, SOLUTION INTRAMUSCULAR; INTRAVENOUS; SUBCUTANEOUS
Status: DISCONTINUED | OUTPATIENT
Start: 2022-08-15 | End: 2022-08-16 | Stop reason: HOSPADM

## 2022-08-15 RX ORDER — PROCHLORPERAZINE MALEATE 5 MG
TABLET ORAL EVERY 6 HOURS PRN
Status: CANCELLED | OUTPATIENT
Start: 2022-08-15

## 2022-08-15 RX ORDER — PROPOFOL 10 MG/ML
INJECTION, EMULSION INTRAVENOUS PRN
Status: DISCONTINUED | OUTPATIENT
Start: 2022-08-15 | End: 2022-08-15

## 2022-08-15 RX ORDER — LORAZEPAM 2 MG/ML
0.5 INJECTION INTRAMUSCULAR ONCE
Status: DISCONTINUED | OUTPATIENT
Start: 2022-08-15 | End: 2022-08-15

## 2022-08-15 RX ORDER — CEFAZOLIN SODIUM/WATER 2 G/20 ML
2 SYRINGE (ML) INTRAVENOUS EVERY 8 HOURS
Status: DISCONTINUED | OUTPATIENT
Start: 2022-08-15 | End: 2022-08-15 | Stop reason: CLARIF

## 2022-08-15 RX ORDER — HYDROMORPHONE HYDROCHLORIDE 1 MG/ML
0.2 INJECTION, SOLUTION INTRAMUSCULAR; INTRAVENOUS; SUBCUTANEOUS EVERY 5 MIN PRN
Status: DISCONTINUED | OUTPATIENT
Start: 2022-08-15 | End: 2022-08-15 | Stop reason: HOSPADM

## 2022-08-15 RX ORDER — POLYETHYLENE GLYCOL 3350 17 G/17G
17 POWDER, FOR SOLUTION ORAL DAILY
Status: DISCONTINUED | OUTPATIENT
Start: 2022-08-16 | End: 2022-08-16 | Stop reason: HOSPADM

## 2022-08-15 RX ORDER — LORAZEPAM 0.5 MG/1
0.5 TABLET ORAL 2 TIMES DAILY PRN
Status: DISCONTINUED | OUTPATIENT
Start: 2022-08-15 | End: 2022-08-16 | Stop reason: HOSPADM

## 2022-08-15 RX ORDER — LORAZEPAM 2 MG/ML
0.5 INJECTION INTRAMUSCULAR
Status: DISCONTINUED | OUTPATIENT
Start: 2022-08-15 | End: 2022-08-15

## 2022-08-15 RX ORDER — SODIUM CHLORIDE, SODIUM LACTATE, POTASSIUM CHLORIDE, CALCIUM CHLORIDE 600; 310; 30; 20 MG/100ML; MG/100ML; MG/100ML; MG/100ML
INJECTION, SOLUTION INTRAVENOUS CONTINUOUS
Status: DISCONTINUED | OUTPATIENT
Start: 2022-08-15 | End: 2022-08-15 | Stop reason: HOSPADM

## 2022-08-15 RX ORDER — NALOXONE HYDROCHLORIDE 0.4 MG/ML
0.2 INJECTION, SOLUTION INTRAMUSCULAR; INTRAVENOUS; SUBCUTANEOUS
Status: DISCONTINUED | OUTPATIENT
Start: 2022-08-15 | End: 2022-08-16 | Stop reason: HOSPADM

## 2022-08-15 RX ORDER — CEFAZOLIN SODIUM/WATER 2 G/20 ML
2 SYRINGE (ML) INTRAVENOUS EVERY 8 HOURS
Status: DISCONTINUED | OUTPATIENT
Start: 2022-08-15 | End: 2022-08-15

## 2022-08-15 RX ORDER — CEFAZOLIN SODIUM/WATER 2 G/20 ML
2 SYRINGE (ML) INTRAVENOUS SEE ADMIN INSTRUCTIONS
Status: DISCONTINUED | OUTPATIENT
Start: 2022-08-15 | End: 2022-08-15 | Stop reason: HOSPADM

## 2022-08-15 RX ORDER — LIDOCAINE 40 MG/G
CREAM TOPICAL
Status: DISCONTINUED | OUTPATIENT
Start: 2022-08-15 | End: 2022-08-15 | Stop reason: HOSPADM

## 2022-08-15 RX ORDER — DEXMEDETOMIDINE HYDROCHLORIDE 4 UG/ML
INJECTION, SOLUTION INTRAVENOUS CONTINUOUS PRN
Status: DISCONTINUED | OUTPATIENT
Start: 2022-08-15 | End: 2022-08-15

## 2022-08-15 RX ORDER — ONDANSETRON 4 MG/1
4 TABLET, ORALLY DISINTEGRATING ORAL EVERY 6 HOURS PRN
Status: DISCONTINUED | OUTPATIENT
Start: 2022-08-15 | End: 2022-08-16 | Stop reason: HOSPADM

## 2022-08-15 RX ORDER — LIDOCAINE HYDROCHLORIDE 20 MG/ML
INJECTION, SOLUTION INFILTRATION; PERINEURAL PRN
Status: DISCONTINUED | OUTPATIENT
Start: 2022-08-15 | End: 2022-08-15

## 2022-08-15 RX ORDER — HYDRALAZINE HYDROCHLORIDE 20 MG/ML
2.5-5 INJECTION INTRAMUSCULAR; INTRAVENOUS EVERY 10 MIN PRN
Status: DISCONTINUED | OUTPATIENT
Start: 2022-08-15 | End: 2022-08-15 | Stop reason: HOSPADM

## 2022-08-15 RX ORDER — DEXMEDETOMIDINE HYDROCHLORIDE 4 UG/ML
INJECTION, SOLUTION INTRAVENOUS PRN
Status: DISCONTINUED | OUTPATIENT
Start: 2022-08-15 | End: 2022-08-15

## 2022-08-15 RX ORDER — LORAZEPAM 2 MG/ML
1 INJECTION INTRAMUSCULAR
Status: DISCONTINUED | OUTPATIENT
Start: 2022-08-15 | End: 2022-08-15 | Stop reason: HOSPADM

## 2022-08-15 RX ORDER — PROPOFOL 10 MG/ML
INJECTION, EMULSION INTRAVENOUS CONTINUOUS PRN
Status: DISCONTINUED | OUTPATIENT
Start: 2022-08-15 | End: 2022-08-15

## 2022-08-15 RX ORDER — ONDANSETRON 2 MG/ML
4 INJECTION INTRAMUSCULAR; INTRAVENOUS EVERY 30 MIN PRN
Status: DISCONTINUED | OUTPATIENT
Start: 2022-08-15 | End: 2022-08-15 | Stop reason: HOSPADM

## 2022-08-15 RX ORDER — ONDANSETRON 4 MG/1
4 TABLET, ORALLY DISINTEGRATING ORAL EVERY 30 MIN PRN
Status: DISCONTINUED | OUTPATIENT
Start: 2022-08-15 | End: 2022-08-15 | Stop reason: HOSPADM

## 2022-08-15 RX ORDER — ONDANSETRON 2 MG/ML
4 INJECTION INTRAMUSCULAR; INTRAVENOUS EVERY 6 HOURS PRN
Status: DISCONTINUED | OUTPATIENT
Start: 2022-08-15 | End: 2022-08-16 | Stop reason: HOSPADM

## 2022-08-15 RX ORDER — OXYCODONE HYDROCHLORIDE 10 MG/1
10 TABLET ORAL EVERY 4 HOURS PRN
Status: DISCONTINUED | OUTPATIENT
Start: 2022-08-15 | End: 2022-08-16 | Stop reason: HOSPADM

## 2022-08-15 RX ORDER — LORAZEPAM 2 MG/ML
INJECTION INTRAMUSCULAR PRN
Status: DISCONTINUED | OUTPATIENT
Start: 2022-08-15 | End: 2022-08-15

## 2022-08-15 RX ORDER — LIDOCAINE HYDROCHLORIDE AND EPINEPHRINE 10; 10 MG/ML; UG/ML
INJECTION, SOLUTION INFILTRATION; PERINEURAL PRN
Status: DISCONTINUED | OUTPATIENT
Start: 2022-08-15 | End: 2022-08-15 | Stop reason: HOSPADM

## 2022-08-15 RX ORDER — LABETALOL HYDROCHLORIDE 5 MG/ML
10-40 INJECTION, SOLUTION INTRAVENOUS EVERY 10 MIN PRN
Status: DISCONTINUED | OUTPATIENT
Start: 2022-08-15 | End: 2022-08-16 | Stop reason: HOSPADM

## 2022-08-15 RX ORDER — HYDRALAZINE HYDROCHLORIDE 20 MG/ML
10-20 INJECTION INTRAMUSCULAR; INTRAVENOUS EVERY 30 MIN PRN
Status: DISCONTINUED | OUTPATIENT
Start: 2022-08-15 | End: 2022-08-16 | Stop reason: HOSPADM

## 2022-08-15 RX ORDER — FENTANYL CITRATE 50 UG/ML
INJECTION, SOLUTION INTRAMUSCULAR; INTRAVENOUS PRN
Status: DISCONTINUED | OUTPATIENT
Start: 2022-08-15 | End: 2022-08-15

## 2022-08-15 RX ORDER — SODIUM CHLORIDE, SODIUM LACTATE, POTASSIUM CHLORIDE, CALCIUM CHLORIDE 600; 310; 30; 20 MG/100ML; MG/100ML; MG/100ML; MG/100ML
INJECTION, SOLUTION INTRAVENOUS CONTINUOUS PRN
Status: DISCONTINUED | OUTPATIENT
Start: 2022-08-15 | End: 2022-08-15

## 2022-08-15 RX ORDER — PROCHLORPERAZINE 25 MG
25 SUPPOSITORY, RECTAL RECTAL EVERY 12 HOURS PRN
Status: CANCELLED | OUTPATIENT
Start: 2022-08-15

## 2022-08-15 RX ORDER — FENTANYL CITRATE 50 UG/ML
25 INJECTION, SOLUTION INTRAMUSCULAR; INTRAVENOUS EVERY 5 MIN PRN
Status: DISCONTINUED | OUTPATIENT
Start: 2022-08-15 | End: 2022-08-15 | Stop reason: HOSPADM

## 2022-08-15 RX ORDER — CEFAZOLIN SODIUM 1 G/50ML
2 SOLUTION INTRAVENOUS EVERY 8 HOURS
Status: DISCONTINUED | OUTPATIENT
Start: 2022-08-15 | End: 2022-08-15 | Stop reason: HOSPADM

## 2022-08-15 RX ORDER — LIDOCAINE HYDROCHLORIDE 20 MG/ML
JELLY TOPICAL PRN
Status: DISCONTINUED | OUTPATIENT
Start: 2022-08-15 | End: 2022-08-15 | Stop reason: HOSPADM

## 2022-08-15 RX ORDER — LABETALOL HYDROCHLORIDE 5 MG/ML
10 INJECTION, SOLUTION INTRAVENOUS
Status: DISCONTINUED | OUTPATIENT
Start: 2022-08-15 | End: 2022-08-15 | Stop reason: HOSPADM

## 2022-08-15 RX ORDER — LIDOCAINE 40 MG/G
CREAM TOPICAL
Status: DISCONTINUED | OUTPATIENT
Start: 2022-08-15 | End: 2022-08-16 | Stop reason: HOSPADM

## 2022-08-15 RX ORDER — ACETAMINOPHEN 325 MG/1
650 TABLET ORAL EVERY 4 HOURS PRN
Status: DISCONTINUED | OUTPATIENT
Start: 2022-08-18 | End: 2022-08-16 | Stop reason: HOSPADM

## 2022-08-15 RX ORDER — BISACODYL 10 MG
10 SUPPOSITORY, RECTAL RECTAL DAILY PRN
Status: DISCONTINUED | OUTPATIENT
Start: 2022-08-15 | End: 2022-08-16 | Stop reason: HOSPADM

## 2022-08-15 RX ORDER — ACETAMINOPHEN 325 MG/1
975 TABLET ORAL EVERY 8 HOURS
Status: DISCONTINUED | OUTPATIENT
Start: 2022-08-15 | End: 2022-08-16 | Stop reason: HOSPADM

## 2022-08-15 RX ORDER — ONDANSETRON 2 MG/ML
INJECTION INTRAMUSCULAR; INTRAVENOUS PRN
Status: DISCONTINUED | OUTPATIENT
Start: 2022-08-15 | End: 2022-08-15

## 2022-08-15 RX ORDER — LORAZEPAM 1 MG/1
1 TABLET ORAL EVERY 4 HOURS PRN
Status: CANCELLED | OUTPATIENT
Start: 2022-08-15

## 2022-08-15 RX ORDER — CEFAZOLIN SODIUM/WATER 2 G/20 ML
2 SYRINGE (ML) INTRAVENOUS
Status: COMPLETED | OUTPATIENT
Start: 2022-08-15 | End: 2022-08-15

## 2022-08-15 RX ORDER — AMOXICILLIN 250 MG
1 CAPSULE ORAL 2 TIMES DAILY
Status: DISCONTINUED | OUTPATIENT
Start: 2022-08-15 | End: 2022-08-16 | Stop reason: HOSPADM

## 2022-08-15 RX ADMIN — FENTANYL CITRATE 50 MCG: 50 INJECTION, SOLUTION INTRAMUSCULAR; INTRAVENOUS at 14:43

## 2022-08-15 RX ADMIN — ONDANSETRON 4 MG: 2 INJECTION INTRAMUSCULAR; INTRAVENOUS at 13:55

## 2022-08-15 RX ADMIN — FENTANYL CITRATE 50 MCG: 50 INJECTION, SOLUTION INTRAMUSCULAR; INTRAVENOUS at 14:53

## 2022-08-15 RX ADMIN — SODIUM CHLORIDE, POTASSIUM CHLORIDE, SODIUM LACTATE AND CALCIUM CHLORIDE: 600; 310; 30; 20 INJECTION, SOLUTION INTRAVENOUS at 12:26

## 2022-08-15 RX ADMIN — PROPOFOL 20 MG: 10 INJECTION, EMULSION INTRAVENOUS at 13:30

## 2022-08-15 RX ADMIN — PROPOFOL 20 MG: 10 INJECTION, EMULSION INTRAVENOUS at 13:16

## 2022-08-15 RX ADMIN — PROPOFOL 20 MG: 10 INJECTION, EMULSION INTRAVENOUS at 07:48

## 2022-08-15 RX ADMIN — LIDOCAINE HYDROCHLORIDE 80 MG: 20 INJECTION, SOLUTION INFILTRATION; PERINEURAL at 08:30

## 2022-08-15 RX ADMIN — PROPOFOL 20 MG: 10 INJECTION, EMULSION INTRAVENOUS at 07:51

## 2022-08-15 RX ADMIN — DEXMEDETOMIDINE HYDROCHLORIDE 8 MCG: 4 INJECTION, SOLUTION INTRAVENOUS at 13:00

## 2022-08-15 RX ADMIN — ACETAMINOPHEN 975 MG: 325 TABLET, FILM COATED ORAL at 18:02

## 2022-08-15 RX ADMIN — ONDANSETRON 4 MG: 2 INJECTION INTRAMUSCULAR; INTRAVENOUS at 15:54

## 2022-08-15 RX ADMIN — Medication 2 G: at 08:25

## 2022-08-15 RX ADMIN — PROPOFOL 20 MG: 10 INJECTION, EMULSION INTRAVENOUS at 08:46

## 2022-08-15 RX ADMIN — LORAZEPAM 0.5 MG: 2 INJECTION INTRAMUSCULAR; INTRAVENOUS at 06:30

## 2022-08-15 RX ADMIN — LEVODOPA AND CARBIDOPA 3 CAPSULE: 195; 48.75 CAPSULE, EXTENDED RELEASE ORAL at 16:55

## 2022-08-15 RX ADMIN — DEXMEDETOMIDINE HYDROCHLORIDE 8 MCG: 4 INJECTION, SOLUTION INTRAVENOUS at 08:30

## 2022-08-15 RX ADMIN — LORAZEPAM 1 MG: 2 INJECTION INTRAMUSCULAR; INTRAVENOUS at 14:12

## 2022-08-15 RX ADMIN — PROPOFOL 20 MCG/KG/MIN: 10 INJECTION, EMULSION INTRAVENOUS at 08:55

## 2022-08-15 RX ADMIN — PROPOFOL 20 MG: 10 INJECTION, EMULSION INTRAVENOUS at 08:42

## 2022-08-15 RX ADMIN — LORAZEPAM 0.5 MG: 2 INJECTION INTRAMUSCULAR; INTRAVENOUS at 06:25

## 2022-08-15 RX ADMIN — DEXAMETHASONE SODIUM PHOSPHATE 6 MG: 4 INJECTION, SOLUTION INTRA-ARTICULAR; INTRALESIONAL; INTRAMUSCULAR; INTRAVENOUS; SOFT TISSUE at 19:13

## 2022-08-15 RX ADMIN — LORAZEPAM 0.5 MG: 2 INJECTION INTRAMUSCULAR; INTRAVENOUS at 12:17

## 2022-08-15 RX ADMIN — LORAZEPAM 0.5 MG: 2 INJECTION INTRAMUSCULAR; INTRAVENOUS at 12:19

## 2022-08-15 RX ADMIN — PROPOFOL 20 MG: 10 INJECTION, EMULSION INTRAVENOUS at 09:00

## 2022-08-15 RX ADMIN — ONDANSETRON 4 MG: 2 INJECTION INTRAMUSCULAR; INTRAVENOUS at 21:58

## 2022-08-15 RX ADMIN — LEVODOPA AND CARBIDOPA 2 CAPSULE: 195; 48.75 CAPSULE, EXTENDED RELEASE ORAL at 22:00

## 2022-08-15 RX ADMIN — PROPOFOL 10 MG: 10 INJECTION, EMULSION INTRAVENOUS at 14:08

## 2022-08-15 RX ADMIN — PROPOFOL 30 MG: 10 INJECTION, EMULSION INTRAVENOUS at 07:49

## 2022-08-15 RX ADMIN — PROPOFOL 10 MG: 10 INJECTION, EMULSION INTRAVENOUS at 09:02

## 2022-08-15 RX ADMIN — SODIUM CHLORIDE: 9 INJECTION, SOLUTION INTRAVENOUS at 18:03

## 2022-08-15 RX ADMIN — PROPOFOL 30 MG: 10 INJECTION, EMULSION INTRAVENOUS at 07:47

## 2022-08-15 RX ADMIN — SODIUM CHLORIDE, POTASSIUM CHLORIDE, SODIUM LACTATE AND CALCIUM CHLORIDE: 600; 310; 30; 20 INJECTION, SOLUTION INTRAVENOUS at 07:37

## 2022-08-15 RX ADMIN — Medication 2 G: at 12:25

## 2022-08-15 RX ADMIN — SODIUM CHLORIDE, POTASSIUM CHLORIDE, SODIUM LACTATE AND CALCIUM CHLORIDE 1 ML: 600; 310; 30; 20 INJECTION, SOLUTION INTRAVENOUS at 15:24

## 2022-08-15 RX ADMIN — DEXMEDETOMIDINE HYDROCHLORIDE 8 MCG: 4 INJECTION, SOLUTION INTRAVENOUS at 08:40

## 2022-08-15 RX ADMIN — ONDANSETRON 4 MG: 2 INJECTION INTRAMUSCULAR; INTRAVENOUS at 06:38

## 2022-08-15 RX ADMIN — PROPOFOL 30 MG: 10 INJECTION, EMULSION INTRAVENOUS at 14:12

## 2022-08-15 RX ADMIN — Medication 2 G: at 20:00

## 2022-08-15 RX ADMIN — Medication 0.7 MCG/KG/HR: at 08:28

## 2022-08-15 ASSESSMENT — ACTIVITIES OF DAILY LIVING (ADL)
ADLS_ACUITY_SCORE: 36
ADLS_ACUITY_SCORE: 35
ADLS_ACUITY_SCORE: 36
ADLS_ACUITY_SCORE: 35
ADLS_ACUITY_SCORE: 36
ADLS_ACUITY_SCORE: 36

## 2022-08-15 NOTE — PROGRESS NOTES
Dr Manriquez in to interview patient. Patient became very anxious, started having abdominal cramps, and nausea (realted to abd cramping). Order received-see EMR    0700 patient states a relief of anxiety, abd cramping and nausea

## 2022-08-15 NOTE — PROGRESS NOTES
Neurosurgery Pre- Operative Physical Exam    [facility], [place]  Neurosurgery Progress Note:  08/15/2022      Assessment:  Annalise Nicole is a 36 y/o gentleman with PD who with history of DBS s/p left sided GPi DBS now presented for combined Phase I/II DBS to right GPi.    Gen: Appears comfortable, mild distress  Wound: left scalp and left post auricular incisions healing well  Neurologic:  Alert & Oriented to person, place, time, and situation  Follows commands briskly  Speech fluent, spontaneous. No aphasia or dysarthria.  No gaze preference. No apparent hemineglect.  PERRL, EOMI  Face symmetric with sensation intact to light touch  Palate elevates symmetrically, uvula midline, tongue protrudes midline  Trapezii muscles 5/5 bilaterally  No pronator drift; tremors on left UE and LE     Del Tr Bi WE WF Gr   R 5 5 5 5 5 5   L 5 5 5 5 5 5    HF KE KF DF PF EHL   R 5 5 5 5 5 5   L 5 5 5 5 5 5     Reflexes 2+ throughout    Sensation intact and symmetric to light touch throughout    Objective:      No intake/output data recorded.        LABS:  Recent Labs   Lab 08/15/22  0550 08/15/22  0548   NA  --  138   POTASSIUM  --  4.1   CHLORIDE  --  101   CO2  --  23   ANIONGAP  --  14   * 110*   BUN  --  14.7   CR  --  0.80   DAWSON  --  10.5*       Recent Labs   Lab 08/15/22  0548   WBC 6.2   RBC 4.73   HGB 14.3   HCT 41.8   MCV 88   MCH 30.2   MCHC 34.2   RDW 12.6        Barbi Manriquez MD, PhD  PGY-2 Neurosurgery  Pager: [phone]

## 2022-08-15 NOTE — ANESTHESIA CARE TRANSFER NOTE
Patient: Dipesh Nicole    Procedure: Procedure(s):  Stealth Assisted Right side deep brain stimulator placement, phase I & II combined, placement of right side deep brain stimulator electrode, target right globus pallidus internus, with microelectrode recording and connection to existing generator/battery       Diagnosis: Parkinson disease (H) [G20]  Diagnosis Additional Information: No value filed.    Anesthesia Type:   No value filed.     Note:    Oropharynx: oropharynx clear of all foreign objects and spontaneously breathing  Level of Consciousness: awake  Oxygen Supplementation: room air    Independent Airway: airway patency satisfactory and stable  Dentition: dentition unchanged  Vital Signs Stable: post-procedure vital signs reviewed and stable  Report to RN Given: handoff report given  Patient transferred to: PACU    Handoff Report: Identifed the Patient, Identified the Reponsible Provider, Reviewed the pertinent medical history, Discussed the surgical course, Reviewed Intra-OP anesthesia mangement and issues during anesthesia, Set expectations for post-procedure period and Allowed opportunity for questions and acknowledgement of understanding      Vitals:  Vitals Value Taken Time   /90 08/15/22 1455   Temp     Pulse 60 08/15/22 1456   Resp 14    SpO2 95 % 08/15/22 1456   Vitals shown include unvalidated device data.    Electronically Signed By: LUCERO Montes De Oca CRNA  August 15, 2022  2:58 PM

## 2022-08-15 NOTE — BRIEF OP NOTE
[facility]    Brief Operative Note    Pre-operative diagnosis: Parkinson disease (H) [G20]  Post-operative diagnosis Same as pre-operative diagnosis    Procedure: Procedure(s):  Stealth Assisted Right side deep brain stimulator placement, phase I & II combined, placement of right side deep brain stimulator electrode, target right globus pallidus internus, with microelectrode recording and connection to existing generator/battery  Surgeon: Surgeon(s) and Role:     * Angel Morales MD - Primary     * Barbi Manriquez MD - Resident - Assisting  Anesthesia: MAC with Local   Estimated Blood Loss: 10 mL from 8/15/2022  7:37 AM to 8/15/2022  2:48 PM      Drains: None  Specimens: * No specimens in log *  Findings:  Previously placed lead and connector identified under left scalp. All impedences within normal limits with final verification.   Complications: None.  Implants:   Implant Name Type Inv. Item Serial No.  Lot No. LRB No. Used Action   IMP BUR HOLE COVER GUARDIAN 6010ANS - [id number] Metallic Hardware/[employer] IMP BUR HOLE COVER GUARDIAN 6010ANS  Jaco Solarsi [id number] Right 1 Implanted   KIT DBS LEAD DBS 8CH 40CM 1-3,3-1 SPACE 0.5 BLACK 6172ANS - [id number] Leads KIT DBS LEAD DBS 8CH 40CM 1-3,3-1 SPACE 0.5 BLACK 6172ANS [id number] [employer]  Right 1 Implanted   IMP PLATE SYN STR DOG BONE LOW PROFILE 4H .504 - [id number] Metallic Hardware/[employer] IMP PLATE SYN STR DOG BONE LOW PROFILE 4H .504  SYNTHES[employer]STRATEC NA Right 1 Implanted   IMP SCR SYN MATRIX LOW PRO 1.5X04MM SELF DRILL [id number] - [id number] Metallic Hardware/[employer] IMP SCR SYN MATRIX LOW PRO 1.5X04MM SELF DRILL [id number]  SYNTHES[employer]STRATEC NA Right 4 Implanted     Closure: monocryl suture    Barbi Manriquez MD, PhD  PGY-2 Neurosurgery  Pager: [phone]

## 2022-08-15 NOTE — ANESTHESIA POSTPROCEDURE EVALUATION
Patient: Dipesh Nicole    Procedure: Procedure(s):  Stealth Assisted Right side deep brain stimulator placement, phase I & II combined, placement of right side deep brain stimulator electrode, target right globus pallidus internus, with microelectrode recording and connection to existing generator/battery       Anesthesia Type:  MAC    Note:  Disposition: Inpatient   Postop Pain Control: Challenging            Sign Out: Well controlled pain   PONV: No   Neuro/Psych:             Sign Out: Acceptable/Baseline neuro status   Airway/Respiratory: Uneventful            Sign Out: Acceptable/Baseline resp. status   CV/Hemodynamics: Uneventful            Sign Out: Acceptable CV status; No obvious hypovolemia; No obvious fluid overload   Other NRE: NONE   DID A NON-ROUTINE EVENT OCCUR? No           Last vitals:  Vitals Value Taken Time   /92 08/15/22 1651   Temp 36.8  C (98.3  F) 08/15/22 1500   Pulse 61 08/15/22 1704   Resp 14 08/15/22 1500   SpO2 99 % 08/15/22 1704   Vitals shown include unvalidated device data.    Electronically Signed By: Pramod Owens MD  August 15, 2022  6:16 PM

## 2022-08-15 NOTE — PHARMACY-ADMISSION MEDICATION HISTORY
Admission Medication History Completed by Pharmacy    See Epic Admission Navigator for allergy information, preferred outpatient pharmacy, prior to admission medications and immunization status.     Medication History Sources:     Patient    Surescripts dispense report    Epic chart review    Changes made to PTA medication list (reason):    Added: None    Deleted:   o Miralax  o Senna-docusate    Changed: None    Additional Information:    Pt manages his own meds and is an excellent historian    Allergies reviewed, no changes    Pt placed rotigotine patch 8/15am on R thigh @0400    Pt reported he does not need/use lorazepam PRN anxiety at home, but occasionally gets anxious when hospitalized, so med was left on PTA list    Prior to Admission medications    Medication Sig Last Dose Taking? Auth Provider Long Term End Date   acetaminophen (TYLENOL) 325 MG tablet Take 2 tablets (650 mg) by mouth every 6 hours as needed for mild pain Past Month at Unknown time Yes Ketan Velez MD     amantadine (SYMMETREL) 100 MG capsule 100mg by mouth twice daily at 7am and 11am  Patient taking differently: Take 100 mg by mouth 2 times daily 100mg by mouth twice daily at 7am and 11am 8/15/2022 at 0500 Yes Ramesh Carson MD     carbidopa-levodopa (PARCOPA)  MG ODT Take one-half to 1 tablet by mouth as needed for breakthrough Parkinson symptoms, up to 2 times/day 8/14/2022 at Unknown time Yes Ramesh Carson MD Yes    carbidopa-levodopa (RYTARY) 48. MG CPCR per CR capsule Take 2 capsules by mouth at 5 am, 9 am, 5 pm, and bedtime and take 3 capsules at 1 pm = 11 capsules/day 8/15/2022 at Unknown time Yes Ramesh Carson MD Yes    famotidine (PEPCID) 20 MG tablet 20mg tab by mouth twice daily at 7am and 11am 8/15/2022 at Unknown time Yes Ramesh Carson MD     LORazepam (ATIVAN) 0.5 MG tablet Take 0.5 mg by mouth 2 times daily as needed More than a month at Unknown time Yes Reported, Patient     Multiple  Vitamin (MULTIVITAMIN ADULT PO) every morning Vitamin by mouth daily Past Week at Unknown time Yes Reported, Patient     ondansetron (ZOFRAN ODT) 4 MG ODT tab Take 1 tablet (4 mg) by mouth every 6 hours as needed for nausea or vomiting Past Week at Unknown time Yes Angel Morales MD     rotigotine (NEUPRO) 3 MG/24HR 24 hr patch Place 1 patch onto the skin daily 8/15/2022 at 0400 Yes Ramesh Carson MD Yes         Date completed: 08/15/22    Medication history completed by:   Jeremy Pena, PharmD, BCPS

## 2022-08-15 NOTE — ANESTHESIA PREPROCEDURE EVALUATION
Anesthesia Pre-Procedure Evaluation    Patient: Dipesh Nicole   MRN: [id number] : 1985        Procedure : Procedure(s):  Stealth Assisted Right side deep brain stimulator placement, phase I & II combined, placement of right side deep brain stimulator electrode, target right globus pallidus internus, with microelectrode recording and connection to existing generator/battery          Past Medical History:   Diagnosis Date     Esophagitis endoscopy done 2021    Impression:            - Normal first portion of the duodenum, second portion                         of the duodenum and third portion of the duodenum.                         Biopsied.                         - Nodular mucosa in the duodenal bulb. Biopsied.                         - Normal stomach. Biopsied.                         - Medium-sized hiatal hernia.                         - LA Grade     H/O electroencephalogram 2021     Impression:  This is a normal waking and sleep EEG, with generalized beta activities throughout the recording. Generalized beta activities are usually associated certain medication use, such as benzodiazepines or barbiturates. The cause of these activities in this case is unclear at this time.         H/O magnetic resonance imaging of brain and brain stem 2018 MR Brain without and with IV [id number]/8/2009 [facility] Result Impression  Normal MRI of the brain.  Result Narrative      Multiecho, multiplanar views of the brain were obtained prior to and  following the IV administration of 19 mL of contrast. There are no  previous studies available for comparison.     The brain parenchyma is normal in appearance on all pulse sequences,  with      H/O magnetic resonance imaging of cervical spine 2018 MR Cervical Spine without IV Contrast3/15/2017 [facility] Result Addenda Addendum by Provider, PA Jones on 03/15/2017 12:30 PM RAD^^^MA MR Cervical Spine w/o  contrast 3/15/2017 12:30:00 Result Impression  Minimal degenerative disc disease at C5-6 and C6-7  otherwise an unremarkable MR scan of the cervical spine.  Result Narrative   EXAM: MR Cervical Spine w/o contrast   INDICATION:      H/O shoulder surgery 8/17/2018 2017 July XR SHOULDER 2 VIEWS LEFT7[facility]14[facility] Narrative XR SHOULDER 2 VIEWS LEFT 7/14/2017 9:13 PM  INDICATION: Shoulder Injury COMPARISON: None.  FINDINGS: Negative shoulder. No fracture or dislocation.  Status       Hiatal hernia 4/20/2021    Based on a 2021 ct scan Small hiatal hernia with some wall thickening in the distal thoracic esophagus suggesting some esophagitis.     Seizure (H) at age 22 yrs 8/17/2018     Shoulder dislocation     left     Tremor 8/10/2018      Past Surgical History:   Procedure Laterality Date     ESOPHAGOSCOPY, GASTROSCOPY, DUODENOSCOPY (EGD), COMBINED N/A 11/18/2021    Procedure: ESOPHAGOGASTRODUODENOSCOPY, WITH BIOPSY;  Surgeon: Veronica Kay MD;  Location: [facility] OR     ESOPHAGOSCOPY, GASTROSCOPY, DUODENOSCOPY (EGD), COMBINED N/A 3/7/2022    Procedure: ESOPHAGOGASTRODUODENOSCOPY, WITH BIOPSY;  Surgeon: Alexandr Sweet MD;  Location: [facility] OR     IMPLANT DEEP BRAIN STIMULATION GENERATOR / BATTERY Left 7/25/2022    Procedure: Deep brain stimulator placement, phase II, placement of deep brain stimulator generator/battery over the left chest wall;  Surgeon: Angel Morales MD;  Location: [facility] OR     OPTICAL TRACKING SYSTEM INSERTION DEEP BRAIN STIMULATION Left 7/18/2022    Procedure: Left side deep brain stimulator placement, phase I, placement of left side deep brain stimulator electrode, target left globus pallidus internus, with microelectrode recording;  Surgeon: Angel Morales MD;  Location: [facility] OR     SHOULDER SURGERY  2007      Allergies   Allergen Reactions     Diphenhydramine      Other reaction(s): Other (see comments)  Told not to take due to  parkinsons     Metoclopramide      Avoid in patient with a history of Parkinson Disease  Other reaction(s): GI intolerance  Avoid in patient with a history of Parkinson Disease     Promethazine      Other reaction(s): Other (see comments)  Told not to take due to parkisons      Social History     Tobacco Use     Smoking status: Never Smoker     Smokeless tobacco: Never Used   Substance Use Topics     Alcohol use: Yes      Wt Readings from Last 1 Encounters:   08/15/22 75.9 kg (167 lb 5.3 oz)        Anesthesia Evaluation   Pt has had prior anesthetic. Type: MAC and General.        ROS/MED HX  ENT/Pulmonary:  - neg pulmonary ROS     Neurologic:     (+) Parkinson's disease,     Cardiovascular:  - neg cardiovascular ROS     METS/Exercise Tolerance:     Hematologic:  - neg hematologic  ROS     Musculoskeletal:  - neg musculoskeletal ROS     GI/Hepatic:  - neg GI/hepatic ROS     Renal/Genitourinary:  - neg Renal ROS     Endo:  - neg endo ROS     Psychiatric/Substance Use:  - neg psychiatric ROS     Infectious Disease:  - neg infectious disease ROS     Malignancy:  - neg malignancy ROS     Other:  - neg other ROS          Physical Exam    Airway        Mallampati: II   TM distance: > 3 FB    Mouth opening: > 3 cm    Respiratory Devices and Support         Dental  no notable dental history         Cardiovascular   cardiovascular exam normal          Pulmonary   pulmonary exam normal                OUTSIDE LABS:  CBC:   Lab Results   Component Value Date    WBC 6.2 08/15/2022    WBC 11.4 (H) 07/19/2022    HGB 14.3 08/15/2022    HGB 14.2 07/19/2022    HCT 41.8 08/15/2022    HCT 42.0 07/19/2022     08/15/2022     07/19/2022     BMP:   Lab Results   Component Value Date     08/15/2022     07/19/2022    POTASSIUM 4.1 08/15/2022    POTASSIUM 3.8 07/19/2022    CHLORIDE 101 08/15/2022    CHLORIDE 102 07/19/2022    CO2 23 08/15/2022    CO2 23 07/19/2022    BUN 14.7 08/15/2022    BUN 7.6 07/19/2022    CR  0.80 08/15/2022    CR 0.82 07/19/2022     (H) 08/15/2022     (H) 08/15/2022     COAGS:   Lab Results   Component Value Date    PTT 28 08/15/2022    INR 0.97 08/15/2022     POC: No results found for: BGM, HCG, HCGS  HEPATIC: No results found for: ALBUMIN, PROTTOTAL, ALT, AST, GGT, ALKPHOS, BILITOTAL, BILIDIRECT, JACQUELYN  OTHER:   Lab Results   Component Value Date    DAWSON 10.5 (H) 08/15/2022    PHOS 3.1 07/19/2022    MAG 2.0 07/19/2022       Anesthesia Plan    ASA Status:  2   NPO Status:  NPO Appropriate    Anesthesia Type: MAC.              Consents    Anesthesia Plan(s) and associated risks, benefits, and realistic alternatives discussed. Questions answered and patient/representative(s) expressed understanding.    - Discussed:     - Discussed with:  Patient      - Extended Intubation/Ventilatory Support Discussed: No.      - Patient is DNR/DNI Status: No    Use of blood products discussed: No .     Postoperative Care    Pain management: IV analgesics.   PONV prophylaxis: Ondansetron (or other 5HT-3)     Comments:                Pramod Owens MD

## 2022-08-16 VITALS
SYSTOLIC BLOOD PRESSURE: 132 MMHG | HEIGHT: 73 IN | RESPIRATION RATE: 18 BRPM | DIASTOLIC BLOOD PRESSURE: 84 MMHG | TEMPERATURE: 96.3 F | OXYGEN SATURATION: 98 % | BODY MASS INDEX: 22.18 KG/M2 | WEIGHT: 167.33 LBS | HEART RATE: 84 BPM

## 2022-08-16 LAB
ANION GAP SERPL CALCULATED.3IONS-SCNC: 14 MMOL/L (ref 7–15)
BUN SERPL-MCNC: 8.2 MG/DL (ref 6–20)
CALCIUM SERPL-MCNC: 9.6 MG/DL (ref 8.6–10)
CHLORIDE SERPL-SCNC: 99 MMOL/L (ref 98–107)
CREAT SERPL-MCNC: 0.74 MG/DL (ref 0.67–1.17)
DEPRECATED HCO3 PLAS-SCNC: 23 MMOL/L (ref 22–29)
GFR SERPL CREATININE-BSD FRML MDRD: >90 ML/MIN/1.73M2
GLUCOSE SERPL-MCNC: 124 MG/DL (ref 70–99)
HOLD SPECIMEN: NORMAL
MAGNESIUM SERPL-MCNC: 2 MG/DL (ref 1.7–2.3)
PHOSPHATE SERPL-MCNC: 3.6 MG/DL (ref 2.5–4.5)
POTASSIUM SERPL-SCNC: 4.1 MMOL/L (ref 3.4–5.3)
SODIUM SERPL-SCNC: 136 MMOL/L (ref 136–145)

## 2022-08-16 PROCEDURE — 999N000128 HC STATISTIC PERIPHERAL IV START W/O US GUIDANCE

## 2022-08-16 PROCEDURE — 250N000011 HC RX IP 250 OP 636: Performed by: STUDENT IN AN ORGANIZED HEALTH CARE EDUCATION/TRAINING PROGRAM

## 2022-08-16 PROCEDURE — 250N000013 HC RX MED GY IP 250 OP 250 PS 637

## 2022-08-16 PROCEDURE — 250N000011 HC RX IP 250 OP 636

## 2022-08-16 PROCEDURE — 83735 ASSAY OF MAGNESIUM: CPT

## 2022-08-16 PROCEDURE — 36415 COLL VENOUS BLD VENIPUNCTURE: CPT

## 2022-08-16 PROCEDURE — 84100 ASSAY OF PHOSPHORUS: CPT

## 2022-08-16 PROCEDURE — 82310 ASSAY OF CALCIUM: CPT

## 2022-08-16 PROCEDURE — 250N000013 HC RX MED GY IP 250 OP 250 PS 637: Performed by: NEUROLOGICAL SURGERY

## 2022-08-16 RX ORDER — MAGNESIUM OXIDE 400 MG/1
400 TABLET ORAL EVERY 4 HOURS
Status: DISCONTINUED | OUTPATIENT
Start: 2022-08-16 | End: 2022-08-16 | Stop reason: HOSPADM

## 2022-08-16 RX ORDER — CEFAZOLIN SODIUM 2 G/100ML
2 INJECTION, SOLUTION INTRAVENOUS ONCE
Status: COMPLETED | OUTPATIENT
Start: 2022-08-16 | End: 2022-08-16

## 2022-08-16 RX ADMIN — ACETAMINOPHEN 975 MG: 325 TABLET, FILM COATED ORAL at 00:27

## 2022-08-16 RX ADMIN — LORAZEPAM 0.5 MG: 0.5 TABLET ORAL at 05:25

## 2022-08-16 RX ADMIN — POLYETHYLENE GLYCOL 3350 17 G: 17 POWDER, FOR SOLUTION ORAL at 08:27

## 2022-08-16 RX ADMIN — AMANTADINE 100 MG: 100 CAPSULE ORAL at 08:27

## 2022-08-16 RX ADMIN — Medication 400 MG: at 09:27

## 2022-08-16 RX ADMIN — LEVODOPA AND CARBIDOPA 2 CAPSULE: 195; 48.75 CAPSULE, EXTENDED RELEASE ORAL at 08:28

## 2022-08-16 RX ADMIN — Medication 2 G: at 04:05

## 2022-08-16 RX ADMIN — SENNOSIDES AND DOCUSATE SODIUM 1 TABLET: 50; 8.6 TABLET ORAL at 08:27

## 2022-08-16 RX ADMIN — CEFAZOLIN SODIUM 2 G: 2 INJECTION, SOLUTION INTRAVENOUS at 09:26

## 2022-08-16 RX ADMIN — ONDANSETRON 4 MG: 4 TABLET, ORALLY DISINTEGRATING ORAL at 08:27

## 2022-08-16 RX ADMIN — FAMOTIDINE 20 MG: 20 TABLET ORAL at 08:27

## 2022-08-16 RX ADMIN — ROTIGOTINE 1 PATCH: 3 PATCH, EXTENDED RELEASE TRANSDERMAL at 04:05

## 2022-08-16 RX ADMIN — ACETAMINOPHEN 975 MG: 325 TABLET, FILM COATED ORAL at 08:30

## 2022-08-16 RX ADMIN — LEVODOPA AND CARBIDOPA 2 CAPSULE: 195; 48.75 CAPSULE, EXTENDED RELEASE ORAL at 04:04

## 2022-08-16 ASSESSMENT — ACTIVITIES OF DAILY LIVING (ADL)
CONCENTRATING,_REMEMBERING_OR_MAKING_DECISIONS_DIFFICULTY: NO
DIFFICULTY_EATING/SWALLOWING: NO
ADLS_ACUITY_SCORE: 19
ADLS_ACUITY_SCORE: 19
DRESSING/BATHING_DIFFICULTY: NO
CHANGE_IN_FUNCTIONAL_STATUS_SINCE_ONSET_OF_CURRENT_ILLNESS/INJURY: NO
ADLS_ACUITY_SCORE: 19
FALL_HISTORY_WITHIN_LAST_SIX_MONTHS: NO
WALKING_OR_CLIMBING_STAIRS_DIFFICULTY: NO
WEAR_GLASSES_OR_BLIND: NO
ADLS_ACUITY_SCORE: 36
ADLS_ACUITY_SCORE: 19
ADLS_ACUITY_SCORE: 19
TOILETING_ISSUES: NO
DOING_ERRANDS_INDEPENDENTLY_DIFFICULTY: NO

## 2022-08-16 NOTE — PLAN OF CARE
Status: POD 0 DBS phase 2. Arrived from PACU at 1730   Vitals: VSS  Neuros: WNL  IV: NS 100cc/hr  Labs/Electrolytes: none  Resp/trach: WNL  Diet: Regular dinner ordered, but pt unable to eat d/t nausea. Tolerated small amount of water and saltine crackers  Bowel status: Nausea, large emesis (unmeasured) x1. Decadron and Zofran given with fair relief.  : Void 200cc x1 in urinal. Per PACU, pt had laege output in vernon during OR.  Skin: Healing scalp incision with sutures from previous OR. Todays surgical dressings CDI.   Pain: Scheduled Tylenol and ice packs. HOB >30. Pt leary of Oxycodone d/t nausea and intolerance of it in the past.  Activity: Stood at bedside x1.  Social: Parents at bedside earlier this jun.  Plan: Likely home tomorrow  Updates this shift: Need to clarify number of abx doses needed and possibly an increased dose of Zofran. Continue with POC

## 2022-08-16 NOTE — PLAN OF CARE
Patient discharged to home, ride provided by parents. PIV and dressings removed. Education provided and all questions answered. Instructed to  meds at discharge pharmacy.

## 2022-08-16 NOTE — PLAN OF CARE
Status: 8/15 DBS phase 2  Vitals: VSS  Neuros: A/Ox4. 5/5 w slight int. Tremors throughout   IV: PIV- Tko in between antibiotics  Diet: Regular  Bowel status: no BM   : Voiding spont.   Skin: Right side DBS, CDI   Pain: Scheduled Tylenol effective per pt report. Ativan x1   Activity: up ad nemesio   Plan: Continue to follow POC

## 2022-08-16 NOTE — DISCHARGE SUMMARY
[facility] Discharge Summary and Instructions    Dipesh Nicole MRN# [id number]   Age: 36 year old YOB: 1985     Date of Admission:  8/15/2022  Date of Discharge::  8/16/2022  Admitting Physician:  Angel Morales MD  Discharge Physician:  Angel Morales MD          Admission Diagnoses:   Parkinson disease (H) [G20]  S/P deep brain stimulator placement [Z96.89]          Discharge Diagnosis:     Parkinson disease (H) [G20]  S/P deep brain stimulator placement [Z96.89]            Procedures:   1.  Placement of stereotactic frame and neuronavigation planning.  2.  Stealth-assisted right side deep brain stimulator placement.  3.  Placement of right side deep brain stimulator electrode, target right Globus Pallidus Internus with microelectrode recording.  4.  Intraoperative fluoroscopy.  5.  Electrical interrogation and analysis of deep brain stimulator system.           Brief History of Illness:   Annalise Nicole is a 36 y/o gentleman with PD who with history of DBS s/p left sided GPi DBS now presented for combined Phase I/II DBS to right GPi on 8/15/2022.       Patient underwent above-mentioned procedure on 8/15/22. The operation was uncomplicated and he was admitted to the surgical floor for routine post operative cares. On post operative day 1, he was ambulating, voiding without a vernon, eating a regular diet, pain was well controlled with tylenol and therefore he was discharged home.    Exam on day of discharge:  Gen: Appears comfortable, mild distress  Wound: left scalp and left post auricular incisions healing well; new right scalp incisions clean, dry, and intact  Neurologic:  Alert & Oriented to person, place, time, and situation  Follows commands briskly  Speech fluent, spontaneous. No aphasia or dysarthria.  No gaze preference. No apparent hemineglect.  PERRL, EOMI  Face symmetric with sensation intact to light touch  Palate elevates symmetrically, uvula midline, tongue protrudes  midline  Trapezii muscles 5/5 bilaterally  No pronator drift; tremors on left UE and LE       Del Tr Bi WE WF Gr   R 5 5 5 5 5 5   L 5 5 5 5 5 5     HF KE KF DF PF EHL   R 5 5 5 5 5 5   L 5 5 5 5 5 5      Reflexes 2+ throughout     Sensation intact and symmetric to light touch throughout                 Discharge Medications:     Current Discharge Medication List      START taking these medications    Details   !! acetaminophen (TYLENOL) 325 MG tablet Take 2 tablets (650 mg) by mouth every 4 hours as needed for headaches or pain (For optimal non-opioid multimodal pain management to improve pain control.)  Qty: 60 tablet, Refills: 0    Associated Diagnoses: S/P deep brain stimulator placement       !! - Potential duplicate medications found. Please discuss with provider.      CONTINUE these medications which have NOT CHANGED    Details   !! acetaminophen (TYLENOL) 325 MG tablet Take 2 tablets (650 mg) by mouth every 6 hours as needed for mild pain  Qty: 50 tablet, Refills: 0    Associated Diagnoses: S/P deep brain stimulator placement      amantadine (SYMMETREL) 100 MG capsule 100mg by mouth twice daily at 7am and 11am  Qty: 180 capsule, Refills: 3    Associated Diagnoses: Parkinson disease (H)      carbidopa-levodopa (PARCOPA)  MG ODT Take one-half to 1 tablet by mouth as needed for breakthrough Parkinson symptoms, up to 2 times/day  Qty: 60 tablet, Refills: 11    Associated Diagnoses: Parkinson disease (H)      carbidopa-levodopa (RYTARY) 48. MG CPCR per CR capsule Take 2 capsules by mouth at 5 am, 9 am, 5 pm, and bedtime and take 3 capsules at 1 pm = 11 capsules/day  Qty: 330 capsule, Refills: 11    Comments: Dose increase  Associated Diagnoses: Parkinson disease (H)      famotidine (PEPCID) 20 MG tablet 20mg tab by mouth twice daily at 7am and 11am  Qty: 180 tablet, Refills: 11    Associated Diagnoses: Other acute gastritis without hemorrhage      LORazepam (ATIVAN) 0.5 MG tablet Take 0.5 mg by mouth  2 times daily as needed      Multiple Vitamin (MULTIVITAMIN ADULT PO) every morning Vitamin by mouth daily      ondansetron (ZOFRAN ODT) 4 MG ODT tab Take 1 tablet (4 mg) by mouth every 6 hours as needed for nausea or vomiting  Qty: 10 tablet, Refills: 0    Associated Diagnoses: S/P deep brain stimulator placement; Nausea      rotigotine (NEUPRO) 3 MG/24HR 24 hr patch Place 1 patch onto the skin daily  Qty: 30 patch, Refills: 11    Comments: Dose change  Associated Diagnoses: Parkinson disease (H)       !! - Potential duplicate medications found. Please discuss with provider.                  Discharge Instructions and Follow-Up:     Discharge diet: Regular   Discharge activity: You may advance activity as tolerated. No strenuous exercise or heay lifting greater than 10 lbs for 4 weeks or until seen and cleared in clinic.   Discharge follow-up: Follow-up with Monae Wong, Neurosurgery [facility] on 9/2/2022 for wound check/post-hospital evaluation       Wound care: Ok to shower on post operative day 3, however no scrubbing of the wound and no soaking of the wound, meaning no bathtubs or swimming pools. Pat dry only. Leave wound open to air.  Patient to have wound checked 2 weeks following surgery.    Wound location: left and right scalp  Closure technique: absorbable sutures  Dressing needs: none  Post-op care at follow-up: Keep dry and clean     Please call if you have:  1. increased pain, redness, drainage, swelling at your incision  2. fevers > 101.5 F degrees  3. with any questions or concerns.  You may reach the Neurosurgery clinic at [phone] during regular work hours. ER at [phone].    and ask for the Neurosurgery Resident on call at [phone], during off hours or weekends.         Discharge Disposition:     Discharged to home        Barbi Manriquez MD, PhD  Department of Neurosurgery  PGY-2    LUCERO Orona, CNP  Neurosurgery  Pager [phone]

## 2022-08-17 ENCOUNTER — TELEPHONE (OUTPATIENT)
Dept: NEUROSURGERY | Facility: CLINIC | Age: 37
End: 2022-08-17

## 2022-08-17 DIAGNOSIS — G20.A1 PARKINSON DISEASE (H): Primary | ICD-10-CM

## 2022-08-17 RX ORDER — LORAZEPAM 0.5 MG/1
0.5 TABLET ORAL 2 TIMES DAILY PRN
Qty: 10 TABLET | Refills: 3 | Status: SHIPPED | OUTPATIENT
Start: 2022-08-17 | End: 2023-09-08

## 2022-08-17 NOTE — TELEPHONE ENCOUNTER
Discharge call to Annalise  [facility]   Date of Admission:                  8/15/2022  Date of Discharge::                 8/16/2022  Admitting Physician:               Angel Morales MD  Stealth Assisted Right side deep brain stimulator placement, phase I & II combined, placement of right side deep brain stimulator electrode, target right globus pallidus internus, with microelectrode recording and connection to existing generator/battery    Outbound call to patient, left detailed message to call Rubi MARTÍNEZ @ [phone], just wanting post procedure update and how things are going.    
Patient returned call, states out walking and doing well.  No issues at this time, Did not have time to visit, patient checking in.    Call back for any questions/concerns.    
Salvador Michael (MD)  Cardiovascular Disease; Internal Medicine; Interventional Cardiology  [address] [place] [address]  Phone: [phone]  Fax: [phone]  Follow Up Time:
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
## 2022-08-24 NOTE — PROGRESS NOTES
Department of Neurology  Movement Disorders Division   DBS Follow-up Note    Patient: Dipesh Nicole  MRN: [id number]   : 1985   Date of Visit: 2022    Diagnosis: Parkinson's disease   DBS Target(s): Bilateral GPi  Date(s) of DBS Lead Placement: L GPi 2022; R GPi 8/15/2022     Date(s) of IPG Placement: 2022  Device: Abbott Infinity 7    Chief Complaint:  Dipseh Nicole is a 36 year old male who returns to clinic for follow up of Parkinson's disease status post bilateral GPi DBS (as above). He presents today for 1st monopolar review & initial programming of L GPi.    Interval History:  No new medical history since the surgeries. He has been doing well.    Lesion effect:  Improved abdominal cramping, no emesis since surgery. Left tremors improved in foot and hand. Walking has been easier, feels strong and more active.    Side effect post DBS lead placement:  None     Review of Systems:  Other than that noted at the end of this note, the remainder of 12 systems reviewed were negative.      PD Medications 5 AM 7 AM 8 AM 9 AM 11 AM 1 PM  5 PM 9:30 PM   Amantadine 100 mg  1   1      Carbidopa/levodopa ODT 25/100 (Parcopa) As needed           Rytary 195 mg  2   2  2 2 2   Rotigotine 3 mg 24 hr patch   1          Last dose taken:  - Amantadine yesterday at 11 AM  - Parcopa not recently  - Rytary today at 5 AM  - Rotigotine took off at 5 AM, did not replace    During exam:   - Amantadine 100 mg 1 tab taken at 8:15 AM    Medications:  Current Outpatient Medications   Medication Sig Dispense Refill     acetaminophen (TYLENOL) 325 MG tablet Take 2 tablets (650 mg) by mouth every 4 hours as needed for headaches or pain (For optimal non-opioid multimodal pain management to improve pain control.) 60 tablet 0     acetaminophen (TYLENOL) 325 MG tablet Take 2 tablets (650 mg) by mouth every 6 hours as needed for mild pain 50 tablet 0     amantadine (SYMMETREL) 100 MG capsule 100mg by mouth twice daily at 7am and  11am (Patient taking differently: Take 100 mg by mouth 2 times daily 100mg by mouth twice daily at 7am and 11am) 180 capsule 3     carbidopa-levodopa (PARCOPA)  MG ODT Take one-half to 1 tablet by mouth as needed for breakthrough Parkinson symptoms, up to 2 times/day 60 tablet 11     carbidopa-levodopa (RYTARY) 48. MG CPCR per CR capsule Take 2 capsules by mouth at 5 am, 9 am, 5 pm, and bedtime and take 3 capsules at 1 pm = 11 capsules/day 330 capsule 11     famotidine (PEPCID) 20 MG tablet 20mg tab by mouth twice daily at 7am and 11am 180 tablet 11     LORazepam (ATIVAN) 0.5 MG tablet Take 1 tablet (0.5 mg) by mouth 2 times daily as needed (do not drive after taking a dose) 10 tablet 3     Multiple Vitamin (MULTIVITAMIN ADULT PO) every morning Vitamin by mouth daily       ondansetron (ZOFRAN ODT) 4 MG ODT tab Take 1 tablet (4 mg) by mouth every 6 hours as needed for nausea or vomiting 10 tablet 0     rotigotine (NEUPRO) 3 MG/24HR 24 hr patch Place 1 patch onto the skin daily 30 patch 11      Allergies: is allergic to diphenhydramine, metoclopramide, and promethazine.    Past Medical History:  Past Medical History:   Diagnosis Date     Esophagitis endoscopy done 11/2021 11/18/2021    Impression:            - Normal first portion of the duodenum, second portion                         of the duodenum and third portion of the duodenum.                         Biopsied.                         - Nodular mucosa in the duodenal bulb. Biopsied.                         - Normal stomach. Biopsied.                         - Medium-sized hiatal hernia.                         - LA Grade     H/O electroencephalogram 2021 11/18/2021     Impression:  This is a normal waking and sleep EEG, with generalized beta activities throughout the recording. Generalized beta activities are usually associated certain medication use, such as benzodiazepines or barbiturates. The cause of these activities in this case is unclear at  this time.         H/O magnetic resonance imaging of brain and brain stem 8/17/2018 2009 December MR Brain without and with IV [id number]/8/2009 [facility] Result Impression  Normal MRI of the brain.  Result Narrative      Multiecho, multiplanar views of the brain were obtained prior to and  following the IV administration of 19 mL of contrast. There are no  previous studies available for comparison.     The brain parenchyma is normal in appearance on all pulse sequences,  with      H/O magnetic resonance imaging of cervical spine 8/17/2018 March MR Cervical Spine without IV Contrast3/15/2017 [facility] Result Addenda Addendum by Provider, PA Jones on 03/15/2017 12:30 PM RAD^^^MA MR Cervical Spine w/o contrast 3/15/2017 12:30:00 Result Impression  Minimal degenerative disc disease at C5-6 and C6-7  otherwise an unremarkable MR scan of the cervical spine.  Result Narrative   EXAM: MR Cervical Spine w/o contrast   INDICATION:      H/O shoulder surgery 8/17/2018 2017 July XR SHOULDER 2 VIEWS LEFT7[facility]14[facility] Result Narrative XR SHOULDER 2 VIEWS LEFT 7/14/2017 9:13 PM  INDICATION: Shoulder Injury COMPARISON: None.  FINDINGS: Negative shoulder. No fracture or dislocation.  Status       Hiatal hernia 4/20/2021    Based on a 2021 ct scan Small hiatal hernia with some wall thickening in the distal thoracic esophagus suggesting some esophagitis.     Seizure (H) at age 22 yrs 8/17/2018     Shoulder dislocation     left     Tremor 8/10/2018       Past Surgical History:  Past Surgical History:   Procedure Laterality Date     ESOPHAGOSCOPY, GASTROSCOPY, DUODENOSCOPY (EGD), COMBINED N/A 11/18/2021    Procedure: ESOPHAGOGASTRODUODENOSCOPY, WITH BIOPSY;  Surgeon: Veronica Kay MD;  [facility] [facility] [place]     ESOPHAGOSCOPY, GASTROSCOPY, DUODENOSCOPY (EGD), COMBINED N/A 3/7/2022    Procedure: ESOPHAGOGASTRODUODENOSCOPY, WITH BIOPSY;  Surgeon: Alexandr Sweet MD;   Location: [facility] OR     IMPLANT DEEP BRAIN STIMULATION GENERATOR / BATTERY Left 7/25/2022    Procedure: Deep brain stimulator placement, phase II, placement of deep brain stimulator generator/battery over the left chest wall;  Surgeon: Angel Morales MD;  Location: [facility] OR     OPTICAL TRACKING SYSTEM INSERTION DEEP BRAIN STIMULATION Left 7/18/2022    Procedure: Left side deep brain stimulator placement, phase I, placement of left side deep brain stimulator electrode, target left globus pallidus internus, with microelectrode recording;  Surgeon: Angel Morales MD;  Location: [facility] OR     OPTICAL TRACKING SYSTEM INSERTION DEEP BRAIN STIMULATION Right 8/15/2022    Procedure: Stealth Assisted Right side deep brain stimulator placement, phase I & II combined, placement of right side deep brain stimulator electrode, target right globus pallidus internus, with microelectrode recording and connection to existing generator/battery;  Surgeon: Angel Morales MD;  Location: [facility] OR     SHOULDER SURGERY  2007       Social History:  Social History     Socioeconomic History     Marital status: Single   Tobacco Use     Smoking status: Never Smoker     Smokeless tobacco: Never Used   Substance and Sexual Activity     Alcohol use: Yes     Drug use: No   Social History Narrative    single. lives in [facility]. mother cabrera ambriz            He has a history of a left dislocated shoulder with surgery performed in 2009. He currently lives in [place] but comes to the [place] area to visit his parents in [place]. 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     Social Determinants of Health     Intimate Partner Violence: Not At Risk     Fear of Current or Ex-Partner: No     Emotionally Abused: No     Physically Abused: No     Sexually Abused: No       Family History:  Family History   Problem Relation Age of Onset     Cancer Other         grandmother     Tremor No family hx of      Dementia No family hx of       Parkinsonism No family hx of      Seizure Disorder No family hx of        Physical Exam:  The patient's  vitals were not taken for this visit.       Neurological Examination:     Gait: long stride length, good arm swing bilaterally, dyskinetic, stable on arrival    UPDRS Values 8/26/2022   Time: 7:14 AM   Medication On   R Brain DBS: None   L Brain DBS: None   Dyskinesia (LID) Yes   Did LID interfere No   Speech 0   Facial Expression 0   Rigidity Neck 1   Rigidity RUE 1   Rigidity LUE 0   Rigidity RLE 1   Rigidity LLE 0   Finger Taps R 0   Finger Taps L 1   Hand Mvt R 0   Hand Mvt L 0   Pron-/Supinate R 0   Pron-/Supinate L 0   Toe Tap R 0   Toe Tap L 0   Leg Agility R 0   Leg Agility L 0   Arise From Chair 0   Gait 1   Gait Freezing 0   Postural Stability 0   Posture 0   Global Spont Mvt 0   Postural Tremor RUE 0   Postural Tremor LUE 0   Kinetic Tremor RUE 0   Kinetic Tremor LUE 1   Rest Tremor RUE 0   Rest Tremor LUE 0   Rest Tremor RLE 0   Rest Tremor LLE 0   Rest Tremor Lip/Jaw 0   Rest Tremor Constancy 0   Total Right 2   Total Left 2   Axial Total 2   Total 6       Incision Site:  left chest are dry, intact, and healing nicely. No redness, swelling, or drainage. DBS insertions and behind ear at the extension wire are scabbed and healing nicely    Data Reviewed:   Surgical notes from 7/18/22, 7/25/22, 8/15/22  [facility] reports    Procedure: DBS Interrogation & Programming    Lead(s):    Left Right   DBS Target GPi GPi   DBS Lead Type Infinity 0.5 Infinity 0.5   Lead Implant Date 7/18/2022 8/15/2022     IPG(s):   1   IPG Abbott Infinity 7   IPG Implant Date 7/25/2022   Location Left Chest   Battery (V) >3.0 V     Impedance Check: low impedances found on 10B, 12-  See scanned report for impedance details.    Monopolar Review    Generator Serial No. Lead Hemisphere   Lead Region    Initials     [id number] Left GPi KG, SC          Contacts   Ampl   Ampl Unit   Pulse Width (ms)     Rate (Hz)   Effect on  "Symptoms    Side Effects       C+3- 1.0 mA 60 130      1.5         2.0         2.5         3.0         3.5         4.0         4.5     \"Light energy\" feeling in R face. Slight nasolabial fold flattening    4.8     More tingling in face    5.0     Slight facial asymmetry in mouth    0     Resolved    4.0    FT 1 (worse)     0    FT back to 0     3.0    NC     3.5    NC     4.0    FT 1 Gait stable, with slightly decreased L arm swing   C+ 2- 1.0 mA 60 130      1.5         2.0         2.5         3.0         3.4     Paresthesias in right face, endorses more difficulty with speech    3.7     Worsening paresthesias, nasolabial fold flattening    3.2     Side effects resolve   C+ 1- 0    LLE tremor 1, FT 0     2.2     Persistent R face paresthesias, transient RLE paresthesias. Subjectively more difficulty with speech.   C+ 4- 0     Resolution of symptoms    3.5     Very faint paresthesia in face    3.7     ?Slight flattening of nasolabial fold    4.0     NC    4.5     NC    5.0     NC    5.5     NC    6.0     NC    6.2     Paresthesias in R face & pinky, speech slower    6.5    FTN slightly more difficult for him, still 0  Worsening paresthesias   C+ 3A- 1.0         5.0     ?more difficulty with speech    5.2     Paresthesia in R thumb & eye. Speech more difficult. Right eye squinting   C+3B- 5.5     Slight paresthesia in face, ?speech    5.7     Paresthesia spread to R thumb, worsening speech   C+3C- 4.7     Speech more difficult    4.9     Worsening speech, paresthesias in tongue   C+2A- 3.9     Paresthesias in R cheek & tongue   C+2B- 4.4     Paresthesia tongue/cheek, speech more difficult    4.5     Persistent   C+2C- 3.5     Tongue, cheek paresthesia    3.9     Tighter in lips      NA not assessed  NC no change from setting above  NN none noted    Program A         Side Left GPi  Right GPi   IPG 1  1   Initial/Final Initial Final Initial & Final   Active/Inactive None ACTIVE None   Amplitude (mA)   2.0 (0 - 4.0)   "   Pulse width (usec)   60     Freq (Hz)   130     Contacts:   C pos 3_omni neg          Assessment/Plan:  Dipesh Nicole is a 36 year old male with who returns to clinic for follow up of Parkinson's disease status post bilateral GPi DBS. He presents today for initial monopolar review of Left GPi, which he tolerated well. He was sent home with L GPi stimulation set at 2.0 mA with the plan to gradually increase at 3.0 mA as tolerated/needed for Parkinson's disease symptoms. No medication changes today. Today we educated patient on connecting his patient controller to DBS, checking the battery, and turning the device on/off.    - DBS programmed as above  - no medication changes today    RTC scheduled for R GPi monopolar review on 2022, will also schedule a 3 month DBS follow-up after that appointment    Patient and plan was examined & discussed with attending physician, Dr. Murrell.     Chantale Hays MD  Movement Disorders Fellow     Patient seen and examined with Dr. Hays and I agree with the assessment and plan as outlined.  DBS programming 1.5h.  Additional time this date with patient, reviewing records, and documentin.75h.    Jun Murrell PhD, MD

## 2022-08-25 ENCOUNTER — VIRTUAL VISIT (OUTPATIENT)
Dept: PSYCHOLOGY | Facility: CLINIC | Age: 37
End: 2022-08-25
Payer: COMMERCIAL

## 2022-08-25 DIAGNOSIS — Z91.199 NO-SHOW FOR APPOINTMENT: Primary | ICD-10-CM

## 2022-08-25 NOTE — PROGRESS NOTES
Received [employer] message from patient at 1:45 stating that he is unable to make today's appointment and that he will need to reschedule. Will respond to [employer] message with options for rescheduling.     Ada Mon, Ph.D., , [facility]  Clinical Health Psychologist  Phone: [phone]   Pager: [phone]  8/25/2022 2:05 PM         No show/late cancellation for this scheduled appointment.

## 2022-08-26 ENCOUNTER — OFFICE VISIT (OUTPATIENT)
Dept: NEUROLOGY | Facility: CLINIC | Age: 37
End: 2022-08-26
Payer: COMMERCIAL

## 2022-08-26 DIAGNOSIS — G20.A1 PARKINSON DISEASE (H): Primary | ICD-10-CM

## 2022-08-26 PROCEDURE — 99215 OFFICE O/P EST HI 40 MIN: CPT | Mod: GC | Performed by: PSYCHIATRY & NEUROLOGY

## 2022-08-26 PROCEDURE — 95984 ALYS BRN NPGT PRGRMG ADDL 15: CPT | Mod: GC | Performed by: PSYCHIATRY & NEUROLOGY

## 2022-08-26 PROCEDURE — 95983 ALYS BRN NPGT PRGRMG 15 MIN: CPT | Mod: GC | Performed by: PSYCHIATRY & NEUROLOGY

## 2022-08-26 ASSESSMENT — UNIFIED PARKINSONS DISEASE RATING SCALE (UPDRS)
TOETAPPING_RIGHT: NORMAL
AMPLITUDE_RUE: NORMAL: NO TREMOR.
RIGIDITY_NECK: SLIGHT: RIGIDITY ONLY DETECTED WITH ACTIVATION MANEUVER.
AMPLITUDE_LLE: NORMAL: NO TREMOR.
FINGER_TAPPING_LEFT: SLIGHT: ANY OF THE FOLLOWING: A) THE REGULAR RHYTHM IS BROKEN WITH ONE WITH ONE OR TWO INTERRUPTIONS OR HESITATIONS OF THE MOVEMENT B) SLIGHT SLOWING C) THE AMPLITUDE DECREMENTS NEAR THE END OF THE 10 MOVEMENTS.
HANDMOVEMENTS_LEFT: NORMAL
FREEZING_GAIT: NORMAL
TOTAL_SCORE: 2
RIGIDITY_RLE: SLIGHT: RIGIDITY ONLY DETECTED WITH ACTIVATION MANEUVER.
LEG_AGILITY_RIGHT: NORMAL
SPONTANEITY_OF_MOVEMENT: 0: NORMAL.  NO PROBLEMS.
FINGER_TAPPING_RIGHT: NORMAL
SPEECH: NORMAL
CONSTANCY_TREMOR_ATREST: NORMAL: NO TREMOR.
FACIAL_EXPRESSION: NORMAL.
PARKINSONS_MEDS: ON
AMPLITUDE_RLE: NORMAL: NO TREMOR.
RIGIDITY_RUE: SLIGHT: RIGIDITY ONLY DETECTED WITH ACTIVATION MANEUVER.
DYSKINESIAS_PRESENT: YES
ARISING_CHAIR: NORMAL: ABLE TO ARISE QUICKLY WITHOUT HESITATION.
AXIAL_SCORE: 2
RIGIDITY_LUE: NORMAL
AMPLITUDE_LUE: NORMAL: NO TREMOR.
MOVEMENTS_INTERFERE_WITH_RATINGS: NO
PRONATION_SUPINATION_LEFT: NORMAL
TOETAPPING_LEFT: NORMAL
LEG_AGILITY_LEFT: NORMAL
GAIT: SLIGHT: INDEPENDENT WALKING WITH MINOR GAIT IMPAIRMENT.
HANDMOVEMENTS_RIGHT: NORMAL
TOTAL_SCORE: 6
POSTURE: 0 NORMAL, NO PROBLEMS
AMPLITUDE_LIP_JAW: NORMAL: NO TREMOR.
TOTAL_SCORE_LEFT: 2
RIGIDITY_LLE: NORMAL
PRONATION_SUPINATION_RIGHT: NORMAL
POSTURAL_STABILITY: NORMAL:  RECOVERS WITH ONE OR TWO STEPS.

## 2022-08-26 ASSESSMENT — MOVEMENT DISORDERS SOCIETY - UNIFIED PARKINSONS DISEASE RATING SCALE (MDS-UPDRS)
CHEWING_AND_SWALLOWING: NORMAL: NO PROBLEMS.
HYGIENE: NORMAL: NOT AT ALL (NO PROBLEMS).
SPEECH: NORMAL: NOT AT ALL (NO PROBLEMS).
TURNING_IN_BED: NORMAL: NOT AT ALL (NO PROBLEMS).
WALKING_AND_BALANCE: NORMAL: NOT AT ALL (NO PROBLEMS).
FREEZING: NORMAL: NOT AT ALL (NO PROBLEMS).
SALIVA_AND_DROOLING: NORMAL: NOT AT ALL (NO PROBLEMS).
DRESSING: NORMAL: NOT AT ALL (NO PROBLEMS).
EATING_TASKS: NORMAL: NOT AT ALL (NO PROBLEMS).
HANDWRITING: NORMAL: NOT AT ALL (NO PROBLEMS).
TOTAL_SCORE: 1
HOBBIES_AND_OTHER_ACTIVITIES: SLIGHT: I AM A BIT SLOW BUT DO THESE ACTIVITIES EASILY.
GETTING_OUT_OF_BED_CAR_DEEP_CHAIR: NORMAL: NOT AT ALL (NO PROBLEMS).
TREMOR: NORMAL: NOT AT ALL (NO PROBLEMS).

## 2022-08-26 NOTE — PATIENT INSTRUCTIONS
I recommend checking your battery once per week. We discussed how to do this today in clinic.    I have programmed your RIGHT body today and it is currently set at 2.0 mA. You can increase gradually up to 3.0 mA or up to the lowest effective dose. If you develop any side effects, decrease to the last stimulation that did not have side effect. You can increase by 1 click (0.1 mA) each day.

## 2022-09-02 ENCOUNTER — OFFICE VISIT (OUTPATIENT)
Dept: NEUROSURGERY | Facility: CLINIC | Age: 37
End: 2022-09-02
Payer: COMMERCIAL

## 2022-09-02 VITALS
BODY MASS INDEX: 23.22 KG/M2 | SYSTOLIC BLOOD PRESSURE: 134 MMHG | WEIGHT: 176 LBS | DIASTOLIC BLOOD PRESSURE: 90 MMHG | HEART RATE: 70 BPM | RESPIRATION RATE: 16 BRPM | OXYGEN SATURATION: 98 %

## 2022-09-02 DIAGNOSIS — G20.A1 PARKINSON DISEASE (H): Primary | ICD-10-CM

## 2022-09-02 PROCEDURE — 99024 POSTOP FOLLOW-UP VISIT: CPT | Performed by: NURSE PRACTITIONER

## 2022-09-02 ASSESSMENT — PAIN SCALES - GENERAL: PAINLEVEL: NO PAIN (0)

## 2022-09-02 NOTE — PROGRESS NOTES
[facility]  Department of Neurosurgery      Name: Dipesh Nicole  MRN: [id number]  Age: 36 year old  : 1985  Referring provider: Angel Morales  2022      Chief Complaint:   Parkinson's disease  S/p Stealth-assisted right-sided deep brain stimulator placement and Placement of right-sided deep brain stimulator electrode on 8/15/2022  Post op follow up    History of Present Illness:   Dipesh Nicole is a 36 year old male with a history of Parkinson's disease, s/p  right-sided deep brain stimulator placement and Placement of right-sided deep brain stimulator electrode on 8/15/2022. He was discharged from the hospital the same day.     Today, patient presents for the 2 weeks post op evaluation. He denies any fever and chills since hospital discharge. Overall feeling well. He is planning to start a new job in  once he is cleared from the surgery. He had a visit with Dr. Murrell in Neurology for the DBS programming.       Review of Systems:   Pertinent items are noted in HPI or as in patient entered ROS below, remainder of complete ROS is negative.   No flowsheet data found.     Physical Exam:   There were no vitals taken for this visit.   General: No acute distress.    Neuro: The patient is fully oriented. Speech is normal. Gait is normal.  Psych: Normal mood and affect. Behavior is normal.    Incision: Right sided scalp incisions x 2 CDI. Absorbable sutures present.  No redness or drainage      Imaging:  No new imaging.     Procedures:  Single Nylon suture removed from the left side of his head.     Assessment:  Parkinson's disease  S/p Stealth-assisted right-sided deep brain stimulator placement and Placement of right-sided deep brain stimulator electrode on 8/15/2022  Post op follow up    Plan:  Patient to follow up with us needed. He is planning to start a new job in  after 2 more weeks and I think it is reasonable. 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        I spent 30 minutes on patient care activities related to this encounter on the date of service, including time spent reviewing the chart, obtaining history and examination and in counseling the patient, and in documentation in the electronic medical record.      Monae BARKER CNP  Department of Neurosurgery

## 2022-09-02 NOTE — LETTER
2022       RE: Dipesh Nicole  [address] [place] [address]     Dear Colleague,    Thank you for referring your patient, Dipesh Nicole, to the [facility] NEUROSURGERY [facility]INIC [facility]. Please see a copy of my visit note below.    [facility]  Department of Neurosurgery      Name: Dipesh Nicole  MRN: [id number]  Age: 36 year old  : 1985  Referring provider: Angel Morales  2022      Chief Complaint:   Parkinson's disease  S/p Stealth-assisted right-sided deep brain stimulator placement and Placement of right-sided deep brain stimulator electrode on 8/15/2022  Post op follow up    History of Present Illness:   Dipesh Nicole is a 36 year old male with a history of Parkinson's disease, s/p  right-sided deep brain stimulator placement and Placement of right-sided deep brain stimulator electrode on 8/15/2022. He was discharged from the hospital the same day.     Today, patient presents for the 2 weeks post op evaluation. He denies any fever and chills since hospital discharge. Overall feeling well. He is planning to start a new job in  once he is cleared from the surgery. He had a visit with Dr. Murrell in Neurology for the DBS programming.       Review of Systems:   Pertinent items are noted in HPI or as in patient entered ROS below, remainder of complete ROS is negative.   No flowsheet data found.     Physical Exam:   There were no vitals taken for this visit.   General: No acute distress.    Neuro: The patient is fully oriented. Speech is normal. Gait is normal.  Psych: Normal mood and affect. Behavior is normal.    Incision: Right sided scalp incisions x 2 CDI. Absorbable sutures present.  No redness or drainage      Imaging:  No new imaging.     Procedures:  Single Nylon suture removed from the left side of his head.     Assessment:  Parkinson's disease  S/p Stealth-assisted right-sided  deep brain stimulator placement and Placement of right-sided deep brain stimulator electrode on 8/15/2022  Post op follow up    Plan:  Patient to follow up with us needed. He is planning to start a new job in  after 2 more weeks and I think it is reasonable. 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        I spent 30 minutes on patient care activities related to this encounter on the date of service, including time spent reviewing the chart, obtaining history and examination and in counseling the patient, and in documentation in the electronic medical record.      Monae BARKER, CNP  Department of Neurosurgery

## 2022-09-07 NOTE — PROGRESS NOTES
Intraoperative SAMM and test stimulation for DBS placement     Procedure Note     Patient Name: Dipesh Nicole  MRN: [id number]  Date of Service: August 15, 2022     Procedures: 1. Intraoperative microelectrode recording for guidance of deep brain stimulator lead placement. 2. Test microstimulation from the microelectrode. 3. Test macrostimulation from the microelectrode canula and from the implanted DBS lead.     Physician performing procedure: Dustin Parkinson, PhD, Norma Zepeda, PhD, Sienna Barber DO     Associated Surgical Procedure: Unilateral right globus pallidus interna deep brain stimulator placement.     Surgeon:   Angel Morales MD     Patient Identification: Dipesh Nicole is undergoing unilateral right GPi DBS for treatment of Parkinson Disease.      Description of Procedure: Prior to surgery the patient underwent stereotactic MR scanning for localization of the globus pallidi. The images were imported into the Stealth system for computation, reformatting, and trajectory planning. The initial anatomic target was defined on stereotactic brain MRI by direct visualization of the GPi borders and optic tract using T2-weighted, susceptibility-weighted images (SWI), and T1 MPRAGE MRI. The initial target and trajectory was planned with the goal that the quadripolar electrode array would terminate near the dorsolateral border of the optic tract and traverse the intercommissural plane within the posterior GPi at a distance of at least ~3 mm from the pallidocapsular border. This corresponded to the posteroventral GPi as directly visualized on T2 weighted, SWI, and MPRAGE T1 images. A near parasagittal trajectory were planned to the target that avoided the ventricles, sulci, and cortical veins as visualized on MR.     The morning of surgery the [facility] stereotactic head frame was placed and the patient underwent stereotactic CT scanning with the localizer box in place. The images were then imported into the Stealth  system for computational fusion with the previously described MRI scan.      After the surgical procedure was initiated and the bur hole was made by Dr. Angel Morales, intraoperative microelectrode recording was performed. The right brain was mapped and implanted. Three simultaneous microelectrode penetrations were made using the Cecil-Gun device. Microelectrode recording was performed using the anterior, lateral, and posterior positions of the Cecil-Gun system.     Anterior track:      This revealed a 6.3 mm segment of pallidal cellular activity, with GPi cellular activity between 4.74 mm above and -1.11 mm in relation to target. Neuronal activity consistent with Alida activity was noted between 12.11 and 6.39 mm (to possible 5.39 mm) above target. Several GPi cells modulated by passive arm and leg movement were identified (see paper chart for details). A few border cells and quieter areas were noted during the recording, representing border zones or laminae. Outside of the pallidal base the optic tract was identified at -3.44 mm using light-evoked action potential discharge. Microelectrode recording penetrations were not made too deep to the pallidal base to avoid the risk of injuring a vessel in the choroidal fissure.      Posterior track:     This revealed a 5.8-6.9 mm segment of pallidal cellular activity, with GPi cellular activity between 1.89 mm above and -1.04 mm in relation to target. Neuronal activity consistent with Alida activity was noted between 11.68 and 7.15 mm (to possible 4.74) above target. Several GPi cells modulated by passive arm and leg movement were identified (see paper chart for details). A few border cells and quieter areas were noted during the recording, representing border zones or laminae. Outside of the pallidal base the optic tract was not identified using light-evoked action potential discharge but perhaps experienced a capsular response at -3.44 mm. Microelectrode recording penetrations  were not made greater too deep to the pallidal base to avoid the risk of injuring a vessel in the choroidal fissure.     Lateral track:     This revealed a 4-6.9 mm segment of pallidal cellular activity, with GPi cellular activity between about 2.52 mm above (possibly 3.55 mm) and -2.37 mm in relation to target. Neuronal activity consistent with Alida activity was noted between 9.99 and 5.35 mm (to possibly 3.7 mm) above target. Several GPi cells modulated by passive arm and leg movement were identified (see paper chart for details). A few border cells and quieter areas were noted during the recording, representing border zones or laminae. Outside of the pallidal base the optic tract was not identified using light-evoked action potential discharge. Microelectrode recording penetrations were not made too deep to the pallidal base to avoid the risk of injuring a vessel in the choroidal fissure.     Microstimulation from the microelectrode:     We then proceeded to do test micostimulation from the microelectrode tip from the posterior position track at -3.44 mm below target tongue symptoms were noted. Test micostimulation via ring electrode from the microelectrode tip of the anterior position track at -3.0 mmg (0 mm) at 4.0 mA and 4.5 mA, 130 Hz and 60  s developed capsular effect noted as tongue symptoms.     Macrostimulation from the SAMM canula:     Test macrostimulation from the SAMM canula was next performed at -0.25 mm in relation to target with 1 negative, 3abc positive, 90 microseconds, 130 Hz, and 0-5.5 milliamps. At 2.0 milliamps, rigidity improved with further improvement at 4.0 and sustained improvement 5.0 milliamps. Internal capsule effects noted at 5.5 milliamps with speech difficulty which may have continued when decreased to 5.0 milliamps. At the same depth, test macrostimulation from the SAMM canula was performed with 3abc negative, 1  positive, 90 microseconds, 130 Hz, and 0-6.0 milliamps. At 4.0  "milliamps, patient reported \"arm feels loose\" and at 6.0 milliamps, patient reported \"dry mouth.\"      This microelectrode recording and test stimulation was thought to be reasonable and an anterior lead was fastened into place with the tip at -0.25 mm below target.     Test stimulation from the DBS lead:     Test stimulation was then performed with 1 negative, 3abc positive and 3abc negative, 1 positive at 90 microseconds, 130 Hz, and rigidity improved form 2-5 milliamps. At 3abc negative, 1  Positive at 90 microseconds, 130 Hz, and 4.0 milliamps, patient reported \"arm feels loose\" and no capsular effect up to 6.0 milliamps.     These side effect thresholds were thought to be reasonable and the anterior lead was fastened into place with the tip at -0.25 mm below target and middle of contact 3 at the dorsal border of GPi (4.75 mm) which correlated with the GPi somatomotor territory.      The patient tolerated the above procedures without complication.     Physician time: A total of 4 hours was spent to perform the above intraoperative microelectrode recording and test stimulation     Sienna Barber DO   of Neurology   [facility]  "

## 2022-09-18 ENCOUNTER — HEALTH MAINTENANCE LETTER (OUTPATIENT)
Age: 37
End: 2022-09-18

## 2022-09-20 ENCOUNTER — VIRTUAL VISIT (OUTPATIENT)
Dept: PSYCHOLOGY | Facility: CLINIC | Age: 37
End: 2022-09-20
Payer: COMMERCIAL

## 2022-09-20 DIAGNOSIS — F41.1 GENERALIZED ANXIETY DISORDER: Primary | ICD-10-CM

## 2022-09-20 DIAGNOSIS — F33.40 MAJOR DEPRESSIVE DISORDER, RECURRENT, IN REMISSION (H): ICD-10-CM

## 2022-09-20 DIAGNOSIS — G20.A1 PARKINSON DISEASE (H): ICD-10-CM

## 2022-09-20 PROCEDURE — 90834 PSYTX W PT 45 MINUTES: CPT | Mod: 95 | Performed by: PSYCHOLOGIST

## 2022-09-20 NOTE — PROGRESS NOTES
"    Health Psychology          Diana Link, Ph.D.,  [phone]  Amaris Peres, Ph.D.,  [phone]  Renay Gibbons, Ph.D.,  [phone]  Lisset Anderson, Ph.D., , [facility] [phone]  Damian Jameson, Ph.D., , [facility] [phone]  Ekta Aguilar, Ph.D.,  [phone]  Ada Mon, Ph.D., , [facility] [phone]    [facility], 3rd Floor  [address] [place] [address]       Health Psychology Progress Note    Patient Name: \"Annalise\" STEPHANIE iNcole    Service Type: Individual  Length of Visit: 42 minutes  Start time: 2:01 PM    Stop time: 2:43 PM     Attendees: patient attended alone  Session #: 5      Telemedicine Visit: The patient's condition can be safely assessed and treated via synchronous audio and visual telemedicine encounter.    Reason for Telemedicine Visit: Patient has requested telehealth visit and Patient convenience (e.g. access to timely appointments / distance to available provider)  Originating Site (Patient Location): Patient's home  Distant Site (Provider Location): Provider Remote Setting- Home Office  Consent:  The patient/guardian has verbally consented to: the potential risks and benefits of telemedicine (video visit) versus in person care; bill my insurance or make self-payment for services provided; and responsibility for payment of non-covered services.   Mode of Communication:  Video Conference via [employer].    As the provider I attest to compliance with applicable laws and regulations related to telemedicine.    Identifying Information and Presenting Problem:  The patient is a 36 year old adult who is being seen for problematic symptoms of anxiety in the context of Parkinson's Disease.    Topics Discussed/Interventions Provided:    Session Content:       Update: Patient reports doing quite well. DBS surgery has gone well and he notes that his recovery is progressing very well. States that he has not had issues with cramping " "since the last surgery in August. States that he has continued to experience improvement in his anxiety. Attributes improvements to success of surgery and practice of his therapy skills.         Homework Review: Patient reports that he has continued to practice the diaphragmatic breathing exercise and challenging his cognitive distortions. Discussed insights. Patient notes continued benefits from practicing these skills       Skills/intervention: Discussed therapeutic timeline and next steps for treatment. Patient states that he would like to continue to focus on learning anxiety and stress management skills to maintain the progress he has made and continue to support him through his transition with DBS surgery recovery. He will have another procedure to activate his DBS on Friday. Mutually agreed to introduce mindfulness meditation skills. Provided psychoeducation mindfulness and completed mindful awareness of the breath meditation. Discussed insights. Also briefly introduced ACT principle of values clarification as a next step in setting an intention for the next phase of his recovery.     Treatment Objective(s) Addressed in This Session:  Anxiety: will experience a reduction in anxiety, will develop more effective coping skills to manage anxiety symptoms, will develop healthy cognitive patterns and beliefs and will increase ability to function adaptively  Psychological distress related to Chronic Disease Management    Progress on / Status of Treatment Objective(s) / Homework:  Stable   In progress    Assessment: The patient appeared to be active and engaged in today's session and was receptive to feedback.     Mental Status Exam:   Appearance: Appropriate   Eye Contact: Good    Orientation: Yes, x4  Behavior/relationship to provider/demeanor: Cooperative, Engaged and Pleasant  Motor Activity: restless and significant for akathisia  Mood (subjective report): \"Better\"  Affect (objective appearance): " Appropriate  Speech Rate: Normal  Speech Volume: Normal  Speech Articulation: Normal  Speech Coherence: Normal  Speech Spontaneity: Normal  Thought Content: no suicidal/homicidal ideation, plans, or intent  Thought Process (associations): Logical, Linear, Goal directed  Thought Process (rate): Normal  Abnormal Perception: None  Attention/Concentration: Normal  Memory: Appears grossly intact  Fund of Knowledge: Appears within normal limits   Abstraction:  Normal  Insight: Adequate  Judgment:  good    Does the patient appear to be at imminent risk of harm to self/others at this time? No    The session was necessary to address problematic symptoms of anxiety in the context of PD that have been affecting the patient's quality of life and possibly contributing to worsening symptoms.  Ongoing psychotherapy is necessary to provide counseling, improve functioning with daily activities, provide psychoeducation, provide support and teach cognitive and behavioral skills.    Diagnoses:    1. Generalized anxiety disorder    2. Major depressive disorder, recurrent, in remission (H)    3. Parkinson disease (H)        Plan:    1. Patient to return for a follow-up visit on 10/11/22 at 2:00pm.  2. Primary care provider is Joey Morley and referring provider is Ramesh Carson MD.   3. Patient to contact [facility], [employer] or other community resources if there was a psychiatric emergency.  4. Next visit agenda:   a. Continue to practice diaphragmatic breathing  b. Try each of the meditations at least 3x each  c. Complete the quick look at your values exercise.   d. Next session introduce defusion and complete 90 day treatment plan review    Skills taught/interventions used thus far:     Psychoeducation    Awareness training    Physiological arousal reduction    Diaphragmatic breathing    Unhooking skills    Cognitive restructuring    Mindfulness meditation    Values clarification      NOTE: Treatment plan update due 7/5/23; 90 day review date  "10/3/22                                                  Individual Treatment Plan    Patient's Name: Dipesh Nicole  YOB: 1985    Date of Creation: 07/05/22   Date Treatment Plan Last Reviewed/Revised: 07/05/22     DSM5 Diagnoses:     1. Generalized anxiety disorder    2. Major depressive disorder, recurrent, in remission (H)        Psychosocial / Contextual Factors: upcoming surgery, job/vocational stress, life transition stress, health issues, somewhat limited social support    PROMIS (reviewed every 90 days):     PROMIS 10 6/22/2022   PROMIS TOTAL - SUBSCORES 27       Referral / Collaboration:  Referral to another professional/service is not indicated at this time.    Anticipated number of session for this episode of care: 10-15  Anticipation frequency of session: Every other week  Anticipated Duration of each session: 38-52 minutes  Treatment plan will be reviewed in 90 days or when goals have been changed.     Measurable Treatment Goal(s) related to diagnosis / functional impairment(s)  Goal 1: Patient will improve his ability to manage his anxiety skillfully    \"I will know I've met my goal when I am not as scared to go places because of getting physically locked up and I will be more social. It's the fear of the physical aspects of anxiety.\"     Objective #A (Patient Action)    Patient will improve his ability to identify at least 3 early warning signs or symptoms of anxiety.  Status: New - Date: 07/05/22      Intervention(s)  Therapist will teach awareness training skills.    Objective #B (Patient Action)    Patient will learn use relaxation strategies at least once per day and during times of high anxiety to reduce the physical symptoms of anxiety.  Status: New - Date: 07/05/22       Intervention(s)  Therapist will teach relaxation and physiological arousal reduction strategies and skills.    Objective #C (Patient Action)    Patient will use cognitive strategies identified in therapy to " challenge and unhook from anxious thoughts.  Status: New - Date: 07/05/22      Intervention(s)  Therapist will teach cognitive skills.      Patient has reviewed and agreed to the above plan.      Ada Mon, PhD  July 5, 2022      Ada Mon, Ph.D., , [facility]  Clinical Health Psychologist  Phone: [phone]   Pager: [phone]

## 2022-09-22 NOTE — PROGRESS NOTES
Department of Neurology  Movement Disorders Division   DBS Follow-up Note    Patient: Dipesh Nicole  MRN: [id number]   : 1985   Date of Visit: 2022    Diagnosis: Parkinson's disease   DBS Target(s): Bilateral GPi  Date(s) of DBS Lead Placement: L GPi 2022; R GPi 8/15/2022     Date(s) of IPG Placement: 2022  Device: Abbott Infinity 7    Chief Complaint:  Dipesh Nicole is a 36 year old male who returns to clinic for follow up of Parkinson's disease status post bilateral GPi DBS (as above). He was last seen on 2022 during which 1st monopolar review & initial programming of L GPi was completed.     Interval History:  He reports sometimes wears off a bit before 1 PM, feels left & arm and leg are a bit slower. Right side is not involved in wearing off.    Increased right body stimulation to 3.0 mA. No side effects. Well controlled symptoms.     No falls, no swallowing problems, no hallucinations    Lesion effect: improved tremor on left, now returning    Side effect post DBS lead placement:  None     Part II  2.1 Speech (P) 0   2.2 Saliva and drooling (P) 0   2.3 Chewing and swallowing (P) 0   2.4 Eating tasks (P) 0   2.5 Dressing (P) 0   2.6 Hygiene (P) 0   2.7 Handwriting (P) 0   2.8 Doing hobbies and other activities (P) 0   2.9 Turning in bed (P) 0   2.10 Tremor (P) 0   2.11 Getting out of bed  (P) 0   2.12 Walking and balance  (P) 0   2.13 Freezing (P) 0   Total (P) 0     Previously 1 on 2022    Review of Systems:  Other than that noted at the end of this note, the remainder of 12 systems reviewed were negative.      PD Medications 5 AM 7 AM 8 AM 9 AM 11 AM 1 PM  5 PM 9:30 PM   Amantadine 100 mg  1   1      Carbidopa/levodopa ODT 25/100 (Parcopa) As needed           Rytary 195 mg  2   2  3 2 2   Rotigotine 3 mg 24 hr patch   1          Last dose taken:   - Amantadine yesterday at 12 PM  - Parcopa not since surgery  - Rytary today at 4 AM  - Rotigotine took off at 4 AM, did not  replace    Medications:  Current Outpatient Medications   Medication Sig Dispense Refill     carbidopa-levodopa (PARCOPA)  MG ODT Take one-half to 1 tablet by mouth as needed for breakthrough Parkinson symptoms, up to 2 times/day 60 tablet 11     carbidopa-levodopa (RYTARY) 48. MG CPCR per CR capsule Take 2 capsules by mouth at 5 am, 9 am, 5 pm, and bedtime and take 3 capsules at 1 pm = 11 capsules/day 330 capsule 11     LORazepam (ATIVAN) 0.5 MG tablet Take 1 tablet (0.5 mg) by mouth 2 times daily as needed (do not drive after taking a dose) 10 tablet 3     Multiple Vitamin (MULTIVITAMIN ADULT PO) every morning Vitamin by mouth daily       ondansetron (ZOFRAN ODT) 4 MG ODT tab Take 1 tablet (4 mg) by mouth every 6 hours as needed for nausea or vomiting 10 tablet 0     rotigotine (NEUPRO) 3 MG/24HR 24 hr patch Place 1 patch onto the skin daily 30 patch 11     acetaminophen (TYLENOL) 325 MG tablet Take 2 tablets (650 mg) by mouth every 4 hours as needed for headaches or pain (For optimal non-opioid multimodal pain management to improve pain control.) 60 tablet 0     acetaminophen (TYLENOL) 325 MG tablet Take 2 tablets (650 mg) by mouth every 6 hours as needed for mild pain (Patient not taking: Reported on 9/23/2022) 50 tablet 0     amantadine (SYMMETREL) 100 MG capsule 100mg by mouth twice daily at 7am and 11am (Patient not taking: Reported on 9/23/2022) 180 capsule 3     famotidine (PEPCID) 20 MG tablet 20mg tab by mouth twice daily at 7am and 11am (Patient not taking: Reported on 9/23/2022) 180 tablet 11      Allergies: is allergic to diphenhydramine, metoclopramide, and promethazine.    Past Medical History:  Past Medical History:   Diagnosis Date     Esophagitis endoscopy done 11/2021 11/18/2021    Impression:            - Normal first portion of the duodenum, second portion                         of the duodenum and third portion of the duodenum.                         Biopsied.                          - Nodular mucosa in the duodenal bulb. Biopsied.                         - Normal stomach. Biopsied.                         - Medium-sized hiatal hernia.                         - LA Grade     H/O electroencephalogram 2021 11/18/2021     Impression:  This is a normal waking and sleep EEG, with generalized beta activities throughout the recording. Generalized beta activities are usually associated certain medication use, such as benzodiazepines or barbiturates. The cause of these activities in this case is unclear at this time.         H/O magnetic resonance imaging of brain and brain stem 8/17/2018 2009 December MR Brain without and with IV Dquyzqed24/8/2009 [facility] Result Impression  Normal MRI of the brain.  Result Narrative      Multiecho, multiplanar views of the brain were obtained prior to and  following the IV administration of 19 mL of contrast. There are no  previous studies available for comparison.     The brain parenchyma is normal in appearance on all pulse sequences,  with      H/O magnetic resonance imaging of cervical spine 8/17/2018 March MR Cervical Spine without IV Contrast3/15/2017 [facility] Result Addenda Addendum by Provider, PA Jones on 03/15/2017 12:30 PM RAD^^^MA MR Cervical Spine w/o contrast 3/15/2017 12:30:00 Result Impression  Minimal degenerative disc disease at C5-6 and C6-7  otherwise an unremarkable MR scan of the cervical spine.  Result Narrative   EXAM: MR Cervical Spine w/o contrast   INDICATION:      H/O shoulder surgery 8/17/2018 2017 July XR SHOULDER 2 VIEWS LEFT7/14/2017 [facility] Result Narrative XR SHOULDER 2 VIEWS LEFT 7/14/2017 9:13 PM  INDICATION: Shoulder Injury COMPARISON: None.  FINDINGS: Negative shoulder. No fracture or dislocation.  Status       Hiatal hernia 4/20/2021    Based on a 2021 ct scan Small hiatal hernia with some wall thickening in the distal thoracic esophagus suggesting some esophagitis.      Seizure (H) at age 22 yrs 8/17/2018     Shoulder dislocation     left     Tremor 8/10/2018       Past Surgical History:  Past Surgical History:   Procedure Laterality Date     ESOPHAGOSCOPY, GASTROSCOPY, DUODENOSCOPY (EGD), COMBINED N/A 11/18/2021    Procedure: ESOPHAGOGASTRODUODENOSCOPY, WITH BIOPSY;  Surgeon: Veronica Kay MD;  Location: [facility] OR     ESOPHAGOSCOPY, GASTROSCOPY, DUODENOSCOPY (EGD), COMBINED N/A 3/7/2022    Procedure: ESOPHAGOGASTRODUODENOSCOPY, WITH BIOPSY;  Surgeon: Alexandr wSeet MD;  Location: [facility] OR     IMPLANT DEEP BRAIN STIMULATION GENERATOR / BATTERY Left 7/25/2022    Procedure: Deep brain stimulator placement, phase II, placement of deep brain stimulator generator/battery over the left chest wall;  Surgeon: Angel Morales MD;  Location: [facility] OR     OPTICAL TRACKING SYSTEM INSERTION DEEP BRAIN STIMULATION Left 7/18/2022    Procedure: Left side deep brain stimulator placement, phase I, placement of left side deep brain stimulator electrode, target left globus pallidus internus, with microelectrode recording;  Surgeon: Angel Morales MD;  Location: [facility] OR     OPTICAL TRACKING SYSTEM INSERTION DEEP BRAIN STIMULATION Right 8/15/2022    Procedure: Stealth Assisted Right side deep brain stimulator placement, phase I & II combined, placement of right side deep brain stimulator electrode, target right globus pallidus internus, with microelectrode recording and connection to existing generator/battery;  Surgeon: Angel oMrales MD;  Location: [facility] OR     SHOULDER SURGERY  2007       Social History:  Social History     Socioeconomic History     Marital status: Single   Tobacco Use     Smoking status: Never Smoker     Smokeless tobacco: Never Used   Substance and Sexual Activity     Alcohol use: Yes     Drug use: No   Social History Narrative    single. lives in [facility]. mother cabrera ambriz            He has a history of a left dislocated shoulder with surgery performed  in 2009. He currently lives in [place] but comes to the [place] area to visit his parents in [place]. 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     Social Determinants of Health     Intimate Partner Violence: Not At Risk     Fear of Current or Ex-Partner: No     Emotionally Abused: No     Physically Abused: No     Sexually Abused: No       Family History:  Family History   Problem Relation Age of Onset     Cancer Other         grandmother     Tremor No family hx of      Dementia No family hx of      Parkinsonism No family hx of      Seizure Disorder No family hx of        Physical Exam:  The patient's  weight is 83.6 kg (184 lb 3.2 oz). His blood pressure is 122/62 and his pulse is 72. His oxygen saturation is 99%.       Neurological Examination:   Gait: long stride length, good arm swing bilaterally, mild dykinesia in left hand    UPDRS Values 9/23/2022   Time: 7:32 AM   Medication On   R Brain DBS: None   L Brain DBS: None   Dyskinesia (LID) Yes   Did LID interfere No   Speech 0   Facial Expression 0   Rigidity Neck 0   Rigidity RUE 0   Rigidity LUE 0   Rigidity RLE 0   Rigidity LLE 1   Finger Taps R 0   Finger Taps L 1   Hand Mvt R 0   Hand Mvt L 1   Pron-/Supinate R 0   Pron-/Supinate L 0   Toe Tap R 0   Toe Tap L 0   Leg Agility R 0   Leg Agility L 1   Arise From Chair 0   Gait 0   Gait Freezing 0   Postural Stability 0   Posture 0   Global Spont Mvt 0   Postural Tremor RUE 1   Postural Tremor LUE 1   Kinetic Tremor RUE 0   Kinetic Tremor LUE 1   Rest Tremor RUE 0   Rest Tremor LUE 0   Rest Tremor RLE 0   Rest Tremor LLE 0   Rest Tremor Lip/Jaw 0   Rest Tremor Constancy 0   Total Right 1   Total Left 6   Axial Total 0   Total 7       Previously 6 on 82/26/2022    Incision Site:  left chest are dry, intact, and healing nicely. No redness, swelling, or drainage. DBS insertions and behind ear at the extension wire are intact & well healed    Procedure: DBS Interrogation &  Programming    Lead(s):   Side Left Right   Target GP GP   Lead  Abbott Abbott   Lead model Infinity 0.5 Infinity 0.5   Lead Implant Date 7/18/2022 8/15/2022   IPG location Left chest Left chest     IPG(s):  IPG  Abbott   IPG model Infinity 7   IPG implant date 7/25/22   Location Left chest   Battery 2.99 OK       Impedance Check: low impedances found on 10B, 12-  Left GP ther imped 925 Ohms  See scanned report for impedance details.    L brain for R body remained ON    R brain for L body:  Contacts Frequency Pulse width Amplitude Effects Side effects   9negCpos 130 60 0.25  NN      0.5  NN      1.0  NN      By 0.1  NN      2.0 Jack 1, better, not normal NN      2.5  NN      3.0 Tap 1 NN      3.5  NN      4.0 Tap improved L cheek numb, tingle, persistent, face symmetrical.      4.5  same      5.0  Feel it more, a little facial assymmetry L face pull      0  Sensation and asymmetry resolved   10acNegCpos 130 60 By 0.1  NN      1.0  NN        1.5  NN      2.0 Finger-tapping Improved, 0.5       2.5  NN      3.0 FT Improved: 0 NN      3.5  NN      4.0  NN, face symmetrical      4.5 FT 0.5 NN      5.0  NN      5.5 FT 1.0 NN      6.0 FT: 1.5; improved L armswing, improved L LID (but could be meds wearing off) NN, face symmetrical      0 FT:0.5    11abcNegCpos   0.5  NN      1.0  NN      1.5  NN      2.0 FT: 1, maybe 0.5 NN      2.5  NN      3.0 FT: 0 NN, face symmetrical      3.5    NN      4.0 FT: 0.5 NN      4.5  NN      5.0 FT:0 NN, face symmetrical      5.5  NN      6.0 FT: 0.5 NN, face symmetrical, gait OK      0 FT: 1.0    12NegCpos (possible short to 10b)   0.5  NN      1.0  NN      1.5  NN      2.0 FT: 0.5 NN      2.5  NN      3.0 FT: 0.5, no change NN, face symmetrical      3.5  NN      4.0 FT: 0.5, no change NN      4.5  NN      5.0 FT a little more decrement NN      5.5  NN      6.0 FT: 1, convincingly worse NN, face symmetric, gait looks same, feels better.        NA not assessed  NC no  change from setting above  NN none noted    Program A          Side Left GPi  Right GPi    IPG location Left chest  Left chest    Initial/Final Initial Final Initial Final    Active/Inactive Active Active None Active   Amplitude (mA) 3.0 (0 -4.0) 3.0 (0 - 4.0)   1.5 (0 - 5.0)   Pulse width (usec)  60 60   60   Freq (Hz)  130 130   130   Contacts:  C pos 3_ omni neg C pos 3_omni neg   Cpos 11_omni neg        Assessment/Plan:  Dipesh Nicole is a 36 year old male with who returns to clinic for follow up of Parkinson's disease status post bilateral GPi DBS. He presents today for initial monopolar review of right GPi, which he tolerated well. No changes made to left GPi today - he has found symptoms well controlled with the initial programming. He was sent home with right GPi stimulation set at 1.5 mA with the plan to gradually increase to 3.0 mA as tolerated/needed for Parkinson's disease symptoms. No medication changes today. Today we educated patient on connecting his patient controller to DBS, checking the battery, and turning the device on/off as well as MRI mode (he would not currently be able to obtain MRI with low impedances in right lead).      - DBS programmed as above  - no medication changes today    RTC scheduled for DBS analysis & programming on 22    Patient and plan was examined & discussed with attending physician, Dr. Murrell.     Chantale Hays MD  Movement Disorders Fellow     Patient seen and examined with Dr. Hays and I agree with the assessment and plan as outlined.  DBS programminh.  Additional time this date with patient, reviewing records, and documentin.5h.

## 2022-09-23 ENCOUNTER — OFFICE VISIT (OUTPATIENT)
Dept: NEUROLOGY | Facility: CLINIC | Age: 37
End: 2022-09-23
Payer: COMMERCIAL

## 2022-09-23 ENCOUNTER — ANCILLARY PROCEDURE (OUTPATIENT)
Dept: CT IMAGING | Facility: CLINIC | Age: 37
End: 2022-09-23
Attending: CLINICAL NURSE SPECIALIST
Payer: COMMERCIAL

## 2022-09-23 VITALS
WEIGHT: 184.2 LBS | SYSTOLIC BLOOD PRESSURE: 122 MMHG | OXYGEN SATURATION: 99 % | DIASTOLIC BLOOD PRESSURE: 62 MMHG | HEART RATE: 72 BPM | BODY MASS INDEX: 24.3 KG/M2

## 2022-09-23 DIAGNOSIS — Z96.89 STATUS POST DEEP BRAIN STIMULATOR PLACEMENT: ICD-10-CM

## 2022-09-23 DIAGNOSIS — G20.A1 PARKINSON DISEASE (H): Primary | ICD-10-CM

## 2022-09-23 DIAGNOSIS — K44.9 HIATAL HERNIA: ICD-10-CM

## 2022-09-23 PROCEDURE — 95983 ALYS BRN NPGT PRGRMG 15 MIN: CPT | Mod: GC | Performed by: PSYCHIATRY & NEUROLOGY

## 2022-09-23 PROCEDURE — 99214 OFFICE O/P EST MOD 30 MIN: CPT | Mod: GC | Performed by: PSYCHIATRY & NEUROLOGY

## 2022-09-23 PROCEDURE — 71250 CT THORAX DX C-: CPT | Mod: GC | Performed by: RADIOLOGY

## 2022-09-23 PROCEDURE — 95984 ALYS BRN NPGT PRGRMG ADDL 15: CPT | Mod: GC | Performed by: PSYCHIATRY & NEUROLOGY

## 2022-09-23 ASSESSMENT — UNIFIED PARKINSONS DISEASE RATING SCALE (UPDRS)
AMPLITUDE_LIP_JAW: NORMAL: NO TREMOR.
AMPLITUDE_RLE: NORMAL: NO TREMOR.
HANDMOVEMENTS_LEFT: SLIGHT: ANY OF THE FOLLOWING: A) THE REGULAR RHYTHM IS BROKEN WITH ONE WITH ONE OR TWO INTERRUPTIONS OR HESITATIONS OF THE MOVEMENT B) SLIGHT SLOWING C) THE AMPLITUDE DECREMENTS NEAR THE END OF THE 10 MOVEMENTS.
DYSKINESIAS_PRESENT: YES
TOETAPPING_LEFT: NORMAL
GAIT: NORMAL
AMPLITUDE_LLE: NORMAL: NO TREMOR.
FREEZING_GAIT: NORMAL
AXIAL_SCORE: 0
FINGER_TAPPING_RIGHT: NORMAL
SPEECH: NORMAL
HANDMOVEMENTS_RIGHT: NORMAL
MOVEMENTS_INTERFERE_WITH_RATINGS: NO
TOTAL_SCORE: 1
RIGIDITY_RLE: NORMAL
RIGIDITY_RUE: NORMAL
RIGIDITY_LUE: NORMAL
PRONATION_SUPINATION_LEFT: NORMAL
POSTURAL_STABILITY: NORMAL:  RECOVERS WITH ONE OR TWO STEPS.
TOTAL_SCORE: 7
TOTAL_SCORE_LEFT: 6
RIGIDITY_NECK: NORMAL
POSTURE: 0 NORMAL, NO PROBLEMS
TOETAPPING_RIGHT: NORMAL
FACIAL_EXPRESSION: NORMAL.
RIGIDITY_LLE: SLIGHT: RIGIDITY ONLY DETECTED WITH ACTIVATION MANEUVER.
ARISING_CHAIR: NORMAL: ABLE TO ARISE QUICKLY WITHOUT HESITATION.
LEG_AGILITY_LEFT: SLIGHT: ANY OF THE FOLLOWING: A) THE REGULAR RHYTHM IS BROKEN WITH ONE WITH ONE OR TWO INTERRUPTIONS OR HESITATIONS OF THE MOVEMENT B) SLIGHT SLOWING C) THE AMPLITUDE DECREMENTS NEAR THE END OF THE 10 MOVEMENTS.
PARKINSONS_MEDS: ON
AMPLITUDE_RUE: NORMAL: NO TREMOR.
LEG_AGILITY_RIGHT: NORMAL
AMPLITUDE_LUE: NORMAL: NO TREMOR.
PRONATION_SUPINATION_RIGHT: NORMAL
CONSTANCY_TREMOR_ATREST: NORMAL: NO TREMOR.
SPONTANEITY_OF_MOVEMENT: 0: NORMAL.  NO PROBLEMS.
FINGER_TAPPING_LEFT: SLIGHT: ANY OF THE FOLLOWING: A) THE REGULAR RHYTHM IS BROKEN WITH ONE WITH ONE OR TWO INTERRUPTIONS OR HESITATIONS OF THE MOVEMENT B) SLIGHT SLOWING C) THE AMPLITUDE DECREMENTS NEAR THE END OF THE 10 MOVEMENTS.

## 2022-09-23 ASSESSMENT — MOVEMENT DISORDERS SOCIETY - UNIFIED PARKINSONS DISEASE RATING SCALE (MDS-UPDRS)
HOBBIES_AND_OTHER_ACTIVITIES: NORMAL:  NOT AT ALL (NO PROBLEMS).
EATING_TASKS: NORMAL: NOT AT ALL (NO PROBLEMS).
TURNING_IN_BED: NORMAL: NOT AT ALL (NO PROBLEMS).
DRESSING: NORMAL: NOT AT ALL (NO PROBLEMS).
GETTING_OUT_OF_BED_CAR_DEEP_CHAIR: NORMAL: NOT AT ALL (NO PROBLEMS).
SPEECH: NORMAL: NOT AT ALL (NO PROBLEMS).
TREMOR: NORMAL: NOT AT ALL (NO PROBLEMS).
HYGIENE: NORMAL: NOT AT ALL (NO PROBLEMS).
SALIVA_AND_DROOLING: NORMAL: NOT AT ALL (NO PROBLEMS).
FREEZING: NORMAL: NOT AT ALL (NO PROBLEMS).
CHEWING_AND_SWALLOWING: NORMAL: NO PROBLEMS.
WALKING_AND_BALANCE: NORMAL: NOT AT ALL (NO PROBLEMS).
HANDWRITING: NORMAL: NOT AT ALL (NO PROBLEMS).
TOTAL_SCORE: 0

## 2022-09-23 ASSESSMENT — PAIN SCALES - GENERAL: PAINLEVEL: NO PAIN (0)

## 2022-09-23 NOTE — PATIENT INSTRUCTIONS
Today, we turned on your second-side DBS (Right brain for left body).  Initial amplitude, for that side, is 1.5 mA (milliAmperes).  Starting Monday please increase amplitude by 0.1 every day up to 3.0.  If you get satisfactory benefit at less tan 3.0, you can stop there.  If you develop anything that seems like a side effect, then go back to the lowest amplitude that did not have this problem, and contact us.    Keep the other side DBS amplitude (Left brain for right body) the same.    Keep medications the same, for now:      PD Medications 5 AM 7 AM 8 AM 9 AM 11 AM 1 PM  5 PM 9:30 PM   Amantadine 100 mg  1   1      Carbidopa/levodopa ODT 25/100 (Parcopa) As needed           Rytary 195 mg  2   2  3 2 2   Rotigotine 3 mg 24 hr patch   1

## 2022-09-30 ENCOUNTER — TELEPHONE (OUTPATIENT)
Dept: NEUROLOGY | Facility: CLINIC | Age: 37
End: 2022-09-30

## 2022-09-30 NOTE — TELEPHONE ENCOUNTER
Prior Authorization Retail Medication Request    Medication/Dose: Neupro 3MG/24HR 24HR Patches    ICD code: G20  Previously Tried and Failed:    Rationale:      Insurance Name: [facility]   Insurance ID: [id number]       Pharmacy Information  Name: [id number] - [place], [place] - [address]  Phone: [phone]

## 2022-10-03 NOTE — TELEPHONE ENCOUNTER
Central Prior Authorization Team   Phone: [phone]      PA Initiation    Medication: Neupro 3MG/24HR 24HR Patches  Insurance Company: [employer] - Phone [phone] Fax [phone]  Pharmacy Filling the Rx: [id number] - [place], [place] - [address] [place] [address]  Filling Pharmacy Phone: [phone]  Filling Pharmacy Fax:    Start Date: 10/3/2022

## 2022-10-04 NOTE — TELEPHONE ENCOUNTER
Prior Authorization Not Needed per Insurance    Medication: Neupro 3MG/24HR 24HR Patches-PA Not Needed    Insurance Company: [employer] - Phone [phone] Fax [phone]  Expected CoPay:      Pharmacy Filling the Rx: [id number] - [place], [address]  Pharmacy Notified: Yes-Pharmacy tried reprocessing claim again and still getting rejection. Per letter, if pharmacy need assistance, to call their pharmacy help desk. They will call.   Patient Notified: No

## 2022-10-11 ENCOUNTER — VIRTUAL VISIT (OUTPATIENT)
Dept: PSYCHOLOGY | Facility: CLINIC | Age: 37
End: 2022-10-11
Payer: COMMERCIAL

## 2022-10-11 DIAGNOSIS — Z91.199 NO-SHOW FOR APPOINTMENT: Primary | ICD-10-CM

## 2022-10-11 NOTE — PROGRESS NOTES
Received MyChart message from patient stating that he will be unable to attend today's visit and requested to rescheduled. Sent patient MyChart message reply with information about rescheduling.       Late cancellation for this scheduled appointment.

## 2022-12-16 ENCOUNTER — OFFICE VISIT (OUTPATIENT)
Dept: NEUROLOGY | Facility: CLINIC | Age: 37
End: 2022-12-16
Payer: COMMERCIAL

## 2022-12-16 VITALS
SYSTOLIC BLOOD PRESSURE: 128 MMHG | HEART RATE: 69 BPM | WEIGHT: 191.2 LBS | OXYGEN SATURATION: 99 % | DIASTOLIC BLOOD PRESSURE: 78 MMHG | BODY MASS INDEX: 25.23 KG/M2

## 2022-12-16 DIAGNOSIS — Z96.89 STATUS POST DEEP BRAIN STIMULATOR PLACEMENT: ICD-10-CM

## 2022-12-16 DIAGNOSIS — G20.A1 PARKINSON DISEASE (H): Primary | ICD-10-CM

## 2022-12-16 PROCEDURE — 99214 OFFICE O/P EST MOD 30 MIN: CPT | Mod: 25 | Performed by: PSYCHIATRY & NEUROLOGY

## 2022-12-16 PROCEDURE — 95970 ALYS NPGT W/O PRGRMG: CPT | Mod: GC | Performed by: PSYCHIATRY & NEUROLOGY

## 2022-12-16 ASSESSMENT — UNIFIED PARKINSONS DISEASE RATING SCALE (UPDRS)
RIGIDITY_LLE: NORMAL
RIGIDITY_RLE: NORMAL
HANDMOVEMENTS_RIGHT: NORMAL
AMPLITUDE_LIP_JAW: NORMAL: NO TREMOR.
LEG_AGILITY_RIGHT: NORMAL
SPONTANEITY_OF_MOVEMENT: 0: NORMAL.  NO PROBLEMS.
GAIT: NORMAL
PARKINSONS_MEDS: ON
FINGER_TAPPING_LEFT: NORMAL
PRONATION_SUPINATION_RIGHT: NORMAL
SPEECH: NORMAL
RIGIDITY_NECK: SLIGHT: RIGIDITY ONLY DETECTED WITH ACTIVATION MANEUVER.
PRONATION_SUPINATION_LEFT: NORMAL
RIGIDITY_LUE: NORMAL
FACIAL_EXPRESSION: NORMAL.
CONSTANCY_TREMOR_ATREST: NORMAL: NO TREMOR.
LEG_AGILITY_LEFT: NORMAL
RIGIDITY_RUE: SLIGHT: RIGIDITY ONLY DETECTED WITH ACTIVATION MANEUVER.
POSTURAL_STABILITY: NORMAL:  RECOVERS WITH ONE OR TWO STEPS.
FINGER_TAPPING_RIGHT: NORMAL
FREEZING_GAIT: NORMAL
AMPLITUDE_RLE: NORMAL: NO TREMOR.
POSTURE: 0 NORMAL, NO PROBLEMS
AMPLITUDE_LUE: NORMAL: NO TREMOR.
TOTAL_SCORE: 4
AMPLITUDE_LLE: NORMAL: NO TREMOR.
TOETAPPING_LEFT: NORMAL
DYSKINESIAS_PRESENT: NO
AXIAL_SCORE: 1
ARISING_CHAIR: NORMAL: ABLE TO ARISE QUICKLY WITHOUT HESITATION.
TOTAL_SCORE_LEFT: 2
HANDMOVEMENTS_LEFT: SLIGHT: ANY OF THE FOLLOWING: A) THE REGULAR RHYTHM IS BROKEN WITH ONE WITH ONE OR TWO INTERRUPTIONS OR HESITATIONS OF THE MOVEMENT B) SLIGHT SLOWING C) THE AMPLITUDE DECREMENTS NEAR THE END OF THE 10 MOVEMENTS.
TOTAL_SCORE: 1
TOETAPPING_RIGHT: NORMAL
AMPLITUDE_RUE: NORMAL: NO TREMOR.

## 2022-12-16 NOTE — PATIENT INSTRUCTIONS
We will slowly decrease as below. If you develop any side effects, increase to the previous dose and please let me know.    Week 1: stop morning amantadine, continue 11 AM amantadine  Week 2: stop amantadine    Please let us know how you're doing after decreasing/stopping this.

## 2022-12-16 NOTE — PROGRESS NOTES
Department of Neurology  Movement Disorders Division   DBS Follow-up Note    Patient: Dipesh Nicole  MRN: [id number]   : 1985   Date of Visit: 22     Diagnosis: Parkinson's disease   DBS Target(s): Bilateral GPi  Date(s) of DBS Lead Placement: L GPi 2022; R GPi 8/15/2022     Date(s) of IPG Placement: 2022  Device: Abbott Infinity 7    Chief Complaint:  Dipesh Nicole is a 37 year old male who returns to clinic for follow up of Parkinson's disease status post bilateral GPi DBS (as above). He was last seen on 2022 during which time 2nd monopolar review & initial programming of R GPi was completed.     Interval History:  He reports that his nausea is much better. He's gained 30 lbs which he's happy about.    No DBS changes outside of the scheduled ramp. He tolerated this well. No side effects.     No wearing off in between doses. Able to stop rotigotine, no change in symptoms. No dyskinesias.    No falls, no swallowing problems, no hallucinations    Part II  Previously 0 on 2022    Review of Systems:  Other than that noted at the end of this note, the remainder of 12 systems reviewed were negative.      PD Medications  5 AM 7 AM 9 AM 11 AM 1 PM  5 PM 9:30 PM   Amantadine 100 mg  1  1      Rytary 195 mg  2  2  3 2 2   Rotigotine 3 mg 24 hr patch Stopped            Last dose taken Rytary at 5     Medications:  Current Outpatient Medications   Medication Sig Dispense Refill     acetaminophen (TYLENOL) 325 MG tablet Take 2 tablets (650 mg) by mouth every 4 hours as needed for headaches or pain (For optimal non-opioid multimodal pain management to improve pain control.) 60 tablet 0     amantadine (SYMMETREL) 100 MG capsule 100mg by mouth twice daily at 7am and 11am 180 capsule 3     carbidopa-levodopa (PARCOPA)  MG ODT Take one-half to 1 tablet by mouth as needed for breakthrough Parkinson symptoms, up to 2 times/day 60 tablet 11     carbidopa-levodopa (RYTARY) 48. MG CPCR per  CR capsule Take 2 capsules by mouth at 5 am, 9 am, 5 pm, and bedtime and take 3 capsules at 1 pm = 11 capsules/day 330 capsule 11     famotidine (PEPCID) 20 MG tablet 20mg tab by mouth twice daily at 7am and 11am 180 tablet 11     LORazepam (ATIVAN) 0.5 MG tablet Take 1 tablet (0.5 mg) by mouth 2 times daily as needed (do not drive after taking a dose) 10 tablet 3     Multiple Vitamin (MULTIVITAMIN ADULT PO) every morning Vitamin by mouth daily       ondansetron (ZOFRAN ODT) 4 MG ODT tab Take 1 tablet (4 mg) by mouth every 6 hours as needed for nausea or vomiting 10 tablet 0      Allergies: is allergic to diphenhydramine, metoclopramide, and promethazine.    Past Medical History:  Past Medical History:   Diagnosis Date     Esophagitis endoscopy done 11/2021 11/18/2021    Impression:            - Normal first portion of the duodenum, second portion                         of the duodenum and third portion of the duodenum.                         Biopsied.                         - Nodular mucosa in the duodenal bulb. Biopsied.                         - Normal stomach. Biopsied.                         - Medium-sized hiatal hernia.                         - LA Grade     H/O electroencephalogram 2021 11/18/2021     Impression:  This is a normal waking and sleep EEG, with generalized beta activities throughout the recording. Generalized beta activities are usually associated certain medication use, such as benzodiazepines or barbiturates. The cause of these activities in this case is unclear at this time.         H/O magnetic resonance imaging of brain and brain stem 8/17/2018 2009 December MR Brain without and with IV Qexjqdlk27/8/2009 [facility] Result Impression  Normal MRI of the brain.  Result Narrative      Multiecho, multiplanar views of the brain were obtained prior to and  following the IV administration of 19 mL of contrast. There are no  previous studies available for comparison.     The brain parenchyma  is normal in appearance on all pulse sequences,  with      H/O magnetic resonance imaging of cervical spine 8/17/2018 March MR Cervical Spine without IV Contrast3/15/2017 [facility] Result Addenda Addendum by Provider, PA Jones on 03/15/2017 12:30 PM RAD^^^MA MR Cervical Spine w/o contrast 3/15/2017 12:30:00 Result Impression  Minimal degenerative disc disease at C5-6 and C6-7  otherwise an unremarkable MR scan of the cervical spine.  Result Narrative   EXAM: MR Cervical Spine w/o contrast   INDICATION:      H/O shoulder surgery 8/17/2018 2017 July XR SHOULDER 2 VIEWS LEFT7/14/2017 [facility] Result Narrative XR SHOULDER 2 VIEWS LEFT 7/14/2017 9:13 PM  INDICATION: Shoulder Injury COMPARISON: None.  FINDINGS: Negative shoulder. No fracture or dislocation.  Status       Hiatal hernia 4/20/2021    Based on a 2021 ct scan Small hiatal hernia with some wall thickening in the distal thoracic esophagus suggesting some esophagitis.     Seizure (H) at age 22 yrs 8/17/2018     Shoulder dislocation     left     Tremor 8/10/2018       Past Surgical History:  Past Surgical History:   Procedure Laterality Date     ESOPHAGOSCOPY, GASTROSCOPY, DUODENOSCOPY (EGD), COMBINED N/A 11/18/2021    Procedure: ESOPHAGOGASTRODUODENOSCOPY, WITH BIOPSY;  Surgeon: Veronica Kay MD;  Location: [facility] OR     ESOPHAGOSCOPY, GASTROSCOPY, DUODENOSCOPY (EGD), COMBINED N/A 3/7/2022    Procedure: ESOPHAGOGASTRODUODENOSCOPY, WITH BIOPSY;  Surgeon: Alexandr Sweet MD;  Location: [facility] OR     IMPLANT DEEP BRAIN STIMULATION GENERATOR / BATTERY Left 7/25/2022    Procedure: Deep brain stimulator placement, phase II, placement of deep brain stimulator generator/battery over the left chest wall;  Surgeon: Angel Morales MD;  Location: [facility] OR     OPTICAL TRACKING SYSTEM INSERTION DEEP BRAIN STIMULATION Left 7/18/2022    Procedure: Left side deep brain stimulator placement, phase I, placement of left  side deep brain stimulator electrode, target left globus pallidus internus, with microelectrode recording;  Surgeon: Angel Morales MD;  Location: [facility] OR     OPTICAL TRACKING SYSTEM INSERTION DEEP BRAIN STIMULATION Right 8/15/2022    Procedure: Stealth Assisted Right side deep brain stimulator placement, phase I & II combined, placement of right side deep brain stimulator electrode, target right globus pallidus internus, with microelectrode recording and connection to existing generator/battery;  Surgeon: Angel Morales MD;  Location: [facility] OR     SHOULDER SURGERY  2007       Social History:  Social History     Socioeconomic History     Marital status: Single   Tobacco Use     Smoking status: Never Smoker     Smokeless tobacco: Never Used   Substance and Sexual Activity     Alcohol use: Yes     Drug use: No   Social History Narrative    single. lives in [facility]. mother cabrera ambriz            He has a history of a left dislocated shoulder with surgery performed in 2009. He currently lives in [place] but comes to the [place] area to visit his parents in [place]. 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     Social Determinants of Health     Intimate Partner Violence: Not At Risk     Fear of Current or Ex-Partner: No     Emotionally Abused: No     Physically Abused: No     Sexually Abused: No       Family History:  Family History   Problem Relation Age of Onset     Cancer Other         grandmother     Tremor No family hx of      Dementia No family hx of      Parkinsonism No family hx of      Seizure Disorder No family hx of        Physical Exam:  The patient's  weight is 86.7 kg (191 lb 3.2 oz). His blood pressure is 128/78 and his pulse is 69. His oxygen saturation is 99%.      Neurological Examination:  Gait: long stride length, good arm swing bilaterally    UPDRS Values 12/16/2022   Time: 7:37 AM   Medication On   R Brain DBS: On   L Brain DBS: On   Dyskinesia (LID) No   Did LID  interfere    Speech 0   Facial Expression 0   Rigidity Neck 1   Rigidity RUE 1   Rigidity LUE 0   Rigidity RLE 0   Rigidity LLE 0   Finger Taps R 0   Finger Taps L 0   Hand Mvt R 0   Hand Mvt L 1   Pron-/Supinate R 0   Pron-/Supinate L 0   Toe Tap R 0   Toe Tap L 0   Leg Agility R 0   Leg Agility L 0   Arise From Chair 0   Gait 0   Gait Freezing 0   Postural Stability 0   Posture 0   Global Spont Mvt 0   Postural Tremor RUE 0   Postural Tremor LUE 1   Kinetic Tremor RUE 0   Kinetic Tremor LUE 0   Rest Tremor RUE 0   Rest Tremor LUE 0   Rest Tremor RLE 0   Rest Tremor LLE 0   Rest Tremor Lip/Jaw 0   Rest Tremor Constancy 0   Total Right 1   Total Left 2   Axial Total 1   Total 4       Previously 7 on 9/23/2022    Incision Site:  left chest are dry, intact, and healing nicely. No redness, swelling, or drainage. DBS insertions and behind ear at the extension wire are intact & well healed    Procedure: DBS Interrogation & Programming    Lead(s):   Side Left Right   Target GP GP   Lead  Abbott Abbott   Lead model Infinity 0.5 Infinity 0.5   Lead Implant Date 7/18/2022 8/15/2022   IPG location Left chest Left chest     IPG(s):  IPG  Abbott   IPG model Infinity 7   IPG implant date 7/25/22   Location Left chest   Battery 2.95 V (2.99V)       Impedance Check: low impedances found on 10B, 12-   Left GP ther imped 925 Ohms  See scanned report for impedance details.    Program A       Side Left GPi Right GPi   IPG location Left chest Left chest   Initial/Final Initial & final Initial & final   Active/Inactive Active Active   Amplitude (mA) 3.0 (0 -4.0)  3.0 (0 - 5.0)   Pulse width (usec)  60  60   Freq (Hz)  130  130   Contacts:  C pos 3_ omni neg  Cpos 11_omni neg        Assessment/Plan:  Dipesh Nicole is a 37 year old male with who returns to clinic for follow up of Parkinson's disease status post bilateral GPi DBS. He presents today for Parkinson's disease follow-up and DBS programming. he is quite  happy with symptom control on his current settings. He is no longer having wearing off or dyskinesias. He has successfully weaned off rotigotine patch and has not noticed any change in symptoms. He is interested in seeing if he can further simplify his medication regimen and so will try tapering amantadine.    - no changes to DBS programs  - continue current Rytary dose  - wean amantadine (see AVS)    RTC 3 months, in person, 60 min DBS programming    Patient and plan was examined & discussed with attending physician, Dr. Murrell.     Chantale Hays MD  Movement Disorders Fellow       Patient seen and examined with Dr. Hays and I agree with the assessment and plan as outlined.  Time this date with patient, reviewing records, & documentin.75h.    Jun Murrell PhD, MD

## 2023-01-10 ENCOUNTER — TELEPHONE (OUTPATIENT)
Dept: NEUROLOGY | Facility: CLINIC | Age: 38
End: 2023-01-10

## 2023-01-16 ENCOUNTER — TELEPHONE (OUTPATIENT)
Dept: NEUROLOGY | Facility: CLINIC | Age: 38
End: 2023-01-16
Payer: COMMERCIAL

## 2023-01-16 NOTE — CONFIDENTIAL NOTE
Prior Authorization Retail Medication Request    Medication/Dose: Neupro 3MG/24HR Patches  ICD code: G20  Previously Tried and Failed:    Rationale:      Insurance Name:[facility]  Insurance ID: [id number]       Pharmacy Information   Name: [facility] - [place], [place] - [address] [place] [address]   Phone: [phone]

## 2023-01-28 DIAGNOSIS — G20.A1 PARKINSON DISEASE (H): ICD-10-CM

## 2023-01-30 RX ORDER — ROTIGOTINE 3 MG/24H
PATCH, EXTENDED RELEASE TRANSDERMAL
Qty: 30 PATCH | Refills: 11 | OUTPATIENT
Start: 2023-01-30

## 2023-02-28 ENCOUNTER — TELEPHONE (OUTPATIENT)
Dept: NEUROLOGY | Facility: CLINIC | Age: 38
End: 2023-02-28
Payer: MEDICAID

## 2023-02-28 NOTE — CONFIDENTIAL NOTE
Prior Authorization Retail Medication Request    Medication/Dose: Rytary 48.75-195MG ER  ICD code: G20  Previously Tried and Failed:    Rationale:      Insurance Name: [facility]   Insurance ID: [id number]       Pharmacy Information   Name: [id number] - [place], [place] - [address] [facility]  Phone: [phone]

## 2023-03-03 NOTE — TELEPHONE ENCOUNTER
Central Prior Authorization Team   Phone: [phone]      PA Initiation    Medication: Rytary 48.75-195MG ER  Insurance Company: [employer] - Phone [phone] Fax [phone]  Pharmacy Filling the Rx: [id number] - [place], [place] - [address] [place] [address]  Filling Pharmacy Phone: [phone]  Filling Pharmacy Fax:    Start Date: 3/2/2023

## 2023-03-09 NOTE — TELEPHONE ENCOUNTER
Prior Authorization Approval    Authorization Effective Date: 3/3/2023  Authorization Expiration Date: 3/3/2024  Medication: Rytary 48.75-195MG ER  Approved Dose/Quantity:   Reference #:     Insurance Company: [employer] - Phone [phone] Fax [phone]  Expected CoPay:       CoPay Card Available:      Foundation Assistance Needed:    Which Pharmacy is filling the prescription (Not needed for infusion/clinic administered): [id number] - [place], [place] - [address] [place] [address]  Pharmacy Notified: Yes  Patient Notified: No

## 2023-03-30 ENCOUNTER — TELEPHONE (OUTPATIENT)
Dept: NEUROLOGY | Facility: CLINIC | Age: 38
End: 2023-03-30
Payer: MEDICAID

## 2023-03-30 NOTE — TELEPHONE ENCOUNTER
[facility] Call Center    Phone Message    May a detailed message be left on voicemail: yes     Reason for Call: Other: Pt is calling to schedule an appt for DBS Programming with Dr. Murrell. Writer unable to schedule per protocols. Please contact pt to help schedule.     Action Taken: Message routed to:  [facility] ([facility] Neurology    Travel Screening: Not Applicable

## 2023-04-17 ENCOUNTER — TELEPHONE (OUTPATIENT)
Dept: NEUROLOGY | Facility: CLINIC | Age: 38
End: 2023-04-17
Payer: MEDICARE

## 2023-04-17 NOTE — TELEPHONE ENCOUNTER
The patient was called to set up a neuropsychological evaluation appointment following 1 year after his Deep Brain Stimulation surgeries. The patient was scheduled with Dr. Gracia on 8/1/23 at 12:30 PM.    The patient also asked to schedule a follow up with Dr. Murrell. The patient was scheduled on 4/28 at 7 AM with Dr. Murrell for a Deep Brain Stimulation programming follow up.

## 2023-04-28 ENCOUNTER — OFFICE VISIT (OUTPATIENT)
Dept: NEUROLOGY | Facility: CLINIC | Age: 38
End: 2023-04-28
Payer: COMMERCIAL

## 2023-04-28 VITALS
HEART RATE: 68 BPM | SYSTOLIC BLOOD PRESSURE: 111 MMHG | OXYGEN SATURATION: 97 % | WEIGHT: 198.3 LBS | BODY MASS INDEX: 26.16 KG/M2 | DIASTOLIC BLOOD PRESSURE: 75 MMHG

## 2023-04-28 DIAGNOSIS — Z96.89 STATUS POST DEEP BRAIN STIMULATOR PLACEMENT: ICD-10-CM

## 2023-04-28 DIAGNOSIS — G20.A1 PARKINSON DISEASE (H): Primary | ICD-10-CM

## 2023-04-28 PROCEDURE — 95983 ALYS BRN NPGT PRGRMG 15 MIN: CPT | Mod: GC | Performed by: PSYCHIATRY & NEUROLOGY

## 2023-04-28 PROCEDURE — 95984 ALYS BRN NPGT PRGRMG ADDL 15: CPT | Mod: GC | Performed by: PSYCHIATRY & NEUROLOGY

## 2023-04-28 ASSESSMENT — MOVEMENT DISORDERS SOCIETY - UNIFIED PARKINSONS DISEASE RATING SCALE (MDS-UPDRS)
TREMOR: (0) NORMAL: NOT AT ALL (NO PROBLEMS).
FREEZING: (0) NORMAL: NOT AT ALL (NO PROBLEMS).
GETTING_OUT_OF_BED_CAR_DEEP_CHAIR: (0) NORMAL: NOT AT ALL (NO PROBLEMS).
DRESSING: (0) NORMAL: NOT AT ALL (NO PROBLEMS).
EATING_TASKS: (0) NORMAL: NOT AT ALL (NO PROBLEMS).
SPEECH: (0) NORMAL: NOT AT ALL (NO PROBLEMS).
TOTAL_SCORE: 0
SALIVA_AND_DROOLING: (0) NORMAL: NOT AT ALL (NO PROBLEMS).
TURNING_IN_BED: (0) NORMAL: NOT AT ALL (NO PROBLEMS).
HOBBIES_AND_OTHER_ACTIVITIES: (0) NORMAL: NOT AT ALL (NO PROBLEMS).
CHEWING_AND_SWALLOWING: (0) NORMAL: NO PROBLEMS.
HANDWRITING: (0) NORMAL: NOT AT ALL (NO PROBLEMS).
WALKING_AND_BALANCE: (0) NORMAL: NOT AT ALL (NO PROBLEMS).
HYGIENE: (0) NORMAL: NOT AT ALL (NO PROBLEMS).

## 2023-04-28 ASSESSMENT — UNIFIED PARKINSONS DISEASE RATING SCALE (UPDRS)
PRONATION_SUPINATION_LEFT: (0) NORMAL: NO PROBLEMS.
RIGIDITY_RUE: (0) NORMAL: NO RIGIDITY.
LEG_AGILITY_LEFT: (0) NORMAL: NO PROBLEMS.
FACIAL_EXPRESSION: (0) NORMAL: NORMAL FACIAL EXPRESSION.
TOTAL_SCORE_LEFT: 0
LEG_AGILITY_RIGHT: (0) NORMAL: NO PROBLEMS.
TOETAPPING_LEFT: (0) NORMAL: NO PROBLEMS.
PARKINSONS_MEDS: ON
RIGIDITY_LLE: (0) NORMAL: NO RIGIDITY.
GAIT: (0) NORMAL: NO PROBLEMS.
AMPLITUDE_RLE: (0) NORMAL: NO TREMOR.
DYSKINESIAS_PRESENT: NO
HANDMOVEMENTS_LEFT: (0) NORMAL: NO PROBLEMS.
FREEZING_GAIT: (0) NORMAL: NO FREEZING.
FINGER_TAPPING_RIGHT: (0) NORMAL: NO PROBLEMS.
AMPLITUDE_RUE: (0) NORMAL: NO TREMOR.
POSTURAL_STABILITY: (0) NORMAL: NO PROBLEMS. RECOVERS WITH ONE OR TWO STEPS.
RIGIDITY_NECK: (1) SLIGHT: RIGIDITY ONLY DETECTED WITH ACTIVATION MANEUVER.
ARISING_CHAIR: (0) NORMAL: NO PROBLEMS. ABLE TO ARISE QUICKLY WITHOUT HESITATION.
AMPLITUDE_LUE: (0) NORMAL: NO TREMOR.
AMPLITUDE_LIP_JAW: (0) NORMAL: NO TREMOR.
TOETAPPING_RIGHT: (0) NORMAL: NO PROBLEMS.
HANDMOVEMENTS_RIGHT: (0) NORMAL: NO PROBLEMS.
TOTAL_SCORE: 0
POSTURE: (0) NORMAL: NO PROBLEMS.
AXIAL_SCORE: 1
AMPLITUDE_LLE: (0) NORMAL: NO TREMOR.
FINGER_TAPPING_LEFT: (0) NORMAL: NO PROBLEMS.
SPONTANEITY_OF_MOVEMENT: (0) NORMAL: NO PROBLEMS.
PRONATION_SUPINATION_RIGHT: (0) NORMAL: NO PROBLEMS.
TOTAL_SCORE: 1
RIGIDITY_RLE: (0) NORMAL: NO RIGIDITY.
SPEECH: (0) NORMAL: NO SPEECH PROBLEMS.
CONSTANCY_TREMOR_ATREST: (0) NORMAL: NO TREMOR.
RIGIDITY_LUE: (0) NORMAL: NO RIGIDITY.

## 2023-04-28 ASSESSMENT — PAIN SCALES - GENERAL: PAINLEVEL: NO PAIN (0)

## 2023-04-28 NOTE — PATIENT INSTRUCTIONS
No medication or DBS changes today.     We'll have you stay for the neurosurgery nurse to look at your scalp. Avoid touching the area and stop using headphones that press on that area.         YEYO = Elective replacement Indicator  EOS = End of Service      Abbott Infinity DBS Generator/battery  3.0 volts is considered new: - You will see a GREEN check lizbet  At 2.8 it is recommended the patient connect monthly to see if indicator has come on: You will see a GREEN check lizbet  2.73 is YEYO (Elective replacement indicated) - 20% power left -- You will see a YELLOW check lizbet -- At this point, call us to have your DBS battery replaced.   2.7 battery is depleted -- It will turn itself OFF

## 2023-04-28 NOTE — Clinical Note
2023       RE: Dipesh Nicole  [address] [place] [address]     Dear Colleague,    Thank you for referring your patient, Dipesh Nicole, to the [facility] [place] [facility]. Please see a copy of my visit note below.    Department of Neurology  Movement Disorders Division   DBS Follow-up Note    Patient: Dipesh Nicole  MRN: [id number]   : 1985   Date of Visit: 23     Diagnosis: Parkinson's disease   DBS Target(s): Bilateral GPi  Date(s) of DBS Lead Placement: L GPi 2022; R GPi 8/15/2022     Date(s) of IPG Placement: 2022  Device: Abbott Infinity 7    Chief Complaint:  Dipesh Nicole is a 37 year old male who returns to clinic for follow up of Parkinson's disease status post bilateral GPi DBS (as above). He was last seen on 22 at which time amantadine was discontinued.     Interval History:  He reports that overall doing well. No wearing off. Fairly tired after work, exhausted.     No DBS changes. No side effects.     No wearing off in between doses. Able to stop amantadine, no change in symptoms. No dyskinesias.    No falls, no swallowing pro    blems, no hallucinations        2023     7:26 AM   UPDRS EDL Scale   2.1  Speech 0   2.2  Salivation 0   2.3  Chew & Swallow 0   2.4  Eating                  0   2.5  Dressing                0   2.6  Hygiene                 0   2.7  Handwriting             0   2.8  Hobbies etc.            0   2.9  Turn in bed             0   2.10 Tremor                  0   2.11 Stand from chair        0   2.12 Walking & Balance  0   2.13 Freezing                0   MDS-UPDRS II Total Score 0      Previously 0 on 22     Review of Systems:  Other than that noted at the end of this note, the remainder of 12 systems reviewed were negative.    PD Medications  5 AM 9 AM 1 PM  5 PM 9 PM   Amantadine 100 mg stopped       Rytary 195 mg  2 2 3 2 2     Last dose taken Rytary at  5     Medications:  Current Outpatient Medications   Medication Sig Dispense Refill     amantadine (SYMMETREL) 100 MG capsule 100mg by mouth twice daily at 7am and 11am 180 capsule 3     carbidopa-levodopa (RYTARY) 48. MG CPCR per CR capsule Take 2 capsules by mouth at 5 am, 9 am, 5 pm, and bedtime and take 3 capsules at 1 pm = 11 capsules/day 330 capsule 11     famotidine (PEPCID) 20 MG tablet 20mg tab by mouth twice daily at 7am and 11am 180 tablet 11     acetaminophen (TYLENOL) 325 MG tablet Take 2 tablets (650 mg) by mouth every 4 hours as needed for headaches or pain (For optimal non-opioid multimodal pain management to improve pain control.) (Patient not taking: Reported on 4/28/2023) 60 tablet 0     carbidopa-levodopa (PARCOPA)  MG ODT Take one-half to 1 tablet by mouth as needed for breakthrough Parkinson symptoms, up to 2 times/day (Patient not taking: Reported on 4/28/2023) 60 tablet 11     LORazepam (ATIVAN) 0.5 MG tablet Take 1 tablet (0.5 mg) by mouth 2 times daily as needed (do not drive after taking a dose) (Patient not taking: Reported on 4/28/2023) 10 tablet 3     Multiple Vitamin (MULTIVITAMIN ADULT PO) every morning Vitamin by mouth daily (Patient not taking: Reported on 4/28/2023)       ondansetron (ZOFRAN ODT) 4 MG ODT tab Take 1 tablet (4 mg) by mouth every 6 hours as needed for nausea or vomiting (Patient not taking: Reported on 4/28/2023) 10 tablet 0      Allergies: is allergic to diphenhydramine, metoclopramide, and promethazine.    Past Medical History:  Past Medical History:   Diagnosis Date     Esophagitis endoscopy done 11/2021 11/18/2021    Impression:            - Normal first portion of the duodenum, second portion                         of the duodenum and third portion of the duodenum.                         Biopsied.                         - Nodular mucosa in the duodenal bulb. Biopsied.                         - Normal stomach. Biopsied.                         -  Medium-sized hiatal hernia.                         - LA Grade     H/O electroencephalogram 2021 11/18/2021     Impression:  This is a normal waking and sleep EEG, with generalized beta activities throughout the recording. Generalized beta activities are usually associated certain medication use, such as benzodiazepines or barbiturates. The cause of these activities in this case is unclear at this time.         H/O magnetic resonance imaging of brain and brain stem 8/17/2018 2009 December MR Brain without and with IV [id number]/8/2009 [facility] Result Impression  Normal MRI of the brain.  Result Narrative      Multiecho, multiplanar views of the brain were obtained prior to and  following the IV administration of 19 mL of contrast. There are no  previous studies available for comparison.     The brain parenchyma is normal in appearance on all pulse sequences,  with      H/O magnetic resonance imaging of cervical spine 8/17/2018 March MR Cervical Spine without IV Contrast3/15/2017 [facility] Result Addenda Addendum by ProviderKaren M.D. on 03/15/2017 12:30 PM RAD^^^MA MR Cervical Spine w/o contrast 3/15/2017 12:30:00 Result Impression  Minimal degenerative disc disease at C5-6 and C6-7  otherwise an unremarkable MR scan of the cervical spine.  Result Narrative   EXAM: MR Cervical Spine w/o contrast   INDICATION:      H/O shoulder surgery 8/17/2018 2017 July XR SHOULDER 2 VIEWS LEFT7/14/2017 [facility] Result Narrative XR SHOULDER 2 VIEWS LEFT 7/14/2017 9:13 PM  INDICATION: Shoulder Injury COMPARISON: None.  FINDINGS: Negative shoulder. No fracture or dislocation.  Status       Hiatal hernia 4/20/2021    Based on a 2021 ct scan Small hiatal hernia with some wall thickening in the distal thoracic esophagus suggesting some esophagitis.     Seizure (H) at age 22 yrs 8/17/2018     Shoulder dislocation     left     Tremor 8/10/2018       Past Surgical History:  Past  Surgical History:   Procedure Laterality Date     ESOPHAGOSCOPY, GASTROSCOPY, DUODENOSCOPY (EGD), COMBINED N/A 11/18/2021    Procedure: ESOPHAGOGASTRODUODENOSCOPY, WITH BIOPSY;  Surgeon: Veronica Kay MD;  Location: [facility] OR     ESOPHAGOSCOPY, GASTROSCOPY, DUODENOSCOPY (EGD), COMBINED N/A 3/7/2022    Procedure: ESOPHAGOGASTRODUODENOSCOPY, WITH BIOPSY;  Surgeon: Alexandr Sweet MD;  Location: [facility] OR     IMPLANT DEEP BRAIN STIMULATION GENERATOR / BATTERY Left 7/25/2022    Procedure: Deep brain stimulator placement, phase II, placement of deep brain stimulator generator/battery over the left chest wall;  Surgeon: Angel Morales MD;  Location: [facility] OR     OPTICAL TRACKING SYSTEM INSERTION DEEP BRAIN STIMULATION Left 7/18/2022    Procedure: Left side deep brain stimulator placement, phase I, placement of left side deep brain stimulator electrode, target left globus pallidus internus, with microelectrode recording;  Surgeon: Angel Morales MD;  Location: [facility] OR     OPTICAL TRACKING SYSTEM INSERTION DEEP BRAIN STIMULATION Right 8/15/2022    Procedure: Stealth Assisted Right side deep brain stimulator placement, phase I & II combined, placement of right side deep brain stimulator electrode, target right globus pallidus internus, with microelectrode recording and connection to existing generator/battery;  Surgeon: Angel Morales MD;  Location: [facility] OR     SHOULDER SURGERY  2007       Social History:  Social History     Socioeconomic History     Marital status: Single   Tobacco Use     Smoking status: Never Smoker     Smokeless tobacco: Never Used   Substance and Sexual Activity     Alcohol use: Yes     Drug use: No   Social History Narrative    single. lives in [facility]. mother cabrera ambriz            He has a history of a left dislocated shoulder with surgery performed in 2009. He currently lives in [place] but comes to the [place] area to visit his parents in [place]. He works as a  full-time  throughout the year.     Social Determinants of Health     Intimate Partner Violence: Not At Risk     Fear of Current or Ex-Partner: No     Emotionally Abused: No     Physically Abused: No     Sexually Abused: No       Family History:  Family History   Problem Relation Age of Onset     Cancer Other         grandmother     Tremor No family hx of      Dementia No family hx of      Parkinsonism No family hx of      Seizure Disorder No family hx of        Physical Exam:  The patient's  weight is 89.9 kg (198 lb 4.8 oz). His blood pressure is 111/75 and his pulse is 68. His oxygen saturation is 97%.       Neurological Examination:   Gait: long stride length, good arm swing bilaterally        12/16/2022     7:00 AM   UPDRS Motor Scale   Time:  7:37 AM   Medication On   R Brain DBS: On   L Brain DBS: On   Dyskinesia (LID) No   Speech 0   Facial Expression 0   Rigidity Neck 1   Rigidity RUE 1   Rigidity LUE 0   Rigidity RLE 0   Rigidity LLE 0   Finger Taps R 0   Finger Taps L 0   Hand Mvt R 0   Hand Mvt L 1   Pron-/Supinate R 0   Pron-/Supinate L 0   Toe Tap R 0   Toe Tap L 0   Leg Agility R 0   Leg Agility L 0   Arise From Chair 0   Gait 0   Gait Freezing 0   Postural Stability 0   Posture 0   Global Spont Mvt 0   Postural Tremor RUE 0   Postural Tremor LUE 1   Kinetic Tremor RUE 0   Kinetic Tremor LUE 0   Rest Tremor RUE 0   Rest Tremor LUE 0   Rest Tremor RLE 0   Rest Tremor LLE 0   Rest Tremor Lip/Jaw 0   Rest Tremor Constancy 0   Total Right 1   Total Left 2   Axial Total 1   Total 4        Previously 4 on 12/16/22 ***    Incision Site:  left chest are dry, intact, and healing nicely. No redness, swelling, or drainage. DBS insertions and behind ear at the extension wire are intact & well healed ***    Procedure: DBS Interrogation & Programming    Lead(s):   Side Left Right   Target GP GP   Lead  Abbott Abbott   Lead model Infinity 0.5 Infinity 0.5   Lead Implant Date 7/18/2022  8/15/2022   IPG location Left chest Left chest     IPG(s):  IPG  Abbott   IPG model Infinity 7   IPG implant date 22   Location Left chest   Battery 2.95 V (2.95V)       Impedance Check: low impedances found on  12-   Left GP ther imped 1025 Ohms  See scanned report for impedance details.    Program A ***      Side Left GPi Right GPi   IPG location Left chest Left chest   Initial/Final Initial & final Initial & final   Active/Inactive Active Active   Amplitude (mA) 3.0 (0 -4.0)  3.0 (0 - 5.0)   Pulse width (usec)  60  60   Freq (Hz)  130  130   Contacts:  C pos 3_ omni neg  Cpos 11_omni neg        Assessment/Plan:  Dipesh Nicole is a 37 year old male with who returns to clinic for follow up of Parkinson's disease status post bilateral GPi DBS. He presents today for Parkinson's disease follow-up and DBS programming. he is quite happy with symptom control on his current settings. He is no longer having wearing off or dyskinesias. He has successfully weaned off rotigotine patch and has not noticed any change in symptoms. He is interested in seeing if he can further simplify his medication regimen and so will try tapering amantadine. ***    ***  - no changes to DBS programs  - continue current Rytary dose  - wean amantadine (see AVS)    RTC 3 months, in person, 60 min DBS programming ***    Patient and plan was examined & discussed with attending physician, Dr. Murrell.     Chantale Hays MD  Movement Disorders Fellow     Department of Neurology  Movement Disorders Division   DBS Follow-up Note    Patient: Dipesh Nicole  MRN: [id number]   : 1985   Date of Visit: 23     Diagnosis: Parkinson's disease   DBS Target(s): Bilateral GPi  Date(s) of DBS Lead Placement: L GPi 2022; R GPi 8/15/2022     Date(s) of IPG Placement: 2022  Device: Abbott Infinity 7    Chief Complaint:  Dipesh Nicole is a 37 year old male who returns to clinic for follow up of Parkinson's disease status  post bilateral GPi DBS (as above). He was last seen on 12/16/22 at which time amantadine was discontinued.     Interval History:  He reports that overall doing well. No wearing off. Fairly tired after work, exhausted.     No DBS changes. No side effects.     No wearing off in between doses. Able to stop amantadine, no change in symptoms. No dyskinesias.    No falls, no swallowing problems, no hallucinations        4/28/2023     7:26 AM   UPDRS EDL Scale   2.1  Speech 0   2.2  Salivation 0   2.3  Chew & Swallow 0   2.4  Eating                  0   2.5  Dressing                0   2.6  Hygiene                 0   2.7  Handwriting             0   2.8  Hobbies etc.            0   2.9  Turn in bed             0   2.10 Tremor                  0   2.11 Stand from chair        0   2.12 Walking & Balance  0   2.13 Freezing                0   MDS-UPDRS II Total Score 0      Previously 0 on 9/23/22     Review of Systems:  Other than that noted at the end of this note, the remainder of 12 systems reviewed were negative.    PD Medications  5 AM 9 AM 1 PM  5 PM 9 PM   Amantadine 100 mg stopped       Rytary 195 mg  2 2 3 2 2     Last dose taken Rytary at 5     Medications:  Current Outpatient Medications   Medication Sig Dispense Refill     amantadine (SYMMETREL) 100 MG capsule 100mg by mouth twice daily at 7am and 11am 180 capsule 3     carbidopa-levodopa (RYTARY) 48. MG CPCR per CR capsule Take 2 capsules by mouth at 5 am, 9 am, 5 pm, and bedtime and take 3 capsules at 1 pm = 11 capsules/day 330 capsule 11     famotidine (PEPCID) 20 MG tablet 20mg tab by mouth twice daily at 7am and 11am 180 tablet 11     acetaminophen (TYLENOL) 325 MG tablet Take 2 tablets (650 mg) by mouth every 4 hours as needed for headaches or pain (For optimal non-opioid multimodal pain management to improve pain control.) (Patient not taking: Reported on 4/28/2023) 60 tablet 0     carbidopa-levodopa (PARCOPA)  MG ODT Take one-half to 1 tablet  by mouth as needed for breakthrough Parkinson symptoms, up to 2 times/day (Patient not taking: Reported on 4/28/2023) 60 tablet 11     LORazepam (ATIVAN) 0.5 MG tablet Take 1 tablet (0.5 mg) by mouth 2 times daily as needed (do not drive after taking a dose) (Patient not taking: Reported on 4/28/2023) 10 tablet 3     Multiple Vitamin (MULTIVITAMIN ADULT PO) every morning Vitamin by mouth daily (Patient not taking: Reported on 4/28/2023)       ondansetron (ZOFRAN ODT) 4 MG ODT tab Take 1 tablet (4 mg) by mouth every 6 hours as needed for nausea or vomiting (Patient not taking: Reported on 4/28/2023) 10 tablet 0      Allergies: is allergic to diphenhydramine, metoclopramide, and promethazine.    Past Medical History:  Past Medical History:   Diagnosis Date     Esophagitis endoscopy done 11/2021 11/18/2021    Impression:            - Normal first portion of the duodenum, second portion                         of the duodenum and third portion of the duodenum.                         Biopsied.                         - Nodular mucosa in the duodenal bulb. Biopsied.                         - Normal stomach. Biopsied.                         - Medium-sized hiatal hernia.                         - LA Grade     H/O electroencephalogram 2021 11/18/2021     Impression:  This is a normal waking and sleep EEG, with generalized beta activities throughout the recording. Generalized beta activities are usually associated certain medication use, such as benzodiazepines or barbiturates. The cause of these activities in this case is unclear at this time.         H/O magnetic resonance imaging of brain and brain stem 8/17/2018 2009 December MR Brain without and with IV [id number]/8/2009 [facility] Result Impression  Normal MRI of the brain.  Result Narrative      Multiecho, multiplanar views of the brain were obtained prior to and  following the IV administration of 19 mL of contrast. There are no  previous studies available for  comparison.     The brain parenchyma is normal in appearance on all pulse sequences,  with      H/O magnetic resonance imaging of cervical spine 8/17/2018 March MR Cervical Spine without IV Contrast3/15/2017 [facility] Result Addenda Addendum by Provider, PA Jones on 03/15/2017 12:30 PM RAD^^^MA MR Cervical Spine w/o contrast 3/15/2017 12:30:00 Result Impression  Minimal degenerative disc disease at C5-6 and C6-7  otherwise an unremarkable MR scan of the cervical spine.  Result Narrative   EXAM: MR Cervical Spine w/o contrast   INDICATION:      H/O shoulder surgery 8/17/2018 2017 July XR SHOULDER 2 VIEWS LEFT7/14/2017 [facility] Result Narrative XR SHOULDER 2 VIEWS LEFT 7/14/2017 9:13 PM  INDICATION: Shoulder Injury COMPARISON: None.  FINDINGS: Negative shoulder. No fracture or dislocation.  Status       Hiatal hernia 4/20/2021    Based on a 2021 ct scan Small hiatal hernia with some wall thickening in the distal thoracic esophagus suggesting some esophagitis.     Seizure (H) at age 22 yrs 8/17/2018     Shoulder dislocation     left     Tremor 8/10/2018       Past Surgical History:  Past Surgical History:   Procedure Laterality Date     ESOPHAGOSCOPY, GASTROSCOPY, DUODENOSCOPY (EGD), COMBINED N/A 11/18/2021    Procedure: ESOPHAGOGASTRODUODENOSCOPY, WITH BIOPSY;  Surgeon: Veronica Kay MD;  Location: [facility] OR     ESOPHAGOSCOPY, GASTROSCOPY, DUODENOSCOPY (EGD), COMBINED N/A 3/7/2022    Procedure: ESOPHAGOGASTRODUODENOSCOPY, WITH BIOPSY;  Surgeon: Alexandr Sweet MD;  Location: [facility] OR     IMPLANT DEEP BRAIN STIMULATION GENERATOR / BATTERY Left 7/25/2022    Procedure: Deep brain stimulator placement, phase II, placement of deep brain stimulator generator/battery over the left chest wall;  Surgeon: Angel Morales MD;  Location: [facility] OR     OPTICAL TRACKING SYSTEM INSERTION DEEP BRAIN STIMULATION Left 7/18/2022    Procedure: Left side deep brain stimulator  placement, phase I, placement of left side deep brain stimulator electrode, target left globus pallidus internus, with microelectrode recording;  Surgeon: Angel Morales MD;  Location: [facility] OR     OPTICAL TRACKING SYSTEM INSERTION DEEP BRAIN STIMULATION Right 8/15/2022    Procedure: Stealth Assisted Right side deep brain stimulator placement, phase I & II combined, placement of right side deep brain stimulator electrode, target right globus pallidus internus, with microelectrode recording and connection to existing generator/battery;  Surgeon: Angel Morales MD;  Location: [facility] OR     SHOULDER SURGERY  2007       Social History:  Social History     Socioeconomic History     Marital status: Single   Tobacco Use     Smoking status: Never Smoker     Smokeless tobacco: Never Used   Substance and Sexual Activity     Alcohol use: Yes     Drug use: No   Social History Narrative    single. lives in [facility]. mother cabrera ambriz            He has a history of a left dislocated shoulder with surgery performed in 2009. He currently lives in [place] but comes to the [place] area to visit his parents in [place]. 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     Social Determinants of Health     Intimate Partner Violence: Not At Risk     Fear of Current or Ex-Partner: No     Emotionally Abused: No     Physically Abused: No     Sexually Abused: No       Family History:  Family History   Problem Relation Age of Onset     Cancer Other         grandmother     Tremor No family hx of      Dementia No family hx of      Parkinsonism No family hx of      Seizure Disorder No family hx of        Physical Exam:  The patient's  weight is 89.9 kg (198 lb 4.8 oz). His blood pressure is 111/75 and his pulse is 68. His oxygen saturation is 97%.       Neurological Examination:   Gait: long stride length, good arm swing bilaterally        12/16/2022     7:00 AM   UPDRS Motor Scale   Time:  7:37 AM   Medication On   R  Brain DBS: On   L Brain DBS: On   Dyskinesia (LID) No   Speech 0   Facial Expression 0   Rigidity Neck 1   Rigidity RUE 1   Rigidity LUE 0   Rigidity RLE 0   Rigidity LLE 0   Finger Taps R 0   Finger Taps L 0   Hand Mvt R 0   Hand Mvt L 1   Pron-/Supinate R 0   Pron-/Supinate L 0   Toe Tap R 0   Toe Tap L 0   Leg Agility R 0   Leg Agility L 0   Arise From Chair 0   Gait 0   Gait Freezing 0   Postural Stability 0   Posture 0   Global Spont Mvt 0   Postural Tremor RUE 0   Postural Tremor LUE 1   Kinetic Tremor RUE 0   Kinetic Tremor LUE 0   Rest Tremor RUE 0   Rest Tremor LUE 0   Rest Tremor RLE 0   Rest Tremor LLE 0   Rest Tremor Lip/Jaw 0   Rest Tremor Constancy 0   Total Right 1   Total Left 2   Axial Total 1   Total 4        Previously 4 on 12/16/22 ***    Incision Site:  left chest are dry, intact, and healing nicely. No redness, swelling, or drainage. DBS insertions and behind ear at the extension wire are intact & well healed ***    Procedure: DBS Interrogation & Programming    Lead(s):   Side Left Right   Target GP GP   Lead  Abbott Abbott   Lead model Infinity 0.5 Infinity 0.5   Lead Implant Date 7/18/2022 8/15/2022   IPG location Left chest Left chest     IPG(s):  IPG  Abbott   IPG model Infinity 7   IPG implant date 7/25/22   Location Left chest   Battery 2.95 V (2.95V)       Impedance Check: low impedances found on 10B, 12-   Left GP ther imped 1025 Ohms  See scanned report for impedance details.    Program A ***      Side Left GPi Right GPi   IPG location Left chest Left chest   Initial/Final Initial & final Initial & final   Active/Inactive Active Active   Amplitude (mA) 3.0 (0 -4.0)  3.0 (0 - 5.0)   Pulse width (usec)  60  60   Freq (Hz)  130  130   Contacts:  C pos 3_ omni neg  Cpos 11_omni neg        Assessment/Plan:  Dipesh Nicole is a 37 year old male with who returns to clinic for follow up of Parkinson's disease status post bilateral GPi DBS. He presents today for  Parkinson's disease follow-up and DBS programming. he is quite happy with symptom control on his current settings. He is no longer having wearing off or dyskinesias. He has successfully weaned off rotigotine patch and has not noticed any change in symptoms. He is interested in seeing if he can further simplify his medication regimen and so will try tapering amantadine. ***    ***  - no changes to DBS programs  - continue current Rytary dose  - wean amantadine (see AVS)    RTC 3 months, in person, 60 min DBS programming ***    Patient and plan was examined & discussed with attending physician, Dr. Murrell.     Chantale Hays MD  Movement Disorders Fellow       Again, thank you for allowing me to participate in the care of your patient.      Sincerely,    Jun Murrell MD

## 2023-04-28 NOTE — PROGRESS NOTES
Department of Neurology  Movement Disorders Division   DBS Follow-up Note    Patient: Dipesh Nicole  MRN: [id number]   : 1985   Date of Visit: 23     Diagnosis: Parkinson's disease   DBS Target(s): Bilateral GPi  Date(s) of DBS Lead Placement: L GPi 2022; R GPi 8/15/2022     Date(s) of IPG Placement: 2022  Device: Abbott Infinity 7    Chief Complaint:  Dipesh Nicole is a 37 year old male who returns to clinic for follow up of Parkinson's disease status post bilateral GPi DBS (as above). He was last seen on 22 at which time amantadine was discontinued.     Interval History:  He reports that overall doing well. No wearing off. Fairly tired after work, exhausted.     No DBS changes. No side effects.     No wearing off in between doses. Able to stop amantadine, no change in symptoms. No dyskinesias.    No falls, no swallowing problems, no hallucinations.     He does note that he has been wearing headphones and notices that the scalp behind the ear is a bit irritated.        2023     7:26 AM   UPDRS EDL Scale   2.1  Speech 0   2.2  Salivation 0   2.3  Chew & Swallow 0   2.4  Eating                  0   2.5  Dressing                0   2.6  Hygiene                 0   2.7  Handwriting             0   2.8  Hobbies etc.            0   2.9  Turn in bed             0   2.10 Tremor                  0   2.11 Stand from chair        0   2.12 Walking & Balance  0   2.13 Freezing                0   MDS-UPDRS II Total Score 0      Previously 0 on 22     Review of Systems:  Other than that noted at the end of this note, the remainder of 12 systems reviewed were negative.    PD Medications  5 AM 9 AM 1 PM  5 PM 9 PM   Amantadine 100 mg stopped       Rytary 195 mg  2 2 3 2 2     Last dose taken Rytary at 5 am    Medications:  Current Outpatient Medications   Medication Sig Dispense Refill     amantadine (SYMMETREL) 100 MG capsule 100mg by mouth twice daily at 7am and 11am 180 capsule 3      carbidopa-levodopa (RYTARY) 48. MG CPCR per CR capsule Take 2 capsules by mouth at 5 am, 9 am, 5 pm, and bedtime and take 3 capsules at 1 pm = 11 capsules/day 330 capsule 11     famotidine (PEPCID) 20 MG tablet 20mg tab by mouth twice daily at 7am and 11am 180 tablet 11     acetaminophen (TYLENOL) 325 MG tablet Take 2 tablets (650 mg) by mouth every 4 hours as needed for headaches or pain (For optimal non-opioid multimodal pain management to improve pain control.) (Patient not taking: Reported on 4/28/2023) 60 tablet 0     carbidopa-levodopa (PARCOPA)  MG ODT Take one-half to 1 tablet by mouth as needed for breakthrough Parkinson symptoms, up to 2 times/day (Patient not taking: Reported on 4/28/2023) 60 tablet 11     LORazepam (ATIVAN) 0.5 MG tablet Take 1 tablet (0.5 mg) by mouth 2 times daily as needed (do not drive after taking a dose) (Patient not taking: Reported on 4/28/2023) 10 tablet 3     Multiple Vitamin (MULTIVITAMIN ADULT PO) every morning Vitamin by mouth daily (Patient not taking: Reported on 4/28/2023)       ondansetron (ZOFRAN ODT) 4 MG ODT tab Take 1 tablet (4 mg) by mouth every 6 hours as needed for nausea or vomiting (Patient not taking: Reported on 4/28/2023) 10 tablet 0      Allergies: is allergic to diphenhydramine, metoclopramide, and promethazine.    Past Medical History:  Past Medical History:   Diagnosis Date     Esophagitis endoscopy done 11/2021 11/18/2021    Impression:            - Normal first portion of the duodenum, second portion                         of the duodenum and third portion of the duodenum.                         Biopsied.                         - Nodular mucosa in the duodenal bulb. Biopsied.                         - Normal stomach. Biopsied.                         - Medium-sized hiatal hernia.                         - LA Grade     H/O electroencephalogram 2021 11/18/2021     Impression:  This is a normal waking and sleep EEG, with generalized beta  activities throughout the recording. Generalized beta activities are usually associated certain medication use, such as benzodiazepines or barbiturates. The cause of these activities in this case is unclear at this time.         H/O magnetic resonance imaging of brain and brain stem 8/17/2018 2009 December MR Brain without and with IV [id number]/8/2009 [facility] Result Impression  Normal MRI of the brain.  Result Narrative      Multiecho, multiplanar views of the brain were obtained prior to and  following the IV administration of 19 mL of contrast. There are no  previous studies available for comparison.     The brain parenchyma is normal in appearance on all pulse sequences,  with      H/O magnetic resonance imaging of cervical spine 8/17/2018 March MR Cervical Spine without IV Contrast3/15/2017 [facility] Result Addenda Addendum by Provider, PA Jones on 03/15/2017 12:30 PM RAD^^^MA MR Cervical Spine w/o contrast 3/15/2017 12:30:00 Result Impression  Minimal degenerative disc disease at C5-6 and C6-7  otherwise an unremarkable MR scan of the cervical spine.  Result Narrative   EXAM: MR Cervical Spine w/o contrast   INDICATION:      H/O shoulder surgery 8/17/2018 2017 July [facility] SH[facility] V[facility]WS [facility] Result Narrative XR SHOULDER 2 VIEWS LEFT 7/14/2017 9:13 PM  INDICATION: Shoulder Injury COMPARISON: None.  FINDINGS: Negative shoulder. No fracture or dislocation.  Status       Hiatal hernia 4/20/2021    Based on a 2021 ct scan Small hiatal hernia with some wall thickening in the distal thoracic esophagus suggesting some esophagitis.     Seizure (H) at age 22 yrs 8/17/2018     Shoulder dislocation     left     Tremor 8/10/2018       Past Surgical History:  Past Surgical History:   Procedure Laterality Date     ESOPHAGOSCOPY, GASTROSCOPY, DUODENOSCOPY (EGD), COMBINED N/A 11/18/2021    Procedure: ESOPHAGOGASTRODUODENOSCOPY, WITH BIOPSY;  Surgeon:  Veronica Kay MD;  Location: [facility] OR     ESOPHAGOSCOPY, GASTROSCOPY, DUODENOSCOPY (EGD), COMBINED N/A 3/7/2022    Procedure: ESOPHAGOGASTRODUODENOSCOPY, WITH BIOPSY;  Surgeon: Alexandr Sweet MD;  Location: [facility] OR     IMPLANT DEEP BRAIN STIMULATION GENERATOR / BATTERY Left 7/25/2022    Procedure: Deep brain stimulator placement, phase II, placement of deep brain stimulator generator/battery over the left chest wall;  Surgeon: Angel Morales MD;  Location: [facility] OR     OPTICAL TRACKING SYSTEM INSERTION DEEP BRAIN STIMULATION Left 7/18/2022    Procedure: Left side deep brain stimulator placement, phase I, placement of left side deep brain stimulator electrode, target left globus pallidus internus, with microelectrode recording;  Surgeon: Angel Morales MD;  Location: [facility] OR     OPTICAL TRACKING SYSTEM INSERTION DEEP BRAIN STIMULATION Right 8/15/2022    Procedure: Stealth Assisted Right side deep brain stimulator placement, phase I & II combined, placement of right side deep brain stimulator electrode, target right globus pallidus internus, with microelectrode recording and connection to existing generator/battery;  Surgeon: Angel Morales MD;  Location: [facility] OR     SHOULDER SURGERY  2007       Social History:  Social History     Socioeconomic History     Marital status: Single   Tobacco Use     Smoking status: Never Smoker     Smokeless tobacco: Never Used   Substance and Sexual Activity     Alcohol use: Yes     Drug use: No   Social History Narrative    single. lives in [facility]. mother cabrera ambriz            He has a history of a left dislocated shoulder with surgery performed in 2009. He currently lives in [place] but comes to the [place] area to visit his parents in [place]. 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     Social Determinants of Health     Intimate Partner Violence: Not At Risk     Fear of Current or Ex-Partner: No     Emotionally Abused: No      Physically Abused: No     Sexually Abused: No       Family History:  Family History   Problem Relation Age of Onset     Cancer Other         grandmother     Tremor No family hx of      Dementia No family hx of      Parkinsonism No family hx of      Seizure Disorder No family hx of        Physical Exam:  The patient's  weight is 89.9 kg (198 lb 4.8 oz). His blood pressure is 111/75 and his pulse is 68. His oxygen saturation is 97%.       Neurological Examination:   Gait: long stride length, good arm swing bilaterally        4/28/2023     7:00 AM   UPDRS Motor Scale   Time:  7:40 AM   Medication On   R Brain DBS: On   L Brain DBS: On   Dyskinesia (LID) No   Speech 0   Facial Expression 0   Rigidity Neck 1   Rigidity RUE 0   Rigidity LUE 0   Rigidity RLE 0   Rigidity LLE 0   Finger Taps R 0   Finger Taps L 0   Hand Mvt R 0   Hand Mvt L 0   Pron-/Supinate R 0   Pron-/Supinate L 0   Toe Tap R 0   Toe Tap L 0   Leg Agility R 0   Leg Agility L 0   Arise From Chair 0   Gait 0   Gait Freezing 0   Postural Stability 0   Posture 0   Global Spont Mvt 0   Postural Tremor RUE 0   Postural Tremor LUE 0   Kinetic Tremor RUE 0   Kinetic Tremor LUE 0   Rest Tremor RUE 0   Rest Tremor LUE 0   Rest Tremor RLE 0   Rest Tremor LLE 0   Rest Tremor Lip/Jaw 0   Rest Tremor Constancy 0   Total Right 0   Total Left 0   Axial Total 1   Total 1        Previously 4 on 12/16/22     Incision Site:  left chest are dry, intact. No redness, swelling, or drainage. Behind ear with open wound, slightly erythematous but does not appear to be infected. Unable to visualize wire.    Procedure: DBS Interrogation & Programming    Lead(s):   Side Left Right   Target GP GP   Lead  Abbott Abbott   Lead model Infinity 0.5 Infinity 0.5   Lead Implant Date 7/18/2022 8/15/2022   IPG location Left chest Left chest     IPG(s):  IPG  Abbott   IPG model Infinity 7   IPG implant date 7/25/22   Location Left chest   Battery 2.95 V (2.95V)        Impedance Check: low impedances found on 10B, 12-   Left GP ther imped 1025 Ohms  See scanned report for impedance details.    Program A      Side Left GPi Right GPi   IPG location Left chest Left chest   Initial/Final Initial & final Initial & final   Active/Inactive Active Active   Amplitude (mA) 3.0 (0 -4.0)  3.0 (0 - 5.0)   Pulse width (usec)  60  60   Freq (Hz)  130  130   Contacts:  C pos 3_ omni neg  Cpos 11_omni neg        Assessment/Plan:  Dipesh Nicole is a 37 year old male with who returns to clinic for follow up of Parkinson's disease status post bilateral GPi DBS. He presents today for Parkinson's disease follow-up and DBS programming. he is quite happy with symptom control on his current settings. He is no longer having wearing off or dyskinesias. He has successfully weaned off rotigotine patch and amantadine and has not noticed any change in symptoms. He does note some skin irritation behind ear over wire and is willing to stay to be evaluated by a neurosurgery nurse to determine if additional evaluation/intervention is required    - no changes to DBS programs  - continue current Rytary dose  - neurosurgery to evaluate scalp    RTC 6 months, in person, 60 min DBS programming    Patient and plan was examined & discussed with attending physician, Dr. Murrell.     Chantale Hays MD  Movement Disorders Fellow     Patient seen and examined with Dr. Hays and I agree with the assessment and plan as outlined.  Time this date with patient, reviewing records, & documentinh.    Jun Murrell PHD, MD

## 2023-05-02 ENCOUNTER — OFFICE VISIT (OUTPATIENT)
Dept: NEUROSURGERY | Facility: CLINIC | Age: 38
End: 2023-05-02
Payer: COMMERCIAL

## 2023-05-02 VITALS
HEART RATE: 74 BPM | SYSTOLIC BLOOD PRESSURE: 136 MMHG | OXYGEN SATURATION: 100 % | WEIGHT: 200.4 LBS | BODY MASS INDEX: 26.44 KG/M2 | DIASTOLIC BLOOD PRESSURE: 88 MMHG

## 2023-05-02 DIAGNOSIS — G20.A1 PARKINSON DISEASE (H): Primary | ICD-10-CM

## 2023-05-02 PROCEDURE — 99212 OFFICE O/P EST SF 10 MIN: CPT | Performed by: NEUROLOGICAL SURGERY

## 2023-05-02 ASSESSMENT — PAIN SCALES - GENERAL: PAINLEVEL: NO PAIN (0)

## 2023-05-02 NOTE — PROGRESS NOTES
Neurosurgery Attending DBS Follow-Up Note    HPI: Annalise Nicole is a 38 y/o pleasant gentleman with Parkinson's disease s/p bilateral GPi DBS (Abbott Infinity 7) in Aug 2022. He presents today for wound check. Recently seen for programming/DBS setting check with Dr. Murrell who noted some skin erosion by the extension wire connector site behind his left ear and wanted to have me take a look. Annalise does not note any drainage, fluid leakage, redness, pain, swelling or other problems with the site. He wears a headset for work which he brought in so I could take a look. He does note that he frequently rests them behind his ears when he needs to take them off and they do sit right on the incision site on those occasions.     Exam:  Awake, alert, oriented x 3  Attends, follows, fluent  PERRL  EOMI  FS  TML  BUE/BLE 5/5, no drift  Wounds all c/d/I, no erythema, swelling or pain  The left posterior auricular incision site where the connector sits does have a keloid scar with two small scabbed regions. No drainage, open portions, erythema, swelling or tenderness. His chest incision has a keloid scar as well.     Programming schedule: per movement disorders team    UPDRS outcome:  Preop  OFF med 53-> ON med 11    Last visit:  ON DBS/ON med: 1    A/P: 38 y/o gentleman with PD s/p bilateral GPi-DBS, doing well. We discussed using in-ear headphones only so that nothing rubs up against his incision. The connector is buried in a skull trough so there should essentially be nothing in the soft tissue below the incision except for one of the passing wires going to the right side. There is no evidence of infection or erosion with a couple of small scabs which should fall off in time and not cause a problem as long as the area remains clean and free from irritation. He is going to use in-ear headphones so nothing rubs against the incision in the future. He will check in with us via [employer] in a couple of weeks to ensure that the scabs have  fallen off without difficulty.    - RTC prn

## 2023-05-02 NOTE — LETTER
5/2/2023       RE: Dipesh Nicole  [address] [place] [place] [address]     Dear Colleague,    Thank you for referring your patient, Dipesh Nicole, to the [facility]UROSURG[facility]INIC [facility]. Please see a copy of my visit note below.    Neurosurgery Attending DBS Follow-Up Note    HPI: Annalise Nicole is a 38 y/o pleasant gentleman with Parkinson's disease s/p bilateral GPi DBS (Abbott Infinity 7) in Aug 2022. He presents today for wound check. Recently seen for programming/DBS setting check with Dr. Murrell who noted some skin erosion by the extension wire connector site behind his left ear and wanted to have me take a look. Annalise does not note any drainage, fluid leakage, redness, pain, swelling or other problems with the site. He wears a headset for work which he brought in so I could take a look. He does note that he frequently rests them behind his ears when he needs to take them off and they do sit right on the incision site on those occasions.     Exam:  Awake, alert, oriented x 3  Attends, follows, fluent  PERRL  EOMI  FS  TML  BUE/BLE 5/5, no drift  Wounds all c/d/I, no erythema, swelling or pain  The left posterior auricular incision site where the connector sits does have a keloid scar with two small scabbed regions. No drainage, open portions, erythema, swelling or tenderness. His chest incision has a keloid scar as well.     Programming schedule: per movement disorders team    UPDRS outcome:  Preop  OFF med 53-> ON med 11    Last visit:  ON DBS/ON med: 1    A/P: 38 y/o gentleman with PD s/p bilateral GPi-DBS, doing well. We discussed using in-ear headphones only so that nothing rubs up against his incision. The connector is buried in a skull trough so there should essentially be nothing in the soft tissue below the incision except for one of the passing wires going to the right side. There is no evidence of infection or erosion with a  couple of small scabs which should fall off in time and not cause a problem as long as the area remains clean and free from irritation. He is going to use in-ear headphones so nothing rubs against the incision in the future. He will check in with us via MyChart in a couple of weeks to ensure that the scabs have fallen off without difficulty.    - RTC prn      Again, thank you for allowing me to participate in the care of your patient.      Sincerely,    Angel Morales MD

## 2023-05-25 DIAGNOSIS — K29.00 OTHER ACUTE GASTRITIS WITHOUT HEMORRHAGE: ICD-10-CM

## 2023-05-25 DIAGNOSIS — G20.A1 PARKINSON DISEASE (H): ICD-10-CM

## 2023-05-25 NOTE — TELEPHONE ENCOUNTER
Rx Authorization:  Requested Medication/ Dose: Famotidine   Date last refill ordered:   Quantity ordered:   # refills:   Date of last clinic visit with ordering provider:   Date of next clinic visit with ordering provider:   All pertinent protocol data (lab date/result):   Include pertinent information from patients message:

## 2023-05-26 RX ORDER — LEVODOPA AND CARBIDOPA 195; 48.75 MG/1; MG/1
CAPSULE, EXTENDED RELEASE ORAL
Qty: 330 CAPSULE | Refills: 11 | Status: SHIPPED | OUTPATIENT
Start: 2023-05-26 | End: 2023-09-15

## 2023-05-26 NOTE — TELEPHONE ENCOUNTER
Medication and strength: Rytary 48. and Pepcid 20 mg    Is this a [facility] patient? Yes    Rytary last re-ordered: 6/28/22 for a 30 supply with 11 refills  Pepcid 20 mg last reordered: 5/24/22 for a 30 day supply with 11 refills    Last appointment: 4/28/2023 with Dr. Murrell    Action taken: Sent to be signed by Dr. Murrell

## 2023-05-30 ENCOUNTER — TELEPHONE (OUTPATIENT)
Dept: NEUROLOGY | Facility: CLINIC | Age: 38
End: 2023-05-30

## 2023-05-30 NOTE — TELEPHONE ENCOUNTER
Writer left a message for the patient to call back to reschedule his neuropsychological evaluation on 8/1/23 with Dr. Gracia.

## 2023-05-30 NOTE — TELEPHONE ENCOUNTER
"----- Message from Valeria Mary RN sent at 5/24/2023 11:05 AM CDT -----    ----- Message -----  From: Srinivas Jacobo  Sent: 5/24/2023  10:45 AM CDT  To: Deep Brain Stimulation[facility]    Hello,     I'm working through neuropsych reschedules right now, and this patient is on the list. We need to reschedule his appointment with Dr. Gracia on 8/1. When I checked his referral it said \"Christopher will schedule\" so I am forwarding it here.     Thank you,   Srinivas      "

## 2023-06-04 ENCOUNTER — HEALTH MAINTENANCE LETTER (OUTPATIENT)
Age: 38
End: 2023-06-04

## 2023-06-05 DIAGNOSIS — G20.A1 PARKINSON DISEASE (H): ICD-10-CM

## 2023-06-05 NOTE — TELEPHONE ENCOUNTER
Rx Authorization:  Requested Medication/ Dose AMANTADINE   Date last refill ordered:   Quantity ordered:   # refills:   Date of last clinic visit with ordering provider:   Date of next clinic visit with ordering provider:   All pertinent protocol data (lab date/result):   Include pertinent information from patients message:

## 2023-06-07 RX ORDER — AMANTADINE HYDROCHLORIDE 100 MG/1
CAPSULE, GELATIN COATED ORAL
Qty: 180 CAPSULE | Refills: 3 | OUTPATIENT
Start: 2023-06-07

## 2023-06-07 RX ORDER — FAMOTIDINE 20 MG/1
TABLET, FILM COATED ORAL
Qty: 180 TABLET | Refills: 11 | OUTPATIENT
Start: 2023-06-07

## 2023-06-07 NOTE — TELEPHONE ENCOUNTER
Medication and strength: Amantadine 100 mg    Is this a [facility] patient? Yes    Last appointment: 4/28/2023 with Dr. Murrell/Dr. Hays At that visit patient reported he had stopped amantadine. No new RX needed    Action taken: Will request provider remove amantadine from he medication list.

## 2023-06-28 NOTE — TELEPHONE ENCOUNTER
Writer left a message for the patient to call back and that this will be her last attempt at reaching him. Informed the patient to call the writer back when he is ready to reschedule the neuropsychological evaluation.

## 2023-07-28 NOTE — TELEPHONE ENCOUNTER
Patient called back and was scheduled for the neuropsychological evaluation on 9/28/23 at 8 AM with Dr. Gracia.

## 2023-08-08 ENCOUNTER — OFFICE VISIT (OUTPATIENT)
Dept: NEUROSURGERY | Facility: CLINIC | Age: 38
End: 2023-08-08
Payer: MEDICAID

## 2023-08-08 VITALS
WEIGHT: 193 LBS | DIASTOLIC BLOOD PRESSURE: 93 MMHG | HEIGHT: 73 IN | SYSTOLIC BLOOD PRESSURE: 138 MMHG | HEART RATE: 71 BPM | OXYGEN SATURATION: 98 % | BODY MASS INDEX: 25.58 KG/M2

## 2023-08-08 DIAGNOSIS — G20.A1 PARKINSON DISEASE (H): Primary | ICD-10-CM

## 2023-08-08 PROCEDURE — 99212 OFFICE O/P EST SF 10 MIN: CPT | Performed by: NEUROLOGICAL SURGERY

## 2023-08-08 ASSESSMENT — PAIN SCALES - GENERAL: PAINLEVEL: NO PAIN (0)

## 2023-08-08 ASSESSMENT — PATIENT HEALTH QUESTIONNAIRE - PHQ9: SUM OF ALL RESPONSES TO PHQ QUESTIONS 1-9: 0

## 2023-08-08 NOTE — PROGRESS NOTES
Neurosurgery Attending DBS Follow-Up Note     HPI: Annalise Nicole is a 36 y/o pleasant gentleman with Parkinson's disease s/p bilateral GPi DBS (Abbott Infinity 7) in Aug 2022. He is doing quite well so far. Played baseball yesterday for the first time in quite a while. He presents today for one year follow up research visit.    On meds, on high frequency stim (current clinical settings):  UPDRS gait: 0, large step/stride length, good step height, fast turn, good arm swing bilaterally  UPDRS pull test: 0, recovers from all pulls in 1-2 steps    On meds, on low frequency stim (60 Hz):  UPDRS gait: 0, large step/stride length, good step height, fast turn, good arm swing bilaterally  UPDRS pull test: 0, recovers from all pulls in 1-2 steps    On meds, off stim:  UPDRS gait: 0, large step/stride length, slightly slower gait, likely slightly smaller steps compared to other conditions, good step height, fast turn, good arm swing bilaterally  UPDRS pull test: 0, recovers from all pulls in 1-2 steps      A/P: 36 y/o gentleman with PD s/p bilateral GPi DBS one year ago, doing very well    - DBS setting management per movement disorders team  - RTC in one year

## 2023-08-08 NOTE — LETTER
8/8/2023       RE: Dipesh Nicole  [address] [place] [address]       Dear Colleague,    Thank you for referring your patient, Dipesh Nicole, to the [facility] NEUROSURGERY CLINIC [place] [facility]. Please see a copy of my visit note below.    Neurosurgery Attending DBS Follow-Up Note     HPI: Annalise Nicole is a 38 y/o pleasant gentleman with Parkinson's disease s/p bilateral GPi DBS (Abbott Infinity 7) in Aug 2022. He is doing quite well so far. Played baseball yesterday for the first time in quite a while. He presents today for one year follow up research visit.    On meds, on high frequency stim (current clinical settings):  UPDRS gait: 0, large step/stride length, good step height, fast turn, good arm swing bilaterally  UPDRS pull test: 0, recovers from all pulls in 1-2 steps    On meds, on low frequency stim (60 Hz):  UPDRS gait: 0, large step/stride length, good step height, fast turn, good arm swing bilaterally  UPDRS pull test: 0, recovers from all pulls in 1-2 steps    On meds, off stim:  UPDRS gait: 0, large step/stride length, slightly slower gait, likely slightly smaller steps compared to other conditions, good step height, fast turn, good arm swing bilaterally  UPDRS pull test: 0, recovers from all pulls in 1-2 steps      A/P: 38 y/o gentleman with PD s/p bilateral GPi DBS one year ago, doing very well    - DBS setting management per movement disorders team  - RTC in one year          Again, thank you for allowing me to participate in the care of your patient.      Sincerely,    Angel Morales MD

## 2023-08-20 ENCOUNTER — MYC MEDICAL ADVICE (OUTPATIENT)
Dept: NEUROLOGY | Facility: CLINIC | Age: 38
End: 2023-08-20
Payer: MEDICAID

## 2023-08-22 ENCOUNTER — TELEPHONE (OUTPATIENT)
Dept: NEUROLOGY | Facility: CLINIC | Age: 38
End: 2023-08-22
Payer: MEDICAID

## 2023-08-22 NOTE — TELEPHONE ENCOUNTER
Release of information (ORALIA) form reviewed and signed by Annalise and thumb drive was given to Annalise upon arriving to the third floor Neurology clinic. ORALIA form sent to be scanned into the medical record.

## 2023-08-30 DIAGNOSIS — G20.A1 PARKINSON DISEASE (H): ICD-10-CM

## 2023-08-31 RX ORDER — LEVODOPA AND CARBIDOPA 195; 48.75 MG/1; MG/1
CAPSULE, EXTENDED RELEASE ORAL
Qty: 330 CAPSULE | Refills: 11 | OUTPATIENT
Start: 2023-08-31

## 2023-08-31 NOTE — TELEPHONE ENCOUNTER
Rx Authorization:  Requested Medication/ Dose RYTARY 48.75-195MG ER CAPSULE   Date last refill ordered:   Quantity ordered:   # refills:   Date of last clinic visit with ordering provider:   Date of next clinic visit with ordering provider:   All pertinent protocol data (lab date/result):   Include pertinent information from patients message:

## 2023-09-15 ENCOUNTER — TELEPHONE (OUTPATIENT)
Dept: NEUROLOGY | Facility: CLINIC | Age: 38
End: 2023-09-15
Payer: MEDICAID

## 2023-09-15 NOTE — LETTER
2023    [place]   Attn: Appeals Department   [address] [address] [place] [address]  Fax: [phone]      Re: Dipesh Nicole  : [birth year]  [address]  [place] [address]      To whom this may concern:      Our patient, Dipesh Nicole, has young-onset Parkinson's disease, diagnosed in 2018 at age 32. Given his young age, he developed motor fluctuations very early on and had to undergo deep brain stimulation surgery to improve his symptoms and quality of life. Additionally, his medications have been optimized and his symptoms are currently very well controlled on Rytary, a mixture of long-acting and short-acting carbidopa-levodopa. He has been able to go off of several other medications including rotigotine patch and amantadine and now he is only taking Rytary.     You are recommending carbidopa-levodopa as an alternative; however when Mr. Nicole took carbidopa-levodopa tablets in the past, he experienced severe wearing-off which resulted in abdominal dystonia and severe vomiting. He was in the emergency department several times due to these effects of carbidopa-levodopa. He has not had any of these dystonic issues with Rytary, so it is imperative that he continue on Rytary. Rytary is medically necessary in his case to control his symptoms and optimize his quality of life. If you have any questions or would like to discuss further, you may call our office at [phone].      Sincerely,      Jun Murrell MD  Movement Disorders Neurologist  [facility]    Veronica Swift, Pharm.D.  Medication Therapy Management Pharmacist  [facility]

## 2023-09-15 NOTE — TELEPHONE ENCOUNTER
Prior Authorization Retail Medication Request    Medication/Dose: Rytary 195 mg  ICD code (if different than what is on RX):  G20  Previously Tried and Failed:  carbidopa-levodopa IR, carbidopa-levodopa CR, Neupro, amantadine, Parcopa   Rationale:  patient has been very stable on Rytary for years and it would be detrimental to his health for him to stop this medication     Insurance Name:  new insurance- see [facility]   Insurance ID:        Pharmacy Information (if different than what is on RX)  Name:  [facility]  Phone:

## 2023-09-18 ENCOUNTER — TELEPHONE (OUTPATIENT)
Dept: NEUROLOGY | Facility: CLINIC | Age: 38
End: 2023-09-18
Payer: MEDICAID

## 2023-09-18 NOTE — CONFIDENTIAL NOTE
Prior Authorization Retail Medication Request    Medication/Dose: Rytary 48. per CRCR Caps  ICD code: G20    Previously Tried and Failed:    Rationale:      Insurance Name:  No insurance  Insurance ID:        Pharmacy Information   Name:    [id number] - [place], [place] - [address] [place] [address]     Phone: Telephone Fax  [phone]

## 2023-09-19 NOTE — TELEPHONE ENCOUNTER
Central Prior Authorization Team   Phone: [phone]    PA Initiation    Medication: RYTARY 48. MG PO CPCR  Insurance Company: Other (see comments)  Pharmacy Filling the Rx: [id number] - [place], [place] - [address]  Filling Pharmacy Phone: [phone]  Filling Pharmacy Fax:    Start Date: 9/19/2023

## 2023-09-21 NOTE — TELEPHONE ENCOUNTER
This is a duplicate of 9/15/23 encounter which is still pending insurance. I will close the encounter.

## 2023-09-25 NOTE — TELEPHONE ENCOUNTER
PRIOR AUTHORIZATION DENIED    Medication: RYTARY 48. MG PO CPCR  Insurance Company: Other (see comments)  Denial Date: 9/22/2023  Denial Rational:           Appeal Information:           Patient Notified: No

## 2023-10-05 NOTE — TELEPHONE ENCOUNTER
Appeal letter written and saved to encounter. Please submit appeal.    Veronica Swift, Pharm.D.  Medication Therapy Management Pharmacist  [facility]

## 2023-10-05 NOTE — TELEPHONE ENCOUNTER
Medication Appeal Initiation    Medication: RYTARY 48. MG PO CPCR  Appeal Start Date:  10/5/2023  Insurance Company: [employer] Phone: [phone]  Insurance Fax: [phone]  Comments:  Appeal and denial letter faxed

## 2023-10-10 NOTE — TELEPHONE ENCOUNTER
I called [facility] to check appeal status. Per rep, patient's insurance termed on 9/30/23 so appeal was dismissed. PA was denied on 9/22/2023 and appeal was submitted after the termed date of 10/5/23. If patient has new insurance, patient should update with pharmacy. They will process script and see if new insurance will cover the medication, if not, new PA can be submitted through new insurance.

## 2023-11-02 NOTE — PROGRESS NOTES
"39 yo RHM with IPD, onset in 2016 in the LUE, s/p Bi GP DBS, with a good response to DBS, in particular, resolution of abdominal cramps severe enough to cause emesis.    Ceruloplasmin and urinary copper were normal.    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    At last visit Apr2023:  UPDRS_II:0, _III:3, on Rytary 195 2,2,3,2,2 @ 5a,9a,1p,5p,9p  low impedances on 10B, 12-   Skin chafing, by habitual earphones, behind the left ear, wheere the connector sits.  Saw Dr. Morales 4 d later, no infection, switched to in-ear 'phones.    Since last visit  Furnished, at his request, with a recording of his ON/OFF testing.      Today:    Says video was highly 'impactful' he relates it made a considerable impression on the , and the decision was in his favor.    Played baseball this summer, \"great feeling\" \"like night & day\"        11/3/2023     7:00 AM   UPDRS EDL Scale   2.1  Speech 1   2.2  Salivation 0   2.3  Chew & Swallow 0   2.4  Eating                  0   2.5  Dressing                0   2.6  Hygiene                 0   2.7  Handwriting             0   2.8  Hobbies etc.            0   2.9  Turn in bed             0   2.10 Tremor                  0   2.11 Stand from chair        0   2.12 Walking & Balance  0   2.13 Freezing                0   MDS-UPDRS II Total Score 1     UPDRS  Part I     1.1 Cognitive impairment:  0: Normal: No cognitive impairment.    1.2 Hallucinations and psychosis:  0: Normal: No hallucinations or psychotic behaviour.     1.3 Depressed mood:  0: Normal: No depressed mood.    1.4 Anxious mood:  0: Normal: No anxious feelings.     1.5 Apathy:  0: Normal: No apathy.     1.6 Features of DDS: 0: Normal: No problems present.     1.7 Sleep problems:  0: Normal: No problems.    1.8 Daytime sleepiness:  1: Slight: Daytime sleepiness occurs but I can resist and I stay awake.     1.9 Pain " "and other sensations:  0: Normal: No uncomfortable feelings.     1.10 Urinary problems:  0: Normal: No urine control problems.  Nocturia x1   1.11 Constipation problems:  0: Normal: No constipation.     1.12 Light headedness on standin: Normal: No dizzy or foggy feelings.    1.13 Fatigue:  0: Normal: No fatigue.        Doing well    5 AM 9 AM 1 PM  5 PM 9 PM   Rytary 195 mg  2 2 3 2 2   \"Feel good on those, don't feel like I need to change.\"    Lead(s):   Side Left Right   Target GP GP   Lead  [employer]   Lead model Infinity 0.5 Infinity 0.5   Lead Implant Date 2022 8/15/2022   IPG location Left chest Left chest      IPG(s):  IPG  Abbott   IPG model Infinity 7   IPG implant date 22   Location Left chest   Battery 2.94 (2.95 V) (2.95V)     Checking his device weekly.     Prior low impedances on , 12- Today low impedance only contact 12  Checked  L retroauricular:  looks OK     Program 1       Side Left GPi Right GPi   IPG location Left chest Left chest   Initial/Final Initial & final Initial & final   Active/Inactive Active Active   Amplitude (mA) 3.5 (0 -4.0 by 0.1)  3.5 (0 - 5.0 by 0.1)   Pulse width (usec)  60  60   Freq (Hz)  130  130   Contacts:  C pos 3_ omni neg  Cpos 11_omni neg       Shrug lags slightly on R      11/3/2023     7:00 AM   UPDRS Motor Scale   Time: 07:44   Medication On   R Brain DBS: On   L Brain DBS: On   Dyskinesia (LID) No   Speech 1   Facial Expression 0   Rigidity Neck 0   Rigidity RUE 2   Rigidity LUE 1   Rigidity RLE 1   Rigidity LLE 0   Finger Taps R 0   Finger Taps L 0   Hand Mvt R 0   Hand Mvt L 0   Pron-/Supinate R 0   Pron-/Supinate L 0   Toe Tap R 1   Toe Tap L 1   Leg Agility R 0   Leg Agility L 1   Arise From Chair 0   Gait 0   Gait Freezing 0   Postural Stability 0   Posture 0   Global Spont Mvt 0   Postural Tremor RUE 0   Postural Tremor LUE 0   Kinetic Tremor RUE 0   Kinetic Tremor LUE 0   Rest Tremor RUE 0   Rest Tremor LUE 0 "   Rest Tremor RLE 0   Rest Tremor LLE 0   Rest Tremor Lip/Jaw 0   Rest Tremor Constancy 0   Total Right 4   Total Left 3   Axial Total 1   Total 8     Plan: meds as is, DBS as is, RTC ca. 6 mo    Time this date with patient: 0.5h    Jun Murrell PhD, MD

## 2023-11-03 ENCOUNTER — OFFICE VISIT (OUTPATIENT)
Dept: NEUROLOGY | Facility: CLINIC | Age: 38
End: 2023-11-03
Payer: MEDICARE

## 2023-11-03 VITALS
DIASTOLIC BLOOD PRESSURE: 66 MMHG | SYSTOLIC BLOOD PRESSURE: 117 MMHG | HEART RATE: 63 BPM | WEIGHT: 202.9 LBS | BODY MASS INDEX: 26.77 KG/M2 | OXYGEN SATURATION: 98 %

## 2023-11-03 DIAGNOSIS — Z96.89 STATUS POST DEEP BRAIN STIMULATOR PLACEMENT: ICD-10-CM

## 2023-11-03 DIAGNOSIS — G20.A1 PARKINSON'S DISEASE, UNSPECIFIED WHETHER DYSKINESIA PRESENT, UNSPECIFIED WHETHER MANIFESTATIONS FLUCTUATE (H): Primary | ICD-10-CM

## 2023-11-03 PROCEDURE — 95970 ALYS NPGT W/O PRGRMG: CPT | Performed by: PSYCHIATRY & NEUROLOGY

## 2023-11-03 PROCEDURE — 99214 OFFICE O/P EST MOD 30 MIN: CPT | Mod: 25 | Performed by: PSYCHIATRY & NEUROLOGY

## 2023-11-03 ASSESSMENT — UNIFIED PARKINSONS DISEASE RATING SCALE (UPDRS)
RIGIDITY_LUE: (1) SLIGHT: RIGIDITY ONLY DETECTED WITH ACTIVATION MANEUVER.
POSTURE: (0) NORMAL: NO PROBLEMS.
FREEZING_GAIT: (0) NORMAL: NO FREEZING.
SPONTANEITY_OF_MOVEMENT: (0) NORMAL: NO PROBLEMS.
AXIAL_SCORE: 1
LEG_AGILITY_LEFT: (1) SLIGHT: ANY OF THE FOLLOWING: A) THE REGULAR RHYTHM IS BROKEN WITH ONE WITH ONE OR TWO INTERRUPTIONS OR HESITATIONS OF THE MOVEMENT  B) SLIGHT SLOWING  C) THE AMPLITUDE DECREMENTS NEAR THE END OF THE 10 MOVEMENTS.
HANDMOVEMENTS_RIGHT: (0) NORMAL: NO PROBLEMS.
TOTAL_SCORE_LEFT: 3
TOTAL_SCORE: 4
HANDMOVEMENTS_LEFT: (0) NORMAL: NO PROBLEMS.
RIGIDITY_RUE: (2) MILD: RIGIDITY DETECTED WITHOUT THE ACTIVATION MANEUVER. FULL RANGE OF MOTION IS EASILY ACHIEVED.
POSTURAL_STABILITY: (0) NORMAL: NO PROBLEMS. RECOVERS WITH ONE OR TWO STEPS.
DYSKINESIAS_PRESENT: NO
CONSTANCY_TREMOR_ATREST: (0) NORMAL: NO TREMOR.
AMPLITUDE_RLE: (0) NORMAL: NO TREMOR.
TOTAL_SCORE: 8
AMPLITUDE_LIP_JAW: (0) NORMAL: NO TREMOR.
LEG_AGILITY_RIGHT: (0) NORMAL: NO PROBLEMS.
AMPLITUDE_LLE: (0) NORMAL: NO TREMOR.
RIGIDITY_LLE: (0) NORMAL: NO RIGIDITY.
AMPLITUDE_RUE: (0) NORMAL: NO TREMOR.
SPEECH: (1) SLIGHT: LOSS OF MODULATION, DICTION OR VOLUME, BUT STILL ALL WORDS EASY TO UNDERSTAND.
ARISING_CHAIR: (0) NORMAL: NO PROBLEMS. ABLE TO ARISE QUICKLY WITHOUT HESITATION.
AMPLITUDE_LUE: (0) NORMAL: NO TREMOR.
PARKINSONS_MEDS: ON
RIGIDITY_RLE: (1) SLIGHT: RIGIDITY ONLY DETECTED WITH ACTIVATION MANEUVER.
PRONATION_SUPINATION_LEFT: (0) NORMAL: NO PROBLEMS.
TOETAPPING_RIGHT: (1) SLIGHT: ANY OF THE FOLLOWING: A) THE REGULAR RHYTHM IS BROKEN WITH ONE WITH ONE OR TWO INTERRUPTIONS OR HESITATIONS OF THE MOVEMENT  B) SLIGHT SLOWING  C) THE AMPLITUDE DECREMENTS NEAR THE END OF THE 10 MOVEMENTS.
FINGER_TAPPING_LEFT: (0) NORMAL: NO PROBLEMS.
TOETAPPING_LEFT: (1) SLIGHT: ANY OF THE FOLLOWING: A) THE REGULAR RHYTHM IS BROKEN WITH ONE WITH ONE OR TWO INTERRUPTIONS OR HESITATIONS OF THE MOVEMENT  B) SLIGHT SLOWING  C) THE AMPLITUDE DECREMENTS NEAR THE END OF THE 10 MOVEMENTS.
GAIT: (0) NORMAL: NO PROBLEMS.
FINGER_TAPPING_RIGHT: (0) NORMAL: NO PROBLEMS.
FACIAL_EXPRESSION: (0) NORMAL: NORMAL FACIAL EXPRESSION.
RIGIDITY_NECK: (0) NORMAL: NO RIGIDITY.
PRONATION_SUPINATION_RIGHT: (0) NORMAL: NO PROBLEMS.

## 2023-11-03 ASSESSMENT — MOVEMENT DISORDERS SOCIETY - UNIFIED PARKINSONS DISEASE RATING SCALE (MDS-UPDRS)
TOTAL_SCORE: 1
CHEWING_AND_SWALLOWING: (0) NORMAL: NO PROBLEMS.
TURNING_IN_BED: (0) NORMAL: NOT AT ALL (NO PROBLEMS).
FREEZING: (0) NORMAL: NOT AT ALL (NO PROBLEMS).
HOBBIES_AND_OTHER_ACTIVITIES: (0) NORMAL: NOT AT ALL (NO PROBLEMS).
WALKING_AND_BALANCE: (0) NORMAL: NOT AT ALL (NO PROBLEMS).
SALIVA_AND_DROOLING: (0) NORMAL: NOT AT ALL (NO PROBLEMS).
HYGIENE: (0) NORMAL: NOT AT ALL (NO PROBLEMS).
GETTING_OUT_OF_BED_CAR_DEEP_CHAIR: (0) NORMAL: NOT AT ALL (NO PROBLEMS).
DRESSING: (0) NORMAL: NOT AT ALL (NO PROBLEMS).
HANDWRITING: (0) NORMAL: NOT AT ALL (NO PROBLEMS).
EATING_TASKS: (0) NORMAL: NOT AT ALL (NO PROBLEMS).
SPEECH: (1) SLIGHT: MY SPEECH IS SOFT, SLURRED OR UNEVEN, BUT IT DOES NOT CAUSE OTHERS TO ASK ME TO REPEAT MYSELF.
TREMOR: (0) NORMAL: NOT AT ALL (NO PROBLEMS).

## 2023-11-03 ASSESSMENT — PAIN SCALES - GENERAL: PAINLEVEL: NO PAIN (0)

## 2023-11-03 NOTE — PATIENT INSTRUCTIONS
No changes to your stimulator today.  Continue medication (Rytary) as you have been.  Next appointment approx. 6 months.  Please get in touch if any changes or problems in the meantime.

## 2023-12-13 ENCOUNTER — ANCILLARY PROCEDURE (OUTPATIENT)
Dept: ULTRASOUND IMAGING | Facility: CLINIC | Age: 38
End: 2023-12-13
Attending: STUDENT IN AN ORGANIZED HEALTH CARE EDUCATION/TRAINING PROGRAM
Payer: MEDICARE

## 2023-12-13 ENCOUNTER — OFFICE VISIT (OUTPATIENT)
Dept: GASTROENTEROLOGY | Facility: CLINIC | Age: 38
End: 2023-12-13
Payer: MEDICARE

## 2023-12-13 VITALS
HEIGHT: 73 IN | HEART RATE: 65 BPM | BODY MASS INDEX: 25.84 KG/M2 | OXYGEN SATURATION: 99 % | WEIGHT: 195 LBS | SYSTOLIC BLOOD PRESSURE: 137 MMHG | DIASTOLIC BLOOD PRESSURE: 88 MMHG

## 2023-12-13 DIAGNOSIS — R10.32 ABDOMINAL PAIN, LEFT LOWER QUADRANT: ICD-10-CM

## 2023-12-13 DIAGNOSIS — R10.32 ABDOMINAL PAIN, LEFT LOWER QUADRANT: Primary | ICD-10-CM

## 2023-12-13 PROBLEM — G20.A1 PARKINSON'S DISEASE (H): Status: ACTIVE | Noted: 2019-01-09

## 2023-12-13 PROCEDURE — 99215 OFFICE O/P EST HI 40 MIN: CPT | Performed by: STUDENT IN AN ORGANIZED HEALTH CARE EDUCATION/TRAINING PROGRAM

## 2023-12-13 PROCEDURE — 76705 ECHO EXAM OF ABDOMEN: CPT | Mod: GC | Performed by: STUDENT IN AN ORGANIZED HEALTH CARE EDUCATION/TRAINING PROGRAM

## 2023-12-13 NOTE — LETTER
12/13/2023         RE: Dipesh Nicole  [address] [place] [address]        Dear Colleague,    Thank you for referring your patient, iDpesh Nicole, to the [facility] [place]. Please see a copy of my visit note below.    GI CLINIC VISIT    CC: Left lower quadrant abdominal discomfort      ASSESSMENT/PLAN:    38-year-old gentleman with history of Parkinson's disease on Sinemet, hiatal hernia, LA grade C esophagitis, related to vomiting who presents for 3+ months of left lower quadrant abdominal discomfort.  He describes this as a pressure or discomfort sensation in the left inguinal region that is worse with certain position changes such as sitting upright and driving.  He has not particularly noticed a bulge in this area.  The pain does feel somewhat deep inside.  He has been feeling more fatigued lately.  Bowel movements are regular, daily and soft and easy to pass.  He denies changes to bowel movements, melena, hematochezia, nausea, vomiting, fevers, chills, early satiety, heartburn symptoms.  Differential diagnosis includes constipation, diverticulitis, inguinal hernia.  He does see his neurologist tomorrow and plans to discuss fatigue at that visit as well.    I was not able to palpate an inguinal hernia or abdominal wall hernia on exam today.  He had a negative Carnett's sign.  We will further evaluate with an ultrasound of the abdomen in the area of discomfort.  I have also ordered CBC, CMP and urinalysis to rule out diverticulitis.  If no evidence of source of his symptoms on ultrasound, we can consider CT abdomen and pelvis with contrast for further evaluation.  I will reach out to him through [employer] with his results and follow-up will be determined based on findings.        Specialty Problems          Gastroenterology Diagnoses    Hiatal hernia        Cyclical vomiting syndrome unrelated to migraine        Esophagitis endoscopy done 11/2021           RTC PRN    It  was a pleasure to participate in the care of this patient; please contact us with any further questions.  A total of 25 face-to-face minutes was spent with this patient, >50% of which was counseling regarding the above delineated issues. An additional 31 minutes was spent on the date of the encounter doing chart review, documentation, and further activities as noted above.    Lisset Mariano PA-C  Division of Gastroenterology, Hepatology and Nutrition  [facility]    ---------------------------------------------------------------------------------------------------  HPI:  Annalise is a very pleasant 38-year-old gentleman with history of Parkinson's disease on Sinemet, cyclic vomiting syndrome, history of seizure, who is here in the GI clinic for left lower quadrant abdominal discomfort.     He had an upper endoscopy as part of an evaluation for nausea and vomiting in 2021.  The upper endoscopy showed grade C esophagitis.  Biopsies of the esophagus showed only ulcer and necrotic debris.  He was put on omeprazole 20 mg twice daily.  He notes that he had never had symptoms of heartburn.  He denied any odynophagia dysphagia. Nausea/vomiting significantly improved with adjustment of Parkinson's medications and deep brain stimulator. Repeat EGD 3/2022 showed Hill Grade IV flap valve, 4 cm hiatal hernia, bilious gastric fluid. Biopsies were unremarkable.      History 2/23/2022  Overall, he is doing quite well.  His nausea and vomiting has been better.  He thinks that it may be related to his Parkinson's medications getting low.  This will cause tensing and spasming of his abdominal muscles which he believes contributes to the nausea and vomiting.  He now has a sublingual Parkinson's medication which helps abort the symptoms quite well.  He has had no symptoms in 2 weeks since starting this medication.     He also had esophageal manometry testing done which was normal.   Esophagram in 5/2022 showed small hiatal hernia  otherwise unremarkable.    also a referral to thoracic surgery to discuss surgery     Interval History 12/2023  He presents today to discuss left lower quadrant abdominal discomfort that started a few month ago in the summer.  He describes this as a discomfort and not a pain.  He finds that the discomfort or pressure is more obvious when he is sitting up straight or when he changes position or drives.  He has noticed this discomfort is present every day now.  He has not found that the discomfort increases with heavy lifting, bowel movements.  He has not noticed any changes to his bowel movements and reports daily, soft, easy to pass stools without melena or hematochezia.  He does report feeling somewhat fatigued over the last several weeks.  He has had a good appetite.  He denies fevers, chills, straining with bowel movements, dysphagia, early satiety, acid reflux symptoms.  He has not needed to take anything on a regular basis or over-the-counter as needed for reflux and feels that this is very well-controlled.  He has an upcoming appointment with [facility] pharmacy on 12/27 to review some of his medications.  Denies any family history of GI problems.    ROS:    A 10 point review of systems was performed and is negative except as noted in the HPI.     PROBLEM LIST  Patient Active Problem List    Diagnosis Date Noted    Esophagitis endoscopy done 11/2021 11/18/2021     Priority: Medium     Impression:            - Normal first portion of the duodenum, second portion                          of the duodenum and third portion of the duodenum.                          Biopsied.                          - Nodular mucosa in the duodenal bulb. Biopsied.                          - Normal stomach. Biopsied.                          - Medium-sized hiatal hernia.                          - LA Grade C esophagitis (with numerous ulcers at the                          GE junction and in the esophagus) with bleeding.                           Biopsied. Friability may indicate EoE as well -                          further biopses avoided given friability.   Recommendation:        - Patient has a contact number available for                          emergencies. The signs and symptoms of potential                          delayed complications were discussed with the patient.                          Return to normal activities tomorrow. Written                          discharge instructions were provided to the patient.                          - Resume previous diet.                          - Continue present medications.                          - Await pathology results.                          - Repeat upper endoscopy at appointment to be                          scheduled to check healing.                          - Start PPI BID if there is no interaction with                          current medications.       S/P deep brain stimulator placement 11/18/2021     Priority: Medium       Impression:  This is a normal waking and sleep EEG, with generalized beta activities throughout the recording. Generalized beta activities are usually associated certain medication use, such as benzodiazepines or barbiturates. The cause of these activities in this case is unclear at this time.            Cyclical vomiting syndrome unrelated to migraine 07/30/2021     Priority: Medium    Hiatal hernia 04/20/2021     Priority: Medium     Based on a 2021 ct scan  Small hiatal hernia with some wall thickening in the distal thoracic esophagus suggesting some esophagitis.      Parkinson disease 01/09/2019     Priority: Medium    Shoulder dislocation      Priority: Medium     left      Seizure (H) at age 22 yrs 08/17/2018     Priority: Medium    H/O magnetic resonance imaging of cervical spine 08/17/2018     Priority: Medium     March  MR Cervical Spine without IV Contrast3/15/2017  [facility]  Result Addenda   Addendum by ProviderKaren M.D. on 03/15/2017  12:30 PM  RAD^^^MA  MR Cervical Spine w/o contrast  3/15/2017 12:30:00   Result Impression    Minimal degenerative disc disease at C5-6 and C6-7   otherwise an unremarkable MR scan of the cervical spine.    Result Narrative       EXAM: MR Cervical Spine w/o contrast     INDICATION: left hand weakness     COMPARISON: None.      Procedure: Sagittal short and long TR and STIR images of the cervical   spine were followed by axial 2-D merge and long TR images through the   disc spaces. Oblique sagittal long TR images within obtained through   the neural foramina bilaterally.     FINDINGS: There is a normal cervical lordosis. The vertebral body   heights and intervertebral disc spaces are maintained. The marrow   signal is normal in all sequences. The C1-2 relationship is normal. No   prevertebral soft tissue swelling is seen. The C7-T1 relationship is   normal.     Axial images at C2-3, C3-4, C4-5 demonstrate no disc bulging or   herniation. The neural foramina are widely patent and the posterior   facets are intact.     Axial images at C5-6 and C6-7 demonstrate minimal focal annular   bulging in the midline without significant encroachment on the thecal   sac. The neural foramina are widely patent and the posterior facets   are intact.     Axial images at C7-T1 demonstrate no disc bulging or herniation. The   neural foramina are widely patent and the posterior facets are intact.    Status           H/O shoulder surgery 08/17/2018     Priority: Medium     2017 July  XR SHOULDER 2 VIEWS LEFT7[facility]14/2017  [facility]  Result Narrative   XR SHOULDER 2 VIEWS LEFT  7/14/2017 9:13 PM    INDICATION: Shoulder Injury  COMPARISON: None.    FINDINGS: Negative shoulder. No fracture or dislocation.    Status           H/O magnetic resonance imaging of brain and brain stem 08/17/2018     Priority: Medium     2009 December  MR Brain without and with IV Mjayrgjm82/8/2009  [facility]  Result Impression     "Normal MRI of the brain.    Result Narrative           Multiecho, multiplanar views of the brain were obtained prior to and   following the IV administration of 19 mL of contrast. There are no   previous studies available for comparison.       The brain parenchyma is normal in appearance on all pulse sequences,   with no intracranial hemorrhage or evidence of an acute stroke. There   is no pathological enhancement. The ventricles are normal in size,   shape and position, with no shift in midline structures or other   evidence of significant mass effect. There is no sclerosis of the   mesial temporal lobe on either side to suggest a cause for this   patient's apparent seizure activity.       There are normal flow-voids within the internal carotid and   vertebrobasilar arterial systems bilaterally. The visualized aspects   of the orbits are normal.                Tremor 08/10/2018     Priority: Medium    Right inguinal hernia 01/19/2017     Priority: Medium       PERTINENT MEDICATIONS:  Current Outpatient Medications   Medication    carbidopa-levodopa (RYTARY) 48. MG CPCR per CR capsule     No current facility-administered medications for this visit.       PHYSICAL EXAMINATION:  Vitals /88   Pulse 65   Ht 1.854 m (6' 1\")   Wt 88.5 kg (195 lb)   SpO2 99%   BMI 25.73 kg/m     Wt   Wt Readings from Last 2 Encounters:   12/13/23 88.5 kg (195 lb)   11/03/23 92 kg (202 lb 14.4 oz)      Gen: Pt sitting up on exam table in NAD, interactive and cooperative on exam  Eyes: sclerae anicteric, no injection  Resp: Unlabored breathing  GI: Normoactive BS, abd soft, flat, nontender throughout all quadrants, no organomegaly or masses palpated, no palpable abdominal wall or inguinal hernia.  Negative Carnett's sign.  Skin: Warm, dry, no jaundice, nails appear healthy  Neuro: alert, oriented, answers questions appropriately      PERTINENT STUDIES:    Lab on 08/12/2022   Component Date Value Ref Range Status    SARS CoV2 " PCR 08/12/2022 Negative  Negative Final         Again, thank you for allowing me to participate in the care of your patient.      Sincerely,    Lisset Mariano PA-C

## 2023-12-13 NOTE — NURSING NOTE
"Chief Complaint   Patient presents with    Follow Up       Vitals:    12/13/23 1506   BP: 137/88   Pulse: 65   SpO2: 99%   Weight: 88.5 kg (195 lb)   Height: 1.854 m (6' 1\")       Body mass index is 25.73 kg/m .    Blood pressure elevated. Provider notified. Recheck offered.     Deisy Logic    "

## 2023-12-13 NOTE — PROGRESS NOTES
GI CLINIC VISIT    CC: Left lower quadrant abdominal discomfort      ASSESSMENT/PLAN:    38-year-old gentleman with history of Parkinson's disease on Sinemet, hiatal hernia, LA grade C esophagitis, related to vomiting who presents for 3+ months of left lower quadrant abdominal discomfort.  He describes this as a pressure or discomfort sensation in the left inguinal region that is worse with certain position changes such as sitting upright and driving.  He has not particularly noticed a bulge in this area.  The pain does feel somewhat deep inside.  He has been feeling more fatigued lately.  Bowel movements are regular, daily and soft and easy to pass.  He denies changes to bowel movements, melena, hematochezia, nausea, vomiting, fevers, chills, early satiety, heartburn symptoms.  Differential diagnosis includes constipation, diverticulitis, inguinal hernia.  He does see his neurologist tomorrow and plans to discuss fatigue at that visit as well.    I was not able to palpate an inguinal hernia or abdominal wall hernia on exam today.  He had a negative Carnett's sign.  We will further evaluate with an ultrasound of the abdomen in the area of discomfort.  I have also ordered CBC, CMP and urinalysis to rule out diverticulitis.  If no evidence of source of his symptoms on ultrasound, we can consider CT abdomen and pelvis with contrast for further evaluation.  I will reach out to him through [employer] with his results and follow-up will be determined based on findings.        Specialty Problems          Gastroenterology Diagnoses    Hiatal hernia        Cyclical vomiting syndrome unrelated to migraine        Esophagitis endoscopy done 11/2021           [facility] PRN    It was a pleasure to participate in the care of this patient; please contact us with any further questions.  A total of 25 face-to-face minutes was spent with this patient, >50% of which was counseling regarding the above delineated issues. An additional 31 minutes  was spent on the date of the encounter doing chart review, documentation, and further activities as noted above.    Lisset Mariano PA-C  Division of Gastroenterology, Hepatology and Nutrition  [facility]    ---------------------------------------------------------------------------------------------------  HPI:  Annalise is a very pleasant 38-year-old gentleman with history of Parkinson's disease on Sinemet, cyclic vomiting syndrome, history of seizure, who is here in the GI clinic for left lower quadrant abdominal discomfort.     He had an upper endoscopy as part of an evaluation for nausea and vomiting in 2021.  The upper endoscopy showed grade C esophagitis.  Biopsies of the esophagus showed only ulcer and necrotic debris.  He was put on omeprazole 20 mg twice daily.  He notes that he had never had symptoms of heartburn.  He denied any odynophagia dysphagia. Nausea/vomiting significantly improved with adjustment of Parkinson's medications and deep brain stimulator. Repeat EGD 3/2022 showed Hill Grade IV flap valve, 4 cm hiatal hernia, bilious gastric fluid. Biopsies were unremarkable.      History 2/23/2022  Overall, he is doing quite well.  His nausea and vomiting has been better.  He thinks that it may be related to his Parkinson's medications getting low.  This will cause tensing and spasming of his abdominal muscles which he believes contributes to the nausea and vomiting.  He now has a sublingual Parkinson's medication which helps abort the symptoms quite well.  He has had no symptoms in 2 weeks since starting this medication.     He also had esophageal manometry testing done which was normal.   Esophagram in 5/2022 showed small hiatal hernia otherwise unremarkable.    also a referral to thoracic surgery to discuss surgery     Interval History 12/2023  He presents today to discuss left lower quadrant abdominal discomfort that started a few month ago in the summer.  He describes this as a discomfort  and not a pain.  He finds that the discomfort or pressure is more obvious when he is sitting up straight or when he changes position or drives.  He has noticed this discomfort is present every day now.  He has not found that the discomfort increases with heavy lifting, bowel movements.  He has not noticed any changes to his bowel movements and reports daily, soft, easy to pass stools without melena or hematochezia.  He does report feeling somewhat fatigued over the last several weeks.  He has had a good appetite.  He denies fevers, chills, straining with bowel movements, dysphagia, early satiety, acid reflux symptoms.  He has not needed to take anything on a regular basis or over-the-counter as needed for reflux and feels that this is very well-controlled.  He has an upcoming appointment with [facility] pharmacy on 12/27 to review some of his medications.  Denies any family history of GI problems.    ROS:    A 10 point review of systems was performed and is negative except as noted in the HPI.     PROBLEM LIST  Patient Active Problem List    Diagnosis Date Noted    Esophagitis endoscopy done 11/2021 11/18/2021     Priority: Medium     Impression:            - Normal first portion of the duodenum, second portion                          of the duodenum and third portion of the duodenum.                          Biopsied.                          - Nodular mucosa in the duodenal bulb. Biopsied.                          - Normal stomach. Biopsied.                          - Medium-sized hiatal hernia.                          - LA Grade C esophagitis (with numerous ulcers at the                          GE junction and in the esophagus) with bleeding.                          Biopsied. Friability may indicate EoE as well -                          further biopses avoided given friability.   Recommendation:        - Patient has a contact number available for                          emergencies. The signs and symptoms of  potential                          delayed complications were discussed with the patient.                          Return to normal activities tomorrow. Written                          discharge instructions were provided to the patient.                          - Resume previous diet.                          - Continue present medications.                          - Await pathology results.                          - Repeat upper endoscopy at appointment to be                          scheduled to check healing.                          - Start PPI BID if there is no interaction with                          current medications.       S/P deep brain stimulator placement 11/18/2021     Priority: Medium       Impression:  This is a normal waking and sleep EEG, with generalized beta activities throughout the recording. Generalized beta activities are usually associated certain medication use, such as benzodiazepines or barbiturates. The cause of these activities in this case is unclear at this time.            Cyclical vomiting syndrome unrelated to migraine 07/30/2021     Priority: Medium    Hiatal hernia 04/20/2021     Priority: Medium     Based on a 2021 ct scan  Small hiatal hernia with some wall thickening in the distal thoracic esophagus suggesting some esophagitis.      Parkinson disease 01/09/2019     Priority: Medium    Shoulder dislocation      Priority: Medium     left      Seizure (H) at age 22 yrs 08/17/2018     Priority: Medium    H/O magnetic resonance imaging of cervical spine 08/17/2018     Priority: Medium     March  MR Cervical Spine without IV Contrast3/15/2017  [facility]  Result Addenda   Addendum by Provider, PA Jones on 03/15/2017 12:30 PM  RAD^^^MA  MR Cervical Spine w/o contrast  3/15/2017 12:30:00   Result Impression    Minimal degenerative disc disease at C5-6 and C6-7   otherwise an unremarkable MR scan of the cervical spine.    Result Narrative       EXAM: MR Cervical Spine w/o  contrast     INDICATION: left hand weakness     COMPARISON: None.      Procedure: Sagittal short and long TR and STIR images of the cervical   spine were followed by axial 2-D merge and long TR images through the   disc spaces. Oblique sagittal long TR images within obtained through   the neural foramina bilaterally.     FINDINGS: There is a normal cervical lordosis. The vertebral body   heights and intervertebral disc spaces are maintained. The marrow   signal is normal in all sequences. The C1-2 relationship is normal. No   prevertebral soft tissue swelling is seen. The C7-T1 relationship is   normal.     Axial images at C2-3, C3-4, C4-5 demonstrate no disc bulging or   herniation. The neural foramina are widely patent and the posterior   facets are intact.     Axial images at C5-6 and C6-7 demonstrate minimal focal annular   bulging in the midline without significant encroachment on the thecal   sac. The neural foramina are widely patent and the posterior facets   are intact.     Axial images at C7-T1 demonstrate no disc bulging or herniation. The   neural foramina are widely patent and the posterior facets are intact.    Status           H/O shoulder surgery 08/17/2018     Priority: Medium     2017 July  XR SHOULDER 2 VIEWS LEFT7/14/2017  [facility]  Result Narrative   XR SHOULDER 2 VIEWS LEFT  7/14/2017 9:13 PM    INDICATION: Shoulder Injury  COMPARISON: None.    FINDINGS: Negative shoulder. No fracture or dislocation.    Status           H/O magnetic resonance imaging of brain and brain stem 08/17/2018     Priority: Medium     2009 December  MR Brain without and with IV Nyixezka35/8/2009  [facility]  Result Impression    Normal MRI of the brain.    Result Narrative           Multiecho, multiplanar views of the brain were obtained prior to and   following the IV administration of 19 mL of contrast. There are no   previous studies available for comparison.       The brain  "parenchyma is normal in appearance on all pulse sequences,   with no intracranial hemorrhage or evidence of an acute stroke. There   is no pathological enhancement. The ventricles are normal in size,   shape and position, with no shift in midline structures or other   evidence of significant mass effect. There is no sclerosis of the   mesial temporal lobe on either side to suggest a cause for this   patient's apparent seizure activity.       There are normal flow-voids within the internal carotid and   vertebrobasilar arterial systems bilaterally. The visualized aspects   of the orbits are normal.                Tremor 08/10/2018     Priority: Medium    Right inguinal hernia 01/19/2017     Priority: Medium       PERTINENT MEDICATIONS:  Current Outpatient Medications   Medication    carbidopa-levodopa (RYTARY) 48. MG CPCR per CR capsule     No current facility-administered medications for this visit.       PHYSICAL EXAMINATION:  Vitals /88   Pulse 65   Ht 1.854 m (6' 1\")   Wt 88.5 kg (195 lb)   SpO2 99%   BMI 25.73 kg/m     Wt   Wt Readings from Last 2 Encounters:   12/13/23 88.5 kg (195 lb)   11/03/23 92 kg (202 lb 14.4 oz)      Gen: Pt sitting up on exam table in NAD, interactive and cooperative on exam  Eyes: sclerae anicteric, no injection  Resp: Unlabored breathing  GI: Normoactive BS, abd soft, flat, nontender throughout all quadrants, no organomegaly or masses palpated, no palpable abdominal wall or inguinal hernia.  Negative Carnett's sign.  Skin: Warm, dry, no jaundice, nails appear healthy  Neuro: alert, oriented, answers questions appropriately      PERTINENT STUDIES:    Lab on 08/12/2022   Component Date Value Ref Range Status    SARS CoV2 PCR 08/12/2022 Negative  Negative Final     "

## 2023-12-13 NOTE — PROGRESS NOTES
Diagnosis/Summary/Recommendations:    PATIENT: Dipesh Nicole  38 year old male     : 1985    LAUREL: 2023    MRN: [id number]  [id number] [id number]   [email or web address]  [phone] (M)   [phone] (H)      Ritu       [phone]        Assessment:  (G20.A1) Parkinson's disease, unspecified whether dyskinesia present, unspecified whether manifestations fluctuate  (primary encounter diagnosis)  Developed symptoms left arm at age 31 years or 30 years of age  Diagnosis was made 2018  Parkinson's disease, not due to a mutation in SNCA, GBA, LRRK2, kimberley, DJ1, PINK1, or VPS35.       Amantadine symmetrel 100mg twice daily     carbidopa/levodopa sinemet 25/100  1.5@630am, 1@9a, 1@1130am, 1@2p, 1@430p, 1@7p, 1/2-1@930p     Had intractable vomiting with motor testing on 10/6/2021  OFF score of 53      Cognitive/Driving      Working with his dad's company and may move to [place] and look for a job      Brain MRI 2021  On high-resolution SWI images, nigrosome-1 cannot be well-seen  (absence of swallow tail sign). This can be seen in Parkinson's  disease or Lewy Body dementia. No abnormal enhancement.     Neuropsych evaluation 10/19/2021     Annalise Nicole is a 35-year-old man with a history of Parkinson's disease diagnosed in 2018.  He is bothered by left-sided tremor and right-sided stiffness.  He is interested in undergoing DBS surgery for management of his symptoms.     Current results indicate performance that falls almost entirely within normal limits across cognitive domains, generally in the average to above average range. He has difficulty with memory on one list-learning task; learning and memory otherwise falls within normal limits. He does not report significant depressive symptomatology, anxiety, apathy, or compulsive behaviors.     This pattern of performance does not reflect dementia at this time, and is only subtly abnormal. There is  no consistent suggestion of cognitive decline.  He appears to be capable of comprehending medical information and making well reasoned decisions for himself. He has a good understanding of the surgical procedure and the risks involved.  He has a good support system in his family.  He lives alone now, but will be moving in with his brother in the next couple of months, and his parents are also available to provide assistance as necessary. He appears to be a good candidate for surgery from a neurocognitive perspective.     Review of diagnosis    Young onset parkinson     Avoidance of dopamine blockers   Not taking     Motor complication review   Motor fluctuations/dyskinesias        Review of Impulse control disorders         Review of surgical or medication options    Right DBS Lead 8/15/2022  Left DBS Lead 7/18/2022  IPG 7/25/2022     Gait/Balance/Falls         Exercise/Therapy performed/offered         Cognitive/Driving         Mood   Anxiety and ?vomiting  Meeting with a counsellor  Discussion about antidepressant medication     Hallucinations/delusions         Sleep   Crazy dreams  No problems falling asleep  Has not yet tried melatonin  He recalls having dreams when he wakes up  He can be awaken about a dream about cramping leg and then wakes up with a cramp and takes sinemet and goes back to bed.      Waking up with cramping at 2am or so and taking 1/4 to 1/2 tablet of medication.     Bladder/Renal/Prostate/Gyn/Other   denies     GI/Constipation/GERD      odansetron zofran ODT 4mg as needed     Had some cramping in legs- 3 hours after eating at 930pm   He had dose at 630pm and ate at 730 and cramping at 930pm     Cramping - independent of what he is eating.      Having episodes once per month - wondering      Endoscopy 11/2021    - Normal first portion of the duodenum, second portion                          of the duodenum and third portion of the duodenum.                          Biopsied.                           - Nodular mucosa in the duodenal bulb. Biopsied.                          - Normal stomach. Biopsied.                          - Medium-sized hiatal hernia.                          - LA Grade C esophagitis (with numerous ulcers at the                          GE junction and in the esophagus) with bleeding.                          Biopsied. Friability may indicate EoE as well -                          further biopses avoided given friability.   Recommendation:        - Patient has a contact number available for                          emergencies. The signs and symptoms of potential                          delayed complications were discussed with the patient.                          Return to normal activities tomorrow. Written                          discharge instructions were provided to the patient.                          - Resume previous diet.                          - Continue present medications.                          - Await pathology results.                          - Repeat upper endoscopy at appointment to be                          scheduled to check healing.                          - Start PPI BID if there is no interaction with                          current medications.      ENDO/Lipid/DM/Bone density/Thyroid        Cardio/heart/Hyper or Hypotensive   Denies faints     Vision/Dry Eyes/Cataracts/Glaucoma/Macular   No changes in vision     Heme/Anticoagulation/Antiplatelet/Anemia/Other  Not taking aspirin     ENT/Resp     Skin/Cancer/Seborrhea/other        Musculoskeletal/Pain/Headache        Other:     Had a possible seizure on 11/23/2009 - had an mri and was discussing the use of medication  From chart review it may have been a syncopal episode vs seizure. He had been dehydrated and possibly sleep deprived.      EEG was done 12/9/2009  This is an abnormal EEG due to the presence of two episodes of frontally maximal sharp waves, epileptiform in nature. This places the patient  "at risk for partial or secondarily generalized seizures arising from the frontal lobes.      BRAIN MRI was normal on 12/8/2009     Seen by Dr. Je Sanon on 12/10/2009 and Annalise opted not to take an AED lamotrigine     Discussion about anxiety and vomiting and parkinson - breaking the cycle  He has not had as severe off abdominal dystonia with the rytary which may be connected with the nausea/vomiting  Meeting with [facility] therapist     GI Dr. Kelley hiatal hernia consultation 6/1/2022 - he may consider putting this on hold till he has had dbs surgery   He had an esophagram on 5/17/2022  1. Small hiatal hernia.  2. Otherwise unremarkable double contrast esophagram.      Scheduling preop      Living with family and they are supportive    11/3/2023 Visit with Dr. 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     Ceruloplasmin and urinary copper were normal.     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     At last visit Apr2023:  UPDRS_II:0, _III:3, on Rytary 195 2,2,3,2,2 @ 5a,9a,1p,5p,9p  low impedances on 10B, 12-   Skin chafing, by habitual earphones, behind the left ear, wheere the connector sits.  Saw Dr. Morales 4 d later, no infection, switched to in-ear 'phones.     Since last visit  Furnished, at his request, with a recording of his ON/OFF testing.       Today:     Says video was highly 'impactful' he relates it made a considerable impression on the , and the decision was in his favor.     Played baseball this summer, \"great feeling\" \"like night & day\"             5a 7a 9a 11 1p 5 7p 9p 10/11p 2-3a                Carbidopa/levodopa Rytary 195mg 2   2    3 2    2                                                               Plan:    García SINGH followup " 12/14/2023     38-year-old gentleman with history of Parkinson's disease on Sinemet, hiatal hernia, LA grade C esophagitis, related to vomiting who presents for 3+ months of left lower quadrant abdominal discomfort.  He describes this as a pressure or discomfort sensation in the left inguinal region that is worse with certain position changes such as sitting upright and driving.  He has not particularly noticed a bulge in this area.  The pain does feel somewhat deep inside.  He has been feeling more fatigued lately.  Bowel movements are regular, daily and soft and easy to pass.  He denies changes to bowel movements, melena, hematochezia, nausea, vomiting, fevers, chills, early satiety, heartburn symptoms.  Differential diagnosis includes constipation, diverticulitis, inguinal hernia.  He does see his neurologist tomorrow and plans to discuss fatigue at that visit as well.     I was not able to palpate an inguinal hernia or abdominal wall hernia on exam today.  He had a negative Carnett's sign.  We will further evaluate with an ultrasound of the abdomen in the area of discomfort.  I have also ordered CBC, CMP and urinalysis to rule out diverticulitis.  If no evidence of source of his symptoms on ultrasound, we can consider CT abdomen and pelvis with contrast for further evaluation.  I will reach out to him through [employer] with his results and follow-up will be determined based on findings    Ultrasound was okay  Labs are do.   12/27/2023  Glenys Logan    I talked with Glenys (GI pharmacist) and she was wondering if maybe he should see me instead of her. After you see him tomorrow can you let Glenys and I know if his concerns are more related to Parkinson's or GI so we can figure out who should see him?    Probably would cancel this as not an issue.     He is tired throughout the day  Goes to bed around 11pm  And falls asleep right away  Gets up once or twice and urinates and then falls back asleep  Gets  up for the day at 7am  Not clearly snoring or talking during the day  He is not falling asleep during the day    Cognitivey  He has apathy  And not as motivated to do things  He does not feel depressed or anxious  Mood has been much better since brain surgery    Medication is covered by mnsure    [place] [facility]  [place] [id number]    Blood pressure has been okay    No clear diabetes  Has not had thyroid screen    Will get cbc and metabolic panel.     IPG voltage was fine on 11/3/23. There was a short at contact 12.   5/3/2023 Return Dr. Murrell    Won the court case for disability.   Used video     Has fatigue  Able to work out.     Father has retired  [place] - parents out there  He is [facility]     Pharmacy ([facility]) consultation and medication management  Please call the scheduling number I@ [phone] to set up an appointment with pharmacists Veronica Swift or Katharina Layton.     Discussion  Would probably avoid stimulant  - he has some attentional issues.   Would avoid dopamine agonists due to risk of ICD  Would avoid increase rytary due to risk of ICD  Some data on rivastigmine/exelon patch for apathy in people without cognitive impairment  Apathy can be approved with a regular exercise program with a   Wendy - Veronica will look into this.   Call Dr. Swift to review above issues.     In summary major issues were related to attention and motivation.     Coding statement:   Medical Decision Making:  #  Chronic progressive medical conditions addressed  - see above --   Review and/or interpretation of unique test or documentation from a provider outside of neurology yes   Independent historian provided additional details  no I  Prescription drug management and review of potential side effects and/or monitoring for side effects  -- see above ---  Health impacted by social determinants of health  no    I have reviewed the note as documented above.  This  accurately captures the substance of my conversation with the patient and total time spent preparing for visit, executing visit and completing visit on the day of the visit:  40 minutes.  The portion of this total time included face to face time 38 minutes.     Ramesh Carson MD     ______________________________________    Last visit date and details:             ______________________________________      Patient was asked about 14 Review of systems including changes in vision (dry eyes, double vision), hearing, heart, lungs, musculoskeletal, depression, anxiety, snoring, RBD, insomnia, urinary frequency, urinary urgency, constipation, swallowing problems, hematological, ID, allergies, skin problems: seborrhea, endocrinological: thyroid, diabetes, cholesterol; balance, weight changes, and other neurological problems and these were not significant at this time except for   Patient Active Problem List   Diagnosis    Tremor    Seizure (H) at age 22 yrs    H/O magnetic resonance imaging of cervical spine    H/O shoulder surgery    H/O magnetic resonance imaging of brain and brain stem    Shoulder dislocation    Parkinson disease    Right inguinal hernia    Hiatal hernia    Cyclical vomiting syndrome unrelated to migraine    Esophagitis endoscopy done 11/2021    S/P deep brain stimulator placement    Parkinson's disease          Allergies   Allergen Reactions    Diphenhydramine      Other reaction(s): Other (see comments)  Told not to take due to parkinsons    Metoclopramide      Avoid in patient with a history of Parkinson Disease  Other reaction(s): GI intolerance  Avoid in patient with a history of Parkinson Disease    Promethazine      Other reaction(s): Other (see comments)  Told not to take due to parkisons     Past Surgical History:   Procedure Laterality Date    ESOPHAGOSCOPY, GASTROSCOPY, DUODENOSCOPY (EGD), COMBINED N/A 11/18/2021    Procedure: ESOPHAGOGASTRODUODENOSCOPY, WITH BIOPSY;  Surgeon: Veronica Kay  MD;  Location: [facility] OR    ESOPHAGOSCOPY, GASTROSCOPY, DUODENOSCOPY (EGD), COMBINED N/A 3/7/2022    Procedure: ESOPHAGOGASTRODUODENOSCOPY, WITH BIOPSY;  Surgeon: Alexandr Sweet MD;  Location: [facility] OR    IMPLANT DEEP BRAIN STIMULATION GENERATOR / BATTERY Left 7/25/2022    Procedure: Deep brain stimulator placement, phase II, placement of deep brain stimulator generator/battery over the left chest wall;  Surgeon: Angel Morales MD;  Location: [facility] OR    OPTICAL TRACKING SYSTEM INSERTION DEEP BRAIN STIMULATION Left 7/18/2022    Procedure: Left side deep brain stimulator placement, phase I, placement of left side deep brain stimulator electrode, target left globus pallidus internus, with microelectrode recording;  Surgeon: Angel Morales MD;  Location: [facility] OR    OPTICAL TRACKING SYSTEM INSERTION DEEP BRAIN STIMULATION Right 8/15/2022    Procedure: Stealth Assisted Right side deep brain stimulator placement, phase I & II combined, placement of right side deep brain stimulator electrode, target right globus pallidus internus, with microelectrode recording and connection to existing generator/battery;  Surgeon: Angel Morales MD;  [facility]    SHOULDER SURGERY  2007     Past Medical History:   Diagnosis Date    Esophagitis endoscopy done 11/2021 11/18/2021    Impression:            - Normal first portion of the duodenum, second portion                         of the duodenum and third portion of the duodenum.                         Biopsied.                         - Nodular mucosa in the duodenal bulb. Biopsied.                         - Normal stomach. Biopsied.                         - Medium-sized hiatal hernia.                         - LA Grade    H/O electroencephalogram 2021 11/18/2021     Impression:  This is a normal waking and sleep EEG, with generalized beta activities throughout the recording. Generalized beta activities are usually associated certain medication use, such as  benzodiazepines or barbiturates. The cause of these activities in this case is unclear at this time.        H/O magnetic resonance imaging of brain and brain stem 8/17/2018 2009 December MR Brain without and with IV Lgymczme59/8/2009 [facility] Result Impression  Normal MRI of the brain.  Result Narrative      Multiecho, multiplanar views of the brain were obtained prior to and  following the IV administration of 19 mL of contrast. There are no  previous studies available for comparison.     The brain parenchyma is normal in appearance on all pulse sequences,  with     H/O magnetic resonance imaging of cervical spine 8/17/2018 March MR Cervical Spine without IV Contrast3/15/2017 [facility] Result Addenda Addendum by Provider, PA Jones on 03/15/2017 12:30 PM RAD^^^MA MR Cervical Spine w/o contrast 3/15/2017 12:30:00 Result Impression  Minimal degenerative disc disease at C5-6 and C6-7  otherwise an unremarkable MR scan of the cervical spine.  Result Narrative   EXAM: MR Cervical Spine w/o contrast   INDICATION:     H/O shoulder surgery 8/17/2018 2017 July XR SHOULDER 2 VIEWS LEFT7/14/2017 [facility] Result Narrative XR SHOULDER 2 VIEWS LEFT 7/14/2017 9:13 PM  INDICATION: Shoulder Injury COMPARISON: None.  FINDINGS: Negative shoulder. No fracture or dislocation.  Status      Hiatal hernia 4/20/2021    Based on a 2021 ct scan Small hiatal hernia with some wall thickening in the distal thoracic esophagus suggesting some esophagitis.    Seizure (H) at age 22 yrs 8/17/2018    Shoulder dislocation     left    Tremor 8/10/2018     Social History     Socioeconomic History    Marital status: Single     Spouse name: Not on file    Number of children: Not on file    Years of education: Not on file    Highest education level: Not on file   Occupational History    Not on file   Tobacco Use    Smoking status: Never    Smokeless tobacco: Never   Substance and Sexual Activity     Alcohol use: Yes    Drug use: No    Sexual activity: Not on file   Other Topics Concern    Not on file   Social History Narrative    single. lives in [facility]. mother cabrera ambriz            He has a history of a left dislocated shoulder with surgery performed in 2009. He currently lives in [place] but comes to the [place] area to visit his parents in [place]. 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     Social Determinants of Health     Financial Resource Strain: Not on file   Food Insecurity: Not on file   Transportation Needs: Not on file   Physical Activity: Not on file   Stress: Not on file   Social Connections: Not on file   Interpersonal Safety: Not At Risk (11/4/2021)    Humiliation, Afraid, Rape, and Kick questionnaire     Fear of Current or Ex-Partner: No     Emotionally Abused: No     Physically Abused: No     Sexually Abused: No   Housing Stability: Not on file       Drug and lactation database from the [place] National Library of Medicine:  http://toxnet.nlm.nih.gov/cgi-bin/sis/htmlgen?LACT      B/P: Data Unavailable, T: Data Unavailable, P: Data Unavailable, R: Data Unavailable 0 lbs 0 oz  There were no vitals taken for this visit., There is no height or weight on file to calculate BMI.  Medications and Vitals not listed above were documented in the cart and reviewed by me.     Current Outpatient Medications   Medication Sig Dispense Refill    carbidopa-levodopa (RYTARY) 48. MG CPCR per CR capsule TAKE 2 CAPSULES BY MOUTH AT 5AM, 9AM, 5PM AND 9PM. TAKE 3 CAPSULES BY MOUTH AT1PM = 11 capsules/day 330 capsule 11         Ramesh Carson MD

## 2023-12-13 NOTE — PATIENT INSTRUCTIONS
It was a pleasure taking care of you today.  I've included a brief summary of our discussion and care plan from today's visit below.  Please review this information with your primary care provider.  _______________________________________________________________________     My recommendations are summarized as follows:  -- ultrasound ordered to assess for a hernia or other cause of discomfort in the lower left abdomen/groin  -- urinalysis, labs ordered to assess for infection or cause of fatigue   -- Follow up TBD based on the above. I will contact you through [employer] with the results    ______________________________________________________________________     How do I schedule labs, imaging studies, or procedures that were ordered in clinic today?      Labs: To schedule lab appointment you can contact your local [facility] or call [phone] to schedule at any convenient [facility] location.      Procedures: If a colonoscopy, upper endoscopy, breath test, esophageal manometry, or pH impedence was ordered today, our endoscopy team will call you to schedule this. If you have not heard from our endoscopy team within a week, please call [phone] to schedule.      Imaging Studies: If you were scheduled for a CT scan, X-ray, MRI, ultrasound, HIDA scan or other imaging study, please call [phone] to have this scheduled.      Referral: If a referral to another specialty was ordered, expect a phone call or follow instructions above. If you have not heard from anyone regarding your referral in a week, please call our clinic to check the status.      Who do I call with any questions after my visit?  Please be in touch if there are any further questions that arise following today's visit.  There are multiple ways to contact your gastroenterology care team.       During business hours, you may reach a Gastroenterology nurse at [phone]     To schedule or reschedule an appointment, please call  [phone].      You can always send a secure message through [employer].  [employer] messages are answered by your nurse or doctor typically within 24 hours.  Please allow extra time on weekends and holidays.       For urgent/emergent questions after business hours, you may reach the on-call GI Fellow by contacting the [facility]  at [phone].     How will I get the results of any tests ordered?    You will receive all of your results.  If you have signed up for [employer], any tests ordered at your visit will be available to you after your physician reviews them.  Typically this takes 1-2 weeks.  If there are urgent results that require a change in your care plan, your physician or nurse will call you to discuss the next steps.       What is [employer]?  [employer] is a secure way for you to access all of your healthcare records from the [facility].  It is a web based computer program, so you can sign on to it from any location.  It also allows you to send secure messages to your care team.  I recommend signing up for [employer] access if you have not already done so and are comfortable with using a computer.       How to I schedule a follow-up visit?  If you did not schedule a follow-up visit today, please call [phone] to schedule a follow-up office visit.

## 2023-12-14 ENCOUNTER — LAB (OUTPATIENT)
Dept: LAB | Facility: CLINIC | Age: 38
End: 2023-12-14
Payer: MEDICARE

## 2023-12-14 ENCOUNTER — OFFICE VISIT (OUTPATIENT)
Dept: NEUROLOGY | Facility: CLINIC | Age: 38
End: 2023-12-14
Payer: MEDICARE

## 2023-12-14 VITALS
SYSTOLIC BLOOD PRESSURE: 140 MMHG | OXYGEN SATURATION: 100 % | DIASTOLIC BLOOD PRESSURE: 67 MMHG | HEART RATE: 62 BPM | RESPIRATION RATE: 16 BRPM

## 2023-12-14 DIAGNOSIS — R53.83 LOSS OF ENERGY: ICD-10-CM

## 2023-12-14 DIAGNOSIS — Z13.29 SCREENING FOR THYROID DISORDER: ICD-10-CM

## 2023-12-14 DIAGNOSIS — G20.A1 PARKINSON'S DISEASE, UNSPECIFIED WHETHER DYSKINESIA PRESENT, UNSPECIFIED WHETHER MANIFESTATIONS FLUCTUATE (H): Primary | ICD-10-CM

## 2023-12-14 LAB
CRP SERPL-MCNC: <3 MG/L
ERYTHROCYTE [SEDIMENTATION RATE] IN BLOOD BY WESTERGREN METHOD: 12 MM/HR (ref 0–15)
TSH SERPL DL<=0.005 MIU/L-ACNC: 1.37 UIU/ML (ref 0.3–4.2)

## 2023-12-14 PROCEDURE — 99215 OFFICE O/P EST HI 40 MIN: CPT | Performed by: PSYCHIATRY & NEUROLOGY

## 2023-12-14 PROCEDURE — 85652 RBC SED RATE AUTOMATED: CPT | Performed by: PATHOLOGY

## 2023-12-14 PROCEDURE — 36415 COLL VENOUS BLD VENIPUNCTURE: CPT | Performed by: PATHOLOGY

## 2023-12-14 PROCEDURE — 86140 C-REACTIVE PROTEIN: CPT | Performed by: PATHOLOGY

## 2023-12-14 PROCEDURE — 84443 ASSAY THYROID STIM HORMONE: CPT | Performed by: PATHOLOGY

## 2023-12-14 RX ORDER — LEVODOPA AND CARBIDOPA 195; 48.75 MG/1; MG/1
CAPSULE, EXTENDED RELEASE ORAL
COMMUNITY
Start: 2023-12-14 | End: 2024-01-07

## 2023-12-14 ASSESSMENT — PAIN SCALES - GENERAL: PAINLEVEL: NO PAIN (0)

## 2023-12-14 NOTE — LETTER
2023       RE: Dipesh Nicole  [address]  [place] [address]     Dear Colleague,    Thank you for referring your patient, Dipesh Nicole, to the [facility] [place] [facility]. Please see a copy of my visit note below.        Diagnosis/Summary/Recommendations:    PATIENT: Dipesh Nicole  38 year old male     : 1985    LAUREL: 2023    MRN: [id number]  [address]   [place] [address]   [email or web address]  [phone] (M)   [phone] (H)      Ritu       [phone]        Assessment:  (G20.A1) Parkinson's disease, unspecified whether dyskinesia present, unspecified whether manifestations fluctuate  (primary encounter diagnosis)  Developed symptoms left arm at age 31 years or 30 years of age  Diagnosis was made 2018  Parkinson's disease, not due to a mutation in SNCA, GBA, LRRK2, kimberley, DJ1, PINK1, or VPS35.       Amantadine symmetrel 100mg twice daily     carbidopa/levodopa sinemet 25/100  1.5@630am, 1@9a, 1@1130am, 1@2p, 1@430p, 1@7p, 1/2-1@930p     Had intractable vomiting with motor testing on 10/6/2021  OFF score of 53      Cognitive/Driving      Working with his dad's company and may move to [place] and look for a job      Brain MRI 2021  On high-resolution SWI images, nigrosome-1 cannot be well-seen  (absence of swallow tail sign). This can be seen in Parkinson's  disease or Lewy Body dementia. No abnormal enhancement.     Neuropsych evaluation 10/19/2021     Annalise Nicole is a 35-year-old man with a history of Parkinson's disease diagnosed in 2018.  He is bothered by left-sided tremor and right-sided stiffness.  He is interested in undergoing DBS surgery for management of his symptoms.     Current results indicate performance that falls almost entirely within normal limits across cognitive domains, generally in the average to above average range. He has difficulty  with memory on one list-learning task; learning and memory otherwise falls within normal limits. He does not report significant depressive symptomatology, anxiety, apathy, or compulsive behaviors.     This pattern of performance does not reflect dementia at this time, and is only subtly abnormal. There is no consistent suggestion of cognitive decline.  He appears to be capable of comprehending medical information and making well reasoned decisions for himself. He has a good understanding of the surgical procedure and the risks involved.  He has a good support system in his family.  He lives alone now, but will be moving in with his brother in the next couple of months, and his parents are also available to provide assistance as necessary. He appears to be a good candidate for surgery from a neurocognitive perspective.     Review of diagnosis    Young onset parkinson     Avoidance of dopamine blockers   Not taking     Motor complication review   Motor fluctuations/dyskinesias        Review of Impulse control disorders         Review of surgical or medication options    Right DBS Lead 8/15/2022  Left DBS Lead 7/18/2022  IPG 7/25/2022     Gait/Balance/Falls         Exercise/Therapy performed/offered         Cognitive/Driving         Mood   Anxiety and ?vomiting  Meeting with a counsellor  Discussion about antidepressant medication     Hallucinations/delusions         Sleep   Crazy dreams  No problems falling asleep  Has not yet tried melatonin  He recalls having dreams when he wakes up  He can be awaken about a dream about cramping leg and then wakes up with a cramp and takes sinemet and goes back to bed.      Waking up with cramping at 2am or so and taking 1/4 to 1/2 tablet of medication.     Bladder/Renal/Prostate/Gyn/Other   denies     GI/Constipation/GERD      odansetron zofran ODT 4mg as needed     Had some cramping in legs- 3 hours after eating at 930pm   He had dose at 630pm and ate at 730 and cramping at  930pm     Cramping - independent of what he is eating.      Having episodes once per month - wondering      Endoscopy 11/2021    - Normal first portion of the duodenum, second portion                          of the duodenum and third portion of the duodenum.                          Biopsied.                          - Nodular mucosa in the duodenal bulb. Biopsied.                          - Normal stomach. Biopsied.                          - Medium-sized hiatal hernia.                          - LA Grade C esophagitis (with numerous ulcers at the                          GE junction and in the esophagus) with bleeding.                          Biopsied. Friability may indicate EoE as well -                          further biopses avoided given friability.   Recommendation:        - Patient has a contact number available for                          emergencies. The signs and symptoms of potential                          delayed complications were discussed with the patient.                          Return to normal activities tomorrow. Written                          discharge instructions were provided to the patient.                          - Resume previous diet.                          - Continue present medications.                          - Await pathology results.                          - Repeat upper endoscopy at appointment to be                          scheduled to check healing.                          - Start PPI BID if there is no interaction with                          current medications.      ENDO/Lipid/DM/Bone density/Thyroid        Cardio/heart/Hyper or Hypotensive   Denies faints     Vision/Dry Eyes/Cataracts/Glaucoma/Macular   No changes in vision     Heme/Anticoagulation/Antiplatelet/Anemia/Other  Not taking aspirin     ENT/Resp     Skin/Cancer/Seborrhea/other        Musculoskeletal/Pain/Headache        Other:     Had a possible seizure on 11/23/2009 - had an mri and was  discussing the use of medication  From chart review it may have been a syncopal episode vs seizure. He had been dehydrated and possibly sleep deprived.      EEG was done 12/9/2009  This is an abnormal EEG due to the presence of two episodes of frontally maximal sharp waves, epileptiform in nature. This places the patient at risk for partial or secondarily generalized seizures arising from the frontal lobes.      BRAIN MRI was normal on 12/8/2009     Seen by Dr. Je Sanon on 12/10/2009 and Annalise opted not to take an AED lamotrigine     Discussion about anxiety and vomiting and parkinson - breaking the cycle  He has not had as severe off abdominal dystonia with the rytary which may be connected with the nausea/vomiting  Meeting with [facility] therapist     GI Dr. Kelley hiatal hernia consultation 6/1/2022 - he may consider putting this on hold till he has had dbs surgery   He had an esophagram on 5/17/2022  1. Small hiatal hernia.  2. Otherwise unremarkable double contrast esophagram.      Scheduling preop      Living with family and they are supportive    11/3/2023 Visit with Dr. 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     Ceruloplasmin and urinary copper were normal.     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     At last visit Apr2023:  UPDRS_II:0, _III:3, on Rytary 195 2,2,3,2,2 @ 5a,9a,1p,5p,9p  low impedances on 10B, 12-   Skin chafing, by habitual earphones, behind the left ear, wheere the connector sits.  Saw Dr. Morales 4 d later, no infection, switched to in-ear 'phones.     Since last visit  Furnished, at his request, with a recording of his ON/OFF testing.       Today:     Says video was highly 'impactful' he relates it made a considerable  "impression on the , and the decision was in his favor.     Played baseball this summer, \"great feeling\" \"like night & day\"             5a 7a 9a 11 1p 5 7p 9p 10/11p 2-3a                Carbidopa/levodopa Rytary 195mg 2   2     2    2                                                               Plan:    [facility] followup 12/14/2023     38-year-old gentleman with history of Parkinson's disease on Sinemet, hiatal hernia, LA grade C esophagitis, related to vomiting who presents for 3+ months of left lower quadrant abdominal discomfort.  He describes this as a pressure or discomfort sensation in the left inguinal region that is worse with certain position changes such as sitting upright and driving.  He has not particularly noticed a bulge in this area.  The pain does feel somewhat deep inside.  He has been feeling more fatigued lately.  Bowel movements are regular, daily and soft and easy to pass.  He denies changes to bowel movements, melena, hematochezia, nausea, vomiting, fevers, chills, early satiety, heartburn symptoms.  Differential diagnosis includes constipation, diverticulitis, inguinal hernia.  He does see his neurologist tomorrow and plans to discuss fatigue at that visit as well.     I was not able to palpate an inguinal hernia or abdominal wall hernia on exam today.  He had a negative Carnett's sign.  We will further evaluate with an ultrasound of the abdomen in the area of discomfort.  I have also ordered CBC, CMP and urinalysis to rule out diverticulitis.  If no evidence of source of his symptoms on ultrasound, we can consider CT abdomen and pelvis with contrast for further evaluation.  I will reach out to him through [employer] with his results and follow-up will be determined based on findings    Ultrasound was okay  Labs are do.   12/27/2023  Glenys Logan    I talked with Glenys (GI pharmacist) and she was wondering if maybe he should see me instead of her. After you see him tomorrow can " you let Glenys and I know if his concerns are more related to Parkinson's or GI so we can figure out who should see him?    Probably would cancel this as not an issue.     He is tired throughout the day  Goes to bed around 11pm  And falls asleep right away  Gets up once or twice and urinates and then falls back asleep  Gets up for the day at 7am  Not clearly snoring or talking during the day  He is not falling asleep during the day    Cognitivey  He has apathy  And not as motivated to do things  He does not feel depressed or anxious  Mood has been much better since brain surgery    [facility]    [facility]  ID [id number]    Blood pressure has been okay    No clear diabetes  Has not had thyroid screen    Will get cbc and metabolic panel.     IPG voltage was fine on 11/3/23. There was a short at contact 12.   5/3/2023 Return Dr. Murrell    Won the court case for disability.   Used video     Has fatigue  Able to work out.     Father has retired  [place] - parents out there  He is [facility]     Pharmacy ([facility]) consultation and medication management  Please call the scheduling number I@ [phone] to set up an appointment with pharmacists Veronica Swift or Katharina Layton.     Discussion  Would probably avoid stimulant  - he has some attentional issues.   Would avoid dopamine agonists due to risk of ICD  Would avoid increase rytary due to risk of ICD  Some data on rivastigmine/exelon patch for apathy in people without cognitive impairment  Apathy can be approved with a regular exercise program with a   Wendy - Veronica will look into this.   Call Dr. Swfit to review above issues.       In summary major issues were related to attention and motivation.     Coding statement:   Medical Decision Making:  #  Chronic progressive medical conditions addressed  - see above --   Review and/or interpretation of unique test or documentation from a  provider outside of neurology yes   Independent historian provided additional details  no I  Prescription drug management and review of potential side effects and/or monitoring for side effects  -- see above ---  Health impacted by social determinants of health  no    I have reviewed the note as documented above.  This accurately captures the substance of my conversation with the patient and total time spent preparing for visit, executing visit and completing visit on the day of the visit:  40 minutes.  The portion of this total time included face to face time 38 minutes.     Ramesh Carson MD     ______________________________________    Last visit date and details:             ______________________________________      Patient was asked about 14 Review of systems including changes in vision (dry eyes, double vision), hearing, heart, lungs, musculoskeletal, depression, anxiety, snoring, RBD, insomnia, urinary frequency, urinary urgency, constipation, swallowing problems, hematological, ID, allergies, skin problems: seborrhea, endocrinological: thyroid, diabetes, cholesterol; balance, weight changes, and other neurological problems and these were not significant at this time except for   Patient Active Problem List   Diagnosis    Tremor    Seizure (H) at age 22 yrs    H/O magnetic resonance imaging of cervical spine    H/O shoulder surgery    H/O magnetic resonance imaging of brain and brain stem    Shoulder dislocation    Parkinson disease    Right inguinal hernia    Hiatal hernia    Cyclical vomiting syndrome unrelated to migraine    Esophagitis endoscopy done 11/2021    S/P deep brain stimulator placement    Parkinson's disease          Allergies   Allergen Reactions    Diphenhydramine      Other reaction(s): Other (see comments)  Told not to take due to parkinsons    Metoclopramide      Avoid in patient with a history of Parkinson Disease  Other reaction(s): GI intolerance  Avoid in patient with a history of  Parkinson Disease    Promethazine      Other reaction(s): Other (see comments)  Told not to take due to parkisons     Past Surgical History:   Procedure Laterality Date    ESOPHAGOSCOPY, GASTROSCOPY, DUODENOSCOPY (EGD), COMBINED N/A 11/18/2021    Procedure: ESOPHAGOGASTRODUODENOSCOPY, WITH BIOPSY;  Surgeon: Veronica Kay MD;  [place] [facility]    ESOPHAGOSCOPY, GASTROSCOPY, DUODENOSCOPY (EGD), COMBINED N/A 3/7/2022    Procedure: ESOPHAGOGASTRODUODENOSCOPY, WITH BIOPSY;  Surgeon: Alexandr Sweet MD;  Location: [facility] OR    IMPLANT DEEP BRAIN STIMULATION GENERATOR / BATTERY Left 7/25/2022    Procedure: Deep brain stimulator placement, phase II, placement of deep brain stimulator generator/battery over the left chest wall;  Surgeon: Angel Morales MD;  Location: [facility] OR    OPTICAL TRACKING SYSTEM INSERTION DEEP BRAIN STIMULATION Left 7/18/2022    Procedure: Left side deep brain stimulator placement, phase I, placement of left side deep brain stimulator electrode, target left globus pallidus internus, with microelectrode recording;  Surgeon: Angel Morales MD;  Location: [facility] OR    OPTICAL TRACKING SYSTEM INSERTION DEEP BRAIN STIMULATION Right 8/15/2022    Procedure: Stealth Assisted Right side deep brain stimulator placement, phase I & II combined, placement of right side deep brain stimulator electrode, target right globus pallidus internus, with microelectrode recording and connection to existing generator/battery;  Surgeon: Angel Morales MD;  Location: [facility] OR    SHOULDER SURGERY  2007     Past Medical History:   Diagnosis Date    Esophagitis endoscopy done 11/2021 11/18/2021    Impression:            - Normal first portion of the duodenum, second portion                         of the duodenum and third portion of the duodenum.                         Biopsied.                         - Nodular mucosa in the duodenal bulb. Biopsied.                         - Normal stomach. Biopsied.                          - Medium-sized hiatal hernia.                         - LA Grade    H/O electroencephalogram 2021 11/18/2021     Impression:  This is a normal waking and sleep EEG, with generalized beta activities throughout the recording. Generalized beta activities are usually associated certain medication use, such as benzodiazepines or barbiturates. The cause of these activities in this case is unclear at this time.        H/O magnetic resonance imaging of brain and brain stem 8/17/2018 2009 December MR Brain without and with IV Nqamhksh88/8/2009 [facility] Result Impression  Normal MRI of the brain.  Result Narrative      Multiecho, multiplanar views of the brain were obtained prior to and  following the IV administration of 19 mL of contrast. There are no  previous studies available for comparison.     The brain parenchyma is normal in appearance on all pulse sequences,  with     H/O magnetic resonance imaging of cervical spine 8/17/2018 March MR Cervical Spine without IV Contrast3/15/2017 [facility] Result Addenda Addendum by ProviderKaren M.D. on 03/15/2017 12:30 PM RAD^^^MA MR Cervical Spine w/o contrast 3/15/2017 12:30:00 Result Impression  Minimal degenerative disc disease at C5-6 and C6-7  otherwise an unremarkable MR scan of the cervical spine.  Result Narrative   EXAM: MR Cervical Spine w/o contrast   INDICATION:     H/O shoulder surgery 8/17/2018 2017 July XR SHOULDER 2 VIEWS LEFT7/14/2017 [facility] Result Narrative XR SHOULDER 2 VIEWS LEFT 7/14/2017 9:13 PM  INDICATION: Shoulder Injury COMPARISON: None.  FINDINGS: Negative shoulder. No fracture or dislocation.  Status      Hiatal hernia 4/20/2021    Based on a 2021 ct scan Small hiatal hernia with some wall thickening in the distal thoracic esophagus suggesting some esophagitis.    Seizure (H) at age 22 yrs 8/17/2018    Shoulder dislocation     left    Tremor 8/10/2018     Social History      Socioeconomic History    Marital status: Single     Spouse name: Not on file    Number of children: Not on file    Years of education: Not on file    Highest education level: Not on file   Occupational History    Not on file   Tobacco Use    Smoking status: Never    Smokeless tobacco: Never   Substance and Sexual Activity    Alcohol use: Yes    Drug use: No    Sexual activity: Not on file   Other Topics Concern    Not on file   Social History Narrative    single. lives in [facility]. mother cabrera ambriz            He has a history of a left dislocated shoulder with surgery performed in 2009. He currently lives in [place] but comes to the [place] area to visit his parents in [place]. 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     Social Determinants of Health     Financial Resource Strain: Not on file   Food Insecurity: Not on file   Transportation Needs: Not on file   Physical Activity: Not on file   Stress: Not on file   Social Connections: Not on file   Interpersonal Safety: Not At Risk (11/4/2021)    Humiliation, Afraid, Rape, and Kick questionnaire     Fear of Current or Ex-Partner: No     Emotionally Abused: No     Physically Abused: No     Sexually Abused: No   Housing Stability: Not on file       Drug and lactation database from the [place] National Library of Medicine:  http://toxnet.nlm.nih.gov/cgi-bin/sis/htmlgen?LACT      B/P: Data Unavailable, T: Data Unavailable, P: Data Unavailable, R: Data Unavailable 0 lbs 0 oz  There were no vitals taken for this visit., There is no height or weight on file to calculate BMI.  Medications and Vitals not listed above were documented in the cart and reviewed by me.     Current Outpatient Medications   Medication Sig Dispense Refill    carbidopa-levodopa (RYTARY) 48. MG CPCR per CR capsule TAKE 2 CAPSULES BY MOUTH AT 5AM, 9AM, 5PM AND 9PM. TAKE 3 CAPSULES BY MOUTH AT1PM = 11 capsules/day 330 capsule 11         Ramesh Carson  MD

## 2023-12-14 NOTE — PATIENT INSTRUCTIONS
5a 7a 9a 11 1p 5 7p 9p 10/11p 2-3a                Carbidopa/levodopa Rytary 195mg 2   2     2    2                                                               Plan:    [facility] followup 12/14/2023     38-year-old gentleman with history of Parkinson's disease on Sinemet, hiatal hernia, LA grade C esophagitis, related to vomiting who presents for 3+ months of left lower quadrant abdominal discomfort.  He describes this as a pressure or discomfort sensation in the left inguinal region that is worse with certain position changes such as sitting upright and driving.  He has not particularly noticed a bulge in this area.  The pain does feel somewhat deep inside.  He has been feeling more fatigued lately.  Bowel movements are regular, daily and soft and easy to pass.  He denies changes to bowel movements, melena, hematochezia, nausea, vomiting, fevers, chills, early satiety, heartburn symptoms.  Differential diagnosis includes constipation, diverticulitis, inguinal hernia.  He does see his neurologist tomorrow and plans to discuss fatigue at that visit as well.     I was not able to palpate an inguinal hernia or abdominal wall hernia on exam today.  He had a negative Carnett's sign.  We will further evaluate with an ultrasound of the abdomen in the area of discomfort.  I have also ordered CBC, CMP and urinalysis to rule out diverticulitis.  If no evidence of source of his symptoms on ultrasound, we can consider CT abdomen and pelvis with contrast for further evaluation.  I will reach out to him through [email or web address] with his results and follow-up will be determined based on findings    Ultrasound was okay  Labs are do.   12/27/2023  Glenys Logan    I talked with Glenys (GI pharmacist) and she was wondering if maybe he should see me instead of her. After you see him tomorrow can you let Glenys and I know if his concerns are more related to Parkinson's or GI so we can figure out who should see him?    Probably  would cancel this as not an issue.     He is tired throughout the day  Goes to bed around 11pm  And falls asleep right away  Gets up once or twice and urinates and then falls back asleep  Gets up for the day at 7am  Not clearly snoring or talking during the day  He is not falling asleep during the day    Cognitivey  He has apathy  And not as motivated to do things  He does not feel depressed or anxious  Mood has been much better since brain surgery    [facility]    [facility]  ID [id number]    Blood pressure has been okay    No clear diabetes  Has not had thyroid screen    Will get cbc and metabolic panel.     IPG voltage was fine on 11/3/23. There was a short at contact 12.   5/3/2023 Return Dr. Murrell    Won the court case for disability.   Used video     Has fatigue  Able to work out.     Father has retired  [place] - parents out there  He is [facility]     Pharmacy ([facility]) consultation and medication management  Please call the scheduling number I@ [phone] to set up an appointment with pharmacists Veronica Swift or Katharina Layton.     Discussion  Would probably avoid stimulant  - he has some attentional issues.   Would avoid dopamine agonists due to risk of ICD  Would avoid increase rytary due to risk of ICD  Some data on rivastigmine/exelon patch for apathy in people without cognitive impairment  Apathy can be approved with a regular exercise program with a   Wendy - Veronica will look into this.   Call Dr. Swift to review above issues.       In summary major issues were related to attention and motivation.

## 2023-12-17 ENCOUNTER — HEALTH MAINTENANCE LETTER (OUTPATIENT)
Age: 38
End: 2023-12-17

## 2023-12-19 DIAGNOSIS — R10.32 ABDOMINAL PAIN, LEFT LOWER QUADRANT: Primary | ICD-10-CM

## 2024-01-06 ENCOUNTER — MYC MEDICAL ADVICE (OUTPATIENT)
Dept: NEUROLOGY | Facility: CLINIC | Age: 39
End: 2024-01-06
Payer: MEDICAID

## 2024-01-07 ENCOUNTER — TELEPHONE (OUTPATIENT)
Dept: NEUROLOGY | Facility: CLINIC | Age: 39
End: 2024-01-07
Payer: MEDICAID

## 2024-01-07 DIAGNOSIS — G20.A1 PARKINSON'S DISEASE, UNSPECIFIED WHETHER DYSKINESIA PRESENT, UNSPECIFIED WHETHER MANIFESTATIONS FLUCTUATE (H): Primary | ICD-10-CM

## 2024-01-07 RX ORDER — LEVODOPA AND CARBIDOPA 195; 48.75 MG/1; MG/1
CAPSULE, EXTENDED RELEASE ORAL
Qty: 330 CAPSULE | Refills: 0 | Status: SHIPPED | OUTPATIENT
Start: 2024-01-07 | End: 2024-01-08

## 2024-01-07 NOTE — TELEPHONE ENCOUNTER
He is out of rytary, not clear why there are issues with the script.  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

## 2024-01-08 ENCOUNTER — VIRTUAL VISIT (OUTPATIENT)
Dept: NEUROLOGY | Facility: CLINIC | Age: 39
End: 2024-01-08
Attending: PSYCHIATRY & NEUROLOGY
Payer: MEDICARE

## 2024-01-08 DIAGNOSIS — G20.A1 PARKINSON'S DISEASE, UNSPECIFIED WHETHER DYSKINESIA PRESENT, UNSPECIFIED WHETHER MANIFESTATIONS FLUCTUATE (H): ICD-10-CM

## 2024-01-08 DIAGNOSIS — G20.A1 PARKINSON'S DISEASE, UNSPECIFIED WHETHER DYSKINESIA PRESENT, UNSPECIFIED WHETHER MANIFESTATIONS FLUCTUATE (H): Primary | ICD-10-CM

## 2024-01-08 PROCEDURE — 99207 PR NO BILLABLE SERVICE THIS VISIT: CPT | Mod: 95 | Performed by: PHARMACIST

## 2024-01-08 RX ORDER — LEVODOPA AND CARBIDOPA 195; 48.75 MG/1; MG/1
CAPSULE, EXTENDED RELEASE ORAL
Qty: 330 CAPSULE | Refills: 0 | Status: SHIPPED | OUTPATIENT
Start: 2024-01-08 | End: 2024-01-08

## 2024-01-08 RX ORDER — LEVODOPA AND CARBIDOPA 195; 48.75 MG/1; MG/1
CAPSULE, EXTENDED RELEASE ORAL
Qty: 330 CAPSULE | Refills: 11 | Status: SHIPPED | OUTPATIENT
Start: 2024-01-08

## 2024-01-08 NOTE — CONFIDENTIAL NOTE
Rytary 48. was marked historical on 12/14 and inadvertently e-canceled. A 30 day supply was prescribed by Dr. Serrano on 1/7/2024 with 0 refills. He will need a new prescription with refills.

## 2024-01-08 NOTE — Clinical Note
So I saw Annalise to discuss his concerns. We had a pretty soniya conversation and honestly I feel like he just wanted to talk about his concentration/apathy concerns, doesn't necessarily feel medications are needed. I think he would benefit from a health  but I don't think it would be covered by insurance so we might need to go the health psychology route. As for possible ADHD, this would need a formal evaluation with psychiatry before I would consider a stimulant I think. He also mentioned speech concerns and seemed very interested in speech therapy. In summary, could you refer him to speech therapy and health psychology, if you agree? Let me know if other thoughts based on my notes

## 2024-01-08 NOTE — Clinical Note
1/8/2024       RE: Dipesh Nicole  [address] [place] [address]     Dear Colleague,    Thank you for referring your patient, Dipesh Nicole, to the [facility] [place] at [facility]. Please see a copy of my visit note below.    Medication Therapy Management (MTM) Encounter    ASSESSMENT:                            Medication Adherence/Access: No issues identified    Parkinson's Disease:   Patient may benefit from implementing non-drug therapies. Higher doses of dopaminergic medications may increase his risk for impulse control problems and it seems that his motor symptoms are currently well controlled.  As for treatment of apathy and concentration, he was wondering about stimulants and although they may be helpful would not be first-line without a diagnosis of ADHD. He may instead benefit from working with health psychology and maybe at some point could be evaluated for attention deficit. He may also benefit from working with a health  but I don't think health coaching services are covered by insurance. I will also talk with his neurologist about possible speech therapy referral.       PLAN:                            Consider speech therapy referral   Consider Health psychology referral     Follow-up: 6 months     SUBJECTIVE/OBJECTIVE:                          Annalise Nicole is a 38 year old male contacted via secure video for a follow-up visit from 5/24/22.       Reason for visit: follow up on Parkinson's disease.    Allergies/ADRs: Reviewed in chart  Past Medical History: Reviewed in chart  Tobacco: He reports that he has never smoked. He has never used smokeless tobacco.  Alcohol: socially - not often  THC: uses about once a week to help with sleep and appetite   Current address: [address] [place] [address], [facility] [address]; [place] [address]     Medication Adherence/Access: no issues reported    Parkinson's Disease:    Rytary as listed  below    Patient states that deep brain stimulation has been very helpful for managing his motor symptoms. He is not having wearing off of Rytary and is not experiencing dyskinesias currently. The 3 pm dose does feel more effective than the others but does not feel it would be necessary to increase the other doses. He has not had any issues with GI symptoms such as vomiting since he got deep brain stimulation.     His primary concerns right now are trouble with motivation and fatigue- it's hard to get out of bed in the morning. He reports feeling more disorganized but states he has always felt this way. He was working for a while but stopped working so he could stay on medical insurance. 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 He reports that he feels he could do this job without getting overwhelmed right not but may get fatigued by the end of the day.  He is not currently feeling depressed or anxious; he did have trouble with depression and anxiety before deep brain stimulation but the surgery helped this a lot. Patient reports he is goal-oriented and would be interested in working with a  more so than a therapist.     Patient also reports that his speech has changed a bit. He feels more fast and slurred with his talking. He has never done speech therapy but would be interested in trying this.     5 am 9 am 1 pm 5 pm 9 pm    Rytary 48. mg  2 2 3 2 2       Today's Vitals: There were no vitals taken for this visit.  ----------------    I spent 16 minutes with this patient today. All changes were made via collaborative practice agreement with Dr. Carson. A copy of the visit note was provided to the patient's provider(s).    A summary of these recommendations was sent via popchips.    Veronica Swift, Pharm.D.  Medication Therapy Management Pharmacist  [facility]    Telemedicine Visit Details  Type of  service:  Video Conference via [employer]  Start Time:  3:02 PM  End Time: 3:18 PM     Medication Therapy Recommendations  No medication therapy recommendations to display       Again, thank you for allowing me to participate in the care of your patient.      Sincerely,    Veronica Swift [facility]

## 2024-01-08 NOTE — PROGRESS NOTES
Medication Therapy Management (MTM) Encounter    ASSESSMENT:                            Medication Adherence/Access: No issues identified    Parkinson's Disease:   Patient may benefit from implementing non-drug therapies. Higher doses of dopaminergic medications may increase his risk for impulse control problems and it seems that his motor symptoms are currently well controlled.  As for treatment of apathy and concentration, he was wondering about stimulants and although they may be helpful would not be first-line without a diagnosis of ADHD. He may instead benefit from working with health psychology and maybe at some point could be evaluated for attention deficit. He may also benefit from working with a health  but I don't think health coaching services are covered by insurance. I will also talk with his neurologist about possible speech therapy referral.       PLAN:                            I will talk with Dr. Carson about possible speech therapy referral and health psychology referral  I would not recommend changing your medications at this time     Follow-up: 6 months or sooner if needed     SUBJECTIVE/OBJECTIVE:                          Annalise Nicole is a 38 year old male contacted via secure video for a follow-up visit from 5/24/22.       Reason for visit: follow up on Parkinson's disease.    Allergies/ADRs: Reviewed in chart  Past Medical History: Reviewed in chart  Tobacco: He reports that he has never smoked. He has never used smokeless tobacco.  Alcohol: socially - not often  THC: uses about once a week to help with sleep and appetite   Current address: [address] [place] [address], [place] [id number]     Medication Adherence/Access: no issues reported    Parkinson's Disease:    Rytary as listed below    Patient states that deep brain stimulation has been very helpful for managing his motor symptoms. He is not having wearing off of Rytary and is not experiencing dyskinesias currently. The 3 pm dose  does feel more effective than the others but does not feel it would be necessary to increase the other doses. He has not had any issues with GI symptoms such as vomiting since he got deep brain stimulation.     His primary concerns right now are trouble with motivation and fatigue- it's hard to get out of bed in the morning. He reports feeling more disorganized but states he has always felt this way. He was working for a while but stopped working so he could stay on medical insurance. 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 He reports that he feels he could do this job without getting overwhelmed right not but may get fatigued by the end of the day.  He is not currently feeling depressed or anxious; he did have trouble with depression and anxiety before deep brain stimulation but the surgery helped this a lot. Patient reports he is goal-oriented and would be interested in working with a  more so than a therapist.     Patient also reports that his speech has changed a bit. He feels more fast and slurred with his talking. He has never done speech therapy but would be interested in trying this.     5 am 9 am 1 pm 5 pm 9 pm    Rytary 48. mg  2 2 3 2 2       Today's Vitals: There were no vitals taken for this visit.  ----------------    I spent 16 minutes with this patient today. All changes were made via collaborative practice agreement with Dr. Carson. A copy of the visit note was provided to the patient's provider(s).    A summary of these recommendations was sent via [employer].    Veronica Swift, Pharm.D.  Medication Therapy Management Pharmacist  [facility]    Telemedicine Visit Details  Type of service:  Video Conference via [employer]  Start Time:  3:02 PM  End Time: 3:18 PM     Medication Therapy Recommendations  No medication therapy recommendations to display

## 2024-01-11 DIAGNOSIS — F43.20 ADJUSTMENT DISORDER, UNSPECIFIED TYPE: ICD-10-CM

## 2024-01-11 DIAGNOSIS — R47.9 SPEECH DISORDER: ICD-10-CM

## 2024-01-11 DIAGNOSIS — R41.840 ATTENTION AND CONCENTRATION DEFICIT: Primary | ICD-10-CM

## 2024-01-11 DIAGNOSIS — F80.9 SPEECH AND LANGUAGE DISORDER: ICD-10-CM

## 2024-01-11 NOTE — PATIENT INSTRUCTIONS
"Recommendations from today's MTM visit:                                                      I will talk with Dr. Carson about possible speech therapy referral and health psychology referral  I would not recommend changing your medications at this time     Follow-up: 6 months or sooner if needed     It was great speaking with you today.  I value your experience and would be very thankful for your time in providing feedback in our clinic survey. In the next few days, you may receive an email or text message from [employer] with a link to a survey related to your  clinical pharmacist.\"     To schedule another MTM appointment, please call the clinic directly or you may call the MTM scheduling line at [phone].    My Clinical Pharmacist's contact information:                                                      Please feel free to contact me with any questions or concerns you have.      Veronica Swift, Pharm.D.  Medication Therapy Management Pharmacist  [facility]     "

## 2024-01-16 ENCOUNTER — LAB (OUTPATIENT)
Dept: LAB | Facility: CLINIC | Age: 39
End: 2024-01-16
Payer: MEDICARE

## 2024-01-16 ENCOUNTER — ANCILLARY PROCEDURE (OUTPATIENT)
Dept: CT IMAGING | Facility: CLINIC | Age: 39
End: 2024-01-16
Attending: STUDENT IN AN ORGANIZED HEALTH CARE EDUCATION/TRAINING PROGRAM
Payer: MEDICARE

## 2024-01-16 DIAGNOSIS — R10.32 ABDOMINAL PAIN, LEFT LOWER QUADRANT: ICD-10-CM

## 2024-01-16 LAB
ALBUMIN SERPL BCG-MCNC: 5 G/DL (ref 3.5–5.2)
ALBUMIN UR-MCNC: NEGATIVE MG/DL
ALP SERPL-CCNC: 59 U/L (ref 40–150)
ALT SERPL W P-5'-P-CCNC: 10 U/L (ref 0–70)
ANION GAP SERPL CALCULATED.3IONS-SCNC: 10 MMOL/L (ref 7–15)
APPEARANCE UR: CLEAR
AST SERPL W P-5'-P-CCNC: 24 U/L (ref 0–45)
BASOPHILS # BLD AUTO: 0 10E3/UL (ref 0–0.2)
BASOPHILS NFR BLD AUTO: 0 %
BILIRUB SERPL-MCNC: 0.9 MG/DL
BILIRUB UR QL STRIP: NEGATIVE
BUN SERPL-MCNC: 9.6 MG/DL (ref 6–20)
CALCIUM SERPL-MCNC: 9.9 MG/DL (ref 8.6–10)
CHLORIDE SERPL-SCNC: 102 MMOL/L (ref 98–107)
COLOR UR AUTO: NORMAL
CREAT SERPL-MCNC: 0.87 MG/DL (ref 0.67–1.17)
DEPRECATED HCO3 PLAS-SCNC: 27 MMOL/L (ref 22–29)
EGFRCR SERPLBLD CKD-EPI 2021: >90 ML/MIN/1.73M2
EOSINOPHIL # BLD AUTO: 0.2 10E3/UL (ref 0–0.7)
EOSINOPHIL NFR BLD AUTO: 3 %
ERYTHROCYTE [DISTWIDTH] IN BLOOD BY AUTOMATED COUNT: 12.6 % (ref 10–15)
GLUCOSE SERPL-MCNC: 96 MG/DL (ref 70–99)
GLUCOSE UR STRIP-MCNC: NEGATIVE MG/DL
HCT VFR BLD AUTO: 46.7 % (ref 40–53)
HGB BLD-MCNC: 16.1 G/DL (ref 13.3–17.7)
HGB UR QL STRIP: NEGATIVE
IMM GRANULOCYTES # BLD: 0 10E3/UL
IMM GRANULOCYTES NFR BLD: 0 %
KETONES UR STRIP-MCNC: NEGATIVE MG/DL
LEUKOCYTE ESTERASE UR QL STRIP: NEGATIVE
LYMPHOCYTES # BLD AUTO: 1.6 10E3/UL (ref 0.8–5.3)
LYMPHOCYTES NFR BLD AUTO: 29 %
MCH RBC QN AUTO: 30.7 PG (ref 26.5–33)
MCHC RBC AUTO-ENTMCNC: 34.5 G/DL (ref 31.5–36.5)
MCV RBC AUTO: 89 FL (ref 78–100)
MONOCYTES # BLD AUTO: 0.4 10E3/UL (ref 0–1.3)
MONOCYTES NFR BLD AUTO: 7 %
NEUTROPHILS # BLD AUTO: 3.3 10E3/UL (ref 1.6–8.3)
NEUTROPHILS NFR BLD AUTO: 61 %
NITRATE UR QL: NEGATIVE
NRBC # BLD AUTO: 0 10E3/UL
NRBC BLD AUTO-RTO: 0 /100
PH UR STRIP: 6 [PH] (ref 5–7)
PLATELET # BLD AUTO: 224 10E3/UL (ref 150–450)
POTASSIUM SERPL-SCNC: 4.5 MMOL/L (ref 3.4–5.3)
PROT SERPL-MCNC: 7.7 G/DL (ref 6.4–8.3)
RBC # BLD AUTO: 5.25 10E6/UL (ref 4.4–5.9)
RBC URINE: 0 /HPF
SODIUM SERPL-SCNC: 139 MMOL/L (ref 135–145)
SP GR UR STRIP: 1 (ref 1–1.03)
UROBILINOGEN UR STRIP-MCNC: NORMAL MG/DL
WBC # BLD AUTO: 5.5 10E3/UL (ref 4–11)
WBC URINE: <1 /HPF

## 2024-01-16 PROCEDURE — 36415 COLL VENOUS BLD VENIPUNCTURE: CPT | Performed by: PATHOLOGY

## 2024-01-16 PROCEDURE — 85025 COMPLETE CBC W/AUTO DIFF WBC: CPT | Performed by: PATHOLOGY

## 2024-01-16 PROCEDURE — 80053 COMPREHEN METABOLIC PANEL: CPT | Performed by: PATHOLOGY

## 2024-01-16 PROCEDURE — 81001 URINALYSIS AUTO W/SCOPE: CPT | Performed by: PATHOLOGY

## 2024-01-16 PROCEDURE — G1010 CDSM STANSON: HCPCS | Performed by: RADIOLOGY

## 2024-01-16 PROCEDURE — 74177 CT ABD & PELVIS W/CONTRAST: CPT | Mod: MG | Performed by: RADIOLOGY

## 2024-01-16 RX ORDER — IOPAMIDOL 755 MG/ML
95 INJECTION, SOLUTION INTRAVASCULAR ONCE
Status: COMPLETED | OUTPATIENT
Start: 2024-01-16 | End: 2024-01-16

## 2024-01-16 RX ADMIN — IOPAMIDOL 95 ML: 755 INJECTION, SOLUTION INTRAVASCULAR at 16:13

## 2024-01-16 NOTE — DISCHARGE INSTRUCTIONS

## 2024-02-05 ENCOUNTER — TELEPHONE (OUTPATIENT)
Dept: NEUROLOGY | Facility: CLINIC | Age: 39
End: 2024-02-05
Payer: MEDICAID

## 2024-02-05 NOTE — CONFIDENTIAL NOTE
Prior Authorization Retail Medication Request    Medication/Dose: Rytary 48.75-195MG ER  Diagnosis and ICD code: G20.A    New/renewal/insurance change PA/secondary ins. PA:  Previously Tried and Failed:    Rationale:      Insurance   Primary: MEDICARE    Insurance ID: [id number]      Secondary:  Insurance ID:      Pharmacy Information   Name:  [id number] - [place], [place] - [address] [place] [address]   Phone:  [phone]   Fax:   [phone]

## 2024-02-06 ENCOUNTER — VIRTUAL VISIT (OUTPATIENT)
Dept: PSYCHOLOGY | Facility: CLINIC | Age: 39
End: 2024-02-06
Payer: MEDICAID

## 2024-02-06 DIAGNOSIS — R41.840 ATTENTION AND CONCENTRATION DEFICIT: ICD-10-CM

## 2024-02-06 DIAGNOSIS — Z96.89 S/P DEEP BRAIN STIMULATOR PLACEMENT: ICD-10-CM

## 2024-02-06 DIAGNOSIS — G20.A1 EARLY-ONSET PARKINSON'S DISEASE (H): ICD-10-CM

## 2024-02-06 DIAGNOSIS — F41.1 GENERALIZED ANXIETY DISORDER: Primary | ICD-10-CM

## 2024-02-06 PROCEDURE — 90834 PSYTX W PT 45 MINUTES: CPT | Mod: 95 | Performed by: PSYCHOLOGIST

## 2024-02-06 NOTE — PROGRESS NOTES
Health Psychology          Diana Link, Ph.D.,  [phone]  Amaris Peres, Ph.D.,  [phone]  Renay Gibbons, Ph.D.,  [phone]  Lisset Anderson, Ph.D., , [facility] [phone]  Damian Jameson, Ph.D., , [facility] [phone]  Ekta Aguilar, Ph.D.,  [phone]  Ada Mon, Ph.D., , [facility] [phone]    [facility], 3rd Floor  [address] [place] [address]       Health Psychology Progress Note    Patient Name: Dipesh Nicole    Service Type: Individual  Length of Visit: 44 minutes  Start time: 4:03 PM  Stop time: 4:47 PM   Attendees: patient attended alone  Session #: 1 (new episode of care)    Telemedicine Information:     Telemedicine Visit: The patient's condition can be safely assessed and treated via synchronous audio and visual telemedicine encounter.    Reason for Telemedicine Visit: Patient has requested telehealth visit and Patient convenience (e.g. access to timely appointments / distance to available provider)  Originating Site (Patient Location): Patient's home  Distant Location (provider location):  Off-site: Provider's home office  Consent:  The patient/guardian has verbally consented to: the potential risks and benefits of telemedicine (video visit) versus in person care; bill my insurance or make self-payment for services provided; and responsibility for payment of non-covered services.   Mode of Communication:  Video Conference via [employer]Scale  As the provider I attest to compliance with applicable laws and regulations related to telemedicine.      Identifying Information and Presenting Problem:  The patient is a 38 year old adult who is being seen for problematic symptoms of anxiety in the context of early onset PD.    Topics Discussed/Interventions Provided:      Session Content:     Background/Context: Patient with a PMH significant for early onset Parkinson's Disease and generalized anxiety disorder who is s/p  deep brain stimulator placement. He completed a course of psychotherapy with me prior to his DBS surgery in 2022 at the recommendation of his neurology team who believed there was a connection between his anxiety and his PD symptom severity. He successfully completed a course of psychotherapy and discontinued treatment upon improvement in his anxiety after his DBS placement. Patient is returning for another course of treatment at the recommendation of his neurologist, Dr. Carson. Patient states that he is currently struggling with difficulty completing tasks and having trouble with follow-through due to stress. He notes that he is procrastinating a lot on completing tasks and projects, which is further amplifying his anxiety. He states that he would like to complete another course of treatment to learn strategies for improving his follow-through and improving his ability to focus and complete tasks.      Skills/intervention: Provided psychoeducation about anxiety and evidence-based treatment for anxiety. Discussed symptoms of anxiety and how patient's description is consistent with an exacerbation of his ANAYA. Patient agreed and noted that he has several of the symptoms described. He also inquires about whether stimulants may be a helpful treatment for his concentration concerns in the context of early onset PD. He states that he read information that stimulants may help PD patients with their cognition and focus. Provided psychoeducation about ADHD and the differences and overlap between these two conditions. Also provided education that stimulants may be contraindicated for anxiety and discussed treatments that can provide coverage for both anxiety and ADHD. Advised that given patient's history of anxiety, it would be beneficial to treat his anxiety and then consider pursuing evaluation for the presence of ADHD if attention and concentration concerns persist after treatment. Patient was agreeable to this. Order  placed for ADHD evaluation. Of note, patient does have a history of cocaine use and a history of daily cannabis use. Thus, further assessment is warranted to explore risk for chemical dependency and substance use concerns to support patient in addressing his concentration and focus concerns while mitigating risk for SUDs. Will complete updated DA and initiate new course of treatment. Patient notes that he has not been regularly practicing the skills taught previously and it was recommended that he resume some of these practices. Will also send patient a psychoeducational handout about practical anti-procrastination strategies and a mindfulness meditation exercise to practice.        Treatment Objective(s) Addressed in This Session:  Anxiety: will develop more effective coping skills to manage anxiety symptoms and will increase ability to function adaptively  Attention Problems: will develop coping skills to effectively manage attention issues    Progress on / Status of Treatment Objective(s) / Homework:  New Objective established this session     Medication Adherence: Patient did not report medication changes. Current medication list is below:     Current Outpatient Medications   Medication    carbidopa-levodopa (RYTARY) 48. MG CPCR per CR capsule     No current facility-administered medications for this visit.         Assessment: The patient appeared to be active and engaged in today's session and was receptive to feedback.     Mental Status Exam:   Appearance: Appropriate   Eye Contact: Good    Orientation: Yes, x4  Behavior/relationship to provider/demeanor: Cooperative, Engaged, and Pleasant  Motor Activity: restless  Mood (subjective report): Anxious  Affect (objective appearance): Appropriate/mood congruent, anxious  Speech Rate: Rapid  Speech Volume: Normal  Speech Articulation: Normal  Speech Coherence: Normal  Speech Spontaneity: Normal  Thought Content: no suicidal/homicidal ideation, plans, or  intent  Thought Process (associations): Logical and Linear  Thought Process (rate): Rapid  Abnormal Perception: None  Attention/Concentration: Normal  Memory: Appears grossly intact  Fund of Knowledge: Appears within normal limits  Abstraction:  Normal  Insight:  Adequate  Judgment: Adequate    Does the patient appear to be at imminent risk of harm to self/others at this time? No    The session was necessary to address problematic symptoms of anxiety in the context of early onset PD that have been interfering with patient's ability to focus, concentrate, and complete tasks.  Ongoing psychotherapy is necessary to improve functioning with daily activities, provide psychoeducation, provide support, and teach cognitive and behavioral skills.    Diagnoses:    1. Generalized anxiety disorder    2. Attention and concentration deficit    3. Early-onset Parkinson's disease    4. S/P deep brain stimulator placement        Plan:    Follow-up video visit with me on 2/21/24 at 10:00am  Patient to use [email or web address] or other crisis resources in the event of a psychiatric emergency  Primary care needs and medications are managed by Joey Morley. Referring provider is Referred Self  Next visit agenda/goals:   Read handout on practical strategies for overcoming procrastination  Practice mindfulness meditation at least 3x before the next visit  Schedule appointment for ADHD evaluation  Complete updated DA    NOTE: Treatment plan due in future visit    Ada Mon, Ph.D., , ABPP  Clinical Health Psychologist  Phone: [phone]   Pager: [phone]  2/6/2024 4:03 PM

## 2024-02-06 NOTE — Clinical Note
Follow-up video visit with me on 2/21/24 at 10:00am please. Please also CANCEL psychology appointments for 5/24/24 and 10/29/24. Thank you!

## 2024-02-07 ENCOUNTER — THERAPY VISIT (OUTPATIENT)
Dept: SPEECH THERAPY | Facility: CLINIC | Age: 39
End: 2024-02-07
Attending: PSYCHIATRY & NEUROLOGY
Payer: MEDICAID

## 2024-02-07 DIAGNOSIS — F80.9 SPEECH AND LANGUAGE DISORDER: ICD-10-CM

## 2024-02-07 DIAGNOSIS — F43.20 ADJUSTMENT DISORDER, UNSPECIFIED TYPE: ICD-10-CM

## 2024-02-07 DIAGNOSIS — R47.9 SPEECH DISORDER: ICD-10-CM

## 2024-02-07 DIAGNOSIS — R41.840 ATTENTION AND CONCENTRATION DEFICIT: Primary | ICD-10-CM

## 2024-02-07 PROCEDURE — 92522 EVALUATE SPEECH PRODUCTION: CPT | Mod: GN

## 2024-02-07 NOTE — PROGRESS NOTES
"SPEECH LANGUAGE PATHOLOGY EVALUATION    [facility]        OUTPATIENT SPEECH LANGUAGE PATHOLOGY SPEECH EVALUATION    See electronic medical record for Abuse and Falls Screening details.    Subjective      Presenting condition or subjective complaint: speech fluidity and pace  Mr. Landon \"Annalise\" Torie is an 38 year old male who was referred to outpatient speech language pathology following his concern with 'pace' related to speech. Annalise w/ PMHx of Parkinsons disease, on sinemet, onset in 2016 in the LUE, s/p Bi GP DBS (Abbott Infinity 7) in Aug 2022.  He denies falls, swallowing difficulty, dyskinesias. Further endorsed he noticed his speech changes, 'within the last couple of months'. He has not seen SLP prior. Speech changes per his report includes speech that sounds 'sped up', stumbling/stuttering on words, voice is not as loud. Declined word finding difficulties, and any other cognitive linguistic difficulties.  Annalise further endorses baseline ADHD, is not currently on intervention for dx.     Date of onset: 01/11/24 (date of order)    Relevant medical history: Parkinson s Disease   Active Ambulatory Problems     Diagnosis Date Noted    Tremor 08/10/2018    Seizure (H) at age 22 yrs 08/17/2018    H/O magnetic resonance imaging of cervical spine 08/17/2018    H/O shoulder surgery 08/17/2018    H/O magnetic resonance imaging of brain and brain stem 08/17/2018    Shoulder dislocation     Parkinson disease 01/09/2019    Right inguinal hernia 01/19/2017    Hiatal hernia 04/20/2021    Cyclical vomiting syndrome unrelated to migraine 07/30/2021    Esophagitis endoscopy done 11/2021 11/18/2021    S/P deep brain stimulator placement 11/18/2021    Parkinson's disease 01/09/2019     Resolved Ambulatory Problems     Diagnosis Date Noted    Misuse of cannabis 07/30/2021     Past Medical History:   Diagnosis Date    H/O electroencephalogram 2021 11/18/2021      Dates & types of surgery: DBS surgery " (7-) and (8-) and (7/28/2023)  Past Surgical History:   Procedure Laterality Date    ESOPHAGOSCOPY, GASTROSCOPY, DUODENOSCOPY (EGD), COMBINED N/A 11/18/2021    Procedure: ESOPHAGOGASTRODUODENOSCOPY, WITH BIOPSY;  Surgeon: Veronica Kay MD;  Location: [facility] OR    ESOPHAGOSCOPY, GASTROSCOPY, DUODENOSCOPY (EGD), COMBINED N/A 3/7/2022    Procedure: ESOPHAGOGASTRODUODENOSCOPY, WITH BIOPSY;  Surgeon: Alexandr Sweet MD;  Location: [facility] OR    IMPLANT DEEP BRAIN STIMULATION GENERATOR / BATTERY Left 7/25/2022    Procedure: Deep brain stimulator placement, phase II, placement of deep brain stimulator generator/battery over the left chest wall;  Surgeon: Angel Morales MD;  Location: [facility] OR    OPTICAL TRACKING SYSTEM INSERTION DEEP BRAIN STIMULATION Left 7/18/2022    Procedure: Left side deep brain stimulator placement, phase I, placement of left side deep brain stimulator electrode, target left globus pallidus internus, with microelectrode recording;  Surgeon: Angel Morales MD;  Location: [facility] OR    OPTICAL TRACKING SYSTEM INSERTION DEEP BRAIN STIMULATION Right 8/15/2022    Procedure: Stealth Assisted Right side deep brain stimulator placement, phase I & II combined, placement of right side deep brain stimulator electrode, target right globus pallidus internus, with microelectrode recording and connection to existing generator/battery;  Surgeon: Angel Morales MD;  Location: [facility] OR    SHOULDER SURGERY  2007      Lead(s):     Left Right   DBS Target GPi GPi   DBS Lead Type Infinity 0.5 Infinity 0.5   Lead Implant Date 7/18/2022 8/15/2022      IPG(s):    1   IPG Abbott Infinity 7   IPG Implant Date 7/25/2022   Location Left Chest   Battery (V) >3.0 V     Prior diagnostic imaging/testing results: MRI       Indication: s/p R DBS to GPi     Comparison: Skull x-ray 7/18/2022, head CT 8/15/2022     Findings:   AP and lateral supine radiographs of the skull. Postsurgical changes  of new right  deep brain stimulator. Stable positioning of left deep  brain stimulator. Small amount of postoperative pneumocephalus.                                                              Impression: Postoperative changes of new right deep brain stimulator.  Stable positioning of left deep brain stimulator.     I have personally reviewed the examination and initial interpretation  and I agree with the findings.     BAKARI CHANG MD     Prior therapy history for the same diagnosis, illness or injury: No  no     Living Environment  Social support: Alone   Help at home: None  Equipment owned:       Employment: Yes management  Hobbies/Interests: gym, golf, working out, baseball    Patient goals for therapy: improve overall speech    Pain assessment: Pain denied     Objective     ORAL MOTOR FUNCTION:  generally intact  Dentition: adequate dentition  Secretion management: WFL  Mucosal quality: adequate  Mandibular function: intact  Oral labial function: impaired coordination on right, Annalise endorsing slight paresthesia   Lingual function: impaired coordination, Annalise endorsing slight paresthesia   Laryngeal function: volitional swallow    MOTOR SPEECH:  Speech Intelligibility:     Word level speech intelligibility:  96% intelligible      Phrase/sentence level speech intelligibility:  92% intelligible      Conversation level speech intelligibility:  90% intelligible   Respiration Observations: shallow breathing   Phonation: monoloudness , reduced loudness, loudness decay, and reduced stress and loudness  Maximum phonation time (seconds): 12.4 (WFL for males =15 sec)    ACOUSTIC MEASURES:  Equipment use for testing: Sound level meter at 30 cm distance from microphone to mouth    Maximum sustained phonation:     Prolonged 'ah' at mid pitch (sec): 70 dB SPL for an average of 12.4 seconds   S/Z ratio (9) sec/ (10) sec): 0.9  (>1.4 is suggestive of VF pathology)   Resonance:  intermittent hypo-hypernasality   Rate/prosody: monotone  speech, rapid rate , reduced prosody   Articulation:  telescoping, distorted vowels, imprecise consonants, disfluencies, rapid/burred AMRS, increased speaking rate    Speech Production: Diadochokinetic Rates (30-55 in 10 secs=WFL) /pa/=48, /ta/=38, /ka/=42. (8 in 10 secs=WFL) /pataka/=4  Speech Fluency: sound/syllable repetitions palilalia (automatic repetition of own words or phrases)  Dysarthria differential diagnosis:  unable to determine differential diagnosis at this time. Pt is demonstrated s/sx relating to ataxic and hypokinetic dysarthria's.     Motor speech level of impairment: mild impairment     Assessment & Plan   CLINICAL IMPRESSIONS   Medical Diagnosis: Diagnosis  R41.840 (ICD-10-CM) - Attention and concentration deficit  F43.20 (ICD-10-CM) - Adjustment disorder, unspecified type  R47.9 (ICD-10-CM) - Speech disorder  F80.9 (ICD-10-CM) - Speech and language disorder    Treatment Diagnosis: mild dysarthria   Impression/Assessment: Pt is a 38 year old male with speech complaints. The following significant findings have been identified: impaired speech intelligibility and impaired voicing, characterized by reduced respiration (shallow), reduced phonation quality (reduced loudness, reduced stress), and reduced maximum phonation time, impaired resonance marked by intermittent hypo-hypernasality, impaired rate/prosody marked by rapid rate of speech and reduced prosody, impaired articulation marked by telescoping, distorted vowels, imprecise consonants, disfluencies, rapid/blurred AMR's. Further reduced speech fluencies marked by sound/syllable repetitions, and palilalia. . Identified deficits interfere with their ability to communicate within the home or community and maintain adequate speech and breath support affecting intelligibility   as compared to previous level of function.    PLAN OF CARE  Treatment Interventions: Speech    Prognosis to achieve stated therapy goals is good   Rehab potential is  impacted by: comorbidities, pending medical intervention    Long Term Goals   SLP Goal 1  Goal Identifier: Speech  Goal Description: Patient will verbalize and utilize motor speech strategies (especially relating to pace, naturalness, and articulatory precision) with 90% accuracy and no cues at the multi-syllabic words, sentence, and conversation levels to promote increased articulatory accuracy for function communication  Rationale: To maximize the ability to communicate wants and needs within the home or community  Target Date: 04/07/24  SLP Goal 2  Goal Identifier: Breath/Voice  Goal Description: Patient will increase vocal loudness to an average SPL of >75 dB at 30 cm during sustained /a/ phonation of at least 15 seconds given min cues in order to increase vocal respiratory support required for functional communication.  Rationale: To maximize the ability to communicate wants and needs within the home or community  Target Date: 04/07/24      Frequency of Treatment: x1/wk  Duration of Treatment: up to 60 days     Recommended Referrals to Other Professionals:  to be further determined as needed   Education Assessment:   Learner/Method: Patient;Listening  Education Comments: Created cohesive and individualized treatment/POC w/ pt, and interdisplinary team. Pt stated comprehension of evaluation results, recommendations, and POC    Risks and benefits of evaluation/treatment have been explained.   Patient/Family/caregiver agrees with Plan of Care.     Evaluation Time:    Sound production (artic, phonology, apraxia, dysarthria) Minutes (83483): 30     Present: Not applicable     Signing Clinician: LIBIA Teresa    [facility]                                                                                   OUTPATIENT SPEECH LANGUAGE PATHOLOGY      PLAN OF TREATMENT FOR OUTPATIENT REHABILITATION   Patient's Last Name, First Name, Dipesh Summers Date of Birth:   "1985   Provider's Name   [facility]   Medical Record No.  [id number]     Onset Date: 01/11/24 (date of order) Start of Care Date: 02/12/24     Medical Diagnosis:  Diagnosis  R41.840 (ICD-10-CM) - Attention and concentration deficit  F43.20 (ICD-10-CM) - Adjustment disorder, unspecified type  R47.9 (ICD-10-CM) - Speech disorder  F80.9 (ICD-10-CM) - Speech and language disorder      SLP Treatment Diagnosis: mild dysarthria  Plan of Treatment  Frequency/Duration: x1/wk  / up to 60 days     Certification date from 02/12/24   To 04/07/24          See note for plan of treatment details and functional goals   Referring Provider:  Ramesh Carson    Initial Assessment  See Epic Evaluation- 02/12/24    Thank you for referring Dipesh \"Annalise\" Corey  to outpatient speech therapy at [facility].  Please call [phone] or email  Sonal Carr MS, CCC-SLP at [email or web address] with any questions or concerns.      Sonal Carr MS, CCC-SLP      Speech-Language Pathologist     "

## 2024-02-09 NOTE — TELEPHONE ENCOUNTER
Medication Appeal Initiation    Medication: RYTARY 48. MG PO CPCR  Appeal Start Date:  2/9/2024  Insurance Company: [employer]  Insurance Phone: [phone]  Insurance Fax: [phone]  Comments:  Started appeal via phone when I called in to see when the PA had been denied. This had been started by the patient, and denied due to lack of clinical information.  I was told that the only option was to appeal it. I asked for an urgent decision.              PRIOR AUTHORIZATION DENIED    Medication: RYTARY 48. MG PO CPCR  Insurance Company: [employer] - Phone [phone] Fax [phone]  Denial Date: 2/9/2024  Denial Reason(s): PA was started by patient on 01/18/2024 and denied on 01/31/2024, due to no clinical information given.  Our only option now is to appeal the case. Rep at [facility] is assisting with starting the appeal.  Appeal Information: Starting appeal via phone 02/09/2024  Patient Notified: No          PA Initiation    Medication: RYTARY 48. MG PO CPCR  Insurance Company: [employer] - Phone [phone] Fax [phone]  Pharmacy Filling the Rx: [id number] - [place], [place] - [address] [place] [address]  Filling Pharmacy Phone: [phone]  Filling Pharmacy Fax:    Start Date: 2/9/2024  Retail Pharmacy Prior Authorization Team   Phone: [phone]

## 2024-02-12 DIAGNOSIS — G20.A1 PARKINSON'S DISEASE, UNSPECIFIED WHETHER DYSKINESIA PRESENT, UNSPECIFIED WHETHER MANIFESTATIONS FLUCTUATE (H): Primary | ICD-10-CM

## 2024-02-12 RX ORDER — CARBIDOPA/LEVODOPA 25MG-250MG
TABLET ORAL
Qty: 150 TABLET | Refills: 11 | COMMUNITY
Start: 2024-02-12 | End: 2024-02-13

## 2024-02-12 NOTE — TELEPHONE ENCOUNTER
MEDICATION APPEAL APPROVED    Medication: RYTARY 48. MG PO CPCR  Authorization Effective Date: 1/1/2024  Authorization Expiration Date: 2/8/2025  Approved Dose/Quantity: 330/30 Days  Reference #:     Appeal Insurance Company: [employer]  Expected CoPay: $       CoPay Card Available:    Financial Assistance Needed: No  Filling Pharmacy: [id number] - [place], [address]  Patient Notified: Pharmacy will notify patient.    Comments:

## 2024-02-13 DIAGNOSIS — G20.A1 PARKINSON'S DISEASE, UNSPECIFIED WHETHER DYSKINESIA PRESENT, UNSPECIFIED WHETHER MANIFESTATIONS FLUCTUATE (H): ICD-10-CM

## 2024-02-13 RX ORDER — CARBIDOPA/LEVODOPA 25MG-250MG
TABLET ORAL
Qty: 150 TABLET | Refills: 11 | Status: SHIPPED | OUTPATIENT
Start: 2024-02-13 | End: 2024-02-13

## 2024-02-13 RX ORDER — CARBIDOPA/LEVODOPA 25MG-250MG
TABLET ORAL
Qty: 150 TABLET | Refills: 11 | Status: SHIPPED | OUTPATIENT
Start: 2024-02-13

## 2024-02-21 ENCOUNTER — VIRTUAL VISIT (OUTPATIENT)
Dept: PSYCHOLOGY | Facility: CLINIC | Age: 39
End: 2024-02-21
Payer: MEDICAID

## 2024-02-21 DIAGNOSIS — G20.A1 EARLY-ONSET PARKINSON'S DISEASE (H): ICD-10-CM

## 2024-02-21 DIAGNOSIS — Z96.89 S/P DEEP BRAIN STIMULATOR PLACEMENT: ICD-10-CM

## 2024-02-21 DIAGNOSIS — F41.1 GENERALIZED ANXIETY DISORDER: Primary | ICD-10-CM

## 2024-02-21 PROCEDURE — 90791 PSYCH DIAGNOSTIC EVALUATION: CPT | Mod: 95 | Performed by: PSYCHOLOGIST

## 2024-02-21 ASSESSMENT — ANXIETY QUESTIONNAIRES
GAD7 TOTAL SCORE: 9
2. NOT BEING ABLE TO STOP OR CONTROL WORRYING: SEVERAL DAYS
IF YOU CHECKED OFF ANY PROBLEMS ON THIS QUESTIONNAIRE, HOW DIFFICULT HAVE THESE PROBLEMS MADE IT FOR YOU TO DO YOUR WORK, TAKE CARE OF THINGS AT HOME, OR GET ALONG WITH OTHER PEOPLE: SOMEWHAT DIFFICULT
1. FEELING NERVOUS, ANXIOUS, OR ON EDGE: MORE THAN HALF THE DAYS
8. IF YOU CHECKED OFF ANY PROBLEMS, HOW DIFFICULT HAVE THESE MADE IT FOR YOU TO DO YOUR WORK, TAKE CARE OF THINGS AT HOME, OR GET ALONG WITH OTHER PEOPLE?: SOMEWHAT DIFFICULT
GAD7 TOTAL SCORE: 9
4. TROUBLE RELAXING: NEARLY EVERY DAY
3. WORRYING TOO MUCH ABOUT DIFFERENT THINGS: NEARLY EVERY DAY
7. FEELING AFRAID AS IF SOMETHING AWFUL MIGHT HAPPEN: NOT AT ALL
7. FEELING AFRAID AS IF SOMETHING AWFUL MIGHT HAPPEN: NOT AT ALL
5. BEING SO RESTLESS THAT IT IS HARD TO SIT STILL: NOT AT ALL
6. BECOMING EASILY ANNOYED OR IRRITABLE: NOT AT ALL

## 2024-02-21 ASSESSMENT — PATIENT HEALTH QUESTIONNAIRE - PHQ9: SUM OF ALL RESPONSES TO PHQ QUESTIONS 1-9: 9

## 2024-02-21 NOTE — PROGRESS NOTES
Health Psychology          Diana Link, Ph.D.,  [phone]  Amaris Peres, Ph.D.,  [phone]  Renay Gibbons, Ph.D.,  [phone]  Lisset Anderson, Ph.D., , ABP [phone]  Damian Jameson, Ph.D., , ABPP [phone]  Ekta Aguilar, Ph.D.,  [phone]  Ada Mon, Ph.D., , ABPP [phone]    [facility], 3rd Floor  [address] [place] [address]     STANDARD DIAGNOSTIC ASSESSMENT - UPDATE      Information obtained from patient's self-report during face-to-face interaction, completion of assessment measures, and review of available medical records.     Services Provided: No    Length of Session: 53 minutes  Start time: 1:01 PM  End time: 1:54 PM     Type of assessment: Standard    Referred by: Ramesh Carson MD, Neurology    Telemedicine Information:     Telemedicine Visit: The patient's condition can be safely assessed and treated via synchronous audio and visual telemedicine encounter.    Reason for Telemedicine Visit: Patient has requested telehealth visit and Patient convenience (e.g. access to timely appointments / distance to available provider)  Originating Site (Patient Location): Patient's home  Distant Location (provider location):  On-site: [facility]  Consent:  The patient/guardian has verbally consented to: the potential risks and benefits of telemedicine (video visit) versus in person care; bill my insurance or make self-payment for services provided; and responsibility for payment of non-covered services.   Mode of Communication:  Video Conference via Spacious  As the provider I attest to compliance with applicable laws and regulations related to telemedicine.    IDENTIFYING INFORMATION:  Dipesh Nicole is a 38 year old male adult who was referred to health psychology by neurologist Dr. Carson. Patient presented with concerns of anxiety in the context of Parkinson's Disease.    CURRENT  LIFE SITUATION:  Patient currently resides in a house with his parents.   Patient is currently single.   Patient has 0 children.     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    Educational history includes Bachelor's degree in management.    Primary source(s) of social support is parents, brother, and friends and patient rates the quality of social support as good to excellent.   Patient identifies the following strengths and resources: caring, empathetic, motivated, open to learning, supportive, wants to learn, willing to ask questions, and loyalty, also has stable housing and reliable transportation.   Patient identified the following current stressors/contextual factors that contribute to his presenting concern: job/vocational stress, career-related stress, financial stress, life transition stress, health issues - early onset Parkinson's.   Belief system is Rastafarian  Patient rates general physical health as very good  Relevant cultural influences on treatment and patient's relationship to cultural heritage: Patient identifies as male, heterosexual, White American, and does feel connected to his cultural heritage/community.     Current medications:   Current Outpatient Medications   Medication    carbidopa-levodopa (RYTARY) 48. MG CPCR per CR capsule    carbidopa-levodopa (SINEMET)  MG tablet     No current facility-administered medications for this visit.          REASON FOR ASSESSMENT:    Patient was seen previously for a consultation visit. History obtained at previous visit includes the following:   Patient with a PMH significant for Parkinson's Disease and anxiety. He was originally referred to health psychology in 2022 by Ramesh Carson MD in Neurology for assistance with anxiety. Patient was seen for a health psychology intervention to treat anxiety, which was impacting his PD symptom severity prior to DBS surgery. Patient concluded treatment with me  "after his DBS surgery and is returning now for a new course of treatment due to increased anxiety and difficulty with procrastination. See below for additional details about symptom endorsements.      Patient's perception of his concern:  Patient states that he is currently struggling with difficulty completing tasks and having trouble with follow-through due to stress. He notes that he is procrastinating a lot on completing tasks and projects, which is further amplifying his anxiety. He states that he would like to complete another course of treatment to learn strategies for improving his follow-through and improving his ability to focus and complete tasks.      PSYCHIATRIC TREATMENT HISTORY:    Ever had mental health treatment in the past? Yes, course of psychotherapy with me in 2022  Previous medication trials? No  Currently taking meds? No: previously had PRN Rx for lorazepam, has not taken since DBS surgery in 2022  Willing to consider psychiatric medication consultation? Yes  Ever seen by someone in psychiatry at [facility]? No  Currently being seen by a mental health provider? No  Release of information for records? No    DEVELOPMENTAL INCIDENTS: Patient reports normative development and denies experiencing any major developmental incidents.     MALTREATMENT OR ABUSE: Patient denies history of maltreatment, abuse, neglect or exploitation.     HISTORY OF CHEMICAL USE AND DEPENDENCY:     2/21/2024  10:15 AM   [employer] Flowsheet    Have you ever felt you should Cut down on your drinking or drug use? 0    Have people Annoyed you by criticizing your drinking or drug use? 0    Have you ever felt bad or Guilty about your drinking or drug use? 0    Have you ever had a drink or used drugs first thing in the morning to steady your nerves or to get rid of a hangover? (Eye opener) 0        [employer] reprinted with permission from the [place] Medical Journal, ADRIÁN Marc. and LAUREL Motta, \"Conjoint screening questionnaires for " "alcohol and drug abuse\" [place] [employer] Journal 94: 135-140, 1995.     Patient currently reports consuming 4-5 alcoholic drinks in one night once per week and the following addiction symptoms: none. However, patient's pattern of alcohol use is consistent with binge drinking. Patient currently reports using marijuana 1-2x every-other week to help with getting to sleep and the following addiction symptoms: does not report addiction symptoms related to cannabis use, previously used daily but has cut back. Patient currently denies problematic use of prescription medications and the following addiction symptoms: none.  Patient currently denies using nicotine.      Patient denies a history of problematic alcohol use/dependence. Patient denies a history of withdrawal symptoms. Reports that he did have a seizure at age 22 after a day of drinking and using energy drinks and being dehydrated. Has not occurred since that time. Patient denies a history of problematic drug use/dependence. He does endorse a history of recreational cocaine use. States that his last use was 2 years ago. Patient denies a history of chemical dependency treatments.    HEALTH HISTORY AND FAMILY HEALTH HISTORY/MEDICAL CONCERNS: None. Patient's current medical concerns are indicated below.     Patient Active Problem List   Diagnosis    Tremor    Seizure (H) at age 22 yrs    H/O magnetic resonance imaging of cervical spine    H/O shoulder surgery    H/O magnetic resonance imaging of brain and brain stem    Shoulder dislocation    Parkinson disease    Right inguinal hernia    Hiatal hernia    Cyclical vomiting syndrome unrelated to migraine    Esophagitis endoscopy done 11/2021    S/P deep brain stimulator placement    Parkinson's disease        CULTURAL INFLUENCES AND IMPACT: Patient identified the following cultural influences and impact on his concerns: pressure associated with being in sports     MENTAL STATUS EXAM:  Appearance: Appropriate   Eye " Contact: Good    Orientation: Yes, x4  Behavior/relationship to provider/demeanor: Cooperative, Engaged and Pleasant  Motor Activity: restless  Mood (subjective report): Anxious  Affect (objective appearance): Appropriate  Speech Rate: Normal  Speech Volume: Normal  Speech Articulation: Normal  Speech Coherence: Normal  Speech Spontaneity: Normal  Thought Content: no suicidal/homicidal ideation, plans, or intent  Thought Process (associations): Logical, Linear, Goal directed and Perseverative  Thought Process (rate): Normal  Abnormal Perception: None  Attention/Concentration: Normal  Memory: Appears grossly intact  Fund of Knowledge: Appears within normal limits   Abstraction:  Normal  Insight: Adequate  Judgment:  good    ASSESSMENT OF NEEDS: Emotional or mental health: psychotherapy to address anxiety in the context of early onset PD    CURRENT SYMPTOMS AND HISTORY:    PTSD SCREEN  Patient denies history of trauma.    BIPOLAR SCREEN  Patient endorses the following symptoms of bipolar disorder: No history of manic/hypomanic episodes    DEPRESSION SCREEN    PATIENT HEALTH QUESTIONNAIRE-9 (PHQ - 9)   2/21/2024  10:15 AM   PHQ-9 ([employer])    1. Little interest or pleasure in doing things 1    2. Feeling down, depressed, or hopeless 0    3. Trouble falling or staying asleep, or sleeping too much 2    4. Feeling tired or having little energy 2    5. Poor appetite or overeating 1    6. Feeling bad about yourself - or that you are a failure or have let yourself or your family down 0    7. Trouble concentrating on things, such as reading the newspaper or watching television 2    8. Moving or speaking so slowly that other people could have noticed. Or the opposite - being so fidgety or restless that you have been moving around a lot more than usual 1    9. Thoughts that you would be better off dead, or of hurting yourself in some way 0    PHQ-9 Total Score 9    If you checked off any problems, how difficult have these  problems made it for you to do your work, take care of things at home, or get along with other people? Very difficult      Onset of depressive symptoms occurred 6-7 years ago and was associated with onset of Parkinson's symptoms. Patient endorses a history of multiple major depressive episodes. Meets criteria for recurrent major depressive disorder, currently in remission.     ANXIETY DISORDER SCREEN    Generalized Anxiety Disorder 7-item (ANAYA-7)   2/21/2024  9:58 AM   ANAYA-7  [employer], 2002; Used with Permission)    1. Feeling nervous, anxious, or on edge 2    2. Not being able to stop or control worrying 1    3. Worrying too much about different things 3    4. Trouble relaxing 3    5. Being so restless that it is hard to sit still 0    6. Becoming easily annoyed or irritable 0    7. Feeling afraid, as if something awful might happen 0    ANAYA-7 Total Score 9      Patient currently endorses the following anxiety symptoms: anxiety/worry about several different things, excessive worry/preoccupation, restlessness, feeling keyed-up and on edge, physical tension, difficulty concentrating, and sleep disturbance, feeling tired/easily fatigued. No symptoms consistent with panic disorder, obsessive compulsive disorder, social anxiety disorder, and specific phobias. Patient meets criteria for generalized anxiety disorder.     OTHER MENTAL HEALTH CONCERNS SCREEN    PSYCHOSIS SCREEN: Patient currently endorses the following psychotic symptoms: None  PERSONALITY TRAITS: Patient endorsed and/or exhibits the following personality traits: Deferred.       SAFETY SCREEN    Suicide & Self-Harm Assessment:    In the past month, have you wished you were dead or wished you could go to sleep and not wake up? No  In the past month, have you actually had any thoughts of killing yourself? No  Have you ever done anything, started to do anything, or prepared to do anything to end your life? No    History of psychiatric hospitalizations  No  Any family/friends/loved ones die by suicide: No  Access to guns/weapons? Yes: hunting rifles, stored in a gun safe, unloaded, ammunition kept separate  Safety Plan? No    Violence/Homicide Risk Assessment:    History of problems with anger management: No  History of violence: No  History of significant damage to property: No  History of threats made to harm or kill someone: No  History of legal involvement due to violence/aggression: No  Safety Plan? No    Patient denies current or recent homicidal ideation or behaviors.    Patient denies thoughts to engage in self-injurious behavior without suicidal intent.   Patient denies current or recent homicidal ideation or behaviors.      GENERAL FUNCTIONIN2024  9:58 AM   PROMIS 10    In general, would you say your health is: Good    In general, would you say your quality of life is: Good    In general, how would you rate your physical health? Good    In general, how would you rate your mental health, including your mood and your ability to think? Good    In general, how would you rate your satisfaction with your social activities and relationships? Good    In general, please rate how well you carry out your usual social activities and roles Good    To what extent are you able to carry out your everyday physical activities such as walking, climbing stairs, carrying groceries, or moving a chair? Mostly    In the past 7 days, how often have you been bothered by emotional problems such as feeling anxious, depressed, or irritable? Often    In the past 7 days, how would you rate your fatigue on average? Mild    In the past 7 days, how would you rate your pain on average, where 0 means no pain, and 10 means worst imaginable pain? 0    In general, would you say your health is: 3    In general, would you say your quality of life is: 3    In general, how would you rate your physical health? 3    In general, how would you rate your mental health, including your mood  "and your ability to think? 3    In general, how would you rate your satisfaction with your social activities and relationships? 3    In general, please rate how well you carry out your usual social activities and roles. (This includes activities at home, at work and in your community, and responsibilities as a parent, child, spouse, employee, friend, etc.) 3    To what extent are you able to carry out your everyday physical activities such as walking, climbing stairs, carrying groceries, or moving a chair? 4    In the past 7 days, how often have you been bothered by emotional problems such as feeling anxious, depressed, or irritable? 4    In the past 7 days, how would you rate your fatigue on average? 2    In the past 7 days, how would you rate your pain on average, where 0 means no pain, and 10 means worst imaginable pain? 0    Global Mental Health Score 11    Global Physical Health Score 16    PROMIS TOTAL - SUBSCORES 27        [id number] PROMIS HEALTH ORGANIZATION AND PROMIS COOPERATIVE GROUP VERSION 1.1     HEALTH RELATED CONCERNS TO BE ADDRESSED: Parkinson's Disease    LABS: Have basic labs been completed within the last year? Yes, generally WNL. Have labs been completed to check for Vitamin-D and TSH levels? Yes. TSH WNL, vitamin D not checked. Is patient taking supplement? Unknown      CLINICAL SUMMARY, CONCLUSIONS, AND RECOMMENDATIONS:   Dipesh \"Annalise\" STEPHANIE Nicole is a 38 year old male who was referred to health psychology by neurologist, Dr. Carson for assistance with anxiety in the context of Parkinson's Disease. The patient completed the assessment independently. Based on the patient's report, his symptoms are likely explained by a diagnosis of generalized anxiety disorder. He also endorses symptoms consistent with recurrent major depressive disorder, which appears to be in remission currently. He also inquires about whether he may meet criteria for ADHD and whether a stimulant may be a treatment worth pursuing. " He was advised that his anxiety should be treated prior to pursuing an ADHD evaluation, as his symptoms are not currently well-controlled and likely explain several of the symptoms he describes of ADHD (e.g., restlessness, difficulty concentrating). He was also educated about the possible exacerbating effects of stimulants on anxiety and was advised that this should be discussed with a prescribing provider if he is eventually diagnosed with ADHD. Patient was referred for further evaluation of ADHD and was agreeable to waiting until his anxiety is well-treated before pursuing additional evaluation. The patient's mental health concerns have been affecting his overall quality of life. The patient reports periodic cannabis use to get to sleep as well as a pattern of alcohol consumption that is consistent with binge drinking. Patient also has a history of recreational cocaine use. Ongoing assessment is warranted to monitor for the presence of chemical dependency or substance use concerns. He currently does not endorse symptoms consistent with addiction or chemical dependency. The patient is experiencing moderate psychosocial stress. Additional stressors include: job/vocational stress, career-related stress, financial stress, life transition stress, health issues - early onset Parkinson's. He denies safety concerns. Without additional treatment, the patient's symptoms are likely to persist and continue to affect his quality of life. Based on the patient's reported symptoms and impact on functioning, the recommendation for the patient is to complete a course of individual psychotherapy to treat anxiety in the context of Parkinson's Disease. Patient expressed a preference to continue individual psychotherapy with me.    PROVISIONAL CLINICAL HYPOTHESIS (Diagnostic Impressions):    1. Generalized anxiety disorder    2. Early-onset Parkinson's disease    3. S/P deep brain stimulator placement             TREATMENT PLAN: will  generate with patient at next visit     PLAN:     Start a trial of individual psychotherapy.  Patient to return for a follow-up visit on 9:00am on 3/5/24.  Primary care provider is Joey Morley and referring provider is Ramesh Carson MD.   Patient to contact [place] [phone] or other community resources if there was a psychiatric emergency.  Next visit agenda:   Complete treatment plan  Review educational materials about practical strategies for overcoming procrastination         Ada Mon, Ph.D., , [facility]  Clinical Health Psychologist  Phone: [phone]   Pager: [phone]  2/21/2024 10:01 AM

## 2024-02-22 ENCOUNTER — THERAPY VISIT (OUTPATIENT)
Dept: SPEECH THERAPY | Facility: CLINIC | Age: 39
End: 2024-02-22
Payer: MEDICAID

## 2024-02-22 DIAGNOSIS — R41.840 ATTENTION AND CONCENTRATION DEFICIT: Primary | ICD-10-CM

## 2024-02-22 DIAGNOSIS — F43.20 ADJUSTMENT DISORDER, UNSPECIFIED TYPE: ICD-10-CM

## 2024-02-22 DIAGNOSIS — F80.9 SPEECH AND LANGUAGE DISORDER: ICD-10-CM

## 2024-02-22 DIAGNOSIS — R47.9 SPEECH DISORDER: ICD-10-CM

## 2024-02-22 PROCEDURE — 92507 TX SP LANG VOICE COMM INDIV: CPT | Mod: GN

## 2024-02-28 ENCOUNTER — THERAPY VISIT (OUTPATIENT)
Dept: SPEECH THERAPY | Facility: CLINIC | Age: 39
End: 2024-02-28
Payer: MEDICAID

## 2024-02-28 DIAGNOSIS — R47.9 SPEECH DISORDER: ICD-10-CM

## 2024-02-28 DIAGNOSIS — F43.20 ADJUSTMENT DISORDER, UNSPECIFIED TYPE: ICD-10-CM

## 2024-02-28 DIAGNOSIS — F80.9 SPEECH AND LANGUAGE DISORDER: ICD-10-CM

## 2024-02-28 DIAGNOSIS — R41.840 ATTENTION AND CONCENTRATION DEFICIT: Primary | ICD-10-CM

## 2024-02-28 PROCEDURE — 92507 TX SP LANG VOICE COMM INDIV: CPT | Mod: GN

## 2024-03-06 ENCOUNTER — THERAPY VISIT (OUTPATIENT)
Dept: SPEECH THERAPY | Facility: CLINIC | Age: 39
End: 2024-03-06
Payer: MEDICAID

## 2024-03-06 DIAGNOSIS — R47.9 SPEECH DISORDER: ICD-10-CM

## 2024-03-06 DIAGNOSIS — F80.9 SPEECH AND LANGUAGE DISORDER: ICD-10-CM

## 2024-03-06 DIAGNOSIS — F43.20 ADJUSTMENT DISORDER, UNSPECIFIED TYPE: ICD-10-CM

## 2024-03-06 DIAGNOSIS — R41.840 ATTENTION AND CONCENTRATION DEFICIT: Primary | ICD-10-CM

## 2024-03-06 PROCEDURE — 92507 TX SP LANG VOICE COMM INDIV: CPT | Mod: GN

## 2024-03-18 NOTE — TELEPHONE ENCOUNTER
Called [facility] and spoke to Lisset. Lisset reported Annalise picked up his prescription last on June 22nd with a quantity of 270 tablets (30 day supply if taking 9 tablets a day). Lisset confirmed they did get the updated prescription send on 6/9.    Annalise reported he is out of carbidopa/levodopa . He reports because of his schedule he has been taking more since he is staying up until Midnight some nights. He reports he gets stiffness and cramping if he does not take medication every 2 hours.    He takes 2 tablets at 6/6:30 AM, 2 tablets at 8/8:30 AM then 1 tablet every 2 hours until he goes to bed (about 12 a day) and the pharmacy had not dispensed enough tablets causing him to be short.  He reported the pharmacy needs authorization to dispense a 2 day supply early until the next refill goes through his insurance.       From: Nichelle Raza  To: Dr. Pauly Bond  Sent: 3/15/2024 4:09 PM EDT  Subject: Mouth Sores    Can we please get a mouth rinse called in again for my mom’s irritation in her mouth. You have called this in before thanks

## 2024-03-26 ENCOUNTER — MYC MEDICAL ADVICE (OUTPATIENT)
Dept: NEUROLOGY | Facility: CLINIC | Age: 39
End: 2024-03-26
Payer: MEDICAID

## 2024-03-27 ENCOUNTER — THERAPY VISIT (OUTPATIENT)
Dept: SPEECH THERAPY | Facility: CLINIC | Age: 39
End: 2024-03-27
Payer: MEDICAID

## 2024-03-27 DIAGNOSIS — R47.9 SPEECH DISORDER: Primary | ICD-10-CM

## 2024-03-27 DIAGNOSIS — F80.9 SPEECH AND LANGUAGE DISORDER: ICD-10-CM

## 2024-03-27 DIAGNOSIS — R41.840 ATTENTION AND CONCENTRATION DEFICIT: ICD-10-CM

## 2024-03-27 DIAGNOSIS — F43.20 ADJUSTMENT DISORDER, UNSPECIFIED TYPE: ICD-10-CM

## 2024-03-27 PROCEDURE — 92507 TX SP LANG VOICE COMM INDIV: CPT | Mod: GN

## 2024-03-27 NOTE — PROGRESS NOTES
[facility]        OUTPATIENT SPEECH LANGUAGE PATHOLOGY PROGRESS/DISCHARGE NOTE     Beginning/End Dates of Progress Note Reporting Period:    1/11/2024  to 03/27/2024 03/27/24 0500   Appointment Info   Treating Provider Chantale Carr MS, CCC-SLP; FARTUN Cottrell - SLP student   Visits Used 5   Medical Diagnosis Diagnosis  R41.840 (ICD-10-CM) - Attention and concentration deficit  F43.20 (ICD-10-CM) - Adjustment disorder, unspecified type  R47.9 (ICD-10-CM) - Speech disorder  F80.9 (ICD-10-CM) - Speech and language disorder   SLP Tx Diagnosis mild dysarthria   Quick Adds Certification;Student Supervision   Progress Note/Certification   Start Of Care Date 02/12/24   Onset Of Illness/injury Or Date Of Surgery 01/11/24   Therapy Frequency x1/wk   Predicted Duration up to 60 days   Certification date from 02/12/24   Certification date to 04/07/24   Progress Note Due Date 04/07/24   Supervision   Student Supervision Therapy services provided with the co-signing licensed therapist guiding and directing the services, and providing the skilled judgement and assessment throughout the session   Subjective Report   Subjective Report Annalise arrived on time to session. Endorsed feeling like techniques are benefitting speech in daily life.   SLP Goals   SLP Goals 1;2   SLP Goal 1   Goal Identifier Speech   Goal Description Patient will verbalize and utilize motor speech strategies (especially relating to pace, naturalness, and articulatory precision) with 90% accuracy and no cues at the multi-syllabic words, sentence, and conversation levels to promote increased articulatory accuracy for function communication   Rationale To maximize the ability to communicate wants and needs within the home or community   Goal Progress GOAL MET. Across the last three sessions targeting speech, pt average intelligibility was 93% given no-min cues. Skilled intervention addressed a variety of tasks w/  training in compensatory techniques for dysarthric speech including exaggerated/over-articulation, pausal breathing, slow pace, and diaphragmatic breathing. Tasks included scrambled sentences, passage reading, conversational tasks, tongue twisters, word emphasis, and auditory feedback.   Target Date 04/07/24   Date Met 03/27/24   SLP Goal 2   Goal Identifier Breath/Voice   Goal Description Patient will increase vocal loudness to an average SPL of >75 dB at 30 cm during sustained /a/ phonation of at least 15 seconds given min cues in order to increase vocal respiratory support required for functional communication.   Rationale To maximize the ability to communicate wants and needs within the home or community   Goal Progress GOAL MET. Across the last session targeting breath, pt average  ah  duration was 16.0 seconds average volume was 75 dB given no-min cues. Further maintained dB goal of >75 db across paragraph reading and conversational tasks. Skilled intervention addressed training in compensatory breath support techniques including diaphragmatic breathing and upright posture.   Target Date 04/07/24   Date Met 03/27/24   Treatment Interventions (SLP)   Treatment Interventions Treatment Speech/Lang/Voice   Treatment Speech/Lang/Voice   Treatment of Speech, Language, Voice Communication&/or Auditory Processing (75103) 30 Minutes   Speech/Lang/Voice 1 Speech/voice   Speech/Lang/Voice 1 - Details Skilled intervention addressed dysarthric speech to improve pt's speech intelligibiilty. Reviewed skilled training in use of over articulation and pacing/ breathing strategies during spontaneous and conversational speech . Pt demonstrated implementation of pacing/breathing strategy. Benefitted from auditory feedback via auditory recording to aid in pt's perception of rate/articulation. Pt implemented strategies during baseball passage reading: utilized strategies in 100% of opportunities given no-min cues. Voice SPL during  "reading task= 79 dB. Reading pace significantly slower than previous sessions demonstrating carry-over of techniques. Conversation from discussion prompts.would you rather while card sorting: pt utilized strategies in 95% of opportunities given no-min cues.   Skilled Intervention Provided written and verbal information on.;Modeled compensatory strategies;Provided feedback on performance of tasks   Patient Response/Progress cues for slowing down relating to pacing strategies. Alert and cooperative. progress made regarding carry-over of use of strategies across variety of situations.   Education   Learner/Method Patient;Listening;Demonstration;Reading   Plan   Home program MS/voice: daily x5 'ah', cont. to implement dysarthria compensatory strategies   Plan for next session Discharge   Total Session Time   Total Treatment Time (sum of timed and untimed services) 30       Annalise has made significant progress this reporting period, as described above. No further intervention is required at this time as patient has demonstrated adequate understanding and mastery of dysarthric speech/voice compensatory techniques.      Annalise is a 38-year-old male being seen for outpatient speech-language intervention. Annalise is seen at a frequency x1/week. He attended five sessions during this reporting period. He presented with good participation during sessions, and excellent participation in HEP.     DISCHARGE  Reason for Discharge: Patient has met all goals.    Equipment Issued: n/a    Discharge Plan: Patient to continue home program.  Daily 'ah's, use of dysarthric reduction/speech intelligibility strategies across variety of settings    Referring Provider:  Ramesh Carson     Thank you for referring Dipesh \"Annalise\" Corey  to outpatient speech therapy at [facility].  Please call [phone] or email  Sonal Carr MS, CCC-SLP at [email or web address] with any questions or concerns.       "   Dominique Ron, BA - SLP student  Therapy services provided with the co-signing licensed therapist guiding and directing the services, and providing the skilled judgement and assessment throughout the session    Chantale Carr MS, CCC-SLP    Speech-Language Pathologist

## 2024-03-28 ENCOUNTER — TELEPHONE (OUTPATIENT)
Dept: NEUROLOGY | Facility: CLINIC | Age: 39
End: 2024-03-28
Payer: MEDICAID

## 2024-03-28 NOTE — LETTER
2024    [facility]  Attn:   [address]  [place] [address]    Re: Dipesh Nicole  : [birth year]  [address]  [place] [address]        To whom this may concern:     Our patient, Dipesh Nicole, has young-onset Parkinson's disease, diagnosed in 2018 at age 32. Given his young age, he developed motor fluctuations very early on and had to undergo deep brain stimulation surgery to improve his symptoms and quality of life. Additionally, his medications have been optimized and his symptoms are currently very well controlled on Rytary, a mixture of long-acting and short-acting carbidopa-levodopa. He has been able to wean off of several other medications including rotigotine patch and amantadine and now he is only taking Rytary to manage his Parkinson's disease symptoms.      When Mr. Nicole took immediate-release carbidopa-levodopa tablets in the past, he experienced severe wearing-off which resulted in abdominal dystonia and severe vomiting. He was in the emergency department several times due to these effects of carbidopa-levodopa. He has not had any of these dystonic issues with Rytary, so it is imperative that he continue on Rytary. He has been on Rytary continuously for over 2 years now. Rytary is medically necessary in his case to control his symptoms and optimize his quality of life. If you have any questions or would like to discuss further, you may call our office at [phone].     Sincerely,       Ramesh Carson MD   Movement Disorders Neurologist  [facility]     Veronica Swift, Pharm.D.  Medication Therapy Management Pharmacist  [facility]

## 2024-03-28 NOTE — TELEPHONE ENCOUNTER
Prior Authorization Retail Medication Request    Medication/Dose: Rytary  Diagnosis and ICD code (if different than what is on RX):  G20  New/renewal/insurance change PA/secondary ins. PA: renewal - new insurance   Previously Tried and Failed:  carbidopa-levodopa IR tablets, amantadine, Neupro   Rationale:  patient has been stable on Rytary for several years and it would be detrimental to his health to change medications     Insurance   Primary: [place]  Insurance ID:  [id number]     Secondary (if applicable):  Insurance ID:      Pharmacy Information (if different than what is on RX)  Name:  [facility]  P: [phone]  F: [phone]

## 2024-03-28 NOTE — TELEPHONE ENCOUNTER
Retail Pharmacy Prior Authorization Team   Phone: [phone]    PA Initiation    Medication: Rytary - DENIED  Insurance Company: [place] ([facility] [place] [facility] - Phone [phone] Fax [phone]  Pharmacy Filling the Rx: [id number] - [address]  Filling Pharmacy Phone: [phone]  Filling Pharmacy Fax: [phone]  Start Date: 3/29/2024

## 2024-03-29 NOTE — TELEPHONE ENCOUNTER
PRIOR AUTHORIZATION DENIED    Medication: Rytary - DENIED    Denial Date: 3/29/2024    Denial Rational: INSURANCE STATES PATIENT DID NOT MEET COVERAGE CRITERIA -        Appeal Information:  IF YOU WOULD LIKE TO APPEAL PLEASE SUPPLY PA TEAM WITH A LETTER OF MEDICAL NECESSITY WITH CLINICAL REASON.

## 2024-03-29 NOTE — TELEPHONE ENCOUNTER
Appeal letter saved to this encounter.    Pharmacy claims data for last 90 days (showing he has been taking Rytary) need to be included in the appeal information.     Veronica Swift, Pharm.D.  Medication Therapy Management Pharmacist  [facility]

## 2024-03-29 NOTE — TELEPHONE ENCOUNTER
PA Initiation    Medication: Rytary - DENIED  Insurance Company: [place] ([facility] - Phone [phone] Fax [phone]  Pharmacy Filling the Rx: [id number] - [address]  Filling Pharmacy Phone: [phone]  Filling Pharmacy Fax: [phone]  Start Date: 3/29/2024    APPEAL LETTER SUBMITTED.

## 2024-04-02 NOTE — TELEPHONE ENCOUNTER
Resubmitted with screen shots from pharmacy showing paid claims for the last 3 months.  Also sent chart notes and letter.

## 2024-04-04 NOTE — TELEPHONE ENCOUNTER
Prior Authorization Approval    Authorization Effective Date: 3/28/2024  Authorization Expiration Date: 6/25/2024  Medication: Rytary - APPROVED  Approved Dose/Quantity:    Reference #:     Insurance Company: [place] Medicaid ([facility]) - Phone [phone] Fax [phone]  Expected CoPay:       CoPay Card Available:      Foundation Assistance Needed:    Which Pharmacy is filling the prescription (Not needed for infusion/clinic administered): [id number] - [place], [place] - [address] [place] [address]  Pharmacy Notified:  YES  Patient Notified:  YES

## 2024-04-24 ENCOUNTER — VIRTUAL VISIT (OUTPATIENT)
Dept: PSYCHOLOGY | Facility: CLINIC | Age: 39
End: 2024-04-24
Payer: MEDICAID

## 2024-04-24 DIAGNOSIS — G20.A1 EARLY-ONSET PARKINSON'S DISEASE (H): ICD-10-CM

## 2024-04-24 DIAGNOSIS — F40.10 SOCIAL ANXIETY DISORDER: ICD-10-CM

## 2024-04-24 DIAGNOSIS — F41.1 GENERALIZED ANXIETY DISORDER: Primary | ICD-10-CM

## 2024-04-24 DIAGNOSIS — Z96.89 S/P DEEP BRAIN STIMULATOR PLACEMENT: ICD-10-CM

## 2024-04-24 PROCEDURE — 90834 PSYTX W PT 45 MINUTES: CPT | Mod: 95 | Performed by: PSYCHOLOGIST

## 2024-04-24 NOTE — PROGRESS NOTES
"    Health Psychology          Diana Link, Ph.D.,  [phone]  Amaris Peres, Ph.D.,  [phone]  Renay Gibbons, Ph.D.,  [phone]  Lisset Anderson, Ph.D., , [facility] [phone]  Damian Jameson, Ph.D., , [facility] [phone]  Ekta Aguilar, Ph.D.,  [phone]  Ada Mon, Ph.D., , [facility] [phone]    [facility], 3rd Floor  [address] [place] [address]       Health Psychology Progress Note    Patient Name: Dipesh \"Annalise\" STEPHANIE Nicole    Service Type: Individual  Length of Visit: 48 minutes  Start time: 9:01 AM   Stop time: 9:49 AM    Attendees: patient attended alone  Session #: 3 (new episode of [facility])    Telemedicine Information:     Telemedicine Visit: The patient's condition can be safely assessed and treated via synchronous audio and visual telemedicine encounter.    Reason for Telemedicine Visit: Patient has requested telehealth visit and Patient convenience (e.g. access to timely appointments / distance to available provider)  Originating Site (Patient Location): Patient's home  Distant Location (provider location):  On-site: [facility]  Consent:  The patient/guardian has verbally consented to: the potential risks and benefits of telemedicine (video visit) versus in person care; bill my insurance or make self-payment for services provided; and responsibility for payment of non-covered services.   Mode of Communication:  Video Conference via [employer]  As the provider I attest to compliance with applicable laws and regulations related to telemedicine.      Identifying Information and Presenting Problem:  The patient is a 38 year old adult who is being seen for problematic symptoms of anxiety in the context of early onset PD.    Topics Discussed/Interventions Provided:      Session Content:     Update: Patient reports doing generally well. He reports that he is more aware of his anxiety and when his anxiety is worse, " "his PD symptoms worsen, particularly stiffness, rigidity, balance, and speech fluidity and coherence. Reports that anxiety worsens throughout the day and is particularly noticeable in social situations. He notes that his PD symptoms virtually disappear when he is not feeling anxious. Completed additional assessment and patient does meet criteria for social anxiety disorder in addition to generalized anxiety disorder.      Treatment Planning: Completed treatment plan See below.                                               Individual Treatment Plan    Patient's Name: Dipesh Nicole  YOB: 1985    Date of Creation: 04/24/24  Date Treatment Plan Last Reviewed/Revised: 04/24/24    DSM5 Diagnoses:     1. Generalized anxiety disorder    2. Social anxiety disorder      Psychosocial / Contextual Factors: job/vocational stress, career-related stress, financial stress, life transition stress, health issues - early onset Parkinson's.   PROMIS (reviewed every 90 days):      2/21/2024  9:58 AM   PROMIS 10    PROMIS TOTAL - SUBSCORES 27          Referral / Collaboration:  Collaboration with medical team and other specialists as needed.    Anticipated number of session for this episode of care:  10-15  Anticipation frequency of session:  2x per month  Anticipated Duration of each session: 38-52 minutes  Treatment plan will be reviewed in 90 days or when goals have been changed.       Measurable Treatment Goal(s) related to diagnosis / functional impairment(s)  Goal 1: Patient will experience an improvement in his ability to manage his anxiety, particularly in social situations   \"I will know I've met my goal when I feel more confident going out in public and being around people.\"     Objective #A (Patient Action)    Patient will learn and implement cognitive skills for challenging thinking patterns that amplify anxiety.  Status: New - Date: 04/24/24      Intervention(s)  Therapist will teach defusion and cognitive " reappraisal.    Objective #B  Patient will learn and implement behavioral skills for increasing socialization.  Status:  New - Date: 04/24/24      Intervention(s)  Therapist will teach committed action and exposure skills.    Objective #C  Patient will learn and implement physiological arousal reduction skills.  Status:  New - Date: 04/24/24      Intervention(s)  Therapist will teach mindfulness and relaxation skills.    Objective #D  Patient will increase the frequency of engaging in social activities from once every 2 weeks to at least 1-2x per week.  Status:  New - Date: 04/24/24      Intervention(s)  Therapist will teach exposure and committed action skills    Objective #E  Patient will decrease the frequency of procrastination and avoidance-based coping and will increase the frequency of using approach-based coping.  Status:  New - Date: 04/24/24      Intervention(s)  Therapist will teach mindfulness and relaxation skills.      Patient has reviewed and agreed to the above plan.      Ada Mon, PhD  April 24, 2024     Skills/intervention: Provided psychoeducation about CBT model and introduced CBT principle of cognitive reappraisal. Educated patient about cognitive distortions and provided instructions on how to complete a cognitive awareness training exercise prior to our next visit.        Treatment Objective(s) Addressed in This Session:  Anxiety: will develop more effective coping skills to manage anxiety symptoms and will increase ability to function adaptively  Attention Problems: will develop coping skills to effectively manage attention issues  Psychological distress related to Parkinson's    Progress on / Status of Treatment Objective(s) / Homework:  Satisfactory progress   Stable     Medication Adherence: Patient did not report medication changes. Current medication list is below:     Current Outpatient Medications   Medication Sig Dispense Refill    carbidopa-levodopa (RYTARY) 48.  MG CPCR per CR capsule 2 @5AM, 2@ 9AM, 3@1pm, 2@5PM AND 2@ 9PM = 11 capsules/day 330 capsule 11    carbidopa-levodopa (SINEMET)  MG tablet 1 tab 5/day 150 tablet 11     No current facility-administered medications for this visit.       Assessment: The patient appeared to be active and engaged in today's session and was receptive to feedback.     Mental Status Exam:   Appearance: Appropriate   Eye Contact: Good    Orientation: Yes, x4  Behavior/relationship to provider/demeanor: Cooperative, Engaged, and Pleasant  Motor Activity: restless  Mood (subjective report): Anxious  Affect (objective appearance): Appropriate/mood congruent, anxious  Speech Rate: Rapid  Speech Volume: Normal  Speech Articulation: Normal  Speech Coherence: Normal  Speech Spontaneity: Normal  Thought Content: no suicidal/homicidal ideation, plans, or intent  Thought Process (associations): Logical and Linear  Thought Process (rate): Rapid  Abnormal Perception: None  Attention/Concentration: Normal  Memory: Appears grossly intact  Fund of Knowledge: Appears within normal limits  Abstraction:  Normal  Insight:  Adequate  Judgment: Adequate    Does the patient appear to be at imminent risk of harm to self/others at this time? No    The session was necessary to address problematic symptoms of anxiety in the context of early onset PD that have been interfering with patient's ability to focus, concentrate, and complete tasks.  Ongoing psychotherapy is necessary to improve functioning with daily activities, provide psychoeducation, provide support, and teach cognitive and behavioral skills.    Diagnoses:    1. Generalized anxiety disorder    2. Social anxiety disorder    3. Early-onset Parkinson's disease (H)    4. S/P deep brain stimulator placement        Plan:    Follow-up video visit with me on 5/15/24 at 11:00am  Patient to use 911 or other crisis resources in the event of a psychiatric emergency  Primary care needs and medications are  managed by Joey Morley. Referring provider is Referred Self  Next visit agenda/goals:   Read cognitive model handout  Tally cognitive distortions  Continue using [employer] sarah to practice meditation      NOTE: Treatment plan update due 4/24/25; 90 day treatment plan review due 7/23/24    Ada Mon, Ph.D., , ABPP  Clinical Health Psychologist  Phone: [phone]   Pager: [phone]

## 2024-05-03 ENCOUNTER — OFFICE VISIT (OUTPATIENT)
Dept: NEUROLOGY | Facility: CLINIC | Age: 39
End: 2024-05-03
Payer: COMMERCIAL

## 2024-05-03 VITALS
BODY MASS INDEX: 25.73 KG/M2 | DIASTOLIC BLOOD PRESSURE: 78 MMHG | HEART RATE: 61 BPM | SYSTOLIC BLOOD PRESSURE: 137 MMHG | OXYGEN SATURATION: 100 % | WEIGHT: 195 LBS

## 2024-05-03 DIAGNOSIS — G20.A1 PARKINSON'S DISEASE, UNSPECIFIED WHETHER DYSKINESIA PRESENT, UNSPECIFIED WHETHER MANIFESTATIONS FLUCTUATE (H): Primary | ICD-10-CM

## 2024-05-03 DIAGNOSIS — Z96.89 STATUS POST DEEP BRAIN STIMULATOR PLACEMENT: ICD-10-CM

## 2024-05-03 PROCEDURE — 95983 ALYS BRN NPGT PRGRMG 15 MIN: CPT | Performed by: PSYCHIATRY & NEUROLOGY

## 2024-05-03 PROCEDURE — 99214 OFFICE O/P EST MOD 30 MIN: CPT | Mod: 25 | Performed by: PSYCHIATRY & NEUROLOGY

## 2024-05-03 ASSESSMENT — UNIFIED PARKINSONS DISEASE RATING SCALE (UPDRS)
PRONATION_SUPINATION_RIGHT: (0) NORMAL: NO PROBLEMS.
POSTURE: (0) NORMAL: NO PROBLEMS.
TOETAPPING_LEFT: (0) NORMAL: NO PROBLEMS.
AMPLITUDE_LLE: (0) NORMAL: NO TREMOR.
TOTAL_SCORE_LEFT: 0
FINGER_TAPPING_LEFT: (0) NORMAL: NO PROBLEMS.
DYSKINESIAS_PRESENT: NO
RIGIDITY_LUE: (0) NORMAL: NO RIGIDITY.
PRONATION_SUPINATION_LEFT: (0) NORMAL: NO PROBLEMS.
TOETAPPING_RIGHT: (0) NORMAL: NO PROBLEMS.
CONSTANCY_TREMOR_ATREST: (0) NORMAL: NO TREMOR.
RIGIDITY_RUE: (1) SLIGHT: RIGIDITY ONLY DETECTED WITH ACTIVATION MANEUVER.
TOTAL_SCORE: 3
AMPLITUDE_LIP_JAW: (0) NORMAL: NO TREMOR.
PARKINSONS_MEDS: ON
GAIT: (0) NORMAL: NO PROBLEMS.
LEG_AGILITY_RIGHT: (0) NORMAL: NO PROBLEMS.
RIGIDITY_RLE: (1) SLIGHT: RIGIDITY ONLY DETECTED WITH ACTIVATION MANEUVER.
AMPLITUDE_LUE: (0) NORMAL: NO TREMOR.
HANDMOVEMENTS_RIGHT: (0) NORMAL: NO PROBLEMS.
AMPLITUDE_RUE: (0) NORMAL: NO TREMOR.
LEG_AGILITY_LEFT: (0) NORMAL: NO PROBLEMS.
RIGIDITY_NECK: (0) NORMAL: NO RIGIDITY.
SPONTANEITY_OF_MOVEMENT: (0) NORMAL: NO PROBLEMS.
POSTURAL_STABILITY: (0) NORMAL: NO PROBLEMS. RECOVERS WITH ONE OR TWO STEPS.
FACIAL_EXPRESSION: (0) NORMAL: NORMAL FACIAL EXPRESSION.
MOVEMENTS_INTERFERE_WITH_RATINGS: NO
FINGER_TAPPING_RIGHT: (0) NORMAL: NO PROBLEMS.
AMPLITUDE_RLE: (0) NORMAL: NO TREMOR.
RIGIDITY_LLE: (0) NORMAL: NO RIGIDITY.
TOTAL_SCORE: 2
AXIAL_SCORE: 1
HANDMOVEMENTS_LEFT: (0) NORMAL: NO PROBLEMS.
SPEECH: (1) SLIGHT: LOSS OF MODULATION, DICTION OR VOLUME, BUT STILL ALL WORDS EASY TO UNDERSTAND.
ARISING_CHAIR: (0) NORMAL: NO PROBLEMS. ABLE TO ARISE QUICKLY WITHOUT HESITATION.
FREEZING_GAIT: (0) NORMAL: NO FREEZING.

## 2024-05-03 ASSESSMENT — MOVEMENT DISORDERS SOCIETY - UNIFIED PARKINSONS DISEASE RATING SCALE (MDS-UPDRS)
GETTING_OUT_OF_BED_CAR_DEEP_CHAIR: (0) NORMAL: NOT AT ALL (NO PROBLEMS).
SALIVA_AND_DROOLING: (0) NORMAL: NOT AT ALL (NO PROBLEMS).
DRESSING: (0) NORMAL: NOT AT ALL (NO PROBLEMS).
TURNING_IN_BED: (0) NORMAL: NOT AT ALL (NO PROBLEMS).
TOTAL_SCORE: 6
WALKING_AND_BALANCE: (1) SLIGHT: I AM SLIGHTLY SLOW OR MAY DRAG A LEG.  I NEVER USE A WALKING AID.
HANDWRITING: (1) SLIGHT: MY WRITING IS SLOW, CLUMSY OR UNEVEN, BUT ALL WORDS ARE CLEAR.
HOBBIES_AND_OTHER_ACTIVITIES: (0) NORMAL: NOT AT ALL (NO PROBLEMS).
CHEWING_AND_SWALLOWING: (0) NORMAL: NO PROBLEMS.
HYGIENE: (1) SLIGHT: I AM SLOW, BUT I DO NOT NEED ANY HELP.
TREMOR: (0) NORMAL: NOT AT ALL (NO PROBLEMS).
FREEZING: (0) NORMAL: NOT AT ALL (NO PROBLEMS).
EATING_TASKS: (0) NORMAL: NOT AT ALL (NO PROBLEMS).
SPEECH: (3) MODERATE: MY SPEECH IS UNCLEAR ENOUGH THAT OTHERS ASK ME TO REPEAT MYSELF EVERY DAY EVEN THOUGH MOST OF MY SPEECH IS UNDERSTOOD.

## 2024-05-03 NOTE — PROGRESS NOTES
37 yo RHM with IPD, onset in 2016 in the LUE, s/p Bi GP DBS ([place]), with a good response to DBS, in particular, resolution of abdominal cramps severe enough to cause emesis.  Dr. Carson involved, for non-DBS issues.  Lexi Swift involved.     Ceruloplasmin and urinary copper were normal.     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      Since last visit:    Jan08 [place] pharmacist appointment:  DBS very helpful, no wearing-off of Rytary, and no LID.  None of the vomiting he had as an OFF symptom prior to DBS.  3pm dose feels a little more effective than the othrs.  Mainly concerned with fatigue, lack of motivation.  Hard to get OOB in the AM, feeling disorganized (but no more than in the past).  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  Has had depression & anxiety in the past, improved post DBS, is interested in working with a  more than a therapist, but accepted referral for Health Psychology, also Speech Therapy.    Also repeated difficulties with rytary prescriptions,  Canceled at pharmacy, once, for unclear reason (and not our intention).  Another time delayed refill for prior-auth.    UPDRS  Part I     1.1 Cognitive impairment:  0: Normal: No cognitive impairment.    1.2 Hallucinations and psychosis:  0: Normal: No hallucinations or psychotic behaviour.     1.3 Depressed mood:  0: Normal: No depressed mood.    1.4 Anxious mood:  1: Slight: Anxious feelings present but not sustained for more than one day at a time. No interference with patient's ability to carry out normal activities and social interactions.     1.5 Apathy:  0: Normal: No apathy.     1.6 Features of DDS: 0: Normal: No problems present.  Thoughts flit around a lot.   1.7 Sleep problems:  0: Normal: No problems.    1.8 Daytime  "sleepiness:  0: Normal: No daytime sleepiness.     1.9 Pain and other sensations:  0: Normal: No uncomfortable feelings.     1.10 Urinary problems:  0: Normal: No urine control problems.     1.11 Constipation problems:  0: Normal: No constipation.     1.12 Light headedness on standin: Normal: No dizzy or foggy feelings.    1.13 Fatigue:  1: Slight: Fatigue occurs. However it does not cause me troubles doing things or being with people. A little afternoon fatigue then \"I get a second wind\"             5/3/2024     7:00 AM   UPDRS EDL Scale   2.1  Speech 3   2.2  Salivation 0   2.3  Chew & Swallow 0   2.4  Eating                  0   2.5  Dressing                0   2.6  Hygiene                 1   2.7  Handwriting             1   2.8  Hobbies etc.            0   2.9  Turn in bed             0   2.10 Tremor                  0   2.11 Stand from chair        0   2.12 Walking & Balance  1   2.13 Freezing                0   MDS-UPDRS II Total Score 6   Has been: 0 to 1    \"Past 6 mo have been good\"  Feeling stronger, working out  Running's a little harder, but able.  Still golfs, plays baseball.  Shuffling feet more.  Balance a littl worse.  E.g. getting out of bed, a feeling of retropulsion.  No falls.  Feel like more anxiety, when anx inc sx worsen e.g. speech getting faster  Only change    \"Eating  well sleeping good\"  Couple weeks [of] psychologist great for me\"        5 AM 9 AM 1 PM  5 PM 9 PM   Rytary 195 mg  2 2 3 2 2   No wearing off.  Still no cramping, and no vomiting.        5/3/2024     8:00 AM   UPDRS Motor Scale   Time: 08:00   Medication On   R Brain DBS: On   L Brain DBS: On   Dyskinesia (LID) No   Did LID interfere No   Speech 1   Facial Expression 0   Rigidity Neck 0   Rigidity RUE 1   Rigidity LUE 0   Rigidity RLE 1   Rigidity LLE 0   Finger Taps R 0   Finger Taps L 0   Hand Mvt R 0   Hand Mvt L 0   Pron-/Supinate R 0   Pron-/Supinate L 0   Toe Tap R 0   Toe Tap L 0   Leg Agility R 0   Leg Agility L " "0   Arise From Chair 0   Gait 0   Gait Freezing 0   Postural Stability 0   Posture 0   Global Spont Mvt 0   Postural Tremor RUE 0   Postural Tremor LUE 0   Kinetic Tremor RUE 0   Kinetic Tremor LUE 0   Rest Tremor RUE 0   Rest Tremor LUE 0   Rest Tremor RLE 0   Rest Tremor LLE 0   Rest Tremor Lip/Jaw 0   Rest Tremor Constancy 0   Total Right 2   Total Left 0   Axial Total 1   Total 3     Was: 8       Lead(s):   Side Left Right   Target GP GP   Lead  Abbott Abbott   Lead model Infinity 0.5 Infinity 0.5   Lead Implant Date 2022 8/15/2022   IPG location Left chest Left chest      IPG(s):  IPG  Abbott   IPG model Infinity 7   IPG implant date 22   Location Left chest   Battery 2.93 (2.94) (2.95 V) (2.95V)      System impedances:  Right lad, contact 12 (=contact 4)    Checking his device weekly.    Did Speech therapy, \"v. Good for me\" he is still doing the exercises.      Program 1         Side Left GPi  Right GPi    IPG location Left chest  Left chest    Initial/Final Initial Final Initial & Final    Active/Inactive Active  Active    Amplitude (mA) 3.6 (0 -4.0 by 0.1) 3.6 (0 -4.1 by 0.1)  3.6 (0 - 5.0 by 0.1)    Pulse width (usec)  60   60    Freq (Hz)  130   130    Contacts:  C pos 3_ omni neg   Cpos 11_omni neg       DBS programming 0.25h  Additional time this date with patient, reviewing records, & documentin.5h.    Plan:  see AVS    "

## 2024-05-03 NOTE — PATIENT INSTRUCTIONS
"First 1-2 weeks:  Reduce amplitude on both sides, by 0.5mA, from 3.6mA to 3.1mA   whether on average, overall your symptoms are better, or worse.  Pay particular attention to  speech and gait.    Next 1-2 weeks  Put amplitude on both sides back up, by 0.5mA from 3.1 mA to 3.6 mA  This is to establish a new \"baseline\" for your symptoms, before going on to the next step.    Next 1-2 weeks:  Increase amplitude on both sides, by 0.5 mA, from 3.6 mA to 4.1 mA   whether on average, overall your symptoms are better, or worse.  Pay particular attention to  speech and gait.    After that, contact us for instructions.  Bear in mind that symptoms can improve with a reduced amplitude, or worsen with an increased amplitud, or it can be the other way around.  Or symptoms might not change at all.  We are looking for information, to be sure your amplitude is as good as it can be.        "

## 2024-05-15 ENCOUNTER — VIRTUAL VISIT (OUTPATIENT)
Dept: PSYCHOLOGY | Facility: CLINIC | Age: 39
End: 2024-05-15
Payer: COMMERCIAL

## 2024-05-15 DIAGNOSIS — G20.A1 EARLY-ONSET PARKINSON'S DISEASE (H): ICD-10-CM

## 2024-05-15 DIAGNOSIS — F40.10 SOCIAL ANXIETY DISORDER: ICD-10-CM

## 2024-05-15 DIAGNOSIS — Z96.89 S/P DEEP BRAIN STIMULATOR PLACEMENT: ICD-10-CM

## 2024-05-15 DIAGNOSIS — F41.1 GENERALIZED ANXIETY DISORDER: Primary | ICD-10-CM

## 2024-05-15 PROCEDURE — 90834 PSYTX W PT 45 MINUTES: CPT | Mod: 95 | Performed by: PSYCHOLOGIST

## 2024-05-15 NOTE — PROGRESS NOTES
"    Health Psychology          Diana Link, Ph.D.,  [phone]  Amaris Peres, Ph.D.,  [phone]  Renay Gibbons, Ph.D.,  [phone]  Lisset Anderson, Ph.D., , [facility] [phone]  Damian Jameson, Ph.D., , [facility] [phone]  Ekta Aguilar, Ph.D.,  [phone]  Ada Mon, Ph.D., , [facility] [phone]    [facility], 3rd Floor  [address] [place] [address]       Health Psychology Progress Note    Patient Name: Dipesh \"Annalise\" STEPHANIE Nicole    Service Type: Individual  Length of Visit: 44 minutes  Start time: 11:03 AM   Stop time: 11:47 AM     Attendees: patient attended alone  Session #: 4 (new episode of [facility])    Telemedicine Information:     Telemedicine Visit: The patient's condition can be safely assessed and treated via synchronous audio and visual telemedicine encounter.    Reason for Telemedicine Visit: Patient has requested telehealth visit and Patient convenience (e.g. access to timely appointments / distance to available provider)  Originating Site (Patient Location): Patient's home  Distant Location (provider location):  On-site: [facility]  Consent:  The patient/guardian has verbally consented to: the potential risks and benefits of telemedicine (video visit) versus in person care; bill my insurance or make self-payment for services provided; and responsibility for payment of non-covered services.   Mode of Communication:  Video Conference via [employer]  As the provider I attest to compliance with applicable laws and regulations related to telemedicine.      Identifying Information and Presenting Problem:  The patient is a 38 year old adult who is being seen for problematic symptoms of anxiety in the context of early onset PD.    Topics Discussed/Interventions Provided:      Session Content:     Update: Patient reports doing generally well. States that he experienced an increase in anxiety last week with adjustments " "that neurology team was making to DBS to calibrate his settings. States that his settings were put back to their previous levels yesterday and he is feeling less anxious today. Reports that he continues to get daily exercise and goes to the gym 6 days per week. Patient also has an appointment for an ADHD evaluation with Dr. Rubio on June 13th.       Homework Review: Patient completed his homework assignments. Discussed insights from reviewing the cognitive model, identifying cognitive distortions, and practicing meditation. Top cognitive distortions identified include magnification, catastrophizing, fortune telling, mind reading, and disqualifying the positive. He also notes that the \"names\" meditation that uses affective labeling was very helpful.     Skills/intervention: Provided psychoeducation about CBT principle of cognitive reappraisal. Introduced the Automatic Thought record as a cognitive restructuring strategy.         Treatment Objective(s) Addressed in This Session:  Anxiety: will develop more effective coping skills to manage anxiety symptoms and will increase ability to function adaptively  Attention Problems: will develop coping skills to effectively manage attention issues  Psychological distress related to Parkinson's    Progress on / Status of Treatment Objective(s) / Homework:  Satisfactory progress   Stable     Medication Adherence: Patient did not report medication changes. Current medication list is below:     Current Outpatient Medications   Medication Sig Dispense Refill    carbidopa-levodopa (RYTARY) 48. MG CPCR per CR capsule 2 @5AM, 2@ 9AM, 3@1pm, 2@5PM AND 2@ 9PM = 11 capsules/day 330 capsule 11    carbidopa-levodopa (SINEMET)  MG tablet 1 tab 5/day (Patient not taking: Reported on 5/3/2024) 150 tablet 11     No current facility-administered medications for this visit.       Assessment: The patient appeared to be active and engaged in today's session and was receptive to " feedback.     Mental Status Exam:   Appearance: Appropriate   Eye Contact: Good    Orientation: Yes, x4  Behavior/relationship to provider/demeanor: Cooperative, Engaged, and Pleasant  Motor Activity: restless  Mood (subjective report): Anxious  Affect (objective appearance): Appropriate/mood congruent, anxious  Speech Rate: Rapid  Speech Volume: Normal  Speech Articulation: Normal  Speech Coherence: Normal  Speech Spontaneity: Normal  Thought Content: no suicidal/homicidal ideation, plans, or intent  Thought Process (associations): Logical and Linear  Thought Process (rate): Rapid  Abnormal Perception: None  Attention/Concentration: Normal  Memory: Appears grossly intact  Fund of Knowledge: Appears within normal limits  Abstraction:  Normal  Insight:  Adequate  Judgment: Adequate    Does the patient appear to be at imminent risk of harm to self/others at this time? No    The session was necessary to address problematic symptoms of anxiety in the context of early onset PD that have been interfering with patient's ability to focus, concentrate, and complete tasks.  Ongoing psychotherapy is necessary to improve functioning with daily activities, provide psychoeducation, provide support, and teach cognitive and behavioral skills.    Diagnoses:    1. Generalized anxiety disorder    2. Social anxiety disorder    3. Early-onset Parkinson's disease (H)    4. S/P deep brain stimulator placement      Plan:    Follow-up video visit with me on 6/7/24 at 9:00am  Patient to use 911 or other crisis resources in the event of a psychiatric emergency  Primary care needs and medications are managed by Joey Morley. Referring provider is self-referred from previous course of treatment (originally referred by Dr. Carson)  Next visit agenda/goals:   Complete at least 3 Automatic Thought Records  Continue using Arcaris sarah to practice meditation    Skills taught/interventions used thus far:  Psychoeducation  Awareness  "training  Cognitive restructuring (AT records)    NOTE: Treatment plan update due 4/24/25; 90 day treatment plan review due 7/23/24                                                Individual Treatment Plan    Patient's Name: Dipesh Nicole  YOB: 1985    Date of Creation: 04/24/24  Date Treatment Plan Last Reviewed/Revised: 04/24/24    DSM5 Diagnoses:     1. Generalized anxiety disorder    2. Social anxiety disorder      Psychosocial / Contextual Factors: job/vocational stress, career-related stress, financial stress, life transition stress, health issues - early onset Parkinson's.   PROMIS (reviewed every 90 days):      2/21/2024  9:58 AM   PROMIS 10    PROMIS TOTAL - SUBSCORES 27          Referral / Collaboration:  Collaboration with medical team and other specialists as needed.    Anticipated number of session for this episode of care: 10-15  Anticipation frequency of session: 2x per month  Anticipated Duration of each session: 38-52 minutes  Treatment plan will be reviewed in 90 days or when goals have been changed.       Measurable Treatment Goal(s) related to diagnosis / functional impairment(s)  Goal 1: Patient will experience an improvement in his ability to manage his anxiety, particularly in social situations   \"I will know I've met my goal when I feel more confident going out in public and being around people.\"     Objective #A (Patient Action)    Patient will learn and implement cognitive skills for challenging thinking patterns that amplify anxiety.  Status: New - Date: 04/24/24      Intervention(s)  Therapist will teach defusion and cognitive reappraisal.    Objective #B  Patient will learn and implement behavioral skills for increasing socialization.  Status:  New - Date: 04/24/24      Intervention(s)  Therapist will teach committed action and exposure skills.    Objective #C  Patient will learn and implement physiological arousal reduction skills.  Status:  New - Date: 04/24/24  "     Intervention(s)  Therapist will teach mindfulness and relaxation skills.    Objective #D  Patient will increase the frequency of engaging in social activities from once every 2 weeks to at least 1-2x per week.  Status:  New - Date: 04/24/24      Intervention(s)  Therapist will teach exposure and committed action skills    Objective #E  Patient will decrease the frequency of procrastination and avoidance-based coping and will increase the frequency of using approach-based coping.  Status:  New - Date: 04/24/24      Intervention(s)  Therapist will teach mindfulness and relaxation skills.      Patient has reviewed and agreed to the above plan.      Ada Mon, PhD  April 24, 2024    Ada Mon, Ph.D., , [facility]  Clinical Health Psychologist  Phone: [phone]   Pager: [phone]

## 2024-06-07 ENCOUNTER — VIRTUAL VISIT (OUTPATIENT)
Dept: PSYCHOLOGY | Facility: CLINIC | Age: 39
End: 2024-06-07
Payer: MEDICAID

## 2024-06-07 DIAGNOSIS — F40.10 SOCIAL ANXIETY DISORDER: ICD-10-CM

## 2024-06-07 DIAGNOSIS — Z96.89 S/P DEEP BRAIN STIMULATOR PLACEMENT: ICD-10-CM

## 2024-06-07 DIAGNOSIS — G20.A1 EARLY-ONSET PARKINSON'S DISEASE (H): ICD-10-CM

## 2024-06-07 DIAGNOSIS — F41.1 GENERALIZED ANXIETY DISORDER: Primary | ICD-10-CM

## 2024-06-07 PROCEDURE — 90834 PSYTX W PT 45 MINUTES: CPT | Mod: 95 | Performed by: PSYCHOLOGIST

## 2024-06-07 NOTE — NURSING NOTE
Is the patient currently in the state of [place]? YES    Visit mode:VIDEO    If the visit is dropped, the patient can be reconnected by: VIDEO VISIT: Send to e-mail at: [email or web address]    Will anyone else be joining the visit? NO  (If patient encounters technical issues they should call [phone]    How would you like to obtain your AVS? MyChart    Are changes needed to the allergy or medication list? N/A    Are refills needed on medications prescribed by this physician? NO    Reason for visit: RECHECK    Corin CERDA

## 2024-06-07 NOTE — PROGRESS NOTES
"    Health Psychology          Diana Link, Ph.D.,  [phone]  Amaris Peres, Ph.D.,  [phone]  Renay Gibbons, Ph.D.,  [phone]  Lisset Anderson, Ph.D., , [facility] [phone]  Damian Jameson, Ph.D., , [facility] [phone]  Ekta Aguilar, Ph.D.,  [phone]  Ada Mon, Ph.D., , [facility] [phone]    [facility], 3rd Floor  [address] [place] [address]       Health Psychology Progress Note    Patient Name: Dipesh \"Annalise\" STEPHANIE Nicole    Service Type: Individual  Length of Visit: 50 minutes  Start time: 9:07 AM   Stop time: 9:57 AM     Attendees: patient attended alone  Session #: 5 ([facility])    Telemedicine Information:     Telemedicine Visit: The patient's condition can be safely assessed and treated via synchronous audio and visual telemedicine encounter.    Reason for Telemedicine Visit: Patient has requested telehealth visit and Patient convenience (e.g. access to timely appointments / distance to available provider)  Originating Site (Patient Location): Patient's home  Distant Location (provider location):  Off-site: Provider's Home Office  Consent:  The patient/guardian has verbally consented to: the potential risks and benefits of telemedicine (video visit) versus in person care; bill my insurance or make self-payment for services provided; and responsibility for payment of non-covered services.   Mode of Communication:  Video Conference via [employer]ee  As the provider I attest to compliance with applicable laws and regulations related to telemedicine.      Identifying Information and Presenting Problem:  The patient is a 38 year old adult who is being seen for problematic symptoms of anxiety in the context of early onset PD.    Topics Discussed/Interventions Provided:      Session Content:     Update: Patient reports doing generally well. States that he experienced a decrease in anxiety last week with adjustments " that neurology team has made to DBS to calibrate his settings (device is on a higher setting). States that his settings were put back to their previous levels yesterday and he is feeling less anxious today. Reports that his procrastination has worsened in the past week but ability to focus has improved. Notes his sleep has worsened this past week, particularly falling asleep and getting back to sleep. Reports that he continues to get daily exercise and goes to the gym 6 days per week. Confirms his ADHD evaluation with Dr. Rubio on June 13th.       Homework Review: Patient completed his homework assignments. Discussed insights from practicing cognitive restructuring and meditation.     Skills/intervention: Provided additional psychoeducation about CBT principle of cognitive reappraisal. Assisted patient in completing an Automatic Thought record as a cognitive restructuring strategy. Discussed lessons learned from this exercise. Introduced Socratic Questioning method as another cognitive restructuring strategy.         Treatment Objective(s) Addressed in This Session:  Anxiety: will develop more effective coping skills to manage anxiety symptoms and will increase ability to function adaptively  Attention Problems: will develop coping skills to effectively manage attention issues  Psychological distress related to Parkinson's    Progress on / Status of Treatment Objective(s) / Homework:  Satisfactory progress   Stable     Medication Adherence: Patient did not report medication changes. Current medication list is below:     Current Outpatient Medications   Medication Sig Dispense Refill    carbidopa-levodopa (RYTARY) 48. MG CPCR per CR capsule 2 @5AM, 2@ 9AM, 3@1pm, 2@5PM AND 2@ 9PM = 11 capsules/day 330 capsule 11    carbidopa-levodopa (SINEMET)  MG tablet 1 tab 5/day (Patient not taking: Reported on 5/3/2024) 150 tablet 11     No current facility-administered medications for this visit.       Assessment:  "The patient appeared to be active and engaged in today's session and was receptive to feedback.     Mental Status Exam:   Appearance: Appropriate   Eye Contact: Good    Orientation: Yes, x4  Behavior/relationship to provider/demeanor: Cooperative, Engaged, and Pleasant  Motor Activity: normal or unremarkable  Mood (subjective report): Anxious, \"Better\"  Affect (objective appearance): Appropriate/mood congruent  Speech Rate: Rapid  Speech Volume: Normal  Speech Articulation: Normal  Speech Coherence: Normal  Speech Spontaneity: Normal  Thought Content: no suicidal/homicidal ideation, plans, or intent  Thought Process (associations): Logical and Linear  Thought Process (rate): Rapid  Abnormal Perception: None  Attention/Concentration: Normal  Memory: Appears grossly intact  Fund of Knowledge: Appears within normal limits  Abstraction:  Normal  Insight:  Adequate  Judgment: Adequate    Does the patient appear to be at imminent risk of harm to self/others at this time? No    The session was necessary to address problematic symptoms of anxiety in the context of early onset PD that have been interfering with patient's ability to focus, concentrate, and complete tasks.  Ongoing psychotherapy is necessary to improve functioning with daily activities, provide psychoeducation, provide support, and teach cognitive and behavioral skills.    Diagnoses:    1. Generalized anxiety disorder    2. Social anxiety disorder    3. Early-onset Parkinson's disease (H)    4. S/P deep brain stimulator placement      Plan:    Follow-up video visit with me on 7/1/24 at 4:00pm  Patient to use 911 or other crisis resources in the event of a psychiatric emergency  Primary care needs and medications are managed by Joey Morley. Referring provider is self-referred from previous course of treatment (originally referred by Dr. Carson)  Next visit agenda/goals:   Complete at least 1 Automatic Thought Record  Complete at least 1 Socratic Questioning " "worksheet    Skills taught/interventions used thus far:  Psychoeducation  Awareness training  Cognitive restructuring (AT records, Socratic Questioning)    NOTE: Treatment plan update due 4/24/25; 90 day treatment plan review due 7/23/24                                                Individual Treatment Plan    Patient's Name: Dipesh Nicole  YOB: 1985    Date of Creation: 04/24/24  Date Treatment Plan Last Reviewed/Revised: 04/24/24    DSM5 Diagnoses:     1. Generalized anxiety disorder    2. Social anxiety disorder      Psychosocial / Contextual Factors: job/vocational stress, career-related stress, financial stress, life transition stress, health issues - early onset Parkinson's.   PROMIS (reviewed every 90 days):      2/21/2024  9:58 AM   PROMIS 10    PROMIS TOTAL - SUBSCORES 27          Referral / Collaboration:  Collaboration with medical team and other specialists as needed.    Anticipated number of session for this episode of care: 10-15  Anticipation frequency of session: 2x per month  Anticipated Duration of each session: 38-52 minutes  Treatment plan will be reviewed in 90 days or when goals have been changed.       Measurable Treatment Goal(s) related to diagnosis / functional impairment(s)  Goal 1: Patient will experience an improvement in his ability to manage his anxiety, particularly in social situations   \"I will know I've met my goal when I feel more confident going out in public and being around people.\"     Objective #A (Patient Action)    Patient will learn and implement cognitive skills for challenging thinking patterns that amplify anxiety.  Status: New - Date: 04/24/24      Intervention(s)  Therapist will teach defusion and cognitive reappraisal.    Objective #B  Patient will learn and implement behavioral skills for increasing socialization.  Status:  New - Date: 04/24/24      Intervention(s)  Therapist will teach committed action and exposure skills.    Objective " #C  Patient will learn and implement physiological arousal reduction skills.  Status:  New - Date: 04/24/24      Intervention(s)  Therapist will teach mindfulness and relaxation skills.    Objective #D  Patient will increase the frequency of engaging in social activities from once every 2 weeks to at least 1-2x per week.  Status:  New - Date: 04/24/24      Intervention(s)  Therapist will teach exposure and committed action skills    Objective #E  Patient will decrease the frequency of procrastination and avoidance-based coping and will increase the frequency of using approach-based coping.  Status:  New - Date: 04/24/24      Intervention(s)  Therapist will teach mindfulness and relaxation skills.      Patient has reviewed and agreed to the above plan.      Ada Mon, PhD  April 24, 2024    Ada Mon, Ph.D., [facility]  Clinical Health Psychologist  Phone: [phone]   Pager: [phone]

## 2024-06-13 ENCOUNTER — VIRTUAL VISIT (OUTPATIENT)
Dept: PSYCHOLOGY | Facility: CLINIC | Age: 39
End: 2024-06-13
Payer: MEDICARE

## 2024-06-13 DIAGNOSIS — R41.840 ATTENTION AND CONCENTRATION DEFICIT: ICD-10-CM

## 2024-06-13 DIAGNOSIS — G20.A1 EARLY-ONSET PARKINSON'S DISEASE (H): ICD-10-CM

## 2024-06-13 PROCEDURE — 99207 PR NO BILLABLE SERVICE THIS VISIT: CPT | Mod: 95 | Performed by: PSYCHOLOGIST

## 2024-06-13 ASSESSMENT — PATIENT HEALTH QUESTIONNAIRE - PHQ9
10. IF YOU CHECKED OFF ANY PROBLEMS, HOW DIFFICULT HAVE THESE PROBLEMS MADE IT FOR YOU TO DO YOUR WORK, TAKE CARE OF THINGS AT HOME, OR GET ALONG WITH OTHER PEOPLE: SOMEWHAT DIFFICULT
SUM OF ALL RESPONSES TO PHQ QUESTIONS 1-9: 5
SUM OF ALL RESPONSES TO PHQ QUESTIONS 1-9: 5

## 2024-06-13 NOTE — PROGRESS NOTES
[facility]         PATIENT'S NAME: Dipesh Nicole  PREFERRED NAME: Annalise  PRONOUNS:    He/his  MRN: [id number]  : 1985  ADDRESS: [address] [address]  [facility]:  [id number]  DATE OF SERVICE: 24  START TIME: 1:00  END TIME: 1:10  PREFERRED PHONE: [phone]  May we leave a program related message: Yes  EMERGENCY CONTACT: was not obtained   SERVICE MODALITY:  Video Visit:      Provider verified identity through the following two step process.  Patient provided:  Patient     Telemedicine Visit: The patient's condition can be safely assessed and treated via synchronous audio and visual telemedicine encounter.      Reason for Telemedicine Visit: Services only offered telehealth    Originating Site (Patient Location): Patient's home    Distant Site (Provider Location): Provider Remote Setting- Home Office    Consent:  The patient/guardian has verbally consented to: the potential risks and benefits of telemedicine (video visit) versus in person care; bill my insurance or make self-payment for services provided; and responsibility for payment of non-covered services.     Patient would like the video invitation sent by:  My Chart    Mode of Communication:  Video Conference via Voxa    Distant Location (Provider):  Off-site    As the provider I attest to compliance with applicable laws and regulations related to telemedicine.    UNIVERSAL ADULT Mental Health DIAGNOSTIC ASSESSMENT    Identifying Information:  Patient is a 38 year old,    individual.  Patient was referred for an assessment by primary care provider .  Patient attended the session alone.    Chief Complaint:   The purpose of this evaluation is to: provide treatment recommendations and clarify diagnosis. Patient reported seeking services at this time for diagnostic assessment and recommendations for treatment.  Patient reported that he  has not completed a previous ADHD diagnostic assessment.   Patient has not received a previous diagnosis of ADHD. Patient reported that medication has not been prescribed medication to address these problems.     Client has a history of parkinson's disease. He had brain surgery 2 years ago. He would like to be tested for ADHD. However, a referral for neuropsychology will be ordered as they will be able to more thoroughly evaluate his overall cognitive functioning as well as executive functioning, memory, attention/concentration, etc. Writer will route note to psychologist, Ada Izquierdo who placed the original referral. Client expressed understanding and was understandably disappointed and frustrated given that he has been waiting for this evaluation for 6 months and he is eager to learn more about his functioning and have access to tools and supports to improve functioning.      Current Mental Status Exam:   Appearance:  Appropriate    Eye Contact:  Good   Psychomotor:  Normal       Gait / station:  no problem  Attitude / Demeanor: Cooperative   Speech      Rate / Production: Normal/ Responsive      Volume:  Normal  volume      Language:  no problems and good  Mood:   Normal  Affect:   Appropriate    Thought Content: Clear   Thought Process: Goal Directed  Logical       Associations: No loosening of associations  Insight:   Good   Judgment:  Intact   Orientation:  All  Attention/concentration: Good        Provider Name/ Credentials:  Danita Rubio, PhD, LP  June 13, 2024

## 2024-06-13 NOTE — Clinical Note
Hi Dr. Mon, I met with Annalise today to begin an ADHD Evaluation, but given his history of parkinson's disease and previous brain surgery, we would recommend that he have a neuropsychological evaluation to more thoroughly evaluate his cognitive functioning, executive functioning, attention, memory, etc. He accepted this referral and an order is placed today.  Thanks, Dr. Rubio

## 2024-07-01 ENCOUNTER — VIRTUAL VISIT (OUTPATIENT)
Dept: PSYCHOLOGY | Facility: CLINIC | Age: 39
End: 2024-07-01
Payer: MEDICAID

## 2024-07-01 VITALS — WEIGHT: 183 LBS | BODY MASS INDEX: 24.14 KG/M2

## 2024-07-01 DIAGNOSIS — F40.10 SOCIAL ANXIETY DISORDER: ICD-10-CM

## 2024-07-01 DIAGNOSIS — F41.1 GENERALIZED ANXIETY DISORDER: Primary | ICD-10-CM

## 2024-07-01 DIAGNOSIS — Z96.89 S/P DEEP BRAIN STIMULATOR PLACEMENT: ICD-10-CM

## 2024-07-01 DIAGNOSIS — G20.A1 EARLY-ONSET PARKINSON'S DISEASE (H): ICD-10-CM

## 2024-07-01 PROCEDURE — 90837 PSYTX W PT 60 MINUTES: CPT | Mod: 95 | Performed by: PSYCHOLOGIST

## 2024-07-01 ASSESSMENT — ANXIETY QUESTIONNAIRES
3. WORRYING TOO MUCH ABOUT DIFFERENT THINGS: SEVERAL DAYS
IF YOU CHECKED OFF ANY PROBLEMS ON THIS QUESTIONNAIRE, HOW DIFFICULT HAVE THESE PROBLEMS MADE IT FOR YOU TO DO YOUR WORK, TAKE CARE OF THINGS AT HOME, OR GET ALONG WITH OTHER PEOPLE: NOT DIFFICULT AT ALL
4. TROUBLE RELAXING: SEVERAL DAYS
5. BEING SO RESTLESS THAT IT IS HARD TO SIT STILL: NOT AT ALL
7. FEELING AFRAID AS IF SOMETHING AWFUL MIGHT HAPPEN: NOT AT ALL
7. FEELING AFRAID AS IF SOMETHING AWFUL MIGHT HAPPEN: NOT AT ALL
GAD7 TOTAL SCORE: 4
8. IF YOU CHECKED OFF ANY PROBLEMS, HOW DIFFICULT HAVE THESE MADE IT FOR YOU TO DO YOUR WORK, TAKE CARE OF THINGS AT HOME, OR GET ALONG WITH OTHER PEOPLE?: NOT DIFFICULT AT ALL
GAD7 TOTAL SCORE: 4
2. NOT BEING ABLE TO STOP OR CONTROL WORRYING: SEVERAL DAYS
6. BECOMING EASILY ANNOYED OR IRRITABLE: NOT AT ALL
1. FEELING NERVOUS, ANXIOUS, OR ON EDGE: SEVERAL DAYS

## 2024-07-01 ASSESSMENT — PAIN SCALES - GENERAL: PAINLEVEL: NO PAIN (0)

## 2024-07-01 NOTE — NURSING NOTE
Is the patient currently in the state of [place]? YES    Visit mode:VIDEO    If the visit is dropped, the patient can be reconnected by: VIDEO VISIT: Text to cell phone:   Telephone Information:   Mobile [phone]       Will anyone else be joining the visit? No  (If patient encounters technical issues they should call [phone])    How would you like to obtain your AVS? MyChart    Are changes needed to the allergy or medication list? N/A    Are refills needed on medications prescribed by this physician? NO    Rooming Documentation: Questionnaire(s) not done per department protocol.    Reason for visit: RECHECK     PRASHANT Couch

## 2024-07-01 NOTE — PROGRESS NOTES
"    Health Psychology          Diana Link, Ph.D.,  [phone]  Amaris Peres, Ph.D.,  [phone]  Renay Gibbons, Ph.D.,  [phone]  Damian Jameson, Ph.D., , [facility] [phone]  Ekta Aguilar, Ph.D.,  [phone]  Ada Mon, Ph.D., , [facility] [phone]    [facility], 3rd Floor  [address] [place] [address]       Health Psychology Progress Note    Patient Name: Dipesh \"Annalise\" STEPHANIE Nicole    Service Type: Individual  Length of Visit: 56 minutes - extended session due to complexity of content and duration between visits  Start time: 4:02 PM  Stop time: 4:58 PM     Attendees: patient attended alone  Session #: 6 (new episode of care)    Telemedicine Information:     Telemedicine Visit: The patient's condition can be safely assessed and treated via synchronous audio and visual telemedicine encounter.    Reason for Telemedicine Visit: Patient has requested telehealth visit and Patient convenience (e.g. access to timely appointments / distance to available provider)  Originating Site (Patient Location): Patient's home  Distant Location (provider location):  Off-site: Provider's Home Office  Consent:  The patient/guardian has verbally consented to: the potential risks and benefits of telemedicine (video visit) versus in person care; bill my insurance or make self-payment for services provided; and responsibility for payment of non-covered services.   Mode of Communication:  Video Conference via [employer]  As the provider I attest to compliance with applicable laws and regulations related to telemedicine.      Identifying Information and Presenting Problem:  The patient is a 38 year old adult who is being seen for problematic symptoms of anxiety in the context of early onset PD.    Topics Discussed/Interventions Provided:      Session Content:     Update: Patient reports doing generally well. DBS is still on a higher setting. Reports that " his anxiety is better and he is sleeping better. Notes that he stopped smoking marijuana 8 days ago and has more mental clarity and has noticed an improvement in his anxiety after quitting. Had an appointment for his ADHD evaluation with Dr. Rubio on June 13th, but was informed that he will need to be evaluated by neuropsychology instead. Patient expressed some frustration and disappointment with this experience. Discussed next steps.       Homework Review: Patient completed his homework assignments. Discussed insights from practicing cognitive restructuring and meditation.     Skills/intervention: Provided additional psychoeducation about cognitive reappraisal and mindfulness meditation. Discussed these strategies as means of coping and forming counter-narratives that may be more helpful. Also explored other options for expanding patient's experience including attending early onset Parkinson's support groups. I will reach out to neurology team for options and recommendations. Introduced additional cognitive restructuring exercises and discussed adjustments to make to mindfulness meditation practice. Next session will focus on deepening meditation practice and introducing ACT principles.         Treatment Objective(s) Addressed in This Session:  Anxiety: will develop more effective coping skills to manage anxiety symptoms and will increase ability to function adaptively  Attention Problems: will develop coping skills to effectively manage attention issues  Psychological distress related to Parkinson's    Progress on / Status of Treatment Objective(s) / Homework:  Satisfactory progress   Stable     Medication Adherence: Patient did not report medication changes. Current medication list is below:     Current Outpatient Medications   Medication Sig Dispense Refill    carbidopa-levodopa (RYTARY) 48. MG CPCR per CR capsule 2 @5AM, 2@ 9AM, 3@1pm, 2@5PM AND 2@ 9PM = 11 capsules/day 330 capsule 11     carbidopa-levodopa (SINEMET)  MG tablet 1 tab 5/day (Patient not taking: Reported on 5/3/2024) 150 tablet 11     No current facility-administered medications for this visit.       Assessment: The patient appeared to be active and engaged in today's session and was receptive to feedback.     Mental Status Exam:   Appearance: Appropriate   Eye Contact: Good    Orientation: Yes, x4  Behavior/relationship to provider/demeanor: Cooperative, Engaged, and Pleasant  Motor Activity: normal or unremarkable  Mood (subjective report): Anxious  Affect (objective appearance): Appropriate/mood congruent  Speech Rate: Rapid  Speech Volume: Normal  Speech Articulation: Normal, some difficulty with fluency and flow  Speech Coherence: Normal  Speech Spontaneity: Normal  Thought Content: no suicidal/homicidal ideation, plans, or intent  Thought Process (associations): Logical and Linear  Thought Process (rate): Rapid  Abnormal Perception: None  Attention/Concentration: Normal  Memory: Appears grossly intact  Fund of Knowledge: Appears within normal limits  Abstraction:  Normal  Insight:  Adequate  Judgment: Adequate    Does the patient appear to be at imminent risk of harm to self/others at this time? No    The session was necessary to address problematic symptoms of anxiety in the context of early onset PD that have been interfering with patient's ability to focus, concentrate, and complete tasks.  Ongoing psychotherapy is necessary to improve functioning with daily activities, provide psychoeducation, provide support, and teach cognitive and behavioral skills.    Diagnoses:    1. Generalized anxiety disorder    2. Social anxiety disorder    3. Early-onset Parkinson's disease (H)    4. S/P deep brain stimulator placement      Plan:    Follow-up video visit with me on 7/26/24 at 11:00am  Patient to use 911 or other crisis resources in the event of a psychiatric emergency  Primary care needs and medications are managed by Peyman  "Joey. Referring provider is self-referred from previous course of treatment (originally referred by Dr. Carson)  Next visit agenda/goals:   Complete 90 day treatment plan review  Complete decatastrophizing, 6 question technique, and thoughts on trial exercises  Practice the 3 introductory meditations between 4pm and 11pm at least 3x per week    Skills taught/interventions used thus far:  Psychoeducation  Awareness training  Cognitive restructuring (AT records, Socratic Questioning)    NOTE: Treatment plan update due 4/24/25; 90 day treatment plan review due 7/23/24                                                Individual Treatment Plan    Patient's Name: Dipesh Nicole  YOB: 1985    Date of Creation: 04/24/24  Date Treatment Plan Last Reviewed/Revised: 04/24/24    DSM5 Diagnoses:     1. Generalized anxiety disorder    2. Social anxiety disorder      Psychosocial / Contextual Factors: job/vocational stress, career-related stress, financial stress, life transition stress, health issues - early onset Parkinson's.   PROMIS (reviewed every 90 days):      2/21/2024  9:58 AM   PROMIS 10    PROMIS TOTAL - SUBSCORES 27          Referral / Collaboration:  Collaboration with medical team and other specialists as needed.    Anticipated number of session for this episode of care: 10-15  Anticipation frequency of session: 2x per month  Anticipated Duration of each session: 38-52 minutes  Treatment plan will be reviewed in 90 days or when goals have been changed.       Measurable Treatment Goal(s) related to diagnosis / functional impairment(s)  Goal 1: Patient will experience an improvement in his ability to manage his anxiety, particularly in social situations   \"I will know I've met my goal when I feel more confident going out in public and being around people.\"     Objective #A (Patient Action)    Patient will learn and implement cognitive skills for challenging thinking patterns that amplify anxiety.  Status: " New - Date: 04/24/24      Intervention(s)  Therapist will teach defusion and cognitive reappraisal.    Objective #B  Patient will learn and implement behavioral skills for increasing socialization.  Status:  New - Date: 04/24/24      Intervention(s)  Therapist will teach committed action and exposure skills.    Objective #C  Patient will learn and implement physiological arousal reduction skills.  Status:  New - Date: 04/24/24      Intervention(s)  Therapist will teach mindfulness and relaxation skills.    Objective #D  Patient will increase the frequency of engaging in social activities from once every 2 weeks to at least 1-2x per week.  Status:  New - Date: 04/24/24      Intervention(s)  Therapist will teach exposure and committed action skills    Objective #E  Patient will decrease the frequency of procrastination and avoidance-based coping and will increase the frequency of using approach-based coping.  Status:  New - Date: 04/24/24      Intervention(s)  Therapist will teach mindfulness and relaxation skills.      Patient has reviewed and agreed to the above plan.      Ada Mon, PhD  April 24, 2024    Ada Mon, Ph.D., , [facility]  Clinical Health Psychologist  Phone: [phone]   Pager: [phone]

## 2024-07-02 ENCOUNTER — TELEPHONE (OUTPATIENT)
Dept: NEUROPSYCHOLOGY | Facility: CLINIC | Age: 39
End: 2024-07-02
Payer: COMMERCIAL

## 2024-07-02 NOTE — TELEPHONE ENCOUNTER
Left Voicemail (1st Attempt) and Sent Mychart (1st Attempt) for the patient to call back and schedule the following:    Schedule per protocol     Timeline: First Available   Location: [facility], [place] NEUROPSYCHOLOGY,  NEUROPSYCHOLOGY, or [facility] NEUROLOGY   Preferred Provider: ANY PROVIDER   Visit Type: NEUROPSYCH EVAL or Neuropsych Interview Only (testing scheduled shortly after interview)     Given the long wait time, our providers also recommend the following external neuropsychologists should the patient like to pursue an appointment with one of them.     Martha Falcon, PhD   [facility]     Matheus Cortés, Ph.D.   [phone]     Eileen Li, PhD   [phone]     Theresa Soria, PhD   [phone]       Raegan Cannon on 7/2/2024 at 11:03 AM

## 2024-07-02 NOTE — TELEPHONE ENCOUNTER
Patient confirmed scheduled appointment:  Date: 7/15  Time: 8 am  Visit type: Neuropsychology Eval  Provider: Biju   Location: [facility]- pt aware   Testing/imaging: n/a  Additional notes: in basket message to schedule     Raegan Cannon on 7/2/2024 at 2:01 PM

## 2024-07-14 ENCOUNTER — HEALTH MAINTENANCE LETTER (OUTPATIENT)
Age: 39
End: 2024-07-14

## 2024-07-15 ENCOUNTER — TELEPHONE (OUTPATIENT)
Dept: NEUROLOGY | Facility: CLINIC | Age: 39
End: 2024-07-15
Payer: COMMERCIAL

## 2024-07-15 ENCOUNTER — OFFICE VISIT (OUTPATIENT)
Dept: NEUROPSYCHOLOGY | Facility: CLINIC | Age: 39
End: 2024-07-15
Payer: MEDICAID

## 2024-07-15 DIAGNOSIS — F41.9 ANXIETY: ICD-10-CM

## 2024-07-15 DIAGNOSIS — F32.A DEPRESSION, UNSPECIFIED DEPRESSION TYPE: ICD-10-CM

## 2024-07-15 DIAGNOSIS — F06.8 OTHER SPECIFIED MENTAL DISORDERS DUE TO KNOWN PHYSIOLOGICAL CONDITION: ICD-10-CM

## 2024-07-15 DIAGNOSIS — R41.840 DECREASED ATTENTION SPAN: Primary | ICD-10-CM

## 2024-07-15 DIAGNOSIS — G20.A1 EARLY-ONSET PARKINSON'S DISEASE (H): ICD-10-CM

## 2024-07-15 PROCEDURE — 96139 PSYCL/NRPSYC TST TECH EA: CPT | Performed by: CLINICAL NEUROPSYCHOLOGIST

## 2024-07-15 PROCEDURE — 96132 NRPSYC TST EVAL PHYS/QHP 1ST: CPT | Performed by: CLINICAL NEUROPSYCHOLOGIST

## 2024-07-15 PROCEDURE — 96133 NRPSYC TST EVAL PHYS/QHP EA: CPT | Performed by: CLINICAL NEUROPSYCHOLOGIST

## 2024-07-15 PROCEDURE — 90791 PSYCH DIAGNOSTIC EVALUATION: CPT | Performed by: CLINICAL NEUROPSYCHOLOGIST

## 2024-07-15 PROCEDURE — 96138 PSYCL/NRPSYC TECH 1ST: CPT | Performed by: CLINICAL NEUROPSYCHOLOGIST

## 2024-07-15 NOTE — LETTER
"7/15/2024       RE: Dipesh Nicole  [address] [place] [address]     Dear Colleague,    Thank you for referring your patient, Dipesh Nicole, to the [facility] [place] at [facility]. Please see a copy of my visit note below.    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 He was unaccompanied to the interview.    The patient was in-clinic for the entirety of this evaluation. The interview and testing were completed face-to-face.     BACKGROUND INFORMATION / INTERVIEW FINDINGS    Records indicate that Mr. Dipesh Nicole developed left hand tremor and right-sided stiffness in late 2016 early 2017.  He was diagnosed with Parkinson disease in 2018.  He underwent DBS placement surgery in July 2022 and August 2022.  A CT scan of his head on August 15, 2022 documented \"New right frontal approach DBS lead with tip in the region of the right globus pallidus. Stable positioning of left frontal approach DBS lead with tip near the left globus pallidus.\"  On May 3, 2024, the amplitude of his left DBS was increased by Dr. Murrell.  His other medical history includes a single suspected seizure (generalized convulsion) in November, 2009.  He completed an EEG study on December 9, 2009.  This study was read as abnormal and documented the presence of 2 episodes of frontally maximal sharp waves that were felt to be epileptiform in nature.  A more recent EEG study on October 13, 2021 was read as normal.  He also has a history of esophagitis, migraine headache, hiatal hernia, shoulder surgery, and right inguinal hernia.  He has also raised some question about the possibility of an underlying ADHD diagnosis.  The current evaluation was " "requested by Dr. Danita Rubio, in this context.    Of note, Mr. Nicole saw my colleague, Dr. Mamie Gracia, for a baseline neuropsychology exam on October 19, 2021.  The results from this evaluation were felt to be generally within normal limits.  The exam documented average to above average range cognitive functioning.  He had difficulty with memory on a list learning task.  The findings were felt to be only subtly abnormal.  He was felt to be a good candidate for DBS surgery from a neuropsychological perspective.    On interview, Mr. Nicole confirmed the above history.  We initially discussed his seizure in 2009.  He stated that he had been drinking alcohol in the 2 days leading up to this seizure.  He denied that he had any seizures prior to or has had any seizures since that time.    With regard to his Parkinson's disease, Mr. Nicole confirmed the above described details.  He reported that he had a left hand tremor, right-sided stiffness, dystonia, shuffling gait, and poorly articulated speech before the DBS surgeries.  He reported that after the DBS was placed, he had no more tremor.  He reported that his dystonia was resolved.  He reported that he got a lot healthier and stronger.  He described the changes as \"night and day.\"  He stated that his movements became more fluid.  He had less rigidity and greater strength.  He noted that as time went on, he began to develop a bit more stiffness and shuffling of his gait.  He then had the increase in DBS amplitude in May, 2024.  He reported that this increase in amplitude seems to have resolved some of these motor issues.    We next discussed his cognition.  Mr. Nicole reported that at baseline, he has always been a procrastinator.  He stated that he has always had some issues with attention and focus.  He noted that he can remember these issues dating back to the fourth or fifth grade.  He stated that when he was in the fifth or sixth grade, he needed to be the center " of attention.  He reported that he was smarter than his grades showed.  He denied that he spaced out in school, but stated that he was not interested in the topics.  He reported that he could focus, but he had other interests beyond schoolwork.  He was never diagnosed with attention deficit disorder or a learning disability.  He denied that he had noticed any cognitive deficits or changes from his cognitive baseline prior to DBS surgery.  He stated that he had increased confidence in his cognition after the DBS surgery.  He stated that he has had increased procrastination after DBS surgery.  He also noted an increase in the fluency of his speech after the recent change in his DBS settings.  He stated that he now speaks rapidly but his speech may be poorly articulated.  He occasionally has difficulties with word finding as well.  He otherwise denied cognitive changes, even he was presented with a list of specific cognitive domains.  In terms of personality, he stated that he is less personable and has felt more anxious about social interactions.    With respect to mental health, Mr. Nicole reported that he did not have a history of mental health issues prior to Parkinson disease.  He saw a psychologist for 5 or 6 sessions shortly before the DBS surgery.  He stated that he had not been talking to people about the possibility of surgery, so the surgical team wanted him to have a source of support.  He reported that his mental health was good after the surgery.  He reported that since the increase in the amplitude of his DBS in May, he has been more emotional.  He stated that he is now started to feel a bit more depressed and anxious.  He had a couple of sessions with psychologists, but he has not had regular psychotherapy at any point since his surgery.  He has never been prescribed a medication for mental health related concerns.  He denied prior psychiatric hospitalization, hallucinations, symptoms consistent with  severe mental illness, trauma, abuse, suicidal ideation, or past suicide attempts.    With regard to other medical background, Mr. Nicole denied prior head injury or stroke.  He reported that his sleep is generally good.  He averages between 6 and 8 hours of sleep per night.  He does wake up at night to use the restroom.  He slept well the night before this exam (he slept for 9 hours).  He denied symptoms of REM behavior disorder or that he snores.  He did note that he has bizarre and wild dreams because of his medications.  He denied pain.  He denied sensory changes.  Per records, he is prescribed carbidopa-levodopa.  In terms of substance use, he stated that he consumes 2 or 3 alcoholic drinks once per week.  He had been using cannabis on a weekly basis.  However, he has not used cannabis for more than 2 weeks now.  He denied a history of substance abuse.  He denied known family neurologic history.    Mr. Nicole is living at home with his parents.  He is managing his own basic and instrumental daily activities.  He drives.    By way of background, Mr. Nicole is single and has never been .  He does not have children.  Regarding educational background, he graduated from high school with slightly above average grades.  He earned a bachelor's degree from [employer] in [place].  He studied sports management.  Professionally, he worked as a professional  and a .  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  He works at this business full-time, but is also looking for other full-time work.    BEHAVIORAL OBSERVATIONS  Mr. Nicole was pleasant and cooperative with the exam. His speech was rapid at times and sometimes poorly articulated. His comprehension was normal. His thought processes were unremarkable. His mood was mildly depressed and anxious with congruent affect. His effort was good. The current results are  felt to be an accurate depiction of his cognitive functioning.      RESULTS OF EXAM  His performances on standardized measures of neuropsychological functioning were as follows.     He was fully oriented to time, place, and various aspects of personal information.  He was able to state the names of the 6 most recent presidents.  He obtained passing scores on stand-alone and embedded metrics of cognitive performance ability.  Auditory attention for digits was average.  Mental calculations were average.  Learning of words in a list format was borderline impaired.  Delayed recall of list words was borderline impaired.  Percent retention of list words was low average.  Delayed recognition of list words was impaired.  However, it should be noted that he recognized more words than he produced in any of the learning trials, and he did not endorse any false positive errors.  Learning and delayed recall of story information were both low average.  Delayed recognition of story information was average.  Learning of simple geometric shapes and their spatial locations was low average.  Delayed recall of the shapes and their locations was low average.  Percent retention of the shapes was normal.  Delayed recognition of the shapes was normal and performed without error.  Visual spatial judgments for variably oriented lines were average.  Nonverbal reasoning for incomplete matrices was average.  His drawing of a complicated geometric figure was normal.  Comprehension of phrases and short stories was average.  Verbal associative fluency was high average.  Animal fluency was average.  Naming to confrontation was low average.  Verbal abstract reasoning was high average.  Speeded visual sequencing under focused attention was high average.  A similar measure with a divided attention component was average, with 2 errors.  Speeded word reading was average.  Speeded color naming was low average.  Speeded inhibition of an overlearned  response was average.  Speeded visual motor coding was low average.  Speeded fine motor dexterity was average, bilaterally.    He endorsed items consistent with mild symptoms of depression and mild symptoms of anxiety on self-report measures.  He also endorsed items consistent with mild symptoms of apathy on a self-report measure.    IMPRESSIONS  Mr. Nicole demonstrated weaknesses and variability that might raise some question of mild frontal region compromise.  Relative to the results from his 2021 baseline evaluation, there is a decline in some aspects of attention that are frontally mediated.  However, he is also now mildly depressed, mildly anxious, and is reporting mild symptoms of apathy.  I strongly suspect that these psychological factors have impacted his cognitive performance since evaluation.  It is quite common to document changes in the functioning of the frontal lobes and increased cognitive variability with worsened depression and anxiety.  On testing, weaknesses were noted in verbal and nonverbal learning and free recall/attention.  Recognition memory is preserved.  Other cognitive abilities were normal and performed in keeping with his average to slightly above average range cognitive baseline.    With regard to the question of ADHD, based on his described history, his baseline neuropsychological testing in 2021, and the current test results, it would be challenging to make this diagnosis.  While there are some variable performances on attentionally mediated measures in this exam, he is also depressed and anxious.  Additionally, weaknesses in attention can be documented in Parkinson's disease.    RECOMMENDATIONS  Preliminary results and recommendations were provided to the patient over the telephone on July 16, 2024, and all questions were answered.     1.  If medically indicated, Mr. Nicole might benefit from treatment of his mental health.    2.  Along similar lines, I would encourage him to engage  in psychotherapy services for treatment of his mental health.    3.  He describes significant changes in his mental health status after the modifications to his DBS amplitude in May, 2024.  I encouraged him to have a conversation with Dr. Murrell about these symptoms. I will also reach out to Dr. Murrell.     4.  If he notices difficulties with memory, routine use of a memory notebook or other assistive device could be of benefit.    5. Follow-up neuropsychological evaluation could be considered in the future, if clinically indicated.    Jac Ivy, Ph.D., L.P., ABPP  Board Certified in Clinical Neuropsychology   / Licensed Psychologist [id number]    Time spent:  One unit psychiatric diagnostic interview including interview and clinical assessment by licensed and board-certified neuropsychologist (CPT 57364). One unit (60 minutes) neuropsychological testing evaluation by licensed and board-certified neuropsychologist, including integration of patient data, interpretation of standardized test results and clinical data, clinical decision-making, treatment planning, and report, first hour (CPT 49747). One unit(s) (35 minutes) of neuropsychological testing evaluation by licensed and board-certified neuropsychologist, including integration of patient data, interpretation of standardized test results and clinical data, clinical decision-making, treatment planning, consulting with colleagues, and report, subsequent hours (CPT 29511). One unit (30 minutes) of psychological and neuropsychological test administration and scoring by technician, first 30 minutes (CPT 71620). Four units (121 minutes) psychological or neuropsychological test administration and scoring by technician, subsequent 30 minutes (CPT 71246). Diagnoses: G20, R41.840, F06.8, F32.A, F41.9.

## 2024-07-15 NOTE — PROGRESS NOTES
"Name: Dipesh Nicole  MR#: [id number]  YOB: 1985  Date of Exam: Jul 15, 2024    NEUROPSYCHOLOGICAL EVALUATION    IDENTIFYING INFORMATION  Dipesh Nicole is a 38 year old year old, right handed, business co-owner, with 16 years of formal education. He was unaccompanied to the interview.    The patient was in-clinic for the entirety of this evaluation. The interview and testing were completed face-to-face.     BACKGROUND INFORMATION / INTERVIEW FINDINGS    Records indicate that Mr. Dipesh Nicole developed left hand tremor and right-sided stiffness in late 2016 early 2017.  He was diagnosed with Parkinson disease in 2018.  He underwent DBS placement surgery in July 2022 and August 2022.  A CT scan of his head on August 15, 2022 documented \"New right frontal approach DBS lead with tip in the region of the right globus pallidus. Stable positioning of left frontal approach DBS lead with tip near the left globus pallidus.\"  On May 3, 2024, the amplitude of his left DBS was increased by Dr. Murrell.  His other medical history includes a single suspected seizure (generalized convulsion) in November, 2009.  He completed an EEG study on December 9, 2009.  This study was read as abnormal and documented the presence of 2 episodes of frontally maximal sharp waves that were felt to be epileptiform in nature.  A more recent EEG study on October 13, 2021 was read as normal.  He also has a history of esophagitis, migraine headache, hiatal hernia, shoulder surgery, and right inguinal hernia.  He has also raised some question about the possibility of an underlying ADHD diagnosis.  The current evaluation was requested by Dr. Danita Rubio, in this context.    Of note, Mr. Nicole saw my colleague, Dr. Mamie Gracia, for a baseline neuropsychology exam on October 19, 2021.  The results from this evaluation were felt to be generally within normal limits.  The exam documented average to above average range cognitive " "functioning.  He had difficulty with memory on a list learning task.  The findings were felt to be only subtly abnormal.  He was felt to be a good candidate for DBS surgery from a neuropsychological perspective.    On interview, Mr. Nicole confirmed the above history.  We initially discussed his seizure in 2009.  He stated that he had been drinking alcohol in the 2 days leading up to this seizure.  He denied that he had any seizures prior to or has had any seizures since that time.    With regard to his Parkinson's disease, Mr. Nicole confirmed the above described details.  He reported that he had a left hand tremor, right-sided stiffness, dystonia, shuffling gait, and poorly articulated speech before the DBS surgeries.  He reported that after the DBS was placed, he had no more tremor.  He reported that his dystonia was resolved.  He reported that he got a lot healthier and stronger.  He described the changes as \"night and day.\"  He stated that his movements became more fluid.  He had less rigidity and greater strength.  He noted that as time went on, he began to develop a bit more stiffness and shuffling of his gait.  He then had the increase in DBS amplitude in May, 2024.  He reported that this increase in amplitude seems to have resolved some of these motor issues.    We next discussed his cognition.  Mr. Nicole reported that at baseline, he has always been a procrastinator.  He stated that he has always had some issues with attention and focus.  He noted that he can remember these issues dating back to the fourth or fifth grade.  He stated that when he was in the fifth or sixth grade, he needed to be the center of attention.  He reported that he was smarter than his grades showed.  He denied that he spaced out in school, but stated that he was not interested in the topics.  He reported that he could focus, but he had other interests beyond schoolwork.  He was never diagnosed with attention deficit disorder or a " learning disability.  He denied that he had noticed any cognitive deficits or changes from his cognitive baseline prior to DBS surgery.  He stated that he had increased confidence in his cognition after the DBS surgery.  He stated that he has had increased procrastination after DBS surgery.  He also noted an increase in the fluency of his speech after the recent change in his DBS settings.  He stated that he now speaks rapidly but his speech may be poorly articulated.  He occasionally has difficulties with word finding as well.  He otherwise denied cognitive changes, even he was presented with a list of specific cognitive domains.  In terms of personality, he stated that he is less personable and has felt more anxious about social interactions.    With respect to mental health, Mr. Nicole reported that he did not have a history of mental health issues prior to Parkinson disease.  He saw a psychologist for 5 or 6 sessions shortly before the DBS surgery.  He stated that he had not been talking to people about the possibility of surgery, so the surgical team wanted him to have a source of support.  He reported that his mental health was good after the surgery.  He reported that since the increase in the amplitude of his DBS in May, he has been more emotional.  He stated that he is now started to feel a bit more depressed and anxious.  He had a couple of sessions with psychologists, but he has not had regular psychotherapy at any point since his surgery.  He has never been prescribed a medication for mental health related concerns.  He denied prior psychiatric hospitalization, hallucinations, symptoms consistent with severe mental illness, trauma, abuse, suicidal ideation, or past suicide attempts.    With regard to other medical background, Mr. Nicole denied prior head injury or stroke.  He reported that his sleep is generally good.  He averages between 6 and 8 hours of sleep per night.  He does wake up at night to use  the restroom.  He slept well the night before this exam (he slept for 9 hours).  He denied symptoms of REM behavior disorder or that he snores.  He did note that he has bizarre and wild dreams because of his medications.  He denied pain.  He denied sensory changes.  Per records, he is prescribed carbidopa-levodopa.  In terms of substance use, he stated that he consumes 2 or 3 alcoholic drinks once per week.  He had been using cannabis on a weekly basis.  However, he has not used cannabis for more than 2 weeks now.  He denied a history of substance abuse.  He denied known family neurologic history.    Mr. Nicole is living at home with his parents.  He is managing his own basic and instrumental daily activities.  He drives.    By way of background, Mr. Nicole is single and has never been .  He does not have children.  Regarding educational background, he graduated from high school with slightly above average grades.  He earned a bachelor's degree from [employer] in [place].  He studied sports management.  Professionally, he worked as a professional  and a .  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  He works at this business full-time, but is also looking for other full-time work.    BEHAVIORAL OBSERVATIONS  Mr. Nicole was pleasant and cooperative with the exam. His speech was rapid at times and sometimes poorly articulated. His comprehension was normal. His thought processes were unremarkable. His mood was mildly depressed and anxious with congruent affect. His effort was good. The current results are felt to be an accurate depiction of his cognitive functioning.      RESULTS OF EXAM  His performances on standardized measures of neuropsychological functioning were as follows.     He was fully oriented to time, place, and various aspects of personal information.  He was able to state the names of the 6  most recent presidents.  He obtained passing scores on stand-alone and embedded metrics of cognitive performance ability.  Auditory attention for digits was average.  Mental calculations were average.  Learning of words in a list format was borderline impaired.  Delayed recall of list words was borderline impaired.  Percent retention of list words was low average.  Delayed recognition of list words was impaired.  However, it should be noted that he recognized more words than he produced in any of the learning trials, and he did not endorse any false positive errors.  Learning and delayed recall of story information were both low average.  Delayed recognition of story information was average.  Learning of simple geometric shapes and their spatial locations was low average.  Delayed recall of the shapes and their locations was low average.  Percent retention of the shapes was normal.  Delayed recognition of the shapes was normal and performed without error.  Visual spatial judgments for variably oriented lines were average.  Nonverbal reasoning for incomplete matrices was average.  His drawing of a complicated geometric figure was normal.  Comprehension of phrases and short stories was average.  Verbal associative fluency was high average.  Animal fluency was average.  Naming to confrontation was low average.  Verbal abstract reasoning was high average.  Speeded visual sequencing under focused attention was high average.  A similar measure with a divided attention component was average, with 2 errors.  Speeded word reading was average.  Speeded color naming was low average.  Speeded inhibition of an overlearned response was average.  Speeded visual motor coding was low average.  Speeded fine motor dexterity was average, bilaterally.    He endorsed items consistent with mild symptoms of depression and mild symptoms of anxiety on self-report measures.  He also endorsed items consistent with mild symptoms of apathy on a  self-report measure.    IMPRESSIONS  Mr. Nicole demonstrated weaknesses and variability that might raise some question of mild frontal region compromise.  Relative to the results from his 2021 baseline evaluation, there is a decline in some aspects of attention that are frontally mediated.  However, he is also now mildly depressed, mildly anxious, and is reporting mild symptoms of apathy.  I strongly suspect that these psychological factors have impacted his cognitive performance since evaluation.  It is quite common to document changes in the functioning of the frontal lobes and increased cognitive variability with worsened depression and anxiety.  On testing, weaknesses were noted in verbal and nonverbal learning and free recall/attention.  Recognition memory is preserved.  Other cognitive abilities were normal and performed in keeping with his average to slightly above average range cognitive baseline.    With regard to the question of ADHD, based on his described history, his baseline neuropsychological testing in 2021, and the current test results, it would be challenging to make this diagnosis.  While there are some variable performances on attentionally mediated measures in this exam, he is also depressed and anxious.  Additionally, weaknesses in attention can be documented in Parkinson's disease.    RECOMMENDATIONS  Preliminary results and recommendations were provided to the patient over the telephone on July 16, 2024, and all questions were answered.     1.  If medically indicated, Mr. Nicole might benefit from treatment of his mental health.    2.  Along similar lines, I would encourage him to engage in psychotherapy services for treatment of his mental health.    3.  He describes significant changes in his mental health status after the modifications to his DBS amplitude in May, 2024.  I encouraged him to have a conversation with Dr. Murrell about these symptoms. I will also reach out to Dr. Murrell.     4.   If he notices difficulties with memory, routine use of a memory notebook or other assistive device could be of benefit.    5. Follow-up neuropsychological evaluation could be considered in the future, if clinically indicated.    Jac Iyv, Ph.D., L.P., ABPP  Board Certified in Clinical Neuropsychology   / Licensed Psychologist [id number]    Time spent:  One unit psychiatric diagnostic interview including interview and clinical assessment by licensed and board-certified neuropsychologist (CPT 54909). One unit (60 minutes) neuropsychological testing evaluation by licensed and board-certified neuropsychologist, including integration of patient data, interpretation of standardized test results and clinical data, clinical decision-making, treatment planning, and report, first hour (CPT 28153). One unit(s) (35 minutes) of neuropsychological testing evaluation by licensed and board-certified neuropsychologist, including integration of patient data, interpretation of standardized test results and clinical data, clinical decision-making, treatment planning, consulting with colleagues, and report, subsequent hours (CPT 33695). One unit (30 minutes) of psychological and neuropsychological test administration and scoring by technician, first 30 minutes (CPT 68515). Four units (121 minutes) psychological or neuropsychological test administration and scoring by technician, subsequent 30 minutes (CPT 67856). Diagnoses: G20, R41.840, F06.8, F32.A, F41.9.

## 2024-07-15 NOTE — TELEPHONE ENCOUNTER
Prior Authorization Retail Medication Request    Medication/Dose: carbidopa-levodopa (RYTARY) 48. MG CPCR per CR capsule  Diagnosis and ICD code (if different than what is on RX):  G20  New/renewal/insurance change PA/secondary ins. PA: renewal   Previously Tried and Failed:  carbidopa-levodopa IR tablets, amantadine, Neupro   Rationale:   patient has been stable on Rytary for several years and it would be detrimental to his health to change medications     Insurance   Primary: Medicaid  Insurance ID:  [id number]     Secondary (if applicable):  Insurance ID:      Pharmacy Information (if different than what is on RX)  Name:  [facility]  P: [phone]  F: [phone]

## 2024-07-15 NOTE — NURSING NOTE
The patient was seen for neuropsychological evaluation at the request of Dr. Danita Rubio, for the purposes of diagnostic clarification and treatment planning. 151 minutes of test administration and scoring were provided by this writer, Giovanny Shirley. Please see Dr. Jac Ivy's report for a full interpretation of the findings.

## 2024-07-16 NOTE — PROGRESS NOTES
NAME  Dipesh Nicole    MRN  [id number]      [birth year]     AGE  38     SEX  Male     HANDEDNESS Right     EDUCATION 16     CIFUENTES  7/15/24     PROVIDER       TECH  KB     STATION  OP            ORIENTATION      Time  0     Personal Info.     Place      Presidents             WAIS-IV       FSIQ:  WMI: 95    VCI:  PSI:     MAHNAZ:  RDS: 8             Raw ScS    Similarities 31 13    Matrix Reasoning 15 8    Digit Span  24 8    Arithmetic  15 10    Coding  56 7           JUAN-O COMPLEX FIGURE       Raw T %ile   Copy  34  >16   Time to Copy 187  >16          COWAT FAS      Raw 55      ScS 14      T 58             ANIMAL FLUENCY      Raw 27      ScS 13      T 54              BOSTON NAMING TEST    Raw 55 /60     ScS 9      T 38             COMPLEX IDEATIONAL MATERIAL    Raw 12      ScS 12      T 55             TRAIL MAKING TEST       Time Errors ScS T   A 17 0 14 61   B 55 2 12 50          STROOP        Raw T     Word 110 52     Color 70 43     C/W 49 54             KEYSHA   H   Raw 25      %ile 56             GROOVED PEGBOARD      Raw Drops ScS T   RH 57 0 12 55   LH 63 0 12 54          BDI-II       Raw 18      Interp. Mild             BRIAN       Raw 8      Interp. Mild             APATHY SCALE      Raw 17      Interp. Mild              WMS-IV LOGICAL MEMORY YA    Raw ScS/%ile     LM I 18 7     LM II 15 7     Recog. 24 26-50             HVLT   5     Raw T    Trial 1  5     Trial 2  7     Trial 3  9     Learning  4     Total Recall 21 34    Delayed Recall 7 36    Percent Retention 78% 40    True Positives 10     False Positives 0     Disc. Index  10 27            BVMT   1     Raw T/%ile    Trial 1  7     Trial 2  8     Trial 3  8     Learning  1     Total Recall 23 43    Delayed Recall 8 39    Percent Retention 100% >16    Recognition Hits 6     Recognition F.P 0     Disc. Index  6 >16           DCT       E-Score 4

## 2024-07-18 NOTE — TELEPHONE ENCOUNTER
Central Prior Authorization Team  Phone: [phone]    PA Initiation    Medication: RYTARY 48. MG PO CPCR  Insurance Company: [place] Medicaid ([facility]) - Phone [phone] Fax [phone]  Pharmacy Filling the Rx: [id number] - [address]  Filling Pharmacy Phone: [phone]  Filling Pharmacy Fax: [phone]  Start Date: 7/18/2024

## 2024-07-22 NOTE — TELEPHONE ENCOUNTER
Prior Authorization Approval    Medication: RYTARY 48. MG PO CPCR  Authorization Effective Date: 7/18/2024  Authorization Expiration Date: 7/31/2024  Approved Dose/Quantity:   Reference #:   PA# [id number]  Insurance Company: [place] Medicaid ([facility]) - Phone [phone] Fax [phone]  Expected CoPay: $    CoPay Card Available:      Financial Assistance Needed:   Which Pharmacy is filling the prescription: [id number] - [place], [place] - [address] [place] [address]  Pharmacy Notified: Yes  Patient Notified: No

## 2024-07-29 NOTE — TELEPHONE ENCOUNTER
Attempted to contact patient regarding upcoming EGD procedure on 3/7/22 for pre assessment questions. No answer.     Left message to return call to [phone] #2    Covid test scheduled: 3/3/22    Arrival time: 1430    Facility location: [facility]    Sedation type: MAC    Indication for procedure: f/u esophagitis     Anticoagulants: no.     Emely Pascual RN       warm

## 2024-08-07 ENCOUNTER — MYC MEDICAL ADVICE (OUTPATIENT)
Dept: PSYCHOLOGY | Facility: CLINIC | Age: 39
End: 2024-08-07
Payer: COMMERCIAL

## 2024-08-08 NOTE — TELEPHONE ENCOUNTER
Received voicemail from patient stating he was returning the call to schedule. Will request that [facility] staff reach out again to reschedule.     Ada Mon, Ph.D., , [facility]  Clinical Health Psychologist  Phone: [phone]   Pager: [phone]  8/8/2024 2:26 PM

## 2024-08-08 NOTE — TELEPHONE ENCOUNTER
Left Voicemail (1st Attempt) for the patient to call back and schedule the following:    Appointment type: ADULT PSYCHOTHERAPY RETURN  Provider: Dr. Mon  Return date: any  Specialty phone number: [phone]

## 2024-08-23 ENCOUNTER — TELEPHONE (OUTPATIENT)
Dept: NEUROLOGY | Facility: CLINIC | Age: 39
End: 2024-08-23
Payer: COMMERCIAL

## 2024-08-23 NOTE — TELEPHONE ENCOUNTER
Prior Authorization Retail Medication Request     Medication/Dose: carbidopa-levodopa (RYTARY) 48. MG CPCR per CR capsule  Diagnosis and ICD code (if different than what is on RX):  G20  New/renewal/insurance change PA/secondary ins. PA: renewal   Previously Tried and Failed:  carbidopa-levodopa IR tablets, amantadine, Neupro   Rationale:   patient has been stable on Rytary for several years and it would be detrimental to his health to change medications      Insurance   [facility]  ID: [id number]  PMI# [id number]   Issue Date: 10/26/23  Issuer: [id number]     Secondary (if applicable):  Insurance ID:       Pharmacy Information (if different than what is on RX)  Name:  [facility]  P: [phone]  F: [phone]

## 2024-08-26 DIAGNOSIS — G20.A1 PARKINSON'S DISEASE, UNSPECIFIED WHETHER DYSKINESIA PRESENT, UNSPECIFIED WHETHER MANIFESTATIONS FLUCTUATE (H): ICD-10-CM

## 2024-08-26 NOTE — TELEPHONE ENCOUNTER
This looks like an urgent PA - please advise.                                          RX Authorization    Medication: carbidopa-levodopa (RYTARY) 48. MG CPCR per CR capsule     Date last refill ordered: 1/8/24    Quantity ordered: 330    # refills: 11    Date of last clinic visit with ordering provider: 12/14/23 (Dr. Carson)    Date of next clinic visit with ordering provider: 11/8/24 (Dr. Murrell)

## 2024-08-27 RX ORDER — LEVODOPA AND CARBIDOPA 195; 48.75 MG/1; MG/1
CAPSULE, EXTENDED RELEASE ORAL
Qty: 330 CAPSULE | Refills: 11 | OUTPATIENT
Start: 2024-08-27

## 2024-08-27 NOTE — TELEPHONE ENCOUNTER
Retail Pharmacy Prior Authorization Team   Phone: [phone]    PA Initiation    Medication: RYTARY 48. MG PO CPCR  Insurance Company: [employer] - Phone [phone] Fax [phone]  Pharmacy Filling the Rx[id number] [place] [address] [place] [address]  Filling Pharmacy Phone: [phone]  Filling Pharmacy Fax:    Start Date: 8/27/2024

## 2024-08-27 NOTE — TELEPHONE ENCOUNTER
Prior Authorization Approval    Medication: RYTARY 48. MG PO CPCR  Authorization Effective Date: 8/27/2024  Authorization Expiration Date: 8/27/2025  Approved Dose/Quantity:   Reference #:     Insurance Company: [employer] - Phone [phone] Fax [phone]  Expected CoPay: $    CoPay Card Available:      Financial Assistance Needed:   Which Pharm[facility] the prescription[id number] - [place], [place] - [address]  Pharmacy Notified: Yes  Patient Notified: Pharmacy states patient was able to  today

## 2024-08-27 NOTE — TELEPHONE ENCOUNTER
Request is for an urgent PA as patient has 1 day of medication left. No new prescription needed. 8/23/2024 telephone encounter shows a prior authorization was started, patients new insurance was obtained today for the PA. PA encounter re-rerouted as urgent.

## 2024-09-03 ENCOUNTER — OFFICE VISIT (OUTPATIENT)
Dept: NEUROSURGERY | Facility: CLINIC | Age: 39
End: 2024-09-03
Payer: COMMERCIAL

## 2024-09-03 VITALS
HEART RATE: 62 BPM | WEIGHT: 183 LBS | OXYGEN SATURATION: 98 % | BODY MASS INDEX: 24.25 KG/M2 | HEIGHT: 73 IN | DIASTOLIC BLOOD PRESSURE: 79 MMHG | SYSTOLIC BLOOD PRESSURE: 131 MMHG

## 2024-09-03 DIAGNOSIS — G20.A1 PARKINSON'S DISEASE WITHOUT DYSKINESIA OR FLUCTUATING MANIFESTATIONS (H): Primary | ICD-10-CM

## 2024-09-03 ASSESSMENT — PAIN SCALES - GENERAL: PAINLEVEL: NO PAIN (0)

## 2024-09-03 NOTE — LETTER
9/3/2024       RE: Dipesh Nicole  [address] [place] [address]     Dear Colleague,    Thank you for referring your patient, Dipesh Nicole, to the [facility]OS[facility]IC [place] [facility]. Please see a copy of my visit note below.    Neurosurgery Attending DBS Follow Up Note     HPI: 37 y/o gentleman with PD ~2 years s/p b/l GPi DBS ([employer]) who presents for neurosurgical research visit. Doing well. Stimulation has greatly improved his symptoms. He is back playing baseball this past year.     Exam:  ON high freq stim, ON meds  UPDRS gait: 0, normal step length, normal arm swing, 1 step turn  UPDRS pull test: 0, recovers from medium to hard pulls in 1-2 steps, easy pulls in 1 step     OFF stim, ON meds  UPDRS gait: 0, normal step length, normal arm swing, 1 step turn  UPDRS pull test: 0, recovers from medium to hard pulls in 1-2 steps, easy pulls in 1 step    ON low freq stim, ON meds  UPDRS gait: 0, normal step length, normal arm swing, 1 step turn  UPDRS pull test: 0, recovers from medium to hard pulls in 1-2 steps, easy pulls in 1 step     UPDRS outcome: see prior notes from      A/P: 37 y/o gentleman with PD s/p bilateral GPi DBS, doing well     - DBS setting management per movement disorders team  - Follow-up in 1 year for research visit      Again, thank you for allowing me to participate in the care of your patient.      Sincerely,    Angel Morales MD

## 2024-09-03 NOTE — PROGRESS NOTES
Neurosurgery Attending DBS Follow Up Note     HPI: 39 y/o gentleman with PD ~2 years s/p b/l GPi DBS ([employer] 7) who presents for neurosurgical research visit. Doing well. Stimulation has greatly improved his symptoms. He is back playing baseball this past year.     Exam:  ON high freq stim, ON meds  UPDRS gait: 0, normal step length, normal arm swing, 1 step turn  UPDRS pull test: 0, recovers from medium to hard pulls in 1-2 steps, easy pulls in 1 step     OFF stim, ON meds  UPDRS gait: 0, normal step length, normal arm swing, 1 step turn  UPDRS pull test: 0, recovers from medium to hard pulls in 1-2 steps, easy pulls in 1 step    ON low freq stim, ON meds  UPDRS gait: 0, normal step length, normal arm swing, 1 step turn  UPDRS pull test: 0, recovers from medium to hard pulls in 1-2 steps, easy pulls in 1 step     UPDRS outcome: see prior notes from      A/P: 39 y/o gentleman with PD s/p bilateral GPi DBS, doing well     - DBS setting management per movement disorders team  - Follow-up in 1 year for research visit

## 2024-11-08 ENCOUNTER — TELEPHONE (OUTPATIENT)
Dept: NEUROLOGY | Facility: CLINIC | Age: 39
End: 2024-11-08

## 2024-11-08 NOTE — TELEPHONE ENCOUNTER
Spoke to the patient and he was scheduled on 12/13/24 at 9 AM with Dr. Murrell. 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 The patient was added onto a wait list in of any cancellations per patient's request.

## 2024-11-08 NOTE — TELEPHONE ENCOUNTER
[facility] Call Center    Phone Message    May a detailed message be left on voicemail: yes     Reason for Call: Other:       Patient missed his DBS appt this morning, thought the appt was at 10:00 am when it was at 8:00 am. Requesting call back to reschedule. States he is headed to the clinic and if there is an opening today he would like to be seen if possible. Call back #[phone]    Action Taken: Message routed to:  [facility] ([facility]): Neurology    Travel Screening: Not Applicable

## 2024-11-11 ENCOUNTER — HOSPITAL ENCOUNTER (EMERGENCY)
Facility: CLINIC | Age: 39
Discharge: HOME | End: 2024-11-11
Attending: EMERGENCY MEDICINE | Admitting: EMERGENCY MEDICINE
Payer: COMMERCIAL

## 2024-11-11 VITALS
HEIGHT: 73 IN | SYSTOLIC BLOOD PRESSURE: 160 MMHG | DIASTOLIC BLOOD PRESSURE: 91 MMHG | OXYGEN SATURATION: 98 % | RESPIRATION RATE: 22 BRPM | TEMPERATURE: 97.3 F | BODY MASS INDEX: 24.52 KG/M2 | WEIGHT: 185 LBS | HEART RATE: 70 BPM

## 2024-11-11 DIAGNOSIS — R11.2 INTRACTABLE NAUSEA AND VOMITING: ICD-10-CM

## 2024-11-11 DIAGNOSIS — E86.0 DEHYDRATION: ICD-10-CM

## 2024-11-11 LAB
ALBUMIN SERPL BCG-MCNC: 4.8 G/DL (ref 3.5–5.2)
ALBUMIN UR-MCNC: 10 MG/DL
ALP SERPL-CCNC: 69 U/L (ref 40–150)
ALT SERPL W P-5'-P-CCNC: 7 U/L (ref 0–70)
AMORPH CRY #/AREA URNS HPF: ABNORMAL /HPF
ANION GAP SERPL CALCULATED.3IONS-SCNC: 11 MMOL/L (ref 7–15)
APPEARANCE UR: ABNORMAL
AST SERPL W P-5'-P-CCNC: 20 U/L (ref 0–45)
ATRIAL RATE - MUSE: 66 BPM
BASOPHILS # BLD AUTO: 0 10E3/UL (ref 0–0.2)
BASOPHILS NFR BLD AUTO: 0 %
BILIRUB SERPL-MCNC: 0.6 MG/DL
BILIRUB UR QL STRIP: NEGATIVE
BUN SERPL-MCNC: 12.1 MG/DL (ref 6–20)
CALCIUM SERPL-MCNC: 9.3 MG/DL (ref 8.8–10.4)
CHLORIDE SERPL-SCNC: 101 MMOL/L (ref 98–107)
COLOR UR AUTO: YELLOW
CREAT SERPL-MCNC: 0.82 MG/DL (ref 0.67–1.17)
DIASTOLIC BLOOD PRESSURE - MUSE: NORMAL MMHG
EGFRCR SERPLBLD CKD-EPI 2021: >90 ML/MIN/1.73M2
EOSINOPHIL # BLD AUTO: 0.1 10E3/UL (ref 0–0.7)
EOSINOPHIL NFR BLD AUTO: 1 %
ERYTHROCYTE [DISTWIDTH] IN BLOOD BY AUTOMATED COUNT: 12.7 % (ref 10–15)
GLUCOSE SERPL-MCNC: 134 MG/DL (ref 70–99)
GLUCOSE UR STRIP-MCNC: NEGATIVE MG/DL
HCO3 SERPL-SCNC: 27 MMOL/L (ref 22–29)
HCT VFR BLD AUTO: 42.8 % (ref 40–53)
HGB BLD-MCNC: 14.4 G/DL (ref 13.3–17.7)
HGB UR QL STRIP: NEGATIVE
IMM GRANULOCYTES # BLD: 0 10E3/UL
IMM GRANULOCYTES NFR BLD: 0 %
INTERPRETATION ECG - MUSE: NORMAL
KETONES UR STRIP-MCNC: 10 MG/DL
LEUKOCYTE ESTERASE UR QL STRIP: NEGATIVE
LIPASE SERPL-CCNC: 84 U/L (ref 13–60)
LYMPHOCYTES # BLD AUTO: 0.9 10E3/UL (ref 0.8–5.3)
LYMPHOCYTES NFR BLD AUTO: 11 %
MCH RBC QN AUTO: 30.3 PG (ref 26.5–33)
MCHC RBC AUTO-ENTMCNC: 33.6 G/DL (ref 31.5–36.5)
MCV RBC AUTO: 90 FL (ref 78–100)
MONOCYTES # BLD AUTO: 0.4 10E3/UL (ref 0–1.3)
MONOCYTES NFR BLD AUTO: 5 %
MUCOUS THREADS #/AREA URNS LPF: PRESENT /LPF
NEUTROPHILS # BLD AUTO: 6.4 10E3/UL (ref 1.6–8.3)
NEUTROPHILS NFR BLD AUTO: 82 %
NITRATE UR QL: NEGATIVE
NRBC # BLD AUTO: 0 10E3/UL
NRBC BLD AUTO-RTO: 0 /100
P AXIS - MUSE: 44 DEGREES
PH UR STRIP: 8.5 [PH] (ref 5–7)
PLATELET # BLD AUTO: 209 10E3/UL (ref 150–450)
POTASSIUM SERPL-SCNC: 4.3 MMOL/L (ref 3.4–5.3)
PR INTERVAL - MUSE: 162 MS
PROT SERPL-MCNC: 7.4 G/DL (ref 6.4–8.3)
QRS DURATION - MUSE: 100 MS
QT - MUSE: 368 MS
QTC - MUSE: 385 MS
R AXIS - MUSE: 82 DEGREES
RBC # BLD AUTO: 4.76 10E6/UL (ref 4.4–5.9)
RBC URINE: 0 /HPF
SODIUM SERPL-SCNC: 139 MMOL/L (ref 135–145)
SP GR UR STRIP: 1.02 (ref 1–1.03)
SYSTOLIC BLOOD PRESSURE - MUSE: NORMAL MMHG
T AXIS - MUSE: 57 DEGREES
UROBILINOGEN UR STRIP-MCNC: <2 MG/DL
VENTRICULAR RATE- MUSE: 66 BPM
WBC # BLD AUTO: 7.8 10E3/UL (ref 4–11)
WBC URINE: 0 /HPF

## 2024-11-11 PROCEDURE — 96375 TX/PRO/DX INJ NEW DRUG ADDON: CPT

## 2024-11-11 PROCEDURE — 36415 COLL VENOUS BLD VENIPUNCTURE: CPT | Performed by: EMERGENCY MEDICINE

## 2024-11-11 PROCEDURE — 99284 EMERGENCY DEPT VISIT MOD MDM: CPT | Mod: 25

## 2024-11-11 PROCEDURE — 250N000009 HC RX 250: Performed by: EMERGENCY MEDICINE

## 2024-11-11 PROCEDURE — 250N000013 HC RX MED GY IP 250 OP 250 PS 637: Performed by: EMERGENCY MEDICINE

## 2024-11-11 PROCEDURE — 258N000003 HC RX IP 258 OP 636: Performed by: EMERGENCY MEDICINE

## 2024-11-11 PROCEDURE — 250N000011 HC RX IP 250 OP 636: Performed by: EMERGENCY MEDICINE

## 2024-11-11 PROCEDURE — 96361 HYDRATE IV INFUSION ADD-ON: CPT

## 2024-11-11 PROCEDURE — 96376 TX/PRO/DX INJ SAME DRUG ADON: CPT

## 2024-11-11 PROCEDURE — 81001 URINALYSIS AUTO W/SCOPE: CPT | Performed by: EMERGENCY MEDICINE

## 2024-11-11 PROCEDURE — 80053 COMPREHEN METABOLIC PANEL: CPT | Performed by: EMERGENCY MEDICINE

## 2024-11-11 PROCEDURE — 96374 THER/PROPH/DIAG INJ IV PUSH: CPT

## 2024-11-11 PROCEDURE — 83690 ASSAY OF LIPASE: CPT | Performed by: EMERGENCY MEDICINE

## 2024-11-11 PROCEDURE — 93005 ELECTROCARDIOGRAM TRACING: CPT | Performed by: EMERGENCY MEDICINE

## 2024-11-11 PROCEDURE — 85004 AUTOMATED DIFF WBC COUNT: CPT | Performed by: EMERGENCY MEDICINE

## 2024-11-11 RX ORDER — LORAZEPAM 1 MG/1
.5-1 TABLET ORAL EVERY 6 HOURS PRN
Qty: 15 TABLET | Refills: 0 | Status: SHIPPED | OUTPATIENT
Start: 2024-11-11

## 2024-11-11 RX ORDER — ONDANSETRON 2 MG/ML
4 INJECTION INTRAMUSCULAR; INTRAVENOUS ONCE
Status: COMPLETED | OUTPATIENT
Start: 2024-11-11 | End: 2024-11-11

## 2024-11-11 RX ORDER — DROPERIDOL 2.5 MG/ML
1.25 INJECTION, SOLUTION INTRAMUSCULAR; INTRAVENOUS ONCE
Status: COMPLETED | OUTPATIENT
Start: 2024-11-11 | End: 2024-11-11

## 2024-11-11 RX ORDER — ONDANSETRON 4 MG/1
4 TABLET, ORALLY DISINTEGRATING ORAL EVERY 8 HOURS PRN
Qty: 15 TABLET | Refills: 0 | Status: SHIPPED | OUTPATIENT
Start: 2024-11-11

## 2024-11-11 RX ORDER — LORAZEPAM 2 MG/ML
1 INJECTION INTRAMUSCULAR ONCE
Status: COMPLETED | OUTPATIENT
Start: 2024-11-11 | End: 2024-11-11

## 2024-11-11 RX ADMIN — PROCHLORPERAZINE EDISYLATE 5 MG: 5 INJECTION INTRAMUSCULAR; INTRAVENOUS at 12:12

## 2024-11-11 RX ADMIN — LORAZEPAM 1 MG: 2 INJECTION INTRAMUSCULAR; INTRAVENOUS at 08:36

## 2024-11-11 RX ADMIN — SODIUM CHLORIDE 1000 ML: 9 INJECTION, SOLUTION INTRAVENOUS at 08:49

## 2024-11-11 RX ADMIN — ONDANSETRON 4 MG: 2 INJECTION, SOLUTION INTRAMUSCULAR; INTRAVENOUS at 08:50

## 2024-11-11 RX ADMIN — LEVODOPA AND CARBIDOPA 2 CAPSULE: 195; 48.75 CAPSULE, EXTENDED RELEASE ORAL at 12:47

## 2024-11-11 RX ADMIN — SODIUM CHLORIDE 1000 ML: 9 INJECTION, SOLUTION INTRAVENOUS at 12:12

## 2024-11-11 RX ADMIN — PANTOPRAZOLE SODIUM 40 MG: 40 INJECTION, POWDER, FOR SOLUTION INTRAVENOUS at 08:50

## 2024-11-11 RX ADMIN — PROCHLORPERAZINE EDISYLATE 5 MG: 5 INJECTION INTRAMUSCULAR; INTRAVENOUS at 10:18

## 2024-11-11 RX ADMIN — DROPERIDOL 1.25 MG: 2.5 INJECTION, SOLUTION INTRAMUSCULAR; INTRAVENOUS at 09:55

## 2024-11-11 ASSESSMENT — COLUMBIA-SUICIDE SEVERITY RATING SCALE - C-SSRS
2. HAVE YOU ACTUALLY HAD ANY THOUGHTS OF KILLING YOURSELF IN THE PAST MONTH?: NO
1. IN THE PAST MONTH, HAVE YOU WISHED YOU WERE DEAD OR WISHED YOU COULD GO TO SLEEP AND NOT WAKE UP?: NO
6. HAVE YOU EVER DONE ANYTHING, STARTED TO DO ANYTHING, OR PREPARED TO DO ANYTHING TO END YOUR LIFE?: NO

## 2024-11-11 ASSESSMENT — ACTIVITIES OF DAILY LIVING (ADL)
ADLS_ACUITY_SCORE: 0

## 2024-11-11 NOTE — ED PROVIDER NOTES
EMERGENCY DEPARTMENT ENCOUNTER      NAME: Dipesh Nicole  AGE: 39 year old male  YOB: 1985  MRN: [id number]  EVALUATION DATE & TIME: 2024  8:12 AM    PCP: Joey Morley    ED PROVIDER: Dunia Quarles M.D.      Chief Complaint   Patient presents with    Vomiting       FINAL IMPRESSION:  1. Intractable nausea and vomiting    2. Dehydration        ED COURSE & MEDICAL DECISION MAKIN:17 AM I met with the patient to gather history and to perform my initial exam. I discussed the plan for care while in the Emergency Department.  12:44 PM Rechecked and updated the patient. We discussed the plan for discharge and the patient is agreeable. Reviewed supportive cares, symptomatic treatment, outpatient follow up, and reasons to return to the Emergency Department. Patient to be discharged by ED RN.     ED Course as of 24 1019   Mon 2024   0818 1. Vomiting.  Hx of cyclic vomiting, no marijuana use or alcohol use.  Abdomen benign, denies ab pain.  Differentials considered include cyclic vomiting, gastritis, gastroenteritis, pancreatitis unlikely given no abdominal pain, dehydration, metabolic abnormality, NAY.  Disposition pending labs, IV fluids and meds.   0820 Chart reviewed as follows: 9/3/2024 office visit note surgery neurosurgery.  GI clinic visit 2023:  Upper endoscopy as part of evaluation for nausea and vomiting in , upper endoscopy showed grade C esophagitis.  Put on omeprazole twice daily.  Repeat EGD 3 2022 Showed Hill grade 4 flap valve, 4 cm hiatal hernia, bilious gastric fluid.  Biopsies were unremarkable.  Esophageal manometry testing done which was normal.  Esophagram 2022 showed small hiatal hernia otherwise unremarkable.   1015 EKG reviewed by myself at 1004 and shows sinus rhythm rate 66, Qtc 385, compared to previous EKG from earlier at 0947 today but no other EKGs prior to this visible.  I have independently reviewed and interpreted today's EKG, pending  Cardiologist read     1036 I spoke to friend and updated them on plan of care. He feels slightly improved after compazine dose. IV droperidol given previously per ER record review, we have no droperidol in stock due to shortages.   1116 PO challenging with water now.   1200 Patient tolerated PO, discharged home.     Pertinent Labs & Imaging studies reviewed. (See chart for details).    Medical Decision Making  Obtained supplemental history:Supplemental history obtained?: No  Reviewed external records: External records reviewed?: Outpatient Record: Neurosurgery Visit 9/3/24  Care impacted by chronic illness:Other: Parkinson's disease  Did you consider but not order tests?: Work up considered but not performed and documented in chart, if applicable  Did you interpret images independently?: Independent interpretation of ECG and images noted in documentation, when applicable.  Consultation discussion with other provider:Did you involve another provider (consultant, , pharmacy, etc.)?: No  Discharge. I prescribed additional prescription strength medication(s) as charted. See documentation for any additional details.    MIPS: Not Applicable        At the conclusion of the encounter I discussed the results of all of the tests and the disposition. The questions were answered. The patient or family acknowledged understanding and was agreeable with the care plan.      CRITICAL CARE:  N/A    HPI    Patient information was obtained from: Patient    Use of : N/A.       Dipesh Nicole is a 39 year old male who presents for evaluation of vomiting.    The patient reports he has had continuous nausea and vomiting over the past 13 hours with approximately 50 episodes of vomiting. He has not noticed any blood in his emesis. He reports a history of cyclic vomiting but after having a deep brain stimulator placed a couple years ago he had not had any episodes of recurrent vomiting since today. He denies any recent alcohol or  marijuana use that may have provoked his symptoms. In the past Ativan has helped to improve his symptoms. He reports he has not been able to keep down any of his Rytary (Parkinson's medication) since the vomiting started.    He has not had any sick contacts. He has not had any falls or head trauma. He denies any fever, cough, or cold symptoms.    Per Chart Review: Patient with DBS placed approximately 2 years ago.      REVIEW OF SYSTEMS  All other systems negative unless noted in HPI.    PAST MEDICAL HISTORY:  Past Medical History:   Diagnosis Date    Esophagitis endoscopy done 11/2021 11/18/2021    Impression:            - Normal first portion of the duodenum, second portion                         of the duodenum and third portion of the duodenum.                         Biopsied.                         - Nodular mucosa in the duodenal bulb. Biopsied.                         - Normal stomach. Biopsied.                         - Medium-sized hiatal hernia.                         - LA Grade    H/O electroencephalogram 2021 11/18/2021     Impression:  This is a normal waking and sleep EEG, with generalized beta activities throughout the recording. Generalized beta activities are usually associated certain medication use, such as benzodiazepines or barbiturates. The cause of these activities in this case is unclear at this time.        H/O magnetic resonance imaging of brain and brain stem 8/17/2018 2009 December MR Brain without and with IV [id number]/8/2009 [facility] Result Impression  Normal MRI of the brain.  Result Narrative      Multiecho, multiplanar views of the brain were obtained prior to and  following the IV administration of 19 mL of contrast. There are no  previous studies available for comparison.     The brain parenchyma is normal in appearance on all pulse sequences,  with     H/O magnetic resonance imaging of cervical spine 8/17/2018 March MR Cervical Spine without IV Contrast3/15/2017 [facility] Result Addenda Addendum by Provider, PA Jones on 03/15/2017 12:30 PM RAD^^^MA MR Cervical Spine w/o contrast 3/15/2017 12:30:00 Result Impression  Minimal degenerative disc disease at C5-6 and C6-7  otherwise an unremarkable MR scan of the cervical spine.  Result Narrative   EXAM: MR Cervical Spine w/o contrast   INDICATION:     H/O shoulder surgery 8/17/2018 2017 July XR SHOULDER 2 VIEWS LEFT7/14/2017 [facility] Result Narrative XR SHOULDER 2 VIEWS LEFT 7/14/2017 9:13 PM  INDICATION: Shoulder Injury COMPARISON: None.  FINDINGS: Negative shoulder. No fracture or dislocation.  Status      Hiatal hernia 4/20/2021    Based on a 2021 ct scan Small hiatal hernia with some wall thickening in the distal thoracic esophagus suggesting some esophagitis.    Seizure (H) at age 22 yrs 8/17/2018    Shoulder dislocation     left    Tremor 8/10/2018       PAST SURGICAL HISTORY:  Past Surgical History:   Procedure Laterality Date    ESOPHAGOSCOPY, GASTROSCOPY, DUODENOSCOPY (EGD), COMBINED N/A 11/18/2021    Procedure: ESOPHAGOGASTRODUODENOSCOPY, WITH BIOPSY;  Surgeon: Veronica Kay MD;  Location: [facility] [place]    ESOPHAGOSCOPY, GASTROSCOPY, DUODENOSCOPY (EGD), COMBINED N/A 3/7/2022    Procedure: ESOPHAGOGASTRODUODENOSCOPY, WITH BIOPSY;  Surgeon: Alexandr Sweet MD;  Location: [facility] OR    IMPLANT DEEP BRAIN STIMULATION GENERATOR / BATTERY Left 7/25/2022    Procedure: Deep brain stimulator placement, phase II, placement of deep brain stimulator generator/battery over the left chest wall;  Surgeon: Angel Morales MD;  Location: [facility] OR    OPTICAL TRACKING SYSTEM INSERTION DEEP BRAIN STIMULATION Left 7/18/2022    Procedure: Left side deep brain stimulator placement, phase I, placement of left side deep brain stimulator electrode, target left globus pallidus internus, with microelectrode recording;  Surgeon: Angel Morales MD;  Location: [facility] OR    OPTICAL TRACKING SYSTEM  INSERTION DEEP BRAIN STIMULATION Right 8/15/2022    Procedure: Stealth Assisted Right side deep brain stimulator placement, phase I & II combined, placement of right side deep brain stimulator electrode, target right globus pallidus internus, with microelectrode recording and connection to existing generator/battery;  Surgeon: Angel Morales MD;  Location: [facility] OR    SHOULDER SURGERY  2007         CURRENT MEDICATIONS:    No current facility-administered medications for this encounter.     Current Outpatient Medications   Medication Sig Dispense Refill    LORazepam (ATIVAN) 1 MG tablet Take 0.5-1 tablets (0.5-1 mg) by mouth every 6 hours as needed for vomiting or muscle spasms. 15 tablet 0    omeprazole (PRILOSEC) 20 MG DR capsule Take 1 capsule (20 mg) by mouth 2 times daily. 60 capsule 0    ondansetron (ZOFRAN ODT) 4 MG ODT tab Take 1 tablet (4 mg) by mouth every 8 hours as needed for vomiting or nausea. 15 tablet 0    carbidopa-levodopa (RYTARY) 48. MG CPCR per CR capsule 2 @5AM, 2@ 9AM, 3@1pm, 2@5PM AND 2@ 9PM = 11 capsules/day 330 capsule 11    carbidopa-levodopa (SINEMET)  MG tablet 1 tab 5/day (Patient not taking: Reported on 5/3/2024) 150 tablet 11         ALLERGIES:  Allergies   Allergen Reactions    Diphenhydramine      Other reaction(s): Other (see comments)  Told not to take due to parkinsons    Metoclopramide      Avoid in patient with a history of Parkinson Disease  Other reaction(s): GI intolerance  Avoid in patient with a history of Parkinson Disease    Promethazine      Other reaction(s): Other (see comments)  Told not to take due to parkisons       FAMILY HISTORY:  Family History   Problem Relation Age of Onset    Cancer Other         grandmother    Tremor No family hx of     Dementia No family hx of     Parkinsonism No family hx of     Seizure Disorder No family hx of        SOCIAL HISTORY:  Social History     Socioeconomic History    Marital status: Single     Spouse name: None     Number of children: None    Years of education: None    Highest education level: None   Tobacco Use    Smoking status: Never     Passive exposure: Never    Smokeless tobacco: Never   Substance and Sexual Activity    Alcohol use: Yes    Drug use: No   Social History Narrative    single. lives in [place]. mother cabrera ambriz            He has a history of a left dislocated shoulder with surgery performed in 2009. He currently lives in [place] but comes to the [place] area to visit his parents in [place]. 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     Social Drivers of Health     Financial Resource Strain: Low Risk  (7/30/2021)    Received from [facility]    Overall Financial Resource Strain (CARDIA)     Difficulty of Paying Living Expenses: Not very hard   Food Insecurity: No Food Insecurity (7/30/2021)    Received from [facility]    Hunger Vital Sign     Worried About Running Out of Food in the Last Year: Never true     Ran Out of Food in the Last Year: Never true   Transportation Needs: No Transportation Needs (7/30/2021)    Received from [facility]    PRAPARE - Transportation     Lack of Transportation (Medical): No     Lack of Transportation (Non-Medical): No   Physical Activity: Sufficiently Active (7/30/2021)    Received from [facility]    Exercise Vital Sign     Days of Exercise per Week: 3 days     Minutes of Exercise per Session: 60 min   Stress: Stress Concern Present (7/30/2021)    Received from [facility]    [place] [employer] - Occupational Stress Questionnaire     Feeling of Stress : To some extent   Social Connections: Unknown (7/30/2021)    Received from [facility]    Social Connection and Isolation Panel [NHANES]     Frequency of Communication with Friends and Family: More than three times a week     Frequency of Social Gatherings with Friends and Family: Three times  "a week     Attends [employer] Services: More than 4 times per year     Active Member of Clubs or Organizations: Yes     Attends Club or Organization Meetings: 1 to 4 times per year     Marital Status: Patient declined   Interpersonal Safety: Not At Risk (11/4/2021)    Humiliation, Afraid, Rape, and Kick questionnaire     Fear of Current or Ex-Partner: No     Emotionally Abused: No     Physically Abused: No     Sexually Abused: No   Housing Stability: Low Risk  (7/30/2021)    [facility]    Housing Stability Vital Sign     Unable to Pay for Housing in the Last Year: No     Number of Places Lived in the Last Year: 2     Unstable Housing in the Last Year: No       VITALS:  Patient Vitals for the past 24 hrs:   BP Pulse Resp SpO2   11/11/24 1230 (!) 160/91 70 -- 98 %   11/11/24 1200 (!) 167/95 66 22 98 %   11/11/24 1130 (!) 168/95 69 23 97 %   11/11/24 1100 (!) 163/101 69 22 98 %   11/11/24 1030 (!) 168/109 65 21 99 %       PHYSICAL EXAM    VITAL SIGNS: BP (!) 160/91   Pulse 70   Temp 97.3  F (36.3  C) (Oral)   Resp 22   Ht 1.854 m (6' 1\")   Wt 83.9 kg (185 lb)   SpO2 98%   BMI 24.41 kg/m    Physical Exam  Vitals and nursing note reviewed.   Constitutional:       General: He is in acute distress.      Appearance: He is not toxic-appearing.      Comments: Appears uncomfortable   HENT:      Mouth/Throat:      Mouth: Mucous membranes are moist.      Pharynx: No oropharyngeal exudate or posterior oropharyngeal erythema.      Comments: Lips are dry but mucous membranes intraorally are moist.  Eyes:      General: No scleral icterus.        Right eye: No discharge.         Left eye: No discharge.      Extraocular Movements: Extraocular movements intact.      Pupils: Pupils are equal, round, and reactive to light.   Cardiovascular:      Rate and Rhythm: Normal rate and regular rhythm.      Pulses: Normal pulses.      Comments: Left anterior chest wall DBS device present  Pulmonary:      Effort: " Pulmonary effort is normal. No respiratory distress.      Breath sounds: Normal breath sounds.   Abdominal:      General: There is no distension.      Palpations: Abdomen is soft.      Tenderness: There is no abdominal tenderness. There is no guarding or rebound.   Musculoskeletal:         General: No swelling or deformity.      Cervical back: Neck supple. No rigidity.   Skin:     General: Skin is warm and dry.      Capillary Refill: Capillary refill takes less than 2 seconds.      Findings: No bruising or erythema.   Neurological:      General: No focal deficit present.      Mental Status: He is alert and oriented to person, place, and time. Mental status is at baseline.      Comments: No slurred speech, following commands spontaneously. No facial droop. Increased tone to extremities present. Flat facies present.   Psychiatric:         Mood and Affect: Mood normal.         Behavior: Behavior normal.         LABS  Labs Ordered and Resulted from Time of ED Arrival to Time of ED Departure   COMPREHENSIVE METABOLIC PANEL - Abnormal       Result Value    Sodium 139      Potassium 4.3      Carbon Dioxide (CO2) 27      Anion Gap 11      Urea Nitrogen 12.1      Creatinine 0.82      GFR Estimate >90      Calcium 9.3      Chloride 101      Glucose 134 (*)     Alkaline Phosphatase 69      AST 20      ALT 7      Protein Total 7.4      Albumin 4.8      Bilirubin Total 0.6     LIPASE - Abnormal    Lipase 84 (*)    ROUTINE UA WITH MICROSCOPIC REFLEX TO CULTURE - Abnormal    Color Urine Yellow      Appearance Urine Cloudy (*)     Glucose Urine Negative      Bilirubin Urine Negative      Ketones Urine 10 (*)     Specific Gravity Urine 1.021      Blood Urine Negative      pH Urine 8.5 (*)     Protein Albumin Urine 10 (*)     Urobilinogen Urine <2.0      Nitrite Urine Negative      Leukocyte Esterase Urine Negative      Mucus Urine Present (*)     Amorphous Crystals Urine Few (*)     RBC Urine 0      WBC Urine 0     CBC WITH  PLATELETS AND DIFFERENTIAL    WBC Count 7.8      RBC Count 4.76      Hemoglobin 14.4      Hematocrit 42.8      MCV 90      MCH 30.3      MCHC 33.6      RDW 12.7      Platelet Count 209      % Neutrophils 82      % Lymphocytes 11      % Monocytes 5      % Eosinophils 1      % Basophils 0      % Immature Granulocytes 0      NRBCs per 100 WBC 0      Absolute Neutrophils 6.4      Absolute Lymphocytes 0.9      Absolute Monocytes 0.4      Absolute Eosinophils 0.1      Absolute Basophils 0.0      Absolute Immature Granulocytes 0.0      Absolute NRBCs 0.0           RADIOLOGY  No orders to display      NA      EKG:    See ED course notes above.       PROCEDURES:  N/A      MEDICATIONS GIVEN IN THE EMERGENCY:  Medications   LORazepam (ATIVAN) injection 1 mg (1 mg Intravenous $Given 11/11/24 0836)   ondansetron (ZOFRAN) injection 4 mg (4 mg Intravenous $Given 11/11/24 0850)   pantoprazole (PROTONIX) IV push injection 40 mg (40 mg Intravenous $Given 11/11/24 0850)   sodium chloride 0.9% BOLUS 1,000 mL (0 mLs Intravenous Stopped 11/11/24 1216)   carbidopa-levodopa (RYTARY) 48. MG per CR capsule 2 capsule (2 capsules Oral $Given 11/11/24 1247)   droPERidol (INAPSINE) injection 1.25 mg (1.25 mg Intravenous $Given 11/11/24 0955)   prochlorperazine (COMPAZINE) injection 5 mg (5 mg Intravenous $Given 11/11/24 1018)   sodium chloride 0.9% BOLUS 1,000 mL (0 mLs Intravenous Stopped 11/11/24 1244)   prochlorperazine (COMPAZINE) injection 5 mg (5 mg Intravenous $Given 11/11/24 1212)       NEW PRESCRIPTIONS STARTED AT TODAY'S ER VISIT  Discharge Medication List as of 11/11/2024 12:44 PM        START taking these medications    Details   LORazepam (ATIVAN) 1 MG tablet Take 0.5-1 tablets (0.5-1 mg) by mouth every 6 hours as needed for vomiting or muscle spasms., Disp-15 tablet, R-0, E-Prescribe      omeprazole (PRILOSEC) 20 MG DR capsule Take 1 capsule (20 mg) by mouth 2 times daily., Disp-60 capsule, R-0, E-Prescribe      ondansetron  (ZOFRAN ODT) 4 MG ODT tab Take 1 tablet (4 mg) by mouth every 8 hours as needed for vomiting or nausea., Disp-15 tablet, R-0, E-Prescribe              I, Zhao Bain, am serving as a scribe to document services personally performed by Dunia Quarles MD, based on my observations and the provider's statements to me.  I, Dunia Quarles MD, attest that Zhao Bain is acting in a scribe capacity, has observed my performance of the services and has documented them in accordance with my direction.     Dunia Quarles MD  Emergency Medicine  [facility] EMERGENCY ROOM  [facility] [facility] [address]  [phone]  Dept: [phone]           Dunia Quarles MD  11/12/24 1020

## 2024-11-11 NOTE — Clinical Note
Dipesh Nicole was seen and treated in our emergency department on 11/11/2024.  He may return to work on 11/13/2024.       If you have any questions or concerns, please don't hesitate to call.      Dunia Quarles MD

## 2024-11-11 NOTE — ED NOTES
Ordered repeat compazine given. Patient reports he still feels nauseous but did sip a bit more water.

## 2024-11-11 NOTE — ED TRIAGE NOTES
"Pt states has been vomiting for the past 13 hours, can't keep anything down. Pt states hx of this in past and called it \"cyclic vomiting\". Pt states also Hx of parkinson's disease. Denies pain, denies diarrhea. Denies fever.         "

## 2024-11-11 NOTE — DISCHARGE INSTRUCTIONS
You were treated in the ED for vomiting, you were given several different medications to help treat your symptoms.

## 2025-01-12 ENCOUNTER — HEALTH MAINTENANCE LETTER (OUTPATIENT)
Age: 40
End: 2025-01-12

## 2025-01-13 ENCOUNTER — MYC MEDICAL ADVICE (OUTPATIENT)
Dept: BEHAVIORAL HEALTH | Facility: CLINIC | Age: 40
End: 2025-01-13
Payer: COMMERCIAL

## 2025-01-13 DIAGNOSIS — F19.20 ADDICTION (H): Primary | ICD-10-CM

## 2025-01-13 DIAGNOSIS — F14.20 COCAINE DEPENDENCE WITHOUT COMPLICATION (H): Primary | ICD-10-CM

## 2025-01-13 PROBLEM — G20.A1 PARKINSON'S DISEASE, UNSPECIFIED WHETHER DYSKINESIA PRESENT, UNSPECIFIED WHETHER MANIFESTATIONS FLUCTUATE (H): Status: ACTIVE | Noted: 2019-01-09

## 2025-01-13 NOTE — TELEPHONE ENCOUNTER
Patient responded back via MyC. Patient struggling with cocaine use. RN accepted referral. RN called [facility] to update and request they reach out to patient to schedule.     RN updated patient via MyC to watch for call from the schedulers.     Teresa Parekh RN on 1/13/2025 at 3:29 PM

## 2025-01-13 NOTE — TELEPHONE ENCOUNTER
RN reviewed Adult Mental Health and  Referral. Per chart review, patient may be seeking assistance with ADHD diagnostics. Similar referral was also placed early 2024.     RN called patient to attempt to assess current needs and any substances of abuse. LVM. RN also sent MyC message.     Awaiting return call or response to MyC message prior to accepting referral.     Teresa Parekh RN on 1/13/2025 at 12:21 PM

## 2025-01-16 ENCOUNTER — OFFICE VISIT (OUTPATIENT)
Dept: ADDICTION MEDICINE | Facility: CLINIC | Age: 40
End: 2025-01-16
Payer: COMMERCIAL

## 2025-01-16 VITALS — SYSTOLIC BLOOD PRESSURE: 129 MMHG | HEART RATE: 69 BPM | DIASTOLIC BLOOD PRESSURE: 84 MMHG

## 2025-01-16 DIAGNOSIS — G20.A1 PARKINSON'S DISEASE, UNSPECIFIED WHETHER DYSKINESIA PRESENT, UNSPECIFIED WHETHER MANIFESTATIONS FLUCTUATE (H): ICD-10-CM

## 2025-01-16 DIAGNOSIS — F14.20 COCAINE USE DISORDER, SEVERE, DEPENDENCE (H): Primary | ICD-10-CM

## 2025-01-16 LAB
AMPHETAMINE QUAL URINE POCT: NEGATIVE
BARBITURATE QUAL URINE POCT: NEGATIVE
BENZODIAZEPINE QUAL URINE POCT: NEGATIVE
BUPRENORPHINE QUAL URINE POCT: NEGATIVE
COCAINE QUAL URINE POCT: NEGATIVE
CREATININE QUAL URINE POCT: NEGATIVE
INTERNAL QC QUAL URINE POCT: ABNORMAL
MDMA QUAL URINE POCT: NEGATIVE
METHADONE QUAL URINE POCT: NEGATIVE
METHAMPHETAMINE QUAL URINE POCT: NEGATIVE
OPIATE QUAL URINE POCT: NEGATIVE
OXYCODONE QUAL URINE POCT: NEGATIVE
PH QUAL URINE POCT: ABNORMAL
PHENCYCLIDINE QUAL URINE POCT: NEGATIVE
POCT KIT EXPIRATION DATE: ABNORMAL
POCT KIT LOT NUMBER: ABNORMAL
SPECIFIC GRAVITY POCT: 1.02
TEMPERATURE URINE POCT: ABNORMAL
THC QUAL URINE POCT: ABNORMAL

## 2025-01-16 PROCEDURE — 99205 OFFICE O/P NEW HI 60 MIN: CPT | Performed by: NURSE PRACTITIONER

## 2025-01-16 PROCEDURE — G2211 COMPLEX E/M VISIT ADD ON: HCPCS | Performed by: NURSE PRACTITIONER

## 2025-01-16 PROCEDURE — 80305 DRUG TEST PRSMV DIR OPT OBS: CPT | Performed by: NURSE PRACTITIONER

## 2025-01-16 ASSESSMENT — PATIENT HEALTH QUESTIONNAIRE - PHQ9
10. IF YOU CHECKED OFF ANY PROBLEMS, HOW DIFFICULT HAVE THESE PROBLEMS MADE IT FOR YOU TO DO YOUR WORK, TAKE CARE OF THINGS AT HOME, OR GET ALONG WITH OTHER PEOPLE: SOMEWHAT DIFFICULT
SUM OF ALL RESPONSES TO PHQ QUESTIONS 1-9: 7
SUM OF ALL RESPONSES TO PHQ QUESTIONS 1-9: 7

## 2025-01-16 NOTE — PROGRESS NOTES
SUBJECTIVE                                                      Dipesh Nicole is a 39 year old male who presents for initial visit for addiction consultation and management referred by Dr. Ramesh Carson due to concerns for Cocaine Use Disorder    Visit performed In Person, face-to-face    HPI:  Dipesh Nicole is a 39 year old male with history of *** who presents for further evaluation of possible substance use disorder and management options.    ***  Reports use in 2008, college party. Moved ot [place] in 2016, started to use cocaine more, 1 gram per day 4-5 days per week, since that time has been using regularly, every other weekend for the past 6-7 years. 2022 DBS was placed no use that year. It was challenging not to use cocaine during that time.     Diagnosis made in 2017, started to have tremor, moved back to [place] and was diagnosed with Parkinson's disease. Primary symptoms at the time were tremor and dystonia. Started carbidopa-levodopa (Sinemet) and that worked well. Now taking a longer acting formulation, Rytary. There was slight change in cocaine use when he started that medication. Using 3.5 grams over 2-3 days    Last use was Saturday. Used 3 grams. Via insufflation. No medication to help. No treatment. He is interested in treatment.     Father with possible early onset glaucoma and uncle with glaucoma. No personal h/o of glaucoma or kidney stones.     StUD:   Cocaine is often taken in larger amounts or over a longer period than was intended.- YES   There is a persistent desire or unsuccessful efforts to cut down or control use.- YES   A great deal of time is spent in activities necessary to obtain, use , or recover from its effects. - YES   Craving, or a strong desire or urge to use. - YES  Recurrent  use resulting in a failure to fulfill major role obligations at work, school, or home. - YES   Continued use despite having persistent or recurrent social or interpersonal problems caused or exacerbated by the  effects of cocaine. - YES   Important social, occupational, or recreational activities are given up or reduced because of use. - YES   Recurrent use in situations in which it is physically hazardous. - NO  Cocaine  use is continued despite knowledge of having a persistent or recurrent physical or psychological problem that is likely to have been caused or exacerbated by cocaine. - YES   Tolerance - YES   Withdrawal- NO   303.9 (F10.2) Severe: Presence of 6 or more symptoms.     Substance Use History:   ALCOHOL - Yes, socially. Two to three drinks when out with friends.  CANNABIS - Yes, last smoked 3 days ago. Maybe once per week   PRESCRIPTION STIMULANTS (includes Ritalin, Adderall, Vyvanse) - Denies  COCAINE/CRACK - Cocaine, last use Saturday via snorting. Uses roughly 1 gram daily only on weekends. Using for 7 years.  METH/AMPHETAMINES (includes ecstacy, MDMA/vanessa) - Tried ecstasy and vanessa it in the past, about 2 months ago. No regular use. No methamphetamine use.   OPIATES - Denies  BENZODIAZEPINES (includes Ativan, Klonopin, Xanax) - Prescribed lorazepam. Takes 1 tablet roughly every 6 months.  KRATOM (mild opioid and stimulant effects) - Denies  KETAMINE - Denies  HALLUCINOGENS (includes DXM) - Mushrooms in the past, about 1 year ago.  BEHAVIORAL (Gambling, Eating d/o, Compulsivity) - Denies  NICOTINE  Cigarettes: Denies  Chew/snus: Denies  Vaping: Denies  Past NRT/medication use: N/A      Previous withdrawal treatment episodes (e.g. detox): Denies  Previous [facility] treatment programs: Denies  Hospitalizations or overdose: Denies  Medical complications from substance use: Denies  IV Drug use?: Denies  Previous Medication for Addiction Tx: Denies  Longest period of full abstinence: 7-8 months after brain surgery in 2022  Activities that have previously supported abstinence: Surgery and being home with parents  Current Recovery Activities: Supportive girlfriend and family, work      Infectious disease  "screening  Hep C:  No results found for: \"HCVAB\"    HIV:  No results found for: \"HIAGAB\"      Psychiatric History (per patient report and problem list review)  Past diagnoses - Depression and anxiety  Current or past psychiatrist: Denies  Current or past therapist:  Psychologist  Hospitalizations/TMS/ECT - Denies  Suicide Attempts - Denies  Medication trials - Denies      PHQ-9 scores:      2/21/2024    10:15 AM 6/13/2024    12:50 PM 1/16/2025     9:36 AM   PHQ   PHQ-9 Total Score 9 5 7    Q9: Thoughts of better off dead/self-harm past 2 weeks Not at all Not at all Not at all       Patient-reported     ANAYA-7 scores:      7/29/2022    11:07 AM 2/21/2024     9:58 AM 7/1/2024     3:29 PM   ANAYA-7 SCORE   Total Score 2 (minimal anxiety) 9 (mild anxiety) 4 (minimal anxiety)   Total Score 2 9 4         SOCIAL HISTORY:  Housing status: alone in [place]   Employment status: Employed full time- cocaine use has started to cause issues with work   Relationship status:   Children: Child due August 1 st. Wife is 12 weeks pregnant.  Legal concerns related to use: Denies  Contact information up to date? Yes    3rd Party Involvement: Not at this time (please obtain ORALIA if pt would like to include)      Medical History:  Patient Active Problem List    Diagnosis Date Noted    Cocaine addiction (H) 01/13/2025     Priority: Medium    Esophagitis endoscopy done 11/2021 11/18/2021     Priority: Medium     Impression:            - Normal first portion of the duodenum, second portion                          of the duodenum and third portion of the duodenum.                          Biopsied.                          - Nodular mucosa in the duodenal bulb. Biopsied.                          - Normal stomach. Biopsied.                          - Medium-sized hiatal hernia.                          - LA Grade C esophagitis (with numerous ulcers at the                          GE junction and in the esophagus) with bleeding.          "                 Biopsied. Friability may indicate EoE as well -                          further biopses avoided given friability.   Recommendation:        - Patient has a contact number available for                          emergencies. The signs and symptoms of potential                          delayed complications were discussed with the patient.                          Return to normal activities tomorrow. Written                          discharge instructions were provided to the patient.                          - Resume previous diet.                          - Continue present medications.                          - Await pathology results.                          - Repeat upper endoscopy at appointment to be                          scheduled to check healing.                          - Start PPI BID if there is no interaction with                          current medications.       S/P deep brain stimulator placement 11/18/2021     Priority: Medium       Impression:  This is a normal waking and sleep EEG, with generalized beta activities throughout the recording. Generalized beta activities are usually associated certain medication use, such as benzodiazepines or barbiturates. The cause of these activities in this case is unclear at this time.            Cyclical vomiting syndrome unrelated to migraine 07/30/2021     Priority: Medium    Hiatal hernia 04/20/2021     Priority: Medium     Based on a 2021 ct scan  Small hiatal hernia with some wall thickening in the distal thoracic esophagus suggesting some esophagitis.      Parkinson disease (H) 01/09/2019     Priority: Medium    Parkinson's disease, unspecified whether dyskinesia present, unspecified whether manifestations fluctuate (H) 01/09/2019     Priority: Medium    Shoulder dislocation      Priority: Medium     left      Seizure (H) at age 22 yrs 08/17/2018     Priority: Medium    H/O magnetic resonance imaging of cervical spine 08/17/2018      Priority: Medium     March  MR Cervical Spine without IV Contrast3/15/2017  [facility]  Result Addenda   Addendum by Provider, PA Jones on 03/15/2017 12:30 PM  RAD^^^MA  MR Cervical Spine w/o contrast  3/15/2017 12:30:00   Result Impression    Minimal degenerative disc disease at C5-6 and C6-7   otherwise an unremarkable MR scan of the cervical spine.    Result Narrative       EXAM: MR Cervical Spine w/o contrast     INDICATION: left hand weakness     COMPARISON: None.      Procedure: Sagittal short and long TR and STIR images of the cervical   spine were followed by axial 2-D merge and long TR images through the   disc spaces. Oblique sagittal long TR images within obtained through   the neural foramina bilaterally.     FINDINGS: There is a normal cervical lordosis. The vertebral body   heights and intervertebral disc spaces are maintained. The marrow   signal is normal in all sequences. The C1-2 relationship is normal. No   prevertebral soft tissue swelling is seen. The C7-T1 relationship is   normal.     Axial images at C2-3, C3-4, C4-5 demonstrate no disc bulging or   herniation. The neural foramina are widely patent and the posterior   facets are intact.     Axial images at C5-6 and C6-7 demonstrate minimal focal annular   bulging in the midline without significant encroachment on the thecal   sac. The neural foramina are widely patent and the posterior facets   are intact.     Axial images at C7-T1 demonstrate no disc bulging or herniation. The   neural foramina are widely patent and the posterior facets are intact.    Status           H/O shoulder surgery 08/17/2018     Priority: Medium     2017 July  XR SHOULDER 2 VIEWS LEFT7/14/2017  [facility]  Result Narrative   XR SHOULDER 2 VIEWS LEFT  7/14/2017 9:13 PM    INDICATION: Shoulder Injury  COMPARISON: None.    FINDINGS: Negative shoulder. No fracture or dislocation.    Status           H/O magnetic resonance  imaging of brain and brain stem 08/17/2018     Priority: Medium     2009 December  MR Brain without and with IV Micufbtf82/8/2009  [facility]  Result Impression    Normal MRI of the brain.    Result Narrative           Multiecho, multiplanar views of the brain were obtained prior to and   following the IV administration of 19 mL of contrast. There are no   previous studies available for comparison.       The brain parenchyma is normal in appearance on all pulse sequences,   with no intracranial hemorrhage or evidence of an acute stroke. There   is no pathological enhancement. The ventricles are normal in size,   shape and position, with no shift in midline structures or other   evidence of significant mass effect. There is no sclerosis of the   mesial temporal lobe on either side to suggest a cause for this   patient's apparent seizure activity.       There are normal flow-voids within the internal carotid and   vertebrobasilar arterial systems bilaterally. The visualized aspects   of the orbits are normal.                Tremor 08/10/2018     Priority: Medium    Right inguinal hernia 01/19/2017     Priority: Medium       Past Medical History:   Diagnosis Date    Cocaine addiction (H) 01/13/2025    Esophagitis endoscopy done 11/2021 11/18/2021    Impression:            - Normal first portion of the duodenum, second portion                         of the duodenum and third portion of the duodenum.                         Biopsied.                         - Nodular mucosa in the duodenal bulb. Biopsied.                         - Normal stomach. Biopsied.                         - Medium-sized hiatal hernia.                         - LA Grade    H/O electroencephalogram 2021 11/18/2021     Impression:  This is a normal waking and sleep EEG, with generalized beta activities throughout the recording. Generalized beta activities are usually associated certain medication use, such as benzodiazepines or barbiturates. The  cause of these activities in this case is unclear at this time.        H/O magnetic resonance imaging of brain and brain stem 08/17/2018 2009 December MR Brain without and with IV Cmwqxbal78/8/2009 [facility] Result Impression  Normal MRI of the brain.  Result Narrative      Multiecho, multiplanar views of the brain were obtained prior to and  following the IV administration of 19 mL of contrast. There are no  previous studies available for comparison.     The brain parenchyma is normal in appearance on all pulse sequences,  with     H/O magnetic resonance imaging of cervical spine 08/17/2018 March MR Cervical Spine without IV Contrast3/15/2017 [facility] Result Addenda Addendum by Provider, PA Jones on 03/15/2017 12:30 PM RAD^^^MA MR Cervical Spine w/o contrast 3/15/2017 12:30:00 Result Impression  Minimal degenerative disc disease at C5-6 and C6-7  otherwise an unremarkable MR scan of the cervical spine.  Result Narrative   EXAM: MR Cervical Spine w/o contrast   INDICATION:     H/O shoulder surgery 08/17/2018 2017 July XR SH[facility]IE[facility] Result Narrative XR SHOULDER 2 VIEWS LEFT 7/14/2017 9:13 PM  INDICATION: Shoulder Injury COMPARISON: None.  FINDINGS: Negative shoulder. No fracture or dislocation.  Status      Hiatal hernia 04/20/2021    Based on a 2021 ct scan Small hiatal hernia with some wall thickening in the distal thoracic esophagus suggesting some esophagitis.    Seizure (H) at age 22 yrs 08/17/2018    Shoulder dislocation     left    Tremor 08/10/2018       Past Surgical History:   Procedure Laterality Date    ESOPHAGOSCOPY, GASTROSCOPY, DUODENOSCOPY (EGD), COMBINED N/A 11/18/2021    Procedure: ESOPHAGOGASTRODUODENOSCOPY, WITH BIOPSY;  Surgeon: Veronica Kay MD;  Location: [facility] OR    ESOPHAGOSCOPY, GASTROSCOPY, DUODENOSCOPY (EGD), COMBINED N/A 3/7/2022    Procedure: ESOPHAGOGASTRODUODENOSCOPY, WITH BIOPSY;  Surgeon: Kee  Alexandr Dixon MD;  Location: [facility] OR    IMPLANT DEEP BRAIN STIMULATION GENERATOR / BATTERY Left 7/25/2022    Procedure: Deep brain stimulator placement, phase II, placement of deep brain stimulator generator/battery over the left chest wall;  Surgeon: Angel Morales MD;  Location: [facility] OR    OPTICAL TRACKING SYSTEM INSERTION DEEP BRAIN STIMULATION Left 7/18/2022    Procedure: Left side deep brain stimulator placement, phase I, placement of left side deep brain stimulator electrode, target left globus pallidus internus, with microelectrode recording;  Surgeon: Angel Morales MD;  Location: [facility] OR    OPTICAL TRACKING SYSTEM INSERTION DEEP BRAIN STIMULATION Right 8/15/2022    Procedure: Stealth Assisted Right side deep brain stimulator placement, phase I & II combined, placement of right side deep brain stimulator electrode, target right globus pallidus internus, with microelectrode recording and connection to existing generator/battery;  Surgeon: Angel Morales MD;  Location: [facility] OR    SHOULDER SURGERY  2007         Family History   Problem Relation Age of Onset    Cancer Other         grandmother    Tremor No family hx of     Dementia No family hx of     Parkinsonism No family hx of     Seizure Disorder No family hx of        Current Outpatient Medications   Medication Sig Dispense Refill    carbidopa-levodopa (RYTARY) 48. MG CPCR per CR capsule 2 @5AM, 2@ 9AM, 3@1pm, 2@5PM AND 2@ 9PM = 11 capsules/day 330 capsule 11    LORazepam (ATIVAN) 1 MG tablet Take 0.5-1 tablets (0.5-1 mg) by mouth every 6 hours as needed for vomiting or muscle spasms. 15 tablet 0    omeprazole (PRILOSEC) 20 MG DR capsule Take 1 capsule (20 mg) by mouth 2 times daily. 60 capsule 0    ondansetron (ZOFRAN ODT) 4 MG ODT tab Take 1 tablet (4 mg) by mouth every 8 hours as needed for vomiting or nausea. 15 tablet 0         Allergies   Allergen Reactions    Diphenhydramine      Other reaction(s): Other (see comments)  Told not to  "take due to parkinsons    Metoclopramide      Avoid in patient with a history of Parkinson Disease  Other reaction(s): GI intolerance  Avoid in patient with a history of Parkinson Disease    Promethazine      Other reaction(s): Other (see comments)  Told not to take due to parkisons       OBJECTIVE                                                      EXAM    /84   Pulse 69     {AH Exam Brief:094430::\"GENERAL: healthy, alert and no distress\",\"EYES: Eyes grossly normal to inspection, PERRL and conjunctivae and sclerae normal\",\"RESP: No respiratory distress\",\"MENTAL STATUS EXAM\"}      LAB:   Recent UDS Labs (may not contain today's lab data)  Lab Results   Component Value Date    BUP Negative 01/16/2025    BZO Negative 01/16/2025    BAR Negative 01/16/2025    OMARI Negative 01/16/2025    MAMP Negative 01/16/2025    AMP Negative 01/16/2025    MDMA Negative 01/16/2025    MTD Negative 01/16/2025    [id number] Negative 01/16/2025    OXY Negative 01/16/2025    PCP Negative 01/16/2025    THC Screen Positive (A) 01/16/2025    TEMP 90 F 01/16/2025    SGPOCT 1.025 01/16/2025     Hepatic Function  AST   Date Value Ref Range Status   11/11/2024 20 0 - 45 U/L Final     ALT   Date Value Ref Range Status   11/11/2024 7 0 - 70 U/L Final     Bilirubin Total   Date Value Ref Range Status   11/11/2024 0.6 <=1.2 mg/dL Final     Albumin   Date Value Ref Range Status   11/11/2024 4.8 3.5 - 5.2 g/dL Final     INR   Date Value Ref Range Status   08/15/2022 0.97 0.85 - 1.15 Final       CBC  WBC Count   Date Value Ref Range Status   11/11/2024 7.8 4.0 - 11.0 10e3/uL Final     RBC Count   Date Value Ref Range Status   11/11/2024 4.76 4.40 - 5.90 10e6/uL Final     Hemoglobin   Date Value Ref Range Status   11/11/2024 14.4 13.3 - 17.7 g/dL Final     Hematocrit   Date Value Ref Range Status   11/11/2024 42.8 40.0 - 53.0 % Final     MCV   Date Value Ref Range Status   11/11/2024 90 78 - 100 fL Final     MCH   Date Value Ref Range Status "   11/11/2024 30.3 26.5 - 33.0 pg Final     MCHC   Date Value Ref Range Status   11/11/2024 33.6 31.5 - 36.5 g/dL Final     Platelet Count   Date Value Ref Range Status   11/11/2024 209 150 - 450 10e3/uL Final     RDW   Date Value Ref Range Status   11/11/2024 12.7 10.0 - 15.0 % Final       Today's lab data  Results for orders placed or performed in visit on 01/16/25   Drugs of Abuse Screen Urine (POC CUPS) POCT     Status: Abnormal   Result Value Ref Range    POCT Kit Lot Number [id number]     POCT Kit Expiration Date 2026-07-14     Temperature Urine POCT 90 F 90 F, 92 F, 94 F, 96 F, 98 F, 100 F    Specific [place] POCT 1.025 1.005, 1.015, 1.025    pH Qual Urine POCT 7 pH 4 pH, 5 pH, 7 pH, 9 pH    Creatinine Qual Urine POCT Negative (A) 20 mg/dL, 50 mg/dL, 100 mg/dL, 200 mg/dL    Internal QC Qual Urine POCT Valid Valid    Amphetamine Qual Urine POCT Negative Negative    Barbiturate Qual Urine POCT Negative Negative    Buprenorphine Qual Urine POCT Negative Negative    Benzodiazepine Qual Urine POCT Negative Negative    Cocaine Qual Urine POCT Negative Negative    Methamphetamine Qual Urine POCT Negative Negative    MDMA Qual Urine POCT Negative Negative    Methadone Qual Urine POCT Negative Negative    Opiate Qual Urine POCT Negative Negative    Oxycodone Qual Urine POCT Negative Negative    Phencyclidine Qual Urine POCT Negative Negative    THC Qual Urine POCT Screen Positive (A) Negative           [place] Board of Pharmacy Data Base Reviewed;    ***Consistent with patient reports and Epic records.           A/P                                                      ASSESSMENT/PLAN:  Annalise was seen today for addiction problem.    Diagnoses and all orders for this visit:    Cocaine use disorder, severe, dependence (H)  -     Drugs of Abuse Screen Urine (POC CUPS) POCT; Standing  -     Drugs of Abuse Screen Urine (POC CUPS) POCT  -     Adult Mental Health  Referral; Future    Parkinson's disease,  unspecified whether dyskinesia present, unspecified whether manifestations fluctuate (H)        - Comprehensive Assessment   - Consider Topiramate, will discuss with PharmD and present as clinical discussion, given current treatment for Parkinson's disease with Carbidopa -levodopa.        Continued Complex Management  The longitudinal plan of care for {ARELI choices:[id number]} was addressed during this visit. Due to the added complexity in care, I will continue to support Annalise in the subsequent management and with ongoing continuity of care.      Counseled the patient on the importance of having a recovery program in addition to medication to manage recovery.  Components include avoiding isolating, having willingness to change, avoiding triggers and managing cravings. Encouraged having some type of sober network and practicing honesty with trusted support person(s). Encouraged other services such as counseling, 12 step or other self-help organizations.          RTC   3-4 weeks       LUCERO Varela [facility] Addiction Medicine  [phone]      Answers submitted by the patient for this visit:  Patient Health Questionnaire (Submitted on 1/16/2025)  If you checked off any problems, how difficult have these problems made it for you to do your work, take care of things at home, or get along with other people?: Somewhat difficult  PHQ9 TOTAL SCORE: 7     from neurology Dr. Ramesh Carson for further eval and management of cocaine use. Pt reports ongoing cocaine use for the past 7 years with period of remission for about 1 year following brain surgery for DBS device placement. H/o using up to 3.5 grams over 2-3 days period. More recently pattern of use is on the weekends, 1 gram, via insufflation. Reviewed criteria met for cocaine use disorder severe and recommendation for combination of psychosocial interventions and pharmacotherapy.  Referral placed for Comprehensive Assessment. Encouraged pt to follow recommendation of .     - discussed medication options for StUD (cocaine), with primary recommendation generally for Topiramate. Denies h/o glaucoma or kidney stones. Reviewed will consider Topiramate as treatment option to target cravings and facilitate goal of abstinence. However prior to starting, will consult with PharmD as well as pt's neurologist to discuss safety and consider possible side effects or drug interactions due to current treatment for Parkinson's disease with Carbidopa -levodopa.     Plan:   - Complete Comprehensive Assessment - start programmatic care.   - Consider Topiramate, will discuss with PharmD and Neurology, given current treatment for Parkinson's disease with Carbidopa -levodopa.     Addendum: Coordination of care with PharmD and pt's neurologist. Review of research and data available on concurrent use of Topiramate with Carbidopa -levodopa. Plan to start low dose Topiramate for StUD 12.5 mg daily, increase to 25 mg daily after 1 week. MOA and common SE reviewed with patient. His care team is in agreement with this plan. Will monitor for side effects.      Continued Complex Management  The longitudinal plan of care for Stimulant Use Disorder was addressed during this visit. Due to the added complexity in care, I will continue to support Annalise in the subsequent management and with ongoing continuity of care.      Counseled the  patient on the importance of having a recovery program in addition to medication to manage recovery.  Components include avoiding isolating, having willingness to change, avoiding triggers and managing cravings. Encouraged having some type of sober network and practicing honesty with trusted support person(s). Encouraged other services such as counseling, 12 step or other self-help organizations.          RTC   3-4 weeks     I sent a total of 65 minutes today, on the care of this patient. This consisted of face-to-face time as well as time spent on pre-visit and post-visit activities including coordination of care, chart review, results review, and documentation.     LUCERO Varela [facility] Addiction Medicine  [phone]      Answers submitted by the patient for this visit:  Patient Health Questionnaire (Submitted on 1/16/2025)  If you checked off any problems, how difficult have these problems made it for you to do your work, take care of things at home, or get along with other people?: Somewhat difficult  PHQ9 TOTAL SCORE: 7

## 2025-01-16 NOTE — PATIENT INSTRUCTIONS
Patient Education   Addiction Medicine  What to Expect  Here's what to expect from our Addiction Medicine program.  About Addiction Medicine  Addiction Medicine clinics help you with substance use problems. You set your own goals. We try to help you reach your goals. Your care plan can include:  Medicine  Creating a recovery plan  Helping you find local resources  Helping with treatment options  Clinic phone number and addresses  Clinic Phone: [phone]  Mental Health and Addiction Clinic  [facility]  [address], [address]  [place] [address]  [facility]  [address], [address]  [place] [address]  Walk-in services  We offer walk-in care for patients at the [facility]. This is only for patients with Opioid Use Disorder (OUD). Anyone with OUD is welcome. Our providers will refer you to the Recovery Clinic if you're struggling to keep up with your medicines or appointments.  [facility] ([facility])  [address], [address]  [place] [address]  Phone: [phone]  The [facility] is open for walk-ins Monday to Friday 9 a.m. to 11:30 a.m. and 12:30 p.m. to 3 p.m.  How it works  Come to your visits every time. The treatment works better when you do.   You can have as many visits as you need. When you're better, we'll refer you back to being cared for by your family doctor.   If you need it, we'll send you to doctors, psychiatrists, therapists, and other providers. We focus on treating addiction. We don't treat other problems, like managing other medicines or non-addiction issues.  About visits  Urine drug testing  We'll often test your pee (urine) for drugs. This is the only way we can know for sure whether or not you're using drugs. It helps us treat you without judgement.   Suboxone (buprenorphine)  If you're taking buprenorphine, you'll have a lot of visits at first. If your problem is getting worse, or you're  "using substances, we may schedule you for extra visits.   Cancelling visits  If you can't come to your visit, please call us right away at [phone]. If you don't cancel at least 24 hours (1 full day) before your visit, that's \"late cancellation.\"  Being late to visits  If you come late, you may not be seen. This will count as a \"no-show.\"  Please call the clinic if you're running late. This will help us plan, but it doesn't mean you'll be seen.   Being late is:  More than 15 minutes late for a return visit.  More than 30 minutes late for your first visit.  If you cancel late or don't show up 2 times within 6 months, we may transfer you to another clinic.   Getting help between visits  If you need help between visits, you can call us Monday to Friday from 8 a.m. to 4:30 p.m. at [phone]. You can also send us a message on [employer].  Medicine refills  If you miss or cancel a visit, you can still ask for a refill. But we can only refill your medicines if you've made a new appointment.  Please call your pharmacy for medicine refills. If you have a question about your refill, call us at [phone].  It takes up to 2 business days to refill your drugs. Let us know 2 to 3 days before you run out. Don't call more than 1 week before you run out. That's too early.   Please make sure we have your right phone number.  If we have a problem with your refill, we'll call you. If we call you, please call us back right away. If you don't, you may not get your medicines quickly.   Call your pharmacy to find out if your medicines are ready.   Keep your medicines in a safe place. Keep them away from pets and children. If your medicines are lost or stolen, we usually don't replace them. We recommend you file a police report if your medicines are stolen. Your insurance may not pay for early refills, even if you have a prescription.  Forms  Please give us at least 3 business days to fill out any forms. Bring the forms to your " visits if you can. We may refer you to other members of your care team to complete the forms.   Emergency care   Call 911 or go to the nearest emergency room if your life or someone else's life is in danger.  Call 988 anytime for the Suicide and Crisis Lifeline.  If you need care when we're closed, call your family doctor to see if they can help. You can also go to urgent care or an emergency room. [facility] emergency rooms may be able to give you buprenorphine or other medicine refills.  Thank you for choosing us for your care.  For informational purposes only. Not to replace the advice of your health care provider. Copyright   2023 [facility]. All rights reserved. [employer] 604165 - REV 05/23.

## 2025-01-20 RX ORDER — TOPIRAMATE 25 MG/1
TABLET, FILM COATED ORAL
Qty: 23 TABLET | Refills: 0 | Status: SHIPPED | OUTPATIENT
Start: 2025-01-20 | End: 2025-02-19

## 2025-01-21 ENCOUNTER — HOSPITAL ENCOUNTER (OUTPATIENT)
Dept: BEHAVIORAL HEALTH | Facility: CLINIC | Age: 40
Discharge: HOME | End: 2025-01-21
Attending: NURSE PRACTITIONER | Admitting: NURSE PRACTITIONER
Payer: COMMERCIAL

## 2025-01-21 VITALS — WEIGHT: 185 LBS | HEIGHT: 73 IN | BODY MASS INDEX: 24.52 KG/M2

## 2025-01-21 DIAGNOSIS — F14.20 COCAINE USE DISORDER, SEVERE, DEPENDENCE (H): ICD-10-CM

## 2025-01-21 DIAGNOSIS — F12.20 CANNABIS USE DISORDER, MODERATE, DEPENDENCE (H): Primary | ICD-10-CM

## 2025-01-21 PROCEDURE — H0001 ALCOHOL AND/OR DRUG ASSESS: HCPCS

## 2025-01-21 ASSESSMENT — COLUMBIA-SUICIDE SEVERITY RATING SCALE - C-SSRS
1. HAVE YOU WISHED YOU WERE DEAD OR WISHED YOU COULD GO TO SLEEP AND NOT WAKE UP?: NO
ATTEMPT LIFETIME: NO
TOTAL  NUMBER OF INTERRUPTED ATTEMPTS LIFETIME: NO
2. HAVE YOU ACTUALLY HAD ANY THOUGHTS OF KILLING YOURSELF?: NO
TOTAL  NUMBER OF ABORTED OR SELF INTERRUPTED ATTEMPTS LIFETIME: NO
6. HAVE YOU EVER DONE ANYTHING, STARTED TO DO ANYTHING, OR PREPARED TO DO ANYTHING TO END YOUR LIFE?: NO

## 2025-01-21 ASSESSMENT — PAIN SCALES - GENERAL: PAINLEVEL_OUTOF10: NO PAIN (0)

## 2025-01-21 NOTE — PROGRESS NOTES
[facility]  Provider Name:  FARTUN Root/LAURA     Telephone: [phone]      PATIENT'S NAME: Dipesh Nicole  PREFERRED NAME: Annalise  PRONOUNS: he/him/his    MRN: [id number]  : 1985  ADDRESS:   [address]  [place] [address]  E-MAIL: [email or web address]   ACCT. NUMBER:  [id number]  DATE OF SERVICE: 2025  START TIME: 12:50 pm  END TIME: 1:52 pm  PREFERRED PHONE: [phone]   May we leave a program related message: Yes  SERVICE MODALITY:  In-person:        Last 4 digits of SSN #: [id number]     EMERGENCY CONTACT:   Dipesh Nicole . (father)   Tel #: [phone]     Ritu Nicole (mother)  Tel #: [phone]      Shante Del Toro (wife)  Tel #: [phone]    UNIVERSAL ADULT SUBSTANCE USE DISORDER DIAGNOSTIC ASSESSMENT    Identifying Information:  The patient is a 39 year old, /White male.  The patient was referred for an assessment by LUCERO Varela CNP at [facility].  The patient attended the session alone.     Chief Complaint:   The reason for seeking services at this time is:  The patient reported the reason for participating in the substance use disorder assessment today on 2025 was due to the patient's own awareness he needed help and due to pressure from his wife and from his parents for him to get help.  During the past 12-months, the patient reported a pattern of snorting between 1-2 grams of powder cocaine around 8 times per month on average and he had been unsuccessful in his attempts to stop his use on his own.  The patient reported his use of powder cocaine had been having a negative impact on his relationships with his wife and parents.  The patient reported he had just  his wife in 2024 and his wife is pregnant with his child, so he felt now was a great time to work on stopping his use of powder cocaine and of all other non-prescribed mood altering chemicals.  The  patient had requested a referral to enter the [facility] program at [facility] in [place], [place] for substance use disorder treatment.  The patient first had a concern about having substance abuse issues in 2016.  The patient reported he had attempted to stop his use of powder cocaine on his own in the past, but he had been unsuccessful in his attempts to maintain abstinence from powder cocaine.  The patient denied having any history of participating in a substance use disorder treatment program.  The patient denied having any inpatient detoxification admissions or inpatient hospitalizations for withdrawal symptoms.  The patient is currently receiving the following services: The patient is currently working with the [facility] team for Medication Assisted Therapy with topiramate.  The patient denied having any history of attending 12-step or other recovery support group meetings.  The patient does not appear to be in severe withdrawal, an imminent safety risk to self or others, or requiring immediate medical attention and may proceed with the assessment interview.    Social/Family History:  The patient reported growing up in [place] and [place].  The patient reported being raised by both of his biological parents in the same family home.  The patient reported having 1 sibling, a younger brother.  The patient denied experiencing or witnessing any verbal, physical or sexual abuse when he was growing up in the family home.  The patient reported overall his childhood had been happy.  The patient reported feeling supported by all of his family members when he was growing up.  The patient reported being raised in the [employer]  The patient described his current relationships with his family of origin as being good overall, but somewhat strained due to the patient's substance abuse.      The patient describes his cultural background as being a  /White male.  Cultural influences and impact on patient's life structure, values, norms, and healthcare: The patient denied cultural concerns had an impact on life structure, values, norms, or healthcare.  Contextual influences on patient's health include: Community Factors: The patient reported he had first started to use powder cocaine with his friends/peers in social situations.  The patient identified his preferred language to be English.  The patient reported he does not need the assistance of an  or other support involved in therapy.  The patient reported he is actively involved in community kevin activities.  The patient reported his spirituality would have a positive impact on his recovery.    The patient reported having no significant delays in developmental tasks.  The patient's highest education level was college graduate.  The patient identified the following learning problems: none reported.  The patient reports he is able to understand written materials.    The patient reported the following relationship history: The patient reported being  1 time and he is still  to his wife.  The patient identified as being heterosexual and he reported being  to his wife for the past month and a half.  The patient denied having any children, but his wife is currently pregnant, so his first child is on the way.     The patient's current living/housing situation involves staying in own home/apartment.  The patient reported living with 1-roommate and he reported his housing is stable.  The patient denied having any concerns regarding his immediate living environment and/or neighborhood, but he has plans to move into a home with his wife in the near future.  The patient identified his wife, his father, his mother, and his brother as being his primary support network at this time.  The patient identified the quality of his relationships with his support network as being good  overall, but somewhat strained due to the patient's substance abuse.  The patient would like the following people involved in treatment services if recommended: his wife.     The patient reported engaging in the following recreational/leisure activities: unknown.  The patient reported engaging in the following recreation/leisure activities while using alcohol or other non-prescribed mood altering chemicals: The patient's use of powder cocaine had been done independently of his social/recreational/leisure activities.  The patient reported the following people are supportive of his recovery: his wife, his father, his mother, his brother, and a few close friends.  The patient reported he had been working full-time as a  since 9/2024.  The patient reported his income is obtained from employment.  The patient reported having financial stressors at this time, including money being tight at this time.  Cultural and socioeconomic factors do not affect the patient's access to services.    The patient reported the following substance related arrests or legal issues: The patient reported having 1 DWI charge in 2012 and he denied having any other history of arrests or legal charges.  The patient denied being on court probation at this time.    Patient's Strengths and Limitations:  The patient identified the following strengths or resources that will help him succeed in treatment: commitment to health and well being, kevin / spirituality, family support, positive work environment, and motivation.  Things that may interfere with the patient's success in treatment include: lack of a sober peer support network, financial stressors, physical health concerns, mental health concerns, and lacks awareness regarding the risks and potential negative consequences of substance abuse.     Assessments:  The following assessments were completed by patient for this visit:  PHQ9:       6/22/2022     2:47 PM 8/8/2023     8:40 AM  2/21/2024    10:15 AM 6/13/2024    12:50 PM 1/16/2025     9:36 AM 1/21/2025    12:56 PM   PHQ-9 SCORE   PHQ-9 Total Score MyChart 2 (Minimal depression)   5 (Mild depression) 7 (Mild depression) 7 (Mild depression)   PHQ-9 Total Score 2 0 9 5 7  7        Patient-reported     GAD2:       6/13/2024    12:48 PM 1/21/2025    12:56 PM   ANAYA-2   Feeling nervous, anxious, or on edge 2 1   Not being able to stop or control worrying 0 1   ANAYA-2 Total Score 2    2 2        Patient-reported     PROMIS 10-Global Health (all questions and answers displayed):       11/4/2021    11:04 AM 6/22/2022     2:52 PM 2/21/2024     9:58 AM 7/1/2024     3:30 PM 1/16/2025     9:37 AM 1/21/2025    12:57 PM   PROMIS 10   In general, would you say your health is:  Good Good Good Fair Fair   In general, would you say your quality of life is:  Good Good Good Fair Fair   In general, how would you rate your physical health?  Very good Good Good Fair Good   In general, how would you rate your mental health, including your mood and your ability to think?  Good Good Good Fair Fair   In general, how would you rate your satisfaction with your social activities and relationships?  Good Good Good Fair Fair   In general, please rate how well you carry out your usual social activities and roles  Good Good Good Fair Fair   To what extent are you able to carry out your everyday physical activities such as walking, climbing stairs, carrying groceries, or moving a chair?  Mostly Mostly Mostly Mostly Moderately   In the past 7 days, how often have you been bothered by emotional problems such as feeling anxious, depressed, or irritable?  Sometimes Often Sometimes Often Sometimes   In the past 7 days, how would you rate your fatigue on average?  Moderate Mild Mild Mild Moderate   In the past 7 days, how would you rate your pain on average, where 0 means no pain, and 10 means worst imaginable pain?  2 0 1 1 1   In general, would you say your health is: 4 3 3 3 2 2    In general, would you say your quality of life is: 4 3 3 3 2 2   In general, how would you rate your physical health? 4 4 3 3 2 3   In general, how would you rate your mental health, including your mood and your ability to think? 4 3 3 3 2 2   In general, how would you rate your satisfaction with your social activities and relationships? 4 3 3 3 2 2   In general, please rate how well you carry out your usual social activities and roles. (This includes activities at home, at work and in your community, and responsibilities as a parent, child, spouse, employee, friend, etc.) 4 3 3 3 2 2   To what extent are you able to carry out your everyday physical activities such as walking, climbing stairs, carrying groceries, or moving a chair? 5 4 4 4 4 3   In the past 7 days, how often have you been bothered by emotional problems such as feeling anxious, depressed, or irritable? 1 3 4 3 4 3   In the past 7 days, how would you rate your fatigue on average? 2 3 2 2 2 3   In the past 7 days, how would you rate your pain on average, where 0 means no pain, and 10 means worst imaginable pain? 0 2 0 1 1 1   Global Mental Health Score 17 12    12 11 12 8  9    Global Physical Health Score 18 15    15 16 15 14  13    PROMIS TOTAL - SUBSCORES 35 27    27 27 27 22  22        Patient-reported     [place] Suicide Severity Rating Scale (Lifetime/Recent)      11/11/2024     8:10 AM 1/21/2025     1:00 PM   [place] Suicide Severity Rating (Lifetime/Recent)   Q1 Wished to be Dead (Past Month) 0-->no    Q2 Suicidal Thoughts (Past Month) 0-->no    Q6 Suicide Behavior (Lifetime) 0-->no    Level of Risk per Screen no risks indicated    Q1 Wish to be Dead (Lifetime)  N   Q2 Non-Specific Active Suicidal Thoughts (Lifetime)  N   Actual Attempt (Lifetime)  N   Has subject engaged in non-suicidal self-injurious behavior? (Lifetime)  N   Interrupted Attempts (Lifetime)  N   Aborted or Self-Interrupted Attempt (Lifetime)  N   Preparatory Acts or  Behavior (Lifetime)  N   Calculated C-SSRS Risk Score (Lifetime/Recent)  No Risk Indicated     GAIN-sliding scale:      1/21/2025     1:00 PM   When was the last time that you had significant problems...   with feeling very trapped, lonely, sad, blue, depressed or hopeless about the future? Past month   with sleep trouble, such as bad dreams, sleeping restlessly, or falling asleep during the day? Past Month   with feeling very anxious, nervous, tense, scared, panicked or like something bad was going to happen? 2 to 12 months ago   with becoming very distressed & upset when something reminded you of the past? 1+ years ago   with thinking about ending your life or committing suicide? Never          1/21/2025     1:00 PM   When was the last time that you did the following things 2 or more times?   Lied or conned to get things you wanted or to avoid having to do something? Past month   Had a hard time paying attention at school, work or home? Past month   Had a hard time listening to instructions at school, work or home? Past month   Were a bully or threatened other people? Never   Started physical fights with other people? Never     Personal and Family Medical History:  The patient did not report a family history of mental health concerns.  The patient reported having no awareness of any his family members having a history of chronic medical problems, substance abuse problems or mental health problems.    The patient reported the following previous mental health diagnoses: The patient reported a history of Depression NOS and Anxiety disorder NOS.  The patient reported his primary mental health symptoms include: depression, anxiety, sleep problems, and attentional problems and these do not impact his ability to function.  The patient has received mental health services in the past: The patient reported taking his prescribed psychotropic medications as prescribed.  The patient reported a history of working with a 1:1  mental health therapist in the past, but he denied working with a 1:1 mental health therapist at this time.  Psychiatric Hospitalizations: None.  The patient denies a history of civil commitment.  Current mental health services/providers include:  The patient reported taking his prescribed psychotropic medications as prescribed.  The patient reported a history of working with a 1:1 mental health therapist in the past, but he denied working with a 1:1 mental health therapist at this time.    The patient has had a physical exam to rule out medical causes for current symptoms.  Date of last physical exam was within the past year. Symptoms have developed since last physical exam and the patient was encouraged to follow up with PCP .  The patient has a [facility] Provider, who is named Joey Morley.  The patient reported the following medical concerns:   Past Medical History:   Diagnosis Date    Anxiety     Cocaine addiction (H) 01/13/2025    Depressive disorder     Esophagitis endoscopy done 11/2021 11/18/2021    Impression:            - Normal first portion of the duodenum, second portion                         of the duodenum and third portion of the duodenum.                         Biopsied.                         - Nodular mucosa in the duodenal bulb. Biopsied.                         - Normal stomach. Biopsied.                         - Medium-sized hiatal hernia.                         - LA Grade    H/O electroencephalogram 2021 11/18/2021     Impression:  This is a normal waking and sleep EEG, with generalized beta activities throughout the recording. Generalized beta activities are usually associated certain medication use, such as benzodiazepines or barbiturates. The cause of these activities in this case is unclear at this time.        H/O magnetic resonance imaging of brain and brain stem 08/17/2018 2009 December MR Brain without and with IV Lxarrlwq13/8/2009 [facility] Result Impression   Normal MRI of the brain.  Result Narrative      Multiecho, multiplanar views of the brain were obtained prior to and  following the IV administration of 19 mL of contrast. There are no  previous studies available for comparison.     The brain parenchyma is normal in appearance on all pulse sequences,  with     H/O magnetic resonance imaging of cervical spine 08/17/2018 March MR Cervical Spine without IV Contrast3/15/2017 [facility] Result Addenda Addendum by Provider, PA Jones on 03/15/2017 12:30 PM RAD^^^MA MR Cervical Spine w/o contrast 3/15/2017 12:30:00 Result Impression  Minimal degenerative disc disease at C5-6 and C6-7  otherwise an unremarkable MR scan of the cervical spine.  Result Narrative   EXAM: MR Cervical Spine w/o contrast   INDICATION:     H/O shoulder surgery 08/17/2018 2017 July XR SHOULDER 2 VIEWS LEFT7/14/2017 [facility] Result Narrative XR SHOULDER 2 VIEWS LEFT 7/14/2017 9:13 PM  INDICATION: Shoulder Injury COMPARISON: None.  FINDINGS: Negative shoulder. No fracture or dislocation.  Status      Hiatal hernia 04/20/2021    Based on a 2021 ct scan Small hiatal hernia with some wall thickening in the distal thoracic esophagus suggesting some esophagitis.    History of seizures     Parkinson disease (H)     Seizure (H) at age 22 yrs 08/17/2018    Shoulder dislocation     left    Tremor 08/10/2018   The patient reported taking his medications as prescribed and following the recommendations of his healthcare providers.  The patient denied having any current issues with pain.  The patient is male and is not pregnant.  There are not significant appetite / nutritional concerns / weight changes.  The patient does not report having a history of an eating disorder.  The patient does report a history of head injury / trauma / cognitive impairment.  The patient reported having a brain surgery in 2022 secondary to his Parkinson's disease.    The patient  reported current medications as:   Current Outpatient Medications   Medication Sig Dispense Refill    carbidopa-levodopa (RYTARY) 48. MG CPCR per CR capsule 2 @5AM, 2@ 9AM, 3@1pm, 2@5PM AND 2@ 9PM = 11 capsules/day 330 capsule 11    LORazepam (ATIVAN) 1 MG tablet Take 0.5-1 tablets (0.5-1 mg) by mouth every 6 hours as needed for vomiting or muscle spasms. 15 tablet 0    omeprazole (PRILOSEC) 20 MG DR capsule Take 1 capsule (20 mg) by mouth 2 times daily. 60 capsule 0    ondansetron (ZOFRAN ODT) 4 MG ODT tab Take 1 tablet (4 mg) by mouth every 8 hours as needed for vomiting or nausea. 15 tablet 0    topiramate (TOPAMAX) 25 MG tablet Take 0.5 tablets (12.5 mg) by mouth daily for 15 days, THEN 1 tablet (25 mg) daily for 15 days. 23 tablet 0     No current facility-administered medications for this encounter.     Medication Adherence:  The patient reported taking his prescribed medications as prescribed.  The patient reported being able  to self-administer his medications.    Patient Allergies:    Allergies   Allergen Reactions    Diphenhydramine      Other reaction(s): Other (see comments)  Told not to take due to parkinsons    Metoclopramide      Avoid in patient with a history of Parkinson Disease  Other reaction(s): GI intolerance  Avoid in patient with a history of Parkinson Disease    Promethazine      Other reaction(s): Other (see comments)  Told not to take due to parkisons     Medical History:    Past Medical History:   Diagnosis Date    Anxiety     Cocaine addiction (H) 01/13/2025    Depressive disorder     Esophagitis endoscopy done 11/2021 11/18/2021    Impression:            - Normal first portion of the duodenum, second portion                         of the duodenum and third portion of the duodenum.                         Biopsied.                         - Nodular mucosa in the duodenal bulb. Biopsied.                         - Normal stomach. Biopsied.                         - Medium-sized  hiatal hernia.                         - LA Grade    H/O electroencephalogram 2021 11/18/2021     Impression:  This is a normal waking and sleep EEG, with generalized beta activities throughout the recording. Generalized beta activities are usually associated certain medication use, such as benzodiazepines or barbiturates. The cause of these activities in this case is unclear at this time.        H/O magnetic resonance imaging of brain and brain stem 08/17/2018 2009 December MR Brain without and with IV Jsfcjoby64/8/2009 [facility] Result Impression  Normal MRI of the brain.  Result Narrative      Multiecho, multiplanar views of the brain were obtained prior to and  following the IV administration of 19 mL of contrast. There are no  previous studies available for comparison.     The brain parenchyma is normal in appearance on all pulse sequences,  with     H/O magnetic resonance imaging of cervical spine 08/17/2018 March MR Cervical Spine without IV Contrast3/15/2017 [facility] Result Addenda Addendum by ProviderKaren M.D. on 03/15/2017 12:30 PM RAD^^^MA MR Cervical Spine w/o contrast 3/15/2017 12:30:00 Result Impression  Minimal degenerative disc disease at C5-6 and C6-7  otherwise an unremarkable MR scan of the cervical spine.  Result Narrative   EXAM: MR Cervical Spine w/o contrast   INDICATION:     H/O shoulder surgery 08/17/2018 2017 July XR SHOULDER 2 VIEWS LEFT7/14/2017 [facility] Result Narrative XR SHOULDER 2 VIEWS LEFT 7/14/2017 9:13 PM  INDICATION: Shoulder Injury COMPARISON: None.  FINDINGS: Negative shoulder. No fracture or dislocation.  Status      Hiatal hernia 04/20/2021    Based on a 2021 ct scan Small hiatal hernia with some wall thickening in the distal thoracic esophagus suggesting some esophagitis.    History of seizures     Parkinson disease (H)     Seizure (H) at age 22 yrs 08/17/2018    Shoulder dislocation     left    Tremor 08/10/2018       Substance Use:  The patient reported having no family history of chemical health issues.  The patient denied having any history of participating in a substance use disorder treatment program.  The patient denied having any inpatient detoxification admissions or inpatient hospitalizations for withdrawal symptoms.  The patient is currently receiving the following services: The patient is currently working with the [facility] team for Medication Assisted Therapy with topiramate.  The patient denied having any history of attending 12-step or other recovery support group meetings.      Substance Age of first use Pattern and duration of use (include amounts and frequency) Date of last use     Withdrawal potential Route of use   Has used Alcohol 18 The patient reported his heaviest use of alcohol had been between 2012 and 2015, when he reported a pattern of drinking between 2-5 beers around 3 times per week on average.    During the past 12-months, he reported a pattern of drinking between 2-4 beers or mixed drinks around 5 times per month on average.     The patient denied having any memory impairment or blackouts due to his use of alcohol within the past 12-months.   1/18/2025    (2 beers and 2 mixed drinks)  No Oral   Has used Marijuana   18 The patient reported his heaviest use of THC/cannabis had been between the ages of 29 and 34, when he reported a pattern of smoking 1 gram of THC/cannabis on a daily basis.    During the past 12-months, he reported a pattern of vaping a few hits of THC/cannabis a few times per month on average.     The patient reported having no use of THC/cannabis from 9/2024 up until relapsing with THC/cannabis during this past week.    During this past week, he reported a pattern of vaping a few hits of THC/cannabis on a daily basis.   1/20/2025    (2 hits off of a vape pen)  No Smoked and Vaping    Has used Amphetamines   21 MDMA/Ecstasy: 15 times in life.  7/2024 No  Oral    Has used Cocaine/crack    21 The patient reported his heaviest use of powder cocaine had been in 2016, when he reported a pattern of snorting 1 gram of powder cocaine between 4-5 times per week on average.    The patient reported his longest period of time without using powder cocaine had been for an 8-month period of time in 2022 after having a brain surgery and his return to using powder cocaine had been due to having a desire to use powder cocaine after he had been feeling good again following his recovery from his brain surgery.     During the past 12-months, he reported a pattern of snorting between 1-2 grams of powder cocaine around 8 times per month on average.    1/18/2025    (1 gram)  No Snort   Has used Hallucinogens 22 Psilocybin mushrooms: 6 times in life.    10/2023 No Oral   Has not used Inhalants        Has not used Heroin        Has not used Other Opiates        Has used Benzodiazepines   35 The patient reported taking his prescribed Lorazepam (Ativan) as prescribed and he denied having any history of abusing or overusing his prescribed Lorazepam (Ativan).     The patient reported a pattern of taking his prescribed Lorazepam (Ativan) around 2-3 times per year on average.   2-weeks ago No Oral   Has not used Barbiturates        Has not used Over the counter medications        Has not used Nicotine        Has use Caffeine 12 The patient reported a current pattern of drinking 1 cup of coffee or 1 bottle/can of soda around 2 times per week on average.    1/21/2025  No  Oral   Has used other substances not listed above:  Identify:           The patient reported the following problems as a result of their substance use: relationship problems, family problems, legal issues, chronic health problems which were exacerbated by his use of powder cocaine, mental health symptoms which were exacerbated by his use of powder cocaine, occupational / vocational problems, and financial problems.  The patient is  concerned about substance use.  The patient reported his recovery goal is: The patient's plan and goal is to abstain from powder cocaine and from all other non-prescribed mood altering chemicals.     The patient reports experiencing the following withdrawal symptoms within the past 12 months: sweating, shaky/jittery/tremors, unable to sleep, agitation, headache, fatigue, sad/depressed feeling, muscle aches, vivid/unpleasant dreams, irritability, high blood pressure, diminished appetite, and anxiety/worry and the following within the past 30 days: sweating, shaky/jittery/tremors, unable to sleep, agitation, headache, fatigue, sad/depressed feeling, muscle aches, vivid/unpleasant dreams, irritability, high blood pressure, diminished appetite, and anxiety/worry. (DSM-11)  The patient reported having urges to use THC/cannabis and powder cocaine.  (DSM-4)  The patient reported he has used more powder cocaine than intended and over a longer period of time than intended.  (DSM-1)  The patient reported he has had unsuccessful attempts to cut down or control use of THC/cannabis and powder cocaine.  (DSM-2)  The patient reported his longest period of time without using powder cocaine had been for an 8-month period of time in 2022 after having a brain surgery and his return to using powder cocaine had been due to having a desire to use powder cocaine after he had been feeling good again following his recovery from his brain surgery.  The patient reported he has needed to use more powder cocaine to achieve the same effect.  (DSM-10)  The patient does report diminished effect with use of same amount of powder cocaine.  (DSM-10)     The patient does not report a great deal of time is spent in activities necessary to obtain, use, or recover from powder cocaine effects.  (DSM-3)  The patient does not report important social, occupational, or recreational activities are given up or reduced because of powder cocaine use.  (DSM-7)   Cocaine and THC/cannabis use is continued despite knowledge of having a persistent or recurrent physical or psychological problem that is likely to have been caused or exacerbated by use.  (DSM-9)  The patient reported the following problem behaviors while under the influence of substances: The patient reported having relationship conflict with his wife and his parents and being more impulsive when under the influence of powder cocaine.  (DSM-6)  The patient reported recurrent use of powder cocaine in physically hazardous such as driving a motor vehicle while under the influence.  (DSM-8)    The patient reported his substance use has not negatively impacted his ability to function in a school setting within the past 12-months.  (DSM-5)  The patient reported his substance use has negatively impacted his ability to function in a work setting.  The patient reported being late to work and having decreased performance at work due to his substance use.  (DSM-5)  The patient's demographics and history impact his recovery in the following ways: The patient reported he had first started to use powder cocaine with his friends/peers in social situations.  The patient reported engaging in the following recreation/leisure activities while using alcohol or other non-prescribed mood altering chemicals: The patient's use of powder cocaine had been done independently of his social/recreational/leisure activities.  The patient reported the following people are supportive of his recovery: his wife, his father, his mother, his brother, and a few close friends.    The patient denied having current or past concerns regarding gambling and denied ever participating in a gambling treatment program.  The patient does not have other addictive behaviors he is concerned about at this time.    Dimension Scale Ratings:    Dimension 1 -  Acute Intoxication/Withdrawal: 0 - No Problem    Dimension 2 - Biomedical: 2 - Moderate Problem    Dimension 3  - Emotional/Behavioral/Cognitive Conditions: 2 - Moderate Problem    Dimension 4 - Readiness to Change:  3 - Severe Problem    Dimension 5 - Relapse/Continued Use/ Continued Problem Potential: 4 - Extreme Problem    Dimension 6 - Recovery Environment:  3 - Severe Problem    Significant Losses / Trauma / Abuse / Neglect Issues:   The patient did not serve in the .  There are indications or report of significant loss, trauma, abuse or neglect issues related to: The patient denied having any history of being verbally, emotionally, physically, or sexually abused.  The patient has not been a victim of exploitation.  The patient reported having a history of trauma issues due to having a brain surgery in 2022.  The patient denied having any history of suicide attempts and denied having any current suicide ideation.  The patient denied having any history of self-injurious behavior.   Concerns for possible neglect are not present.    Safety Assessment:   The patient denies current homicidal ideation and behaviors.  The patient denies current self-injurious ideation and behaviors.    The patient reported having mental health problems, reported having health problems, using illicit drugs with unknown contents and purity, and having a risk for having an accidental drug overdose associated with substance use.  The patient reported substance use associated with mental health symptoms.  The patient reported the following current concerns for their personal safety: None.  The patient reported there are not any firearms in the home.     History of Safety Concerns:  The patient denied a history of homicidal ideation.     The patient denied a history of personal safety concerns.    The patient denied a history of assaultive behaviors.    The patient denied having any history of sexual assault behaviors.  The patient denied having any history of being registered as a sex offender.  The patient reported a history of having  mental health problems, reported a history of having health problems, reported a history of using illicit drugs with unknown contents and purity, and reported a history of having a risk for having an accidental drug overdose associated with substance use.  The patient reported a history of substance use associated with mental health symptoms.  The patient reported the following protective factors: positive relationships positive family connections, forward/future oriented thinking, dedication to family/friends, sense of belonging, adherence with prescribed medication, daily obligations, commitment to well-being, and strong sense of self-worth/esteem.    Vulnerability Assessment:    Does the patient have a history of vulnerability such as being teased, picked on, or other indications of potential safety issues with others ?  No    Does this patient have a history of being the victim of abuse? No history of abuse reported or documented.    Does this patient have a history of victimizing others or physical/sexual aggression? No     Does the patient have a history of boundary violations?  No.    Does the patient have a history of other sexual acting out behaviors (e.g grooming)?   No    Does the patient have a history of threats to self or others? Fire setting, running away or other self-injurious behaviors?    No    Has the patient required holds or restraints to manage behavior?  No    Does the patient s history indicate the need for special precautions or particular staffing patterns in the facility?  No    NOTE: If this screening indicates that the patient is at risk to harm self or others, notify staff at referral location.    Risk Plan:  See Recommendations for Safety and Risk Management Plan    Review of Symptoms per patient report:  Substance Use:  significant withdrawal symptoms, substance use related medical issues, substance use related mental health issues, cravings/urges to use, family relationship problems  due to substance use, and social problems related to substance use.    Diagnostic Criteria:   1.)  Substance is often taken in larger amounts or over a longer period than was intended.  Met for:  powder cocaine.  2.)  There is persistent desire or unsuccessful efforts to cut down or control use of the substance.  Met for:  THC/cannabis and powder cocaine.  4.)  Craving, or a strong desire or urge to use the substance.  Met for:  THC/cannabis and powder cocaine.  5.)  Recurrent use of the substance resulting in a failure to fulfill major role obligations at work, school, or home.  Met for:  powder cocaine.  6.)  Continued use of the substance despite having persistent or recurrent social or interpersonal problems caused or exacerbated by the effects of its use.  Met for:  powder cocaine.  7.)  Important social, occupational, or recreational activities are given up or reduced because of the substance.  Met for:  powder cocaine.  8.)  Recurrent use of the substance in which it is physically hazardous.  Met for:  powder cocaine.  9.)  Use of the substance is continued despite knowledge of having a persistent or recurrent physical or psychological problem that is likely to have been cause or exacerbated by the substance.  Met for:  THC/cannabis and powder cocaine.  10.)  Tolerance:  either a need for markedly increased amounts of the substance to achieve the desired effect or a markedly diminished effect with continued use of the same amount of the substance.  Met for:  powder cocaine.  11.)  Withdrawal:  either patient endorses characteristic withdrawal syndrome for the substance or the substance (or closely related substance) is taken to relieve or avoid withdrawal symptoms.  Met for:  THC/cannabis and powder cocaine.    Collateral Contact Summary:   Collateral contacts contributing to this assessment:  The patient's electronic medical records were reviewed at time of assessment.    No additional collateral data had  been obtained at the time of this documentation.     If court related records were reviewed, summarize here: None    Information from collateral contacts supported/largely agreed with information from the client and associated risk ratings.    Information in this assessment was obtained from the medical record and provided by the patient who is a good historian.        The patient will have open access to his substance use disorder assessment medical record.    As evidenced by self report and criteria, the patient meets the following DSM-5 Diagnoses: (Sustained by DSM-5 Criteria Listed Above)      1.)  Cocaine Use Disorder Severe - 304.20 (F14.20)  2.)  Cannabis Use Disorder Moderate - 304.30 (F12.20)  3.)  Depression NOS, per patient self-report  4.)  Anxiety disorder NOS, per patient self-report    Specify if: In early remission:  After full criteria for alcohol/drug use disorder were previously met, none of the criteria for alcohol/drug use disorder have been met for at least 3 months but for less than 12 months (with the exception that Criterion A4,  Craving or a strong desire or urge to use alcohol/drug  may be met).     In sustained remission:   After full criteria for alcohol use disorder were previously met, none of the criteria for alcohol/drug use disorder have been met at any time during a period of 12 months or longer (with the exception that Criterion A4,  Craving or strong desire or urge to use alcohol/drug  may be met).     Specify if:   This additional specifier is used if the individual is in an environment where access to alcohol is restricted.    Mild: Presence of 2-3 symptoms  Moderate: Presence of 4-5 symptoms  Severe: Presence of 6 or more symptoms    Recommendations:     1. Plan for Safety and Risk Management:     Recommended that patient call 911 or go to the local ED should there be a change in any of these risk factors.            Report to child / adult protection services was not  needed.    2. [facility] Referrals:      Recommendations:      1.)  It was recommended the patient abstain from powder cocaine and from all other non-prescribed mood altering chemicals.   2.)  Have a mental health evaluation to address his current clinical mental health issues while on a residential or board and lodging substance use disorder treatment program unit.  3.)  Follow all of the recommendations of his medical and mental health providers.  4.)  Enter the [facility] program at [facility] in [place], [place] or enter a similar residential or board and lodging treatment program for substance use disorder treatment.  5.)  Follow all of the recommendations of his substance use disorder treatment providers including entering an extended care program as needed.        The patient reported he was willing to follow the above recommendations.      The patient meets criteria for the following levels of care based on ASAM Criteria:      Withdrawal Management - No Withdrawal Management Indicated.    Treatment/Recovery Services - 3.5 Clinically Managed Medium and High Intensity Residential Services.      The patient's placement aligns with the assessment and placement level of care recommendation based on current ASAM Dimension scale ratings.     The patient would like the following family or other support people involved in their treatment:  his wife.  The patient does not have any history of opioid abuse.      3.  Mental Health Referrals:     The patient would benefit from having a mental health evaluation to address his current mental health issues while on the residential or board and lodging treatment program unit.    4. Clinical Substantiation for the above recommendations: The patient has been unable to maintain abstinence from powder cocaine while living at his current home environment, would benefit from developing long-term sober maintenance skills, would benefit from developing sober coping skills, would  benefit from developing a sober peer support network, has dual issues of mental health and substance abuse, has mental health symptoms which are exacerbated by substance abuse, has medical issues which are exacerbated by substance abuse, and lacks awareness regarding the disease model of addiction.    5.  Cultural Concerns:    The patient did not identify having any cultural concerns regarding mental health, physical health, or substance use issues.     6. Recommendations for treatment focus:      Alcohol / Substance Use - See #2. [facility] Referrals above for details on recommendations.    7. Interactive Complexity: No    8. Safety Plan:  Patient denied any current/recent/lifetime history of suicidal ideation and/or behaviors.  No safety plan indicated at this time.     Provider Name/ Credentials:  LAURA Root  January 21, 2025

## 2025-01-28 ENCOUNTER — TELEPHONE (OUTPATIENT)
Dept: BEHAVIORAL HEALTH | Facility: CLINIC | Age: 40
End: 2025-01-28
Payer: COMMERCIAL

## 2025-01-28 NOTE — TELEPHONE ENCOUNTER
----- Message from Massiel Small sent at 1/27/2025  2:48 PM CST -----  Regarding: schedule admission  Scheduling Request    Patient Name: Dipesh Nicole  Location of programming[facility]  Start Date: January / 29 / 2025  Group:  JEROME on Wednesday at 12:00 PM   Attending Provider (MD): Trace  Duration of Appointment in minutes: 840  Visit Type: In-person or Treatment - 870    Additional notes: direct admission

## 2025-01-29 ENCOUNTER — TELEPHONE (OUTPATIENT)
Dept: BEHAVIORAL HEALTH | Facility: CLINIC | Age: 40
End: 2025-01-29
Payer: COMMERCIAL

## 2025-01-29 NOTE — TELEPHONE ENCOUNTER
----- Message from Grey Garcia sent at 1/29/2025  8:45 AM CST -----  Regarding: Reschedule Admission  This pt was rescheduled for tomorrow, please push all appointments back by 1 day. Thank you!!

## 2025-01-30 ENCOUNTER — TELEPHONE (OUTPATIENT)
Dept: BEHAVIORAL HEALTH | Facility: CLINIC | Age: 40
End: 2025-01-30
Payer: COMMERCIAL

## 2025-01-30 PROBLEM — F19.20 CHEMICAL DEPENDENCY (H): Status: ACTIVE | Noted: 2025-01-30

## 2025-01-30 NOTE — TELEPHONE ENCOUNTER
----- Message from Grey Garcia sent at 1/30/2025  1:10 PM CST -----  Regarding: Annalise HICKEY Pt arrived and admitted to [facility]. [place] [facility]

## 2025-01-31 ENCOUNTER — HOSPITAL ENCOUNTER (OUTPATIENT)
Dept: BEHAVIORAL HEALTH | Facility: CLINIC | Age: 40
Discharge: HOME | End: 2025-01-31
Attending: FAMILY MEDICINE
Payer: COMMERCIAL

## 2025-01-31 PROCEDURE — 1002N00001 HC LODGING PLUS FACILITY CHARGE ADULT

## 2025-01-31 PROCEDURE — H2035 A/D TX PROGRAM, PER HOUR: HCPCS | Mod: HQ

## 2025-01-31 NOTE — PROGRESS NOTES
"Comprehensive Assessment Summary     Based on client interview, review of previous assessments and   comprehensive assessment interview the following diagnosis and recommendations are:     Patient: Dipesh Nicole  MRN; [id number]   : 1985  Age: 39 year old Sex: male       Client meets criteria for:  Cocaine Use Disorder, Severe (F14.20)  Cannabis Use Disorder,  Moderate (F12.0)    Dimension One: Acute Intoxication/Withdrawal Potential     Ratin     Patient reported problematic use of Cocaine. He reported his last date of use was 2025. Patient also reported occasional use of Alcohol and Cannabis. He was a direct admit from the community to [facility].     Dimension Two: Biomedical Condition and Complications    Ratin    Patient reported having a Diagnosis of Parkinson's Disease. Patient reported having a history of seizure, although has not experienced any since 2018. He currently has prescribed medications. Patient appears able to seek medical services as necessary.      Dimension Three: Emotional/Behavioral/Cognitive Conditions & Complications    Ratin   Patient reported having past diagnosis of Anxiety and Depression. He reported that he worked with a therapist in , however is not currently receiving services in the community. Upon admission, patient's PHQ-9 score was 5 out of 27, indicating mild depression. His ANAYA-7 score was 3 out of 21, indicating minimal anxiety. Patient was rated as a very low risk for suicide upon admission. He denied any concerns with grief and loss. Patient reported that he is interested in working with a therapist while in programming.     Dimension Four: Treatment Acceptance/Resistance     Rating: 3   Patient stated, \"myself, trying to make myself better...better relationships,\" as his motivation to seek treatment. He reported that his family is supportive of his decision to seek treatment. Patient also noted having a child on the way, which was a factor " in his decision to stop using and seek treatment. Patient rated his motivation as a 9.5/10. Patient identified personal strengths such as perseverance, loyalty, respecting others, confidence, and family.     Dimension Five: Continued Use/Relaspe Prevention     Ratin   Patient reported having no prior treatment episodes. He reported that his longest period of sobriety was 6 months. Patient reported that he recently felt some shame regarding his use. Patient identified his main trigger as wanting to escape reality.     Dimension Six: Recovery Environment     Rating: 3     Patient reported working as a  and living independently prior to admission. He reported that he was recently , however him and his wife are still living separately. Patient reported that him and his wife are expecting their first child. He reported Bachelors Degree as highest level of education. Patient reported having no past sobriety support group attendance. Patient denied having any current legal involvement.     I have reviewed the information on the assessment, psychosocial and medical history and checklist:

## 2025-01-31 NOTE — GROUP NOTE
Group Therapy Documentation    PATIENT'S NAME: Dipesh Nicole  MRN:   [id number]  :   1985  [id number]  DATE OF SERVICE: 25  START TIME:  9:00 AM  END TIME: 11:00 AM  FACILITATOR(S): Grey Garcia LADC  TOPIC: BEH Group Therapy  Number of patients attending the group:  5  Group Length:  2 Hours    Dimensions addressed: 1, 3, 4, 5, and 6    Summary of Group / Topics Discussed:    Recovery Principles, Sober coping skills, Relationship/socialization, Balanced lifestyle, and Self-care activities      Group Attendance:  Attended group session    Patient's response to the group topic/interactions:  expressed readiness to alter behaviors, expressed understanding of topic, and listened actively    Patient appeared to be Attentive and Engaged.        Client specific details:  Pt was attentive and engaged and offered insight into personal strategies to successfully complete treatment.

## 2025-01-31 NOTE — GROUP NOTE
"Group Therapy Documentation    PATIENT'S NAME: Dipesh Nicole  MRN:   [id number]  :   1985  ACCT. NUMBER: [id number]  DATE OF SERVICE: 25  START TIME: 12:30 PM  END TIME:  2:30 PM  FACILITATOR(S): Pramod Hart LADC  TOPIC: BEH Group Therapy  Number of patients attending the group:  5  Group Length:  2 Hours    Dimensions addressed: 3, 4, 5, and 6    Summary of Group / Topics Discussed:    Sober coping skills, Balanced lifestyle, Disease of addiction, and Relapse prevention      Group participants viewed the film \"Ground Hogs Day\", followed by a discussion emphasizing delayed gratification.       Group Attendance:  Attended group session    Patient's response to the group topic/interactions:  cooperative with task    Patient appeared to be Actively participating.        Client specific details:  Patient actively engaged in group discussion.    "

## 2025-02-01 ENCOUNTER — HOSPITAL ENCOUNTER (OUTPATIENT)
Dept: BEHAVIORAL HEALTH | Facility: CLINIC | Age: 40
Discharge: HOME | End: 2025-02-01
Attending: FAMILY MEDICINE
Payer: COMMERCIAL

## 2025-02-01 PROCEDURE — 1002N00001 HC LODGING PLUS FACILITY CHARGE ADULT

## 2025-02-01 PROCEDURE — H2035 A/D TX PROGRAM, PER HOUR: HCPCS | Mod: HQ

## 2025-02-01 NOTE — GROUP NOTE
Group Therapy Documentation    PATIENT'S NAME: Dipesh Nicole  MRN:   [id number]  :   1985  ACCT. NUMBER: [id number]  DATE OF SERVICE: 25  START TIME: 12:30 PM  END TIME:  2:30 PM  FACILITATOR(S): Carmen Fortune LADC  TOPIC: BEH Group Therapy  Number of patients attending the group:  22    Group Length:  2 Hours    Dimensions addressed: 3, 4, and 5    Summary of Group / Topics Discussed:    Recovery Principles, Spiritual Care, Relationship/socialization, and Relapse prevention      Group Attendance:  Attended group session    Patient's response to the group topic/interactions:  cooperative with task    Patient appeared to be Actively participating, Attentive, and Engaged.        Client specific details:  Patient attended voices of addiction and consequences vs rewards workshop and was attentive and participative.

## 2025-02-01 NOTE — GROUP NOTE
Group Therapy Documentation    PATIENT'S NAME: Dipesh Nicole  MRN:   [id number]  :   1985  ACCT. NUMBER: [id number]  DATE OF SERVICE: 25  START TIME:  9:00 AM  END TIME: 11:00 AM  FACILITATOR(S): Kiarra Hernandez LADC; Carmen Fortune LADC  TOPIC: BEH Group Therapy  Number of patients attending the group:  22  Group Length:  2 Hours    Dimensions addressed: 4 and 6    Summary of Group / Topics Discussed:    Balanced lifestyle, Disease of addiction, and Relapse prevention    Group Attendance:  Attended group session    Patient's response to the group topic/interactions:  cooperative with task    Patient appeared to be Attentive and Engaged.        Client specific details: Pt listened respectfully to presentations on quitting heroin and the risks of THC use.

## 2025-02-02 ENCOUNTER — HOSPITAL ENCOUNTER (OUTPATIENT)
Dept: BEHAVIORAL HEALTH | Facility: CLINIC | Age: 40
Discharge: HOME | End: 2025-02-02
Attending: FAMILY MEDICINE
Payer: COMMERCIAL

## 2025-02-02 PROCEDURE — H2035 A/D TX PROGRAM, PER HOUR: HCPCS | Mod: HQ | Performed by: COUNSELOR

## 2025-02-02 PROCEDURE — H2035 A/D TX PROGRAM, PER HOUR: HCPCS | Mod: HQ

## 2025-02-02 PROCEDURE — 1002N00001 HC LODGING PLUS FACILITY CHARGE ADULT

## 2025-02-02 NOTE — GROUP NOTE
Group Therapy Documentation    PATIENT'S NAME: Dipesh Nicole  MRN:   [id number]  :   1985  ACCT. NUMBER: [id number]  DATE OF SERVICE: 25  START TIME: 12:30 PM  END TIME:  1:30 PM  FACILITATOR(S): Carmen Fortune LADC  TOPIC: BEH Group Therapy  Number of patients attending the group:  22    Group Length:  1 Hour    Dimensions addressed: 4 and 5    Summary of Group / Topics Discussed:    Recovery Principles and Relapse prevention      Group Attendance:  Attended group session    Patient's response to the group topic/interactions:  cooperative with task    Patient appeared to be Actively participating, Attentive, and Engaged.        Client specific details:  Patient attended group lecture and was attentive and participative.

## 2025-02-02 NOTE — GROUP NOTE
Psychoeducation Group Documentation    PATIENT'S NAME: Dipesh Nicole  MRN:   [id number]  :   1985  ACCT. NUMBER: [id number]  DATE OF SERVICE: 25  START TIME:  8:45 AM  END TIME: 10:30 AM  FACILITATOR(S): Leana Field LADC; Tameka Forde RN  TOPIC: BEH Pyschoeducation  Number of patients attending the group:  22  Group Length:  1.5 Hours    Skills Group Therapy Type: Daily living/independence skills and Healthy behaviors development    Summary of Group / Topics Discussed:    Balanced lifestyle skills, Symptom management skills, and Medication management skills    Staff RN provided a lecture on diet, exercise, sleep, and substance use in pregnancy.  Patients asked appropriate questions and gained insights into healthy life balance for recovery.        Group Attendance:  Attended group session    Patient's response to the group topic/interactions:  listened actively    Patient appeared to be Attentive.         Client specific details:  Patient actively attended to this lecture.

## 2025-02-03 ENCOUNTER — HOSPITAL ENCOUNTER (OUTPATIENT)
Dept: BEHAVIORAL HEALTH | Facility: CLINIC | Age: 40
Discharge: HOME | End: 2025-02-03
Attending: FAMILY MEDICINE
Payer: COMMERCIAL

## 2025-02-03 PROCEDURE — 1002N00001 HC LODGING PLUS FACILITY CHARGE ADULT

## 2025-02-03 PROCEDURE — H2035 A/D TX PROGRAM, PER HOUR: HCPCS | Mod: HQ

## 2025-02-03 NOTE — ADDENDUM NOTE
Encounter addended by: Luis Manuel Valdivia on: 2/3/2025 4:01 PM   Actions taken: Flowsheet data copied forward, Flowsheet accepted

## 2025-02-03 NOTE — GROUP NOTE
Group Therapy Documentation    PATIENT'S NAME: Dipesh Nicole  MRN:   [id number]  :   1985  [id number]  DATE OF SERVICE: 25  START TIME:  9:00 AM  END TIME: 11:00 AM  FACILITATOR(S): Kiarra Hernandez LADC; Danielle Gaston  TOPIC: BEH Group Therapy  Number of patients attending the group:  5  Group Length:  2 Hours    Dimensions addressed: 3 and 5    Summary of Group / Topics Discussed:    Emotions/expression and Relapse prevention    Group Attendance:  Attended group session    Patient's response to the group topic/interactions:  cooperative with task    Patient appeared to be Actively participating, Attentive, and Engaged.        Client specific details: Pt reported feeling anxious and set goals for the week (continue settling in and get his tx plan). He offered support to a peer struggling with a decision in early recovery.

## 2025-02-03 NOTE — GROUP NOTE
Group Therapy Documentation    PATIENT'S NAME: Dipesh Nicole  MRN:   [id number]  :   1985  ACCT. NUMBER: [id number]  DATE OF SERVICE: 25  START TIME: 12:30 PM  END TIME:  2:30 PM  FACILITATOR(S): Pramod Hart LADC  TOPIC: BEH Group Therapy  Number of patients attending the group:  6  Group Length:  2 Hours    Dimensions addressed: 3, 4, 5, and 6    Summary of Group / Topics Discussed:    Sober coping skills, Relationship/socialization, Disease of addiction, Emotions/expression, and Relapse prevention      Group participants shared assignments throughout group session. Feedback was provided by participants throughout group session.       Group Attendance:  Attended group session    Patient's response to the group topic/interactions:  cooperative with task    Patient appeared to be Actively participating.        Client specific details:  Patient actively engaged in group discussion.

## 2025-02-04 ENCOUNTER — HOSPITAL ENCOUNTER (OUTPATIENT)
Dept: BEHAVIORAL HEALTH | Facility: CLINIC | Age: 40
Discharge: HOME | End: 2025-02-04
Attending: FAMILY MEDICINE
Payer: COMMERCIAL

## 2025-02-04 PROCEDURE — H2035 A/D TX PROGRAM, PER HOUR: HCPCS | Mod: HQ

## 2025-02-04 PROCEDURE — 1002N00001 HC LODGING PLUS FACILITY CHARGE ADULT

## 2025-02-04 PROCEDURE — H2035 A/D TX PROGRAM, PER HOUR: HCPCS | Mod: HQ | Performed by: COUNSELOR

## 2025-02-04 NOTE — GROUP NOTE
Group Therapy Documentation    PATIENT'S NAME: Dipesh Nicole  MRN:   [id number]  :   1985  ACCT. NUMBER: [id number]  DATE OF SERVICE: 25  START TIME:  3:00 PM  END TIME:  4:00 PM  FACILITATOR(S): Breanne Blanton LADC; Vargas Mills LADC; Carmen Fortune LADC  TOPIC: BEH Group Therapy  Number of patients attending the group:  19  Group Length:  1 Hour    Dimensions addressed: 4    Summary of Group / Topics Discussed:    Recovery Principles and Research and Addiction      Guest Lecturer Dr. Escalante presented a 60 minute presentation on Addiction Research. Exploring ( What is research? What's the point of research in the Addiction Field? What are the benefits? How long does it take? What is the overall goal of Addiction Study Research.) Patients were allowed to asked questions and process the presentation with peers and staff.        Group Attendance:  Attended group session    Patient's response to the group topic/interactions:  cooperative with task    Patient appeared to be Actively participating.        Client specific details:  Annalise attended afternoon skills group and participated in processing discussion. Patient remained engaged and participated throughout group session.       LAURA Gallegos

## 2025-02-04 NOTE — GROUP NOTE
Group Therapy Documentation    PATIENT'S NAME: Dipesh Nicole  MRN:   [id number]  :   1985  [id number]  DATE OF SERVICE: 25  START TIME: 12:30 PM  END TIME:  2:30 PM  FACILITATOR(S): Leana Field LADC; Carmen Fortune LADC  TOPIC: BEH Group Therapy  Number of patients attending the group:  9  Group Length:  2 Hours    Dimensions addressed: 3, 4, 5, and 6    Summary of Group / Topics Discussed:    Sober coping skills, Disease of addiction, and Relapse prevention    Patients discussed the disease of addiction including the progressive, chronic, and fatal nature of the disease.  A patient discussed guilt and shame and group members contributed feedback and personalized insights.        Group Attendance:  Attended group session    Patient's response to the group topic/interactions:  cooperative with task, discussed personal experience with topic, and listened actively    Patient appeared to be Actively participating, Attentive, and Engaged.        Client specific details:  Patient actively participated in group.  Patient contributed feedback and personalized insights regarding the progression of his disease.

## 2025-02-05 ENCOUNTER — HOSPITAL ENCOUNTER (OUTPATIENT)
Dept: BEHAVIORAL HEALTH | Facility: CLINIC | Age: 40
Discharge: HOME | End: 2025-02-05
Attending: FAMILY MEDICINE
Payer: COMMERCIAL

## 2025-02-05 ENCOUNTER — OFFICE VISIT (OUTPATIENT)
Dept: ADDICTION MEDICINE | Facility: CLINIC | Age: 40
End: 2025-02-05
Payer: COMMERCIAL

## 2025-02-05 VITALS
BODY MASS INDEX: 24.96 KG/M2 | WEIGHT: 189.2 LBS | HEART RATE: 69 BPM | SYSTOLIC BLOOD PRESSURE: 135 MMHG | DIASTOLIC BLOOD PRESSURE: 85 MMHG

## 2025-02-05 DIAGNOSIS — F14.20 COCAINE USE DISORDER, SEVERE, DEPENDENCE (H): Primary | ICD-10-CM

## 2025-02-05 LAB
AMPHETAMINE QUAL URINE POCT: NEGATIVE
BARBITURATE QUAL URINE POCT: NEGATIVE
BENZODIAZEPINE QUAL URINE POCT: NEGATIVE
BUPRENORPHINE QUAL URINE POCT: NEGATIVE
COCAINE QUAL URINE POCT: NEGATIVE
CREATININE QUAL URINE POCT: NORMAL
INTERNAL QC QUAL URINE POCT: NORMAL
MDMA QUAL URINE POCT: NEGATIVE
METHADONE QUAL URINE POCT: NEGATIVE
METHAMPHETAMINE QUAL URINE POCT: NEGATIVE
OPIATE QUAL URINE POCT: NEGATIVE
OXYCODONE QUAL URINE POCT: NEGATIVE
PH QUAL URINE POCT: NORMAL
PHENCYCLIDINE QUAL URINE POCT: NEGATIVE
POCT KIT EXPIRATION DATE: NORMAL
POCT KIT LOT NUMBER: NORMAL
SPECIFIC GRAVITY POCT: 1.01
TEMPERATURE URINE POCT: NORMAL
THC QUAL URINE POCT: NEGATIVE

## 2025-02-05 PROCEDURE — 1002N00001 HC LODGING PLUS FACILITY CHARGE ADULT

## 2025-02-05 PROCEDURE — H2035 A/D TX PROGRAM, PER HOUR: HCPCS | Mod: HQ

## 2025-02-05 RX ORDER — TOPIRAMATE 25 MG/1
TABLET, FILM COATED ORAL
Status: SHIPPED
Start: 2025-02-05 | End: 2025-03-07

## 2025-02-05 ASSESSMENT — PAIN SCALES - GENERAL: PAINLEVEL_OUTOF10: NO PAIN (0)

## 2025-02-05 NOTE — ADDENDUM NOTE
Encounter addended by: Kiarra Hernandez LADC on: 2/5/2025 9:56 AM   Actions taken: Clinical Note Signed, Charge Capture section accepted

## 2025-02-05 NOTE — PATIENT INSTRUCTIONS
Patient Education   Addiction Medicine  What to Expect  Here's what to expect from our Addiction Medicine program.  About Addiction Medicine  Addiction Medicine clinics help you with substance use problems. You set your own goals. We try to help you reach your goals. Your care plan can include:  Medicine  Creating a recovery plan  Helping you find local resources  Helping with treatment options  Clinic phone number and addresses  Clinic Phone: [phone]  Mental Health and Addiction Clinic  [facility]  [address], [address]  [place] [address]  [facility]  [address], [address]  [place] [address]  Walk-in services  We offer walk-in care for patients at the Recovery Clinic. This is only for patients with Opioid Use Disorder (OUD). Anyone with OUD is welcome. Our providers will refer you to the Recovery Clinic if you're struggling to keep up with your medicines or appointments.  Recovery Clinic ([facility])  [address], [address]  [place] [address]  Phone: [phone]  The Recovery Clinic is open for walk-ins Monday to Friday 9 a.m. to 11:30 a.m. and 12:30 p.m. to 3 p.m.  How it works  Come to your visits every time. The treatment works better when you do.   You can have as many visits as you need. When you're better, we'll refer you back to being cared for by your family doctor.   If you need it, we'll send you to doctors, psychiatrists, therapists, and other providers. We focus on treating addiction. We don't treat other problems, like managing other medicines or non-addiction issues.  About visits  Urine drug testing  We'll often test your pee (urine) for drugs. This is the only way we can know for sure whether or not you're using drugs. It helps us treat you without judgement.   Suboxone (buprenorphine)  If you're taking buprenorphine, you'll have a lot of visits at first. If your problem is getting worse, or you're  "using substances, we may schedule you for extra visits.   Cancelling visits  If you can't come to your visit, please call us right away at [phone]. If you don't cancel at least 24 hours (1 full day) before your visit, that's \"late cancellation.\"  Being late to visits  If you come late, you may not be seen. This will count as a \"no-show.\"  Please call the clinic if you're running late. This will help us plan, but it doesn't mean you'll be seen.   Being late is:  More than 15 minutes late for a return visit.  More than 30 minutes late for your first visit.  If you cancel late or don't show up 2 times within 6 months, we may transfer you to another clinic.   Getting help between visits  If you need help between visits, you can call us Monday to Friday from 8 a.m. to 4:30 p.m. at [phone]. You can also send us a message on [employer].  Medicine refills  If you miss or cancel a visit, you can still ask for a refill. But we can only refill your medicines if you've made a new appointment.  Please call your pharmacy for medicine refills. If you have a question about your refill, call us at [phone].  It takes up to 2 business days to refill your drugs. Let us know 2 to 3 days before you run out. Don't call more than 1 week before you run out. That's too early.   Please make sure we have your right phone number.  If we have a problem with your refill, we'll call you. If we call you, please call us back right away. If you don't, you may not get your medicines quickly.   Call your pharmacy to find out if your medicines are ready.   Keep your medicines in a safe place. Keep them away from pets and children. If your medicines are lost or stolen, we usually don't replace them. We recommend you file a police report if your medicines are stolen. Your insurance may not pay for early refills, even if you have a prescription.  Forms  Please give us at least 3 business days to fill out any forms. Bring the forms to your " visits if you can. We may refer you to other members of your care team to complete the forms.   Emergency care   Call 911 or go to the nearest emergency room if your life or someone else's life is in danger.  Call 988 anytime for the Suicide and Crisis Lifeline.  If you need care when we're closed, call your family doctor to see if they can help. You can also go to urgent care or an emergency room. [facility] emergency rooms may be able to give you buprenorphine or other medicine refills.  Thank you for choosing us for your care.  For informational purposes only. Not to replace the advice of your health care provider. Copyright   2023 [facility]. All rights reserved. [employer] 446684 - REV 05/23.

## 2025-02-05 NOTE — GROUP NOTE
Group Therapy Documentation    PATIENT'S NAME: Dipesh Nicole  MRN:   [id number]  :   1985  ACCT. NUMBER: [id number]  DATE OF SERVICE: 25  START TIME: 12:30 PM  END TIME:  2:30 PM  FACILITATOR(S): Pramod Hart LADC  TOPIC: BEH Group Therapy  Number of patients attending the group:  4  Group Length:  2 Hours    Dimensions addressed: 4, 5, and 6    Summary of Group / Topics Discussed:    Spiritual Care      Group was facilitated by spiritual health services staff regarding spiritual health in recovery. Participants provided feedback throughout group session.       Group Attendance:  Attended group session and Other - attend ed an appointment.    Patient's response to the group topic/interactions:  cooperative with task    Patient appeared to be Actively participating.        Client specific details:  Patient actively engaged in group discussion. Patient was excused from the second half of group for an appointment.

## 2025-02-05 NOTE — PROGRESS NOTES
[facility] - Addiction Medicine       Rooming information:    Point of care urine drug screen positive for:  Lab Results   Component Value Date    BUP Negative 02/05/2025    BZO Negative 02/05/2025    BAR Negative 02/05/2025    OMARI Negative 02/05/2025    MAMP Negative 02/05/2025    AMP Negative 02/05/2025    MDMA Negative 02/05/2025    MTD Negative 02/05/2025    SZD640 Negative 02/05/2025    OXY Negative 02/05/2025    PCP Negative 02/05/2025    THC Negative 02/05/2025    TEMP 94 F 02/05/2025    SGPOCT 1.015 02/05/2025       *POC urine drug screen does not screen for Fentanyl    PHQ-9 Scores:       1/16/2025     9:36 AM 1/21/2025    12:56 PM 1/30/2025    12:42 PM   PHQ   PHQ-9 Total Score 7  7  5    Q9: Thoughts of better off dead/self-harm past 2 weeks Not at all Not at all Not at all       Patient-reported     ANAYA-7 Scores:      2/21/2024     9:58 AM 7/1/2024     3:29 PM 1/30/2025    12:43 PM   ANAYA-7 SCORE   Total Score 9 (mild anxiety) 4 (minimal anxiety) 3 (minimal anxiety)   Total Score 9 4 3        Patient-reported     Any other recent substance use:     Denies    NICOTINE-No  If using nicotine, ready to quit?     Side effects related to medications pt would like to discuss with provider (constipation, dry mouth, HA, GI upset, sedation?) No     Narcan currently available: Yes    Primary care provider: ARLYN ASTORGA     Mental health provider: no (follow up on MH referral if needed)    Any housing, insurance deficits?: currently in [place]    Contact information up to date? yes    3rd Party Involvement no today (please obtain ORALIA if pt would like to include)    Chantelle Tapia MA  February 5, 2025  1:49 PM

## 2025-02-05 NOTE — PROGRESS NOTES
Name: Dipesh Nicole  Date: 2/5/2025  Medical Record: [id number]    Envelope Number: [id number]  List of Contents (List each item separately in new row):     Finasteride 1 mg Tablets     Admission:  I am responsible for any personal items that are not sent to the safe or pharmacy.  [facility] is not responsible for loss, theft or damage of any property in my possession.    Patient Signature:  ___________________________________________       Date/Time:__________________________    Staff Signature: __________________________________       Date/Time:__________________________    [facility] Staff person, if patient is unable/unwilling to sign:      __________________________________________________________       Date/Time: __________________________    **All medications are packaged by [facility] staff and securely stored on [facility]. Medications left by patients at discharge will be packaged by [facility] staff and transported by [facility] staff to inpatient pharmacy for storage.**    Discharge:  [facility] has returned all of my personal belongings:    Patient Signature: ________________________________________     Date/Time: ____________________________________    Staff Signature: ______________________________________     Date/Time:_____________________________________

## 2025-02-05 NOTE — GROUP NOTE
"Group Therapy Documentation    PATIENT'S NAME: Dipesh Nicole  MRN:   [id number]  :   1985  ACCT. NUMBER: [id number]  DATE OF SERVICE: 25  START TIME:  9:00 AM  END TIME: 11:00 AM  FACILITATOR(S): Kiarra Hernandez LADC; Danielle Gaston  TOPIC: BEH Group Therapy  Number of patients attending the group:  4  Group Length:  2 Hours    Dimensions addressed: 3 and 4    Summary of Group / Topics Discussed:    Sober coping skills, Emotions/expression, and Relapse prevention    Group Attendance:  Attended group session    Patient's response to the group topic/interactions:  cooperative with task    Patient appeared to be Actively participating, Attentive, and Engaged.        Client specific details: Pt reported feeling more at ease, and shared that he knows he can trust someone if it's reciprocal. He took part in a peer's graduation and offered feedback on his peer's \"Identifying Triggers and Cues\" assignment.  "

## 2025-02-05 NOTE — GROUP NOTE
Group Therapy Documentation    PATIENT'S NAME: Dipesh Nicole  MRN:   [id number]  :   1985  ACCT. NUMBER: [id number]  DATE OF SERVICE: 25  START TIME:  9:45 AM  END TIME: 11:15 AM  FACILITATOR(S): Pramod Hart LADC  TOPIC: BEH Group Therapy  Number of patients attending the group:  4  Group Length:  2 Hours    Dimensions addressed: 3, 4, and 5    Summary of Group / Topics Discussed:    Relationship/socialization, Balanced lifestyle, Relapse prevention, and Self-care activities        Group began with check-ins, followed by a reflection reading regarding the importance of healthy boundaries in relationships. A participant then shared an assignment regarding self-sabotage. Feedback was provided by participants throughout group session.       Group Attendance:  Attended group session    Patient's response to the group topic/interactions:  cooperative with task    Patient appeared to be Actively participating.        Client specific details:  Patient actively engaged in group discussion.

## 2025-02-05 NOTE — PROGRESS NOTES
[facility] Addiction Medicine    A/P                                                    ASSESSMENT/PLAN  Diagnoses and all orders for this visit:  Cocaine use disorder, severe, dependence (H)  -     Drugs of Abuse Screen Urine (POC CUPS) POCT  -     topiramate (TOPAMAX) 25 MG tablet; Take 0.5 tablets (12.5 mg) by mouth daily for 15 days, THEN 1 tablet (25 mg) daily for 15 days.    Orders Placed This Encounter   Medications    topiramate (TOPAMAX) 25 MG tablet     Sig: Take 0.5 tablets (12.5 mg) by mouth daily for 15 days, THEN 1 tablet (25 mg) daily for 15 days.       Feb 5, 2025  - Completed ARELI eval with recommendation for residential programming. Has since starting programming at [facility]. Presents today for follow up. Confirms date of last cocaine use as prior to admission on 1/30/25. POC UDS completed today which was negative for all substances.   - Has not yet started Topiramate. Reviewed recommendation to start low dose Topiramate for StUD 12.5 mg daily, increase to 25 mg daily after 15 days. MOA and common SE reviewed with patient. His care team (neurology) is in agreement with this plan. Will monitor for side effects. Continue to titrate to therapeutic dose as tolerated.   - all questions answered   - Counseled the patient on the importance of having a recovery program in addition to medication to manage recovery.  Components include avoiding isolating, having willingness to change, avoiding triggers and managing cravings. Encouraged having some type of sober network and practicing honesty with trusted support person(s). Encouraged other services such as counseling, 12 step or other self-help organizations.        Continued Complex Management  The longitudinal plan of care for Stimulant Use Disorder was addressed during this visit. Due to the added complexity in care, I will continue to support Annalise in the subsequent management and with ongoing continuity of care.    RTC:   Return in  about 3 weeks (around 2/26/2025) for Follow up, with me, in person.        Last encounter A/P 1/16/25  Cocaine use disorder, severe, dependence (H)  -     Drugs of Abuse Screen Urine (POC CUPS) POCT; Standing  -     Drugs of Abuse Screen Urine (POC CUPS) POCT  -     Adult Mental Health [facility] Referral; Future  -     topiramate (TOPAMAX) 25 MG tablet; Take 0.5 tablets (12.5 mg) by mouth daily for 15 days, THEN 1 tablet (25 mg) daily for 15 days.     Parkinson's disease, unspecified whether dyskinesia present, unspecified whether manifestations fluctuate (H)  - pt presents for initial visit referred from neurology Dr. Ramesh Carson for further eval and management of cocaine use. Pt reports ongoing cocaine use for the past 7 years with period of remission for about 1 year following brain surgery for DBS device placement. H/o using up to 3.5 grams over 2-3 days period. More recently pattern of use is on the weekends, 1 gram, via insufflation. Reviewed criteria met for cocaine use disorder severe and recommendation for combination of psychosocial interventions and pharmacotherapy.  Referral placed for Comprehensive Assessment. Encouraged pt to follow recommendation of .      - discussed medication options for StUD (cocaine), with primary recommendation generally for Topiramate. Denies h/o glaucoma or kidney stones. Reviewed will consider Topiramate as treatment option to target cravings and facilitate goal of abstinence. However prior to starting, will consult with PharmD as well as pt's neurologist to discuss safety and consider possible side effects or drug interactions due to current treatment for Parkinson's disease with Carbidopa -levodopa.      Plan:   - Complete Comprehensive Assessment - start programmatic care.   - Consider Topiramate, will discuss with PharmD and Neurology, given current treatment for Parkinson's disease with Carbidopa -levodopa.      Addendum: Coordination of care with PharmD and pt's  neurologist. Review of research and data available on concurrent use of Topiramate with Carbidopa -levodopa. Plan to start low dose Topiramate for StUD 12.5 mg daily, increase to 25 mg daily after 1 week. MOA and common SE reviewed with patient. His care team is in agreement with this plan. Will monitor for side effects.       PDMP Review         Value Time User    State PDMP site checked  Yes 1/20/2025  4:03 PM Shauna Burrows APRN CNP            SUBJECTIVE                                                      Visit performed In Person, face-to-face    HPI:  Dipesh Nicole is a 39 year old male with history of seizures, Parkinson's disease, migraines, deep brain stimulator placement, and cocaine use who presents for Addiction Medicine follow up.     ARELI Hx:    Reports initial cocaine use in 2008, at a college party. Moved to [place] in 2016, started to use cocaine more, 1 gram per day 4-5 days per week, since that time has been using regularly, every other weekend for the past 6-7 years. He has a h/o early onset Parkinson's disease. Reports the diagnosis was made in 2017, began to experience a tremor and dystonia. He moved back to [place] and was diagnosed with Parkinson's disease. Started carbidopa-levodopa (Sinemet) and that worked well. Now taking a longer acting formulation, Rytary.  There was slight increase in cocaine use when he started that medication. Using 3.5 grams over 2-3 days. In 2022 deep brain stimulation device ([employer] 7 )  was placed, after surgery in 2002 reports no cocaine use that year. It was challenging not to use cocaine during that time.Has not tried medication in the past for cravings. No history of programmatic care/treatment.  Extremely motivated for abstinence. Father with possible early onset glaucoma (not diagnosed) and uncle with glaucoma. No personal h/o of glaucoma or kidney stones. Started programming at [facility]. Started Topiramate at low dose.      Substance Use History:  "  ALCOHOL - Yes, socially. Two to three drinks when out with friends.  CANNABIS - Yes, last smoked 3 days ago. Maybe once per week   PRESCRIPTION STIMULANTS (includes Ritalin, Adderall, Vyvanse) - Denies  COCAINE/CRACK - Cocaine, Uses roughly 1 gram daily only on weekends. Using for 7 years. Previously up to 3 grams. Last use 1/30/25.   METH/AMPHETAMINES (includes ecstasy, MDMA/vanessa) - Tried ecstasy and vanessa it in the past, about 2 months ago. No regular use. No methamphetamine use.   OPIATES - Denies  BENZODIAZEPINES (includes Ativan, Klonopin, Xanax) - Prescribed lorazepam. Takes 1 tablet roughly every 6 months.  KRATOM (mild opioid and stimulant effects) - Denies  KETAMINE - Denies  HALLUCINOGENS (includes DXM) - Mushrooms in the past, about 1 year ago.  BEHAVIORAL (Gambling, Eating d/o, Compulsivity) - Denies  NICOTINE  Cigarettes: Denies  Chew/snus: Denies  Vaping: Denies  Past NRT/medication use: N/A        Previous withdrawal treatment episodes (e.g. detox): Denies  Previous ARELI treatment programs: Currently admitted at [facility]   Hospitalizations or overdose: Denies  Medical complications from substance use: Denies  IV Drug use?: Denies  Previous Medication for Addiction Tx: Denies  Longest period of full abstinence: 7-8 months after brain surgery (DBS device placement) in 2022  Activities that have previously supported abstinence: Surgery and being home with parents  Current Recovery Activities: Supportive partner and family, work and treatment at [facility]        Infectious disease screening  Hep C: negative per pt No results found for: \"HCVAB\"  HIV: negative per pt No results found for: \"HIAGAB\"        Psychiatric History (per patient report and problem list review)  Past diagnoses - Depression and anxiety  Current or past psychiatrist: Denies  Current or past therapist:  Psychologist  Hospitalizations/TMS/ECT - Denies  Suicide Attempts - Denies  Medication trials - Denies     SOCIAL " HISTORY:  Housing status: alone in [place]   Employment status: Employed full time- cocaine use has started to cause issues with work   Relationship status:   Children: Child due August 1 st. Wife is 12 weeks pregnant.  Legal concerns related to use: Denies  Contact information up to date? Yes     3rd Party Involvement: Not at this time      TODAY'S VISIT  HPI Feb 5, 2025  - pt presents for follow up, since last seen he did complete ARELI eval and [facility] programming [facility]. Confirms date of last use as prior to starting programming. He is very happy to be in treatment, came as a relief. His family and SO have been very supportive. They were happy he decided to seek treatment. He has not yet started Topiramate. He was not sure if he should start it while in treatment but did bring his prescription. Wondering about ways to decreased social and environmental triggers after LP. Considering new phone number and blocking certain social media accounts. At first this was hard, his SO went through it with him. But since he has been in treatment, realizing that those things are not important to him. No withdrawal symptoms. Exercise has been helpful. Doing what he can while he is in treatment.   - very motivated for recovery   - interested in outpatient programming after LP       OBJECTIVE  PHYSICAL EXAM:  /85 (BP Location: Right arm, Patient Position: Sitting, Cuff Size: Adult Regular)   Pulse 69   Wt 85.8 kg (189 lb 3.2 oz)   BMI 24.96 kg/m      GENERAL: healthy, alert and no distress  EYES: Eyes grossly normal to inspection, PERRL and conjunctivae and sclerae normal  RESP: No respiratory distress  MENTAL STATUS EXAM  Appearance/Behavior: No apparent distress and Neatly groomed  Speech: Normal  Mood/Affect: normal affect  Insight: Adequate      PHQ-9 Score:       1/16/2025     9:36 AM 1/21/2025    12:56 PM 1/30/2025    12:42 PM   PHQ   PHQ-9 Total Score 7  7  5    Q9: Thoughts of better  off dead/self-harm past 2 weeks Not at all Not at all Not at all       Patient-reported       ANAYA-7 Score:      2/21/2024     9:58 AM 7/1/2024     3:29 PM 1/30/2025    12:43 PM   ANAYA-7 SCORE   Total Score 9 (mild anxiety) 4 (minimal anxiety) 3 (minimal anxiety)   Total Score 9 4 3        Patient-reported       LABS (may not contain today's labs)                                                      Today's lab data  Results for orders placed or performed in visit on 02/05/25   Drugs of Abuse Screen Urine (POC CUPS) POCT     Status: Normal   Result Value Ref Range    POCT Kit Lot Number [id number]     POCT Kit Expiration Date 2026-08-05     Temperature Urine POCT 94 F 90 F, 92 F, 94 F, 96 F, 98 F, 100 F    Specific [place] POCT 1.015 1.005, 1.015, 1.025    pH Qual Urine POCT 7 pH 4 pH, 5 pH, 7 pH, 9 pH    Creatinine Qual Urine POCT 50 mg/dL 20 mg/dL, 50 mg/dL, 100 mg/dL, 200 mg/dL    Internal QC Qual Urine POCT Valid Valid    Amphetamine Qual Urine POCT Negative Negative    Barbiturate Qual Urine POCT Negative Negative    Buprenorphine Qual Urine POCT Negative Negative    Benzodiazepine Qual Urine POCT Negative Negative    Cocaine Qual Urine POCT Negative Negative    Methamphetamine Qual Urine POCT Negative Negative    MDMA Qual Urine POCT Negative Negative    Methadone Qual Urine POCT Negative Negative    Opiate Qual Urine POCT Negative Negative    Oxycodone Qual Urine POCT Negative Negative    Phencyclidine Qual Urine POCT Negative Negative    THC Qual Urine POCT Negative Negative           HISTORY                                                    Problem list reviewed & adjusted, as indicated.  Patient Active Problem List   Diagnosis    Tremor    Seizure (H) at age 22 yrs    H/O magnetic resonance imaging of cervical spine    H/O shoulder surgery    H/O magnetic resonance imaging of brain and brain stem    Shoulder dislocation    Parkinson disease (H)    Right inguinal hernia    Hiatal hernia    Cyclical vomiting  syndrome unrelated to migraine    Esophagitis endoscopy done 11/2021    S/P deep brain stimulator placement    Parkinson's disease, unspecified whether dyskinesia present, unspecified whether manifestations fluctuate (H)    Cocaine use disorder, severe, dependence (H)    Cannabis use disorder, moderate, dependence (H)    Chemical dependency (H)         MEDICATION LIST (after visit)  Current Outpatient Medications   Medication Sig Dispense Refill    carbidopa-levodopa (RYTARY) 48. MG CPCR per CR capsule 2 @5AM, 2@ 9AM, 3@1pm, 2@5PM AND 2@ 9PM = 11 capsules/day 330 capsule 11    topiramate (TOPAMAX) 25 MG tablet Take 0.5 tablets (12.5 mg) by mouth daily for 15 days, THEN 1 tablet (25 mg) daily for 15 days.      acetaminophen (TYLENOL) 500 MG tablet Take 500-1,000 mg by mouth every 8 hours as needed for mild pain.      alum & mag hydroxide-simethicone (MAALOX) 200-200-20 MG/5ML SUSP suspension Take 30 mLs by mouth every 6 hours as needed for indigestion.      benzocaine-menthol (CHLORASEPTIC) 6-10 MG lozenge Place 1 lozenge inside cheek every 2 hours as needed for sore throat.      guaiFENesin (ROBITUSSIN) 20 mg/mL liquid Take 200 mg by mouth every 6 hours as needed for cough.      ibuprofen (ADVIL/MOTRIN) 200 MG tablet Take 600 mg by mouth every 6 hours as needed for pain.      loratadine (CLARITIN) 10 MG tablet Take 10 mg by mouth daily as needed for allergies.      melatonin 5 MG tablet Take 5-10 mg by mouth nightly as needed for sleep.      naloxone (NARCAN) 4 MG/0.1ML nasal spray Spray 4 mg into one nostril alternating nostrils as needed for opioid reversal. every 2-3 minutes until assistance arrives      senna-docusate (SENOKOT-S/PERICOLACE) 8.6-50 MG tablet Take 2 tablets by mouth daily as needed for constipation.       No current facility-administered medications for this visit.     Facility-Administered Medications Ordered in Other Visits   Medication Dose Route Frequency Provider Last Rate Last Admin     Self Administer Medications: Behavioral Services   Does not apply See Admin Instructions Martha Woodward MD             Allergies   Allergen Reactions    Diphenhydramine      Other reaction(s): Other (see comments)  Told not to take due to parkinsons    Metoclopramide      Avoid in patient with a history of Parkinson Disease  Other reaction(s): GI intolerance  Avoid in patient with a history of Parkinson Disease    Promethazine      Other reaction(s): Other (see comments)  Told not to take due to parkisons     I sent a total of 30 minutes today, on the care of this patient. This consisted of face-to-face time as well as time spent on pre-visit and post-visit activities including coordination of care, chart review, results review, and documentation.       LUCERO Varela [facility] Addiction Medicine  [phone]

## 2025-02-05 NOTE — GROUP NOTE
Group Therapy Documentation    PATIENT'S NAME: Dipesh Nicole  MRN:   [id number]  :   1985  ACCT. NUMBER: [id number]  DATE OF SERVICE: 25  START TIME:  8:30 AM  END TIME:  9:15 AM  FACILITATOR(S): Xander Chavez LADC; Pramod Hart LADC  TOPIC: BEH Group Therapy  Number of patients attending the group:  19  Group Length:  2 Hours    Dimensions addressed: 3, 4, and 5    Summary of Group / Topics Discussed:    Sober coping skills and Relationship/socialization      Group lecture was presented by counseling staff regarding how technology can be a obstacle regarding interpersonal communication. Feedback was provided by participants throughout group session.        Group Attendance:  Attended group session    Patient's response to the group topic/interactions:  cooperative with task    Patient appeared to be Actively participating.        Client specific details:  Patient actively engaged in group discussion.

## 2025-02-05 NOTE — PROGRESS NOTES
[facility] Weekly Treatment Plan Review    Treatment plan review for the following date span:  1/30/2025-2/3/2025    ATTENDANCE  Patient did not have any absences during this time period (list absence dates and reason for absence).        Weekly Treatment Plan Review     Treatment Plan initiated on: 2/3/2025    Dimension1: Acute Intoxication/Withdrawal Potential -   Date of Last Use: 1/27/2025  Any reports of withdrawal symptoms - None reported      Dimension 2: Biomedical Conditions & Complications -   Medical Concerns:  None reported (Patient has a diagnosis of Parkinson's Disease and has a history of seizure.)  Vitals:   BP Readings from Last 3 Encounters:   02/05/25 135/85   01/30/25 (!) 156/88   01/16/25 129/84     Pulse Readings from Last 3 Encounters:   02/05/25 69   01/30/25 68   01/16/25 69     Wt Readings from Last 3 Encounters:   02/05/25 85.8 kg (189 lb 3.2 oz)   01/30/25 81.6 kg (180 lb)   01/21/25 83.9 kg (185 lb)     Temp Readings from Last 3 Encounters:   11/11/24 97.3  F (36.3  C) (Oral)   08/16/22 (!) 96.3  F (35.7  C) (Oral)   07/25/22 97.8  F (36.6  C) (Oral)      Current Medications & Medication Changes:  Current Outpatient Medications   Medication Sig Dispense Refill    acetaminophen (TYLENOL) 500 MG tablet Take 500-1,000 mg by mouth every 8 hours as needed for mild pain.      alum & mag hydroxide-simethicone (MAALOX) 200-200-20 MG/5ML SUSP suspension Take 30 mLs by mouth every 6 hours as needed for indigestion.      benzocaine-menthol (CHLORASEPTIC) 6-10 MG lozenge Place 1 lozenge inside cheek every 2 hours as needed for sore throat.      carbidopa-levodopa (RYTARY) 48. MG CPCR per CR capsule 2 @5AM, 2@ 9AM, 3@1pm, 2@5PM AND 2@ 9PM = 11 capsules/day 330 capsule 11    guaiFENesin (ROBITUSSIN) 20 mg/mL liquid Take 200 mg by mouth every 6 hours as needed for cough.      ibuprofen (ADVIL/MOTRIN) 200 MG tablet Take 600 mg by mouth every 6 hours as needed for pain.      loratadine  (CLARITIN) 10 MG tablet Take 10 mg by mouth daily as needed for allergies.      melatonin 5 MG tablet Take 5-10 mg by mouth nightly as needed for sleep.      naloxone (NARCAN) 4 MG/0.1ML nasal spray Spray 4 mg into one nostril alternating nostrils as needed for opioid reversal. every 2-3 minutes until assistance arrives      senna-docusate (SENOKOT-S/PERICOLACE) 8.6-50 MG tablet Take 2 tablets by mouth daily as needed for constipation.      topiramate (TOPAMAX) 25 MG tablet Take 0.5 tablets (12.5 mg) by mouth daily for 15 days, THEN 1 tablet (25 mg) daily for 15 days.       No current facility-administered medications for this encounter.     Facility-Administered Medications Ordered in Other Encounters   Medication Dose Route Frequency Provider Last Rate Last Admin    Self Administer Medications: Behavioral Services   Does not apply See Admin Instructions Martha Woodward MD         Taking meds as prescribed? Yes  Medication side effects or concerns:  None reported   Outside medical appointments this week:  None during this period.       Dimension 3: Emotional/Behavioral Conditions & Complications -   Mental health diagnosis: Anxiety and Depression    Date of last SIB:  None reported/ No history  Date of  last SI:  None reported/ No history  Date of last HI: None reported/ No history  Behavioral Targets:  Stabilize and mental health. Utilize available supports and resources.   Current MH Assignments:  None at this time.     PHQ2:       2/5/2025     3:00 PM 5/2/2023     8:44 AM 4/28/2023     7:25 AM 7/15/2022    12:14 PM 2/23/2022     1:34 PM 10/5/2021     2:04 PM 1/28/2020     3:24 PM   PHQ-2 ( 1999 [employer])   Q1: Little interest or pleasure in doing things 1 0 0 0 0 0 0   Q2: Feeling down, depressed or hopeless 1 0 0 0 0 0 0   PHQ-2 Score 2 0 0 0 0 0 0   PHQ-2 Total Score (12-17 Years)- Positive if 3 or more points; Administer PHQ-A if positive  0 0   0 0      GAD2:       6/13/2024    12:48 PM 1/21/2025    12:56 PM  "2/5/2025     3:00 PM   ANAYA-2   Feeling nervous, anxious, or on edge 2 1 1   Not being able to stop or control worrying 0 1 1   ANAYA-2 Total Score 2    2 2  2       Patient-reported       Additional Narrative:  Current Mental Health symptoms include: None reported.    Active interventions to stabilize mental health symptoms this week : Patient participated in group therapy and is scheduled to begin individual session with staff therapist during the next treatment period. He reported having no significant change in is mood, and reported that his stress level has decreased. Patient reported utilizing prayer as a coping skill to help manage stress and difficult emotions during this period.       Dimension 4: Treatment Acceptance / Resistance -   ARELI Diagnosis:  Cocaine Use Disorder, Severe (F14.20)                              Cannabis Use Disorder,  Moderate (F12.0)  Commitment to tx process/Stage of change- Contemplation  ARELI assignments - None at this time.      Additional Narrative - Patient participated appropriately in scheduled programming. He stated, \"myself,\" as his motivation to stay in treatment during this period.       Dimension 5: Relapse / Continued Problem Potential -   Relapses this week - None  Urges to use - None  UA results - 2/3/2025 NEG/ ALL  Recent Results (from the past week)   Urine Drug Confirmation Panel    Collection Time: 01/30/25 12:14 PM   Result Value Ref Range    Amphetamine ng/mL 490 (H) <50 ng/mL    Amphetamine 195 Absent ng/mg [creat]    Benzoylecgonine (Cocaine Metabolite) ng/mL >8,000 (H) <50 ng/mL    Benzoylegonine (Cocaine Metabolite)     Urine Creatinine for Drug Screen Panel    Collection Time: 01/30/25 12:14 PM   Result Value Ref Range    Creatinine Urine for Drug Screen 251 mg/dL   Drugs of Abuse Screen Urine (POC CUPS) POCT    Collection Time: 02/05/25  1:58 PM   Result Value Ref Range    POCT Kit Lot Number [id number]     POCT Kit Expiration Date 2026-08-05     Temperature " Urine POCT 94 F 90 F, 92 F, 94 F, 96 F, 98 F, 100 F    Specific [place] POCT 1.015 1.005, 1.015, 1.025    pH Qual Urine POCT 7 pH 4 pH, 5 pH, 7 pH, 9 pH    Creatinine Qual Urine POCT 50 mg/dL 20 mg/dL, 50 mg/dL, 100 mg/dL, 200 mg/dL    Internal QC Qual Urine POCT Valid Valid    Amphetamine Qual Urine POCT Negative Negative    Barbiturate Qual Urine POCT Negative Negative    Buprenorphine Qual Urine POCT Negative Negative    Benzodiazepine Qual Urine POCT Negative Negative    Cocaine Qual Urine POCT Negative Negative    Methamphetamine Qual Urine POCT Negative Negative    MDMA Qual Urine POCT Negative Negative    Methadone Qual Urine POCT Negative Negative    Opiate Qual Urine POCT Negative Negative    Oxycodone Qual Urine POCT Negative Negative    Phencyclidine Qual Urine POCT Negative Negative    THC Qual Urine POCT Negative Negative     Identified triggers - None reported  Coping skills identified - Patient reported reading as a coping skill to manage cravings.    Patient is able to utilize these skills when needed.    Additional Narrative- Patient participated in relapse prevention group discussions and lectures.     Dimension 6: Recovery Environment -   Family Involvement - Patient reported having contact with his wife and parents.   Community support group attendance - Patient attends a minimum of 3 AA/NA meetings weekly while in programming.   Recreational activities - movies      Additional Narrative - Patient reported that he got along well with staff and peers during this period.     Progress made on transition planning goals:     Justification for Continued Treatment at this Level of Care:  Patient was rated as a high risk for relapse upon admission and will remain with a high risk rating throughout his time in treatment.   Treatment coordination activities this week:   N/A  Need for peer recovery support referral? No    Discharge Planning:  Target Discharge Date/Timeframe:  2/27/2025   Med Mgmt  Provider/Appt:  2/5/2025   Ind therapy Provider/Appt:  2/3/2025-2/7/2025   Other referrals:  N/A        Dimension Scale Review     Prior ratings: Dim1 - 0 DIM2 - 2 DIM3 - 2 DIM4 - 3 DIM5 - 4 DIM6 -3     Current ratings: Dim1 - 0 DIM2 - 2 DIM3 - 2 DIM4 - 3 DIM5 - 4 DIM6 -3       If client is 18 or older, has vulnerable adult status change? No    Are Treatment Plan goals/objectives effective? Yes  *If no, list changes to treatment plan:    Are the current goals meeting client's needs? Yes  *If no, list the changes to treatment plan.      *Client agrees with any changes to the treatment plan: N/A  *Client received copy of changes: No  *Client is aware of right to access a treatment plan review: Yes       LAURA Garcia on 2/5/2025 at 3:15 PM

## 2025-02-05 NOTE — Clinical Note
Please see attached A/P - plan to start Topiramate at this time. Pt noted he has rx at treatment. Thanks! LUCERO Varela CNP on 2/5/2025 at 3:06 PM

## 2025-02-06 NOTE — PROGRESS NOTES
39 year old RHM with IPD, onset in 2016 in the LUE, s/p Bi GP DBS ([place]), with a good response to DBS, in particular, resolution of abdominal cramps severe enough to cause emesis.  Dr. Carson involved, for non-DBS issues.  Veronica Swift involved.     Ceruloplasmin and urinary copper were normal.     PMH otherwise notable for a generalized confulsion Nov21,2009, EEG with frontal epileptiform activity, MRI brain normal, elected not to start Lamictal.    Last visit 5/3/24, plan created to test both reduced and increased amplitudes bilaterally over time. The patient reached out 1/13/25 to get a referral for management of cocaine addiction which was placed.    Interval history:  He feels like things have been going well overall. Feels like he is talking too quickly and people have noticed this. Tremor is not a huge issues.    Has some difficultly relaxing. Feels like this is associated with the medication and occurs 3 hours after taking the last dose that lasts until 30+ mins after he takes the medicine. Drooling is an issue at night but not during the day. Memory and thinking are going well without issue. No hallucinosis, edge of eye of presence phenomenon. Mood has been going well. Is in inpatient rehab for 8 days and has been going well. Lightheadedness isn't an issue. Has just started topiramate for cocaine use.     No issues with the device. Feels like the battery is slightly tilted from previous. Has not made changes to the device.     The Rytary is going well other than the above mentioned symptoms. Is getting some more off state from 6-10AM      6 AM 10 AM 2 PM  6 PM 10 PM   Rytary 195 mg  2 2 3 2 2   Last taken 6 AM    UPDRS  Part I     1.1 Cognitive impairment:  0: Normal: No cognitive impairment.    1.2 Hallucinations and psychosis:  0: Normal: No hallucinations or psychotic behaviour.     1.3 Depressed mood:  0: Normal: No depressed mood.    1.4 Anxious mood:  1: Slight: Anxious feelings present but not  sustained for more than one day at a time. No interference with patient's ability to carry out normal activities and social interactions.     1.5 Apathy:  1: Slight: Apathy appreciated by patient and/or caregiver, but no interference with daily activities and social interactions.     1.6 Features of DDS: 3: Moderate: Problems are present and usually cause a lot of difficulties in the patient's personal and family life.     1.7 Sleep problems:  0: Normal: No problems.    1.8 Daytime sleepiness:  0: Normal: No daytime sleepiness.     1.9 Pain and other sensations:  0: Normal: No uncomfortable feelings.     1.10 Urinary problems:  0: Normal: No urine control problems.     1.11 Constipation problems:  0: Normal: No constipation.     1.12 Light headedness on standin: Normal: No dizzy or foggy feelings.    1.13 Fatigue:  0: Normal: No fatigue.     Total: 5/52         5/3/2024     7:00 AM 2025     7:53 AM   UPDRS EDL Scale   2.1  Speech 3 2    2.2  Salivation 0 2    2.3  Chew & Swallow 0 0    2.4  Eating                  0 0    2.5  Dressing                0 0    2.6  Hygiene                 1 0    2.7  Handwriting             1 1    2.8  Hobbies etc.            0 0    2.9  Turn in bed             0 0    2.10 Tremor                  0 0    2.11 Stand from chair        0 0    2.12 Walking & Balance  1 0    2.13 Freezing                0 0    MDS-UPDRS II Total Score 6 5        Proxy-reported     Exam:      5/3/2024     8:00 AM 2025     8:00 AM   UPDRS Motor Scale   Time: 08:00 08:33   Medication On On   R Brain DBS: On On   L Brain DBS: On On   Dyskinesia (LID) No No   Did LID interfere No No   Speech 1 1   Facial Expression 0 0   Rigidity Neck 0 1   Rigidity RUE 1 1   Rigidity LUE 0 0   Rigidity RLE 1 0   Rigidity LLE 0 0   Finger Taps R 0 0   Finger Taps L 0 0   Hand Mvt R 0 0   Hand Mvt L 0 0   Pron-/Supinate R 0 0   Pron-/Supinate L 0 0   Toe Tap R 0 0   Toe Tap L 0 1   Leg Agility R 0 0   Leg Agility L 0 0    Arise From Chair 0 0   Gait 0 0   Gait Freezing 0 0   Postural Stability 0 0   Posture 0 0   Global Spont Mvt 0 0   Postural Tremor RUE 0 0   Postural Tremor LUE 0 0   Kinetic Tremor RUE 0 0   Kinetic Tremor LUE 0 0   Rest Tremor RUE 0 0   Rest Tremor LUE 0 0   Rest Tremor RLE 0 0   Rest Tremor LLE 0 0   Rest Tremor Lip/Jaw 0 0   Rest Tremor Constancy 0 0   Total Right 2 1   Total Left 0 1   Axial Total 1 2   Total 3 4      Lead(s):   Side Left Right   Target GP GP   Lead  [employer]   Lead model Infinity 0.5 Infinity 0.5   Lead Implant Date 7/18/2022 8/15/2022   IPG location Left chest Left chest      IPG(s):  IPG  Abbott   IPG model Infinity 7   IPG implant date 7/25/22   Location Left chest   Battery 2.89V (2.93) (2.94) (2.95 V) (2.95V)      System impedances:  OK    Checking his device every 2 weeks.    Program 1       Side Left GPi Right GPi   IPG location Left chest Left chest   Initial/Final Initial & Final Initial & Final   Active/Inactive Active Active   Amplitude (mA) 3.9 (0 -4.0 by 0.1)  4.6 (0 - 5.0 by 0.1)   Pulse width (usec)  60  60   Freq (Hz)  130  130   Contacts:  C pos 3_ omni neg  Cpos 11_omni neg      39 year old man with IPD s/p bilateral GPi DBS who presents follow-up. The patient is going through rehab for cocaine abuse at this stage and appears to be doing well overall. Did discuss that increasing the Rytary would be less ideal given the cocaine abuse as cocaine does increase DA release. Could consider further increases if needed in the future but this would need to be done cautiously. For now will keep the medications and stimulation setting the same while the patient works through rehab.    Plan:    - Keep Rytary the same as below  - Checked DBS and everything looks okay  - RTC in 3 months for a 1 hour DBS      6 AM 10 AM 2 PM  6 PM 10 PM   Rytary 195 mg  2 2 3 2 2       Patient seen and discussed with Dr. Handy Lee MD  Neuromodulation/Movement  Disorders Fellow    Patient seen and examined with Dr. Lee and I agree with the assessment and plan as outlined.  Time this date with patient, reviewing records, and documentinh.    Jun Murrell PhD, Md

## 2025-02-07 ENCOUNTER — OFFICE VISIT (OUTPATIENT)
Dept: NEUROLOGY | Facility: CLINIC | Age: 40
End: 2025-02-07
Payer: COMMERCIAL

## 2025-02-07 VITALS
WEIGHT: 182 LBS | HEART RATE: 65 BPM | DIASTOLIC BLOOD PRESSURE: 81 MMHG | BODY MASS INDEX: 24.01 KG/M2 | OXYGEN SATURATION: 99 % | SYSTOLIC BLOOD PRESSURE: 134 MMHG

## 2025-02-07 DIAGNOSIS — Z96.89 STATUS POST DEEP BRAIN STIMULATOR PLACEMENT: ICD-10-CM

## 2025-02-07 DIAGNOSIS — G20.A1 PARKINSON'S DISEASE, UNSPECIFIED WHETHER DYSKINESIA PRESENT, UNSPECIFIED WHETHER MANIFESTATIONS FLUCTUATE (H): Primary | ICD-10-CM

## 2025-02-07 PROCEDURE — 99215 OFFICE O/P EST HI 40 MIN: CPT | Mod: 25 | Performed by: PSYCHIATRY & NEUROLOGY

## 2025-02-07 PROCEDURE — 95970 ALYS NPGT W/O PRGRMG: CPT | Performed by: PSYCHIATRY & NEUROLOGY

## 2025-02-07 RX ORDER — ENTACAPONE 200 MG/1
TABLET ORAL
Qty: 90 TABLET | Refills: 3 | Status: CANCELLED | OUTPATIENT
Start: 2025-02-07

## 2025-02-07 ASSESSMENT — UNIFIED PARKINSONS DISEASE RATING SCALE (UPDRS)
PRONATION_SUPINATION_LEFT: (0) NORMAL: NO PROBLEMS.
POSTURE: (0) NORMAL: NO PROBLEMS.
SPONTANEITY_OF_MOVEMENT: (0) NORMAL: NO PROBLEMS.
LEG_AGILITY_LEFT: (0) NORMAL: NO PROBLEMS.
TOTAL_SCORE: 4
HANDMOVEMENTS_RIGHT: (0) NORMAL: NO PROBLEMS.
AMPLITUDE_RLE: (0) NORMAL: NO TREMOR.
TOTAL_SCORE: 1
TOETAPPING_LEFT: (1) SLIGHT: ANY OF THE FOLLOWING: A) THE REGULAR RHYTHM IS BROKEN WITH ONE WITH ONE OR TWO INTERRUPTIONS OR HESITATIONS OF THE MOVEMENT  B) SLIGHT SLOWING  C) THE AMPLITUDE DECREMENTS NEAR THE END OF THE 10 MOVEMENTS.
RIGIDITY_RUE: (1) SLIGHT: RIGIDITY ONLY DETECTED WITH ACTIVATION MANEUVER.
ARISING_CHAIR: (0) NORMAL: NO PROBLEMS. ABLE TO ARISE QUICKLY WITHOUT HESITATION.
RIGIDITY_RLE: (0) NORMAL: NO RIGIDITY.
TOETAPPING_RIGHT: (0) NORMAL: NO PROBLEMS.
AMPLITUDE_LUE: (0) NORMAL: NO TREMOR.
DYSKINESIAS_PRESENT: NO
TOTAL_SCORE_LEFT: 1
AMPLITUDE_RUE: (0) NORMAL: NO TREMOR.
PRONATION_SUPINATION_RIGHT: (0) NORMAL: NO PROBLEMS.
FREEZING_GAIT: (0) NORMAL: NO FREEZING.
HANDMOVEMENTS_LEFT: (0) NORMAL: NO PROBLEMS.
FACIAL_EXPRESSION: (0) NORMAL: NORMAL FACIAL EXPRESSION.
CONSTANCY_TREMOR_ATREST: (0) NORMAL: NO TREMOR.
MOVEMENTS_INTERFERE_WITH_RATINGS: NO
GAIT: (0) NORMAL: NO PROBLEMS.
FINGER_TAPPING_RIGHT: (0) NORMAL: NO PROBLEMS.
AMPLITUDE_LIP_JAW: (0) NORMAL: NO TREMOR.
FINGER_TAPPING_LEFT: (0) NORMAL: NO PROBLEMS.
AXIAL_SCORE: 2
AMPLITUDE_LLE: (0) NORMAL: NO TREMOR.
SPEECH: (1) SLIGHT: LOSS OF MODULATION, DICTION OR VOLUME, BUT STILL ALL WORDS EASY TO UNDERSTAND.
RIGIDITY_LUE: (0) NORMAL: NO RIGIDITY.
RIGIDITY_NECK: (1) SLIGHT: RIGIDITY ONLY DETECTED WITH ACTIVATION MANEUVER.
PARKINSONS_MEDS: ON
POSTURAL_STABILITY: (0) NORMAL: NO PROBLEMS. RECOVERS WITH ONE OR TWO STEPS.
RIGIDITY_LLE: (0) NORMAL: NO RIGIDITY.
LEG_AGILITY_RIGHT: (0) NORMAL: NO PROBLEMS.

## 2025-02-07 ASSESSMENT — MOVEMENT DISORDERS SOCIETY - UNIFIED PARKINSONS DISEASE RATING SCALE (MDS-UPDRS)
TREMOR: (0) NORMAL: NOT AT ALL (NO PROBLEMS).
TURNING_IN_BED: (0) NORMAL: NOT AT ALL (NO PROBLEMS).
DRESSING: (0) NORMAL: NOT AT ALL (NO PROBLEMS).
HANDWRITING: (1) SLIGHT: MY WRITING IS SLOW, CLUMSY OR UNEVEN, BUT ALL WORDS ARE CLEAR.
SALIVA_AND_DROOLING: (2) MILD: I HAVE SOME DROOLING DURING SLEEP, BUT NONE WHEN I AM AWAKE.
CHEWING_AND_SWALLOWING: (0) NORMAL: NO PROBLEMS.
FREEZING: (0) NORMAL: NOT AT ALL (NO PROBLEMS).
EATING_TASKS: (0) NORMAL: NOT AT ALL (NO PROBLEMS).
WALKING_AND_BALANCE: (0) NORMAL: NOT AT ALL (NO PROBLEMS).
TOTAL_SCORE: 5
HOBBIES_AND_OTHER_ACTIVITIES: (0) NORMAL: NOT AT ALL (NO PROBLEMS).
SPEECH: (2) MILD: MY SPEECH CAUSES PEOPLE TO ASK ME TO OCCASIONALLY REPEAT MYSELF, BUT NOT EVERYDAY.
GETTING_OUT_OF_BED_CAR_DEEP_CHAIR: (0) NORMAL: NOT AT ALL (NO PROBLEMS).
HYGIENE: (0) NORMAL: NOT AT ALL (NO PROBLEMS).

## 2025-02-08 ENCOUNTER — HOSPITAL ENCOUNTER (OUTPATIENT)
Dept: BEHAVIORAL HEALTH | Facility: CLINIC | Age: 40
Discharge: HOME | End: 2025-02-08
Attending: FAMILY MEDICINE
Payer: COMMERCIAL

## 2025-02-08 PROCEDURE — H2035 A/D TX PROGRAM, PER HOUR: HCPCS | Mod: HQ

## 2025-02-08 PROCEDURE — 1002N00001 HC LODGING PLUS FACILITY CHARGE ADULT

## 2025-02-08 NOTE — GROUP NOTE
"Group Therapy Documentation    PATIENT'S NAME: Dipesh Nicole  MRN:   [id number]  :   1985  ACCT. NUMBER: [id number]  DATE OF SERVICE: 25  START TIME:  9:00 AM  END TIME: 11:00 AM  FACILITATOR(S): Kiarra Hernandez LADC; Breanne Blanton LADC  TOPIC: BEH Group Therapy  Number of patients attending the group:  206  Group Length:  2 Hours    Dimensions addressed: 5    Summary of Group / Topics Discussed:    Recovery Principles and Relapse prevention      Lecture Topic : What is hidden in our addiction?    Lecture involved identifying iceberg theory and how we can relate addiction.   Patients discussed revolved around the concept of \" addiction icebergs.\"   Patients were given a handout of an iceberg, and reviewed the concept of what is seen/allowed to see above the surface and visible VS below the waterlevel, in the depts, hidden, ignored, dark.     Patients filled in the icebergs per individual situations and then shared them with staff and peers.   Patients processed the feelings/situations they feel/felt the need to keep hidden.   Coping Skills and alternatives to change behaviors was discussed as well.       Group Attendance:  Attended group session    Patient's response to the group topic/interactions:  cooperative with task    Patient appeared to be Actively participating.        Client specific details:  Annalise Attended small group therapy. Patient engaged in discussion and participated in sharing feedback to their peers.       "

## 2025-02-08 NOTE — GROUP NOTE
Group Therapy Documentation    PATIENT'S NAME: Dipesh Nicole  MRN:   [id number]  :   1985  ACCT. NUMBER: [id number]  DATE OF SERVICE: 25  START TIME: 12:30 PM  END TIME:  2:30 PM  FACILITATOR(S): Kiarra Hernandez LADC; Breanne Blanton LADC  TOPIC: BEH Group Therapy  Number of patients attending the group:  20  Group Length:  2 Hours    Dimensions addressed: 5 and 6    Summary of Group / Topics Discussed:    Relationship/socialization, Balanced lifestyle, and Relapse prevention    Group Attendance:  Attended group session    Patient's response to the group topic/interactions:  cooperative with task    Patient appeared to be Attentive and Engaged.        Client specific details: Pt listened respectfully to a presentation on sexual boundaries and asked appropriate questions.

## 2025-02-09 ENCOUNTER — HOSPITAL ENCOUNTER (OUTPATIENT)
Dept: BEHAVIORAL HEALTH | Facility: CLINIC | Age: 40
Discharge: HOME | End: 2025-02-09
Attending: FAMILY MEDICINE
Payer: COMMERCIAL

## 2025-02-09 PROCEDURE — 1002N00001 HC LODGING PLUS FACILITY CHARGE ADULT

## 2025-02-09 PROCEDURE — H2035 A/D TX PROGRAM, PER HOUR: HCPCS | Mod: HQ

## 2025-02-09 NOTE — GROUP NOTE
Group Therapy Documentation    PATIENT'S NAME: Dipesh Nicole  MRN:   [id number]  [facility]   1985  [id number]  DATE OF SERVICE: 25  START TIME: 12:30 PM  END TIME:  1:30 PM  FACILITATOR(S): Kiarra Hernandez LADC; Amaris Duke  TOPIC: BEH Group Therapy  Number of patients attending the group:  17  Group Length:  1 Hours    Dimensions addressed: 3 and 5    Summary of Group / Topics Discussed:    Mindfulness/Relaxation, Emotions/expression, and Relapse prevention    Group Attendance:  Attended group session    Patient's response to the group topic/interactions:  cooperative with task    Patient appeared to be Actively participating, Attentive, and Engaged.        Client specific details: Pt took part in a group exercise focusing on healing, creativity and meditation.

## 2025-02-09 NOTE — GROUP NOTE
Group Therapy Documentation    PATIENT'S NAME: Dipesh Nicole  MRN:   [id number]  :   1985  [id number]  DATE OF SERVICE: 25  START TIME:  9:00 AM  END TIME: 11:00 AM  FACILITATOR(S): Kiarra Hernandez LADC; Gayla Killian RN  TOPIC: BEH Group Therapy  Number of patients attending the group:  17  Group Length:  2 Hours    Dimensions addressed: 2 and 5    Summary of Group / Topics Discussed:    Balanced lifestyle, Disease of addiction, and Relapse prevention    Group Attendance:  Attended group session    Patient's response to the group topic/interactions:  cooperative with task    Patient appeared to be Actively participating, Attentive, and Engaged.        Client specific details: Pt listened respectfully to a presentation on STIs and TB, and took part in the accompanying destigmatization activity.

## 2025-02-10 ENCOUNTER — HOSPITAL ENCOUNTER (OUTPATIENT)
Dept: BEHAVIORAL HEALTH | Facility: CLINIC | Age: 40
Discharge: HOME | End: 2025-02-10
Attending: FAMILY MEDICINE
Payer: COMMERCIAL

## 2025-02-10 PROCEDURE — 1002N00001 HC LODGING PLUS FACILITY CHARGE ADULT

## 2025-02-10 PROCEDURE — H2035 A/D TX PROGRAM, PER HOUR: HCPCS | Mod: HQ

## 2025-02-10 NOTE — GROUP NOTE
Group Therapy Documentation    PATIENT'S NAME: Dipesh Nicole  MRN:   [id number]  :   1985  ACCT. NUMBER: [id number]  DATE OF SERVICE: 2/10/25  START TIME: 12:30 PM  END TIME:  2:30 PM  FACILITATOR(S): Pramod Hart LADC; Amaris Duke  TOPIC: BEH Group Therapy  Number of patients attending the group:  5  Group Length:  2 Hours    Dimensions addressed: 4, 5, and 6    Summary of Group / Topics Discussed:    Spiritual Care      Group was facilitated by spiritual services staff regarding the importance of spiritual health in recovery. Participants provided feedback throughout group session.       Group Attendance:  Attended group session    Patient's response to the group topic/interactions:  cooperative with task    Patient appeared to be Actively participating.        Client specific details:  Patient actively engaged in group discussion.

## 2025-02-10 NOTE — GROUP NOTE
Group Therapy Documentation    PATIENT'S NAME: Dipesh Nicole  MRN:   [id number]  :   1985  ACCT. NUMBER: [id number]  DATE OF SERVICE: 2/10/25  START TIME:  9:00 AM  END TIME: 11:00 AM  FACILITATOR(S): Pramod Hart LADC  TOPIC: BEH Group Therapy  Number of patients attending the group:  8  Group Length:  2 Hours    Dimensions addressed: 3, 4, 5, and 6    Summary of Group / Topics Discussed:    Sober coping skills, Emotions/expression, and Relapse prevention      Group began with check-ins, followed by a reflection reading regarding the importance of remaining teachable. Group then participated in a goal setting activity. Feedback was provided by participants throughout group session.       Group Attendance:  Attended group session    Patient's response to the group topic/interactions:  cooperative with task    Patient appeared to be Actively participating.        Client specific details:  Patient actively engaged in group discussion.

## 2025-02-10 NOTE — PROGRESS NOTES
[facility] Weekly Treatment Plan Review    Treatment plan review for the following date span:  2/4/25-2/10/25    ATTENDANCE  Patient did not have any absences during this time period (list absence dates and reason for absence).        Weekly Treatment Plan Review     Treatment Plan initiated on: 2/3/2025    Dimension1: Acute Intoxication/Withdrawal Potential -   Date of Last Use: 1/27/2025  Any reports of withdrawal symptoms - None reported      Dimension 2: Biomedical Conditions & Complications -   Medical Concerns:  None reported (Patient has a diagnosis of Parkinson's Disease and has a history of seizure.)  Vitals:   BP Readings from Last 3 Encounters:   02/07/25 134/81   02/05/25 135/85   01/30/25 (!) 156/88     Pulse Readings from Last 3 Encounters:   02/07/25 65   02/05/25 69   01/30/25 68     Wt Readings from Last 3 Encounters:   02/07/25 82.6 kg (182 lb)   02/05/25 85.8 kg (189 lb 3.2 oz)   01/30/25 81.6 kg (180 lb)     Temp Readings from Last 3 Encounters:   11/11/24 97.3  F (36.3  C) (Oral)   08/16/22 (!) 96.3  F (35.7  C) (Oral)   07/25/22 97.8  F (36.6  C) (Oral)      Current Medications & Medication Changes:  Current Outpatient Medications   Medication Sig Dispense Refill    acetaminophen (TYLENOL) 500 MG tablet Take 500-1,000 mg by mouth every 8 hours as needed for mild pain.      alum & mag hydroxide-simethicone (MAALOX) 200-200-20 MG/5ML SUSP suspension Take 30 mLs by mouth every 6 hours as needed for indigestion.      benzocaine-menthol (CHLORASEPTIC) 6-10 MG lozenge Place 1 lozenge inside cheek every 2 hours as needed for sore throat.      carbidopa-levodopa (RYTARY) 48. MG CPCR per CR capsule 2 @5AM, 2@ 9AM, 3@1pm, 2@5PM AND 2@ 9PM = 11 capsules/day 330 capsule 11    guaiFENesin (ROBITUSSIN) 20 mg/mL liquid Take 200 mg by mouth every 6 hours as needed for cough.      ibuprofen (ADVIL/MOTRIN) 200 MG tablet Take 600 mg by mouth every 6 hours as needed for pain.      loratadine  (CLARITIN) 10 MG tablet Take 10 mg by mouth daily as needed for allergies.      melatonin 5 MG tablet Take 5-10 mg by mouth nightly as needed for sleep.      naloxone (NARCAN) 4 MG/0.1ML nasal spray Spray 4 mg into one nostril alternating nostrils as needed for opioid reversal. every 2-3 minutes until assistance arrives      senna-docusate (SENOKOT-S/PERICOLACE) 8.6-50 MG tablet Take 2 tablets by mouth daily as needed for constipation.      topiramate (TOPAMAX) 25 MG tablet Take 0.5 tablets (12.5 mg) by mouth daily for 15 days, THEN 1 tablet (25 mg) daily for 15 days.       No current facility-administered medications for this encounter.     Facility-Administered Medications Ordered in Other Encounters   Medication Dose Route Frequency Provider Last Rate Last Admin    Self Administer Medications: Behavioral Services   Does not apply See Admin Instructions Martha Woodward MD         Taking meds as prescribed? Yes  Medication side effects or concerns:  None reported   Outside medical appointments this week:  None during this period.       Dimension 3: Emotional/Behavioral Conditions & Complications -   Mental health diagnosis: Anxiety and Depression    Date of last SIB:  None reported/ No history  Date of  last SI:  None reported/ No history  Date of last HI: None reported/ No history  Behavioral Targets:  Stabilize and mental health. Utilize available supports and resources.   Current MH Assignments:  None at this time.     PHQ2:       2/5/2025     3:00 PM 5/2/2023     8:44 AM 4/28/2023     7:25 AM 7/15/2022    12:14 PM 2/23/2022     1:34 PM 10/5/2021     2:04 PM 1/28/2020     3:24 PM   PHQ-2 ( 1999 [employer])   Q1: Little interest or pleasure in doing things 1 0 0 0 0 0 0   Q2: Feeling down, depressed or hopeless 1 0 0 0 0 0 0   PHQ-2 Score 2 0 0 0 0 0 0   PHQ-2 Total Score (12-17 Years)- Positive if 3 or more points; Administer PHQ-A if positive  0 0   0 0      GAD2:       6/13/2024    12:48 PM 1/21/2025    12:56 PM  "2/5/2025     3:00 PM 2/10/2025    10:00 AM   ANAYA-2   Feeling nervous, anxious, or on edge 2 1 1 1   Not being able to stop or control worrying 0 1 1 1   ANAYA-2 Total Score 2    2 2  2 2       Patient-reported       Additional Narrative:  Current Mental Health symptoms include: None reported.    Active interventions to stabilize mental health symptoms this week : Patient participated in group therapy and began individual therapy with supervised therapist intern. He reported a significant change in is mood stating, \"feeling and aware of my emotions more.\"  He reported that his stress level has decreased. Patient reported \"talking them out [emotions], and expressing my emotions\" as a coping skill to help manage stress and difficult emotions during this period.       Dimension 4: Treatment Acceptance / Resistance -   ARELI Diagnosis:  Cocaine Use Disorder, Severe (F14.20)                              Cannabis Use Disorder,  Moderate (F12.0)  Commitment to tx process/Stage of change- Contemplation  ARELI assignments -   2/3/2025 Client will participate appropriately in scheduled  programming.  Staff will notify client of group rules and expectations 2/27            2/3/2025 Client will complete the \"Drug Use History\" assignment, and share in group.  Staff will provide assignments. 2/13         2/3/2025 Client will complete the \"First Step\" assignment, and share in group.  Staff will provide assignments. 2/17         2/3/2025 Client will complete the \"12 Step Interpretations\" assignment, and share in group.  Staff will provide assignments. 2/29               Additional Narrative - Patient participated appropriately in scheduled programming. He stated, \"myself and my future,\" as his motivation to stay in treatment during this period.       Dimension 5: Relapse / Continued Problem Potential -   Relapses this week - None  Urges to use - None  UA results - 2/3/2025 NEG/ ALL  Recent Results (from the past week)   Drugs of Abuse " "Screen Urine (POC CUPS) POCT    Collection Time: 02/05/25  1:58 PM   Result Value Ref Range    POCT Kit Lot Number [id number]     POCT Kit Expiration Date 2026-08-05     Temperature Urine POCT 94 F 90 F, 92 F, 94 F, 96 F, 98 F, 100 F    Specific [place] POCT 1.015 1.005, 1.015, 1.025    pH Qual Urine POCT 7 pH 4 pH, 5 pH, 7 pH, 9 pH    Creatinine Qual Urine POCT 50 mg/dL 20 mg/dL, 50 mg/dL, 100 mg/dL, 200 mg/dL    Internal QC Qual Urine POCT Valid Valid    Amphetamine Qual Urine POCT Negative Negative    Barbiturate Qual Urine POCT Negative Negative    Buprenorphine Qual Urine POCT Negative Negative    Benzodiazepine Qual Urine POCT Negative Negative    Cocaine Qual Urine POCT Negative Negative    Methamphetamine Qual Urine POCT Negative Negative    MDMA Qual Urine POCT Negative Negative    Methadone Qual Urine POCT Negative Negative    Opiate Qual Urine POCT Negative Negative    Oxycodone Qual Urine POCT Negative Negative    Phencyclidine Qual Urine POCT Negative Negative    THC Qual Urine POCT Negative Negative   2.5.25 - Negative for all    Identified triggers - None reported  Coping skills identified - Patient reported no coping skills this week to manage cravings.        Additional Narrative- Patient participated in relapse prevention group discussions and lectures.     Dimension 6: Recovery Environment -   Family Involvement - yes, \"wife, parents, brother\"  Community support group attendance - Patient attends a minimum of 3 AA/NA meetings weekly while in programming.   Recreational activities - \"ping-pong, chess, movies\"      Additional Narrative - Patient reported that he got along well with staff and peers during this period.     Progress made on transition planning goals: patient was provided recommendations for continuing care including IOP and sober housing.    Justification for Continued Treatment at this Level of Care:      Patient has limited exposure to recovery or to recovery culture. Patient's risk " ratings support further residential care to increase education on addiction, the phenomenon of craving, and relapse prevention.  He requires additional residential care to develop insight into the breadth of powerlessness over his disease, and to set up appropriate aftercare resources.  Patient lacks a safe, stable and sober home, and reports few sober connections.      Treatment coordination activities this week: patient was provided recommendations for continuing care  Need for peer recovery support referral? No    Discharge Planning:  Target Discharge Date/Timeframe:  2/27/2025   Med Mgmt Provider/Appt:  2/5/2025   Ind therapy Provider/Appt:  2/3/2025-2/7/2025   Other referrals:  N/A        Dimension Scale Review     Prior ratings: Dim1 - 0 DIM2 - 2 DIM3 - 2 DIM4 - 3 DIM5 - 4 DIM6 -3     Current ratings: Dim1 - 0 DIM2 -1 DIM3 - 2 DIM4 - 3 DIM5 - 4 DIM6 -3       If client is 18 or older, has vulnerable adult status change? No    Are Treatment Plan goals/objectives effective? Yes  *If no, list changes to treatment plan:    Are the current goals meeting client's needs? Yes  *If no, list the changes to treatment plan.      *Client agrees with any changes to the treatment plan: N/A  *Client received copy of changes: N/A  *Client is aware of right to access a treatment plan review: Yes       Leana Field [facility]ST[facility] on 2/10/2025 at 10:46am

## 2025-02-11 ENCOUNTER — HOSPITAL ENCOUNTER (OUTPATIENT)
Dept: BEHAVIORAL HEALTH | Facility: CLINIC | Age: 40
Discharge: HOME | End: 2025-02-11
Attending: FAMILY MEDICINE
Payer: COMMERCIAL

## 2025-02-11 PROCEDURE — H2035 A/D TX PROGRAM, PER HOUR: HCPCS | Mod: HQ

## 2025-02-11 PROCEDURE — 1002N00001 HC LODGING PLUS FACILITY CHARGE ADULT

## 2025-02-11 NOTE — GROUP NOTE
"Group Therapy Documentation    PATIENT'S NAME: Dipesh Nicole  MRN:   [id number]  :   1985  ACCT. NUMBER: [id number]  DATE OF SERVICE: 25  START TIME: 12:30 PM  END TIME:  2:30 PM  FACILITATOR(S): Xander Chavez LADC; Pramod Hart LADC  TOPIC: BEH Group Therapy  Number of patients attending the group:  6  Group Length:  2 Hours    Dimensions addressed: 4, 5, and 6    Summary of Group / Topics Discussed:    Sober coping skills, Balanced lifestyle, Disease of addiction, Emotions/expression, Leisure explorations/use of leisure time, and Self-care activities      Group participants viewed the film \"Finding Jair\". Group discussion involved identifying the messages that could be seen in the film and how they can be applied to the substance use recovery process.       Group Attendance:  Attended group session    Patient's response to the group topic/interactions:  cooperative with task    Patient appeared to be Actively participating.        Client specific details:  Patient actively engaged in group discussion.    "
Group Therapy Documentation    PATIENT'S NAME: Dipesh Nicole  MRN:   [id number]  :   1985  ACCT. NUMBER: [id number]  DATE OF SERVICE: 25  START TIME:  3:00 PM  END TIME:  4:00 PM  FACILITATOR(S): Vargas Mills LADC; Carmen Fortune LADC; Pramod Hart LADC  TOPIC: BEH Group Therapy  Number of patients attending the group:  17  Group Length:  2 Hours    Dimensions addressed: 5 and 6    Summary of Group / Topics Discussed:    Sober coping skills, Leisure explorations/use of leisure time, and Relapse prevention      Group lecture was presented by counseling staff regarding the topic of recovery resources and activities. Feedback was provided by participants throughout group session.       Group Attendance:  Attended group session    Patient's response to the group topic/interactions:  cooperative with task    Patient appeared to be Actively participating.        Client specific details:  Patient actively engaged in group lecture/discussion.    
Group Therapy Documentation    PATIENT'S NAME: Dipesh Nicole  MRN:   [id number]  :   1985  ACCT. NUMBER: [id number]  DATE OF SERVICE: 25  START TIME:  9:00 AM  END TIME: 11:00 AM  FACILITATOR(S): Carmen Fortune LADC  TOPIC: BEH Group Therapy  Number of patients attending the group:  5    Group Length:  2 Hours    Dimensions addressed: 3, 4, and 5    Summary of Group / Topics Discussed:    Recovery Principles, Sober coping skills, Relationship/socialization, and Relapse prevention      Group Attendance:  Attended group session    Patient's response to the group topic/interactions:  cooperative with task    Patient appeared to be Actively participating, Attentive, and Engaged.        Client specific details:  Patient attended group session and was attentive and participative..    
Statement Selected

## 2025-02-12 ENCOUNTER — HOSPITAL ENCOUNTER (OUTPATIENT)
Dept: BEHAVIORAL HEALTH | Facility: CLINIC | Age: 40
Discharge: HOME | End: 2025-02-12
Attending: FAMILY MEDICINE
Payer: COMMERCIAL

## 2025-02-12 PROCEDURE — H2035 A/D TX PROGRAM, PER HOUR: HCPCS | Mod: HQ

## 2025-02-12 PROCEDURE — 1002N00001 HC LODGING PLUS FACILITY CHARGE ADULT

## 2025-02-12 NOTE — GROUP NOTE
Group Therapy Documentation    PATIENT'S NAME: Dipesh Nicole  MRN:   [id number]  :   1985  ACCT. NUMBER: [id number]  DATE OF SERVICE: 25  START TIME:  9:45 AM  END TIME: 11:15 AM  FACILITATOR(S): Pramod Hart LADC  TOPIC: BEH Group Therapy  Number of patients attending the group:  10  Group Length:  2 Hours    Dimensions addressed: 3, 4, and 5    Summary of Group / Topics Discussed:    Sober coping skills, Disease of addiction, Emotions/expression, and Relapse prevention    Group began with check-ins, followed by a reflection reading regarding pain and suffering. Group then discussed different reason's why people use substances and alternative methods of coping with stress and pain. Feedback was provided by participants throughout group session.       Group Attendance:  Attended group session    Patient's response to the group topic/interactions:  cooperative with task    Patient appeared to be Actively participating.        Client specific details:  Patient actively engaged in group discussion. Patient was excused for an hour of group.

## 2025-02-12 NOTE — GROUP NOTE
Group Therapy Documentation    PATIENT'S NAME: Dipesh Nicole  MRN:   [id number]  :   1985  [id number]  DATE OF SERVICE: 25  START TIME:  8:30 AM  END TIME:  9:30 AM  FACILITATOR(S): Breanne Blanton LADC; Vargas Mills LADC; Carmen Fortune LADC  TOPIC: BEH Group Therapy  Number of patients attending the group:  19  Group Length:  1 Hours    Dimensions addressed: 4, 5, and 6    Summary of Group / Topics Discussed:    Recovery Principles, Sober coping skills, Balanced lifestyle, and Relapse prevention    Facilitator LAURA Jackson presented a lecture/activity on planning and preparation in recovery. Patients were asked to create a daily schedule for the first day they are gone from treatment. Acitivity highlighted recovery mindsets and behaviors. Patients individually were asked to share their schedules and in what ways are they are preparing for a successful transition and recovery.       Group Attendance:  Attended group session    Patient's response to the group topic/interactions:  cooperative with task    Patient appeared to be Actively participating.        Client specific details:  Annalise attended Wednesday morning Psychoeducational skills lecture. Patient remained engaged and particpaited in a question and answer session. No concerns reported.    .

## 2025-02-12 NOTE — GROUP NOTE
Group Therapy Documentation    PATIENT'S NAME: Dipesh Nicole  MRN:   [id number]  :   1985  ACCT. NUMBER: [id number]  DATE OF SERVICE: 25  START TIME: 12:30 PM  END TIME:  2:30 PM  FACILITATOR(S): Carmen Fortune LADC  TOPIC: BEH Group Therapy  Number of patients attending the group:  6    Group Length:  2 Hours    Dimensions addressed: 4 and 5    Summary of Group / Topics Discussed:    Recovery Principles, Sober coping skills, Balanced lifestyle, and Relapse prevention      Group Attendance:  Attended group session    Patient's response to the group topic/interactions:  cooperative with task    Patient appeared to be Actively participating, Attentive, and Engaged.        Client specific details:  Patient attended group session and was attentive and participative.

## 2025-02-13 ENCOUNTER — TELEPHONE (OUTPATIENT)
Dept: NEUROLOGY | Facility: CLINIC | Age: 40
End: 2025-02-13
Payer: COMMERCIAL

## 2025-02-13 DIAGNOSIS — G20.A1 PARKINSON'S DISEASE, UNSPECIFIED WHETHER DYSKINESIA PRESENT, UNSPECIFIED WHETHER MANIFESTATIONS FLUCTUATE (H): ICD-10-CM

## 2025-02-13 RX ORDER — LEVODOPA AND CARBIDOPA 195; 48.75 MG/1; MG/1
CAPSULE, EXTENDED RELEASE ORAL
Qty: 300 CAPSULE | Refills: 11 | Status: SHIPPED | OUTPATIENT
Start: 2025-02-13

## 2025-02-13 NOTE — TELEPHONE ENCOUNTER
Rytary prescription updated per request from patient below      Disp Refills Start End ANJU   carbidopa-levodopa (RYTARY) 48. MG CPCR per CR capsule 300 capsule 11 2/13/2025 -- No   Sig: Take 3 capsules by mouth at 8:30 am, 2 capsules at 12:30 pm, 3 capsules at 4:30 pm, and 2 capsules at 8:30 pm = 10 capsules/day       Lasha PachecoD.  Medication Therapy Management Pharmacist  [facility]

## 2025-02-13 NOTE — TELEPHONE ENCOUNTER
"Patient is currently admitted to [facility], a 28-day residential [facility] treatment facility located inside of [facility]. This is not an inpatient unit. There is no rounding provider or access to hospital consult services.  [facility] nurses utilize community medical providers while patients are admitted for medical collaboration. Medication order(s) may be sent to [facility] on [address] [facility], [address]  [address] and will be delivered to [facility].     Dr Murrell and Dr Carson,    Pt came to writer with the following comments regarding medication      Pt is aware that the last time he met with Dr Murrell that he said \"wait till the dust settles before altering the above medication as prescribed\"    However, pt \"feels like I'm taking too many pills at unnecessary times.  For instance the 5 am and the 9 pm are too early and too late... because I am sleeping during those times.\"   (Pt is required to come to med room and self-administer these medications and this has been a challenge for the pt)     Pt's proposal is:  8:30AM - 3 capsules  12:30pm - 2 capsules  4:30pm - 3 capsules  8:30pm - 2 capsules     Pt adds: \"When I take 3 capsules, afterwards, I feel more like myself and symptoms diminish significantly\"      Gayla Killian RN    [facility]  Nurse Liaison / CD Adult [facility]   [address]  O:[phone]  F:[phone]  TANYA Pelham [id number]  LP After hours(UC):[phone]      "

## 2025-02-14 ENCOUNTER — HOSPITAL ENCOUNTER (OUTPATIENT)
Dept: BEHAVIORAL HEALTH | Facility: CLINIC | Age: 40
Discharge: HOME | End: 2025-02-14
Attending: FAMILY MEDICINE
Payer: COMMERCIAL

## 2025-02-14 PROCEDURE — H2035 A/D TX PROGRAM, PER HOUR: HCPCS | Mod: HQ

## 2025-02-14 PROCEDURE — 1002N00001 HC LODGING PLUS FACILITY CHARGE ADULT

## 2025-02-14 NOTE — GROUP NOTE
Group Therapy Documentation    PATIENT'S NAME: Dipesh Nicole  MRN:   [id number]  [facility]   1985  [id number]  DATE OF SERVICE: 25  START TIME: 12:30 PM  END TIME:  2:30 PM  FACILITATOR(S): Vargas Mills LADC  TOPIC: BEH Group Therapy  Number of patients attending the group:  7  Group Length:  2 Hours    Dimensions addressed: 3, 4, and 5    Summary of Group / Topics Discussed:    Recovery Principles, Mindfulness/Relaxation, Coping/DBT informed care, Trauma informed care, Disease of addiction, Emotions/expression, and Relapse prevention      Group Attendance:  Attended group session    Patient's response to the group topic/interactions:  cooperative with task    Patient appeared to be Attentive and Engaged.        Client specific details:  Annalise participated in the afternoon group which was a recovery activity followed by a discussion.

## 2025-02-14 NOTE — GROUP NOTE
"Group Therapy Documentation    PATIENT'S NAME: Dipesh Nicole  MRN:   [id number]  :   1985  ACCT. NUMBER: [id number]  DATE OF SERVICE: 25  START TIME:  9:00 AM  END TIME: 11:00 AM  FACILITATOR(S): Danielle Gaston; Kiarra Hernandez LADC  TOPIC: BEH Group Therapy  Number of patients attending the group:  7  Group Length:  2 Hours    Dimensions addressed: 4, 5, and 6    Summary of Group / Topics Discussed:    Recovery Principles, Sober coping skills, and Emotions/expression      Group Attendance:  Attended group session    Patient's response to the group topic/interactions:  cooperative with task    Patient appeared to be Actively participating, Attentive, and Engaged.        Client specific details:  Pt participated in collage activity in which he chose a song important to him or his recovery and made a collage to represent the song and what it means to him. Pt chose the song \"Let It Be\" by [employer] and presented his collage and its meaning to the group.     "

## 2025-02-15 ENCOUNTER — HOSPITAL ENCOUNTER (OUTPATIENT)
Dept: BEHAVIORAL HEALTH | Facility: CLINIC | Age: 40
Discharge: HOME | End: 2025-02-15
Attending: FAMILY MEDICINE
Payer: COMMERCIAL

## 2025-02-15 PROCEDURE — H2035 A/D TX PROGRAM, PER HOUR: HCPCS | Mod: HQ

## 2025-02-15 PROCEDURE — 1002N00001 HC LODGING PLUS FACILITY CHARGE ADULT

## 2025-02-15 NOTE — GROUP NOTE
Psychoeducation Group Documentation    PATIENT'S NAME: Dipesh Nicole  MRN:   [id number]  :   1985  ACCT. NUMBER: [id number]  DATE OF SERVICE: 2/15/25  START TIME:  9:00 AM  END TIME: 11:00 AM  FACILITATOR(S): Pramod Hart LADC; Grey Garcia LADC  TOPIC: BEH Pyschoeducation  Number of patients attending the group:  20  Group Length:  2 Hours    Skills Group Therapy Type: Recovery skills    Summary of Group / Topics Discussed:    Balanced lifestyle skills and Relapse prevention skills        Group Attendance:  Attended group session    Patient's response to the group topic/interactions:  listened actively    Patient appeared to be Attentive and Engaged.         Client specific details:  Pt was attentive and engaged with the speaker.

## 2025-02-15 NOTE — GROUP NOTE
"Group Therapy Documentation    PATIENT'S NAME: Dipesh Nicole  MRN:   [id number]  :   1985  ACCT. NUMBER: [id number]  DATE OF SERVICE: 2/15/25  START TIME: 12:30 PM  END TIME:  2:30 PM  FACILITATOR(S): Pramod Hart LADC; Grey Garcia LADC  TOPIC: BEH Group Therapy  Number of patients attending the group:  19  Group Length:  2 Hours    Dimensions addressed: 3, 4, 5, and 6    Summary of Group / Topics Discussed:    Balanced lifestyle, Disease of addiction, Emotions/expression, Relapse prevention, and Self-care activities      Group activity included the viewing of the film \"I Am\", followed by a discussion the current societal struggles throughout the word and how to cope with them.       Group Attendance:  Attended group session    Patient's response to the group topic/interactions:  cooperative with task    Patient appeared to be Actively participating.        Client specific details:  Patient actively engaged in group discussion.    "

## 2025-02-16 ENCOUNTER — HOSPITAL ENCOUNTER (OUTPATIENT)
Dept: BEHAVIORAL HEALTH | Facility: CLINIC | Age: 40
Discharge: HOME | End: 2025-02-16
Attending: FAMILY MEDICINE
Payer: COMMERCIAL

## 2025-02-16 PROCEDURE — H2035 A/D TX PROGRAM, PER HOUR: HCPCS | Mod: HQ

## 2025-02-16 PROCEDURE — 1002N00001 HC LODGING PLUS FACILITY CHARGE ADULT

## 2025-02-16 NOTE — GROUP NOTE
Psychoeducation Group Documentation    PATIENT'S NAME: Dipesh Nicole  MRN:   [id number]  :   1985  ACCT. NUMBER: [id number]  DATE OF SERVICE: 25  START TIME: 12:30 PM  END TIME:  1:30 PM  FACILITATOR(S): Grey Garcia LADC; Pramod Hart LADC  TOPIC: BEH Pyschoeducation  Number of patients attending the group:  19  Group Length:  1 Hours    Skills Group Therapy Type: Recovery skills    Summary of Group / Topics Discussed:    Balanced lifestyle skills and Relapse prevention skills        Group Attendance:  Attended group session    Patient's response to the group topic/interactions:  listened actively    Patient appeared to be Attentive and Engaged.         Client specific details:  Pt was engaged with the lecture.

## 2025-02-16 NOTE — GROUP NOTE
Psychoeducation Group Documentation    PATIENT'S NAME: Dipesh Nicole  MRN:   [id number]  :   1985  ACCT. NUMBER: [id number]  DATE OF SERVICE: 25  START TIME:  9:00 AM  END TIME: 11:00 AM  FACILITATOR(S): Grey Garcia LADC; Pramod Hart LADC  TOPIC: BEH Pyschoeducation  Number of patients attending the group:  20  Group Length:  2 Hours    Skills Group Therapy Type: Medication education    Summary of Group / Topics Discussed:    Symptom management skills and Medication management skills        Group Attendance:  Attended group session    Patient's response to the group topic/interactions:  listened actively    Patient appeared to be Attentive and Engaged.         Client specific details:  Pt was attentive and engaged with the speaker.

## 2025-02-17 ENCOUNTER — TELEPHONE (OUTPATIENT)
Dept: BEHAVIORAL HEALTH | Facility: CLINIC | Age: 40
End: 2025-02-17
Payer: COMMERCIAL

## 2025-02-17 ENCOUNTER — HOSPITAL ENCOUNTER (OUTPATIENT)
Dept: BEHAVIORAL HEALTH | Facility: CLINIC | Age: 40
Discharge: HOME | End: 2025-02-17
Attending: FAMILY MEDICINE
Payer: COMMERCIAL

## 2025-02-17 PROCEDURE — H2035 A/D TX PROGRAM, PER HOUR: HCPCS | Mod: HQ

## 2025-02-17 PROCEDURE — 1002N00001 HC LODGING PLUS FACILITY CHARGE ADULT

## 2025-02-17 NOTE — GROUP NOTE
Group Therapy Documentation    PATIENT'S NAME: Dipesh Nicole  MRN:   [id number]  :   1985  ACCT. NUMBER: [id number]  DATE OF SERVICE: 25  START TIME: 12:30 PM  END TIME:  2:30 PM  FACILITATOR(S): Vargas Mills LADC  TOPIC: BEH Group Therapy  Number of patients attending the group:  9  Group Length:  2 Hours    Dimensions addressed: 3, 4, and 5    Summary of Group / Topics Discussed:    Recovery Principles, Coping/DBT informed care, Trauma informed care, Disease of addiction, and Emotions/expression      Group Attendance:  Attended group session    Patient's response to the group topic/interactions:  cooperative with task    Patient appeared to be Attentive and Engaged.        Client specific details:  Annalise participated in afternoon group. He took part in a discussion on letting go of resentments and dealing with anticipatory grief. For the second half of group, he listened to and gave positive feedback on his peer's assignment.

## 2025-02-17 NOTE — TELEPHONE ENCOUNTER
Called  and it went to . Called LP RN and spoke with TANYA Abreu. Scheduled next day appt.    Lisy Nieves  Transition Clinic Coordinator  02/17/25 2:56 PM

## 2025-02-17 NOTE — TELEPHONE ENCOUNTER
Transition Clinic Referral     Please reach out to [facility] for scheduling, pt does not have his phone while in treatment. Call [phone] or reply to this message  in epic.      Type of Referral:      __x___Medication Only    Referring Provider Name: Martha Woodward MD    Transitions  check pt in with phone number:   1st option: [phone] [facility]    2nd option: [phone] [facility] Nurse.   3rd option: [phone] [facility] Coordinator    Transitions Provider [employer] visits: Use the following ID and pin:  ID [id number]  Pin [id number]    Reason for Transition Clinic Referral: awaiting long-term psych appt. Currently attending [facility] residential treatment and requesting psychiatric stabilization per concerns in RN note.Pt has Parkinson's diagnosis and is interested in discussing the relationship between Parkinson's and anxiety. Pt is hoping to get something to treat his on-going anxiety now that he is not using.     Please indicate if this if FIRST TIME/NEW CONSULT versus requesting 2nd, 3rd, etc when attending [facility]: First time    Next Level of Care Patient Will Be Transitioned To: Psychiatry    Start Date for Next Level of Care (Psychiatry provider visit - Required): Appointment requested this morning for P appointment - no response yet      Type of Clinical Assessment Completed (Crisis, DA, ARELI, etc.): ARELI    MH Diagnosis: depression, anxiety    Date Clinical Assessment (ARELI/comprehensive assessment) was Completed: SEE EPIC    Diagnosis: Alcohol Use Disorder (AUD) and Stimulant Use Disorder     Needs: NO    Does Patient Have Access to Technology: yes    Patient E-mail Address: [email or web address]    Gender Preference (if applicable): NO    Martha Woodward MD

## 2025-02-17 NOTE — TELEPHONE ENCOUNTER
[facility] Appointment Request     Type of Request: Psychiatry at:  [facility], [facility], 2nd floor [id number]  [address]  [address]    [phone]    [facility] nurse contact phone number: [phone]    Pt is currently attending [facility] residential treatment and requesting long term psychiatric/addiction medicine stabilization/service.     Diagnosis: Alcohol Use Disorder (AUD) and Stimulant Use Disorder    MH Diagnosis: depression, anxiety    What Would Be Helpful from the [facility] Psychiatry Clinic: acute psychiatric medication stabilization. Pt has Parkinson's diagnosis and is interested in discussing with a psychiatrist the connection between Parkinson's and anxiety. Pt is looking for something to help treat his anxiety in order to maintain sobriety.      Needs: NO    Requesting in-person appt.  Pt discharge date from [facility] is: 2/26/2025    If pt is seen after discharge from [facility], confirm if NEW Patient Packet should be sent to email or mailing address of patient choice:    Confirm Email address  [email or web address]    Confirm mailing address  [address] [place] [address]    Clinician Gender Preference (if applicable): LAURENCE    [facility]  Nurse Liaison / CD [facility] ([facility]  [address], [place]  O:[phone]  F:[phone]  TANYA Scott [id number]

## 2025-02-17 NOTE — GROUP NOTE
"Group Therapy Documentation    PATIENT'S NAME: Dipesh Nicole  MRN:   [id number]  :   1985  ACCT. NUMBER: [id number]  DATE OF SERVICE: 25  START TIME:  9:00 AM  END TIME: 11:00 AM  FACILITATOR(S): Pramod Hart LADC; Danielle Gaston  TOPIC: BEH Group Therapy  Number of patients attending the group:  8  Group Length:  2 Hours    Dimensions addressed: 3, 5, and 6    Summary of Group / Topics Discussed:    Recovery Principles, Sober coping skills, Emotions/expression, and Relapse prevention      Group Attendance: Attended group session     Patient's response to the group topic/interactions: cooperative with task     Patient appeared to be Actively participating and Attentive.      Client specific details: Pt expressed today he's feeling hopeful and \"excited for the week ahead.\". Pt answered question of the day and gave feedback to peer's assignment on \"My Relapse Prevention Plan.\" Pt participated in discussion about defense mechanisms.     "

## 2025-02-18 ENCOUNTER — HOSPITAL ENCOUNTER (OUTPATIENT)
Dept: BEHAVIORAL HEALTH | Facility: CLINIC | Age: 40
Discharge: HOME | End: 2025-02-18
Attending: FAMILY MEDICINE
Payer: COMMERCIAL

## 2025-02-18 PROCEDURE — H2035 A/D TX PROGRAM, PER HOUR: HCPCS | Mod: HQ

## 2025-02-18 PROCEDURE — 1002N00001 HC LODGING PLUS FACILITY CHARGE ADULT

## 2025-02-18 PROCEDURE — H2035 A/D TX PROGRAM, PER HOUR: HCPCS | Mod: HQ | Performed by: COUNSELOR

## 2025-02-18 NOTE — GROUP NOTE
Group Therapy Documentation    PATIENT'S NAME: Dipesh Nicole  MRN:   [id number]  :   1985  ACCT. NUMBER: [id number]  DATE OF SERVICE: 25  START TIME:  9:00 AM  END TIME: 11:00 AM  FACILITATOR(S): Kirsten Love LADC  TOPIC: BEH Group Therapy  Number of patients attending the group:  10  Group Length:  2 Hours    Dimensions addressed: 3, 4, 5, and 6    Summary of Group / Topics Discussed:    Recovery Principles, Disease of addiction, and Emotions/expression      Group Attendance:  Attended group session    Patient's response to the group topic/interactions:  cooperative with task    Patient appeared to be Actively participating, Attentive, and Engaged.        Client specific details: Annalise was an active participant in morning group therapy session. He presented his First Step assignment and was receptive to feedback from the group. He also helped introduce 2 new members to the group.

## 2025-02-18 NOTE — PROGRESS NOTES
[facility] Weekly Treatment Plan Review    Date span:  25 to 25    Patient did not have any absences during this time period (list absence dates and reason for absence).     Treatment Plan initiated on: 2-3-25.    Dimension1: Acute Intoxication/Withdrawal Potential -   Previous Dimension Ratin  Current Dimension Ratin  Date of Last Use: 25  Any reports of withdrawal symptoms - No    Dimension 2: Biomedical Conditions & Complications -   Previous Dimension Ratin  Current Dimension Ratin  Medical Concerns:  None reported.  Current Medications & Medication Changes:  Current Outpatient Medications   Medication Sig Dispense Refill    acetaminophen (TYLENOL) 500 MG tablet Take 500-1,000 mg by mouth every 8 hours as needed for mild pain.      alum & mag hydroxide-simethicone (MAALOX) 200-200-20 MG/5ML SUSP suspension Take 30 mLs by mouth every 6 hours as needed for indigestion.      benzocaine-menthol (CHLORASEPTIC) 6-10 MG lozenge Place 1 lozenge inside cheek every 2 hours as needed for sore throat.      carbidopa-levodopa (RYTARY) 48. MG CPCR per CR capsule Take 3 capsules by mouth at 8:30 am, 2 capsules at 12:30 pm, 3 capsules at 4:30 pm, and 2 capsules at 8:30 pm = 10 capsules/day 300 capsule 11    guaiFENesin (ROBITUSSIN) 20 mg/mL liquid Take 200 mg by mouth every 6 hours as needed for cough.      ibuprofen (ADVIL/MOTRIN) 200 MG tablet Take 600 mg by mouth every 6 hours as needed for pain.      loratadine (CLARITIN) 10 MG tablet Take 10 mg by mouth daily as needed for allergies.      melatonin 5 MG tablet Take 5-10 mg by mouth nightly as needed for sleep.      naloxone (NARCAN) 4 MG/0.1ML nasal spray Spray 4 mg into one nostril alternating nostrils as needed for opioid reversal. every 2-3 minutes until assistance arrives      senna-docusate (SENOKOT-S/PERICOLACE) 8.6-50 MG tablet Take 2 tablets by mouth daily as needed for constipation.      topiramate (TOPAMAX) 25 MG  tablet Take 0.5 tablets (12.5 mg) by mouth daily for 15 days, THEN 1 tablet (25 mg) daily for 15 days.       No current facility-administered medications for this encounter.     Facility-Administered Medications Ordered in Other Encounters   Medication Dose Route Frequency Provider Last Rate Last Admin    Self Administer Medications: Behavioral Services   Does not apply See Admin Instructions Martha Woodward MD         Medication Prescriber: see chart  Taking meds as prescribed? Yes  Medication side effects or concerns:  None reported.  Outside medical appointments this week (list provider and reason for visit):  None reported.    Narrative: Pt seems fully functioning and seeks medical attention as needed. He attended lecture given by  nurse on HIV/AIDS on 25.     Dimension 3: Emotional/Behavioral Conditions & Complications -   Previous Dimension Ratin  Current Dimension Ratin  PHQ2:       2025     3:00 PM 2023     8:44 AM 2023     7:25 AM 7/15/2022    12:14 PM 2022     1:34 PM 10/5/2021     2:04 PM 2020     3:24 PM   PHQ-2 ( 1999 [employer])   Q1: Little interest or pleasure in doing things 1 0 0 0 0 0 0   Q2: Feeling down, depressed or hopeless 1 0 0 0 0 0 0   PHQ-2 Score 2 0 0 0 0 0 0   PHQ-2 Total Score (12-17 Years)- Positive if 3 or more points; Administer PHQ-A if positive  0 0   0 0      GAD2:       2024    12:48 PM 2025    12:56 PM 2025     3:00 PM 2/10/2025    10:00 AM   ANAYA-2   Feeling nervous, anxious, or on edge 2 1 1 1   Not being able to stop or control worrying 0 1 1 1   ANAYA-2 Total Score 2    2 2  2 2       Patient-reported     Mental health diagnosis: Anxiety and Depression   Date of last SIB:  Patient denies.  Date of  last SI:  Patient denies.  Date of last HI: Patient denies.  Behavioral Targets: Follow recommendations of medical provider. Report any alcohol or drug use to counselor and any increase in withdrawal symptoms to nurse. Stabilize and  "maintain mental health.   Risk factors: The patient lacks relapse prevention, coping, and refusal skills, as well as a sober peer support network. He has dual issues of MI and CD, and a significant history of guilt, shame, and loss issues.  Protective factors:  positive relationships positive family connections, forward/future oriented thinking, restricted access to lethal means (firearms), dedication to family/friends, effectively controls impulses, sense of belonging (peer group, family), abstinence from substances, adherence with prescribed medication, agreement to use safety plan, living with other people, structured day, effective problem-solving skills, committment to well-being, positive social skills, financial stability, and access to a variety of clinical interventions  Current MH Assignments:  none at this time    Narrative: Current Mental Health symptoms include: none reported, potential sx of anxiety (rapid speech, fidgeting) observed. See below for suicide risk assessment. Pt does not endorse thoughts of self-harm or suicide ideation at this time. He reports that his stress level has decreased; coping skills to manage stress used were \"reading, praying, and taking the process one day at at time.\" Pt reported that his mood has not significantly changed this past week.    Colbert Suicide Severity Rating Scale (Short Version)      10/6/2021     3:20 PM 7/18/2022     6:27 AM 7/25/2022     6:59 AM 8/15/2022     6:39 AM 11/11/2024     8:10 AM   Colbert Suicide Severity Rating (Short Version)   Over the past 2 weeks have you felt down, depressed, or hopeless? no no no no    Over the past 2 weeks have you had thoughts of killing yourself? no no no no    Have you ever attempted to kill yourself? no no no no    Q1 Wished to be Dead (Past Month)     0-->no   Q2 Suicidal Thoughts (Past Month)     0-->no   Q6 Suicide Behavior (Lifetime)     0-->no   Level of Risk per Screen     no risks indicated       Dimension " "4: Treatment Acceptance / Resistance -   Previous Dimension Rating:  3  Current Dimension Ratin  ARELI Diagnosis:  304.30 (F12.20) Cannabis Use Disorder Moderate  In a controlled environment  Stimulant Use Disorder:  In a controlled environment, Specify current severity:  Severe  304.20 (F14.20) Severe, Cocaine  Commitment to tx process/Stage of change- Pt consistently attends and participates in groups and lectures. He appears to be in the contemplative stage of change at this time.  ARELI assignments - \"First Step\"    Narrative - Pt reports his motivation this week is \"my future, my sobriety, mental clarity, my wife, and my health.\" Pt reports that his aftercare plans include therapy, psychiatry, and out patient tx. He reports that he has gotten along with peers and staff this week and that \"they've all been great.\"     Dimension 5: Relapse / Continued Problem Potential -   Previous Dimension Ratin  Current Dimension Ratin  Relapses this week - None  Urges to use - None  UA results - negative for all screened drugs    Narrative- Pt reports no cravings this past week. He reported not experiencing any triggers to use, but used the following coping skill(s): \"exercise.\" Pt participated in the spirituality group, facilitated by [facility] and  counseling staff was present during group.    Dimension 6: Recovery Environment -   Previous Dimension Rating:  3  Current Dimension Rating:  3  Family Involvement - n/a  Summarize attendance at family groups and family sessions - n/a  Family supportive of treatment?  Yes  Recreational activities - playing ping pong, watching movies    Narrative - Pt is spending free time with same-gender peers and attending at least 3 virtual 12-step meetings weekly while in . He reports having had contact with his parents, brother and wife this past week. Pt participated in weekend workshop on relationships and completed all activities.    Progress made on " transition planning goals: none at this time    Justification for Continued Treatment at this Level of Care: Risk ratings indicate continued need for this level of care. Pt has been unable to maintain abstinence from alcohol and drugs while at the outpatient level of care, lacks long-term sober maintenance skills, lacks sober coping skills and has medical and mental health concerns which are exacerbated by substance use.   Treatment coordination activities this week:   none at this time  Need for peer recovery support referral? No    Discharge Planning:  Target Discharge Date/Timeframe:  2-27-25  Med Mgmt Provider/Appt:  TBD  Ind therapy Provider/Appt:  TBD  Family therapy Provider/Appt:  TBD  Other referrals:  none at this time.    Has vulnerable adult status changed? No    Interdisciplinary Clinical Supervision including: LAURA and RN    Are Treatment Plan goals/objectives effective? Yes  *If no, list changes to treatment plan:    Are the current goals meeting client's needs? Yes  *If no, list the changes to treatment plan.    Patient Input / Response: Pt contributed to treatment plan review.    *Client agrees with any changes to the treatment plan: N/A  *Client received copy of changes: N/A  *Client is aware of right to access a treatment plan review: Yes    LAURA Tobar

## 2025-02-18 NOTE — GROUP NOTE
Group Therapy Documentation    PATIENT'S NAME: Dipesh Nicole  MRN:   [id number]  :   1985  ACCT. NUMBER: [id number]  DATE OF SERVICE: 25  START TIME: 12:30 PM  END TIME:  2:30 PM  FACILITATOR(S): Pramod Hart LADC  TOPIC: BEH Group Therapy  Number of patients attending the group:  10  Group Length:  2 Hours    Dimensions addressed: 3, 4, 5, and 6    Summary of Group / Topics Discussed:    Sober coping skills, Balanced lifestyle, Disease of addiction, Emotions/expression, Leisure explorations/use of leisure time, Relapse prevention, and Self-care activities      Group began with a barriers identification activity, followed by a discussion regarding mindfulness and other approaches to managing stress.       Group Attendance:  Attended group session    Patient's response to the group topic/interactions:  cooperative with task    Patient appeared to be Actively participating.        Client specific details:  Patient actively engaged in group discussion.

## 2025-02-18 NOTE — GROUP NOTE
"Psychoeducation Group Documentation    PATIENT'S NAME: Dipesh Nicole  MRN:   [id number]  :   1985  ACCT. NUMBER: [id number]  DATE OF SERVICE: 25  START TIME:  3:00 PM  END TIME:  4:00 PM  FACILITATOR(S): Leana Field LADC; Vargas Mills LADC; Carmen Fortune LADC  TOPIC: BEH Pyschoeducation  Number of patients attending the group:  18  Group Length:  1 Hours    Skills Group Therapy Type: Recovery skills, Emotion regulation skills, and Healthy behaviors development    Summary of Group / Topics Discussed:    Coping/DBT skills, Symptom management skills, and Relapse prevention skills    Patients attended psychoeducational lecture regarding staying in treatment until the \"miracle\" happens.  Patients were provided some education on stages of recovery and rationale for treatment.          Group Attendance:  Attended group session    Patient's response to the group topic/interactions:  cooperative with task and listened actively    Patient appeared to be Actively participating, Attentive, and Engaged.         Client specific details:  Patient actively attended to this lecture..      "

## 2025-02-19 ENCOUNTER — HOSPITAL ENCOUNTER (OUTPATIENT)
Dept: BEHAVIORAL HEALTH | Facility: CLINIC | Age: 40
Discharge: HOME | End: 2025-02-19
Attending: FAMILY MEDICINE
Payer: COMMERCIAL

## 2025-02-19 PROCEDURE — H2035 A/D TX PROGRAM, PER HOUR: HCPCS | Mod: HQ

## 2025-02-19 PROCEDURE — 1002N00001 HC LODGING PLUS FACILITY CHARGE ADULT

## 2025-02-19 NOTE — GROUP NOTE
Group Therapy Documentation    PATIENT'S NAME: Dipesh Nicole  MRN:   [id number]  :   1985  ACCT. NUMBER: [id number]  DATE OF SERVICE: 25  START TIME:  9:45 AM  END TIME: 11:30 AM  FACILITATOR(S): Pramod Hart LADC; Danielle Gaston  TOPIC: BEH Group Therapy  Number of patients attending the group:  10  Group Length:  2 Hours    Dimensions addressed: 3, 5, and 6    Summary of Group / Topics Discussed:    Recovery Principles, Balanced lifestyle, Emotions/expression, and Leisure explorations/use of leisure time      Group Attendance: Attended group session     Patient's response to the group topic/interactions: cooperative with task and listened actively     Patient appeared to be Actively participating and Attentive.      Client specific details: Pt expressed today he's feeling peaceful, content, and rested. Pt answered question of the day and participated in discussion about sense of purpose and types of happiness.

## 2025-02-19 NOTE — GROUP NOTE
Group Therapy Documentation    PATIENT'S NAME: Dipesh Nicole  MRN:   [id number]  :   1985  ACCT. NUMBER: [id number]  DATE OF SERVICE: 25  START TIME:  8:30 AM  END TIME:  9:30 AM  FACILITATOR(S): Kirsten Love LADC; Breanne Blanton LADC; Sixto Joyner MD  TOPIC: BEH Group Therapy  Number of patients attending the group:  17  Group Length:  1 Hours    Dimensions addressed: 1, 2, 3, and 4    Summary of Group / Topics Discussed:    Recovery Principles and Disease of addiction      Group Attendance:  Attended group session    Patient's response to the group topic/interactions:  cooperative with task    Patient appeared to be Attentive and Engaged.        Client specific details: Annalise actively listened and engaged with lecture by Dr. Joyner on the disease of addiction and treatment approaches.

## 2025-02-19 NOTE — GROUP NOTE
Group Therapy Documentation    PATIENT'S NAME: Dipesh Nicole  MRN:   [id number]  :   1985  ACCT. NUMBER: [id number]  DATE OF SERVICE: 25  START TIME: 12:30 PM  END TIME:  2:30 PM  FACILITATOR(S): Kirsten Love LADC; Amaris Duke  TOPIC: BEH Group Therapy  Number of patients attending the group:  10  Group Length:  2 Hours    Dimensions addressed: 3, 4, 5, and 6    Summary of Group / Topics Discussed:    Spiritual Care      Group Attendance:  Attended group session    Patient's response to the group topic/interactions:  cooperative with task    Patient appeared to be Actively participating, Attentive, and Engaged.        Client specific details: Annalise was an active participant in spiritual care group therapy session, led by Amaris Duke.

## 2025-02-22 ENCOUNTER — HOSPITAL ENCOUNTER (OUTPATIENT)
Dept: BEHAVIORAL HEALTH | Facility: CLINIC | Age: 40
Discharge: HOME | End: 2025-02-22
Attending: FAMILY MEDICINE
Payer: COMMERCIAL

## 2025-02-22 DIAGNOSIS — F14.20 COCAINE USE DISORDER, SEVERE, DEPENDENCE (H): ICD-10-CM

## 2025-02-22 PROCEDURE — H2035 A/D TX PROGRAM, PER HOUR: HCPCS | Mod: HQ

## 2025-02-22 PROCEDURE — 1002N00001 HC LODGING PLUS FACILITY CHARGE ADULT

## 2025-02-22 NOTE — GROUP NOTE
Group Therapy Documentation    PATIENT'S NAME: Dipesh Nicole  MRN:   [id number]  :   1985  ACCT. NUMBER: [id number]  DATE OF SERVICE: 25  START TIME:  9:00 AM  END TIME: 11:00 AM  FACILITATOR(S): Sienna Sosa LADC  TOPIC: BEH Group Therapy  Number of patients attending the group:  22  Group Length:  2 Hours    Dimensions addressed: 3, 4, 5, and 6    Summary of Group / Topics Discussed:    Recovery Principles, Relationship/socialization, Coping/DBT informed care, Emotions/expression, Leisure explorations/use of leisure time, and Relapse prevention  Setting Boundaries; S.T.O.P. skill; True Colors activity; featured film/episode and discussion.      Group Attendance:  Attended group session    Patient's response to the group topic/interactions:  cooperative with task    Patient appeared to be Engaged.        Client specific details:  Annalise engaged and participated in the group activity.

## 2025-02-22 NOTE — GROUP NOTE
Group Therapy Documentation    PATIENT'S NAME: Dipesh Nicole  MRN:   [id number]  :   1985  ACCT. NUMBER: [id number]  DATE OF SERVICE: 25  START TIME: 12:30 PM  END TIME:  2:30 PM  FACILITATOR(S): Vargas Mills LADC  TOPIC: BEH Group Therapy  Number of patients attending the group:  22  Group Length:  2 Hours    Dimensions addressed: 3, 4, 5, and 6    Summary of Group / Topics Discussed:    Recovery Principles, Relationship/socialization, Balanced lifestyle, Trauma informed care, Disease of addiction, Emotions/expression, Medication management, Relapse prevention, and Self-care activities      Group Attendance:  Attended group session    Patient's response to the group topic/interactions:  cooperative with task    Patient appeared to be Attentive and Engaged.        Client specific details:  The patient participated in the afternoon lecture on what is enabling, how addiction effects families, and the end to for communication and boundaries when the patients leave treatment.

## 2025-02-22 NOTE — PROGRESS NOTES
Nursing Discharge Planning Meeting    Writer completed discharge planning meeting with patient. Discharge is planned for 2/27/25    Discussed appropriate follow up care to manage ARELI/MEDICAL/MH and to obtain medication refills. Patient given a copy of their current medications for reference. Questions were answered at this time and the patient verbalized an understanding of the post-discharge follow up plan.    Pt has Parkinson's. Deep Brain Stimulator surgery 2022 - generator/battery is implanted over the left chest wall    Patient will f/u with PCP as recommended and/or is aware of upcoming appt:  PCP Dr. Joey Morley, [place], [place]    Phone: [phone]    Patient will f/u with Addiction Medicine Provider as recommended and/or is aware of upcoming appt:  [facility]  [address] [place] [address] [address] [facility] [id number] [phone]   Appt date and time: 2/25/25 at 4pm  Provider: Shauna Burrows    Psychology:  [facility] [address]  [address]  Department: [facility]  Phone: [phone]  Appt date and time: 3/7/25 at 10AM  Provider: Ada Mon, PhD    Patient will f/u with [facility] as recommended and/or is aware of upcoming appt:  [facility], [facility], 2nd floor [facility]  [address] [place] [address]  [address]    [phone]  Appt date and time: 4/1/25 at 9:30AM  Provider: John Hendrickson DO    Neurology:  [address]  [address]  Department: [facility] Neurology  Phone: [phone]  Appt date and time: 5/23/25 at 11AM  Provider: Jun Murrell MD        Continue to support patient in discharge planning as needed to assure appropriate continuity of care.     Tobacco Cessation  Patient participated in the nicotine replacement therapy for tobacco cessation or reduction during their treatment  programming: No. Pt does not use tobacco product    Gayla Killian RN     [facility]  Nurse Liaison / CD [facility]  O: [phone]  Fax:[phone]  [facility] [address]  M-F: 7AM to 5:30PM   Sat-Sun: 7AM to 3PM  After hours: [phone]

## 2025-02-22 NOTE — TELEPHONE ENCOUNTER
Isaias Villatoro,    Just wanted to give you a heads up that pt has 12 tabs left of 25mg Topiramate.    He will see you on 2/25/25 and discharging from [facility] on 2/27/25    Taryn Killian RN    [facility]  Nurse Liaison / CD [facility]  [address]  O:[phone]  F:[phone]  RN [place] [id number]  LP After hours(UC):[phone]  LP admin counselor:[phone]  CD assess:[phone]

## 2025-02-23 ENCOUNTER — HOSPITAL ENCOUNTER (OUTPATIENT)
Dept: BEHAVIORAL HEALTH | Facility: CLINIC | Age: 40
Discharge: HOME | End: 2025-02-23
Attending: FAMILY MEDICINE
Payer: COMMERCIAL

## 2025-02-23 PROCEDURE — H2035 A/D TX PROGRAM, PER HOUR: HCPCS | Mod: HQ

## 2025-02-23 PROCEDURE — 1002N00001 HC LODGING PLUS FACILITY CHARGE ADULT

## 2025-02-23 NOTE — GROUP NOTE
Group Therapy Documentation    PATIENT'S NAME: Dipesh Nicole  MRN:   [id number]  :   1985  ACCT. NUMBER: [id number]  DATE OF SERVICE: 25  START TIME: 12:30 PM  END TIME:  1:30 PM  FACILITATOR(S): Vargas Mills LADC  TOPIC: BEH Group Therapy  Number of patients attending the group:  22  Group Length:  1 Hours    Dimensions addressed: 4, 5, and 6    Summary of Group / Topics Discussed:    Recovery Principles, Sober coping skills, Relationship/socialization, and Balanced lifestyle      Group Attendance:  Attended group session    Patient's response to the group topic/interactions:  cooperative with task    Patient appeared to be Attentive and Engaged.        Client specific details:  The patient took part in the afternoon lecture which was a recovery team building exercise followed by a discussion.

## 2025-02-23 NOTE — GROUP NOTE
Group Therapy Documentation    PATIENT'S NAME: Dipesh Nicole  MRN:   [id number]  :   1985  ACCT. NUMBER: [id number]  DATE OF SERVICE: 25  START TIME:  8:45 AM  END TIME: 10:30 AM  FACILITATOR(S): Kirsten Love LADC; Vargas Mills LADC; Gayla Killian RN  TOPIC: BEH Group Therapy  Number of patients attending the group:  22  Group Length:  2 Hours    Dimensions addressed: 1, 2, and 3    Summary of Group / Topics Discussed:    Hepatitis C, with review of Hepatitis A and B. Laughter yoga exercise.      Group Attendance:  Attended group session    Patient's response to the group topic/interactions:  cooperative with task    Patient appeared to be Attentive and Engaged.        Client specific details: Annalise actively listened and engaged with [facility] RN lecture on Hepatitis C. Patient also participated in a laughter yoga exercise.

## 2025-02-24 ENCOUNTER — HOSPITAL ENCOUNTER (OUTPATIENT)
Dept: BEHAVIORAL HEALTH | Facility: CLINIC | Age: 40
Discharge: HOME | End: 2025-02-24
Attending: FAMILY MEDICINE
Payer: COMMERCIAL

## 2025-02-24 PROCEDURE — H2035 A/D TX PROGRAM, PER HOUR: HCPCS | Mod: HQ

## 2025-02-24 PROCEDURE — 1002N00001 HC LODGING PLUS FACILITY CHARGE ADULT

## 2025-02-24 RX ORDER — TOPIRAMATE 25 MG/1
TABLET, FILM COATED ORAL
Qty: 30 TABLET | Refills: 0 | OUTPATIENT
Start: 2025-02-24 | End: 2025-03-26

## 2025-02-24 NOTE — GROUP NOTE
Group Therapy Documentation    PATIENT'S NAME: Dipesh Nicole  MRN:   [id number]  :   1985  ACCT. NUMBER: [id number]  DATE OF SERVICE: 25  START TIME:  9:00 AM  END TIME: 11:00 AM  FACILITATOR(S): Kirsten Love LADC  TOPIC: BEH Group Therapy  Number of patients attending the group:  9  Group Length:  2 Hours    Dimensions addressed: 3, 4, 5, and 6    Summary of Group / Topics Discussed:    Recovery Principles, Sober coping skills, Disease of addiction, and Relapse prevention      Group Attendance:  Attended group session    Patient's response to the group topic/interactions:  cooperative with task    Patient appeared to be Actively participating, Attentive, and Engaged.        Client specific details: Annalise was an active participant in morning group therapy session. He engaged in a discussion on current psychosocial stressors in treatment as well as aftercare planning.

## 2025-02-24 NOTE — TELEPHONE ENCOUNTER
Reviewed thank you - will refill tomorrow at time of follow up. Dose adjustments may be needed at that time.     LUCERO Varela CNP on 2/24/2025 at 9:02 AM

## 2025-02-24 NOTE — GROUP NOTE
"Group Therapy Documentation    PATIENT'S NAME: Dipesh Nicole  MRN:   [id number]  :   1985  ACCT. NUMBER: [id number]  DATE OF SERVICE: 25  START TIME: 12:30 PM  END TIME:  2:30 PM  FACILITATOR(S): Kiarra Hernandez LADC; Danielle Gaston  TOPIC: BEH Group Therapy  Number of patients attending the group:  8  Group Length:  2 Hours    Dimensions addressed: 3, 5, and 6    Summary of Group / Topics Discussed:    Recovery Principles, Co-occurring illnesses symptom management, Disease of addiction, and Emotions/expression      Group Attendance:  Attended group session     Patient's response to the group topic/interactions:  cooperative with task and listened actively     Patient appeared to be Actively participating and Engaged.         Client specific details:  Pt discussed his goals for the week. Pt gave feedback to his peer's assignment on \"Drug Use History.\" Pt participated in discussion on anxiety and depression co-occurring with addiction.     "

## 2025-02-25 ENCOUNTER — OFFICE VISIT (OUTPATIENT)
Dept: ADDICTION MEDICINE | Facility: CLINIC | Age: 40
End: 2025-02-25
Payer: COMMERCIAL

## 2025-02-25 ENCOUNTER — TELEPHONE (OUTPATIENT)
Dept: BEHAVIORAL HEALTH | Facility: CLINIC | Age: 40
End: 2025-02-25

## 2025-02-25 ENCOUNTER — HOSPITAL ENCOUNTER (OUTPATIENT)
Dept: BEHAVIORAL HEALTH | Facility: CLINIC | Age: 40
Discharge: HOME | End: 2025-02-25
Attending: FAMILY MEDICINE
Payer: COMMERCIAL

## 2025-02-25 VITALS
BODY MASS INDEX: 24.28 KG/M2 | HEART RATE: 67 BPM | DIASTOLIC BLOOD PRESSURE: 81 MMHG | SYSTOLIC BLOOD PRESSURE: 147 MMHG | WEIGHT: 184 LBS

## 2025-02-25 DIAGNOSIS — F14.20 COCAINE USE DISORDER, SEVERE, DEPENDENCE (H): ICD-10-CM

## 2025-02-25 PROCEDURE — H2035 A/D TX PROGRAM, PER HOUR: HCPCS | Mod: HQ

## 2025-02-25 PROCEDURE — 1002N00001 HC LODGING PLUS FACILITY CHARGE ADULT

## 2025-02-25 PROCEDURE — H2035 A/D TX PROGRAM, PER HOUR: HCPCS | Mod: HQ | Performed by: COUNSELOR

## 2025-02-25 RX ORDER — TOPIRAMATE 25 MG/1
TABLET, FILM COATED ORAL
Qty: 45 TABLET | Refills: 0 | Status: CANCELLED | OUTPATIENT
Start: 2025-02-25

## 2025-02-25 NOTE — TELEPHONE ENCOUNTER
Patient is a return patient of Shauna Burrows. Already has an in person scheduled for today 2/2/25 @ 4 pm

## 2025-02-25 NOTE — TELEPHONE ENCOUNTER
Addiction Medicine Appointment Request    Please reach out to [facility] Nurses if you need to speak with pt.  Pt does not have access to phone while in [facility] treatment. Call [phone] or reply to this message in epic.      Referring Provider Name: Martha Woodward MD    Please indicate if this if this is a NEW patient visit  NEW    Diagnosis: Cocaine Use Disorder    [facility] Admission Date 1/30/25    [facility] Discharge Date 2/27/25 (PLEASE do not schedule appointments on discharge date)    Additional notes about appointment request: Pt has seen   Shauna Burrows in the past.  Pt requesting to see Dr Joyner.  Both Shauna Burrows and Dr Joyner approve this switch.  Dr Joyner stated to writer that upcoming appt with him would most likely be approx 1 month out    Location Preference:     [facility]   [address] [place] [address], [address] [address] [phone]     Gayla Killian RN    [facility]  Nurse Liaison / CD Adult [facility] ([facility])  [facility] [address]  O:[phone]  F:[phone]  RN Ernest [id number]  LP After hours(UC):[phone]  LP admin counselor:[phone]  CD assess:[phone]

## 2025-02-25 NOTE — PATIENT INSTRUCTIONS
Patient Education   Addiction Medicine  What to Expect  Here's what to expect from our Addiction Medicine program.  About Addiction Medicine  Addiction Medicine clinics help you with substance use problems. You set your own goals. We try to help you reach your goals. Your care plan can include:  Medicine  Creating a recovery plan  Helping you find local resources  Helping with treatment options  Clinic phone number and addresses  Clinic Phone: [phone]  Mental Health and Addiction Clinic  [facility]  [address], [address]  [place] [address]  [facility]  [address] [facility], [address]  [place] [address]  Walk-in services  We offer walk-in care for patients at the Recovery Clinic. This is only for patients with Opioid Use Disorder (OUD). Anyone with OUD is welcome. Our providers will refer you to the Recovery Clinic if you're struggling to keep up with your medicines or appointments.  Recovery Clinic ([facility])  [address], [address]  [place] [address]  Phone: [phone]  The Recovery Clinic is open for walk-ins Monday to Friday 9 a.m. to 11:30 a.m. and 12:30 p.m. to 3 p.m.  How it works  Come to your visits every time. The treatment works better when you do.   You can have as many visits as you need. When you're better, we'll refer you back to being cared for by your family doctor.   If you need it, we'll send you to doctors, psychiatrists, therapists, and other providers. We focus on treating addiction. We don't treat other problems, like managing other medicines or non-addiction issues.  About visits  Urine drug testing  We'll often test your pee (urine) for drugs. This is the only way we can know for sure whether or not you're using drugs. It helps us treat you without judgement.   Suboxone (buprenorphine)  If you're taking buprenorphine, you'll have a lot of visits at first. If your problem is getting worse, or you're  "using substances, we may schedule you for extra visits.   Cancelling visits  If you can't come to your visit, please call us right away at [phone]. If you don't cancel at least 24 hours (1 full day) before your visit, that's \"late cancellation.\"  Being late to visits  If you come late, you may not be seen. This will count as a \"no-show.\"  Please call the clinic if you're running late. This will help us plan, but it doesn't mean you'll be seen.   Being late is:  More than 15 minutes late for a return visit.  More than 30 minutes late for your first visit.  If you cancel late or don't show up 2 times within 6 months, we may transfer you to another clinic.   Getting help between visits  If you need help between visits, you can call us Monday to Friday from 8 a.m. to 4:30 p.m. at [phone]. You can also send us a message on [employer].  Medicine refills  If you miss or cancel a visit, you can still ask for a refill. But we can only refill your medicines if you've made a new appointment.  Please call your pharmacy for medicine refills. If you have a question about your refill, call us at [phone].  It takes up to 2 business days to refill your drugs. Let us know 2 to 3 days before you run out. Don't call more than 1 week before you run out. That's too early.   Please make sure we have your right phone number.  If we have a problem with your refill, we'll call you. If we call you, please call us back right away. If you don't, you may not get your medicines quickly.   Call your pharmacy to find out if your medicines are ready.   Keep your medicines in a safe place. Keep them away from pets and children. If your medicines are lost or stolen, we usually don't replace them. We recommend you file a police report if your medicines are stolen. Your insurance may not pay for early refills, even if you have a prescription.  Forms  Please give us at least 3 business days to fill out any forms. Bring the forms to your " visits if you can. We may refer you to other members of your care team to complete the forms.   Emergency care   Call 911 or go to the nearest emergency room if your life or someone else's life is in danger.  Call 988 anytime for the Suicide and Crisis Lifeline.  If you need care when we're closed, call your family doctor to see if they can help. You can also go to urgent care or an emergency room. [facility] emergency rooms may be able to give you buprenorphine or other medicine refills.  Thank you for choosing us for your care.  For informational purposes only. Not to replace the advice of your health care provider. Copyright   2023 [facility]. All rights reserved. ServiceRelated 107715 - REV 05/23.

## 2025-02-25 NOTE — Clinical Note
Pt is planning to see me in 2 weeks and then follow up with you in 4 weeks, first available was 4/2/25. Wondering if you could review my documentation and plan from today? Please let me know if you have any other suggestions. Thanks you! Shauna

## 2025-02-25 NOTE — PROGRESS NOTES
[facility] Addiction Medicine    A/P                                                    ASSESSMENT/PLAN  Diagnoses and all orders for this visit:  Cocaine use disorder, severe, dependence (H)  -     Drugs of Abuse Screen Urine (POC CUPS) POCT    No orders of the defined types were placed in this encounter.      Feb 25, 2025  -Patient presents for follow-up about 4 weeks after starting low-dose topiramate for cocaine use disorder.  He started 12.5 mg daily for 15 days and then increase to 25 mg daily.  Around the time that he started the medication he did notice an increase in his Parkinson's symptoms, in particular possible change to gait shuffling and speech.  No increased tremor.  He did follow-up with neurology since he was last seen.  His deep brain stimulator device settings were kept the same and his Rytary dose was not changed.  He was instructed to follow-up in 3 months with neurology. Change in Parkinson's symptoms possibly multifactorial (SE of topiramate, changes after discontinuation of cocaine use, dx progression?) .  I think it is possible topiramate may be contributing to his symptoms.  Will discontinue the medication for now.  Monitor symptoms over the next 2 weeks.  If no change could consider resuming topiramate and titration of dose or we will consider alternative pharmacotherapy should he experience cravings.  Will have the patient follow-up with me in 2 weeks and Dr. Joyner in 4 weeks.  Patient may be a good candidate for amphetamine type stimulants for stimulant use disorder.  Would require review from board-certified addiction medicine MD.   - After discharge from [facility] plans to continue outpatient programming.  Strong ongoing recovery supports.  Encouraged patient to attend mutual support groups.  Discussed barriers to minimize social and environmental triggers for use.  Patient is planning to get a new phone number after discharge from [facility] and moving in with his  parents for 2 weeks and then his wife in [place].     Plan:   - discontinue Topiramate. Monitor Parkinson's symptoms. Monitor cravings. Consider pharmacotherapy at follow up.   - 2 weeks follow up with me   - 4 weeks with Dr. Joyner        Continued Complex Management  The longitudinal plan of care for Stimulant Use Disorder was addressed during this visit. Due to the added complexity in care, I will continue to support Annalise in the subsequent management and with ongoing continuity of care.      Last encounter A/P  Feb 5, 2025  - Completed ARELI eval with recommendation for residential programming. Has since starting programming at [facility]. Presents today for follow up. Confirms date of last cocaine use as prior to admission on 1/30/25. POC UDS completed today which was negative for all substances.   - Has not yet started Topiramate. Reviewed recommendation to start low dose Topiramate for StUD 12.5 mg daily, increase to 25 mg daily after 15 days. MOA and common SE reviewed with patient. His care team (neurology) is in agreement with this plan. Will monitor for side effects. Continue to titrate to therapeutic dose as tolerated.   - all questions answered     PDMP Review         Value Time User    State PDMP site checked  Yes 2/25/2025  4:04 PM Shauna Burrows APRN CNP          RTC:     Return in about 2 weeks (around 3/11/2025) for Follow up, with me, in person and then 4 weeks with Dr. Joyner .      Counseled the patient on the importance of having a recovery program in addition to medication to manage recovery.  Components include avoiding isolating, having willingness to change, avoiding triggers and managing cravings. Encouraged having some type of sober network and practicing honesty with trusted support person(s). Encouraged other services such as counseling, 12 step or other self-help organizations.      SUBJECTIVE                                                    Visit performed In Person,  face-to-face    HPI:  Dipesh Nicole is a 39 year old male with history of seizures, Parkinson's disease, migraines, deep brain stimulator placement, and cocaine use who presents for Addiction Medicine follow up.      ARELI Hx:    Reports initial cocaine use in 2008, at a college party. Moved to [place] in 2016, started to use cocaine more, 1 gram per day 4-5 days per week, since that time has been using regularly, every other weekend for the past 6-7 years. He has a h/o early onset Parkinson's disease. Reports the diagnosis was made in 2017, began to experience a tremor and dystonia. He moved back to [place] and was diagnosed with Parkinson's disease. Started carbidopa-levodopa (Sinemet) and that worked well. Now taking a longer acting formulation, Rytary.  There was slight increase in cocaine use when he started that medication. Using 3.5 grams over 2-3 days. In 2022 deep brain stimulation device ([employer] 7 )  was placed, after surgery in 2002 reports no cocaine use that year. It was challenging not to use cocaine during that time.Has not tried medication in the past for cravings. No history of programmatic care/treatment.  Extremely motivated for abstinence. Father with possible early onset glaucoma (not diagnosed) and uncle with glaucoma. No personal h/o of glaucoma or kidney stones. Started programming at [facility]. Started Topiramate at low dose.      Substance Use History:   ALCOHOL - Yes, socially. Two to three drinks when out with friends.  CANNABIS - Yes, last smoked 3 days ago. Maybe once per week   PRESCRIPTION STIMULANTS (includes Ritalin, Adderall, Vyvanse) - Denies  COCAINE/CRACK - Cocaine, Uses roughly 1 gram daily only on weekends. Using for 7 years. Previously up to 3 grams. Last use 1/30/25.   METH/AMPHETAMINES (includes ecstasy, MDMA/vanessa) - Tried ecstasy and vanessa it in the past, about 2 months ago. No regular use. No methamphetamine use.   OPIATES - Denies  BENZODIAZEPINES (includes Ativan,  "Klonopin, Xanax) - Prescribed lorazepam. Takes 1 tablet roughly every 6 months.  KRATOM (mild opioid and stimulant effects) - Denies  KETAMINE - Denies  HALLUCINOGENS (includes DXM) - Mushrooms in the past, about 1 year ago.  BEHAVIORAL (Gambling, Eating d/o, Compulsivity) - Denies  NICOTINE  Cigarettes: Denies  Chew/snus: Denies  Vaping: Denies  Past NRT/medication use: N/A        Previous withdrawal treatment episodes (e.g. detox): Denies  Previous ARELI treatment programs: Currently admitted at [facility]   Hospitalizations or overdose: Denies  Medical complications from substance use: Denies  IV Drug use?: Denies  Previous Medication for Addiction Tx: Denies  Longest period of full abstinence: 7-8 months after brain surgery (DBS device placement) in 2022  Activities that have previously supported abstinence: Surgery and being home with parents  Current Recovery Activities: Supportive partner and family, work and treatment at [facility]        Infectious disease screening  Hep C: negative per pt No results found for: \"HCVAB\"  HIV: negative per pt No results found for: \"HIAGAB\"        Psychiatric History (per patient report and problem list review)  Past diagnoses - Depression and anxiety  Current or past psychiatrist: Denies  Current or past therapist:  Psychologist  Hospitalizations/TMS/ECT - Denies  Suicide Attempts - Denies  Medication trials - Denies     SOCIAL HISTORY:  Housing status: alone in [place]   Employment status: Employed full time- cocaine use has started to cause issues with work   Relationship status:   Children: Child due August 1 st. Wife is 12 weeks pregnant.  Legal concerns related to use: Denies  Contact information up to date? Yes    Recent HPI Details:  Feb 5, 2025  - pt presents for follow up, since last seen he did complete ARELI eval and started programming at [facility] on 1/30/25. Confirms date of last use as prior to starting programming. He is very happy to be in " treatment, came as a relief. His family and SO have been very supportive. They were happy he decided to seek treatment. He has not yet started Topiramate. He was not sure if he should start it while in treatment but did bring his prescription. Wondering about ways to decreased social and environmental triggers after LP. Considering new phone number and blocking certain social media accounts. At first this was hard, his SO went through it with him. But since he has been in treatment, realizing that those things are not important to him. No withdrawal symptoms. Exercise has been helpful. Doing what he can while he is in treatment.   - very motivated for recovery   - interested in outpatient programming after LP        TODAY'S VISIT  HPI Feb 25, 2025  - Annalise is here today for follow-up.  He is doing very well.  He is extremely grateful for [facility] experience [facility].  Feels he has learned a lot and been able to better understand his emotions and regulation of emotions.  He is motivated for his continued recovery.  He plans to discharge from [facility] tomorrow.  He will complete an outpatient programming.  He is going to live with his parents for 2 weeks following discharge and then moved in with his wife.  Looking forward to finding out the gender of their baby this weekend.  They have a gender reveal party planned.  He did not want to find out when he was in [facility].  -He started topiramate at a low dose about 4 weeks ago.  He is titrated from 12.5 mg daily to 25 mg daily.  Around the same time that he started topiramate he did experience an increase in his Parkinson symptoms.  Reports the symptoms got worse when he started programming at [facility].  Initially he thought that it was possibly due to an increased anxiety.  He did meet with psychiatry and was started on duloxetine and Lyrica to address his symptoms.  Increased anxiety in front of people, processing emotions.  He is also unsure if his  Parkinson symptoms worsened because he was no longer using cocaine.  He did have a follow-up with his neurologist who did his 6-month check for deep brain stimulator, they did not make any changes to the device settings or his Rytary dose.  He was instructed to follow-up with neurology in 3 months.  Specifically he feels that he is experience a worsening of gait shuffling.  Possibly some changes to his balance.  No changes to his coordination.  No tremor.  Did notice a tremor on the left side when he was doing push-ups.  Otherwise no tremor.  Some change to his speech but again he feels like this is anxiety induced.  After he started duloxetine and Lyrica his speech did improve.  No severe muscle cramping or other concerns.  Denies cravings for cocaine.      OBJECTIVE  PHYSICAL EXAM:  BP (!) 147/81   Pulse 67   Wt 83.5 kg (184 lb)   BMI 24.28 kg/m      Physical Exam  Eyes:      Extraocular Movements: Extraocular movements intact.   Cardiovascular:      Rate and Rhythm: Normal rate.   Pulmonary:      Effort: Pulmonary effort is normal.   Neurological:      Mental Status: He is alert and oriented to person, place, and time.      Motor: Motor function is intact. No tremor.      Coordination: Coordination is intact. Coordination normal. Finger-Nose-Finger Test normal.      Gait: Gait is intact. Gait normal.   Psychiatric:         Attention and Perception: Attention and perception normal.         Mood and Affect: Mood is anxious.         Speech: Speech normal.         Behavior: Behavior is cooperative.         Thought Content: Thought content normal.         Judgment: Judgment normal. Judgment is not impulsive.             PHQ-9 Score:       1/16/2025     9:36 AM 1/21/2025    12:56 PM 1/30/2025    12:42 PM   PHQ   PHQ-9 Total Score 7  7  5    Q9: Thoughts of better off dead/self-harm past 2 weeks Not at all Not at all Not at all       Patient-reported       ANAYA-7 Score:      2/21/2024     9:58 AM 7/1/2024     3:29 PM  1/30/2025    12:43 PM   ANAYA-7 SCORE   Total Score 9 (mild anxiety) 4 (minimal anxiety) 3 (minimal anxiety)   Total Score 9 4 3        Patient-reported       LABS (may not contain today's labs)                                                      Today's lab data  Results for orders placed or performed in visit on 02/25/25   Drugs of Abuse Screen Urine (POC CUPS) POCT     Status: Normal   Result Value Ref Range    POCT Kit Lot Number [id number]     POCT Kit Expiration Date 2026-08-05     Temperature Urine POCT 94 F 90 F, 92 F, 94 F, 96 F, 98 F, 100 F    Specific [place] POCT 1.015 1.005, 1.015, 1.025    pH Qual Urine POCT 7 pH 4 pH, 5 pH, 7 pH, 9 pH    Creatinine Qual Urine POCT 50 mg/dL 20 mg/dL, 50 mg/dL, 100 mg/dL, 200 mg/dL    Internal QC Qual Urine POCT Valid Valid    Amphetamine Qual Urine POCT Negative Negative    Barbiturate Qual Urine POCT Negative Negative    Buprenorphine Qual Urine POCT Negative Negative    Benzodiazepine Qual Urine POCT Negative Negative    Cocaine Qual Urine POCT Negative Negative    Methamphetamine Qual Urine POCT Negative Negative    MDMA Qual Urine POCT Negative Negative    Methadone Qual Urine POCT Negative Negative    Opiate Qual Urine POCT Negative Negative    Oxycodone Qual Urine POCT Negative Negative    Phencyclidine Qual Urine POCT Negative Negative    THC Qual Urine POCT Negative Negative         HISTORY                                                    Problem list reviewed & adjusted, as indicated.  Patient Active Problem List   Diagnosis    Tremor    Seizure (H) at age 22 yrs    H/O magnetic resonance imaging of cervical spine    H/O shoulder surgery    H/O magnetic resonance imaging of brain and brain stem    Shoulder dislocation    Parkinson disease (H)    Right inguinal hernia    Hiatal hernia    Cyclical vomiting syndrome unrelated to migraine    Esophagitis endoscopy done 11/2021    S/P deep brain stimulator placement    Parkinson's disease, unspecified whether  dyskinesia present, unspecified whether manifestations fluctuate (H)    Cocaine use disorder, severe, dependence (H)    Cannabis use disorder, moderate, dependence (H)    Chemical dependency (H)         MEDICATION LIST (after visit)  Current Outpatient Medications   Medication Sig Dispense Refill    carbidopa-levodopa (RYTARY) 48. MG CPCR per CR capsule Take 3 capsules by mouth at 8:30 am, 2 capsules at 12:30 pm, 3 capsules at 4:30 pm, and 2 capsules at 8:30 pm = 10 capsules/day 300 capsule 11    DULoxetine (CYMBALTA) 20 MG capsule Take 2 capsules (40 mg) by mouth daily. 60 capsule 1    pregabalin (LYRICA) 50 MG capsule Take 1 capsule (50 mg) by mouth 3 times daily. 90 capsule 1     No current facility-administered medications for this visit.     Facility-Administered Medications Ordered in Other Visits   Medication Dose Route Frequency Provider Last Rate Last Admin    Self Administer Medications: Behavioral Services   Does not apply See Admin Instructions Martha Woodward MD             Allergies   Allergen Reactions    Diphenhydramine      Other reaction(s): Other (see comments)  Told not to take due to parkinsons    Metoclopramide      Avoid in patient with a history of Parkinson Disease  Other reaction(s): GI intolerance  Avoid in patient with a history of Parkinson Disease    Promethazine      Other reaction(s): Other (see comments)  Told not to take due to parkisons       This note was created with the help of Dragon dictation system.  All grammatical/typing errors or context distortion are unintentional and inherent to software.    I sent a total of 47 minutes today, on the care of this patient. This consisted of face-to-face time as well as time spent on pre-visit and post-visit activities including coordination of care, chart review, results review, and documentation.       LUCERO Varela [facility] Addiction Medicine  [phone]

## 2025-02-25 NOTE — GROUP NOTE
"Group Therapy Documentation    PATIENT'S NAME: Dipesh Nicole  MRN:   [id number]  :   1985  ACCT. NUMBER: [id number]  DATE OF SERVICE: 25  START TIME: 12:30 PM  END TIME:  2:30 PM  FACILITATOR(S): Kiarra Hernandez LADC; Danielle Gaston  TOPIC: BEH Group Therapy  Number of patients attending the group:  9  Group Length:  2 Hours    Dimensions addressed: 4 and 6    Summary of Group / Topics Discussed:    Balanced lifestyle, Relapse prevention, and Self-care activities    Group Attendance:  Attended group session    Patient's response to the group topic/interactions:  cooperative with task    Patient appeared to be Attentive and Engaged.        Client specific details: Pt took part in a group discussion about sober romance and presented his \"Relapse Prevention Plan\" assignment. He took part in outdoor therapeutic recreation.  "

## 2025-02-25 NOTE — PROGRESS NOTES
[facility] - Addiction Medicine       Rooming information:    Point of care urine drug screen positive for:  Lab Results   Component Value Date    BUP Negative 02/25/2025    BZO Negative 02/25/2025    BAR Negative 02/25/2025    OMARI Negative 02/25/2025    MAMP Negative 02/25/2025    AMP Negative 02/25/2025    MDMA Negative 02/25/2025    MTD Negative 02/25/2025    IQL284 Negative 02/25/2025    OXY Negative 02/25/2025    PCP Negative 02/25/2025    THC Negative 02/25/2025    TEMP 94 F 02/25/2025    SGPOCT 1.015 02/25/2025       *POC urine drug screen does not screen for Fentanyl    PHQ-9 Scores:       1/16/2025     9:36 AM 1/21/2025    12:56 PM 1/30/2025    12:42 PM   PHQ   PHQ-9 Total Score 7  7  5    Q9: Thoughts of better off dead/self-harm past 2 weeks Not at all Not at all Not at all       Patient-reported     ANAYA-7 Scores:      2/21/2024     9:58 AM 7/1/2024     3:29 PM 1/30/2025    12:43 PM   ANAYA-7 SCORE   Total Score 9 (mild anxiety) 4 (minimal anxiety) 3 (minimal anxiety)   Total Score 9 4 3        Patient-reported       Any other recent substance use:     Denies    NICOTINE-No  If using nicotine, ready to quit?     Side effects related to medications pt would like to discuss with provider (constipation, dry mouth, HA, GI upset, sedation?) No     Narcan currently available: N/A    Primary care provider: ARLYN ASTORGA     Mental health provider: yes (follow up on MH referral if needed)    Any housing, insurance deficits?: none    Contact information up to date? yes    3rd Party Involvement not today (please obtain ORALIA if pt would like to include)    Chantelle Tapia MA  February 25, 2025  4:07 PM

## 2025-02-25 NOTE — PROGRESS NOTES
INDIVIDUAL SESSION SUMMARY    D) Met with client on 2/25/25 from 1:30-2:00pm. Client is in the [facility] program. Client discussed his relationship with hi father and the qualities that he aspires to have as he becomes a father himself. Client spoke of renewed appreciation for himself and his abilities to handle whatever comes his way, sober. Client spoke of increased confidence and less anxiety in the last week. Client took several therapy referrals and spoke of starting coupes therapy with his wife. Client stated that he has several job interviews lined up and will be attending for support. Client shared that his primary counselor may also recommend a low intensity CD program in the [facility].     I)  Provided client with verbal interventions including validation, compassion, and support, Provided positive reinforcement for progress towards goals, gains in insight, and application of skills, and Provided referrals/resources for couples therapy .    A)   Client appears to be gaining insights into their emotions and using healthy coping skills for managing stress, Client appears to be practicing impulse control and planning for their future, Client appears to understand the importance of a sober support network, and Client appears motivated to seek a new daily routine, meaningful activities, and a sense of purpose.    P) No future sessions scheduled.   This therapist has provided the client with several therapist referrals to contact in the community or the client will resume sessions with their therapist in the community.     Em Mon, ANMOL, ANMOL  2/25/2025

## 2025-02-25 NOTE — PROGRESS NOTES
[facility] Weekly Treatment Plan Review    Date span:  25 to 25    Patient did not have any absences during this time period (list absence dates and reason for absence).      Treatment Plan initiated on: 2-3-25.    Dimension1: Acute Intoxication/Withdrawal Potential -   Previous Dimension Ratin  Current Dimension Ratin  Date of Last Use: 25  Any reports of withdrawal symptoms - No    Dimension 2: Biomedical Conditions & Complications -   Previous Dimension Ratin  Current Dimension Ratin  Medical Concerns:  None reported.  Current Medications & Medication Changes:  Current Outpatient Medications   Medication Sig Dispense Refill    carbidopa-levodopa (RYTARY) 48. MG CPCR per CR capsule Take 3 capsules by mouth at 8:30 am, 2 capsules at 12:30 pm, 3 capsules at 4:30 pm, and 2 capsules at 8:30 pm = 10 capsules/day 300 capsule 11    DULoxetine (CYMBALTA) 20 MG capsule Take 2 capsules (40 mg) by mouth daily. 60 capsule 1    pregabalin (LYRICA) 50 MG capsule Take 1 capsule (50 mg) by mouth 3 times daily. 90 capsule 1    topiramate (TOPAMAX) 25 MG tablet Take 0.5 tablets (12.5 mg) by mouth daily for 15 days, THEN 1 tablet (25 mg) daily for 15 days.       No current facility-administered medications for this encounter.     Facility-Administered Medications Ordered in Other Encounters   Medication Dose Route Frequency Provider Last Rate Last Admin    Self Administer Medications: Behavioral Services   Does not apply See Admin Instructions Martha Woodward MD         Medication Prescriber: see chart  Taking meds as prescribed? Yes  Medication side effects or concerns:  None reported.  Outside medical appointments this week (list provider and reason for visit):  None reported.    Narrative: Pt seems fully functioning and seeks medical attention as needed. He attended lecture given by  nurse on Hepatitis C on 25.     Dimension 3: Emotional/Behavioral Conditions & Complications  -   Previous Dimension Ratin  Current Dimension Ratin  PHQ2:       2025    10:00 AM 2025     3:00 PM 2023     8:44 AM 2023     7:25 AM 7/15/2022    12:14 PM 2022     1:34 PM 10/5/2021     2:04 PM   PHQ-2 (  [employer])   Q1: Little interest or pleasure in doing things 0 1 0 0 0 0 0   Q2: Feeling down, depressed or hopeless 0 1 0 0 0 0 0   PHQ-2 Score 0 2 0 0 0 0 0   PHQ-2 Total Score (12-17 Years)- Positive if 3 or more points; Administer PHQ-A if positive   0 0   0      GAD2:       2024    12:48 PM 2025    12:56 PM 2025     3:00 PM 2/10/2025    10:00 AM 2025    10:00 AM   ANAYA-2   Feeling nervous, anxious, or on edge 2 1 1 1 1   Not being able to stop or control worrying 0 1 1 1 0   ANAYA-2 Total Score 2    2 2  2 2 1       Patient-reported     Mental health diagnosis: Anxiety and Depression   Date of last SIB:  Patient denies.  Date of  last SI:  Patient denies.  Date of last HI: Patient denies.  Behavioral Targets: Follow recommendations of medical provider. Report any alcohol or drug use to counselor and any increase in withdrawal symptoms to nurse. Stabilize and maintain mental health.   Risk factors: The patient lacks relapse prevention, coping, and refusal skills, as well as a sober peer support network. He has dual issues of MI and CD, and a significant history of guilt, shame, and loss issues.  Protective factors:  positive relationships positive family connections, forward/future oriented thinking, restricted access to lethal means (firearms), dedication to family/friends, effectively controls impulses, sense of belonging (peer group, family), abstinence from substances, adherence with prescribed medication, agreement to use safety plan, living with other people, structured day, effective problem-solving skills, committment to well-being, positive social skills, financial stability, and access to a variety of clinical interventions  Current MH Assignments:   "none at this time    Narrative: Current Mental Health symptoms include: none reported, possible sx of anxiety (leg jiggling, fidgeting, rapid speech) observed. See below for suicide risk assessment. Pt does not endorse thoughts of self-harm or suicide ideation at this time. He reports that his stress level has decreased; coping skills to manage stress used were \"exercise, prayer, meditation, being physically active.\" Pt reported that his mood has not significantly changed this past week.    [place] Suicide Severity Rating Scale (Short Version)      10/6/2021     3:20 PM 2022     6:27 AM 2022     6:59 AM 8/15/2022     6:39 AM 2024     8:10 AM   [place] Suicide Severity Rating (Short Version)   Over the past 2 weeks have you felt down, depressed, or hopeless? no no no no    Over the past 2 weeks have you had thoughts of killing yourself? no no no no    Have you ever attempted to kill yourself? no no no no    Q1 Wished to be Dead (Past Month)     0-->no   Q2 Suicidal Thoughts (Past Month)     0-->no   Q6 Suicide Behavior (Lifetime)     0-->no   Level of Risk per Screen     no risks indicated       Dimension 4: Treatment Acceptance / Resistance -   Previous Dimension Ratin  Current Dimension Ratin  ARELI Diagnosis:  304.30 (F12.20) Cannabis Use Disorder Moderate  In a controlled environment  Stimulant Use Disorder:  In a controlled environment, Specify current severity:  Severe  304.20 (F14.20) Severe, Cocaine  Commitment to tx process/Stage of change- Pt consistently attends and participates in groups and lectures. He appears to be in the contemplative stage of change at this time.  ARELI assignments - \"Drug Use History\"    Narrative - Pt reports his motivation this week is \"Myself, my future, and my family.\" Pt reports that his aftercare plans include therapy. He reports that he has gotten along with peers and staff this week, and that \"everyone has been great.\"     Dimension 5: Relapse / " "Continued Problem Potential -   Previous Dimension Ratin  Current Dimension Ratin  Relapses this week - None  Urges to use - None  UA results - negative for all screened drugs    Narrative- Pt reports no cravings this past week. He reported not experiencing any triggers to use, but used the following coping skill(s): \"socializing.\" Pt participated in the spirituality group, facilitated by [facility] and  counseling staff was present during group.     Dimension 6: Recovery Environment -   Previous Dimension Rating:  3  Current Dimension Rating:  3  Family Involvement - n/a  Summarize attendance at family groups and family sessions - n/a  Family supportive of treatment?  Yes  Recreational activities - pingpong, yoga    Narrative - Pt is spending free time with same-gender peers and attending at least 3 virtual 12-step meetings weekly while in [place]. He reports having had contact with his wife this past week. Pt participated in weekend workshop on relationships and completed all activities.    Progress made on transition planning goals: therapy referrals    Justification for Continued Treatment at this Level of Care: Risk ratings indicate continued need for this level of care. Pt has been unable to maintain abstinence from drugs while at the outpatient level of care, lacks long-term sober maintenance skills, lacks sober coping skills and has medical and mental health concerns which are exacerbated by substance use.   Treatment coordination activities this week:  referrals to mental health services including therapy  Need for peer recovery support referral? No    Discharge Planning:  Target Discharge Date/Timeframe: 25  Med Mgmt Provider/Appt:  TALIA  Ind therapy Provider/Appt:  TALIA  Family therapy Provider/Appt:  TALIA  Other referrals:  none at this time.    Has vulnerable adult status changed? No    Interdisciplinary Clinical Supervision including: LAURA and RN    Are Treatment Plan " goals/objectives effective? Yes  *If no, list changes to treatment plan:    Are the current goals meeting client's needs? Yes  *If no, list the changes to treatment plan.    Patient Input / Response: Pt contributed to treatment plan review.    *Client agrees with any changes to the treatment plan: N/A  *Client received copy of changes: N/A  *Client is aware of right to access a treatment plan review: Yes    LAURA Tobar

## 2025-02-25 NOTE — GROUP NOTE
Group Therapy Documentation    PATIENT'S NAME: Dipesh Nicole  MRN:   [id number]  :   1985  ACCT. NUMBER: [id number]  DATE OF SERVICE: 25  START TIME:  9:00 AM  END TIME: 11:00 AM  FACILITATOR(S): Pramod Hart LADC  TOPIC: BEH Group Therapy  Number of patients attending the group:  9  Group Length:  2 Hours    Dimensions addressed: 3, 4, and 5    Summary of Group / Topics Discussed:    Sober coping skills, Relationship/socialization, Disease of addiction, and Emotions/expression      Group began with check-ins, followed  by a reflection reading regarding the importance of honesty. Group participants then shared assignments. Feedback was provided by participants throughout  group session.       Group Attendance:  Attended group session    Patient's response to the group topic/interactions:  cooperative with task    Patient appeared to be Actively participating.        Client specific details:  Patient actively engaged in group discussion.

## 2025-02-25 NOTE — GROUP NOTE
Psychoeducation Group Documentation    PATIENT'S NAME: Dipesh Nicole  MRN:   [id number]  :   1985  ACCT. NUMBER: [id number]  DATE OF SERVICE: 25  START TIME:  3:00 PM  END TIME:  4:00 PM  FACILITATOR(S): Leana Field LADC; Vargas Mills LADC; Carmen Fortune LADC  TOPIC: BEH Pyschoeducation  Number of patients attending the group:  21  Group Length:  1 Hours    Skills Group Therapy Type: CBT skills    Summary of Group / Topics Discussed:    Coping/DBT skills    Patients received a lecture detailing cognitive behavioral therapy.  Patients received a definition of what it is, what it is used for, and strategies to manage thoughts, emotions, and behaviors with skills.          Group Attendance:  Attended group session    Patient's response to the group topic/interactions:  cooperative with task and listened actively    Patient appeared to be Actively participating, Attentive, and Engaged.         Client specific details:  Patient actively attended to this lecture..

## 2025-02-26 ENCOUNTER — TELEPHONE (OUTPATIENT)
Dept: BEHAVIORAL HEALTH | Facility: CLINIC | Age: 40
End: 2025-02-26
Payer: COMMERCIAL

## 2025-02-26 ENCOUNTER — HOSPITAL ENCOUNTER (OUTPATIENT)
Dept: BEHAVIORAL HEALTH | Facility: CLINIC | Age: 40
Discharge: HOME | End: 2025-02-26
Attending: FAMILY MEDICINE
Payer: COMMERCIAL

## 2025-02-26 PROCEDURE — H2035 A/D TX PROGRAM, PER HOUR: HCPCS | Mod: HQ

## 2025-02-26 PROCEDURE — 1002N00001 HC LODGING PLUS FACILITY CHARGE ADULT

## 2025-02-26 NOTE — GROUP NOTE
Group Therapy Documentation    PATIENT'S NAME: Dipesh Nicole  MRN:   [id number]  :   1985  ACCT. NUMBER: [id number]  DATE OF SERVICE: 25  START TIME: 12:30 PM  END TIME:  2:30 PM  FACILITATOR(S): Amaris Duke; Pramod Hart LADC  TOPIC: BEH Group Therapy  Number of patients attending the group:  10  Group Length:  2 Hours    Dimensions addressed: 4, 5, and 6    Summary of Group / Topics Discussed:    Spiritual Care    Group was facilitated by [facility] staff regarding the importance of spiritual health in recovery. Participants provided feedback throughout group session.       Group Attendance:  Attended group session    Patient's response to the group topic/interactions:  cooperative with task    Patient appeared to be Actively participating.        Client specific details:  Patient actively engaged in group discussion.

## 2025-02-26 NOTE — TELEPHONE ENCOUNTER
----- Message from Emely VINCENT sent at 2/26/2025  3:05 PM CST -----  Regarding: RE: [facility] IOP Referral  He is scheduled for his Intake on 3/18 in person at 2:00 with Cristel for [facility], he also has the IOP MD appt immediately after at 3pm and he will get a link sent via text to join this appointment.  ----- Message -----  From: Pramod Hart LADC  Sent: 2/26/2025   2:48 PM CST  To: [facility] Adult Iop Intake Pool  Subject: [facility] IOP Referral                                 Patient Name: Dipesh Nicole  MRN:  [id number]    Preferred Phone Number: [phone]  Preferred Email Address: [employer]    Date of DA Substance Use Assessment: N/A  Program Referring To:  [facility] Evening Group  Any Barriers to treatment: None    Tentative Discharge Date: 2/27/2025    Additional Notes:  Patient reported that he is going to stay with his parents in [place], [place] for a couple weeks after discharge. Patient is requesting to begin group on 3/17/2025 if possible.

## 2025-02-26 NOTE — GROUP NOTE
Group Therapy Documentation    PATIENT'S NAME: Dipesh Nicole  MRN:   [id number]  :   1985  ACCT. NUMBER: [id number]  DATE OF SERVICE: 25  START TIME:  8:30 AM  END TIME:  9:30 AM  FACILITATOR(S): Kirsten Love LADC; Breanne Blanton LADC  TOPIC: BEH Group Therapy  Number of patients attending the group:  22  Group Length:  1 Hours    Dimensions addressed: 3 and 4    Summary of Group / Topics Discussed:    Co-occurring illnesses symptom management, Disease of addiction, and Emotions/expression      Group Attendance:  Attended group session    Patient's response to the group topic/interactions:  cooperative with task    Patient appeared to be Attentive and Engaged.        Client specific details: Annalise actively listened and engaged with research presentation on addiction and negative emotions, presented by Dr. Marie.

## 2025-02-26 NOTE — GROUP NOTE
Group Therapy Documentation    PATIENT'S NAME: Dipesh Nicole  MRN:   [id number]  :   1985  ACCT. NUMBER: [id number]  DATE OF SERVICE: 25  START TIME:  9:45 AM  END TIME: 11:30 AM  FACILITATOR(S): Kirsten Love LADC; Danielle Gaston  TOPIC: BEH Group Therapy  Number of patients attending the group:  9  Group Length:  2 Hours    Dimensions addressed: 3, 4, 5, and 6    Summary of Group / Topics Discussed:    Recovery Principles, Disease of addiction, and Emotions/expression      Group Attendance:  Attended group session    Patient's response to the group topic/interactions:  cooperative with task    Patient appeared to be Actively participating, Attentive, and Engaged.        Client specific details: Annalise was an active participant in morning group therapy session.         Suicide ideation: 0.   : Action: No.    Self-harm thoughts: 0.  Action: No.

## 2025-02-27 ENCOUNTER — TELEPHONE (OUTPATIENT)
Dept: BEHAVIORAL HEALTH | Facility: CLINIC | Age: 40
End: 2025-02-27
Payer: COMMERCIAL

## 2025-02-27 NOTE — PROGRESS NOTES
[facility]   Client Name: Dipesh Nicole MR#:  [id number]  YOB: 1985        Age: 39 years old Sex: Male    Focus of Treatment / Discharge Recommendations  Personal Safety/ Management of Symptoms  * Follow your safety plan.  Report increased symptoms to your care team and /or go to the nearest Emergency Department.  * Call crisis lines as needed    [facility] [phone]                Crisis Connection [phone]  [facility] [phone]               [facility] COPE [phone]  [facility] [phone]           National Suicide Prevention [phone] OR Text/Call 588  [facility] [phone]             Suicide Prevention [phone]     [place] [facility]: [phone]  [place] [facility]: [phone]  [place] [facility]: [phone]    Abstinence/Relapse Prevention  * Take all medicines as directed.  Carry a current list of medicines with you.  * Use coping skills: nutrition, rest, exercise, spirituality, journal, meditation, engage in activities you enjoy, and sober support resources.   * Remain abstinent from alcohol and non-prescribed, mood altering substances.     Develop/Improve Independent Living/Socialization Skills: Ensure living environment is conducive to sobriety.     Community Resources/Supports and Discharge Planning:    Follow up with medical appointments as needed and as given in nursing discharge instructions.    Follow up with individual/couples therapist referrals provided by  staff.    Go to group therapy and / or support groups at:   [facility]  Intake date: 3/18/25    Attend 2-3 sober support meetings weekly and work with a sponsor.    Client Signature:_______________________   Date / Time:___________    Staff Signature:________________________   Date / Time:___________

## 2025-02-27 NOTE — PROGRESS NOTES
COUNSELOR S DISCHARGE SUMMARY    Date: 2025     Program Name:  [facility]    Client Name Dipesh Nicole Date of Birth 1985      MR#  [id number]    Referred by: LAURA Gallego/ [facility]         Admit date: 2025                      Discharge Date:  2025   # of Days Attended:   28                  Date Last Attended: 2025     Discharge Status:  With Staff Approval    PROBLEMS PRESENTED AT ADMISSION:    Dipesh Nicole is a 39 year old year old male who admitted directly from the community to [facility] on 2025. He was referred to [facility] by the [facility] Assessment Team. Mr. Nicole reported being employed and living independently prior to admission.      Admitting diagnosis:   Cocaine Use Disorder, Severe (F14.20)  Cannabis Use Disorder,  Moderate (F12.0)      PROGRAM PARTICIPATION:  While at [facility], Dipesh Nicole received the following services to address substance use and mental health disorders. He participated in:  Group Therapy, Individual Therapy, Spirituality Groups, DBT Skills Groups, AA/NA meetings , and Psych-education Groups      PROGRESS:     Dimension 1 - Acute Intoxication/Withdrawal Potential:  Admission ASAM Risk Ratin Client displays full functioning with good ability to tolerate and cope with withdrawal discomfort. No signs or symptoms of intoxication or withdrawal or resolving signs or symptoms.  Discharge ASAM Risk Ratin Client displays full functioning with good ability to tolerate and cope with withdrawal discomfort. No signs or symptoms of intoxication or withdrawal or resolving signs or symptoms.    Treatment goal(s):  Self-monitor for withdrawal discomfort. Notify staff of any concerns.      Progress toward goal(s):  Mr. Nicole self monitored for withdrawal discomfort, with no concerns reported. Patient met goals in this area.       Dimension 2 -  Biomedical Conditions & Complications:  Admission ASAM Risk Ratin Client has difficulty tolerating and coping with physical problems or has other biomedical problems that interfere with recovery and treatment. Client neglects or does not seek care for serious biomedical problems.  Discharge ASAM Risk Ratin Client tolerates and stephanie with physical discomfort and is able to get the services that the client needs.    Treatment goal(s):  Self-monitor physical healthy, and notify staff of any concerns.    Progress toward goal(s):  Patient self-monitored his physical health while in programming, with no concerns reported. Patient met goals in this area.       Dimension 3 - Emotional/Behavioral Conditions & Complications:  Admission ASAM Risk Ratin Client has difficulty with impulse control and lacks coping skills. Client has thoughts of suicide or harm to others without means; however, the thoughts may interfere with participation in some treatment activities. Client has difficulty functioning in significant life areas. Client has moderate symptoms of emotional, behavioral, or cognitive problems. Client is able to participate in most treatment activities.  Discharge ASAM Risk Ratin Client has good impulse control and coping skills and presents no risk of harm to self or others. Client functions in    Treatment goal(s):  Gain a better understanding of the relationship between substance abuse and mental health issues. Client will strengthen protective factors.    Progress toward goal(s):  Mr. Nicole participated in group therapy while in programming, as well as met with a staff therapist for individual sessions. It appeared that he gained a better understanding of the relationship between substance abuse and mental health. Patient met all goals in this area.       Dimension 4 - Treatment Acceptance/Resistance:  Admission ASAM Risk Rating: 3 Client displays inconsistent compliance, minimal awareness of  either the client's addiction or mental disorder, and is minimally cooperative.  Discharge ASAM Risk Ratin Client is cooperative, motivated, ready to change, admits problems, committed to change, and engaged in treatment as a responsible participant.    Treatment goal(s):  Acknowledge the effects that substance use has had on himself and the people in his life.     Progress toward goal(s):  Mr. Nicole participated appropriately in scheduled programming. He acknowledged the effects that substance use has had on himself and the people in his life. Patient completed assignments and met all goals in this area.       Dimension 5 - Relapse/Continued Problem Potential:  Admission ASAM Risk Ratin No awareness of the negative impact of mental health problems or substance abuse. No coping skills to arrest mental health or addiction illnesses, or prevent relapse.  Discharge ASAM Risk Ratin Client recognizes relapse issues and prevention strategies, but displays some vulnerability for further substance use or mental health problems.    Treatment goal(s):  Gain a better understanding of personal triggers and past pattern of use.     Progress toward goal(s):  Mr. Nicole participated in relapse prevention group discussions and lectures. He appeared to have gained a better understanding of his personal triggers and past pattern of self-sabotage. Patient met goals in this area.       Dimension 6 - Recovery Environment: (family, recreation, legal, education, etc.)  Admission ASAM Risk Rating: 3 Client is not engaged in structured, meaningful activity and the client's peers, family, significant other, and living environment are unsupportive, or there is significant criminal justice system involvement.  Discharge ASAM Risk Ratin Client is engaged in structured, meaningful activity and has a supportive significant other, family, and living environment.    Treatment goal(s): Develop aftercare plans. Gain an understanding of  the importance of attending sobriety support group meetings.     Progress toward goal(s):  Mr. Nicole participated in weekly AA/NA sobriety support group meetings while in programming. He appeared to have understood the importance of attending sobriety support group meetings to help maintain a healthy recovery. Patient met with his counselors to develop aftercare plans, which included moving in with his significant other and attending outpatient treatment with [facility]/ [facility] in [place], [place].      Client strengths identified during treatment were: Patient identified personal strengths such as perseverance, loyalty, respecting others, confidence, and family.       Client needs identified during treatment were: Safe environment and supportive community.     Discharge Diagnosis:    Cocaine Use Disorder, Severe (F14.20)  Cannabis Use Disorder,  Moderate (F12.0)    Discharge Medications:   Current Outpatient Medications   Medication Sig Dispense Refill    carbidopa-levodopa (RYTARY) 48. MG CPCR per CR capsule Take 3 capsules by mouth at 8:30 am, 2 capsules at 12:30 pm, 3 capsules at 4:30 pm, and 2 capsules at 8:30 pm = 10 capsules/day 300 capsule 11    DULoxetine (CYMBALTA) 20 MG capsule Take 2 capsules (40 mg) by mouth daily. 60 capsule 1    pregabalin (LYRICA) 50 MG capsule Take 1 capsule (50 mg) by mouth 3 times daily. 90 capsule 1     No current facility-administered medications for this encounter.     Facility-Administered Medications Ordered in Other Encounters   Medication Dose Route Frequency Provider Last Rate Last Admin    Self Administer Medications: Behavioral Services   Does not apply See Admin Instructions Martha Woodward MD           Discharge Plan and Recommendations:    Living arrangements at discharge Patient reported to stay with his parents after discharge in [place], [place]. Patient terminated services due program completion.     The following continued care recommendations have been  made:    Take all medicines as directed.  Carry a current list of medicines with you.  Use coping skills: nutrition, rest, exercise, spirituality, journal, meditation, engage in activities you enjoy, and sober support resources.   Remain abstinent from alcohol and non-prescribed, mood altering substances.    Prognosis:    Favorable        Pramod Hart [facility]ST[facility] on 2/27/2025 at 12:10 PM

## 2025-02-27 NOTE — TELEPHONE ENCOUNTER
----- Message from Kirsten Love sent at 2/27/2025  9:27 AM CST -----  Regarding: Discharge  Patient discharged 2/27/25. DAANES completed by counselor. Discharge summary will be completed by end of day 2/28.

## 2025-02-27 NOTE — PROGRESS NOTES
[address]  [place] [address]              Dipesh Nicole, 1985 : was admitted for evaluation/treatment of chemical dependency at [facility].  He took part in these program(s):     ______ The Inpatient Program   ______ The Outpatient Program   ___X___ The Lodging Plus Program   ______ Lodging Day Outpatient         Date admitted: 1/30/25    Date discharged: 2/27/25     Type of discharge:     ______ Satisfactory - completed evaluation / treatment   ______ Discharged without completing   ______ Behavioral discharge   ______ Transferred to another chemical dependency program   ______ Transferred to another type of service   ______ Left against medical advice (AMA) / Eloped               Clinicians: LAURA Peacock & LAURA Isaac, [facility]     Date: 2/27/25           Time: 9:00am

## 2025-02-27 NOTE — ADDENDUM NOTE
Encounter addended by: Kirsten Love LADC on: 2/27/2025 9:00 AM   Actions taken: Clinical Note Signed

## 2025-03-18 ENCOUNTER — TELEPHONE (OUTPATIENT)
Dept: BEHAVIORAL HEALTH | Facility: CLINIC | Age: 40
End: 2025-03-18

## 2025-03-18 ENCOUNTER — VIRTUAL VISIT (OUTPATIENT)
Dept: BEHAVIORAL HEALTH | Facility: CLINIC | Age: 40
End: 2025-03-18
Payer: COMMERCIAL

## 2025-03-18 ENCOUNTER — THERAPY VISIT (OUTPATIENT)
Dept: ADDICTION MEDICINE | Facility: HOSPITAL | Age: 40
End: 2025-03-18
Attending: FAMILY MEDICINE
Payer: COMMERCIAL

## 2025-03-18 ENCOUNTER — MYC REFILL (OUTPATIENT)
Dept: BEHAVIORAL HEALTH | Facility: CLINIC | Age: 40
End: 2025-03-18

## 2025-03-18 ENCOUNTER — BEH TREATMENT PLAN (OUTPATIENT)
Dept: BEHAVIORAL HEALTH | Facility: CLINIC | Age: 40
End: 2025-03-18

## 2025-03-18 ENCOUNTER — MYC MEDICAL ADVICE (OUTPATIENT)
Dept: BEHAVIORAL HEALTH | Facility: CLINIC | Age: 40
End: 2025-03-18

## 2025-03-18 ENCOUNTER — DOCUMENTATION ONLY (OUTPATIENT)
Dept: BEHAVIORAL HEALTH | Facility: CLINIC | Age: 40
End: 2025-03-18

## 2025-03-18 DIAGNOSIS — F14.20 COCAINE USE DISORDER, SEVERE, DEPENDENCE (H): Primary | ICD-10-CM

## 2025-03-18 DIAGNOSIS — F41.9 ANXIETY DISORDER, UNSPECIFIED TYPE: ICD-10-CM

## 2025-03-18 DIAGNOSIS — F12.20 CANNABIS USE DISORDER, MODERATE, DEPENDENCE (H): ICD-10-CM

## 2025-03-18 PROCEDURE — H2035 A/D TX PROGRAM, PER HOUR: HCPCS

## 2025-03-18 PROCEDURE — 98002 SYNCH AUDIO-VIDEO NEW MOD 45: CPT | Performed by: FAMILY MEDICINE

## 2025-03-18 RX ORDER — DULOXETIN HYDROCHLORIDE 20 MG/1
40 CAPSULE, DELAYED RELEASE ORAL DAILY
Qty: 60 CAPSULE | Refills: 1 | OUTPATIENT
Start: 2025-03-18

## 2025-03-18 RX ORDER — PREGABALIN 50 MG/1
50 CAPSULE ORAL 3 TIMES DAILY
Qty: 90 CAPSULE | Refills: 0 | Status: SHIPPED | OUTPATIENT
Start: 2025-03-18

## 2025-03-18 RX ORDER — DULOXETIN HYDROCHLORIDE 20 MG/1
40 CAPSULE, DELAYED RELEASE ORAL DAILY
Qty: 60 CAPSULE | Refills: 0 | Status: SHIPPED | OUTPATIENT
Start: 2025-03-18

## 2025-03-18 ASSESSMENT — PATIENT HEALTH QUESTIONNAIRE - PHQ9
10. IF YOU CHECKED OFF ANY PROBLEMS, HOW DIFFICULT HAVE THESE PROBLEMS MADE IT FOR YOU TO DO YOUR WORK, TAKE CARE OF THINGS AT HOME, OR GET ALONG WITH OTHER PEOPLE: NOT DIFFICULT AT ALL
SUM OF ALL RESPONSES TO PHQ QUESTIONS 1-9: 1
SUM OF ALL RESPONSES TO PHQ QUESTIONS 1-9: 1

## 2025-03-18 ASSESSMENT — COLUMBIA-SUICIDE SEVERITY RATING SCALE - C-SSRS
1. SINCE LAST CONTACT, HAVE YOU WISHED YOU WERE DEAD OR WISHED YOU COULD GO TO SLEEP AND NOT WAKE UP?: NO
2. HAVE YOU ACTUALLY HAD ANY THOUGHTS OF KILLING YOURSELF?: NO
6. HAVE YOU EVER DONE ANYTHING, STARTED TO DO ANYTHING, OR PREPARED TO DO ANYTHING TO END YOUR LIFE?: NO

## 2025-03-18 NOTE — PROGRESS NOTES
Admission DAANES:   Client admitted to IOP [facility] evening  program on 3/18/2025.    Writer completed client's admission DAANES on 3/18/2025.        DAANES ID:[id number]    03/18/25  3:51 PM  Cristel JACOBSEN

## 2025-03-18 NOTE — PROGRESS NOTES
"Comprehensive Assessment Update:    Comprehensive assessment dated 1/21/2025 was reviewed and updates are as follows: 3/18/2025  Reason for admission today:  \"To continue the process of getting better from my Cocaine use\"    Dates of last use and substance(s) used:  Cocaine date of lase use on- 1/28/2025 and Cannabis date of last use- 1/26/2025  Patient does not have a history of opiate use.    Vulnerability Assessment:  Does the patient have a history of vulnerability such as being teased, picked on, or other indications of potential safety issues with other residents?  No    Does this patient have a history of being the victim of abuse? No history of abuse reported or documented.    Does this patient have a history of victimizing others? No     Does the patient have a history of boundary violations?  No.    Does the patient have a history of other sexual acting out behaviors (e.g grooming)?   No    Does the patient have a history of threats to self or others? Fire setting, running away or other self-injurious behaviors?    No    Does the patient s history indicate the need for special precautions or particular staffing patterns in the facility?  No      NOTE: If this screening indicates that the patient is likely to have sexually abusive behavior, the license shen must have written risk   management plans to protect the patient, other patients, staff, and the community.    Other safety concerns:  None       Other:  None    Health Screening:  Given patient's past history, a medication, and physical condition, is there a fall risk?  No  Does the patient have any pain? No  Is the patient on a special diet? If yes, please explain: no  Does the patient have any concerns regarding your nutritional status? If yes, please explain: no  Has the patient had any appetite changes in the last 3 months?  No  Has the patient had any weight loss or weight gain in the last 3 months? Yes, how much? \"Gained about 12 pounds\".   Has " the patient have a history of an eating disorder or been over-eating, avoiding meals, or inducing vomiting?  No  Does the patient have any dental concerns? (Problems with teeth, pain, cavities, braces)?  NO  Are immunizations up to date?  Yes  Any recent exposure to TB, Hepatitis, Measles, or Strep?  No    Brief Biosocial Gambling Screening:  Do you have any current or past concerns regarding gambling?  No      Dimension Scale Ratings:    Dimension 1: 0 Client displays full functioning with good ability to tolerate and cope with withdrawal discomfort. No signs or symptoms of intoxication or withdrawal or resolving signs or symptoms.    Summary to support rating:  Annalise date of lase use of Cocaine on 1/28/2025 and Cannabis on 1/26/2025. Annalise reports no withdrawal concerns at this time.     Dimension 2: 1 Client tolerates and stephanie with physical discomfort and is able to get the services that the client needs.  Summary to support rating:  Annalise reports a health diagnosis of Parkinson's disease. Annalise reports a 0/10 pain. Annalise has a PCP and is able to access services. Annalise has active insurance.     Dimension 3: 1 Client has impulse control and coping skills. Client presents a mild to moderate risk of harm to self or others or displays symptoms of emotional, behavioral or cognitive problems. Client has a mental health diagnosis and is stable. Client functions adequately in significant life areas.  Summary to support rating: Annalise reports a diagnosis of Anxiety disorder. Annalise reports he is currently in therapy services twice a month. Annalise reports taking medication as prescribed. Annalise has no current or history of any SI, SIB, VI, or HI at this time.     Dimension 4: 1 Client is motivated with active reinforcement, to explore treatment and strategies for change, but ambivalent about illness or need for change.  Summary to support rating:  Annalise completed lodging plus inpatient services on 2/26/2025. Annalise reports internal and  "external motivation for change. Annalise reports wanting to be in IOP services and he is ready to start. Annalise is in the preparation stage of change.     Dimension 5: 3 Client has poor recognition and understanding of relapse and recidivism issues and displays moderately high vulnerability for further substance use or mental health problems. Client has few coping skills and rarely applies coping skills.  Summary to support rating:  Annalise is a high risk of relapse. Annalise would benefit from learning more about his urges and triggers of his use. Annalise would benefit to learning more coping skills and relapse prevention skills.     Dimension 6: 1 Client has passive social  or family and significant other are not interested in the client's recovery. The client is engaged in structured meaningful activity.  Summary to support rating::  Annalise has stable housing. Annalise is working full-time. Annalise has no current or pending legal involvement. Annalise has a good support system of his wife, in-laws, and friends.Annalise reports that he has a son on the way and is due in August.  Annalise reports he is not involved in any sober support meetings.     Primary Diagnoses:  300.00 (F41.9) Unspecified Anxiety Disorder    Initial Service Plan (ISP)    Immediate health, safety, and preliminary service needs identified and plan includes the following based on available information from clients, referral sources, and collateral information.    Safety (SI, SIB, suicide attempts, aggressive behaviors):  History of SI (suicidal ideation)?  No  History of SIB (self-injurious behavior)?  No  History of VI (violent ideation)?  No  History of HI (homicidal ideation)?  No    Health:  Client does NOT have health issues that would impede participation in treatment    Transportation: \"My own car\".      Other:  None    Patient does not have any identified barriers to participating in referred services.    Vulnerable Adult Assessment    Does the patient " "possess a physical or mental infirmity or other physical, mental, or emotional dysfunction?  No. Patient is not a vulnerable adult.    This patient is not a functional Vulnerable Adult according to [place] Statute 626.5572 subdivision 21.      Treatment suggestions for client for the time period until the initial treatment planning session:  \"being around my wife and transition to a new apartment\". Pt. Will start group on the 3/24/2025      Service Initiation Individual Note   Start: 1:58pm  End:  2:41 pm    Substance Use Diagnosis:  Cocaine Use Disorder Severe - 304.20 (F14.20)   Cannabis Use Disorder Moderate - 304.30 (F12.20)     Mental Health Diagnosis:  Anxiety Use Disorder     D: Writer met with client to facilitate an individual session that focused on completion of client's service initiation and intake into the program. Reviewed the intake packet, client sign documents regarding general consent, HIPAA and other orientation documents. Client signed ROIS for Insurance and Emergency Contact.  We discussed the impact of their substance use on various areas of their life. The patient shared what they have been doing between the initial comprehensive assessment date and today's service initiation appointment. We discussed the plan for the patient between this session and their first group.  The patient also shared their individual motivation for recovery during this session.      I:  Facilitated an individual session. Utilized MI, Person-Centered Approach, Active Listening, Validation, Clarifying Questions    A: Client actively engaged and participated in the session. Client appears insightful in his recovery and shared he is ready to start group    P: Client is set to start group on 3/24/2025.           LAURA Garcia 3/18/2025 2:12 PM       "

## 2025-03-18 NOTE — TELEPHONE ENCOUNTER
Reason for call:  Other   Patient called regarding (reason for call): returning call  Additional comments: Pt requesting call regarding med refill questions    Phone number to reach patient:  Cell number on file:    Telephone Information:   Mobile [phone]       Best Time:  asap- can leave vm    Can we leave a detailed message on this number?  Not Applicable    Travel screening: Not Applicable

## 2025-03-18 NOTE — PROGRESS NOTES
"[facility]  MENTAL HEALTH AND ADDICTION    CO-OCCURRING RESIDENTIAL AND IOP TREATMENT PLAN    [facility] LEVEL OF CARE:  2.1 Intensive Outpatient Program  Date:  3/18/2025      DIMENSION 1: Intoxication / Withdrawal Potential     Initial Risk Ratin  Identified areas of concern/focus: Cocaine use and Cannabis use.     As evidenced by:  Annalise reports date of lase use of Cocaine on 2025 and Cannabis on 2025    Client's Goal: \"Continue sobriety\"  Clinical Goals:    Abstain from all mood altering substances.  Must be reached in order to have services terminated?  Yes  Target Date: 2025       Date/ Initials Objectives Interventions Target Date Extended Date Extended Date Stopped Completed Initials   25  KT Client will report any substance use to staff at the next treatment session following relapse. Staff will provide a check-in into their sobriety.  2025        DIMENSION 2: Biomedical Conditions/Complications   Initial Risk Ratin  Identified areas of concern/focus:  Annalise reports a diagnosis of Parkinson's disease .    As evidenced by:    Annalise health conditions.    Client's Goal: \"workout routine everyday\"  Clinical Goals:    Annalise will be active throughout the week.  Must be reached in order to have services terminated? No  Target Date: 2025       Date/ Initials Objectives Interventions Target Date Extended Date Extended Date Stopped Completed Initials   3/18/2025  KT Client will consistently take medications as prescribed.  Staff will Check in with client 3 times per week regarding medication compliance. 2025      3/18/2025  KT Client will report any health concerns/changes to staff at the next treatment session. Staff will check-in 3 times a week on any health changes . 25  KT Client will report how he is staying active throughout his week Staff will check-in 3 times a week on his physical activity . 2025    " "    DIMENSION 3:Emotional/Behavioral Conditions/Complications   Initial Risk Ratin  Identified areas of concern/focus:   300.02 (F41.1) Generalized Anxiety Disorder      As evidenced by:    ANXIETY:  excessive worry    Client's Goal: \"[place] and meditate\"  Clinical Goals:    Client will strengthen protective factors.  Must be reached in order to have services terminated?  Yes  Target Date: 2025   Client will develop effective strategies for  anxiety symptoms. Must be reached in order to have services terminated?  No  Target Date: 2025        Date/ Initials Objectives Interventions Target Date Extended Date Extended Date Stopped Completed Initials   3/18/2025  KT Client will participate in 9 hours of group therapy 4 days per week.  Staff will facilitate 9 hours of group therapy 4 days per week. 2025        3/18/2025  KT Client will identify 3 protective factors they would like to increase and strategies to accomplish this. Staff will assist client by providing education on protective factors, helping client prioritize, and reinforcing use of identified strategies. 2025      3/18/2025  KT Anxiety/OCD/PTSD:  Client will utilize thought stopping strategy daily to reduce intrusive thoughts. Anxiety/OCD/PTSD:  Staff will reinforce positive, reality-based cognitive messages.   25  KT Anxiety/OCD/PTSD:  Client will identify and practice   coping strategies that effectively reduce anxious feelings.   Anxiety/OCD/PTSD:  Staff will teach progressive relaxation techniques. 25  KT Annalise will continue therapy services Staff will check in ones a week on his therapy services  2025                                                                                                                                                                   DIMENSION 4: Treatment Acceptance/Resistance   Initial Risk Ratin  Identified areas of concern/focus:    Cannabis Related " "Disorders; 304.30 (F12.20) Cannabis Use Disorder Moderate     Cocaine Use Disorders; 304.20 (F14.20) Cocaine Use Disorder Severe  Moderate motivation for treatment    As evidenced by:  Meets DSM 5 criteria for substance use disorder.    Client's Goal: \"Active in group and come everyday\"  Clinical Goals:    Client will fully engage in treatment and recovery process and begin to verbalize readiness for change.  Must be reached in order to have services terminated?  Yes  Target Date: 6/18/2025     Date/ Initials Objectives Interventions Target Date Extended Date Extended Date Stopped Completed Initials   3/18/2025  KT Client will meet individually with LADC every 30 days to review progress on treatment plan goals. LADC will review progress on treatment plan with client every 30 days. 6/18/2025        3/18/2025  KT Client will identify at least 10 benefits of treatment/sobriety.   Staff will provide client with journal and review daily.  6/18/2025 03/18/25  KT Client will identify at least 5 people as part of sober support system.   Staff will provide client with journal and review daily.  6/18/2025                                              DIMENSION 5: Relapse/Continued Problem Potential   Initial Risk Rating: 3  Identified areas of concern/focus:  High risk for relapse    As Evidenced by:  Client unable to identify relapse triggers.    Client lacks coping skills for relapse prevention.        Client's Goal: \"Learn how the brain works and learn my relapse warning signs\"  Clinical Goals:    Client has established and utilizes a relapse prevention plan.  Must be reached in order to have services terminated?  Yes  Target Date: 6/18/2025   Develop an understanding of personal pattern of relapse in order to help sustain long-term recovery.  Must be reached in order to have services terminated?  Yes  Target Date: 6/18/2025       Date/ Initials Objectives Interventions Target Date Extended Date Extended Date Stopped " "Completed Initials   3/18/2025  KT Client will comply with urine drug screens at staff request to monitor for substance use. Staff will collect drug screens and review results with client. 2025        3/18/2025  KT Client will rate urges to use at each treatment session. Staff will check in with patient at each session regarding urges to use. 25  KT Client will identify and practice at least 5 ways to \"say no\"/resist pressures to use.   Staff will teach refusal skills in group. 25  KT Client will develop a written relapse prevention plan. Staff will provide relapse prevention assignment and assist with completion.  25  KT Client will develop written aftercare plan. Staff will assign relapse processing assignment and review upon completion. 25  KT Client will complete relapse prevention assignment identifying at least 5 potential triggers for relapse and coping strategies for each. Staff will provide relapse prevention assignment and assist with completion.  2025                          DIMENSION 6: Recovery Environment   Initial Risk Ratin  Identified areas of concern/focus: Lack of sober support  Lack of sober / recreational interests    As evidenced by:    Clients lacks sober activities.      Client's Goal: \"Make sure I am around my friends and family\"  Clinical Goals:   Establish a transition plan connecting to culturally informed services in the community for post-treatment follow up care.  Must be reached in order to have services terminated?  Yes  Target Date: 2025   Develop sober recreational activities.  Must be reached in order to have services terminated? Yes  Target Date: 2025       Date/ Initials Objectives Interventions Target Date Extended Date Extended Date Stopped Completed Initials   3/18/2025  KT Client will identify at least 5 people in his/her life that support recovery.   Staff will " provide health relationship topic . 6/18/2025        3/18/2025  KT Client will identify at least 10 sober recreational/leisure activities they are willing to explore. Staff will provide sober activities . 6/18/2025      3/18/2025  KT Client will increase sober support by attending 1 recovery meetings per week. Staff will provide list of area recovery meetings and verify attendance. 6/18/2025                                                                                                           Client Strengths: Open minded and determined  Client Treatment Plan Adaptations:  Client does not need adjustments at this time.  The following adjustments will be made based on the above identified plan: NA   Patient's primary therapist/counselor:  Cristel Malhotra [facility]  The following staff have also contributed to this plan:        Were family/support people involved in the treatment planning?  No - List reason why not:  Pt. Was at session alone  Patient's progress will be reviewed at least every 30 days (for IOP patients)..    Resources  Resources to which the patient is being referred for problems when they are to be addressed concurrently by another provider: No Referrals Needed    [x] Contact insurance to ensure referrals are in network    Vulnerable Adult Review   [] Review of the facility Abuse Prevention plan was reviewed with the patient   [] No individual abuse plan is necessary   [] In addition to the facility Abuse Prevention plan, an Individual Abuse Plan will be put in place       Dipesh attests  date of lase use of Cocaine on 1/28/2025 and Cannabis on 1/26/2025. Dipesh attests they have participated in the creation of this treatment plan. Dipesh has been provided a copy of this treatment plan and is in agreement with how this plan is written and will be stored in the electronic record.     Client Signature:  _______________________________ Date:___________________    [facility] Signature:   _______________________________  Date: __________________

## 2025-03-18 NOTE — TELEPHONE ENCOUNTER
Date of Last Office Visit: 2/18/25  Date of Next Office Visit:  4/1/25  No shows since last visit: No  More than one patient-initiated cancellation (with reschedule) since last seen in clinic? No    []Medication refilled per  Medication Refill in Ambulatory Care  policy.  []Scope of Practice: refill request processed by LPN/MA  [x]Medication unable to be refilled by RN due to criteria not met as indicated below:    []Eligibility: has not had a provider visit within last 6 months   []Supervision: no future appointment; < 7 days before next appointment   []Compliance: no shows; cancellations; lapse in therapy   []Verification: order discrepancy; may need modification...   [] > 30-day supply request   []Advanced refill request: > 7 days before refill date   []Controlled medication   []Medication not included in policy   [x]Review: new med; med adjusted <= 30 days; safety alert; requires lab monitoring...   [x]Other: Change of pharmacy      Medication(s) requested:     -  pregabalin (LYRICA) 50 MG capsule  Date last ordered: 2/18/25  Qty: 90  Refills: 1  Appropriate for refill?  Change of pharmacy.    -  DULoxetine (CYMBALTA) 20 MG capsule  Date last ordered: 2/21/25  Qty: 60  Refills: 1  Appropriate for refill?  Change of pharmacy    Any Controlled Substance(s)? Yes - Lyrica [place]  [place]  checked? N/A      Requested medication(s) verified as identical to current order? Yes    Any lapse in adherence to medication(s) greater than 5 days? No      Additional action taken? none.      Last visit treatment plan:     2/18/25      TREATMENT PLAN        Medications:  Change:  Pregablin/Lyrica 50 mg three times daily; TDD = 150 mg     Duloxetine/Cymbalta 20 mg (1 cap) daily starting 2/21/2025 x 3 days, then increase to 40 mg (2 caps daily)        Labs:  -None Obtained           Therapy and or Non-pharmacological modalities:     Sleep:  -Get 7+ hours per night  -Avoid screens 60 minutes prior to bedtime      Nutrition:  -Anti-inflammatory diet: fruits, veggies, grains, plant proteins, lean animal protein (sparingly), dairy (small amounts)  -Omega 3's + fiber: food = first choice, supplements = second choice  -Avoid excessive amounts of: refined sugars + carbs, fried + fast foods     Activity:  -30 to 90 minutes (doesn't have to be consecutive) most days of the week  -Aerobic + strength/weight bearing  -Yoga + meditation/deep breathing              Disposition:  -Has been advised of consultative Transition Clinic model     -You do not need to return to the Transition Clinic for medication management     -Please make sure to keep the appointment for longitudinal outpatient psychiatry services  (see below):     Department: URPSY  Provider: Dr. JUAN R Hendrickson MD  Location: [place] [address], [address] [place] [id number]  Phone: [phone]  Date/Time/Visit type: 4/1/2025, arrive by 9:15 am for in person/on site visit        Total time:  81 minutes per:     -Review of EMR including but not limited to: tabs within Chart Review + outside records if applicable   -Appointment time  -Documentation              Jovita BARKER-CNP,  PM-BC    Any medication(s) require lab monitoring? No    Sridhar Sharif RN on 3/18/2025 at 11:21 AM

## 2025-03-18 NOTE — PROGRESS NOTES
[facility] - Intensive Outpatient Program    ASSESSMENT/PLAN                                                      1. Cocaine use disorder, severe, dependence (H) (Primary)  2. Cannabis use disorder, moderate, dependence (H)  Reviewed history.  Doing well since completing [facility] program.  Reviewed benefits of IOP program, he is quite motivated.  Encouraged plan for IOP.  He will continue to follow-up with addiction medicine clinic.  At this time he is not having any cravings so no rush to start new MAT at this time.  Topamax caused worsening Parkinson symptoms.  He has history of seizure so caution would be needed if bupropion+naltrexone was to be considered (along with discussion w/ neurology).       Return in about 2 months (around 5/17/2025) for for IOP recertification if needed.      Patient counseling completed today:  Discussed mechanism of action, potential risks/benefits/side effects of medications and other recommendations above.    Discussed importance of avoiding isolation, building a network of supportive relationships, avoiding people/places/things associated with past use to reduce risk of relapse; including motivational interviewing regarding psychosocial treatment for addiction.     Physician Certification of Medical Necessity    Attending physician: Katharina Browne DO    Admission Certification:  I certify the above-named patient would require partial hospitalization care if intensive outpatient services were not provided and that the patient requires such IOP services for a minimum of 9 hours per week. These services are provided under the care and supervision of a physician and under an individualized plan of treatment that is established and periodically reviewed by a physician in consultation with appropriate staff participating in the program.    From admission date: 3/18/2025 to 60th day: 5/17/25      Katharina Browne DO on 3/18/2025 at 2:59 PM        SUBJECTIVE                                                       Video-Visit Details  Type of service:  Video Visit  Video Start Time: 3:02 PM  Video End Time: 3:11 PM  Originating Location (pt. Location): Parked car  Distant Location (provider location):  [place]   Platform used for Video Visit: Alessia      CC/HPI:  Annalise Nicole is a 39 year old male with PMHx of seizures, Parkinson's disease (w/ deep brain stimulator), migraines, and cocaine use disorder who presents for intensive outpatient programming certification.       Annalise is following with Shauna Burrows NP at [facility].  He also recently completed [facility] program 2 weeks ago.  He is 50 days sober today and feeling well.  Denies cravings.  He is sleeping well.  Appetite is good, gaining weight.  Feeling strong.  Reports mood is good.  Looking forward to [facility], will be in evening program.      History from Shauna Burrows NP visit on 2/25/25 reviewed:    Substance Use History:   ALCOHOL - Yes, socially. Two to three drinks when out with friends.  CANNABIS - Yes, last smoked 3 days ago. Maybe once per week   PRESCRIPTION STIMULANTS (includes Ritalin, Adderall, Vyvanse) - Denies  COCAINE/CRACK - Cocaine, Uses roughly 1 gram daily only on weekends. Using for 7 years. Previously up to 3 grams. Last use 1/30/25.   METH/AMPHETAMINES (includes ecstasy, MDMA/vanessa) - Tried ecstasy and vanessa it in the past, about 2 months ago. No regular use. No methamphetamine use.   OPIATES - Denies  BENZODIAZEPINES (includes Ativan, Klonopin, Xanax) - Prescribed lorazepam. Takes 1 tablet roughly every 6 months.  KRATOM (mild opioid and stimulant effects) - Denies  KETAMINE - Denies  HALLUCINOGENS (includes DXM) - Mushrooms in the past, about 1 year ago.  BEHAVIORAL (Gambling, Eating d/o, Compulsivity) - Denies  NICOTINE  Cigarettes: Denies  Chew/snus: Denies  Vaping: Denies  Past NRT/medication use: N/A        Previous withdrawal treatment episodes (e.g. detox): Denies  Previous  "ARELI treatment programs: Currently admitted at [facility]   Hospitalizations or overdose: Denies  Medical complications from substance use: Denies  IV Drug use?: Denies  Previous Medication for Addiction Tx: Denies  Longest period of full abstinence: 7-8 months after brain surgery (DBS device placement) in 2022  Activities that have previously supported abstinence: Surgery and being home with parents  Current Recovery Activities: Supportive partner and family, work and treatment at [facility]        Infectious disease screening  Hep C: negative per pt No results found for: \"HCVAB\"  HIV: negative per pt No results found for: \"HIAGAB\"       Psychiatric History (per patient report and problem list review)  Past diagnoses - Depression and anxiety  Current or past psychiatrist: Denies  Current or past therapist:  Psychologist  Hospitalizations/TMS/ECT - Denies  Suicide Attempts - Denies  Medication trials - Denies        1/21/2025    12:56 PM 1/30/2025    12:42 PM 3/18/2025     2:55 PM   PHQ   PHQ-9 Total Score 7  5  1    Q9: Thoughts of better off dead/self-harm past 2 weeks Not at all Not at all Not at all       Patient-reported       [place] Prescription Drug Monitoring Program Reviewed:  Yes; as expected    Social History  Living Situation/Housing Status: with wife and son  Relationship status:   Children: 1 child  Support System:  wife and parents  Employment status: not currently working  Legal Issues/Upcoming Court Date(s): none      Medications:  Current Outpatient Medications   Medication Sig Dispense Refill    carbidopa-levodopa (RYTARY) 48. MG CPCR per CR capsule Take 3 capsules by mouth at 8:30 am, 2 capsules at 12:30 pm, 3 capsules at 4:30 pm, and 2 capsules at 8:30 pm = 10 capsules/day 300 capsule 11    DULoxetine (CYMBALTA) 20 MG capsule Take 2 capsules (40 mg) by mouth daily. 60 capsule 0    pregabalin (LYRICA) 50 MG capsule Take 1 capsule (50 mg) by mouth 3 times daily. 90 capsule 0 "     No current facility-administered medications for this visit.       Allergies   Allergen Reactions    Diphenhydramine      Other reaction(s): Other (see comments)  Told not to take due to parkinsons    Metoclopramide      Avoid in patient with a history of Parkinson Disease  Other reaction(s): GI intolerance  Avoid in patient with a history of Parkinson Disease    Promethazine      Other reaction(s): Other (see comments)  Told not to take due to parkisons       Past Medical History:   Diagnosis Date    Anxiety     Cocaine addiction (H) 01/13/2025    Depressive disorder     Esophagitis endoscopy done 11/2021 11/18/2021    Impression:            - Normal first portion of the duodenum, second portion                         of the duodenum and third portion of the duodenum.                         Biopsied.                         - Nodular mucosa in the duodenal bulb. Biopsied.                         - Normal stomach. Biopsied.                         - Medium-sized hiatal hernia.                         - LA Grade    H/O electroencephalogram 2021 11/18/2021     Impression:  This is a normal waking and sleep EEG, with generalized beta activities throughout the recording. Generalized beta activities are usually associated certain medication use, such as benzodiazepines or barbiturates. The cause of these activities in this case is unclear at this time.        H/O magnetic resonance imaging of brain and brain stem 08/17/2018 2009 December MR Brain without and with IV Ttaqymwr10/8/2009 [facility] Result Impression  Normal MRI of the brain.  Result Narrative      Multiecho, multiplanar views of the brain were obtained prior to and  following the IV administration of 19 mL of contrast. There are no  previous studies available for comparison.     The brain parenchyma is normal in appearance on all pulse sequences,  with     H/O magnetic resonance imaging of cervical spine 08/17/2018 March MR Cervical Spine  without IV Contrast3/15/2017 [facility] Result Addenda Addendum by Provider, PA Jones on 03/15/2017 12:30 PM RAD^^^MA MR Cervical Spine w/o contrast 3/15/2017 12:30:00 Result Impression  Minimal degenerative disc disease at C5-6 and C6-7  otherwise an unremarkable MR scan of the cervical spine.  Result Narrative   EXAM: MR Cervical Spine w/o contrast   INDICATION:     H/O shoulder surgery 08/17/2018 2017 July XR SHOULDER 2 VIEWS LEFT7/14/2017 [facility] Result Narrative XR SHOULDER 2 VIEWS LEFT 7/14/2017 9:13 PM  INDICATION: Shoulder Injury COMPARISON: None.  FINDINGS: Negative shoulder. No fracture or dislocation.  Status      Hiatal hernia 04/20/2021    Based on a 2021 ct scan Small hiatal hernia with some wall thickening in the distal thoracic esophagus suggesting some esophagitis.    History of seizures     Parkinson disease (H)     Seizure (H) at age 22 yrs 08/17/2018    Shoulder dislocation     left    Tremor 08/10/2018       Past Surgical History:   Procedure Laterality Date    ESOPHAGOSCOPY, GASTROSCOPY, DUODENOSCOPY (EGD), COMBINED N/A 11/18/2021    Procedure: ESOPHAGOGASTRODUODENOSCOPY, WITH BIOPSY;  Surgeon: Veronica Kay MD;  Location[place] [facility] [place]    ESOPHAGOSCOPY, GASTROSCOPY, DUODENOSCOPY (EGD), COMBINED N/A 3/7/2022    Procedure: ESOPHAGOGASTRODUODENOSCOPY, WITH BIOPSY;  Surgeon: Alexandr Sweet MD;  Location: [facility] [place]    IMPLANT DEEP BRAIN STIMULATION GENERATOR / BATTERY Left 7/25/2022    Procedure: Deep brain stimulator placement, phase II, placement of deep brain stimulator generator/battery over the left chest wall;  Surgeon: Angel Morales MD;  Location: [facility] OR    OPTICAL TRACKING SYSTEM INSERTION DEEP BRAIN STIMULATION Left 7/18/2022    Procedure: Left side deep brain stimulator placement, phase I, placement of left side deep brain stimulator electrode, target left globus pallidus internus, with microelectrode recording;  Surgeon:  Angel Morales MD;  Location: [facility] OR    OPTICAL TRACKING SYSTEM INSERTION DEEP BRAIN STIMULATION Right 8/15/2022    Procedure: Stealth Assisted Right side deep brain stimulator placement, phase I & II combined, placement of right side deep brain stimulator electrode, target right globus pallidus internus, with microelectrode recording and connection to existing generator/battery;  Surgeon: Angel Morales MD;  Location: [facility] OR    SHOULDER SURGERY  2007       Family History   Problem Relation Age of Onset    Cancer Other         grandmother    Tremor No family hx of     Dementia No family hx of     Parkinsonism No family hx of     Seizure Disorder No family hx of          REVIEW OF SYSTEMS:  No fevers or illnesses.     OBJECTIVE                                                      There were no vitals taken for this visit.    Physical Exam  Constitutional:       General: He is not in acute distress.     Appearance: Normal appearance. He is not ill-appearing or diaphoretic.   Eyes:      General: No scleral icterus.  Pulmonary:      Effort: Pulmonary effort is normal. No respiratory distress.   Skin:     Coloration: Skin is not jaundiced or pale.   Neurological:      General: No focal deficit present.      Mental Status: He is alert and oriented to person, place, and time.   Psychiatric:         Mood and Affect: Mood normal.         Behavior: Behavior normal.         Thought Content: Thought content normal.         Judgment: Judgment normal.         Labs:    Recent Results (from the past 720 hours)   Drugs of Abuse Screen Urine (POC CUPS) POCT    Collection Time: 02/25/25  4:05 PM   Result Value Ref Range    POCT Kit Lot Number [id number]     POCT Kit Expiration Date 2026-08-05     Temperature Urine POCT 94 F 90 F, 92 F, 94 F, 96 F, 98 F, 100 F    Specific [place] POCT 1.015 1.005, 1.015, 1.025    pH Qual Urine POCT 7 pH 4 pH, 5 pH, 7 pH, 9 pH    Creatinine Qual Urine POCT 50 mg/dL 20 mg/dL, 50 mg/dL, 100 mg/dL,  200 mg/dL    Internal QC Qual Urine POCT Valid Valid    Amphetamine Qual Urine POCT Negative Negative    Barbiturate Qual Urine POCT Negative Negative    Buprenorphine Qual Urine POCT Negative Negative    Benzodiazepine Qual Urine POCT Negative Negative    Cocaine Qual Urine POCT Negative Negative    Methamphetamine Qual Urine POCT Negative Negative    MDMA Qual Urine POCT Negative Negative    Methadone Qual Urine POCT Negative Negative    Opiate Qual Urine POCT Negative Negative    Oxycodone Qual Urine POCT Negative Negative    Phencyclidine Qual Urine POCT Negative Negative    THC Qual Urine POCT Negative Negative         DO RACHELLE [facility]  Intensive Outpatient Program

## 2025-03-19 ENCOUNTER — TELEPHONE (OUTPATIENT)
Dept: BEHAVIORAL HEALTH | Facility: CLINIC | Age: 40
End: 2025-03-19
Payer: COMMERCIAL

## 2025-03-19 NOTE — TELEPHONE ENCOUNTER
----- Message from Cristel Malhotra sent at 3/18/2025  3:08 PM CDT -----  Scheduling Request    Patient Name: Dipesh Nicole  Location of programming: [place]  Start Date: March / 24 / 2025  Group: [id number] on Monday-at 5:30 PM to 7:30 PM, Tuesday-at 5:30 PM to 7:30 PM, Wednesday-at 5:30 PM to 8:30 PM, and Thursday at 5:30 PM to 7:30 PM  Attending Provider (MD): Martha Woodward  Number of visits to be scheduled: 35  Duration of Appointment in minutes: 120 min and 160 min  Visit Type: In-person or Treatment - 870    Additional notes: New Patient

## 2025-03-24 ENCOUNTER — TELEPHONE (OUTPATIENT)
Dept: BEHAVIORAL HEALTH | Facility: CLINIC | Age: 40
End: 2025-03-24
Payer: COMMERCIAL

## 2025-03-24 ENCOUNTER — DOCUMENTATION ONLY (OUTPATIENT)
Dept: BEHAVIORAL HEALTH | Facility: CLINIC | Age: 40
End: 2025-03-24
Payer: COMMERCIAL

## 2025-03-24 NOTE — TELEPHONE ENCOUNTER
----- Message from Cristel Malhotra sent at 3/24/2025  1:09 PM CDT -----  Scheduling Request    Patient Name: Dipesh Nicole  Location of programming: [place]  Start Date: March / 24 / 2025  Group: [id number] on Monday at 5:30 PM to 7:30 PM  Attending Provider (MD): Martha Woodward  Number of visits to be scheduled: 1          Duration of Appointment in minutes: 120 min  Visit Type: In-person or Treatment - 870    Additional notes: Pt. Needs to be added for today's group, thank you :)

## 2025-03-24 NOTE — PROGRESS NOTES
ABSENT NOTE:    Dipesh Nicole was absent from group 3/24/2025 . This absence WAS NOT excused. This writer attempted to contact patient via phone . Patient is expected to be in group on 3/25/2025    LAURA Garcia  3/24/2025 , 6:45 PM

## 2025-03-25 ENCOUNTER — TELEPHONE (OUTPATIENT)
Dept: BEHAVIORAL HEALTH | Facility: CLINIC | Age: 40
End: 2025-03-25
Payer: COMMERCIAL

## 2025-03-25 ENCOUNTER — THERAPY VISIT (OUTPATIENT)
Dept: ADDICTION MEDICINE | Facility: HOSPITAL | Age: 40
End: 2025-03-25
Attending: FAMILY MEDICINE
Payer: COMMERCIAL

## 2025-03-25 DIAGNOSIS — F14.20 COCAINE USE DISORDER, SEVERE, DEPENDENCE (H): Primary | ICD-10-CM

## 2025-03-25 PROCEDURE — H2035 A/D TX PROGRAM, PER HOUR: HCPCS | Mod: HQ

## 2025-03-25 NOTE — GROUP NOTE
"Group Therapy Documentation    PATIENT'S NAME: Dipesh Nicole  MRN:   [id number]  :   1985  ACCT. NUMBER: [id number]  DATE OF SERVICE: 3/25/25  START TIME:  5:30 PM  END TIME:  7:30 PM  FACILITATOR(S): Cristel Malhotra LADC  TOPIC: BEH Group Therapy  Number of patients attending the group:  3  Group Length:  2 Hours    Dimensions addressed: 3 and 5    Summary of Group / Topics Discussed:    ? Summary of Group / Topics Discussed:           Introduction to DBT House :      Provided a handout on The \"DBT House\" is a visual therapeutic tool used in Dialectical Behavior Therapy (DBT) to help individuals explore and understand their inner world, including values, emotions, thoughts, behaviors, and coping strategies, fostering self-awareness and emotional regulation.      Group Objectives/Goals     Clients will gain Build a Strong Foundation: The activity aims to help individuals build a strong foundation for emotional well-being by understanding their inner world.  promote self-awareness and encourage self-reflection and insight into thoughts, feelings and behaviors. identifies areas for growth it helps individuals identify areas where they can improve their coping skills and emotional regulation.        Group Attendance:  Attended group session    Patient's response to the group topic/interactions:  expressed understanding of topic, gave appropriate feedback to peers, and listened actively    Patient appeared to be Actively participating.        Client specific details:  Annalise participated in mindfulness meditation on finding your heart center. Annalise shared from the meditation  reset myself, was stressed today but now it cleared my mind  .  Annalise shared today he is feeling  proud, thankful for sobriety and excited for the future  . Annalise rated his mental health  7.5/10, positive and not getting down on myself  . Annalise shared staying sober and having no urges or triggers to use. Annalise shared their DBT house to the " group. Annalise shared his own values  respect others/me and love  . Annalise shared he would like to experience his emotions in a healthy way by  feeling of happiness and sadness  . Annalise shared ways he can blow off steam  exercise, mental relaxation, and mindfulness  . Annalise shared people that support him  my wife, doctors, job, parents, brother, and my in-laws  .       3/25/2025     6:00 PM   Suicide Ideation Check In   Since last session, how often have you had suicidal thoughts? No thoughts of suicide         .

## 2025-03-26 ENCOUNTER — THERAPY VISIT (OUTPATIENT)
Dept: ADDICTION MEDICINE | Facility: HOSPITAL | Age: 40
End: 2025-03-26
Attending: FAMILY MEDICINE
Payer: COMMERCIAL

## 2025-03-26 DIAGNOSIS — F15.90 STIMULANT USE DISORDER: Primary | ICD-10-CM

## 2025-03-26 PROCEDURE — H2035 A/D TX PROGRAM, PER HOUR: HCPCS | Mod: HQ | Performed by: SOCIAL WORKER

## 2025-03-27 NOTE — GROUP NOTE
Group Therapy Documentation    PATIENT'S NAME: Dipesh Nicole  MRN:   [id number]  :   1985  ACCT. NUMBER: [id number]  DATE OF SERVICE: 3/26/25  START TIME:  5:30 PM  END TIME:  8:30 PM  FACILITATOR(S): Theresa Alanis LICSW  TOPIC: BEH Group Therapy  Number of patients attending the group:  6    Group Length:  3 Hours    Dimensions addressed: 3, 4, and 6    Summary of Group / Topics Discussed:    MH Skills: Values, Connection and Committed Action: Facilitated a psychoeducation group focusing on values, committed action and self-as-context. Engaged clients in a discussion around what are values and had clients explore what their own values are. Clients processed the values that they grew up with and how those have involved over time and how one's addiction can impeded on their values. Engaged in a group brainstorming activity regarding identifying universal and personal values. Provided a documentary focusing on the importance of connections. Provided a group activity on mapping and psychological flexibility. The core of the group was to help clients to gain an understand of values and how they may have been overlooked in active addiction.    Group Objectives/Goals:     Client will identify 2 reasons it is important to re-identify and prioritize values that guide behaviors.  Client will identify 2 ways that the escalation of  addiction may change previous values-based behaviors in negative ways  Client will identify 3 positive impacts of making connections socially, environmentally, and within the community     Expected therapeutic outcome(s):   Reinstatement of values that may have been overlooked in active addiction  Reduction of guilt and remorse   Increased satisfaction in relationships and connections  Improved mood and sense of belonging     Group Attendance:  Attended group session    Patient's response to the group topic/interactions:  cooperative with task and listened actively    Patient appeared  "to be Attentive.        Client specific details:  This was Annalise's first group with this therapist and he shared what brought him to treatment and that his substance of concern is cocaine and cannabis. He was discharged from  on 2/27/25. He is in treatment to \"Get my life back in order, to get back to my old self.\" He meditates daily and his stress is low, at 3 with 10 being highest. He has had no cravings this week. The value(s) Annalise chose to work on this week is \"discipline in my health\" and the step he will take is to work out every day this week. He was able to identify barriers that may come up and how he would counteract them to complete this committed action step.          3/26/2025     8:00 PM   Suicide Ideation Check In   Since last session, how often have you had suicidal thoughts? No thoughts of suicide             "

## 2025-03-31 ENCOUNTER — DOCUMENTATION ONLY (OUTPATIENT)
Dept: BEHAVIORAL HEALTH | Facility: CLINIC | Age: 40
End: 2025-03-31
Payer: COMMERCIAL

## 2025-03-31 NOTE — PROGRESS NOTES
[facility] Weekly Treatment Plan Review    Treatment plan review for the following date span:  3/31/2025    ATTENDANCE  Patient did not have any absences during this time period (list absence dates and reason for absence).        Weekly Treatment Plan Review     Treatment Plan initiated on: 3/18/2025.    Dimension1: Acute Intoxication/Withdrawal Potential -   Client Goals Addressed Since last Review: yes  Are Treatment Plan goals/methods effective? Yes  Date of Last Use Cocaine date of lase use on- 1/28/2025 and Cannabis date of last use- 1/26/2025   Any reports of withdrawal symptoms - No      Dimension 2: Biomedical Conditions & Complications -   Client Goals Addressed Since last Review: yes  Are Treatment Plan goals/methods effective? Yes  Medical Concerns:  Annalise reports a health diagnosis of Parkinson's disease. Annalise reports a 0/10 pain. Annalise has a PCP and is able to access services. Annalise has active insurance.   Vitals:   BP Readings from Last 3 Encounters:   02/25/25 (!) 147/81   02/07/25 134/81   02/05/25 135/85     Pulse Readings from Last 3 Encounters:   02/25/25 67   02/07/25 65   02/05/25 69     Wt Readings from Last 3 Encounters:   03/07/25 84.4 kg (186 lb)   02/25/25 83.5 kg (184 lb)   02/07/25 82.6 kg (182 lb)     Temp Readings from Last 3 Encounters:   11/11/24 97.3  F (36.3  C) (Oral)   08/16/22 (!) 96.3  F (35.7  C) (Oral)   07/25/22 97.8  F (36.6  C) (Oral)      Current Medications & Medication Changes:  Current Outpatient Medications   Medication Sig Dispense Refill    carbidopa-levodopa (RYTARY) 48. MG CPCR per CR capsule Take 3 capsules by mouth at 8:30 am, 2 capsules at 12:30 pm, 3 capsules at 4:30 pm, and 2 capsules at 8:30 pm = 10 capsules/day 300 capsule 11    DULoxetine (CYMBALTA) 20 MG capsule Take 2 capsules (40 mg) by mouth daily. 60 capsule 0    pregabalin (LYRICA) 50 MG capsule Take 1 capsule (50 mg) by mouth 3 times daily. 90 capsule 0     No current facility-administered  medications for this visit.     Taking meds as prescribed? Yes  Medication side effects or concerns:  None  Outside medical appointments since last review (list provider and reason for visit):  None  Additional Narrative:     Dimension 3: Emotional/Behavioral Conditions & Complications -   Client Goals Addressed Since last Review: yes  Are Treatment Plan goals/methods effective? Yes  PHQ2:       3/18/2025     2:00 PM 2/25/2025    10:00 AM 2/5/2025     3:00 PM 5/2/2023     8:44 AM 4/28/2023     7:25 AM 7/15/2022    12:14 PM 2/23/2022     1:34 PM   PHQ-2 ( 1999 [employer])   Q1: Little interest or pleasure in doing things 0 0 1 0 0 0 0   Q2: Feeling down, depressed or hopeless 0 0 1 0 0 0 0   PHQ-2 Score 0 0 2 0 0 0 0   PHQ-2 Total Score (12-17 Years)- Positive if 3 or more points; Administer PHQ-A if positive    0 0        GAD2:       6/13/2024    12:48 PM 1/21/2025    12:56 PM 2/5/2025     3:00 PM 2/10/2025    10:00 AM 2/25/2025    10:00 AM 3/18/2025     2:00 PM   ANAYA-2   Feeling nervous, anxious, or on edge 2 1 1 1 1 1   Not being able to stop or control worrying 0 1 1 1 0 0   ANAYA-2 Total Score 2    2 2  2 2 1 1       Patient-reported     Mental health diagnosis Anxiety disorder.     Date of last SIB:  None  Date of  last SI:  None  Date of last HI: None  Behavioral Targets:  None  Current MH Assignments:  None    Additional Narrative:  Current Mental Health symptoms include: .  Active interventions to stabilize mental health symptoms this week : .        Dimension 4: Treatment Acceptance / Resistance -   Client Goals Addressed Since last Review: yes  Are Treatment Plan goals/methods effective? Yes  ARELI Diagnosis:  Cocaine use disorder, and Cannabis use disorder  Commitment to tx process/Stage of change- preporation   ARELI assignments - 1/21/2025  Behavior plan -  None  Program Contracts - None  Peer restrictions - None    Additional Narrative -       Dimension 5: Relapse / Continued Problem Potential -   Client Goals  Addressed Since last Review: yes  Are Treatment Plan goals/methods effective? Yes  Relapses this week - None  Urges to use - YES, List    UA results - No results found for this or any previous visit (from the past 4 weeks).  Identified triggers -   Coping skills identified - .  Patient is able to utilize these skills when needed.    Additional Narrative-     Dimension 6: Recovery Environment -   Client Goals Addressed Since last Review: yes  Are Treatment Plan goals/methods effective? Yes  Family Involvement - good      Community support group attendance - None  Recreational activities - None   Peer Relationships - Good      Additional Narrative -     Progress made on transition planning goals:     Justification for Continued Treatment at this Level of Care:  Patient would benefit to learning insight into their own triggers, urges, and craving of their use. Patient would benefit in learning coping skills and relapse prevention skills. Patient would benefit in learning mindfulness, meditation and deep breathing.   Treatment coordination activities since last review:   None  Need for peer recovery support referral? No    Discharge Planning:  Target Discharge Date/Timeframe:  8/14/2025  Target Phase 2 Start:  5/5/2025  Target Phase 3 Start: 6/9/2025   Med Mgmt Provider/Appt:  None   Ind therapy Provider/Appt:  None   Family therapy Provider/Appt:  None   Other referrals:  None        Dimension Scale Review     Prior ratings: Dim1 - 0 DIM2 - 1 DIM3 - 1 DIM4 - 1 DIM5 - 3 DIM6 -1     Current ratings: Dim1 - 0 DIM2 - 1 DIM3 - 1 DIM4 - 1 DIM5 - 3 DIM6 -1       Is patient a vulnerable adult?  No    *Client received copy of changes: Yes  *Client is aware of right to access a treatment plan review: Yes

## 2025-04-01 ENCOUNTER — OFFICE VISIT (OUTPATIENT)
Dept: PSYCHIATRY | Facility: CLINIC | Age: 40
End: 2025-04-01
Attending: PSYCHIATRY & NEUROLOGY
Payer: COMMERCIAL

## 2025-04-01 ENCOUNTER — THERAPY VISIT (OUTPATIENT)
Dept: ADDICTION MEDICINE | Facility: HOSPITAL | Age: 40
End: 2025-04-01
Attending: FAMILY MEDICINE
Payer: COMMERCIAL

## 2025-04-01 VITALS
SYSTOLIC BLOOD PRESSURE: 122 MMHG | BODY MASS INDEX: 25.3 KG/M2 | WEIGHT: 191.8 LBS | DIASTOLIC BLOOD PRESSURE: 73 MMHG | HEART RATE: 63 BPM

## 2025-04-01 DIAGNOSIS — F41.9 ANXIETY DISORDER, UNSPECIFIED TYPE: ICD-10-CM

## 2025-04-01 DIAGNOSIS — Z75.3 OCCASIONALLY HAS DIFFICULTY ACCESSING PRIMARY CARE PROVIDER: Primary | ICD-10-CM

## 2025-04-01 DIAGNOSIS — F14.20 COCAINE USE DISORDER, SEVERE, DEPENDENCE (H): Primary | ICD-10-CM

## 2025-04-01 PROCEDURE — H2035 A/D TX PROGRAM, PER HOUR: HCPCS | Mod: HQ

## 2025-04-01 RX ORDER — DULOXETIN HYDROCHLORIDE 20 MG/1
40 CAPSULE, DELAYED RELEASE ORAL DAILY
Qty: 60 CAPSULE | Refills: 1 | Status: SHIPPED | OUTPATIENT
Start: 2025-04-01

## 2025-04-01 RX ORDER — PREGABALIN 50 MG/1
50 CAPSULE ORAL 3 TIMES DAILY
Qty: 90 CAPSULE | Refills: 1 | Status: SHIPPED | OUTPATIENT
Start: 2025-04-17

## 2025-04-01 ASSESSMENT — PAIN SCALES - GENERAL: PAINLEVEL_OUTOF10: NO PAIN (0)

## 2025-04-01 NOTE — GROUP NOTE
Group Therapy Documentation    PATIENT'S NAME: Dipesh Nicole  MRN:   [id number]  :   1985  ACCT. NUMBER: [id number]  DATE OF SERVICE: 25  START TIME:  5:30 PM  END TIME:  7:30 PM  FACILITATOR(S): Cristel Malhotra LADC  TOPIC: BEH Group Therapy  Number of patients attending the group:  7  Group Length:  2 Hours    Dimensions addressed: 3 and 5    Summary of Group / Topics Discussed:    ? Summary of Group / Topics Discussed:    This topic will focus on gaining insight into a technique that can be used to avoid acting on unwanted behavior.     Objective(s):     Client will gain insight into coping skills to help manage triggers/cravings.     Client will practice acknowledging their cravings sensations as they experience them, without passing a value judgment or acting on them     Structure (modalities, homework, worksheets, etc)      Provide psychoeducation on various strategies to effectively manage cravings/triggers.     Utilize video introducing guided urge surfing meditation.      Use teach-back techniques to ensure client understanding.     Client will be provided handouts to enhance learning.     Expected therapeutic outcome(s):     Client will more effectively manage triggers and cravings in their daily life     Client will manage the thoughts and feelings of cravings without actually taking that action.     Therapeutic outcome(s) measured by:      Client s ability to teach back information, actively participate in group discussions, and answering questions relating to group materials.       Group Attendance:  Attended group session    Patient's response to the group topic/interactions:  expressed understanding of topic, gave appropriate feedback to peers, and listened actively    Patient appeared to be Actively participating.        Client specific details:  Annalise participated in Journal mindfulness meditation. Annalise shared from the meditation  recovery means to me becoming myself again  . Annalise  shared today he is feeling  intense and stressed  . Annalise rated his mental health  1/10   . Annalise shared staying sober however having thoughts of use. Annalise affirmation  one day at a time . Annalise participated in urge surfing. Annalise understood how to use urge surfing when having urges, cravings, or triggers to use substances. Annalise participated in and urge activities on trying to fight the urge to blink.      4/1/2025     8:00 PM   Suicide Ideation Check In   Since last session, how often have you had suicidal thoughts? No thoughts of suicide        .

## 2025-04-01 NOTE — PATIENT INSTRUCTIONS
Thank you for your visit today.      Treatment Plan Today:      1) Medications:   -No medication changes.    2) Follow-up appointment with Dr Hendrickson in about 6 weeks  Placed referral for primary care referral. They should reach out in a couple days.      3) In the case of an emergency, crisis numbers are below and clinic after hours number is [phone].      **For crisis resources, please see the information at the end of this document**   Patient Education    Thank you for coming to the [facility] [place] [facility].     Lab Testing:  If you had lab testing today and your results are reassuring or normal they will be mailed to you or sent through [email or web address] within 7 days. If the lab tests need quick action we will call you with the results. The phone number we will call with results is # [phone]. If this is not the best number please call our clinic and change the number.     Medication Refills:  If you need any refills please call your pharmacy and they will contact us. Our fax number for refills is [phone].   Three business days of notice are needed for general medication refill requests.   Five business days of notice are needed for controlled substance refill requests.   If you need to change to a different pharmacy, please contact the new pharmacy directly. The new pharmacy will help you get your medications transferred.     Contact Us:  Please call [phone] during business hours (8-5:00 M-F).   If you have medication related questions after clinic hours, or on the weekend, please call [phone].     Financial Assistance [phone]   Medical Records [phone]       MENTAL HEALTH CRISIS RESOURCES:  For a emergency help, please call 911 or go to the nearest Emergency Department.     Emergency Walk-In Options:   [facility] Unit @ [facility] ([place]): [phone] - Specialized mental health emergency area designed to be calming  [facility]  [place] ([place]): [phone]  [facility] ([place]): [phone]  [facility] ([place]): [phone]    [facility] Crisis Information:   Jose: [phone]  Quentin: [phone]  Janett (AMITA) - Adult: [phone]     Child: [phone]  [place] - Adult: [phone]     Child: [phone]  [place]: [phone]  List of all [facility] resources:   https://[place].gov/dhs/people-we-serve/adults/health-care/mental-health/resources/crisis-contacts.jsp    National Crisis Information:   Crisis Text Line: Text  [place]  to 979913  Suicide & Crisis Lifeline: 988  National Suicide Prevention Lifeline: 5-377-737-TALK (1-541.161.2076)       For online chat options, visit https://suicidepreventionlifeline.org/chat/  Poison Control Center: 5-007-644-8038  Trans Lifeline: 9-782-730-1771 - Hotline for transgender people of all ages  [employer]: 5-214-647-2169 - Hotline for LGBT youth     For Non-Emergency Support:   Fast Tracker: Mental Health & Substance Use Disorder Resources -   https://www.[employer].org/

## 2025-04-01 NOTE — PROGRESS NOTES
ABSENT NOTE:    Dipesh Nicole was absent from group 3/31/2025 . This absence WAS NOT excused. Patient is expected to be in group on 1/1/2025/.     LAURA Garcia  3/31/2025 , 8:45 PM

## 2025-04-01 NOTE — NURSING NOTE
Chief Complaint   Patient presents with    Eval/Assessment     ANAYA     - Isaias Murrell, Visit Facilitator

## 2025-04-01 NOTE — PROGRESS NOTES
"   [facility]  NEW PATIENT DIAGNOSTIC ASSESSMENT  Med-Psych Team       CARE TEAM:    PCP- ARLYN ASTORGA  Therapist- Dr. Ada Mon, PhD  Neuro- Jun Murrell MD Ramesh Woody is a 39 year old who uses the pronouns he, him, his.                   Chief Concern    \"Anxiety\"                Assessment & Plan   {Must include bolded diagnoses. Assessment can be narrative or per problem:486827}  Anxiety unspecified  Depression unspecified   Cocaine use disorder, severe  Cannabis use disorder, moderate    Pertinent medical diagnosis:  Early onset Parkinson's disease    Annalise Nicole is a 39 year old male with previous psychiatric diagnoses of  and pertinent medical diagnoses for early diagnosis of Parkinson's disease who presents today after a referral from his CD inpatient program for management of his anxiety which are his triggers to use cocaine. Substance use including cocaine and cannabis use are contributing to his presentation.     Biologically,     Psychologically,     Socially,     Diagnostically,     Today,       Future considerations:  He has history of seizure so caution would be needed if bupropion+naltrexone was to be considered (along with discussion w/ neurology).   Contingency plan:      Psychotropic Drug Interactions:  {[PSYCHCLINICDDI]:998011}  Blood Pressure Lowering Agents may enhance the hypotensive effect of DULoxetine.  Management: routine monitoring    [place] was [place] today: indicates that controlled prescriptions have been filled as prescribed    Risk Statements:   Treatment Risk: Risks, benefits, alternatives and potential adverse effects have been discussed and are understood.   Safety Risk: Annalise did not appear to be an imminent safety risk to self or others.    PLAN    1) Medications:   - Duloxetine 40mg daily  - Pregabalin 50mg TID (8am, noon, 4pm)    Neurology:  -Carbidopa/Levodopa    2) Psychotherapy: Sees " health psychologist Dr. Mon. Currently enrolled in [facility] IOP program at [facility].     3) Next due:  Labs: Routine monitoring is not indicated for current psychotropic medication regimen   EKG: Routine monitoring is not indicated for current psychotropic medication regimen     4) Care Coordination / Referrals: Continue following up with Addiction medicine.     5) Disposition: Return to clinic in 4 weeks                   Pertinent Background  {Symptoms first started **, Initial diagnosis and treatment **:783277}  Annalise first experienced Parkinson's symptoms at the age of 30/30 with symptoms developing in the left arm and tremors. Diagnosis made in 2018. Was on Amantadine and carbidopa/levedopa before being just managed by carbidopa/levodopa. S/p DBS surgey in 2022. Dementia does not seem to be present currently. Neurology believes there's a connection between his anxiety and PD and so he has seen health psychology twice. Once around the time he received DBS surgery and the second time mos recently in the context of escalating cocaine use.     Successfully completed psychotherapy and discontinued treatment after improvement in his anxiety after DBS placement.     Seeing health psychologist again due to difficulty completing tasks and trouble following through due to stress after 1 year after surgery and experiencing worsening anxiety with cocaine use.     Most recently, he's has escalating cocaine use in the last year. Snorting 1-2g of powder cocaine around 8x amonth and has been unsuccessful at stopping on his own. He has a [facility] asessment at the behest of his wife and parents due to the neg impact it's having. She is pregnant and feels now is th ebest time to stop. These concerns started in 2016. Had attempted to stop use in th epast but was unsuccessful.     Pertinent items include: {cr:453111}                   History of Present Illness   {If acute symptoms, start with duration and description of most recent  history:923213}      Stressors:  - Got  December 5th and wife is pregnant with first child and due date is Aug 3 2025. 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   - Started usingcocaine gain this past fallwhcih escalated due to stress. Cocaine use became severe and he sought CD tx. Completed course of inpt addiction treatment from 1/30-2/27. Continuing with outpt program at [facility], started March 18th. On it for 22 weeks.   Difficulty coping with diagnosis has led him to using cocaine to mask the symptoms which are ***  Removed his contact with cocaine supplier.   - Car issues for wife. Driving wife around.     Mood: Feels overwhelmed with racing thoughts and can be hard to calm down. Feels PD is more prominent. Mood can get down but denies feelings of hopelessnesss, poor motivation, anhedonia or SI.  Or low energy.   Was depressed before rehab. After rehab, it helped make a big difference.   Sleep: Before LP+ no issues. Gets 8 hours of sleep/night unless using cocaine. At LP+ gets around 6-7 hours but wakes up in the middle of the night due to noise.   Topiramate made PD symptoms worse. No sleep issues.   Appetite: No issues. Gained 14lbs at [facility]. Cocaine was interefering with appetite.   Medications:  - Duloxetine and pregabalin has been helpful with maanging his anxiety.   Bilateral DBS:    Sinemet taking hourly. Protein interacts with sinemet.   Changed to long acting  Therapy: Sees Dr. Mon for health psychology. Working on anxiety and coping with Pds.   B  Bilater  Psych ROS:  Depression:  depressed mood. Feels depressed under control.   Anxiety:  excessive worry. Feels anxiety under control. Felt more mental anxiety.   Elevated:  none   Psychosis:  none   Trauma Related:  none     Current Social History:  Financial/occupational: Full-time . [employer] job. Start with  in June  Living situation{partner, children,  pets, etc:430886}: Lives with wife. No pets.   Social/spiritual support: Wife, parents, and brother  Firearms: No    Pertinent Substance Use:  Alcohol: None recently. Previously 1-2 mixed drinks/week or 2-3 drinks socially. None.  Last drink was 1/27  Cannabis: Yes: once per week in 2024. Prior to that as daily. Smoking. Last use was 1/27..   Tobacco: No  Caffeine:  Yes: coffee or soda x2/week   Opioids: No   Narcan Kit current: N/A  Other substances: Yes  -Cocaine last used  Cocaine, Uses roughly 1 gram daily only on weekends. Using for 7 years. Previously up to 3 grams. Last use 1/28/25. 6  [facility] has helped curb cravings.   -MDMA 5 times in his life. Last use 2023    Energy and speech are still impacted by Pds.     Medical Review of Systems:   Lightheadedness/orthostasis: no  Headaches: no  GI: no  Sexual health concerns: ***   DBS: speech is fast.   Every 3-6 mo Dr. Angel Mendoza                Physical Exam  (Vitals Only)    /73   Pulse 63   Wt 87 kg (191 lb 12.8 oz)   BMI 25.30 kg/m    Pulse Readings from Last 3 Encounters:   04/01/25 63   02/25/25 67   02/07/25 65     Wt Readings from Last 3 Encounters:   04/01/25 87 kg (191 lb 12.8 oz)   03/07/25 84.4 kg (186 lb)   02/25/25 83.5 kg (184 lb)     BP Readings from Last 3 Encounters:   04/01/25 122/73   02/25/25 (!) 147/81   02/07/25 134/81                      Mental Status Exam    Alertness: alert  and oriented  Appearance: well groomed  Behavior/Demeanor: cooperative, pleasant, and calm, with good  eye contact   Speech: regular rate and rhythm  Language: intact  Psychomotor: normal or unremarkable  Mood: {m:815394}  Affect: restricted; congruent to: mood- yes, content- yes  Thought Process/Associations: unremarkable  Thought Content:  Reports none;  Denies suicidal & violent ideation and delusions  Perception:  Reports none;  Denies auditory hallucinations and visual hallucinations  Insight: excellent  Judgment: excellent  Cognition: does   appear grossly intact; formal cognitive testing was not done  Gait and Station: unremarkable                  Family History     Diagnoses (and meds if known):  - none    Suicide:  - none    Substance Use:  - No                  Past Psychiatric History              Self injurious behavior [method, most recent]: No  Suicide attempt [#, most recent, method]: No  Suicidal ideation hx [passive, active]: No       Violence/Aggression Hx:No   Psychosis Hx: No   Eating Disorder Hx: No  Trauma hx: No    Psych Hosp [#, most recent]: No  Commitment: No   TMS/ECT: No  Outpatient Programs [Day treatment, DBT, eating disorder tx, etc]: Yes: [facility] in [facility]    SUBSTANCE USE HISTORY   Past Use: Yes: THC, cocaine (DOC since 2012), alcohol, MDMA, mushrooms (2021)  Drinks 1-2 mixed drinks/week   Use Disorder Criteria: (Bold are positive) 8/11 criteria met (mild (2-3)/moderate (4-5)/severe (6+) level)  ?1. Often taken in larger amounts or over a longer period than was intended.  ?2. A persistent desire or unsuccessful efforts to cut down or control use.  ?3. A great deal of time is spent in activities necessary to obtain, use, or recover from the substance s effects.  ?4. Craving or a strong desire or urge to use the substance.  ?5. Recurrent use resulting in a failure to fulfill major role obligations at work, school, or home.  ?6. Continued use despite having persistent or recurrent social or interpersonal problems caused or exacerbated by its effects.  ?7. Important social, occupational, or recreational activities are given up or reduced because of use.  ?8. Recurrent use in situations in which it is physically hazardous.  ?9. Continued use despite knowledge of having a persistent or recurrent physical or psychological problem that is likely to have been caused or exacerbated by the substance.  ?10. Tolerance.  ?11. Withdrawal.     ?1. Often taken in larger amounts or over a longer period than was intended.  ?2. A  persistent desire or unsuccessful efforts to cut down or control use.  ?3. A great deal of time is spent in activities necessary to obtain, use, or recover from the substance s effects.  ?4. Craving or a strong desire or urge to use the substance.  ?5. Recurrent use resulting in a failure to fulfill major role obligations at work, school, or home.  ?6. Continued use despite having persistent or recurrent social or interpersonal problems caused or exacerbated by its effects.  ?7. Important social, occupational, or recreational activities are given up or reduced because of use.  ?8. Recurrent use in situations in which it is physically hazardous.  ?9. Continued use despite knowledge of having a persistent or recurrent physical or psychological problem that is likely to have been caused or exacerbated by the substance.  ?10. Tolerance.  ?11. Withdrawal.     Treatment [#, most recent]: Yes: Currently in [facility]   Medical Consequences: No. Denies hx of of withdrawwal seizures, Dts, or needing detox.   Legal Consequences: Yes: DWI in 2012                   Past Psychotropic Medication Trials  {Use these categories as prompts to fill in the table below, then delete these sections:727539}  {NEWER ANTIDEP:549422}  {OLDER ANTIDEP:009640}  {MOOD STABILIZERS:676973}  {NEWER ANTIPSYCH:485219}  {OLDER ANTIPSYCH:124717}  {TRANQ:667676}  {SEDS / HYPNOTS:139508}  {STIMULANTS / ADHD MEDS:879272}     Medication Max Dose (mg) Dates / Duration Helpful? DC Reason / Adverse Effects?   Duloxetine 40 2/2025-current Y    pregabalin 50mg TID 2/2025-current Y    topiramate 25mg 1/20/2025-  2/25/2025  Impacts on speech, balance, shuffling, and handwriting   and worsened parkinson's symptoms   melatonin 10mg 1/2025-2/2025  Don't need it anymore   Ativan 1mg q6hr prn 5/2022-9/2023;  11/2024-1/2025  Cramp up from anxiety. No longer needed.   Dystonia.                                                Past Medical History     Neurologic Hx [head  injury, seizures, etc]: -Hx of 1 seizure at 23 y/o. Maybe due to dehydration  -Denies concussions    Patient Active Problem List    Diagnosis Date Noted    Chemical dependency (H) 01/30/2025     Priority: Medium    Cannabis use disorder, moderate, dependence (H) 01/21/2025     Priority: Medium    Cocaine use disorder, severe, dependence (H) 01/13/2025     Priority: Medium    Esophagitis endoscopy done 11/2021 11/18/2021     Priority: Medium     Impression:            - Normal first portion of the duodenum, second portion                          of the duodenum and third portion of the duodenum.                          Biopsied.                          - Nodular mucosa in the duodenal bulb. Biopsied.                          - Normal stomach. Biopsied.                          - Medium-sized hiatal hernia.                          - LA Grade C esophagitis (with numerous ulcers at the                          GE junction and in the esophagus) with bleeding.                          Biopsied. Friability may indicate EoE as well -                          further biopses avoided given friability.   Recommendation:        - Patient has a contact number available for                          emergencies. The signs and symptoms of potential                          delayed complications were discussed with the patient.                          Return to normal activities tomorrow. Written                          discharge instructions were provided to the patient.                          - Resume previous diet.                          - Continue present medications.                          - Await pathology results.                          - Repeat upper endoscopy at appointment to be                          scheduled to check healing.                          - Start PPI BID if there is no interaction with                          current medications.       S/P deep brain stimulator placement 11/18/2021      Priority: Medium       Impression:  This is a normal waking and sleep EEG, with generalized beta activities throughout the recording. Generalized beta activities are usually associated certain medication use, such as benzodiazepines or barbiturates. The cause of these activities in this case is unclear at this time.            Cyclical vomiting syndrome unrelated to migraine 07/30/2021     Priority: Medium    Hiatal hernia 04/20/2021     Priority: Medium     Based on a 2021 ct scan  Small hiatal hernia with some wall thickening in the distal thoracic esophagus suggesting some esophagitis.      Parkinson disease (H) 01/09/2019     Priority: Medium    Parkinson's disease, unspecified whether dyskinesia present, unspecified whether manifestations fluctuate (H) 01/09/2019     Priority: Medium    Shoulder dislocation      Priority: Medium     left      Seizure (H) at age 22 yrs 08/17/2018     Priority: Medium    H/O magnetic resonance imaging of cervical spine 08/17/2018     Priority: Medium     March  MR Cervical Spine without IV Contrast3/15/2017  [facility]  Result Addenda   Addendum by ProviderKaren M.D. on 03/15/2017 12:30 PM  RAD^^^MA  MR Cervical Spine w/o contrast  3/15/2017 12:30:00   Result Impression    Minimal degenerative disc disease at C5-6 and C6-7   otherwise an unremarkable MR scan of the cervical spine.    Result Narrative       EXAM: MR Cervical Spine w/o contrast     INDICATION: left hand weakness     COMPARISON: None.      Procedure: Sagittal short and long TR and STIR images of the cervical   spine were followed by axial 2-D merge and long TR images through the   disc spaces. Oblique sagittal long TR images within obtained through   the neural foramina bilaterally.     FINDINGS: There is a normal cervical lordosis. The vertebral body   heights and intervertebral disc spaces are maintained. The marrow   signal is normal in all sequences. The C1-2 relationship is normal. No   prevertebral  soft tissue swelling is seen. The C7-T1 relationship is   normal.     Axial images at C2-3, C3-4, C4-5 demonstrate no disc bulging or   herniation. The neural foramina are widely patent and the posterior   facets are intact.     Axial images at C5-6 and C6-7 demonstrate minimal focal annular   bulging in the midline without significant encroachment on the thecal   sac. The neural foramina are widely patent and the posterior facets   are intact.     Axial images at C7-T1 demonstrate no disc bulging or herniation. The   neural foramina are widely patent and the posterior facets are intact.    Status           H/O shoulder surgery 08/17/2018     Priority: Medium     2017 July  [facility]  Result Narrative   XR SHOULDER 2 VIEWS LEFT  7/14/2017 9:13 PM    INDICATION: Shoulder Injury  COMPARISON: None.    FINDINGS: Negative shoulder. No fracture or dislocation.    Status           H/O magnetic resonance imaging of brain and brain stem 08/17/2018     Priority: Medium     2009 December  MR Brain without and with IV [id number]/8/2009  [facility]  Result Impression    Normal MRI of the brain.    Result Narrative           Multiecho, multiplanar views of the brain were obtained prior to and   following the IV administration of 19 mL of contrast. There are no   previous studies available for comparison.       The brain parenchyma is normal in appearance on all pulse sequences,   with no intracranial hemorrhage or evidence of an acute stroke. There   is no pathological enhancement. The ventricles are normal in size,   shape and position, with no shift in midline structures or other   evidence of significant mass effect. There is no sclerosis of the   mesial temporal lobe on either side to suggest a cause for this   patient's apparent seizure activity.       There are normal flow-voids within the internal carotid and   vertebrobasilar arterial systems  bilaterally. The visualized aspects   of the orbits are normal.                Tremor 08/10/2018     Priority: Medium    Right inguinal hernia 01/19/2017     Priority: Medium                     Allergies     Diphenhydramine, Metoclopramide, and Promethazine                Medications     Current Outpatient Medications   Medication Sig Dispense Refill    carbidopa-levodopa (RYTARY) 48. MG CPCR per CR capsule Take 3 capsules by mouth at 8:30 am, 2 capsules at 12:30 pm, 3 capsules at 4:30 pm, and 2 capsules at 8:30 pm = 10 capsules/day 300 capsule 11    DULoxetine (CYMBALTA) 20 MG capsule Take 2 capsules (40 mg) by mouth daily. 60 capsule 0    pregabalin (LYRICA) 50 MG capsule Take 1 capsule (50 mg) by mouth 3 times daily. 90 capsule 0                     Data         1/16/2025     9:37 AM 1/21/2025    12:57 PM 3/18/2025     2:00 PM   PROMIS-10 Total Score w/o Sub Scores   PROMIS TOTAL - SUBSCORES 22  22  35       Patient-reported         6/22/2022     2:49 PM 2/21/2024    10:15 AM 4/1/2025     8:31 AM   CAGE-AID Total Score   Total Score 1    1 0 0    Total Score MyChart 1 (A total score of 2 or greater is considered clinically significant)  0 (A total score of 2 or greater is considered clinically significant)       Patient-reported         1/21/2025    12:56 PM 1/30/2025    12:42 PM 3/18/2025     2:55 PM   PHQ-9 SCORE   PHQ-9 Total Score MyChart 7 (Mild depression) 5 (Mild depression) 1 (Minimal depression)   PHQ-9 Total Score 7  5  1        Patient-reported         2/21/2024     9:58 AM 7/1/2024     3:29 PM 1/30/2025    12:43 PM   ANAYA-7 SCORE   Total Score 9 (mild anxiety) 4 (minimal anxiety) 3 (minimal anxiety)   Total Score 9 4 3        Patient-reported       Liver/Kidney Function, TSH Metabolic Blood counts   Recent Labs   Lab Test 11/11/24  0832 01/16/24  1512   AST 20 24   ALT 7 10   ALKPHOS 69 59   CR 0.82 0.87     Recent Labs   Lab Test 12/14/23  1508   TSH 1.37    No lab results found.  No lab  results found.  Recent Labs   Lab Test 11/11/24  0832   *    Recent Labs   Lab Test 11/11/24  0832   WBC 7.8   HGB 14.4   HCT 42.8   MCV 90            {Only keep ECG results >1 yr old if QTc>460ms:727310}  ECG 11/11/2024 QTc = 385ms      PROVIDER: John Hendrickson, DO    Patient staffed in clinic with Dr. Munroe who will sign the note.  Supervisor is Dr. Burgos.

## 2025-04-02 ENCOUNTER — THERAPY VISIT (OUTPATIENT)
Dept: ADDICTION MEDICINE | Facility: HOSPITAL | Age: 40
End: 2025-04-02
Attending: FAMILY MEDICINE
Payer: COMMERCIAL

## 2025-04-02 ENCOUNTER — OFFICE VISIT (OUTPATIENT)
Dept: PSYCHOLOGY | Facility: CLINIC | Age: 40
End: 2025-04-02
Payer: COMMERCIAL

## 2025-04-02 DIAGNOSIS — G20.A1 EARLY-ONSET PARKINSON'S DISEASE (H): ICD-10-CM

## 2025-04-02 DIAGNOSIS — F14.20 COCAINE USE DISORDER, SEVERE, DEPENDENCE (H): Primary | ICD-10-CM

## 2025-04-02 DIAGNOSIS — F41.1 GENERALIZED ANXIETY DISORDER: Primary | ICD-10-CM

## 2025-04-02 DIAGNOSIS — F40.10 SOCIAL ANXIETY DISORDER: ICD-10-CM

## 2025-04-02 DIAGNOSIS — Z96.89 S/P DEEP BRAIN STIMULATOR PLACEMENT: ICD-10-CM

## 2025-04-02 DIAGNOSIS — F12.20 CANNABIS USE DISORDER, MODERATE, DEPENDENCE (H): ICD-10-CM

## 2025-04-02 PROCEDURE — H2035 A/D TX PROGRAM, PER HOUR: HCPCS | Mod: HQ

## 2025-04-02 ASSESSMENT — ANXIETY QUESTIONNAIRES
3. WORRYING TOO MUCH ABOUT DIFFERENT THINGS: NOT AT ALL
4. TROUBLE RELAXING: NOT AT ALL
IF YOU CHECKED OFF ANY PROBLEMS ON THIS QUESTIONNAIRE, HOW DIFFICULT HAVE THESE PROBLEMS MADE IT FOR YOU TO DO YOUR WORK, TAKE CARE OF THINGS AT HOME, OR GET ALONG WITH OTHER PEOPLE: NOT DIFFICULT AT ALL
2. NOT BEING ABLE TO STOP OR CONTROL WORRYING: NOT AT ALL
GAD7 TOTAL SCORE: 1
7. FEELING AFRAID AS IF SOMETHING AWFUL MIGHT HAPPEN: NOT AT ALL
8. IF YOU CHECKED OFF ANY PROBLEMS, HOW DIFFICULT HAVE THESE MADE IT FOR YOU TO DO YOUR WORK, TAKE CARE OF THINGS AT HOME, OR GET ALONG WITH OTHER PEOPLE?: NOT DIFFICULT AT ALL
GAD7 TOTAL SCORE: 1
6. BECOMING EASILY ANNOYED OR IRRITABLE: NOT AT ALL
1. FEELING NERVOUS, ANXIOUS, OR ON EDGE: SEVERAL DAYS
5. BEING SO RESTLESS THAT IT IS HARD TO SIT STILL: NOT AT ALL
GAD7 TOTAL SCORE: 1
7. FEELING AFRAID AS IF SOMETHING AWFUL MIGHT HAPPEN: NOT AT ALL

## 2025-04-02 NOTE — PROGRESS NOTES
Health Psychology          Diana Link, Ph.D.,  [phone]  Amaris Peres, Ph.D.,  [phone]  Kuldip Abreu, Ph.D.,  [phone]  Renay Gibbons, Ph.D.,  [phone]  Damian Jameson, Ph.D., , [facility] [phone]  Ekta Aguilar, Ph.D.,  [phone]  Ada Mon, Ph.D., , [facility] [phone]    [facility], 3rd Floor  [address] [place] [address]       Health Psychology Progress Note    Patient Name: Dipesh Nicole    Service Type: Individual  Length of Visit: 50 minutes  Start time: 11:05 AM  Stop time: 11:55 AM   Attendees: patient attended alone  Session #: 2 (new episode of care)    Most recent DA/update: 1/21/25    Identifying Information and Presenting Problem:  The patient is a 39 year old adult who is being seen for problematic symptoms of anxiety and stress in the context of early-onset Parkinson's.    Topics Discussed/Interventions Provided:      Session Content:     Update: Patient returning for a follow-up appointment. States that his anxiety is improved.     Homework Review: Patient completed the introductory meditations.    Treatment Planning: Returning for a course of treatment with health psychology to revisit stress and anxiety management strategies within the context of living with early onset Parkinson's and navigating his medical concerns.                                               Individual Treatment Plan    Patient's Name: Dipesh Nicole  YOB: 1985    Date of Creation: 04/02/25  Date Treatment Plan Last Reviewed/Revised: 04/02/25    DSM5 Diagnoses:     1. Generalized anxiety disorder    2. Social anxiety disorder      Psychosocial / Contextual Factors: health concerns, recent move, [facility]ing [facility]  PROMIS (reviewed every 90 days):      3/18/2025  2:00 PM   PROMIS 10    PROMIS TOTAL - SUBSCORES 35          Referral / Collaboration:  Collaboration with medical providers and  "specialists as needed.    Anticipated number of session for this episode of care:  15-20  Anticipation frequency of session:  1-2x per month  Anticipated Duration of each session: 38-52 minutes  Treatment plan will be reviewed in 90 days or when goals have been changed.       Measurable Treatment Goal(s) related to diagnosis / functional impairment(s)  Goal 1: Patient will decrease use of avoidance-based coping with regard to his PD diagnosis   \"I will know I've met my goal when living with Parkinson's is less hard for me to deal with.\"      Objective #A (Patient Action)    Patient will increase his ability to accept and acknowledge his Parkinson's diagnosis.  Status: New - Date: 04/02/25     Intervention(s)  Therapist will teach acceptance, self-awareness, emotional intelligence skills.    Objective #B  Patient will be more open to sharing diagnosis and how he feels about it.  Status: New - Date: 04/02/25     Intervention(s)  Therapist will teach cognitive reappraisal, defusion, values clarification, and committed action skills.    Objective #C  Patient will develop a core belief about inherent worthiness outside of physical and athletic capabilities.  Status: New - Date: 04/02/25     Intervention(s)  Therapist will teach self-compassion and self-worth skills.    Objective #D  Patient will decrease use of avoidance-based coping.  Status: New - Date: 04/02/25     Intervention(s)  Therapist will teach ACT skills.    Objective #E  Patient will learn and implement anxiety and stress management skills to reduce the impact on PD symptoms.  Status: New - Date: 04/02/25     Intervention(s)  Therapist will teach stress and anxiety management skills.      Patient has reviewed and agreed to the above plan.      Ada Mon, PhD  April 2, 2025     Skills/intervention: Introduced ACT principle of values clarification and module 1 of the self-compassion workbook.       Treatment Objective(s) Addressed in This " Session:  Psychological distress related to Parkinson's  Decreasing impact of stress and anxiety on health and well-being    Progress on / Status of Treatment Objective(s) / Homework:  New objective established this session     Medication Adherence: Patient reports recently initiating duloxetine. Current medication list is below:     Current Outpatient Medications   Medication Sig Dispense Refill    carbidopa-levodopa (RYTARY) 48. MG CPCR per CR capsule Take 3 capsules by mouth at 8:30 am, 2 capsules at 12:30 pm, 3 capsules at 4:30 pm, and 2 capsules at 8:30 pm = 10 capsules/day 300 capsule 11    DULoxetine (CYMBALTA) 20 MG capsule Take 2 capsules (40 mg) by mouth daily. 60 capsule 1    [START ON 4/17/2025] pregabalin (LYRICA) 50 MG capsule Take 1 capsule (50 mg) by mouth 3 times daily. 90 capsule 1     No current facility-administered medications for this visit.         Assessment: The patient appeared to be active and engaged in today's session and was receptive to feedback.     Mental Status Exam:   Appearance: Appropriate   Eye Contact: Good    Orientation: Yes, x4  Behavior/relationship to provider/demeanor: Cooperative, Engaged, and Pleasant  Motor Activity: normal or unremarkable  Mood (subjective report): Anxious, better  Affect (objective appearance): Appropriate/mood congruent  Speech Rate: Rapid  Speech Volume: Normal  Speech Articulation: Normal, some difficulty with fluency and flow  Speech Coherence: Normal  Speech Spontaneity: Normal  Thought Content: no suicidal/homicidal ideation, plans, or intent  Thought Process (associations): Logical and Linear  Thought Process (rate): Rapid  Abnormal Perception: None  Attention/Concentration: Normal  Memory: Appears grossly intact  Fund of Knowledge: Appears within normal limits  Abstraction:  Normal  Insight:  Adequate  Judgment: Adequate    Does the patient appear to be at imminent risk of harm to self/others at this time? No    The session was  necessary to address anxiety and stress in the context of early onset PD.  Ongoing psychotherapy is necessary to stabilize mood, provide counseling, improve functioning with daily activities, provide psychoeducation, provide support, and teach cognitive and behavioral skills.    Diagnoses:    1. Generalized anxiety disorder    2. Social anxiety disorder    3. Early-onset Parkinson's disease (H)    4. S/P deep brain stimulator placement        Plan:    Follow-up visit with me on 4/16/25 at 9:00am  Patient to use 911 or other crisis resources in the event of a psychiatric emergency  Primary care needs and medications are managed by Joey Morley. Referring provider is Ramesh Carson  Next visit agenda/goals:   Complete quick look at values exercise  Complete module 1 of the [facility] workbook on self-compassion    NOTE: Treatment plan update due 4/2/26; 90 day treatment plan review due 7/1/25.     Ada Mon, Ph.D., LP, ABPP  Clinical Health Psychologist  Phone: [phone]

## 2025-04-03 NOTE — GROUP NOTE
Group Therapy Documentation    PATIENT'S NAME: Dipesh Nicole  MRN:   [id number]  :   1985  ACCT. NUMBER: [id number]  DATE OF SERVICE: 25  START TIME:  5:30 PM  END TIME:  8:30 PM  FACILITATOR(S): Jay Borrero [facility]  TOPIC: BEH Group Therapy    Attestation: Martha Woodward MD,  - Provides oversight and supervision of care.     Number of patients attending the group:  4  Group Length:  3 Hours    Dimensions addressed: 2, 3, 4, and 5    Summary of Group / Topics Discussed:    Skills Group: Stress Management and Addiction: Facilitated a psychoeducation and skills group focusing on stress management and addiction. Engaged in a group discussion around stress and how that plays into addiction. Discussed how coping with stress can aide in recovery and minimizing the risk of relapse. Provided clients with a stress management handout that reviewed. Discussed with clients the importance of self-awareness and connecting that to identifying warning signs of stress. Educated clients on various skills and techniques that can be utilized to manage and cope with stress. Provided clients with information on aromatherapy and utilizing essential oils as a tool for stress management. Engaged clients in a progressive relaxation exercise.      Group Objectives/Goals:      Client will identify 2 ways stress is connected to relapse in addiction  Client will identify 3 ways stress affects physical and mental health  Client will identify 3 ways to manage stress  Client will identify 1 healthy daily routine to commit to starting this week.      Expected therapeutic outcome(s):   Decreased frequency and intensity of PAWS  Increased stamina and resiliency  Improved ability to function at optimal level       Group Attendance:  Attended group session    Patient's response to the group topic/interactions:  cooperative with task, discussed personal experience with topic, and expressed readiness to alter  behaviors    Patient appeared to be Actively participating.        Client specific details:  Client denies any change in sobriety date. Client reports feeling proud and motivated to continue his progress in recovery. Client reports cravings as 0 of 10 (10 highest) and level of motivation as 10 of 10. Client identified a thing to work on this week was nutrition/eating habits. He said he will commit to avoiding fast food all week. Client expressed this unit was helpful. He expressed gratitude for the support of her sober family. This topic completes one of the six MH Skills Groups required under D3 of his Treatment Plan.        4/2/2025     8:00 PM   Suicide Ideation Check In   Since last session, how often have you had suicidal thoughts? No thoughts of suicide

## 2025-04-07 ENCOUNTER — DOCUMENTATION ONLY (OUTPATIENT)
Dept: BEHAVIORAL HEALTH | Facility: CLINIC | Age: 40
End: 2025-04-07
Payer: COMMERCIAL

## 2025-04-08 ENCOUNTER — DOCUMENTATION ONLY (OUTPATIENT)
Dept: BEHAVIORAL HEALTH | Facility: CLINIC | Age: 40
End: 2025-04-08
Payer: COMMERCIAL

## 2025-04-08 NOTE — PROGRESS NOTES
ABSENT NOTE:    Dipesh BROWN Corey was absent from group 4/8/2025 . This absence WAS excused. Pt. Sent an email sharing he will not be in group tonight due to work. Patient is expected to be in group on 4/9/2025.     LAURA Garcia  4/8/2025 , 1:27 PM

## 2025-04-09 PROBLEM — R56.9 SEIZURE (H): Status: ACTIVE | Noted: 2018-08-17

## 2025-04-10 ENCOUNTER — DOCUMENTATION ONLY (OUTPATIENT)
Dept: BEHAVIORAL HEALTH | Facility: CLINIC | Age: 40
End: 2025-04-10
Payer: COMMERCIAL

## 2025-04-10 ENCOUNTER — TELEPHONE (OUTPATIENT)
Dept: BEHAVIORAL HEALTH | Facility: CLINIC | Age: 40
End: 2025-04-10
Payer: COMMERCIAL

## 2025-04-11 NOTE — PROGRESS NOTES
ABSENT NOTE:    Dipesh STEPHANIE Rennn was absent from group 4/10/2025 . This absence WAS NOT excused. This writer attempted to contact patient via phone. Patient is expected to be in group on 4/14/2025. If Annalise dose not come to group on 4/14/2025 he will be discharged from the program due to lack of attendance.    Cristel Malhotra, [facility]  4/10/2025 , 8:38 PM

## 2025-04-14 ENCOUNTER — DOCUMENTATION ONLY (OUTPATIENT)
Dept: BEHAVIORAL HEALTH | Facility: CLINIC | Age: 40
End: 2025-04-14
Payer: COMMERCIAL

## 2025-04-15 ENCOUNTER — TELEPHONE (OUTPATIENT)
Dept: BEHAVIORAL HEALTH | Facility: CLINIC | Age: 40
End: 2025-04-15
Payer: COMMERCIAL

## 2025-04-15 ENCOUNTER — DOCUMENTATION ONLY (OUTPATIENT)
Dept: BEHAVIORAL HEALTH | Facility: CLINIC | Age: 40
End: 2025-04-15
Payer: COMMERCIAL

## 2025-04-15 NOTE — PROGRESS NOTES
ABSENT NOTE:    Dipesh BROWN Corey was absent from group 4/14/2025 . This absence WAS NOT excused. Patient is expected to be in group on 4/15/2025, if he dose not show to group he will be discharged from the program.     Cristel Malhotra, [facility]  4/14/2025 , 9:55 PM

## 2025-04-16 ENCOUNTER — TELEPHONE (OUTPATIENT)
Dept: BEHAVIORAL HEALTH | Facility: CLINIC | Age: 40
End: 2025-04-16
Payer: COMMERCIAL

## 2025-04-16 NOTE — PROGRESS NOTES
ABSENT NOTE:    Dipesh Nicole was absent from group 4/15/2025 . This absence WAS NOT excused. This writer attempted to contact patient via email. Patient is being discharged on 4/15/2025    LAURA Garcia  4/15/2025 , 9:25 PM

## 2025-04-16 NOTE — TELEPHONE ENCOUNTER
----- Message from Cristel Malhotra sent at 4/16/2025 11:00 AM CDT -----  Discharge DAANES:   Client discharged from [facility] evening program  on 4/15/2025.    Writer completed client's discharge DAANES on 4/16/2025. DAANES ID:[id number]     Please complete, thank you

## 2025-04-16 NOTE — TELEPHONE ENCOUNTER
----- Message from Cristel Malhotra sent at 4/16/2025 10:48 AM CDT -----  Scheduling Request    Patient Name: Dipesh Nicole  Location of programming: [place]  Start Date: April / 16 / 2025  Group: [id number] on Monday, Tuesday, Wednesday, and Thursday at 5:30 PM to 7:30 PM  Attending Provider (MD): Martha Woodward  Number of visits to be scheduled: Cancel all appointments   Duration of Appointment in minutes: 120 min  Visit Type: In-person or Treatment - 870    Additional notes: Pt. Is discharge from the program

## 2025-04-16 NOTE — PROGRESS NOTES
IOP DISCHARGE SUMMARY                                                                                                            Name:  Dipesh Nicole   :  LAURA Garcia   Admit Date: 3/18/2025   Discharge Date: 4/15/2025   :  1985   Hours Completed: 13   Initial Diagnosis:  1.)  Cocaine Use Disorder Severe - 304.20 (F14.20)  2.)  Cannabis Use Disorder Moderate - 304.30 (F12.20)   Final Diagnosis:  1.)  Cocaine Use Disorder Severe - 304.20 (F14.20)  2.)  Cannabis Use Disorder Moderate - 304.30 (F12.20)   Discharge Address:    [address] [place] [address] [place] [address]   Funding Source:    [facility] PMAP      Program:  [facility] IOP evening program     Discharge Type:  ADMIN- Administrative Discharge    Patient was receiving residential services at the time of discharge:   NO      Reasons for and circumstances of service termination:  Annalise has not attend group sessions since 4/3/202. Annalise has not called the primary counselor back and has not had communication.        If program discharge status was At Staff Request, the license shen must identify the following:    Other interested parties conferred with: not applicable    Referrals provided: not applicable    Alternatives considered and attempted before deciding to discharge:  Annalise was called by his primary counselor and was sent to [employer].   -2025  -4/10/2025  -4/15/2025        Dimension/Course of Treatment/Individualized Care:   1.  Withdrawal Potential - Intake Risk level -  0 Discharge Risk level -0  Narrative supporting risk description:  Annalise date of lase use of Cocaine on 2025 and Cannabis on 2025. Annalise reported no withdrawal concerns   Treatment plan goals and progress towards those goals:  Annalise has made no progress towards his goals due to lack of attendance.      2.  Biomedical Conditions and Complications - Intake Risk level - 1 Discharge Risk level - 1  Narrative supporting risk  description:  Annalise reports a health diagnosis of Parkinson's disease. Annalise reports a 0/10 pain. Annalise has a PCP and is able to access services. Annalise has active insurance.   Treatment plan goals and progress towards those goals:  Annalise has made no progress towards his goals due to lack of attendance.    3.  Emotional/Behavioral/Cognitive Conditions and Complications - Intake Risk level -  1 Discharge Risk level - 1  Narrative supporting risk description:  Annalise reports a diagnosis of Anxiety disorder. Annalise reports he is currently in therapy services twice a month. Annalise reports taking medication as prescribed. Annalise has no history or last seen of any SI, SIB, VI, or HI.   Treatment plan goals and progress towards those goals:  Annalise has made no progress towards his goals due to lack of attendance.      4.  Readiness for Change - Intake Risk level -  1  Discharge Risk level - 2  Narrative supporting risk description:  Annalise completed lodging plus inpatient services on 2/26/2025. Annalise reports external motivation for change due to having a baby. Annalise is discharge from [facility] services due to not attending group. Annalise is in the contemplation stage of change.    Treatment plan goals and progress towards those goals:  Annalise has made no progress towards his goals due to lack of attendance.      5.  Relapse/Continued Use/Continued Problem Potential - Intake Risk level -  3 Discharge Risk level - 3  Narrative supporting risk description:  Annalise is a high risk of relapse. nAnalise would benefit from learning more about his urges and triggers of his use. Annalise would benefit to learning more coping skills and relapse prevention skills.   Treatment plan goals and progress towards those goals:  Annalise has made no progress towards his goals due to lack of attendance.      6.  Recovery Environment - Intake Risk level -  1 Discharge Risk level - 1  Narrative supporting risk description:  Annalise has stable housing. Annalise is working full-time. Annalise has no  current or pending legal involvement. Annalise has a good support system of his wife, in-laws, and friends.Annalise reports that he has a son on the way and is due in August. Annalise reports he is not involved in any sober support meetings.   Treatment plan goals and progress towards those goals:  Annalise has made no progress towards his goals due to lack of attendance.      Strengths: Determined.   Needs: IOP treatment    Services Provided: Intake, assessment, treatment planning, education, group discussion, film, lectures, 1x1 therapy, and recommendations at discharge.      Program Involvement: poor  Attendance: poor  Ability to relate in group/   Other program activities: fair  Assignment Completion: fair  Overall Behavior: fair  Reported Family/Significant   Other Involvement: excellent    Prognosis: Guarded    Adult Substance Use Intensive Outpatient Discharge Instructions      Summary: Dipesh Nicole were admitted to [facility] IOP Group program on 3/18/2025 and you are discharging from our program on 4/15/2025.  If you have any questions after your discharge please call your primary counselor at [phone] to address your questions or concerns.      Recommendation:    Dipesh is recommended to engage and participate in IOP treatment level of care at [facility] or [facility]  to continue to support you in your recovery. Primary counselor was unable to make a referral due to not having contact and no ORALIA signed.     Follow-up Appointments and Continued Care Providers  Individual therapy  Dipesh should continue therapy services twice a month.      Additional Resources/Support   Please continue to engage and participate in Sober Support  Group therapy  to provide you additional support with your recovery. If you don't have a support group please look at the following additional resources down below.       Managing Symptoms and Preventing Relapse  Abstinence/Relapse Prevention  Continue using  healthy strategies to help manage daily stressors.   Maintain abstinence from all mood altering substances     Healthy Living/Socialization Skills:   Continue practicing assertiveness and maintain healthy boundaries   Make sure you are reaching out to supportive people every day.   Take time for yourself and practice self care    Lifestyle Adjustment: Adjust your lifestyle to get enough sleep, relaxation, exercise and  good nutrition. Continue to develop healthy coping skills to decrease stress and promote a sober living environment. Do not use alcohol, illegal drugs or addictive medications other than what is currently prescribed. AA, NA, and  Sponsor are excellent resources for support.     General Medication Instructions:   See your medication sheet(s) for instructions.   Take all medicines as directed.  Make no changes unless your doctor suggests them.   Go to all your doctor visits.  Be sure to have all your required lab tests. This way, your medicines can be refilled on time.  Do not use any drugs not prescribed by your provider.  AA/NA and Sponsors are excellent resources for support  Avoid alcohol.      Symptoms to Report:  If you experience more anxiety, confusion, sleeplessness, deep sadness or thoughts of suicide, notify your treatment team or notify your primary care physician. IF ANY OF THE SYMPTOMS YOU ARE EXPERIENCING ARE A MEDICAL EMERGENCY CALL 911 IMMEDIATELY. If you or someone you know is struggling or in crisis, help is available.  Call or text 988 or chat  at [employer]org    Recovery Resources:  Some AA/NA meetings are being held online however most have returned to in-person or a hybrid combination please check online to verify*  AA meetings search for them at: https://aa-intergroup.org (worldwide meeting listings)  AA meetings for [place] area can be found online at: https://a[place].org (click local online meetings listings)  NA meetings for [place] area can be found online at:  https://www.[employer].org  (click find a meeting)  Alcoholics Anonymous (https://aa.org/): for information 24 hours/day  AA Intergroup service office in [place] (http://www.aastpaul.org/) 810.796.5289  AA Intergroup service office in [facility]: [phone]. (http://www.a[place].org/)  Narcotics Anonymous (www.nam[place].org) (163) 132-3394  https://aafairviewriverside.org/meetings  SMART Recovery - self management for addiction recovery:  www.smartrecovery.org  Pathways ~ A Health Crisis Resource & Support [place]:  355.272.2209.  https://prescribetoprevent.org/patient-education/videos/  http://www.harmreduction.org  [place] Opioid Prevention Coalition: www.opioidcoalition.org      Recovery apps for your phone for educational purposes and to locate in person and zoom recovery meetings  Everything AA - educational purpose and sarah is a great resource  12 Step Toolkit - educational purpose learning about the 12 steps to recovery  [employer] - meeting sarah  AA  - meeting sarah  Meeting guide - meeting sarah  Quick NA meeting - meeting sarah  [employer]- has various apps  Sober Fun Activities: www.sober-activities.[employer]/[facility]//[facility] Recovery Connection ([facility])  [facility] connects people seeking recovery to resources that help foster and sustain long-term recovery.  Whether you are seeking resources for treatment, transportation, housing, job training, education, health care or other pathways to recovery, [facility] is a great place to start. Please call  [phone] or go to their website at www.[place]us.org      Mental Health Crisis Resources  Throughout [place]: call **CRISIS (**133777)  Crisis Text Line: is available for free, 24/7 by texting [place] to 217946  Suicide Awareness Voices of Education (SAVE) (www.save.org): 549-739-SMQM (2951)  The National Suicide Prevention Lifeline is now: 988 Suicide and Crisis Lifeline. Call 988 anytime.  National Hope Line  1.800.SUICIDE  "[6277092]  National [place] on Mental Illness (www.[place].adri.org): 355.988.4090 or 829-793-9245.  Ttqn1ltoj: text the word LIFE to 51177 for immediate support and crisis intervention  Mental Health Consumer/Survivor Network of [place] (www.mhcsn.net): 270.976.1666 or 013-878-1923  [facility] Warm Line  Peer to peer support  Monday thru Saturday, 12 pm to 10 pm  928.266.0618 or 4.382.026.1198  Text \"Support\" to [address]  Mental Health Association of [place] (www.mentalhealth.org): 897.512.6474 or 747-808-6498  Crisis Text Line  Text 951325  You will be connected with a trained live crisis counselor to provide support. Por emiliaanol, texto  ZAIDA a 943320 o texto a 442-AYUDAME en WhatsApp  Peer Support Connection [place] Warmline ([facility]) 1-284.792.6981 Available from 5pm - 9am (7 days a week/365 days a year)  Call or Text 989    Great Pod casts for nutrition and wellness  Understand the connection between what you eat and how you feel. Hosted by licensed nutritionists and dietitians from [employer] we share practical, real-life solutions for healthier living through nutrition.   Listen on Apple Podcasts  Dishing Up Nutrition   [employer]   Nutrition     Here are a list of additional numbers you can call if you are wanting to resume services through [facility]:  [facility] Assessment and Scheduling Intake: [phone]  Mental Health and Addiction Services Programmatic Care Navigation Hub: [phone]  Adult [facility] Intensive Outpatient  call: [phone]  [facility] Admissions call [phone]    Recovery Clinic call: [phone]  [facility] call: [phone]  Medical Records call: [phone]  Billing Department call: [phone]  Patient Relations call: [phone]    THANK YOU FOR CHOOSING [facility]      Counselor Name and Title:  LAURA Garcia       Date:  4/15/2025  Time:  9:27 PM     "

## 2025-04-30 ENCOUNTER — OFFICE VISIT (OUTPATIENT)
Dept: PSYCHOLOGY | Facility: CLINIC | Age: 40
End: 2025-04-30
Payer: COMMERCIAL

## 2025-04-30 DIAGNOSIS — Z96.89 S/P DEEP BRAIN STIMULATOR PLACEMENT: ICD-10-CM

## 2025-04-30 DIAGNOSIS — F41.1 GENERALIZED ANXIETY DISORDER: Primary | ICD-10-CM

## 2025-04-30 DIAGNOSIS — G20.A1 EARLY-ONSET PARKINSON'S DISEASE (H): ICD-10-CM

## 2025-04-30 DIAGNOSIS — F40.10 SOCIAL ANXIETY DISORDER: ICD-10-CM

## 2025-04-30 ASSESSMENT — ANXIETY QUESTIONNAIRES
GAD7 TOTAL SCORE: 2
IF YOU CHECKED OFF ANY PROBLEMS ON THIS QUESTIONNAIRE, HOW DIFFICULT HAVE THESE PROBLEMS MADE IT FOR YOU TO DO YOUR WORK, TAKE CARE OF THINGS AT HOME, OR GET ALONG WITH OTHER PEOPLE: NOT DIFFICULT AT ALL
7. FEELING AFRAID AS IF SOMETHING AWFUL MIGHT HAPPEN: NOT AT ALL
6. BECOMING EASILY ANNOYED OR IRRITABLE: SEVERAL DAYS
3. WORRYING TOO MUCH ABOUT DIFFERENT THINGS: NOT AT ALL
8. IF YOU CHECKED OFF ANY PROBLEMS, HOW DIFFICULT HAVE THESE MADE IT FOR YOU TO DO YOUR WORK, TAKE CARE OF THINGS AT HOME, OR GET ALONG WITH OTHER PEOPLE?: NOT DIFFICULT AT ALL
GAD7 TOTAL SCORE: 2
1. FEELING NERVOUS, ANXIOUS, OR ON EDGE: SEVERAL DAYS
4. TROUBLE RELAXING: NOT AT ALL
2. NOT BEING ABLE TO STOP OR CONTROL WORRYING: NOT AT ALL
GAD7 TOTAL SCORE: 2
5. BEING SO RESTLESS THAT IT IS HARD TO SIT STILL: NOT AT ALL
7. FEELING AFRAID AS IF SOMETHING AWFUL MIGHT HAPPEN: NOT AT ALL

## 2025-04-30 NOTE — PROGRESS NOTES
"    Health Psychology          Diana Link, Ph.D.,  [phone]  Amaris Peres, Ph.D.,  [phone]  Kuldip Abreu, Ph.D.,  [phone]  Renay Gibbons, Ph.D.,  [phone]  Damian Jameson, Ph.D., , [facility] [phone]  Ekta Aguilar, Ph.D.,  [phone]  Ada Mon, Ph.D., , [facility] [phone]    [facility], 3rd Floor  [address] [place] [address]       Health Psychology Progress Note    Patient Name: Dipesh Nicole    Service Type: Individual  Length of Visit: 45 minutes  Start time: 9:05 AM  Stop time: 9:50 AM    Attendees: patient attended alone  Session #: 2 (new episode of care)    Most recent DA/update: 1/21/25    Identifying Information and Presenting Problem:  The patient is a 39 year old adult who is being seen for problematic symptoms of anxiety and stress in the context of early-onset Parkinson's.    Topics Discussed/Interventions Provided:      Session Content:     Update: Patient returning for a follow-up appointment. States that his anxiety is improved. Has discontinued addiction group last week. Will continue with psychiatrist and addiction therapist.      Homework Review: Patient completed module 1 of the self-compassion workbook and the values clarification exercise. Discussed insights. Patient noted that he would benefit from developing more self-compassion. Top 6 values identified included challenge, connection, fitness, honesty, independence, self-awareness.    Skills/intervention: Provided additional education about values. Also reviewed ACT framework and concept of towards moves, away moves, and hooks. Examples of hooks include: competitiveness (drive mode), \"you should be better than you are,\" \"you should be farther along than you are,\" other self-critical thoughts, self-doubt, sensations of tightness in the chest and legs. Examples of towards moves: meditation, diaphragmatic breathing, slowing down, exercise, " and eating healthy. Examples of away moves: substance use, avoidance, eating unhealthy, not sleeping, and social isolation. Introduced vitality vs suffering diary and module 2 of the self-compassion workbook.      Treatment Objective(s) Addressed in This Session:  Psychological distress related to Parkinson's  Decreasing impact of stress and anxiety on health and well-being    Progress on / Status of Treatment Objective(s) / Homework:  Stable  In progress    Medication Adherence: Patient reports recently initiating duloxetine. Current medication list is below:     Current Outpatient Medications   Medication Sig Dispense Refill    carbidopa-levodopa (RYTARY) 48. MG CPCR per CR capsule Take 3 capsules by mouth at 8:30 am, 2 capsules at 12:30 pm, 3 capsules at 4:30 pm, and 2 capsules at 8:30 pm = 10 capsules/day 300 capsule 11    DULoxetine (CYMBALTA) 20 MG capsule Take 2 capsules (40 mg) by mouth daily. 60 capsule 1    pregabalin (LYRICA) 50 MG capsule Take 1 capsule (50 mg) by mouth 3 times daily. 90 capsule 1     No current facility-administered medications for this visit.         Assessment: The patient appeared to be active and engaged in today's session and was receptive to feedback.     Mental Status Exam:   Appearance: Appropriate   Eye Contact: Good    Orientation: Yes, x4  Behavior/relationship to provider/demeanor: Cooperative, Engaged, and Pleasant  Motor Activity: normal or unremarkable  Mood (subjective report): Anxious, better  Affect (objective appearance): Appropriate/mood congruent  Speech Rate: Rapid  Speech Volume: Normal  Speech Articulation: Normal, some difficulty with fluency and flow  Speech Coherence: Normal  Speech Spontaneity: Normal  Thought Content: no suicidal/homicidal ideation, plans, or intent  Thought Process (associations): Logical and Linear  Thought Process (rate): Rapid  Abnormal Perception: None  Attention/Concentration: Normal  Memory: Appears grossly intact  Fund of  Knowledge: Appears within normal limits  Abstraction:  Normal  Insight:  Adequate  Judgment: Adequate    Does the patient appear to be at imminent risk of harm to self/others at this time? No    The session was necessary to address anxiety and stress in the context of early onset PD.  Ongoing psychotherapy is necessary to stabilize mood, provide counseling, improve functioning with daily activities, provide psychoeducation, provide support, and teach cognitive and behavioral skills.    Diagnoses:    1. Generalized anxiety disorder    2. Social anxiety disorder    3. Early-onset Parkinson's disease (H)    4. S/P deep brain stimulator placement        Plan:    Follow-up visit with me on 4/16/25 at 9:00am  Patient to use 911 or other crisis resources in the event of a psychiatric emergency  Primary care needs and medications are managed by Joey Morley. Referring provider is Ramesh Carson  Next visit agenda/goals:   Complete vitality vs suffering diary  Complete module 2 of the CCI workbook on self-compassion    Skills taught/interventions used thus far:  Psychoeducation  Values clarification  Committed action  Self-compassion (CCI workbook modules 1-2)    NOTE: Treatment plan update due 4/2/26; 90 day treatment plan review due 7/1/25.                                                 Individual Treatment Plan    Patient's Name: Dipesh Nicole  YOB: 1985    Date of Creation: 04/02/25  Date Treatment Plan Last Reviewed/Revised: 04/02/25    DSM5 Diagnoses:     1. Generalized anxiety disorder    2. Social anxiety disorder      Psychosocial / Contextual Factors: health concerns, recent move, [facility]ing [facility]  PROMIS (reviewed every 90 days):      3/18/2025  2:00 PM   PROMIS 10    PROMIS TOTAL - SUBSCORES 35          Referral / Collaboration:  Collaboration with medical providers and specialists as needed.    Anticipated number of session for this episode of care: 15-20  Anticipation frequency  "of session: 1-2x per month  Anticipated Duration of each session: 38-52 minutes  Treatment plan will be reviewed in 90 days or when goals have been changed.       Measurable Treatment Goal(s) related to diagnosis / functional impairment(s)  Goal 1: Patient will decrease use of avoidance-based coping with regard to his PD diagnosis   \"I will know I've met my goal when living with Parkinson's is less hard for me to deal with.\"      Objective #A (Patient Action)    Patient will increase his ability to accept and acknowledge his Parkinson's diagnosis.  Status: New - Date: 04/02/25     Intervention(s)  Therapist will teach acceptance, self-awareness, emotional intelligence skills.    Objective #B  Patient will be more open to sharing diagnosis and how he feels about it.  Status: New - Date: 04/02/25     Intervention(s)  Therapist will teach cognitive reappraisal, defusion, values clarification, and committed action skills.    Objective #C  Patient will develop a core belief about inherent worthiness outside of physical and athletic capabilities.  Status: New - Date: 04/02/25     Intervention(s)  Therapist will teach self-compassion and self-worth skills.    Objective #D  Patient will decrease use of avoidance-based coping.  Status: New - Date: 04/02/25     Intervention(s)  Therapist will teach ACT skills.    Objective #E  Patient will learn and implement anxiety and stress management skills to reduce the impact on PD symptoms.  Status: New - Date: 04/02/25     Intervention(s)  Therapist will teach stress and anxiety management skills.      Patient has reviewed and agreed to the above plan.      Ada Mon, PhD  April 2, 2025    Ada Mon, Ph.D., , [facility]  Clinical Health Psychologist  Phone: [phone]   "

## 2025-05-12 NOTE — PROGRESS NOTES
[facility]ry [facility]  MEDICAL PROGRESS NOTE  Med-Psych Team       CARE TEAM:    PCP- ARLYN ASTORGA  Therapist- Dr. Ada Mon, PhD  Neuro- Jun Murrell MD    Annalise is a 39 year old who uses the pronouns he, him, his.                   Assessment & Plan     Anxiety unspecified  Depression unspecified   Cocaine use disorder, severe  Cannabis use disorder, moderate    Pertinent medical diagnosis:  Early onset Parkinson's disease     Annalise Nicole is a 39 year old male with previous psychiatric diagnoses listed above and pertinent medical diagnoses for early diagnosis of Parkinson's disease who was referred by his CD inpatient program for management of his anxiety which triggers him to use cocaine. He presents today for follow-up.     Today, Annalise presents as euthymic and noted that the medication regimen has been helpful at managing his depressive and anxious symptoms. We discussed optimizing his duloxetine so we could eventually wean him off pregabalin. He was agreeable to this plan. Will increase duloxetine from 40mg to 60mg daily. We reviewed the resident transition and he was okay staying in the resident clinic. Due to my limited availability we will have him placed on the waitlist. We will plan to have him follow up with MTM in the interim to assess for side effects, tolerability and to discuss medication changes. Annalise was agreeable to this plan. We discussed the plan for him to continue in Med-Psych in the mean time and once we have a period of stability, will discharge back to PCP. Annalise expressed understanding. He denied safety concerns.     Psychotropic Drug Interactions:    Blood Pressure Lowering Agents may enhance the hypotensive effect of duloxetine.  Management: routine monitoring    [place] was [place] today: indicates that controlled prescriptions have been filled as prescribed    Risk Statements:   Treatment Risk: Risks, benefits,  alternatives and potential adverse effects have been discussed and are understood.   Safety Risk: Annalise did not appear to be an imminent safety risk to self or others.    PLAN    1) Medications:   - Increase Duloxetine 40mg daily to 60mg daily  - Continue with Pregabalin 50mg TID (8am, noon, 4pm)    2) Psychotherapy: Sees health psychologist Dr. Mon. Currently enrolled in [facility] IOP program at [place].     3) Next due:  Labs: Routine monitoring is not indicated for current psychotropic medication regimen   EKG: Routine monitoring is not indicated for current psychotropic medication regimen     4) Care Coordination / Referrals: Previously placed PCP referral. Placed referral again due to Annalise not getting called. Also placed referral for MTM.    5) Follow-up: Return to clinic in 4 weeks. Placed on waitlist. In the meantime, will follow-up with MTM.                      Interval History   Since last visit,    Updates/Stressors:  - Discharged from ARELI program on 4/15 due to lack of attendance. Not involved in sober meetings. - They recommended to engage and participate in IOP treatment level of care at [facility] or [facility]  to continue to support you in your recovery.  - The program was causing more stress. Classes not very productive. Doing good since then.   - Annalise still has not returned to substance use. Date of lase use of cocaine was on 1/28/2025 and cannabis on 1/26/2025.   - Wife is pregnant with son and is due in August.   - Failed exam for life health insurance.  Needs to pass to start the  job. Taking it again next week. Cravings after he failed but no return to use.     Mood: Good  Sleep: Things have been good. Feels more tired throughout the day.   Appetite: Normal. Gained 25 lbs.   Medications:  Adherent. Sex drive is down. Libido isn't as high.   - Duloxetine 40mg daily  - Pregabalin 50mg TID (8am, noon, 4pm)  Therapy: seeing Dr. Mon every couple weeks.        Current Social  "History:  Financial/occupational: Full-time . [employer] job. Start with  position in June  Living situation: Lives with wife. No pets.   Social/spiritual support: Wife, parents, and brother  Firearms: No      Pertinent Substance Use:  [Last updated 05/13/25]  Alcohol: No  Cannabis: No  Tobacco: No  Caffeine:  Yes: coffee x1 day  Opioids: No   Narcan Kit current: N/A  Other substances: No     Medical Review of Systems / Med sfx:   Lightheadedness/orthostasis: denies  Headaches: denies  GI: denies  Sexual health concerns: See above.                    Summary Points of Current Care  04/1/2025: No medication changes. Placed PCP referral.   05/13/2025: Increased duloxetine form 40mg to 60mg daily. Placed PCP and MTM referral                  Physical Exam  (Vitals Only)    There were no vitals taken for this visit.  Pulse Readings from Last 3 Encounters:   04/01/25 63   02/25/25 67   02/07/25 65     Wt Readings from Last 3 Encounters:   04/01/25 87 kg (191 lb 12.8 oz)   03/07/25 84.4 kg (186 lb)   02/25/25 83.5 kg (184 lb)     BP Readings from Last 3 Encounters:   04/01/25 122/73   02/25/25 (!) 147/81   02/07/25 134/81                      Mental Status Exam    Alertness: alert  and oriented  Appearance: well groomed  Behavior/Demeanor: cooperative, pleasant, and calm, with good  eye contact   Speech: regular rate and rhythm  Language: intact  Psychomotor: normal or unremarkable  Mood: :good\"  Affect: full range and appropriate; congruent to: mood- yes, content- yes  Thought Process/Associations: unremarkable  Thought Content:  Reports none;  Denies suicidal & violent ideation and delusions  Perception:  Reports none;  Denies auditory hallucinations and visual hallucinations  Insight: excellent  Judgment: excellent  Cognition: does  appear grossly intact; formal cognitive testing was not done  Gait and Station: unremarkable                  Past Psychotropic Medication Trials   " Medication Max Dose (mg) Dates / Duration Helpful? DC Reason / Adverse Effects?   Duloxetine 60 2/2025-current Y     pregabalin 50mg TID 2/2025-current Y     topiramate 25mg 1/20/2025-  2/25/2025   Impacts on speech, balance, shuffling, and handwriting   and worsened parkinson's symptoms   melatonin 10mg 1/2025-2/2025   Don't need it anymore   Ativan 1mg q6hr prn 5/2022-9/2023;  11/2024-1/2025   Cramp up from anxiety. No longer needed.   Dystonia.                                                                    Past Medical History     Patient Active Problem List   Diagnosis    Tremor    Seizure (H)    H/O magnetic resonance imaging of cervical spine    H/O shoulder surgery    H/O magnetic resonance imaging of brain and brain stem    Shoulder dislocation    Parkinson disease (H)    Right inguinal hernia    Hiatal hernia    Cyclical vomiting syndrome unrelated to migraine    Esophagitis endoscopy done 11/2021    S/P deep brain stimulator placement    Parkinson's disease, unspecified whether dyskinesia present, unspecified whether manifestations fluctuate (H)    Cocaine use disorder, severe, dependence (H)    Cannabis use disorder, moderate, dependence (H)                     Medications     Current Outpatient Medications   Medication Sig Dispense Refill    carbidopa-levodopa (RYTARY) 48. MG CPCR per CR capsule Take 3 capsules by mouth at 8:30 am, 2 capsules at 12:30 pm, 3 capsules at 4:30 pm, and 2 capsules at 8:30 pm = 10 capsules/day 300 capsule 11    DULoxetine (CYMBALTA) 20 MG capsule Take 2 capsules (40 mg) by mouth daily. 60 capsule 1    pregabalin (LYRICA) 50 MG capsule Take 1 capsule (50 mg) by mouth 3 times daily. 90 capsule 1                     Data         1/16/2025     9:37 AM 1/21/2025    12:57 PM 3/18/2025     2:00 PM   PROMIS-10 Total Score w/o Sub Scores   PROMIS TOTAL - SUBSCORES 22  22  35       Patient-reported         1/21/2025    12:56 PM 1/30/2025    12:42 PM 3/18/2025     2:55 PM    PHQ-9 SCORE   PHQ-9 Total Score MyChart 7 (Mild depression) 5 (Mild depression) 1 (Minimal depression)   PHQ-9 Total Score 7  5  1        Patient-reported         1/30/2025    12:43 PM 4/2/2025    10:42 AM 4/30/2025     8:54 AM   ANAYA-7 SCORE   Total Score 3 (minimal anxiety) 1 (minimal anxiety) 2 (minimal anxiety)   Total Score 3  1  2        Patient-reported       Liver/Kidney Function, TSH Metabolic Blood counts   Recent Labs   Lab Test 11/11/24  0832 01/16/24  1512   AST 20 24   ALT 7 10   ALKPHOS 69 59   CR 0.82 0.87     Recent Labs   Lab Test 12/14/23  1508   TSH 1.37    No lab results found.  No lab results found.  Recent Labs   Lab Test 11/11/24  0832   *    Recent Labs   Lab Test 11/11/24  0832   WBC 7.8   HGB 14.4   HCT 42.8   MCV 90              ECG 11/11/2024 QTc = 385ms      Psychiatry Individual Psychotherapy Note   Psychotherapy start time - 8:55am  Psychotherapy end time - 9:20am  Date treatment plan last reviewed with patient - 05/13/25  Subjective: This supportive psychotherapy session addressed issues related to goals of therapy and current psychosocial stressors. Patient's reaction: Action in the context of mental status appropriate for ambulatory setting.    Interactive complexity indicated? No  Plan: RTC in timeframe noted above  Psychotherapy services during this visit included myself and the patient.   Treatment Plan      SYMPTOMS; PROBLEMS   MEASURABLE GOALS;    FUNCTIONAL IMPROVEMENT / GAINS INTERVENTIONS DISCHARGE CRITERIA   Depression: depressed mood  Anxiety: excessive worry  Substance Use: cannabis  and cocaine    reduce depressive symptoms and reduce depressive episodes Supportive / psychodynamic marked symptom improvement, symptom resolution, and reduced visit frequency         Level of Medical Decision Making:   - At least 1 chronic problem that is not stable  - Engaged in prescription drug management during visit (discussed any medication benefits, side effects,  alternatives, etc.)    The longitudinal plan of care for the diagnosis(es)/condition(s) as documented were addressed during this visit. Due to the added complexity in care, I will continue to support Annalise in the subsequent management and with ongoing continuity of care.    PROVIDER: John Hendrickson DO    Patient staffed in clinic with Dr. Burgos who will sign the note.  Supervisor is Dr. Burgos.

## 2025-05-13 ENCOUNTER — OFFICE VISIT (OUTPATIENT)
Dept: PSYCHIATRY | Facility: CLINIC | Age: 40
End: 2025-05-13
Attending: PSYCHIATRY & NEUROLOGY
Payer: COMMERCIAL

## 2025-05-13 VITALS
BODY MASS INDEX: 25.57 KG/M2 | DIASTOLIC BLOOD PRESSURE: 84 MMHG | HEART RATE: 60 BPM | SYSTOLIC BLOOD PRESSURE: 138 MMHG | WEIGHT: 193.8 LBS

## 2025-05-13 DIAGNOSIS — F14.20 COCAINE USE DISORDER, SEVERE, DEPENDENCE (H): ICD-10-CM

## 2025-05-13 DIAGNOSIS — F32.A DEPRESSION, UNSPECIFIED DEPRESSION TYPE: ICD-10-CM

## 2025-05-13 DIAGNOSIS — F41.9 ANXIETY DISORDER, UNSPECIFIED TYPE: ICD-10-CM

## 2025-05-13 DIAGNOSIS — Z76.89 ENCOUNTER TO ESTABLISH CARE: Primary | ICD-10-CM

## 2025-05-13 DIAGNOSIS — F12.20 CANNABIS USE DISORDER, MODERATE, DEPENDENCE (H): ICD-10-CM

## 2025-05-13 RX ORDER — DULOXETIN HYDROCHLORIDE 60 MG/1
60 CAPSULE, DELAYED RELEASE ORAL DAILY
Qty: 30 CAPSULE | Refills: 2 | Status: SHIPPED | OUTPATIENT
Start: 2025-05-13

## 2025-05-13 RX ORDER — PREGABALIN 50 MG/1
50 CAPSULE ORAL 3 TIMES DAILY
Qty: 90 CAPSULE | Refills: 2 | Status: SHIPPED | OUTPATIENT
Start: 2025-05-17

## 2025-05-13 ASSESSMENT — PAIN SCALES - GENERAL: PAINLEVEL_OUTOF10: NO PAIN (0)

## 2025-05-13 NOTE — PATIENT INSTRUCTIONS
Thank you for your visit today.      Treatment Plan Today:      1) Medications:   -Increase Duloxetine from 40mg to 60mg every morning.     2) Follow-up appointment with Dr. Hendrickson in about 4 weeks.   Placed referral for primary care provider.     Placed referral to follow-up with pharmacist in case you're unable to get scheduled with me.  You may also schedule the appointment by calling [phone] or toll-free at [phone].      3) In the case of an emergency, crisis numbers are below and clinic after hours number is [phone].         **For crisis resources, please see the information at the end of this document**   Patient Education    Thank you for coming to the [facility] [place] [facility].     Lab Testing:  If you had lab testing today and your results are reassuring or normal they will be mailed to you or sent through [facility] within 7 days. If the lab tests need quick action we will call you with the results. The phone number we will call with results is # [phone]. If this is not the best number please call our clinic and change the number.     Medication Refills:  If you need any refills please call your pharmacy and they will contact us. Our fax number for refills is [phone].   Three business days of notice are needed for general medication refill requests.   Five business days of notice are needed for controlled substance refill requests.   If you need to change to a different pharmacy, please contact the new pharmacy directly. The new pharmacy will help you get your medications transferred.     Contact Us:  Please call [phone] during business hours (8-5:00 M-F).   If you have medication related questions after clinic hours, or on the weekend, please call [phone].     Financial Assistance [phone]   Medical Records [phone]       MENTAL HEALTH CRISIS RESOURCES:  For a emergency help, please call 121 or go to the nearest Emergency  Department.     Emergency Walk-In Options:   EmPATH Unit @ [facility] ([place]): [phone] - Specialized mental health emergency area designed to be calming  [facility] ([place]): [phone]  [facility] ([place]): [phone]  [facility] ([place]): [phone]    County Crisis Information:   [place]: [phone]  [place]: [phone]  [place] (COPE) - Adult: [phone]     Child: [phone]  [place] - Adult: [phone]     Child: [phone]  [place]: [phone]  List of all [facility] resources:   https://[place].[facility]/dhs/people-we-serve/adults/health-care/mental-health/resources/crisis-contacts.jsp    National Crisis Information:   Crisis Text Line: Text  [place]  to 388707  Suicide & Crisis Lifeline: 988  National Suicide Prevention Lifeline: 1-823-820-TALK (1-449.759.7722)       For online chat options, visit https://suicidepreventionlifeline.org/chat/  Poison Control Center: 1-736.792.2774  Trans Lifeline: 1-704.750.7527 - Hotline for transgender people of all ages  The [employer] Project: 7-780-358-5024 - Hotline for LGBT youth     For Non-Emergency Support:   Fast Tracker: Mental Health & Substance Use Disorder Resources -   https://www.[place].org/

## 2025-06-05 ENCOUNTER — TELEPHONE (OUTPATIENT)
Dept: BEHAVIORAL HEALTH | Facility: CLINIC | Age: 40
End: 2025-06-05
Payer: COMMERCIAL

## 2025-06-05 NOTE — TELEPHONE ENCOUNTER
"Pt. Emailed primary counselor \"Isaias Fam! This is Annalise Corey. I was in therapy with you a couple months ago. I selfishly chose to dismiss myself from therapy, which in hindsight was a bad decision and very selfish of me looking back. I feel I still need help and could use therapy in my life again.    With that being said, is there a way I can reenroll in your therapy session for outpatient work with you and joining therapy again?    Or what steps do I have to take to move forward in therapy again?\".     Pt. Was given the assessment number.    06/05/25  Cristel Malhotra [facility]  "

## 2025-06-05 NOTE — TELEPHONE ENCOUNTER
"Pt is a(n) adult (18+ out of HS) Seeking as eval for Adult ARELI Assessment for primary substance use treatment (OP ARELI programs including Co-occurring).  Appointment scheduled by:  Patient.  (self-pay - complete Cost Estimate)       needed?  NO    Contact information verified/updated: Yes    If appt is for adult ARELI program location, confirm you have verified the location and address with the patient referring to the template header.  Yes    Nory Velasco    \"We have scheduled your evaluation. In the event that your insurance coverage comes back as out of network, you may receive a call to cancel your appointment and direct you to your insurance company for in-network coverage.\"    Disclaimer regarding insurance read to patient?  Yes  Informed patient [place] are for programming that is in person in the [place] area?  Yes - proceed with scheduling  Reviewed waitlist option with pt?  Yes and patient placed on wait list      "

## 2025-06-17 ENCOUNTER — HOSPITAL ENCOUNTER (OUTPATIENT)
Dept: BEHAVIORAL HEALTH | Facility: CLINIC | Age: 40
Discharge: HOME | End: 2025-06-17
Attending: PSYCHIATRY & NEUROLOGY | Admitting: PSYCHIATRY & NEUROLOGY
Payer: COMMERCIAL

## 2025-06-17 ENCOUNTER — TELEPHONE (OUTPATIENT)
Dept: BEHAVIORAL HEALTH | Facility: CLINIC | Age: 40
End: 2025-06-17
Payer: COMMERCIAL

## 2025-06-17 VITALS — HEIGHT: 73 IN | BODY MASS INDEX: 24.39 KG/M2 | WEIGHT: 184 LBS

## 2025-06-17 PROBLEM — F12.21 CANNABIS USE DISORDER, MODERATE, IN EARLY REMISSION (H): Status: ACTIVE | Noted: 2025-01-21

## 2025-06-17 PROCEDURE — H0001 ALCOHOL AND/OR DRUG ASSESS: HCPCS

## 2025-06-17 ASSESSMENT — COLUMBIA-SUICIDE SEVERITY RATING SCALE - C-SSRS
TOTAL  NUMBER OF INTERRUPTED ATTEMPTS LIFETIME: NO
1. HAVE YOU WISHED YOU WERE DEAD OR WISHED YOU COULD GO TO SLEEP AND NOT WAKE UP?: NO
2. HAVE YOU ACTUALLY HAD ANY THOUGHTS OF KILLING YOURSELF?: NO
6. HAVE YOU EVER DONE ANYTHING, STARTED TO DO ANYTHING, OR PREPARED TO DO ANYTHING TO END YOUR LIFE?: NO
TOTAL  NUMBER OF ABORTED OR SELF INTERRUPTED ATTEMPTS LIFETIME: NO
ATTEMPT LIFETIME: NO

## 2025-06-17 ASSESSMENT — PAIN SCALES - GENERAL: PAINLEVEL_OUTOF10: MODERATE PAIN (4)

## 2025-06-17 NOTE — TELEPHONE ENCOUNTER
Summary of Patient Care Communication Handoff to Patient Navigator Coordinator    PATIENT'S NAME:  Dipesh Nicole  MRN:  [id number]  :   1985    DATE OF SERVICE:  25    Adult Substance Use Disorder    Level of Care Recommended:    1.)  It was recommended the patient abstain from powder cocaine and from all other non-prescribed mood altering chemicals.   2.)  Follow all of the recommendations of his medical and mental health providers.  3.)  Continue working with his current 1:1 mental health therapist.   4.)  Enter an Intensive Outpatient program at one of the [facility] locations for substance use disorder treatment or for co-occurring mental health and substance use disorder treatment.  5.)  Follow all of the recommendations of his substance use disorder treatment providers.  6.)  Attend 12-step or other recovery support group meetings on a weekly basis.         The patient reported he was willing to follow the above recommendations.      Schedule Preferences: No schedule preference (Mornings, Afternoons or Evenings)    Referral Needed:  OP ARELI Programming (P: CD ADULT IOP INTAKE POOL) and BEH Outpatient UR (P BEH OUTPATIENT UR)    Is this a Lodging Plus Referral?  Lodging Plus Screening: No.    Are there any potential barriers for entrance into programmatic care?  None    Specialty Care Coordinator Referral Needed:  No    Release of Information Needed:    Emergency Notification/Contact: (REQUIRED): Completed with the patient by the [address]    Faxing Needed: None    Follow up Requests:      1.)  Referral to designated [facility] Program.    [facility] Pal[facility]    Patient Navigator Coordinator Contact Information  Pool Message: dept-[email or web address]-[email or web address] ([phone])   Phone:  [phone]  Fax:  [phone]  Email:  [email or web address]

## 2025-06-17 NOTE — TELEPHONE ENCOUNTER
This patient's ARELI Service Initiation DAANES information was entered directly into the [place] Department of Human Services DAANES website on 6/17/2025.  Service Initiation Date ID: [id number]

## 2025-06-17 NOTE — PROGRESS NOTES
[facility]  Provider Name:  FARTUN Root/LAURA     Telephone: [phone]      PATIENT'S NAME: Dipesh Nicole  PREFERRED NAME: Dipesh  PRONOUNS: he/him/his    MRN: [id number]  : 1985  ADDRESS:   [address]  [place] [address]  E-MAIL: [email or web address]   ACCT. NUMBER:  [id number]  DATE OF SERVICE: 2025  START TIME: 5:25 pm  END TIME: 5:55 pm  PREFERRED PHONE: [phone]   May we leave a program related message: Yes  SERVICE MODALITY:  In-person:        Last 4 digits of SSN #: [id number]     EMERGENCY CONTACT:   Dipesh Nicole Sr. (father)   Tel #: [phone]      Ritu Nicole (mother)  Tel #: [phone]       Shante Del Toro (wife)  Tel #: [phone]    UNIVERSAL ADULT SUBSTANCE USE DISORDER DIAGNOSTIC ASSESSMENT    Identifying Information:  The patient is a 39 year old, /White male.  The patient was referred for an assessment by self.  The patient attended the session alone.     Chief Complaint:   The reason for seeking services at this time is:  The patient reported the reason for participating in the substance use disorder assessment today on 2025 was due to the patient's own awareness he needed help and due to pressure from his family members and from his wife for him to get help.  The patient had a prior substance use disorder assessment with this counselor at [facility] on 2025.  Following that substance use disorder assessment the patient had entered the [facility] treatment program at [facility] in [place], [place] on 2025 until successfully completing that treatment program on 2025.  The patient reported he had been sober up until relapsing with powder cocaine on 2025, when he reported snorting 1 gram of powder cocaine.  The patient reported having 3 additional relapses with powder cocaine on 2025, on 2025 and on 2025, when he reported snorting 1 gram of powder  cocaine.  The patient reported he had not relapsed with any other mood altering chemicals since completing the [facility] treatment program at [facility] in 2/2025.  The patient stated he had previously discontinued going to his step-down intensive outpatient substance use disorder treatment program at [facility] prior to completing that treatment program due to having a conflict with his work schedule, but he had requested a referral to enter an Intensive Outpatient program at one of the [facility] locations for substance use disorder treatment or for co-occurring mental health and substance use disorder treatment.  The patient first had a concern about having substance abuse issues in 2016.  The patient reported he had attempted to stop his use of powder cocaine by attending substance use disorder treatment in the past.  The patient reported participating in 1 prior residential substance use disorder treatment program at the [facility] treatment program at [facility] in [place], [place] in 2/2025.  The patient denied having any inpatient detoxification admissions or inpatient hospitalizations for withdrawal symptoms.  The patient is not currently receiving any substance use disorder treatment services.  The patient reported a history of attending 12-step or other recovery support group meetings, but denied having any recent attendance at meetings.  The patient does not appear to be in severe withdrawal, an imminent safety risk to self or others, or requiring immediate medical attention and may proceed with the assessment interview.    Social/Family History:  The patient reported growing up in [place] and [place].  The patient reported being raised by both of his biological parents in the same family home.  The patient reported having 1 sibling, a younger brother.  The patient denied experiencing or witnessing any verbal, physical or sexual abuse when he was growing up in the family  home.  The patient reported overall his childhood had been happy.  The patient reported feeling supported by all of his family members when he was growing up.  The patient reported being raised in the Yars[employer]ism Taoism (Roman Catholic).  The patient described his current relationships with his family of origin as being good overall, but somewhat strained due to the patient's substance abuse.       The patient describes his cultural background as being a /White male.  Cultural influences and impact on patient's life structure, values, norms, and healthcare: The patient denied cultural concerns had an impact on life structure, values, norms, or healthcare.  Contextual influences on patient's health include: Community Factors: The patient reported he had first started to use powder cocaine with his friends/peers in social situations.  The patient identified his preferred language to be English.  The patient reported he does not need the assistance of an  or other support involved in therapy.  The patient reported he is actively involved in community kevin activities.  The patient reported his spirituality would have a positive impact on his recovery.     The patient reported having no significant delays in developmental tasks.  The patient's highest education level was college graduate.  The patient identified the following learning problems: none reported.  The patient reports he is able to understand written materials.    The patient reported the following relationship history: The patient reported being  1 time and he is still  to his wife.  The patient identified as being heterosexual and he reported being  to his wife since 12/2025.  The patient denied having any children, but his wife is currently pregnant, and their first child is due in 8/2025.     The patient's current living/housing situation involves staying in own home/apartment.  The patient reported living with his wife  and he reported his housing is stable.  The patient denied having any concerns regarding his immediate living environment and/or neighborhood and he plans to continue to live there.  The patient identified his wife, his father, his mother, and his brother as being his primary support network at this time.  The patient identified the quality of his relationships with his support network as being good overall, but somewhat strained due to the patient's substance abuse.  The patient would like the following people involved in treatment services if recommended: None at this time.     The patient reported engaging in the following recreational/leisure activities: golfing, going for walks and playing basketball.  The patient reported engaging in the following recreation/leisure activities while using alcohol or other non-prescribed mood altering chemicals: The patient's use of powder cocaine had been done independently of his social/recreational/leisure activities.  The patient reported the following people are supportive of his recovery: his wife, his father, his mother, and his brother.  The patient reported he had been working full-time as a  since 6/2025.  The patient reported his income is obtained from employment.  The patient reported having financial stressors at this time, including money being tight at this time.  Cultural and socioeconomic factors do not affect the patient's access to services.    The patient reported the following substance related arrests or legal issues: The patient reported having 1 DWI charge in 2012 and he denied having any other history of arrests or legal charges.  The patient denied being on court probation at this time.     Patient's Strengths and Limitations:  The patient identified the following strengths or resources that will help him succeed in treatment: commitment to health and well being, kevin / spirituality, family support, positive work environment, and  motivation.  Things that may interfere with the patient's success in treatment include: lack of a sober peer support network, financial stressors, and mental health concerns.     Assessments:  The following assessments were completed by patient for this visit:  PHQ9:       2/21/2024    10:15 AM 6/13/2024    12:50 PM 1/16/2025     9:36 AM 1/21/2025    12:56 PM 1/30/2025    12:42 PM 3/18/2025     2:55 PM 6/17/2025     5:35 PM   PHQ-9 SCORE   PHQ-9 Total Score MyChart  5 (Mild depression) 7 (Mild depression) 7 (Mild depression) 5 (Mild depression) 1 (Minimal depression) 5 (Mild depression)   PHQ-9 Total Score 9 5 7  7  5  1  5        Patient-reported     GAD2:       6/13/2024    12:48 PM 1/21/2025    12:56 PM 2/5/2025     3:00 PM 2/10/2025    10:00 AM 2/25/2025    10:00 AM 3/18/2025     2:00 PM 6/17/2025     5:35 PM   ANAYA-2   Feeling nervous, anxious, or on edge 2 1 1 1 1 1 1   Not being able to stop or control worrying 0 1 1 1 0 0 0   ANAYA-2 Total Score 2    2 2  2 2 1 1 1        Patient-reported     PROMIS 10-Global Health (all questions and answers displayed):       6/22/2022     2:52 PM 2/21/2024     9:58 AM 7/1/2024     3:30 PM 1/16/2025     9:37 AM 1/21/2025    12:57 PM 3/18/2025     2:00 PM 6/17/2025     5:36 PM   PROMIS 10   In general, would you say your health is: Good Good Good Fair Fair  Good   In general, would you say your quality of life is: Good Good Good Fair Fair  Good   In general, how would you rate your physical health? Very good Good Good Fair Good  Good   In general, how would you rate your mental health, including your mood and your ability to think? Good Good Good Fair Fair  Good   In general, how would you rate your satisfaction with your social activities and relationships? Good Good Good Fair Fair  Good   In general, please rate how well you carry out your usual social activities and roles Good Good Good Fair Fair  Good   To what extent are you able to carry out your everyday physical  activities such as walking, climbing stairs, carrying groceries, or moving a chair? Mostly Mostly Mostly Mostly Moderately  Mostly   In the past 7 days, how often have you been bothered by emotional problems such as feeling anxious, depressed, or irritable? Sometimes Often Sometimes Often Sometimes  Sometimes   In the past 7 days, how would you rate your fatigue on average? Moderate Mild Mild Mild Moderate  Mild   In the past 7 days, how would you rate your pain on average, where 0 means no pain, and 10 means worst imaginable pain? 2 0 1 1 1  5   In general, would you say your health is: 3 3 3 2 2 4 3   In general, would you say your quality of life is: 3 3 3 2 2 4 3   In general, how would you rate your physical health? 4 3 3 2 3 4 3   In general, how would you rate your mental health, including your mood and your ability to think? 3 3 3 2 2 4 3   In general, how would you rate your satisfaction with your social activities and relationships? 3 3 3 2 2 4 3   In general, please rate how well you carry out your usual social activities and roles. (This includes activities at home, at work and in your community, and responsibilities as a parent, child, spouse, employee, friend, etc.) 3 3 3 2 2 4 3   To what extent are you able to carry out your everyday physical activities such as walking, climbing stairs, carrying groceries, or moving a chair? 4 4 4 4 3 5 4   In the past 7 days, how often have you been bothered by emotional problems such as feeling anxious, depressed, or irritable? 3 4 3 4 3 1 3   In the past 7 days, how would you rate your fatigue on average? 3 2 2 2 3 2 2   In the past 7 days, how would you rate your pain on average, where 0 means no pain, and 10 means worst imaginable pain? 2 0 1 1 1 0 5   Global Mental Health Score 12    12 11 12 8  9  17 12    Global Physical Health Score 15    15 16 15 14  13  18 14    PROMIS TOTAL - SUBSCORES 27    27 27 27 22  22  35 26        Patient-reported     Sullivan  Suicide Severity Rating Scale (Lifetime/Recent)      11/11/2024     8:10 AM 1/21/2025     1:00 PM 6/17/2025     5:00 PM   Allen Suicide Severity Rating (Lifetime/Recent)   Q1 Wished to be Dead (Past Month) 0-->no     Q2 Suicidal Thoughts (Past Month) 0-->no     Q6 Suicide Behavior (Lifetime) 0-->no     Level of Risk per Screen no risks indicated     1. Wish to be Dead (Lifetime)  N N   2. Non-Specific Active Suicidal Thoughts (Lifetime)  N N   Actual Attempt (Lifetime)  N N   Has subject engaged in non-suicidal self-injurious behavior? (Lifetime)  N N   Interrupted Attempts (Lifetime)  N N   Aborted or Self-Interrupted Attempt (Lifetime)  N N   Preparatory Acts or Behavior (Lifetime)  N N   Calculated C-SSRS Risk Score (Lifetime/Recent)  No Risk Indicated No Risk Indicated     GAIN-sliding scale:      1/21/2025     1:00 PM 3/18/2025     2:00 PM 6/17/2025     5:00 PM   When was the last time that you had significant problems...   with feeling very trapped, lonely, sad, blue, depressed or hopeless about the future? Past month 2 to 12 months ago Past month   with sleep trouble, such as bad dreams, sleeping restlessly, or falling asleep during the day? Past Month 2 to 12 months ago 2 to 12 months ago   with feeling very anxious, nervous, tense, scared, panicked or like something bad was going to happen? 2 to 12 months ago Never 1+ years ago   with becoming very distressed & upset when something reminded you of the past? 1+ years ago Never 1+ years ago   with thinking about ending your life or committing suicide? Never Never Never          1/21/2025     1:00 PM 3/18/2025     2:00 PM 6/17/2025     5:00 PM   When was the last time that you did the following things 2 or more times?   Lied or conned to get things you wanted or to avoid having to do something? Past month 2 to 12 months ago 2 to 12 months ago   Had a hard time paying attention at school, work or home? Past month 1+ years ago 2 to 12 months ago   Had a hard  time listening to instructions at school, work or home? Past month Never 2 to 12 months ago   Were a bully or threatened other people? Never Never Never   Started physical fights with other people? Never Never Never     Personal and Family Medical History:  The patient did not report a family history of mental health concerns.  The patient reported having no awareness of any his family members having a history of chronic medical problems, substance abuse problems or mental health problems.    The patient reported the following previous mental health diagnoses: The patient reported a history of Depression NOS and Anxiety disorder NOS.  The patient reported his primary mental health symptoms include: depression and these do not impact his ability to function.  The patient has received mental health services in the past: The patient reported taking his prescribed psychotropic medications as prescribed.  The patient reported he had been working with his current 1:1 mental health therapist for the past 2 and half years.  Psychiatric Hospitalizations: None.  The patient denies a history of civil commitment.  Current mental health services/providers include:  The patient reported taking his prescribed psychotropic medications as prescribed.  The patient reported he had been working with his current 1:1 mental health therapist for the past 2 and half years.    The patient has had a physical exam to rule out medical causes for current symptoms.  Date of last physical exam was within the past year. The patient was encouraged to follow up with PCP if symptoms were to develop.  The patient has a [facility] Primary Care Provider, who is named Joey Morley.  The patient reported the following medical concerns:   Past Medical History:   Diagnosis Date    Anxiety     Depressive disorder     Esophagitis endoscopy done 11/2021 11/18/2021    Impression:            - Normal first portion of the duodenum, second portion                          of the duodenum and third portion of the duodenum.                         Biopsied.                         - Nodular mucosa in the duodenal bulb. Biopsied.                         - Normal stomach. Biopsied.                         - Medium-sized hiatal hernia.                         - LA Grade    H/O electroencephalogram 2021 11/18/2021     Impression:  This is a normal waking and sleep EEG, with generalized beta activities throughout the recording. Generalized beta activities are usually associated certain medication use, such as benzodiazepines or barbiturates. The cause of these activities in this case is unclear at this time.        H/O magnetic resonance imaging of brain and brain stem 08/17/2018 2009 December MR Brain without and with IV Ztmoucec92/8/2009 [facility] Result Impression  Normal MRI of the brain.  Result Narrative      Multiecho, multiplanar views of the brain were obtained prior to and  following the IV administration of 19 mL of contrast. There are no  previous studies available for comparison.     The brain parenchyma is normal in appearance on all pulse sequences,  with     H/O magnetic resonance imaging of cervical spine 08/17/2018 March MR Cervical Spine without IV Contrast3/15/2017 [facility] Result Addenda Addendum by ProviderKaren M.D. on 03/15/2017 12:30 PM RAD^^^MA MR Cervical Spine w/o contrast 3/15/2017 12:30:00 Result Impression  Minimal degenerative disc disease at C5-6 and C6-7  otherwise an unremarkable MR scan of the cervical spine.  Result Narrative   EXAM: MR Cervical Spine w/o contrast   INDICATION:     H/O shoulder surgery 08/17/2018 2017 July XR SHOULDER 2 VIEWS LEFT7/14/2017 [facility] Result Narrative XR SHOULDER 2 VIEWS LEFT 7/14/2017 9:13 PM  INDICATION: Shoulder Injury COMPARISON: None.  FINDINGS: Negative shoulder. No fracture or dislocation.  Status      Hiatal hernia 04/20/2021    Based on a 2021 ct scan  Small hiatal hernia with some wall thickening in the distal thoracic esophagus suggesting some esophagitis.    Parkinson disease (H)     Seizure (H) 2008    x1 at age 22      Shoulder dislocation     left    Tremor 08/10/2018   The patient reported taking his medications as prescribed and following the recommendations of his healthcare providers.  The patient denied having any current issues with pain.  The patient is male and is not pregnant.  There are not significant appetite / nutritional concerns / weight changes.  The patient does not report having a history of an eating disorder.  The patient does report a history of head injury / trauma / cognitive impairment.  The patient reported having a brain surgery in 2022 secondary to his Parkinson's disease.      The patient reported current medications as:   Current Outpatient Medications   Medication Sig Dispense Refill    carbidopa-levodopa (RYTARY) 48. MG CPCR per CR capsule Take 3 capsules by mouth at 8:30 am, 2 capsules at 12:30 pm, 3 capsules at 4:30 pm, and 2 capsules at 8:30 pm = 10 capsules/day 300 capsule 11    DULoxetine (CYMBALTA) 20 MG capsule Take 2 capsules (40 mg) by mouth daily. 60 capsule 1    DULoxetine (CYMBALTA) 60 MG capsule Take 1 capsule (60 mg) by mouth daily. 30 capsule 2    pregabalin (LYRICA) 50 MG capsule Take 1 capsule (50 mg) by mouth 3 times daily. 90 capsule 2     No current facility-administered medications for this encounter.     Medication Adherence:  The patient reported taking his prescribed medications as prescribed.  The patient reported being able  to self-administer his medications.    Patient Allergies:    Allergies   Allergen Reactions    Diphenhydramine      Other reaction(s): Other (see comments)  Told not to take due to parkinsons    Metoclopramide      Avoid in patient with a history of Parkinson Disease  Other reaction(s): GI intolerance  Avoid in patient with a history of Parkinson Disease    Promethazine       Other reaction(s): Other (see comments)  Told not to take due to parkisons     Medical History:    Past Medical History:   Diagnosis Date    Anxiety     Depressive disorder     Esophagitis endoscopy done 11/2021 11/18/2021    Impression:            - Normal first portion of the duodenum, second portion                         of the duodenum and third portion of the duodenum.                         Biopsied.                         - Nodular mucosa in the duodenal bulb. Biopsied.                         - Normal stomach. Biopsied.                         - Medium-sized hiatal hernia.                         - LA Grade    H/O electroencephalogram 2021 11/18/2021     Impression:  This is a normal waking and sleep EEG, with generalized beta activities throughout the recording. Generalized beta activities are usually associated certain medication use, such as benzodiazepines or barbiturates. The cause of these activities in this case is unclear at this time.        H/O magnetic resonance imaging of brain and brain stem 08/17/2018 2009 December MR Brain without and with IV Pspoljsf51/8/2009 [facility] Result Impression  Normal MRI of the brain.  Result Narrative      Multiecho, multiplanar views of the brain were obtained prior to and  following the IV administration of 19 mL of contrast. There are no  previous studies available for comparison.     The brain parenchyma is normal in appearance on all pulse sequences,  with     H/O magnetic resonance imaging of cervical spine 08/17/2018 March MR Cervical Spine without IV Contrast3/15/2017 [facility] Result Addenda Addendum by ProviderKaren M.D. on 03/15/2017 12:30 PM RAD^^^MA MR Cervical Spine w/o contrast 3/15/2017 12:30:00 Result Impression  Minimal degenerative disc disease at C5-6 and C6-7  otherwise an unremarkable MR scan of the cervical spine.  Result Narrative   EXAM: MR Cervical Spine w/o contrast   INDICATION:     H/O shoulder surgery 08/17/2018     2017 July XR SHOULDER 2 VIEWS LEFT7/14/2017 [facility] Result Narrative XR SHOULDER 2 VIEWS LEFT 7/14/2017 9:13 PM  INDICATION: Shoulder Injury COMPARISON: None.  FINDINGS: Negative shoulder. No fracture or dislocation.  Status      Hiatal hernia 04/20/2021    Based on a 2021 ct scan Small hiatal hernia with some wall thickening in the distal thoracic esophagus suggesting some esophagitis.    Parkinson disease (H)     Seizure (H) 2008    x1 at age 22      Shoulder dislocation     left    Tremor 08/10/2018      Substance Use:  The patient reported having no family history of chemical health issues.  The patient reported participating in 1 prior residential substance use disorder treatment program at the [facility] treatment program at [facility] in [place], [place] in 2/2025.  The patient denied having any inpatient detoxification admissions or inpatient hospitalizations for withdrawal symptoms.  The patient is not currently receiving any substance use disorder treatment services.  The patient reported a history of attending 12-step or other recovery support group meetings, but denied having any recent attendance at meetings.      Substance Age of first use Pattern and duration of use (include amounts and frequency) Date of last use     Withdrawal potential Route of use   Has used Alcohol 18 The patient reported his heaviest use of alcohol had been between 2012 and 2015, when he reported a pattern of drinking between 2-5 beers around 3 times per week on average.      From 1/2024 until 1/2025, he reported a pattern of drinking between 2-4 beers or mixed drinks around 5 times per month on average.    The patient reported having no use of alcohol since 1/18/2025.   1/18/2025     (2 beers and 2 mixed drinks)  No Oral   Has used Marijuana   18 The patient reported his heaviest use of THC/cannabis had been between the ages of 29 and 34, when he reported a pattern of smoking 1  gram of THC/cannabis on a daily basis.     From 1/2024 until 9/2024, he reported a pattern of vaping a few hits of THC/cannabis a few times per month on average.      The patient reported having no use of THC/cannabis from 9/2024 up until relapsing with THC/cannabis during in 1/2025.     For a 1-week period of time in 1/2025, he reported a pattern of vaping a few hits of THC/cannabis on a daily basis.    The patient reported having no use of THC/cannabis since 1/20/2025.   1/20/2025     (2 hits off of a vape pen)  No Smoked and Vaping    Has used Amphetamines   20 MDMA/Ecstasy: 15 times in life.  7/2024 No Oral   Has used Cocaine/crack    20 The patient reported his heaviest use of powder cocaine had been in 2016, when he reported a pattern of snorting 1 gram of powder cocaine between 4-5 times per week on average.     The patient reported his longest period of time without using powder cocaine had been for an 8-month period of time in 2022 after having a brain surgery and his return to using powder cocaine had been due to having a desire to use powder cocaine after he had been feeling good again following his recovery from his brain surgery.      From 1/2024 until 1/2025, he reported a pattern of snorting between 1-2 grams of powder cocaine around 8 times per month on average.    The patient reported having no use of powder cocaine after being in the [facility] treatment program at [facility] in [place], [place] up until relapsing with powder cocaine on 6/4/2025, when he reported snorting 1 gram of powder cocaine.      The patient reported having 3 additional relapses with powder cocaine on 6/7/2025, on 6/13/2025 and on 6/14/2025, when he reported snorting 1 gram of powder cocaine.   6/14/2025    (1 gram) No Snort   Has used Hallucinogens 22 Psilocybin mushrooms: 6 times in life.    10/2023 No Oral   Has not used Inhalants        Has not used Heroin        Has not used Other Opiates        Has used  Benzodiazepines   35 The patient reported a history of taking his prescribed Lorazepam (Ativan) as prescribed and he denied having any history of abusing or overusing his prescribed Lorazepam (Ativan).      The patient reported taking his prescribed Lorazepam (Ativan) between 2-3 times per year on average.   1/2025 No Oral   Has not used Barbiturates        Has not used Over the counter medications        Has not used Nicotine        Has use Caffeine 12 The patient reported a current pattern of drinking 1 cup of coffee or 1 bottle/can of soda around 2 times per week on average.     6/17/2025  No  Oral   Has not used other substances not listed above:  Identify:           The patient reported the following problems as a result of their substance use: relationship problems, family problems, legal issues, chronic health problems which were exacerbated by his use of powder cocaine, mental health symptoms which were exacerbated by his use of powder cocaine, occupational / vocational problems, and financial problems.  The patient is concerned about substance use.  The patient reported his recovery goal is: The patient's plan and goal is to abstain from powder cocaine and from all other non-prescribed mood altering chemicals.     The patient reports experiencing the following withdrawal symptoms within the past 12 months: sweating, shaky/jittery/tremors, unable to sleep, agitation, headache, fatigue, sad/depressed feeling, muscle aches, vivid/unpleasant dreams, irritability, high blood pressure, diminished appetite, and anxiety/worry and the following within the past 30 days: sweating, shaky/jittery/tremors, unable to sleep, agitation, headache, fatigue, sad/depressed feeling, muscle aches, vivid/unpleasant dreams, irritability, high blood pressure, diminished appetite, and anxiety/worry. (DSM-11)  The patient reported having urges to use THC/cannabis and powder cocaine.  (DSM-4)  The patient reported he has used more  powder cocaine than intended and over a longer period of time than intended.  (DSM-1)  The patient reported he has had unsuccessful attempts to cut down or control use of THC/cannabis and powder cocaine.  (DSM-2)  The patient reported his longest period of time without using powder cocaine had been for an 8-month period of time in 2022 after having a brain surgery and his return to using powder cocaine had been due to having a desire to use powder cocaine after he had been feeling good again following his recovery from his brain surgery.  The patient reported he has needed to use more powder cocaine to achieve the same effect.  (DSM-10)  The patient does report diminished effect with use of same amount of powder cocaine.  (DSM-10)      The patient does not report a great deal of time is spent in activities necessary to obtain, use, or recover from powder cocaine effects.  (DSM-3)  The patient does not report important social, occupational, or recreational activities are given up or reduced because of powder cocaine use.  (DSM-7)  Cocaine and THC/cannabis use is continued despite knowledge of having a persistent or recurrent physical or psychological problem that is likely to have been caused or exacerbated by use.  (DSM-9)  The patient reported the following problem behaviors while under the influence of substances: The patient reported having relationship conflict with his wife and his parents and being more impulsive when under the influence of powder cocaine.  (DSM-6)  The patient reported recurrent use of powder cocaine in physically hazardous such as driving a motor vehicle while under the influence.  (DSM-8)     The patient reported his substance use has not negatively impacted his ability to function in a school setting within the past 12-months.  (DSM-5)  The patient reported his substance use has negatively impacted his ability to function in a work setting.  The patient reported being late to work and  having decreased performance at work due to his substance use.  (DSM-5)  The patient's demographics and history impact his recovery in the following ways: The patient reported he had first started to use powder cocaine with his friends/peers in social situations.  The patient reported engaging in the following recreation/leisure activities while using alcohol or other non-prescribed mood altering chemicals: The patient's use of powder cocaine had been done independently of his social/recreational/leisure activities.  The patient reported the following people are supportive of his recovery: his wife, his father, his mother, his brother, and a few close friends.    The patient denied having current or past concerns regarding gambling and denied ever participating in a gambling treatment program.  The patient does not have other addictive behaviors he is concerned about at this time.    Dimension Scale Ratings:    Dimension 1 -  Acute Intoxication/Withdrawal: 0 - No Problem  Summary to support rating:  Withdrawal Management - No Withdrawal Management Indicated.    Dimension 2 - Biomedical: 1 - Minor Problem  Summary to support rating:  The patient has had a physical exam to rule out medical causes for current symptoms.  Date of last physical exam was within the past year. The patient was encouraged to follow up with PCP if symptoms were to develop.  The patient has a [place] Primary Care Provider, who is named Joey Morley.  The patient reported the following medical concerns:   Past Medical History:   Diagnosis Date    Anxiety     Depressive disorder     Esophagitis endoscopy done 11/2021 11/18/2021    Impression:            - Normal first portion of the duodenum, second portion                         of the duodenum and third portion of the duodenum.                         Biopsied.                         - Nodular mucosa in the duodenal bulb. Biopsied.                         - Normal stomach. Biopsied.                          - Medium-sized hiatal hernia.                         - LA Grade    H/O electroencephalogram 2021 11/18/2021     Impression:  This is a normal waking and sleep EEG, with generalized beta activities throughout the recording. Generalized beta activities are usually associated certain medication use, such as benzodiazepines or barbiturates. The cause of these activities in this case is unclear at this time.        H/O magnetic resonance imaging of brain and brain stem 08/17/2018 2009 December MR Brain without and with IV Fznduebb84/8/2009 [facility] Result Impression  Normal MRI of the brain.  Result Narrative      Multiecho, multiplanar views of the brain were obtained prior to and  following the IV administration of 19 mL of contrast. There are no  previous studies available for comparison.     The brain parenchyma is normal in appearance on all pulse sequences,  with     H/O magnetic resonance imaging of cervical spine 08/17/2018 March MR Cervical Spine without IV Contrast3/15/2017 [facility] Result Addenda Addendum by ProviderKraen M.D. on 03/15/2017 12:30 PM RAD^^^MA MR Cervical Spine w/o contrast 3/15/2017 12:30:00 Result Impression  Minimal degenerative disc disease at C5-6 and C6-7  otherwise an unremarkable MR scan of the cervical spine.  Result Narrative   EXAM: MR Cervical Spine w/o contrast   INDICATION:     H/O shoulder surgery 08/17/2018 2017 July XR SHOULDER 2 VIEWS LEFT7/14/2017 [facility] Result Narrative XR SHOULDER 2 VIEWS LEFT 7/14/2017 9:13 PM  INDICATION: Shoulder Injury COMPARISON: None.  FINDINGS: Negative shoulder. No fracture or dislocation.  Status      Hiatal hernia 04/20/2021    Based on a 2021 ct scan Small hiatal hernia with some wall thickening in the distal thoracic esophagus suggesting some esophagitis.    Parkinson disease (H)     Seizure (H) 2008    x1 at age 22      Shoulder dislocation     left    Tremor  08/10/2018   The patient reported taking his medications as prescribed and following the recommendations of his healthcare providers.  The patient denied having any current issues with pain.  The patient is male and is not pregnant.  There are not significant appetite / nutritional concerns / weight changes.  The patient does not report having a history of an eating disorder.  The patient does report a history of head injury / trauma / cognitive impairment.  The patient reported having a brain surgery in 2022 secondary to his Parkinson's disease.      Dimension 3 - Emotional/Behavioral/Cognitive Conditions: 2 - Moderate Problem  Summary to support rating:  The patient reported a history of Depression NOS and Anxiety disorder NOS.  The patient reported his primary mental health symptoms include: depression and these do not impact his ability to function.  The patient has received mental health services in the past: The patient reported taking his prescribed psychotropic medications as prescribed.  The patient reported he had been working with his current 1:1 mental health therapist for the past 2 and half years.  Psychiatric Hospitalizations: None.  The patient denies a history of civil commitment.  Current mental health services/providers include:  The patient reported taking his prescribed psychotropic medications as prescribed.  The patient reported he had been working with his current 1:1 mental health therapist for the past 2 and half years.    Dimension 4 - Readiness to Change:  2 - Moderate Problem  Summary to support rating:  The patient displayed verbal compliance to abstain from powder cocaine and from all other non-prescribed mood altering chemicals, but he had lacked consistent behavior to support abstinence, including having poor follow through with prior substance abuse treatment recommendations.  The patient did appear to be willing to enter an Intensive Outpatient program at one of the [facility] for substance use disorder treatment or for co-occurring mental health and substance use disorder treatment.    Dimension 5 - Relapse/Continued Use/ Continued Problem Potential: 2 - Moderate Problem  Summary to support rating:  The patient had relapsed with powder cocaine despite having negative consequences, including having mental health issues, medical problems, relationship problems, and financial problems which were exacerbated by his substance abuse.  The patient appears to lack sober coping skills and he lacks long-term sober maintenance skills.  The patient has dual issues of mental health and substance abuse.   The patient has mental health symptoms which are exacerbated by his substance abuse.  The patient has medical problems which are exacerbated by his substance abuse. The patient lacks a current sober peer support network.    Dimension 6 - Recovery Environment:  2 - Moderate Problem  Summary to support rating:  The patient reported being  1 time and he is still  to his wife.  The patient identified as being heterosexual and he reported being  to his wife since 12/2025.  The patient denied having any children, but his wife is currently pregnant, and their first child is due in 8/2025.  The patient's current living/housing situation involves staying in own home/apartment.  The patient reported living with his wife and he reported his housing is stable.  The patient denied having any concerns regarding his immediate living environment and/or neighborhood and he plans to continue to live there.  The patient identified his wife, his father, his mother, and his brother as being his primary support network at this time.  The patient identified the quality of his relationships with his support network as being good overall, but somewhat strained due to the patient's substance abuse.  The patient would like the following people involved in treatment services if  recommended: None at this time.  The patient reported engaging in the following recreational/leisure activities: golfing, going for walks and playing basketball.  The patient reported engaging in the following recreation/leisure activities while using alcohol or other non-prescribed mood altering chemicals: The patient's use of powder cocaine had been done independently of his social/recreational/leisure activities.  The patient reported the following people are supportive of his recovery: his wife, his father, his mother, and his brother.  The patient reported he had been working full-time as a  since 6/2025.  The patient reported his income is obtained from employment.  The patient reported having financial stressors at this time, including money being tight at this time.  Cultural and socioeconomic factors do not affect the patient's access to services.  The patient reported the following substance related arrests or legal issues: The patient reported having 1 DWI charge in 2012 and he denied having any other history of arrests or legal charges.  The patient denied being on court probation at this time.     Significant Losses / Trauma / Abuse / Neglect Issues:   The patient did not serve in the .  There are indications or report of significant loss, trauma, abuse or neglect issues related to: The patient denied having any history of being verbally, emotionally, physically, or sexually abused.  The patient has not been a victim of exploitation.  The patient reported having a history of trauma issues due to having a brain surgery in 2022.  The patient denied having any history of suicide attempts and denied having any current suicide ideation.  The patient denied having any history of self-injurious behavior.   Concerns for possible neglect are not present.    Safety Assessment:   The patient denies current or past homicidal ideation and behaviors.  The patient denied having any  current/recent suicidal ideation, plan, intent or suicide attempts, but he reported a history of having passive suicidal ideation but denied ever having a plan or intent to harm himself.  The patient denied having any history of self-injurious behavior.   The patient reported having mental health problems, reported having health problems, using illicit drugs with unknown contents and purity, and having a risk for having an accidental drug overdose associated with substance use.  The patient reported substance use associated with mental health symptoms.  The patient denied having any current or past personal safety concerns.   The patient denied a history of assaultive behaviors.    The patient denied having any history of sexual assault behaviors.  The patient denied having any history of being registered as a sex offender.   The patient reported there are not any firearms in the home.    Patient reports the following protective factors: positive relationships positive family connections, forward/future oriented thinking, dedication to family/friends, sense of belonging, adherence with prescribed medication, daily obligations, commitment to well-being, and strong sense of self-worth/esteem.       Risk Plan:  See Recommendations for Safety and Risk Management Plan.    Review of Symptoms per patient report:  Substance Use:  significant withdrawal symptoms, substance use related medical issues, substance use related mental health issues, cravings/urges to use, family relationship problems due to substance use, and social problems related to substance use.    Diagnostic Criteria:   1.)  Substance is often taken in larger amounts or over a longer period than was intended.  Met for:  powder cocaine.  2.)  There is persistent desire or unsuccessful efforts to cut down or control use of the substance.  Met for:  THC/cannabis and powder cocaine.  4.)  Craving, or a strong desire or urge to use the substance.  Met for:   THC/cannabis and powder cocaine.  5.)  Recurrent use of the substance resulting in a failure to fulfill major role obligations at work, school, or home.  Met for:  powder cocaine.  6.)  Continued use of the substance despite having persistent or recurrent social or interpersonal problems caused or exacerbated by the effects of its use.  Met for:  powder cocaine.  7.)  Important social, occupational, or recreational activities are given up or reduced because of the substance.  Met for:  powder cocaine.  8.)  Recurrent use of the substance in which it is physically hazardous.  Met for:  powder cocaine.  9.)  Use of the substance is continued despite knowledge of having a persistent or recurrent physical or psychological problem that is likely to have been cause or exacerbated by the substance.  Met for:  THC/cannabis and powder cocaine.  10.)  Tolerance:  either a need for markedly increased amounts of the substance to achieve the desired effect or a markedly diminished effect with continued use of the same amount of the substance.  Met for:  powder cocaine.  11.)  Withdrawal:  either patient endorses characteristic withdrawal syndrome for the substance or the substance (or closely related substance) is taken to relieve or avoid withdrawal symptoms.  Met for:  THC/cannabis and powder cocaine.    Collateral Contact Summary:   Collateral contacts contributing to this assessment:  The patient's electronic medical records were reviewed at time of assessment.    No additional collateral data had been obtained at the time of this documentation.     If court related records were reviewed, summarize here: None    Information from collateral contacts supported/largely agreed with information from the client and associated risk ratings.    Information in this assessment was obtained from the medical record and provided by the patient who is a good historian.        The patient will have open access to his substance use disorder  assessment medical record.    As evidenced by self report and criteria, the patient meets the following DSM-5 Diagnoses: (Sustained by DSM-5 Criteria Listed Above)      1.)  Cocaine Use Disorder Severe - 304.20 (F14.20)  2.)  Cannabis Use Disorder Moderate - 304.30 (F12.20), in early remission  3.)  Depression NOS, per patient self-report  4.)  Anxiety disorder NOS, per patient self-report    Specify if: In early remission:  After full criteria for alcohol/drug use disorder were previously met, none of the criteria for alcohol/drug use disorder have been met for at least 3 months but for less than 12 months (with the exception that Criterion A4,  Craving or a strong desire or urge to use alcohol/drug  may be met).     In sustained remission:   After full criteria for alcohol use disorder were previously met, none of the criteria for alcohol/drug use disorder have been met at any time during a period of 12 months or longer (with the exception that Criterion A4,  Craving or strong desire or urge to use alcohol/drug  may be met).     Specify if:   This additional specifier is used if the individual is in an environment where access to alcohol is restricted.    Mild: Presence of 2-3 symptoms  Moderate: Presence of 4-5 symptoms  Severe: Presence of 6 or more symptoms    Functional Status:  The patient reported the following functional impairments:  The patient denied having any significant functional impairments at this time.       ARELI/DUAL Programmatic Care:    1. Does the patient have a history of vulnerability such as being teased, picked on, or other indications of potential safety issues with other residents?  No    2. Does this patient have a history of being the victim of abuse? The patient denied having any history of being verbally, emotionally, physically, or sexually abused.    3. Does this patient have a history of victimizing others? No     4. Does the patient have a history of boundary violations?   No.    5. Does the patient have a history of other sexual acting out behaviors (e.g grooming)?   No    6. Does the patient have a history of threats to self or others? Fire setting, running away or other self-injurious behaviors?    No    7. Does the patient s history indicate the need for special precautions or particular staffing patterns in the facility?  No    NOTE: If this screening indicates that the patient is at risk to harm self or others, notify staff at referral location.    Recommendations:     1. Plan for Safety and Risk Management:     Recommended that patient call 911 or go to the local ED should there be a change in any of these risk factors.            Report to child / adult protection services was not needed.    2. [facility] Referrals:      Recommendations:      1.)  It was recommended the patient abstain from powder cocaine and from all other non-prescribed mood altering chemicals.   2.)  Follow all of the recommendations of his medical and mental health providers.  3.)  Continue working with his current 1:1 mental health therapist.   4.)  Enter an Intensive Outpatient program at one of the [facility] locations for substance use disorder treatment or for co-occurring mental health and substance use disorder treatment.  5.)  Follow all of the recommendations of his substance use disorder treatment providers.  6.)  Attend 12-step or other recovery support group meetings on a weekly basis.        The patient reported he was willing to follow the above recommendations.      The patient meets criteria for the following levels of care based on ASAM Criteria:      Withdrawal Management - No Withdrawal Management Indicated.    Treatment/Recovery Services - 2.1 Intensive Outpatient Services.      The patient's placement aligns with the assessment and placement level of care recommendation based on current ASAM Dimension scale ratings.     The patient would like the following family or other support  people involved in their treatment:  None at this time.  The patient does not have any history of opioid abuse.      3.  Mental Health Referrals:     The patient would benefit from continuing to follow all of the recommendations of his mental health providers.    4. Clinical Substantiation for the above recommendations: The patient would benefit from developing long-term sober maintenance skills, would benefit from developing sober coping skills, would benefit from developing a sober peer support network, has dual issues of mental health and substance abuse, and has mental health symptoms which are exacerbated by substance abuse.    5.  Cultural Concerns:    The patient did not identify having any cultural concerns regarding mental health, physical health, or substance use issues.     6. Recommendations for treatment focus:      Alcohol / Substance Use - See #2. [facility] Referrals above for details on recommendations.    7. Interactive Complexity: No    8. Safety Plan:  Patient has no change in safety concerns. Committed to safety and agreed to follow previously developed safety plan.     [place] Safety Plan      Creation Date: 2/3/25 Last Update Date: 6/17/25      Step 1: Warning signs:    Warning Signs    Patient reported experiencing isolation and over the top stress.      Step 2: Internal coping strategies - Things I can do to take my mind off my problems without contacting another person:    Strategies    Patient reported utilizing prayer in the event of a crisis.      Step 3: People and social settings that provide distraction:    Name Contact Information    Leigh (Wife) [phone]    Aung (brother) [phone]    Sarath (Dad) [phone]       Places    The Gym    The Park    Parents House      Step 4: People whom I can ask for help during a crisis:    Name Contact Information    Leigh (Wife) [phone]    Aung (brother) [phone]    Andrzej (Friend) [phone]      Step 5: Professionals or  "agencies I can contact during a crisis:    Clinician/Agency Name Phone Emergency Contact    Dr. Mon (Psychologist)        Local Emergency Department Emergency Department Address Emergency Department Phone    911        Suicide Prevention Lifeline Phone: Call or Text 697  Crisis Text Line: Text HOME to 969034     Step 6: Making the environment safer (plan for lethal means safety):   Patient reported having firearms locked up at his parents.      Optional: What is most important to me and worth living for?:   Patient stated, \"My future, my health, and my baby and wife.\"      [place] Safety Plan. Jzazy Wu and Andrez Marc. Used with permission of the authors.        Provider Name/ Credentials:  [facility]  June 17, 2025   "

## 2025-06-18 NOTE — TELEPHONE ENCOUNTER
The below telephone note was routed to the [facility] Patient Navigator at: DEPT-TRIAGETRANSITION-PATIENTNAVIGATOR [[id number]] on 6/18/2025 as a referral for IOP treatment at [facility].    A.)  Name: Dipesh Nicole Tel #: [phone]  B.)  MRN: [id number]  C.)  Referral is for: an Intensive Outpatient program at one of the [facility] locations for substance use disorder treatment.  D.)  CPA needs to be completed for [facility] only: NO  E.)  Notes: IOP referral     Thanks,    Shirin Marroquin

## 2025-06-23 ENCOUNTER — TELEPHONE (OUTPATIENT)
Dept: BEHAVIORAL HEALTH | Facility: CLINIC | Age: 40
End: 2025-06-23
Payer: COMMERCIAL

## 2025-07-01 ENCOUNTER — BEH TREATMENT PLAN (OUTPATIENT)
Dept: BEHAVIORAL HEALTH | Facility: CLINIC | Age: 40
End: 2025-07-01
Payer: COMMERCIAL

## 2025-07-01 ENCOUNTER — THERAPY VISIT (OUTPATIENT)
Dept: ADDICTION MEDICINE | Facility: HOSPITAL | Age: 40
End: 2025-07-01
Attending: FAMILY MEDICINE
Payer: COMMERCIAL

## 2025-07-01 DIAGNOSIS — F14.20 COCAINE USE DISORDER, SEVERE, DEPENDENCE (H): Primary | ICD-10-CM

## 2025-07-01 PROCEDURE — H2035 A/D TX PROGRAM, PER HOUR: HCPCS

## 2025-07-01 ASSESSMENT — ANXIETY QUESTIONNAIRES
2. NOT BEING ABLE TO STOP OR CONTROL WORRYING: NOT AT ALL
5. BEING SO RESTLESS THAT IT IS HARD TO SIT STILL: NOT AT ALL
3. WORRYING TOO MUCH ABOUT DIFFERENT THINGS: SEVERAL DAYS
7. FEELING AFRAID AS IF SOMETHING AWFUL MIGHT HAPPEN: NOT AT ALL
GAD7 TOTAL SCORE: 1
GAD7 TOTAL SCORE: 1
1. FEELING NERVOUS, ANXIOUS, OR ON EDGE: NOT AT ALL
6. BECOMING EASILY ANNOYED OR IRRITABLE: NOT AT ALL
4. TROUBLE RELAXING: NOT AT ALL

## 2025-07-01 ASSESSMENT — PATIENT HEALTH QUESTIONNAIRE - PHQ9: SUM OF ALL RESPONSES TO PHQ QUESTIONS 1-9: 1

## 2025-07-01 NOTE — PROGRESS NOTES
"[facility]  MENTAL HEALTH AND ADDICTION     IOP TREATMENT PLAN    [facility] LEVEL OF CARE:  2.1 Intensive Outpatient Program Date:  25        DIMENSION 1: Intoxication / Withdrawal Potential     Initial Risk Ratin  Identified areas of concern/focus: Cocaine use disorder severe.     As evidenced by: Date of last use of Cocaine on 2025    Client's Goal: \"Stay sober from cocaine and be present with my family\"  Clinical Goals:    Abstain from all mood altering substances.  Must be reached in order to have services terminated?  Yes  Target Date: 2025       Date/ Initials Objectives Interventions Target Date Extended Date Extended Date Stopped Completed Initials   25  KT Client will report any substance use to staff at the next treatment session following relapse. Staff will check-in on his sobriety each week.  2025        DIMENSION 2: Biomedical Conditions/Complications   Initial Risk Ratin  Identified areas of concern/focus:  Annalise is diagnosis with Parkinson's disease . Annalise reports a pain of 5/10 on his right hip. .    As evidenced by:    Annalise physical health diagnosis and pain scale.    Client's Goal: \"Go to the gym 4 days a week and stay present with my physical health\"  Clinical Goals:    Client will take all medications as prescribed. Must be reached in order to have services terminated?  No  Target Date: 2025   Client will make a PCP appointment to address his hip pain.  Must be reached in order to have services terminated? No  Target Date: 2025       Date/ Initials Objectives Interventions Target Date Extended Date Extended Date Stopped Completed Initials   2025  KT Client will report any health concerns/changes to staff at the next treatment session. Staff will check-in on his health and ways he is improving it. 2025  KT Client will consistently take medications as prescribed.  Staff will check in with client 1 times " "per week regarding medication side-effects. 25  KT Client will report on how he is staying physically active.  Staff will check-in on his improvement with his physical activity. . 2025        DIMENSION 3:Emotional/Behavioral Conditions/Complications   Initial Risk Ratin  Identified areas of concern/focus: Anxiety and Depression         As evidenced by:    Annalise past symptoms of Anxiety is \"worry\" and depression \"feeling alone and having no hope for the future\".     Client's Goal: \"Let me feelings out instead of holding them in\"  Clinical Goals:    Client will strengthen protective factors.  Must be reached in order to have services terminated?  Yes  Target Date: 2025   Client will develop effective strategies for  anxiety symptoms and depression symptoms. Must be reached in order to have services terminated?  No  Target Date: 2025        Date/ Initials Objectives Interventions Target Date Extended Date Extended Date Stopped Completed Initials   2025  KT Client will participate in 9 hours of group therapy 4 days per week.  Staff will facilitate 9 hours of group therapy 4 days per week. 2025  KT Client will identify  protective factors he would like to increase and  strategies to accomplish this. Staff will assist client by providing education on protective factors, helping client prioritize, and reinforcing use of identified strategies. 2025  KT   Client will understand the feelings wheel.  Staff will provide feelings wheel.  2025  KT Client will participate on journal meditation.  Staff will provide meditation questions  2025  KT Client will participated in mindfulness meditation.  Staff will provide mindfulness meditation video 2025  KT Client will shared one affirmation during each group.  Staff will provide affirmation list  2025  KT Client will learn self soothing " skills Staff will provide coping skills for self soothing.  9/7/2025 7/1/2025  KT Client will write a love letter to himself.  Staff will provide journal  9/7/2025 7/1/2025  KT Client will learn Wise mind  Staff will provide education on wise mind 9/7/2025 7/1/2025  KT Client will learn about his core values.  Staff will provide information on core values  9/7/2025 7/1/2025  KT Client will participate in learning all of nothing thinking.  Staff will provide education on different types of thinking.  9/7/2025 7/1/2025  KT Client will identify and name his inner critique.  Staff will provide education on negative self-talk 9/7/2025        Mental Health Skills Groups is required for first phase of treatment:    Date/ Initials Methods/Interventions Target Date Extend Date Extend Date Stopped Completed Initials   General Instructions: MH Skills Group - IOP - 6 sessions  Attend each of the following groups one time a week and complete all 6 by due date, unless excused by primary counselor. All group work to be completed each session to receive an effective outcome.       7/1/2025  KT > Neurobiology of addiction:  Informs client of brain changes and reduces feelings of shame, instructs on steps to help heal         7/1/2025  KT > Learn and use Mindfulness to facilitate effective action in problem solving and promote health and well-being  9/7/2025 7/1/2025  KT > Mental Health Part 1: Learn 3 core processes of Acceptance, Cognitive Defusion, and Being Present  9/7/2025 7/1/2025  KT > Mental Health Part 2: Learn 3 core processes of Self-as-Context, Values, and Committed Action  9/7/2025 7/1/2025  KT > Learn and apply Self-Care in a variety of areas to improve mental and physical health, reduce PAWS, and increase feelings of self-empowerment, resiliency and well-being.   9/7/2025 7/1/2025  KT > Learn Stress Management techniques to reduce PAWS and stress-related symptoms, increase  "resiliency, and learn healthy routines to replace dysfunctional habits.  2025            DIMENSION 4: Treatment Acceptance/Resistance   Initial Risk Ratin  Identified areas of concern/focus:    Cocaine Use Disorders; 304.20 (F14.20) Cocaine Use Disorder Severe  Ambivalence about change  Moderate motivation for treatment    As evidenced by:  Meets DSM 5 criteria for substance use disorder.    Client's Goal: \"being willing and open with the group\"  Clinical Goals:    Client will fully engage in treatment and recovery process and begin to verbalize readiness for change.  Must be reached in order to have services terminated?  Yes  Target Date: 2025     Date/ Initials Objectives Interventions Target Date Extended Date Extended Date Stopped Completed Initials   2025  KT Client will meet individually with LADC every month to review progress on treatment plan goals. LADC will review progress on treatment plan with client every month.  2025 Client will identify at least 5 commitments to treatment/recovery.   Staff will see look over his commitments in his own recovery. 25  KT Client will identify at least 10 benefits of treatment/sobriety.   Staff will ask his benefits to his sobriety.  25  KT Client will come to group and participate.  Staff will look over his engagement in group.  2025                                     DIMENSION 5: Relapse/Continued Problem Potential   Initial Risk Rating: 3  Identified areas of concern/focus:  High risk for relapse    As Evidenced by:  Client unable to identify relapse triggers.    Client lacks coping skills for relapse prevention.        Client's Goal: \"learn and apply coping skills when dealing with stress\"  Clinical Goals:    Client has established and utilizes a relapse prevention plan.  Must be reached in order to have services terminated?  Yes  Target Date: 2025   Acquire the necessary skills to " "maintain long-term sobriety.  Must be reached in order to have services terminated?  Yes Target Date: 2025   Develop increased awareness of relapse triggers and develop coping strategies to effectively deal with them.  Must be reached in order to have services terminated?  Yes  Target Date: 2025       Date/ Initials Objectives Interventions Target Date Extended Date Extended Date Stopped Completed Initials   2025  KT Client will comply with urine drug screens at staff request to monitor for substance use. Staff will collect drug screens and review results with client. 2025   Client will rate urges to use at each treatment session. Staff will check in with patient at each session regarding urges to use. 25  KT Client will complete relapse prevention assignment identifying at least 5 potential triggers for relapse and coping strategies for each. Staff will provide relapse prevention assignment and assist with completion.  25  KT Client will identify and practice at least 2 ways to \"say no\"/resist pressures to use.   Staff will teach refusal skills in group. 25   Client will identify and practice at least 5 coping skills for dealing with urges/triggers. Staff will teach coping skills. 25  KT Client will complete goodbye letter. Staff will provide prompts for his goodbye letter. 25  KT Clients will understand grieving the loss of addiction  Staff will provide education. 25  KT Client will learn his personal relapse pattern and learn more about his denial of his use.  Staff will provide education. 2025        DIMENSION 6: Recovery Environment   Initial Risk Ratin  Identified areas of concern/focus: Lack of sober support  Lack of sober / recreational interests    As evidenced by:    Clients lacks sober activities.      Client's Goal: \"Get back into golf and get " "outside more\"  Clinical Goals:   Establish a transition plan connecting to culturally informed services in the community for post-treatment follow up care.  Must be reached in order to have services terminated?  Yes  Target Date: 9/7/2025   Develop sober recreational activities.  Must be reached in order to have services terminated? Yes  Target Date: 9/7/2025   Establish sober support network. Must be reached in order to have services terminated?  Yes  Target Date: 9/7/2025       Date/ Initials Objectives Interventions Target Date Extended Date Extended Date Stopped Completed Initials   7/1/2025  KT Client will identify at least 5 people in his/her life that support recovery.   Staff will educate on healthy relationships. 9/7/2025 7/1/2025  KT Client will identify at least 10 sober recreational/leisure activities they are willing to explore. Staff will provide sober activities. 9/7/2025 7/1/2025  KT Client will continue to work-full time.  Staff will check-in on his employment . 9/7/2025                                                                                                           Client Strengths: Determined  Client Treatment Plan Adaptations:  Client does not need adjustments at this time.  The following adjustments will be made based on the above identified plan: NA   Patient's primary therapist/counselor:  Cristel aMlhotra [facility]  The following staff have also contributed to this plan:        Were family/support people involved in the treatment planning?  No - List reason why not:  Pt. Attended alone  Patient's progress will be reviewed at least every 30 days (for IOP patients)..    Resources  Resources to which the patient is being referred for problems when they are to be addressed concurrently by another provider: No Referrals Needed    [x] Contact insurance to ensure referrals are in network    Vulnerable Adult Review   [] Review of the facility Abuse Prevention plan was reviewed with " the patient   [] No individual abuse plan is necessary   [] In addition to the facility Abuse Prevention plan, an Individual Abuse Plan will be put in place       Dipesh attests their date of last use as 6/14/2025. Dipesh attests they have participated in the creation of this treatment plan. Dipesh has been provided a copy of this treatment plan and is in agreement with how this plan is written and will be stored in the electronic record.     Client Signature:  _______________________________ Date:___________________    [facility] Signature:  _______________________________  Date: __________________

## 2025-07-01 NOTE — PROGRESS NOTES
"Comprehensive Assessment Update:    Comprehensive assessment dated 2025 was reviewed and updates are as follows: 25    Reason for admission today:  \"I just feel like I was going back to my old ways, need to talk about things with people like me\". Pt. Shared \"I want to be here for me now\".     Dates of last use and substance(s) used:  Cocaine- 2025, Alcohol- 2025  Patient does not have a history of opiate use.    Vulnerability Assessment:  Does the patient have a history of vulnerability such as being teased, picked on, or other indications of potential safety issues with other residents?  No    Does this patient have a history of being the victim of abuse? No history of abuse reported or documented.    Does this patient have a history of victimizing others? No     Does the patient have a history of boundary violations?  No.    Does the patient have a history of other sexual acting out behaviors (e.g grooming)?   No    Does the patient have a history of threats to self or others? Fire setting, running away or other self-injurious behaviors?    No    Does the patient s history indicate the need for special precautions or particular staffing patterns in the facility?  No      NOTE: If this screening indicates that the patient is likely to have sexually abusive behavior, the license shen must have written risk   management plans to protect the patient, other patients, staff, and the community.    Other safety concerns:  None        Other:  None    Health Screening:  Given patient's past history, a medication, and physical condition, is there a fall risk?  No  Does the patient have any pain? Yes -  Pain ratin/10      Describe pain:  Word Description: \"achy pain\"        When did it first begin?: \"2 months ago\"  How long does each episode last?: \"Only when I move or lay down\".  What causes or worsens it?:  \"when I sleep\".   What relieves or lessens it?:  \"nothing\"  Would like this pain addressed " during your stay: NO   Staff have requested client inform staff of any new or different pain issue(s) that arise during their treatment stay:  No   Is the patient on a special diet? If yes, please explain: no  Does the patient have any concerns regarding your nutritional status? If yes, please explain: no  Has the patient had any appetite changes in the last 3 months?  No  Has the patient had any weight loss or weight gain in the last 3 months? No  Has the patient have a history of an eating disorder or been over-eating, avoiding meals, or inducing vomiting?  No  Does the patient have any dental concerns? (Problems with teeth, pain, cavities, braces)?  NO  Are immunizations up to date?  Yes  Any recent exposure to TB, Hepatitis, Measles, or Strep?  No    Brief Biosocial Gambling Screening:  Do you have any current or past concerns regarding gambling?  No      Dimension Scale Ratings:    Dimension 1: 0 Client displays full functioning with good ability to tolerate and cope with withdrawal discomfort. No signs or symptoms of intoxication or withdrawal or resolving signs or symptoms.    Summary to support rating:  Annlaise reports date of last use of Cocaine on 6/14/2025. Annalise reports no withdrawal concerns at this time.     Dimension 2: 1 Client tolerates and stephanie with physical discomfort and is able to get the services that the client needs.  Summary to support rating:  Annalise is diagnosis with Parkinson disease. Annalise reports a 5/10 pain on his right hip. Annalise reports that he will make a doctor appointment with his PCP. Annalise has active insurance and is able to receive care when needed.     Dimension 3: 1 Client has impulse control and coping skills. Client presents a mild to moderate risk of harm to self or others or displays symptoms of emotional, behavioral or cognitive problems. Client has a mental health diagnosis and is stable. Client functions adequately in significant life areas.  Summary to support rating: Annalise  "reports mental health diagnosis of Anxiety and Depression. Annalise reports no symptoms of anxiety and depression. Annalise past symptoms of Anxiety is \"worry\" and depression \"feeling alone and having no hope for the future\". Annalise reports that he meets with his individual therapist ones a month. Annalise meets with his physiatrist every 2 months for medication management. Annalise reports not SI,SIB,VI, or HI at this time.      Dimension 4: 2 Client displays verbal compliance, but lacks consistent behaviors; has low motivation for change; and is passively involved in treatment.  Summary to support rating:  Annalise has internal motivation for treatment for himself, wife , and his baby on the way. Annalise completed residential services however was inconstant with IOP level of care and now re entered the program.     Dimension 5: 3 Client has poor recognition and understanding of relapse and recidivism issues and displays moderately high vulnerability for further substance use or mental health problems. Client has few coping skills and rarely applies coping skills.  Summary to support rating:  Annalise is at high risk of relapse due to lack of coping skills and insight into his internal and external triggers. Annalise would benefit in learning about his triggers, urges, and cravings. Annalise would benefit from learning coping skills and mindfulness meditation.     Dimension 6: 1 Client has passive social  or family and significant other are not interested in the client's recovery. The client is engaged in structured meaningful activity.  Summary to support rating::  Annalise is working full-time. Annalise has stable living with his wife. Annalise has a baby on the way in August. Annalise has no pending or current legal charged. Annalise is inconsistent with sober support meetings.         Initial Service Plan (ISP)    Immediate health, safety, and preliminary service needs identified and plan includes the following based on available information from " "clients, referral sources, and collateral information.    Safety (SI, SIB, suicide attempts, aggressive behaviors):  History of SI (suicidal ideation)?  No  History of SIB (self-injurious behavior)?  No  History of VI (violent ideation)?  No  History of HI (homicidal ideation)?  No    Health:  Client does NOT have health issues that would impede participation in treatment    Transportation: \"my own car\".      Other:  None    Patient does not have any identified barriers to participating in referred services.    Vulnerable Adult Assessment    Does the patient possess a physical or mental infirmity or other physical, mental, or emotional dysfunction?  No. Patient is not a vulnerable adult.    This patient is not a functional Vulnerable Adult according to [place] Statute 626.5572 subdivision 21.      Treatment suggestions for client for the time period until the initial treatment planning session: Pt. Shared he will be  Attend dr. Azevedo on Thursday, go into work everyday, be  present with his  wife. Pt. Shared that he has been going to the gym and will continue to go this week.       7/1/2025     3:00 PM   Suicide Ideation Check In   Since last session, how often have you had suicidal thoughts? No thoughts of suicide       Service Initiation Individual Note   Start: 3:10 pm  End:  4:02    Substance Use Diagnosis:  Cocaine Use Disorder Severe - 304.20 (F14.20)   Cannabis Use Disorder Moderate - 304.30 (F12.20), in early remission    Mental Health Diagnosis:  Anxiety and Depression     D: Writer met with client to facilitate an individual session that focused on completion of client's service initiation and intake into the program. Reviewed the intake packet, client sign documents regarding general consent, HIPAA and other orientation documents. Client signed ROIS for Insurance and Emergency contact.  We discussed the impact of their substance use on various areas of their life. The patient shared what they have " been doing between the initial comprehensive assessment date and today's service initiation appointment. We discussed the plan for the patient between this session and their first group.  The patient also shared their individual motivation for recovery during this session.      I: Facilitated an individual session. Utilized MI, Person-Centered Approach, Active Listening, Validation, and Clarifying Questions.    A: Client actively engaged and participated in the session. Client appears insightful into what he is going to work on in his recovery.     P: Client is set to start group on 7/3/2025.    LAURA Garcia 7/1/2025 3:36 PM

## 2025-07-02 ENCOUNTER — TELEPHONE (OUTPATIENT)
Dept: BEHAVIORAL HEALTH | Facility: CLINIC | Age: 40
End: 2025-07-02
Payer: COMMERCIAL

## 2025-07-02 NOTE — TELEPHONE ENCOUNTER
----- Message from Cristel Malhotra sent at 7/1/2025  4:52 PM CDT -----  Scheduling Request    Patient Name: Dipesh Nicole  Location of programming: [facility]  Start Date: July / 03 / 2025  Group: [id number] on Mondayat 5:30 PM to 7:30 PM, Tuesdayat 5:30 PM to 7:30 PM, Wednesdayat 5:30 PM to 8:30 PM, and Thursday at 5:30 PM to 7:30 PM  Attending Provider (MD): Martha Woodward  Number of visits to be scheduled: 22  Duration of Appointment in minutes: 120 min, 180 min  Visit Type: In-person or Treatment - 870    Additional notes: New patient

## 2025-07-07 ENCOUNTER — DOCUMENTATION ONLY (OUTPATIENT)
Dept: BEHAVIORAL HEALTH | Facility: CLINIC | Age: 40
End: 2025-07-07
Payer: COMMERCIAL

## 2025-07-07 ENCOUNTER — VIRTUAL VISIT (OUTPATIENT)
Dept: PHARMACY | Facility: CLINIC | Age: 40
End: 2025-07-07
Attending: PSYCHIATRY & NEUROLOGY
Payer: COMMERCIAL

## 2025-07-07 ENCOUNTER — TELEPHONE (OUTPATIENT)
Dept: BEHAVIORAL HEALTH | Facility: CLINIC | Age: 40
End: 2025-07-07
Payer: COMMERCIAL

## 2025-07-07 DIAGNOSIS — G20.A1 PARKINSON'S DISEASE WITHOUT DYSKINESIA OR FLUCTUATING MANIFESTATIONS (H): Primary | ICD-10-CM

## 2025-07-07 DIAGNOSIS — F41.9 ANXIETY: ICD-10-CM

## 2025-07-07 DIAGNOSIS — F14.20 COCAINE USE DISORDER, SEVERE, DEPENDENCE (H): ICD-10-CM

## 2025-07-07 NOTE — PROGRESS NOTES
"Medication Therapy Management (MTM) Encounter    ASSESSMENT:                            Medication Adherence/Access: No issues identified.    Parkinson's Disease  Appears stable; encouraged patient to schedule follow up in neurology.     Mental Health   Improving     PLAN:                            It was great seeing you today. Congratulations on your new job, marriage, and new baby on the way!  It seems your medications are working pretty well so I do not recommend any changes at this time.   Call neurology clinic to reschedule appointment with Dr. Murrell: [phone]    Follow-up: 6 months or sooner if needed     SUBJECTIVE/OBJECTIVE:                          Annalise Nicole is a 39 year old male seen for a follow-up visit.       Reason for visit: medication review.    Allergies/ADRs: Reviewed in chart  Past Medical History: Reviewed in chart  Tobacco: He reports that he has never smoked. He has never been exposed to tobacco smoke. He has never used smokeless tobacco.  Alcohol: none since 1/28/25  THC: none since 1/28/25   Cocaine: none since 6/14/25    Medication Adherence/Access: no issues reported.    Parkinson's Disease  - Medication regimen listed below  Patient states that his Parkinson's disease symptoms are about the same. He is not noticing wearing off of the Rytary and states he likes the new medication schedule of 4 times/day vs 5 times/day which he was previously taking. He reports feeling \"better\" when he takes 3 capsules vs the 2 capsules. He feels more \"upbeat\" mentally and states he walks more smoothly after 3 capsules. He denies any side effects of Rytary including dyskinesias, hallucinations, and dizziness. He does report getting sleepy in the mid-afternoon, feels like he could fall asleep. He is not sure if this is medication-related.   Patient states he missed last neurology appt and is planning to reschedule.        7 am 11 am 3 pm 7 pm   Rytary 195 mg  2 3 2 3     Mental Health   - pregabalin " 50 mg 3 times daily - 7 am, 11 am, 3 pm   - duloxetine 40 mg daily in the morning   Patient states his mood has been good. He started a new job (), is getting , and is expecting a baby in August. He reports his anxiety is better and he is handling his life better overall. He starts an outpatient addiction program tomorrow. He reports no side effects from the medications and feels they have been helpful.          Today's Vitals: There were no vitals taken for this visit.  ----------------    I spent 12 minutes with this patient today. I offer these suggestions for consideration by Dr. Murrell and Dr. Carson.     A summary of these recommendations was sent via [email or web address].    Veronica Swift, Pharm.D.  Medication Therapy Management Pharmacist  [facility]    Telemedicine Visit Details  The patient's medications can be safely assessed via a telemedicine encounter.  Type of service:  Video Conference via Vivo[employer]  Originating Location (pt. Location): Home    Distant Location (provider location):  Off-site  Start Time: 1:59 PM  End Time: 2:11 PM     Medication Therapy Recommendations  No medication therapy recommendations to display

## 2025-07-07 NOTE — PROGRESS NOTES
ABSENT NOTE:    Dipesh STEPHANIE Rennn was absent from group 7/7/2025 . This absence WAS NOT  excused. This writer called Pt. And shared he has a work conflict. Pt. Was informed starting on 7/8/2025 he needs to attended his group session. Patient is expected to be in group on 7/8/2025.     LAURA Garcia  7/7/2025 , 1:28 PM

## 2025-07-07 NOTE — Clinical Note
MATHEW Annalise is doing fairly well. He did relapse in June with cocaine and is starting an outpatient treatment program tomorrow. He has a new job, is getting , and is expecting a baby in August. He missed the last appt with Dr. Murrell and is planning to reschedule.

## 2025-07-07 NOTE — PATIENT INSTRUCTIONS
"Recommendations from today's MTM visit:                                                      It was great seeing you today. Congratulations on your new job, marriage, and new baby on the way!  It seems your medications are working pretty well so I do not recommend any changes at this time.   Call neurology clinic to reschedule appointment with Dr. Murrell: [phone]    Follow-up: 6 months or sooner if needed     It was great speaking with you today.  I value your experience and would be very thankful for your time in providing feedback in our clinic survey. In the next few days, you may receive an email or text message from [employer] with a link to a survey related to your  clinical pharmacist.\"     To schedule another MTM appointment, please call the clinic directly or you may call the MTM scheduling line at [phone].    My Clinical Pharmacist's contact information:                                                      Please feel free to contact me with any questions or concerns you have.      Veronica Swift, Pharm.D.  Medication Therapy Management Pharmacist  [facility]     "

## 2025-07-08 ENCOUNTER — DOCUMENTATION ONLY (OUTPATIENT)
Dept: BEHAVIORAL HEALTH | Facility: CLINIC | Age: 40
End: 2025-07-08
Payer: COMMERCIAL

## 2025-07-08 ENCOUNTER — THERAPY VISIT (OUTPATIENT)
Dept: ADDICTION MEDICINE | Facility: HOSPITAL | Age: 40
End: 2025-07-08
Attending: FAMILY MEDICINE
Payer: COMMERCIAL

## 2025-07-08 DIAGNOSIS — F14.20 COCAINE USE DISORDER, SEVERE, DEPENDENCE (H): Primary | ICD-10-CM

## 2025-07-08 PROCEDURE — H2035 A/D TX PROGRAM, PER HOUR: HCPCS | Mod: HQ

## 2025-07-08 NOTE — PROGRESS NOTES
Admission DAANES:   Client admitted to [facility] evening program program on 7/1/2025.    Writer completed client's admission DAANES on 7/8/2025.        DAANES ID:[id number]     07/08/25  2:48pm   Cristel Malhotra [facility]

## 2025-07-09 NOTE — GROUP NOTE
Group Therapy Documentation    PATIENT'S NAME: Dipesh Nicole  MRN:   [id number]  :   1985  ACCT. NUMBER: [id number]  DATE OF SERVICE: 25  START TIME:  5:30 PM  END TIME:  7:30 PM  FACILITATOR(S): Cristel Malhotra LADC  TOPIC: BEH Group Therapy  Number of patients attending the group:  5  Group Length:  2 Hours    Dimensions addressed: 2, 3, and 5    Summary of Group / Topics Discussed:    Stress Management:  Clients were provided handout with definition of stress, what causes stress, and common warning signs of stress. Clients participated in discussion regarding the purpose of stress and stress response and how too much or too little can be problematic for health, safety, and/or motivation. Clients circled each symptom on the warning sign list categorized by physical, behavioral, and psychological stress to build awareness of individualized stress responses. Clients shared their stress experience and identified ways of reducing stress to improve overall wellness and relaxation.       Group Objectives/Goals:      Client will gain understanding of stress and the positive and negative effects on the mind and body            Client will identify ways to detect stress warning signs for too much or not enough stress specific to him or her            Client will identify ways to modify stress to boost motivation or decrease tension??     Group Attendance:  Attended group session, arrived at 6pm    Patient's response to the group topic/interactions:  expressed understanding of topic, gave appropriate feedback to peers, and listened actively    Patient appeared to be Actively participating.        Client specific details:   Annalise shared today he is feeling   Egar to get back and start the process   . Annalise rated his mental health 10 being good  7/10, working out and meditation    . Annalise rated his physical health 10 being good  7/10, working out   .  Annalise shared over the weekend of going to [employer] for  [employer] and spending time with his family  . Annalise shared staying sober. Annalise shared if he had any urges, cravings, or triggers to use over the weekend,  yes stress .    Annalise affirmation  one day at a time   .  Annalise shared how he will work on his physical health this week  working out  . Annalise participated in learning the early warning signs of stress. Annalise shared his external causes of stress  job, image, success  . Annalise shared his internal causes of stress  thinking of the image other people see  . Annalise shared if he focuses on what he can control how it would make him feel instead of thinking what is not in his control  Gives me more power and validates my worth  .         7/8/2025     8:00 PM   Suicide Ideation Check In   Since last session, how often have you had suicidal thoughts? No thoughts of suicide         .

## 2025-07-10 ENCOUNTER — DOCUMENTATION ONLY (OUTPATIENT)
Dept: BEHAVIORAL HEALTH | Facility: CLINIC | Age: 40
End: 2025-07-10
Payer: COMMERCIAL

## 2025-07-10 ENCOUNTER — TELEPHONE (OUTPATIENT)
Dept: BEHAVIORAL HEALTH | Facility: CLINIC | Age: 40
End: 2025-07-10
Payer: COMMERCIAL

## 2025-07-10 NOTE — PROGRESS NOTES
ABSENT NOTE:    Dipesh BROWN Corey was absent from group 7/10/2025 . This absence WAS excused. Pt. Left a VM that his wife is in labor and will not be in group. Patient is expected to be in group on 7/17/2025.     Cristel Malhotra [facility]  7/10/2025 , 4:25 PM

## 2025-07-14 ENCOUNTER — TELEPHONE (OUTPATIENT)
Dept: BEHAVIORAL HEALTH | Facility: CLINIC | Age: 40
End: 2025-07-14
Payer: COMMERCIAL

## 2025-07-14 ENCOUNTER — DOCUMENTATION ONLY (OUTPATIENT)
Dept: BEHAVIORAL HEALTH | Facility: CLINIC | Age: 40
End: 2025-07-14
Payer: COMMERCIAL

## 2025-07-14 NOTE — PROGRESS NOTES
ABSENT NOTE:    Dipesh BROWN Corey was absent from group 7/14/2025 . This absence WAS excused. This writer attempted to contact patient via phone and pt called back stating he is still in the hospital . Patient is expected to be in group on 7/15/2025.     [facility]hotra [facility]  7/14/2025 , 5:08 PM

## 2025-07-15 ENCOUNTER — TELEPHONE (OUTPATIENT)
Dept: BEHAVIORAL HEALTH | Facility: CLINIC | Age: 40
End: 2025-07-15
Payer: COMMERCIAL

## 2025-07-15 ENCOUNTER — DOCUMENTATION ONLY (OUTPATIENT)
Dept: BEHAVIORAL HEALTH | Facility: CLINIC | Age: 40
End: 2025-07-15
Payer: COMMERCIAL

## 2025-07-16 ENCOUNTER — THERAPY VISIT (OUTPATIENT)
Dept: ADDICTION MEDICINE | Facility: HOSPITAL | Age: 40
End: 2025-07-16
Attending: FAMILY MEDICINE
Payer: COMMERCIAL

## 2025-07-16 ENCOUNTER — DOCUMENTATION ONLY (OUTPATIENT)
Dept: BEHAVIORAL HEALTH | Facility: CLINIC | Age: 40
End: 2025-07-16
Payer: COMMERCIAL

## 2025-07-16 ENCOUNTER — TELEPHONE (OUTPATIENT)
Dept: BEHAVIORAL HEALTH | Facility: CLINIC | Age: 40
End: 2025-07-16
Payer: COMMERCIAL

## 2025-07-16 DIAGNOSIS — F15.90 STIMULANT USE DISORDER: Primary | ICD-10-CM

## 2025-07-16 PROCEDURE — H2035 A/D TX PROGRAM, PER HOUR: HCPCS | Performed by: SOCIAL WORKER

## 2025-07-16 NOTE — PROGRESS NOTES
Health Psychology          Diana Link, Ph.D.,  [phone]  Amaris Peres, Ph.D.,  [phone]  Renay Gibbons, Ph.D.,  [phone]  Lisset Anderson, Ph.D., , [facility] [phone]  Damian Jameson, Ph.D., , [facility] [phone]  Ekta Aguilar, Ph.D.,  [phone]  Ada Mon, Ph.D., , [facility] [phone]    [facility], 3rd Floor  [address] [place] [address]  [id number]       Health Psychology Progress Note    Patient Name: Dipesh Nicole    Service Type: Individual  Length of Visit: 42 minutes  Start time: 8:05 AM   Stop time: 8:55 AM   Attendees: patient attended alone  Session #: 3    Telemedicine Information:     Patient's location: [facility] [place], [place]    Provider's location: provider's home office, [place], [place]    Type of telemedicine visit: Telemedicine psychotherapy video    Technology used: Epic, Amwell    Patient consent to telemedicine services? Yes    It has been determined that telemedicine service is appropriate and effective for this patient in light of the necessity for social distancing to mitigate the COVID-19 epidemic    Mode of transmission: Secure real time interactive audio and visual telecommunication system    The patient has been notified that:  Video visits will be conducted via a call with their psychologist to provide the care they need with a video conversation. Video visits may be billed at different rates depending on insurance coverage.  Patients are advised to contact their insurance provider with any questions about their health insurance coverage. If during the course of a call the psychologist feels a video visit is not appropriate, patients will not be charged for this service.    Identifying Information and Presenting Problem:  The patient is a 36 year old adult who is being seen for problematic symptoms of anxiety in the context of Parkinson's Disease.    Topics Discussed/Interventions Provided:    Session  Requested Prescriptions     Pending Prescriptions Disp Refills    furosemide (LASIX) 80 mg tablet [Pharmacy Med Name: FUROSEMIDE 80 MG Tablet] 103 Tab 3     Sig: TAKE 1 TAB BY MOUTH TWO (2) TIMES A DAY.  Ivory Clarke, [address]       Last office visit was  12/14/16  Next office visit is     None     Please assist.\ "Content:       Update: Patient reports doing generally well. Read the handouts on diagnoses and agrees with them. No other notable updates.        Treatment planning: Completed treatment plan. See below.                                              Individual Treatment Plan    Patient's Name: Dipesh Nicole  YOB: 1985    Date of Creation: 07/05/22   Date Treatment Plan Last Reviewed/Revised: 07/05/22     DSM5 Diagnoses:     1. Generalized anxiety disorder    2. Major depressive disorder, recurrent, in remission (H)        Psychosocial / Contextual Factors: upcoming surgery, job/vocational stress, life transition stress, health issues, somewhat limited social support    PROMIS (reviewed every 90 days):     PROMIS 10 6/22/2022   PROMIS TOTAL - SUBSCORES 27       Referral / Collaboration:  Referral to another professional/service is not indicated at this time.    Anticipated number of session for this episode of care: 10-15  Anticipation frequency of session: Every other week  Anticipated Duration of each session: 38-52 minutes  Treatment plan will be reviewed in 90 days or when goals have been changed.     Measurable Treatment Goal(s) related to diagnosis / functional impairment(s)  Goal 1: Patient will improve his ability to manage his anxiety skillfully    \"I will know I've met my goal when I am not as scared to go places because of getting physically locked up and I will be more social. It's the fear of the physical aspects of anxiety.\"     Objective #A (Patient Action)    Patient will improve his ability to identify at least 3 early warning signs or symptoms of anxiety.  Status: New - Date: 07/05/22      Intervention(s)  Therapist will teach awareness training skills.    Objective #B (Patient Action)    Patient will learn use relaxation strategies at least once per day and during times of high anxiety to reduce the physical symptoms of anxiety.  Status: New - Date: 07/05/22   " "    Intervention(s)  Therapist will teach relaxation and physiological arousal reduction strategies and skills.    Objective #C (Patient Action)    Patient will use cognitive strategies identified in therapy to challenge and unhook from anxious thoughts.  Status: New - Date: 07/05/22      Intervention(s)  Therapist will teach cognitive skills.      Patient has reviewed and agreed to the above plan.      Ada Mon, PhD  July 5, 2022         Skills/intervention: Provided psychoeducation about symptoms of anxiety. Assisted patient in identifying early warning signs related to his anxiety. Patient noted that the onset of strong anxiety episodes often start with the thought \"What if I lock up right now?\" Also notes that an hour before an anxiety episode, he will often anticipate dystonia. He will also often vomit during high anxiety episodes. Reports that he had vomiting episodes even in the absence of marijuana use.        Introduced CBT model, cognitive distortions, and diaphragmatic breathing.       Treatment Objective(s) Addressed in This Session:  Anxiety: will experience a reduction in anxiety, will develop more effective coping skills to manage anxiety symptoms, will develop healthy cognitive patterns and beliefs and will increase ability to function adaptively  Psychological distress related to Chronic Disease Management    Progress on / Status of Treatment Objective(s) / Homework:  Stable   In progress    Assessment: The patient appeared to be active and engaged in today's session and was receptive to feedback.     Mental Status Exam:   Appearance: Appropriate   Eye Contact: Good    Orientation: Yes, x4  Behavior/relationship to provider/demeanor: Cooperative, Engaged and Pleasant  Motor Activity: restless and significant for akathisia  Mood (subjective report): Anxious  Affect (objective appearance): Appropriate  Speech Rate: Normal  Speech Volume: Normal  Speech Articulation: Normal  Speech " Coherence: Normal  Speech Spontaneity: Normal  Thought Content: no suicidal/homicidal ideation, plans, or intent  Thought Process (associations): Logical, Linear, Goal directed and Perseverative  Thought Process (rate): Normal  Abnormal Perception: None  Attention/Concentration: Normal  Memory: Appears grossly intact  Fund of Knowledge: Appears within normal limits   Abstraction:  Normal  Insight: Adequate  Judgment:  good    Does the patient appear to be at imminent risk of harm to self/others at this time? No    The session was necessary to address problematic symptoms of anxiety in the context of PD that have been affecting the patient's quality of life and possibly contributing to worsening symptoms.  Ongoing psychotherapy is necessary to provide counseling, improve functioning with daily activities, provide psychoeducation, provide support and teach cognitive and behavioral skills.    Diagnoses:    1. Generalized anxiety disorder    2. Major depressive disorder, recurrent, in remission (H)    3. Parkinson disease (H)        Plan:    1. Patient to return for a follow-up visit on 7/29/22 at 11:00am.  2. Primary care provider is Joey Morley and referring provider is Ramesh Carson MD.   3. Patient to contact [facility], [facility] or other community resources if there was a psychiatric emergency.  4. Next visit agenda:   a. Review handouts on CBT model  b. Tally cognitive distortions  c. Practice diaphragmatic breathing at least 3x per week for 5 minutes.      Skills taught/interventions used thus far:     Psychoeducation    Awareness training    Awareness of cognitive distortions    Physiological arousal reduction    Diaphragmatic breathing      NOTE: Treatment plan update due 7/5/23; 90 day review date 10/3/22    Ada Mon, Ph.D., , [facility]  Clinical Health Psychologist  Phone: [phone]   Pager: [phone]          Opt out

## 2025-07-16 NOTE — PROGRESS NOTES
Attestation: Martha Woodward MD. - Provides oversight and supervision of care.     Individual Session Summary   NOTE: This group was a MH Skills Group Topic that was completed in an individual session due to not having the required three people for a group format.     START TIME: 7:26 PM   END TIME: 8:40 PM   DURATION: 74 Minutes    Skills Group: Mindfulness in Action: Facilitated a psychoeducation and session around mindfulness and how to practice it. Discussed with client the importance of increasing awareness of the present moment. Educated client on the benefits of practicing mindfulness and how it works to reduce rumination and how it facilitates effective action. Client participated in a combination of deep breathing and the awareness of sounds mindfulness exercise and mindful eating exercise.      Group Objectives/Goals    Client will Identify 2 skills to increase awareness of the present moment to reduce negative impact of ruminating thoughts  Client will give 1 example of how mindfulness works in the brain to calm the emotional part of the Midbrain (which is overactive in addiction)  Client will give 1 example of how mindfulness practice may decrease reactivity and facilitate effective action    Expected therapeutic outcome(s):   Increased ability to remain in present moment  Increased ability to tolerate negative emotions without suppression or returning to substance use.    Type of Session: Individual, In-person; NOTE: originally scheduled as a MH Skills Group topic, however, only two people could attend so this was presented in two individual sessions.      Attendance:  Annalise attended this session.     Patient's response to the group topic/interactions:  discussed personal experience with topic, expressed readiness to alter behaviors and expressed understanding of topic    Client specific details:  This was Annalise's first session with this therapist and he shared what brought him into treatment at this time.  "He was late in arriving and I had called him at 10 7:00 PM but his cell phone  when we spoke. He arrived worried that he would miss the session and explained that since his baby was born on Saturday he hasn't had much sleep and his in-laws are over and are staying in their apartment with them. His showed me pictures of his son and he is a proud father. Annalise also gave me his history with his brain surgery and how this has affected his speech, speeding it up in a way that he can't control. His substance of concern is cocaine and his last date of use was 2025. He rated his stress at 6, with 10 highest. He attributes this to lack of sleep, the new baby, MD appts, his in-laws staying there (they speak [place] sor[place] and he only speaks a little [place] sor[place]) but he also appreciates them being there to help with their new baby, and he returned to work. He has not had any cravings this week. He enjoyed mindful eating and said, \"I noticed small details of the food that I never noticed before.\" The awareness of sounds exercise especially resonated with him and explained, \"It was amazing, I liked it a lot, it made me feel at home inside, if that makes sense\" and it does. Review: \"I honestly feel an awareness, the hands on learning was so important. 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 He agreed to practice the exercises he learned today at home, including the [place] worksheet. This topic completes one of the six MH Skills Groups required under D3 of his Treatment Plan.         2025     9:00 PM   Suicide Ideation Check In   Since last session, how often have you had suicidal thoughts? No thoughts of suicide          Theresa Alanis, LICSW     "

## 2025-07-16 NOTE — PROGRESS NOTES
Addiction Outpatient Weekly Clinical Staffing     Dipesh Nicole was staffed on 7/16/2025 . Dipesh Nicole was staffed on recovery strengths, barriers and treatment progress.     Staff present: Isabel Perez [facility], Jacklyn Strong [facility], [facility], Kesha Hopkins [facility] , Marli Esteban [facility], Cristel Malhotra [facility], and Jonathan Anton [facility]     Date: 7/16/2025 Time: 3:09 PM    Staff Signature: LAURA Garcia

## 2025-07-16 NOTE — PROGRESS NOTES
"ABSENT NOTE:    Dipesh Nicole was absent from group 7/15/2025 . This absence WAS NOT  excused. Annalise emailed at 6:47 stating \"Hi! I am at the office and no one is here. Did I miss something or is the therapy session over?\" Writer emailed back stating \"Hi Annalise, you shared with me that you were going to be 15 minutes late to the group.  There were not enough people to make a group tonight and I waited till 6:40 pm.\"   Patient is expected to be in group on 7/16/2025.     Cristel Malhotra, [facility]  7/15/2025 , 7:23 PM        "

## 2025-07-17 ENCOUNTER — DOCUMENTATION ONLY (OUTPATIENT)
Dept: BEHAVIORAL HEALTH | Facility: CLINIC | Age: 40
End: 2025-07-17
Payer: COMMERCIAL

## 2025-07-17 ENCOUNTER — TELEPHONE (OUTPATIENT)
Dept: BEHAVIORAL HEALTH | Facility: CLINIC | Age: 40
End: 2025-07-17
Payer: COMMERCIAL

## 2025-07-18 NOTE — PROGRESS NOTES
ABSENT NOTE:    Dipesh Nicole was absent from group 7/17/2025 . This absence WAS NOT excused. This writer attempted to contact patient via Phone however was sent to . Writer is going to call on 7/18/2025 and Pt. Might have a potential discharge.     Cristel Malhotra [facility]  7/17/2025 , 7:36 PM

## 2025-07-19 ENCOUNTER — HEALTH MAINTENANCE LETTER (OUTPATIENT)
Age: 40
End: 2025-07-19

## 2025-07-21 ENCOUNTER — TELEPHONE (OUTPATIENT)
Dept: BEHAVIORAL HEALTH | Facility: CLINIC | Age: 40
End: 2025-07-21
Payer: COMMERCIAL

## 2025-07-21 ENCOUNTER — THERAPY VISIT (OUTPATIENT)
Dept: ADDICTION MEDICINE | Facility: HOSPITAL | Age: 40
End: 2025-07-21
Attending: FAMILY MEDICINE
Payer: COMMERCIAL

## 2025-07-21 DIAGNOSIS — F14.20 COCAINE USE DISORDER, SEVERE, DEPENDENCE (H): Primary | ICD-10-CM

## 2025-07-21 PROCEDURE — H2035 A/D TX PROGRAM, PER HOUR: HCPCS | Mod: HQ

## 2025-07-22 ENCOUNTER — THERAPY VISIT (OUTPATIENT)
Dept: ADDICTION MEDICINE | Facility: HOSPITAL | Age: 40
End: 2025-07-22
Attending: FAMILY MEDICINE
Payer: COMMERCIAL

## 2025-07-22 DIAGNOSIS — F14.20 COCAINE USE DISORDER, SEVERE, DEPENDENCE (H): Primary | ICD-10-CM

## 2025-07-22 DIAGNOSIS — F19.90 SUBSTANCE USE DISORDER: Primary | ICD-10-CM

## 2025-07-22 PROCEDURE — H2035 A/D TX PROGRAM, PER HOUR: HCPCS

## 2025-07-22 NOTE — GROUP NOTE
Group Therapy Documentation    PATIENT'S NAME: Dipesh Nicole  MRN:   [id number]  :   1985  [id number]  DATE OF SERVICE: 25  START TIME:  5:30 PM  END TIME:  7:30 PM  FACILITATOR(S): Cristel Malhotra LADC  TOPIC: BEH Group Therapy  Number of patients attending the group:  4  Group Length:  2 Hours    Dimensions addressed: 2, 3, 5, and 6    Summary of Group / Topics Discussed:    Painting: Facilitated a group discussion on how using their substance affected mental health and physical health. Patients will be able to be creative and explore their emotions with painting. Patients will be painting how their substance use made them feel and also how sobriety is making them feel now.  Clients will be provided a canvas, paint colors in group session. Patients will be able to use their creative side and use colors to represent emotions.       Group goals/objectives:      Client will use their creative side and use colors/paint to tap into their emotions.      Client will paint how sobriety makes them feel.       Client will paint how their substance made them feel.      Group Attendance:  Attended group session and Other - Attended at 6pm    Patient's response to the group topic/interactions:  expressed understanding of topic, gave appropriate feedback to peers, and listened actively    Patient appeared to be Actively participating.        Client specific details:  Annalise participated in meditation on manifesting your future.   Annalise shared today he is feeling  busy mind, catch up with work . Annalise rated his mental health 10 being good  8/10, trying to be positive  . Annalise rated his physical health 10 being good  8/10, going to the gym .  Annalise shared over the weekend of spending time with his son and family .   Annalsie shared staying sober.  Annalise shared if he had any urges, cravings, or triggers to use over the weekend,  yes the day my son was born, the feeling of being a dad I wanted to escape but I  didn't   . Annalise shared what he will do this week for self-care  come to group  .  Annalise affirmation  Peace can be found in the present moment and not look outwards  .  Annalise participated in creating his own canvas on how his addiction made him feel and how his sobriety makes him feel now.        7/21/2025     7:00 PM   Suicide Ideation Check In   Since last session, how often have you had suicidal thoughts? No thoughts of suicide         .

## 2025-07-23 ENCOUNTER — THERAPY VISIT (OUTPATIENT)
Dept: ADDICTION MEDICINE | Facility: HOSPITAL | Age: 40
End: 2025-07-23
Attending: FAMILY MEDICINE
Payer: COMMERCIAL

## 2025-07-23 ENCOUNTER — DOCUMENTATION ONLY (OUTPATIENT)
Dept: BEHAVIORAL HEALTH | Facility: CLINIC | Age: 40
End: 2025-07-23
Payer: COMMERCIAL

## 2025-07-23 NOTE — PROGRESS NOTES
Addiction Outpatient Weekly Clinical Staffing     Dipesh Nicole was staffed on 7/23/2025 . Dipesh Nicole was staffed on recovery strengths, barriers and treatment progress.     Staff present: LAURA Winslow, LAURA Vargas, Donald Welander, LADC, and LAURA Garcia    Date: 7/23/2025 Time: 3:07 PM    Staff Signature: LAURA Garcia

## 2025-07-23 NOTE — PROGRESS NOTES
Individual Session Summary   START TIME: 6:10PM   END TIME: 6:43PM   Duration: 33 min    Dipesh Nicole is a 39 year old male who is being evaluated via in person visit.      Substance Use  Diagnosis:  Cocaine use disorder    Mental Health Diagnosis:  Anxiety and Depression     Data:  Met with client on this date for an individual session. The session focused on client's treatment plan goal for DIM(s) 3,5 of He individual treatment plan. Annalise participated in mindfulness meditation on Daily Refresh for Positive Healing.  Annalise shared today he is feeling  busy, not overwhelmed, trying to organize today   . Annalise rated his mental health 10 being good  7/10, gonig to the gym   . Annalise shared staying sober. Annalise shared if he had any urges, cravings, or triggers to use  No  .    Annalise affirmation  I am stronger than I give myself credit for   .  Annalise participated in painting his emotions on how he is feeling in his recovery and how he felt while using. Annalise shared about his painting on how he is feeling being sober   I did blue, and did 3 shadows of growing my family  . Annalise shared about his emotions on when he was using  red with white lines throughout the canvas of cocaine  .      Intervention: Facilitated an individual session. Utilized MI, Person-Centered Approach, Active Listening, Validation, Clarifying Questions, and Mindfulness Meditation.       Assessment: Client actively engaged and participated in the session. Client appears engaged in painting his canvas and exploring his creative side.     Plan. Client will Client to continue to be engaged and participate in treatment. Client to continue to engage in self-care this week.          7/22/2025     7:00 PM   Suicide Ideation Check In   Since last session, how often have you had suicidal thoughts? No thoughts of suicide               LAURA Garcia

## 2025-07-24 ENCOUNTER — DOCUMENTATION ONLY (OUTPATIENT)
Dept: BEHAVIORAL HEALTH | Facility: CLINIC | Age: 40
End: 2025-07-24
Payer: COMMERCIAL

## 2025-07-24 ENCOUNTER — TELEPHONE (OUTPATIENT)
Dept: BEHAVIORAL HEALTH | Facility: CLINIC | Age: 40
End: 2025-07-24
Payer: COMMERCIAL

## 2025-07-24 NOTE — PROGRESS NOTES
IOP DISCHARGE SUMMARY                                                                                                            Name:  Dipesh Nicole   :  LAURA Garcia   Admit Date: 2025   Discharge Date: 2025   :  1985   Hours Completed: 7   Initial Diagnosis:  304.20 (F14.20) Cocaine Use Disorder Severe   Final Diagnosis:  304.20 (F14.20) Cocaine Use Disorder Severe   Discharge Address:    [address] [place] [address] [facility] [id number]   [facility] Source:    [facility]      Program:  [facility] evening program    Discharge Type:  ADMIN- Administrative Discharge    Patient was receiving residential services at the time of discharge:   NO      Reasons for and circumstances of service termination:  Annalise was given an attendance agreement. Annalise had 5 unexcused absent from group sessions and did not reach out to his primary counselor.         If program discharge status was At Staff Request, the license shen must identify the following:    Other interested parties conferred with: not applicable    Referrals provided: not applicable due to Pt. Not signing an ORALIA for other programs    Alternatives considered and attempted before deciding to discharge:  Attendance agreement       Dimension/Course of Treatment/Individualized Care:   1.  Withdrawal Potential - Intake Risk level -  0 Discharge Risk level -0  Narrative supporting risk description:  Annalise reports date of last use of Cocaine on 2025. Annalise reports no withdrawal concern.   Treatment plan goals and progress towards those goals:  Annalise has not attended the program long enough to make progress towards his goals.      2.  Biomedical Conditions and Complications - Intake Risk level - 1 Discharge Risk level - 1  Narrative supporting risk description:  Annalise is diagnosis with Parkinson disease. Annalise reports a 5/10 pain on his right hip. Annalise reports that he will make a doctor appointment with his PCP. Annalise has  "active insurance and is able to receive care when needed.   Treatment plan goals and progress towards those goals:  Annalise has not attended the program long enough to make progress towards his goals.       3.  Emotional/Behavioral/Cognitive Conditions and Complications - Intake Risk level -  1 Discharge Risk level - 1  Narrative supporting risk description:  Annalise reports mental health diagnosis of Anxiety and Depression. Annalise reports no symptoms of anxiety and depression. Annalise past symptoms of Anxiety is \"worry\" and depression \"feeling alone and having no hope for the future\".  Annalise is experiencing stress due to life changes. Annalise reports that he meets with his individual therapist ones a month. Annalise meets with his physiatrist every 2 months for medication management. Annalise reports not SI,SIB,VI, or HI since 7/22/2025  Treatment plan goals and progress towards those goals:  Annalise has not attended the program long enough to make progress towards his goals.      4.  Readiness for Change - Intake Risk level -  2  Discharge Risk level - 3  Narrative supporting risk description:  Annalise has internal motivation for treatment for his wife, son, and for himself however is showing inconsistent behaviors by not attending treatment services. Annalise is in the contemplation stage of change due to lack of attendance and minimal awareness of how his substance use is impacting his life. Annalise completed residential services however was inconstant with [facility] level of care and was discharged 4/15/2025 and now re entered the program and is discharging from the program again due to lack of attendance.   Treatment plan goals and progress towards those goals:  Annalise has not attended the program long enough to make progress towards his goals.      5.  Relapse/Continued Use/Continued Problem Potential - Intake Risk level -  3 Discharge Risk level - 3  Narrative supporting risk description:  Annalise is at high risk of relapse due to lack of coping skills " and insight into his internal and external triggers. Annalise would benefit in learning about his triggers, urges, and cravings. Annalise would benefit from learning coping skills and mindfulness meditation. Annalise struggles with wanting to escape from  his life. Annalise is at high risk for a potential relapse due to major life changes and new stressors. Annalise is inconstant with applying coping skills when having high urges, triggers, or cravings to use.   Treatment plan goals and progress towards those goals:  Annalise has not attended the program long enough to make progress towards his goals.      6.  Recovery Environment - Intake Risk level -  1 Discharge Risk level - 1  Narrative supporting risk description:  Annalise is working full-time. Annalise has stable living with his wife and son. Annalise has no pending or current legal charged. Annalise is inconsistent with sober support meetings. Annalise sober activities is attending the gym.    Treatment plan goals and progress towards those goals:  Annalise has not attended the program long enough to make progress towards his goals.      Strengths: Determined   Needs: [facility] level of care    Services Provided: Intake, assessment, treatment planning, education, group discussion, film, lectures, 1x1 therapy, and recommendations at discharge.      Program Involvement: poor  Attendance: poor  Ability to relate in group/   Other program activities: fair  Assignment Completion: fair  Overall Behavior: fair  Reported Family/Significant   Other Involvement: fair    Prognosis: Guarded    Adult Substance Use Intensive Outpatient Discharge Instructions      Summary: Dipesh STEPHANIE Nicole were admitted to [facility] program on 7/1/2025 and you are discharging from our program on 07/24/25.  If you have any questions after your discharge please call your primary counselor at [phone] to address your questions or concerns.        Recommendation:    Dipesh is recommended to continued to  engage and participate in [facility] level of care through, [employer] or [employer] , to continue to support you in your recovery.    Follow-up Appointments and Continued Care Providers  Individual therapy  Dipesh has a scheduled  appointment one a month with Ada Mon, PhD  . If you have any questions or concern's about your upcoming appointment please call your provider  to address your questions and concerns.     Psychiatry  Dipesh has a scheduled Medication management  appointment every 2 months. If you have any questions or concern's about your upcoming appointment please call your provider  to address your questions and concerns.    Additional Resources/Support   Please continue to engage and participate in Sober  Support Group  to provide you additional support with your recovery. If you don't have a support group please look at the following additional resources down below.       Managing Symptoms and Preventing Relapse  Abstinence/Relapse Prevention  Continue using healthy strategies to help manage daily stressors.   Maintain abstinence from all mood altering substances     Healthy Living/Socialization Skills:   Continue practicing assertiveness and maintain healthy boundaries   Make sure you are reaching out to supportive people every day.   Take time for yourself and practice self care    Lifestyle Adjustment: Adjust your lifestyle to get enough sleep, relaxation, exercise and  good nutrition. Continue to develop healthy coping skills to decrease stress and promote a sober living environment. Do not use alcohol, illegal drugs or addictive medications other than what is currently prescribed. AA, NA, and  Sponsor are excellent resources for support.     General Medication Instructions:   See your medication sheet(s) for instructions.   Take all medicines as directed.  Make no changes unless your doctor suggests them.   Go to all your doctor visits.  Be sure to have all your required lab  tests. This way, your medicines can be refilled on time.  Do not use any drugs not prescribed by your provider.  AA/NA and Sponsors are excellent resources for support  Avoid alcohol.      Symptoms to Report:  If you experience more anxiety, confusion, sleeplessness, deep sadness or thoughts of suicide, notify your treatment team or notify your primary care physician. IF ANY OF THE SYMPTOMS YOU ARE EXPERIENCING ARE A MEDICAL EMERGENCY CALL 911 IMMEDIATELY. If you or someone you know is struggling or in crisis, help is available.  Call or text 988 or chat  at [employer]    Recovery Resources:  Some AA/NA meetings are being held online however most have returned to in-person or a hybrid combination please check online to verify*  AA meetings search for them at: https://aaQianmiintergroup.org (worldwide meeting listings)  AA meetings for [place] area can be found online at: https://a[place].org (click local online meetings listings)  NA meetings for [place] area can be found online at: https://www.nam[place].org  (click find a meeting)  Alcoholics Anonymous (https://aa.org/): for information 24 hours/day  AA Intergroup service office in [place] (http://www.aastpaul.org/) 527.217.8309  AA Intergroup service office in [facility]: 469.656.5774. (http://www.a[place].org/)  Narcotics Anonymous (www.nam[place].org) (918) 916-8967  https://aafairviewriverside.org/meetings  SMART Recovery - self management for addiction recovery:  www.smartrecovery.org  Pathways ~ A Health Crisis Resource & Support Center:  352.819.8879.  https://prescribetoprevent.org/patient-education/videos/  http://www.harmreduction.org  [place] Opioid Prevention Coalition: www.opioidcoalition.org      Recovery apps for your phone for educational purposes and to locate in person and zoom recovery meetings  Everything AA - educational purpose and sarah is a great resource  12 Step Toolkit - educational purpose learning about the 12 steps to  "recovery  [place] - meeting sarah  AA  - meeting sarah  Meeting guide - meeting sarah  Quick NA meeting - meeting sarah  Sahrita- has various apps  Sober Fun Activities: www.soberGamadoractivities.[employer]//[facility] Recovery Connection (MRC)  [facility] connects people seeking recovery to resources that help foster and sustain long-term recovery.  Whether you are seeking resources for treatment, transportation, housing, job training, education, health care or other pathways to recovery, [facility] is a great place to start. Please call  [phone] or go to their website at www.[place]Voice Assist.org      Mental Health Crisis Resources  Throughout [place]: call **CRISIS (**838726)  Crisis Text Line: is available for free, 24/7 by texting [place] to 856287  Suicide Awareness Voices of Education (SAVE) (www.save.org): 740-144-JEKC (0770)  The [place] Suicide Prevention Lifeline is now: 988 Suicide and Crisis Lifeline. Call 988 anytime.  National Hope Line  1.800.SUICIDE [8023932]  National [place] on Mental Illness (www.[place].adri.org): 409.661.6373 or 780-733-5729.  Ynpo7qmmc: text the word LIFE to 34835 for immediate support and crisis intervention  Mental Health Consumer/Survivor Network of [place] (www.mhcsn.net): 715.356.1340 or 879-896-8496  [facility] Health Warm Line  Peer to peer support  Monday thru Saturday, 12 pm to 10 pm  790.746.4000 or 2.756.883.7109  Text \"Support\" to 86616  Mental Health Association of [place] (www.mentalhealth.org): 860.979.3280 or 577-269-0641  Crisis Text Line  Text 499639  You will be connected with a trained live crisis counselor to provide support. Por espanol, texto  ZAIDA a 918368 o texto a 442-AYUDAME en WhatsApp  Peer Support Connection [place] Warmline ([facility]) 6-505-383-3208 Available from 5pm - 9am (7 days a week/365 days a year)  Call or Text 671    Great Pod casts for nutrition and wellness  Understand the connection between what you eat and how you feel. Hosted by licensed " nutritionists and dietitians from [employer] we share practical, real-life solutions for healthier living through nutrition.   Listen on Apple Podcasts  [employer] Nutrition   [employer]   Nutrition     Here are a list of additional numbers you can call if you are wanting to resume services through [facility]:  [facility] Assessment and Scheduling Intake: [phone]  Mental Health and Addiction Services Programmatic Care Navigation Hub: [phone]  Adult [facility] Intensive Outpatient  call: [phone]  [facility] Admissions call [phone]    Recovery Clinic call: [phone]  [facility] call: [phone]  Medical Records call: [phone]  Billing Department call: [phone]  Patient Relations call: [phone]    THANK YOU FOR CHOOSING [facility]       Counselor Name and Title:  LAURA Garcia       Date:  7/24/2025  Time:  2:04 PM

## 2025-07-24 NOTE — PROGRESS NOTES
ABSENT NOTE:    Dipesh STEPHANIE Nicole was absent from group 7/23/2025. This absence was not excused. This writer attempted to contact patient via telephone, but he did not answer and a voicemail was left.    Primary counselor was informed.     Theresa Alanis, [facility]  7/23/2025 , 8:09 PM

## (undated) DEVICE — PREP POVIDONE-IODINE 7.5% SCRUB 4OZ BOTTLE MDS093945

## (undated) DEVICE — DRSG STERI STRIP 1/2X4" R1547

## (undated) DEVICE — DRAPE IOBAN INCISE 23X17" 6650EZ

## (undated) DEVICE — JELLY LUBRICATING SURGILUBE 2OZ TUBE

## (undated) DEVICE — SPECIMEN CONTAINER 3OZ W/FORMALIN 59901

## (undated) DEVICE — PREP CHLORAPREP CLEAR 3ML 260400

## (undated) DEVICE — SUCTION MANIFOLD NEPTUNE 2 SYS 1 PORT 702-025-000

## (undated) DEVICE — LINEN TOWEL PACK X6 WHITE 5487

## (undated) DEVICE — TUBE GUIDE ALPHA OMEGA SYSTEM STR-007721-00

## (undated) DEVICE — TUBING SUCTION 12"X1/4" N612

## (undated) DEVICE — SU MONOCRYL 4-0 PS-2 27" UND Y426H

## (undated) DEVICE — SPONGE COTTONOID 1/2X1/2" 20-04S

## (undated) DEVICE — KIT DBS LEAD DBS 8CH 40CM 1-3,3-1 SPACE 0.5 BLACK 6172ANS: Type: IMPLANTABLE DEVICE | Site: BRAIN | Status: NON-FUNCTIONAL

## (undated) DEVICE — KIT ENDO TURNOVER/PROCEDURE CARRY-ON 101822

## (undated) DEVICE — DRSG PRIMAPORE 03 1/8X6" 66000318

## (undated) DEVICE — PREP SKIN SCRUB TRAY 4461A

## (undated) DEVICE — PREP DURAPREP 26ML APL 8630

## (undated) DEVICE — SU VICRYL 3-0 SH 8X18" UND J864D

## (undated) DEVICE — DECANTER VIAL 2006S

## (undated) DEVICE — PAD CHUX UNDERPAD 23X24" 7136

## (undated) DEVICE — HEADRING POINTS STEREOTACTIC DHRSL

## (undated) DEVICE — NDL 25GA 2"  8881200441

## (undated) DEVICE — LABEL MEDICATION SYSTEM 3303-P

## (undated) DEVICE — GOWN IMPERVIOUS 2XL BLUE

## (undated) DEVICE — SU VICRYL 0 CT-1 27" UND J260H

## (undated) DEVICE — SUTURE BOOTS 051003PBX

## (undated) DEVICE — DRSG TEGADERM 2 3/8X2 3/4" 1624W

## (undated) DEVICE — ESU GROUND PAD ADULT W/CORD E7507

## (undated) DEVICE — PREP POVIDONE IODINE SOLUTION 10% 4OZ BOTTLE 29906-004

## (undated) DEVICE — LINEN TOWEL PACK X30 5481

## (undated) DEVICE — BLADE CLIPPER SGL USE 9680

## (undated) DEVICE — SOL NACL 0.9% IRRIG 1000ML BOTTLE 2F7124

## (undated) DEVICE — SUCTION MANIFOLD NEPTUNE 2 SYS 4 PORT 0702-020-000

## (undated) DEVICE — DRSG TELFA 3X8" 1238

## (undated) DEVICE — PREP CHLORAPREP CLEAR 3ML 930400

## (undated) DEVICE — ELEC MICROPHONICS-FREE ALPHA OMEGA STR-009080-00

## (undated) DEVICE — COMB STERILE 7" PLASTIC 14-1200

## (undated) DEVICE — CABLE MULTI-LEAD TRIAL 3014ANS

## (undated) DEVICE — PACK GOWN 3/PK DISP XL SBA32GPFCB

## (undated) DEVICE — WIPES FOLEY CARE SURESTEP PROVON DFC100

## (undated) DEVICE — DRSG TEGADERM 4X4 3/4" 1626W

## (undated) DEVICE — SU ETHILON 3-0 PS-1 18" 1663H

## (undated) DEVICE — DRAPE C-ARM W/STRAPS 42X72" 07-CA104

## (undated) DEVICE — PATIENT CONTROLLER DBS

## (undated) DEVICE — RX BACITRACIN OINTMENT 0.9G 1/32OZ CUR001109

## (undated) DEVICE — BLADE KNIFE SURG 11 371111

## (undated) DEVICE — SYR 10ML FINGER CONTROL W/O NDL 309695

## (undated) DEVICE — SU VICRYL 3-0 X-1 27" UND J458H

## (undated) DEVICE — GLOVE EXAM NITRILE LG PF LATEX FREE 5064

## (undated) DEVICE — SPONGE SURGIFOAM 100 1974

## (undated) DEVICE — SOL WATER IRRIG 500ML BOTTLE 2F7113

## (undated) DEVICE — ESU GROUND PAD ADULT REM W/15' CORD E7507DB

## (undated) DEVICE — SYR 30ML SLIP TIP W/O NDL 302833

## (undated) DEVICE — SOL ADH LIQUID BENZOIN SWAB 0.6ML C1544

## (undated) DEVICE — CATH TRAY FOLEY SURESTEP 16FR W/TMP PRB STLK LATEX A319416AM

## (undated) DEVICE — GLOVE PROTEXIS W/NEU-THERA 7.5  2D73TE75

## (undated) DEVICE — SU MONOCRYL 3-0 PS-1 27" Y936H

## (undated) DEVICE — BUR STRK CARBIDE ROUND 5.0MM 5820-110-050C

## (undated) DEVICE — PACK SET-UP STD 9102

## (undated) DEVICE — ENDO BITE BLOCK ADULT OMNI-BLOC

## (undated) DEVICE — STPL SKIN PRXM+SS 35MM PMR35

## (undated) DEVICE — ENDO FORCEP BX CAPTURA PRO SPIKE G50696

## (undated) DEVICE — SUCTION CATH AIRLIFE TRI-FLO W/CONTROL PORT 14FR  T60C

## (undated) DEVICE — DRAPE IOBAN ISOLATION VERTICAL 320X21CM 6617

## (undated) DEVICE — PREP CHLORAPREP 26ML TINTED HI-LITE ORANGE 930815

## (undated) DEVICE — NDL BLUNT 18GA 1" W/O FILTER 305181

## (undated) DEVICE — GLOVE PROTEXIS MICRO 7.5  2D73PM75

## (undated) DEVICE — STRAP UNIVERSAL POSITIONING 2-PIECE 4X47X76" 91-287

## (undated) DEVICE — DRAPE SHEET REV FOLD 3/4 9349

## (undated) DEVICE — PACK NEURO MINOR UMMC SNE32MNMU4

## (undated) DEVICE — CABLE 5 CHANNEL INPUT  ALPHA OMEGA SYSTEM STR-000642-55

## (undated) DEVICE — DRSG GAUZE 4X4" TRAY 6939

## (undated) DEVICE — PATIENT MANUAL AND MAGNET

## (undated) DEVICE — SOL WATER IRRIG 1000ML BOTTLE 2F7114

## (undated) DEVICE — LINEN TOWEL PACK X5 5464

## (undated) DEVICE — DRSG PRIMAPORE 02X3" 7133

## (undated) RX ORDER — FENTANYL CITRATE 50 UG/ML
INJECTION, SOLUTION INTRAMUSCULAR; INTRAVENOUS
Status: DISPENSED
Start: 2022-07-18

## (undated) RX ORDER — CEFAZOLIN SODIUM 1 G/3ML
INJECTION, POWDER, FOR SOLUTION INTRAMUSCULAR; INTRAVENOUS
Status: DISPENSED
Start: 2022-08-15

## (undated) RX ORDER — CEFAZOLIN SODIUM 1 G/3ML
INJECTION, POWDER, FOR SOLUTION INTRAMUSCULAR; INTRAVENOUS
Status: DISPENSED
Start: 2022-07-25

## (undated) RX ORDER — CEFAZOLIN SODIUM 1 G/3ML
INJECTION, POWDER, FOR SOLUTION INTRAMUSCULAR; INTRAVENOUS
Status: DISPENSED
Start: 2022-07-18

## (undated) RX ORDER — LIDOCAINE HYDROCHLORIDE 20 MG/ML
INJECTION, SOLUTION EPIDURAL; INFILTRATION; INTRACAUDAL; PERINEURAL
Status: DISPENSED
Start: 2022-07-18

## (undated) RX ORDER — FENTANYL CITRATE-0.9 % NACL/PF 10 MCG/ML
PLASTIC BAG, INJECTION (ML) INTRAVENOUS
Status: DISPENSED
Start: 2022-07-18

## (undated) RX ORDER — EPINEPHRINE IN SOD CHLOR,ISO 1 MG/10 ML
SYRINGE (ML) INTRAVENOUS
Status: DISPENSED
Start: 2022-07-25

## (undated) RX ORDER — CALCIUM CHLORIDE 100 MG/ML
INJECTION INTRAVENOUS; INTRAVENTRICULAR
Status: DISPENSED
Start: 2022-07-25

## (undated) RX ORDER — EPHEDRINE SULFATE 50 MG/ML
INJECTION, SOLUTION INTRAMUSCULAR; INTRAVENOUS; SUBCUTANEOUS
Status: DISPENSED
Start: 2022-07-18

## (undated) RX ORDER — SODIUM CHLORIDE 9 MG/ML
INJECTION, SOLUTION INTRAVENOUS
Status: DISPENSED
Start: 2022-07-18

## (undated) RX ORDER — ALBUMIN, HUMAN INJ 5% 5 %
SOLUTION INTRAVENOUS
Status: DISPENSED
Start: 2022-07-18

## (undated) RX ORDER — LIDOCAINE HYDROCHLORIDE AND EPINEPHRINE 10; 10 MG/ML; UG/ML
INJECTION, SOLUTION INFILTRATION; PERINEURAL
Status: DISPENSED
Start: 2022-08-15

## (undated) RX ORDER — PROPOFOL 10 MG/ML
INJECTION, EMULSION INTRAVENOUS
Status: DISPENSED
Start: 2022-07-25

## (undated) RX ORDER — ESMOLOL HYDROCHLORIDE 10 MG/ML
INJECTION INTRAVENOUS
Status: DISPENSED
Start: 2022-07-18

## (undated) RX ORDER — ACETAMINOPHEN 325 MG/1
TABLET ORAL
Status: DISPENSED
Start: 2022-07-18

## (undated) RX ORDER — LIDOCAINE HYDROCHLORIDE 20 MG/ML
INJECTION, SOLUTION EPIDURAL; INFILTRATION; INTRACAUDAL; PERINEURAL
Status: DISPENSED
Start: 2022-07-25

## (undated) RX ORDER — PROPOFOL 10 MG/ML
INJECTION, EMULSION INTRAVENOUS
Status: DISPENSED
Start: 2022-07-18

## (undated) RX ORDER — GLYCOPYRROLATE 0.2 MG/ML
INJECTION, SOLUTION INTRAMUSCULAR; INTRAVENOUS
Status: DISPENSED
Start: 2022-08-15

## (undated) RX ORDER — FENTANYL CITRATE 50 UG/ML
INJECTION, SOLUTION INTRAMUSCULAR; INTRAVENOUS
Status: DISPENSED
Start: 2022-08-15

## (undated) RX ORDER — PROPOFOL 10 MG/ML
INJECTION, EMULSION INTRAVENOUS
Status: DISPENSED
Start: 2022-08-15

## (undated) RX ORDER — CEFAZOLIN SODIUM/WATER 2 G/20 ML
SYRINGE (ML) INTRAVENOUS
Status: DISPENSED
Start: 2022-07-18

## (undated) RX ORDER — LIDOCAINE HYDROCHLORIDE 20 MG/ML
INJECTION, SOLUTION EPIDURAL; INFILTRATION; INTRACAUDAL; PERINEURAL
Status: DISPENSED
Start: 2022-08-15

## (undated) RX ORDER — LORAZEPAM 2 MG/ML
INJECTION INTRAMUSCULAR
Status: DISPENSED
Start: 2022-08-15

## (undated) RX ORDER — ONDANSETRON 2 MG/ML
INJECTION INTRAMUSCULAR; INTRAVENOUS
Status: DISPENSED
Start: 2022-07-25

## (undated) RX ORDER — LABETALOL HYDROCHLORIDE 5 MG/ML
INJECTION, SOLUTION INTRAVENOUS
Status: DISPENSED
Start: 2022-07-18

## (undated) RX ORDER — FENTANYL CITRATE 50 UG/ML
INJECTION, SOLUTION INTRAMUSCULAR; INTRAVENOUS
Status: DISPENSED
Start: 2022-07-25

## (undated) RX ORDER — BUPIVACAINE HYDROCHLORIDE 2.5 MG/ML
INJECTION, SOLUTION EPIDURAL; INFILTRATION; INTRACAUDAL
Status: DISPENSED
Start: 2022-07-18

## (undated) RX ORDER — DEXAMETHASONE SODIUM PHOSPHATE 4 MG/ML
INJECTION, SOLUTION INTRA-ARTICULAR; INTRALESIONAL; INTRAMUSCULAR; INTRAVENOUS; SOFT TISSUE
Status: DISPENSED
Start: 2022-07-25

## (undated) RX ORDER — CALCIUM CHLORIDE 100 MG/ML
INJECTION INTRAVENOUS; INTRAVENTRICULAR
Status: DISPENSED
Start: 2022-07-18

## (undated) RX ORDER — CEFAZOLIN SODIUM/WATER 2 G/20 ML
SYRINGE (ML) INTRAVENOUS
Status: DISPENSED
Start: 2022-08-15

## (undated) RX ORDER — OXYCODONE HYDROCHLORIDE 5 MG/1
TABLET ORAL
Status: DISPENSED
Start: 2022-07-18

## (undated) RX ORDER — MUPIROCIN 20 MG/G
OINTMENT TOPICAL
Status: DISPENSED
Start: 2022-08-15

## (undated) RX ORDER — ONDANSETRON 2 MG/ML
INJECTION INTRAMUSCULAR; INTRAVENOUS
Status: DISPENSED
Start: 2022-08-15

## (undated) RX ORDER — LIDOCAINE HYDROCHLORIDE AND EPINEPHRINE 10; 10 MG/ML; UG/ML
INJECTION, SOLUTION INFILTRATION; PERINEURAL
Status: DISPENSED
Start: 2022-07-18

## (undated) RX ORDER — ACETAMINOPHEN 325 MG/1
TABLET ORAL
Status: DISPENSED
Start: 2022-07-25

## (undated) RX ORDER — HYDROMORPHONE HCL IN WATER/PF 6 MG/30 ML
PATIENT CONTROLLED ANALGESIA SYRINGE INTRAVENOUS
Status: DISPENSED
Start: 2022-07-18

## (undated) RX ORDER — LIDOCAINE HYDROCHLORIDE 20 MG/ML
SOLUTION OROPHARYNGEAL
Status: DISPENSED
Start: 2021-11-04

## (undated) RX ORDER — LIDOCAINE HYDROCHLORIDE 20 MG/ML
JELLY TOPICAL
Status: DISPENSED
Start: 2022-07-18

## (undated) RX ORDER — BUPIVACAINE HYDROCHLORIDE 2.5 MG/ML
INJECTION, SOLUTION EPIDURAL; INFILTRATION; INTRACAUDAL
Status: DISPENSED
Start: 2022-08-15

## (undated) RX ORDER — CEFAZOLIN SODIUM/WATER 2 G/20 ML
SYRINGE (ML) INTRAVENOUS
Status: DISPENSED
Start: 2022-07-25

## (undated) RX ORDER — LIDOCAINE HYDROCHLORIDE 20 MG/ML
JELLY TOPICAL
Status: DISPENSED
Start: 2022-08-15

## (undated) RX ORDER — FENTANYL CITRATE-0.9 % NACL/PF 10 MCG/ML
PLASTIC BAG, INJECTION (ML) INTRAVENOUS
Status: DISPENSED
Start: 2022-07-25